# Patient Record
Sex: MALE | Race: WHITE | NOT HISPANIC OR LATINO | Employment: OTHER | URBAN - METROPOLITAN AREA
[De-identification: names, ages, dates, MRNs, and addresses within clinical notes are randomized per-mention and may not be internally consistent; named-entity substitution may affect disease eponyms.]

---

## 2019-03-29 ENCOUNTER — OFFICE VISIT (OUTPATIENT)
Dept: FAMILY MEDICINE CLINIC | Facility: CLINIC | Age: 84
End: 2019-03-29

## 2019-03-29 VITALS
DIASTOLIC BLOOD PRESSURE: 60 MMHG | SYSTOLIC BLOOD PRESSURE: 100 MMHG | HEART RATE: 84 BPM | BODY MASS INDEX: 31.97 KG/M2 | OXYGEN SATURATION: 98 % | WEIGHT: 236 LBS | HEIGHT: 72 IN

## 2019-03-29 DIAGNOSIS — L03.113 CELLULITIS OF RIGHT UPPER EXTREMITY: Primary | ICD-10-CM

## 2019-03-29 PROCEDURE — 99203 OFFICE O/P NEW LOW 30 MIN: CPT | Performed by: FAMILY MEDICINE

## 2019-03-29 RX ORDER — CEPHALEXIN 500 MG/1
500 CAPSULE ORAL 2 TIMES DAILY
Qty: 20 CAPSULE | Refills: 0 | Status: SHIPPED | OUTPATIENT
Start: 2019-03-29 | End: 2019-04-08

## 2019-03-29 RX ORDER — OMEPRAZOLE 40 MG/1
40 CAPSULE, DELAYED RELEASE ORAL DAILY
Refills: 2 | COMMUNITY
Start: 2019-02-21 | End: 2023-04-03 | Stop reason: SDUPTHER

## 2019-03-29 RX ORDER — AMLODIPINE BESYLATE 10 MG/1
5 TABLET ORAL 2 TIMES DAILY
Refills: 10 | COMMUNITY
Start: 2019-01-20 | End: 2023-04-03 | Stop reason: SDUPTHER

## 2019-03-29 RX ORDER — LOSARTAN POTASSIUM 50 MG/1
50 TABLET ORAL 2 TIMES DAILY
Refills: 2 | COMMUNITY
Start: 2019-01-20 | End: 2023-04-03 | Stop reason: SDUPTHER

## 2019-03-29 RX ORDER — CLONIDINE HYDROCHLORIDE 0.2 MG/1
0.2 TABLET ORAL 2 TIMES DAILY
Refills: 2 | COMMUNITY
Start: 2019-02-21

## 2019-03-29 RX ORDER — HYDROCHLOROTHIAZIDE 12.5 MG/1
1 TABLET ORAL DAILY
Refills: 3 | COMMUNITY
Start: 2019-03-21 | End: 2021-12-10 | Stop reason: HOSPADM

## 2019-03-29 NOTE — PROGRESS NOTES
Subjective   Iain Dee is a 83 y.o. male.     Chief Complaint   Patient presents with   • Establish Care     swelling and redness of right hand        Upper Extremity Issue   This is a new problem. The current episode started in the past 7 days. The problem occurs constantly. The problem has been unchanged. Associated symptoms include joint swelling and a rash. Pertinent negatives include no abdominal pain, chills, fatigue, fever, myalgias or weakness. Nothing aggravates the symptoms. He has tried nothing for the symptoms.        Previous primary care: None in years  Sees Dr. Bernstein, cardiology    Acute complaints:  Iain Dee is a 83 y.o. male who presents today to establish [facility]. He reports right hand swelling.    The following portions of the patient's history were reviewed and updated as appropriate: allergies, current medications, past family history, past medical history, past social history, past surgical history and problem list.    Active Ambulatory Problems     Diagnosis Date Noted   • No Active Ambulatory Problems     Resolved Ambulatory Problems     Diagnosis Date Noted   • No Resolved Ambulatory Problems     Past Medical History:   Diagnosis Date   • Arthritis    • Cancer (CMS/[facility])    • Diabetes mellitus (CMS/[facility])    • GERD (gastroesophageal reflux disease)    • Gout    • Hypertension      Past Surgical History:   Procedure Laterality Date   • BLADDER SURGERY      Biopsy   • HERNIA REPAIR     • PROSTATE BIOPSY       Family History   Problem Relation Age of Onset   • Heart attack Father      Social History     Socioeconomic History   • Marital status: Single     Spouse name: Not on file   • Number of children: Not on file   • Years of education: Not on file   • Highest education level: Not on file   Tobacco Use   • Smoking status: Never Smoker   • Smokeless tobacco: Never Used   Substance and Sexual Activity   • Alcohol use: Yes   • Drug use: No         Review of Systems   Constitutional:  "Negative for chills, fatigue and fever.   Gastrointestinal: Negative for abdominal pain.   Musculoskeletal: Positive for joint swelling. Negative for myalgias.   Skin: Positive for rash.   Neurological: Negative for weakness.       Objective   Blood pressure 100/60, pulse 84, height 182.9 cm (72\"), weight 107 kg (236 lb), SpO2 98 %.  Nursing note reviewed  Physical Exam  Const: NAD, A&Ox4, Pleasant, Cooperative  Eyes: EOMI, no conjunctivitis  ENT: No nasal discharge present, neck supple  Cardiac: Regular rate and rhythm, no cyanosis  Resp: Respiratory rate within normal limits, no increased work of breathing, no audible wheezing or retractions noted  GI: No distention or ascites  Neurologic: CN II-XII grossly intact  Psych: Appropriate mood and behavior.  Skin: There is significant dorsal swelling of the right hand and fingers extending to the proximal wrist crease level. There is overlying erythema.  Procedures  Assessment/Plan   Iain was seen today for establish care.    Diagnoses and all orders for this visit:    Cellulitis of right hand  -     cephalexin (KEFLEX) 500 MG capsule; Take 1 capsule by mouth 2 (Two) Times a Day for 10 days.  -     CBC & Differential; Future      We will plan to obtain previous records both for chronic preventative care as well as those related to the current episode of care.  Any records that the patient brought with him today were reviewed personally by me during the visit today and will be scanned into the chart for posterity.    There are no Patient Instructions on file for this visit.    Return in about 1 week (around 4/5/2019) for follow-up acute issue.    Ambulatory progress note signed and attested to by Luis Aguilar D.O. on 3/29/2019 at 10:36.           "

## 2019-04-01 ENCOUNTER — OFFICE VISIT (OUTPATIENT)
Dept: FAMILY MEDICINE CLINIC | Facility: CLINIC | Age: 84
End: 2019-04-01

## 2019-04-01 VITALS
DIASTOLIC BLOOD PRESSURE: 74 MMHG | WEIGHT: 236 LBS | BODY MASS INDEX: 31.97 KG/M2 | SYSTOLIC BLOOD PRESSURE: 120 MMHG | HEART RATE: 74 BPM | HEIGHT: 72 IN | OXYGEN SATURATION: 93 %

## 2019-04-01 DIAGNOSIS — L03.113 CELLULITIS OF RIGHT UPPER EXTREMITY: Primary | ICD-10-CM

## 2019-04-01 DIAGNOSIS — L03.113 CELLULITIS OF RIGHT UPPER EXTREMITY: ICD-10-CM

## 2019-04-01 PROCEDURE — 99214 OFFICE O/P EST MOD 30 MIN: CPT | Performed by: FAMILY MEDICINE

## 2019-04-01 RX ORDER — INDOMETHACIN 50 MG/1
CAPSULE ORAL
Qty: 180 CAPSULE | Refills: 0 | OUTPATIENT
Start: 2019-04-01

## 2019-04-01 RX ORDER — INDOMETHACIN 50 MG/1
50 CAPSULE ORAL 2 TIMES DAILY WITH MEALS
Qty: 20 CAPSULE | Refills: 0 | Status: SHIPPED | OUTPATIENT
Start: 2019-04-01 | End: 2019-04-09 | Stop reason: SDUPTHER

## 2019-04-09 DIAGNOSIS — L03.113 CELLULITIS OF RIGHT UPPER EXTREMITY: ICD-10-CM

## 2019-04-09 RX ORDER — INDOMETHACIN 50 MG/1
CAPSULE ORAL
Qty: 20 CAPSULE | Refills: 0 | Status: SHIPPED | OUTPATIENT
Start: 2019-04-09 | End: 2019-04-18 | Stop reason: SDUPTHER

## 2019-04-18 DIAGNOSIS — L03.113 CELLULITIS OF RIGHT UPPER EXTREMITY: ICD-10-CM

## 2019-04-18 RX ORDER — INDOMETHACIN 50 MG/1
CAPSULE ORAL
Qty: 20 CAPSULE | Refills: 0 | Status: SHIPPED | OUTPATIENT
Start: 2019-04-18 | End: 2019-05-16

## 2019-04-22 NOTE — PROGRESS NOTES
Subjective   Iain Dee is a 83 y.o. male.     Chief Complaint   Patient presents with   • Cellulitis     right hand, no improvement        Upper Extremity Issue   Chronicity: Ongoing. The current episode started in the past 7 days. The problem occurs constantly. The problem has been unchanged. Associated symptoms include joint swelling and a rash. Pertinent negatives include no abdominal pain, chills, fatigue, fever, myalgias or weakness. Nothing aggravates the symptoms. He has tried nothing for the symptoms.   Cellulitis   Chronicity: Ongoing. Associated symptoms include joint swelling and a rash. Pertinent negatives include no abdominal pain, chills, fatigue, fever, myalgias or weakness.        Acute complaints:  Iain Dee is a 83 y.o. male who presents today to follow-up on cellulitis.  He was started on antibiotics last week, talk to his friends again over the weekend and still thinks he has pseudogout.    The following portions of the patient's history were reviewed and updated as appropriate: allergies, current medications, past family history, past medical history, past social history, past surgical history and problem list.    Active Ambulatory Problems     Diagnosis Date Noted   • No Active Ambulatory Problems     Resolved Ambulatory Problems     Diagnosis Date Noted   • No Resolved Ambulatory Problems     Past Medical History:   Diagnosis Date   • Arthritis    • Cancer (CMS/[facility])    • Diabetes mellitus (CMS/[facility])    • GERD (gastroesophageal reflux disease)    • Gout    • Hypertension      Past Surgical History:   Procedure Laterality Date   • BLADDER SURGERY      Biopsy   • HERNIA REPAIR     • PROSTATE BIOPSY       Family History   Problem Relation Age of Onset   • Heart attack Father      Social History     Socioeconomic History   • Marital status: Single     Spouse name: Not on file   • Number of children: Not on file   • Years of education: Not on file   • Highest education level: Not on file  "  Tobacco Use   • Smoking status: Never Smoker   • Smokeless tobacco: Never Used   Substance and Sexual Activity   • Alcohol use: Yes   • Drug use: No         Review of Systems   Constitutional: Negative for chills, fatigue and fever.   Gastrointestinal: Negative for abdominal pain.   Musculoskeletal: Positive for joint swelling. Negative for myalgias.   Skin: Positive for rash.   Neurological: Negative for weakness.       Objective   Blood pressure 120/74, pulse 74, height 182.9 cm (72.01\"), weight 107 kg (236 lb), SpO2 93 %.  Nursing note reviewed  Physical Exam  Const: NAD, A&Ox4, Pleasant, Cooperative  Eyes: EOMI, no conjunctivitis  ENT: No nasal discharge present, neck supple  Cardiac: Regular rate and rhythm, no cyanosis  Resp: Respiratory rate within normal limits, no increased work of breathing, no audible wheezing or retractions noted  GI: No distention or ascites  Neurologic: CN II-XII grossly intact  Psych: Appropriate mood and behavior.  Skin: There is significant dorsal swelling of the right hand and fingers extending to the proximal wrist crease level. There is overlying erythema, which does appear to be slightly improved from last week  Procedures  Assessment/Plan   Iain was seen today for cellulitis.    Diagnoses and all orders for this visit:    Cellulitis of right hand  -     Discontinue: indomethacin (INDOCIN) 50 MG capsule; Take 1 capsule by mouth 2 (Two) Times a Day With Meals.      There is no swelling of a particular joint that would be consistent with pseudogout, and certainly nothing that would be able to be drained today without the assistance of ultrasound, even if it were within the joint.  I like him to continue the antibiotics, he may use indomethacin as needed    There are no Patient Instructions on file for this visit.    No Follow-up on file.    Ambulatory progress note signed and attested to by Luis Aguilar D.O.           "

## 2021-12-08 ENCOUNTER — APPOINTMENT (OUTPATIENT)
Dept: CT IMAGING | Facility: HOSPITAL | Age: 86
End: 2021-12-08

## 2021-12-08 ENCOUNTER — APPOINTMENT (OUTPATIENT)
Dept: GENERAL RADIOLOGY | Facility: HOSPITAL | Age: 86
End: 2021-12-08

## 2021-12-08 ENCOUNTER — APPOINTMENT (OUTPATIENT)
Dept: MRI IMAGING | Facility: HOSPITAL | Age: 86
End: 2021-12-08

## 2021-12-08 ENCOUNTER — HOSPITAL ENCOUNTER (INPATIENT)
Facility: HOSPITAL | Age: 86
LOS: 2 days | Discharge: HOME | End: 2021-12-10
Attending: STUDENT IN AN ORGANIZED HEALTH CARE EDUCATION/TRAINING PROGRAM | Admitting: INTERNAL MEDICINE

## 2021-12-08 ENCOUNTER — APPOINTMENT (OUTPATIENT)
Dept: CARDIOLOGY | Facility: HOSPITAL | Age: 86
End: 2021-12-08

## 2021-12-08 DIAGNOSIS — E83.42 HYPOMAGNESEMIA: ICD-10-CM

## 2021-12-08 DIAGNOSIS — E83.51 HYPOCALCEMIA: ICD-10-CM

## 2021-12-08 DIAGNOSIS — R41.82 ALTERED MENTAL STATUS, UNSPECIFIED ALTERED MENTAL STATUS TYPE: ICD-10-CM

## 2021-12-08 DIAGNOSIS — E87.6 HYPOKALEMIA: Primary | ICD-10-CM

## 2021-12-08 PROBLEM — I48.91 ATRIAL FIBRILLATION: Status: ACTIVE | Noted: 2021-12-08

## 2021-12-08 PROBLEM — Z79.01 CHRONIC ANTICOAGULATION: Status: ACTIVE | Noted: 2021-12-08

## 2021-12-08 PROBLEM — K21.9 GERD (GASTROESOPHAGEAL REFLUX DISEASE): Status: ACTIVE | Noted: 2021-12-08

## 2021-12-08 PROBLEM — G93.40 ENCEPHALOPATHY: Status: ACTIVE | Noted: 2021-12-08

## 2021-12-08 PROBLEM — E88.09 HYPOALBUMINEMIA: Status: ACTIVE | Noted: 2021-12-08

## 2021-12-08 PROBLEM — I10 ESSENTIAL HYPERTENSION: Status: ACTIVE | Noted: 2021-12-08

## 2021-12-08 PROBLEM — R73.9 HYPERGLYCEMIA: Status: ACTIVE | Noted: 2021-12-08

## 2021-12-08 LAB
ALBUMIN SERPL-MCNC: 2.6 G/DL (ref 3.5–5.2)
ALBUMIN/GLOB SERPL: 1.4 G/DL
ALP SERPL-CCNC: 46 U/L (ref 39–117)
ALT SERPL W P-5'-P-CCNC: 9 U/L (ref 1–41)
AMMONIA BLD-SCNC: 16 UMOL/L (ref 16–60)
AMPHET+METHAMPHET UR QL: NEGATIVE
AMPHETAMINES UR QL: NEGATIVE
ANION GAP SERPL CALCULATED.3IONS-SCNC: 12 MMOL/L (ref 5–15)
ANION GAP SERPL CALCULATED.3IONS-SCNC: 14 MMOL/L (ref 5–15)
APAP SERPL-MCNC: <5 MCG/ML (ref 0–30)
AST SERPL-CCNC: 16 U/L (ref 1–40)
B PARAPERT DNA SPEC QL NAA+PROBE: NOT DETECTED
B PERT DNA SPEC QL NAA+PROBE: NOT DETECTED
BACTERIA UR QL AUTO: NORMAL /HPF
BARBITURATES UR QL SCN: NEGATIVE
BASE EXCESS BLDA CALC-SCNC: 4 MMOL/L (ref -5–5)
BASOPHILS # BLD AUTO: 0.1 10*3/MM3 (ref 0–0.2)
BASOPHILS NFR BLD AUTO: 1 % (ref 0–1.5)
BENZODIAZ UR QL SCN: NEGATIVE
BH CV ECHO MEAS - AI DEC SLOPE: 242.5 CM/SEC^2
BH CV ECHO MEAS - AI MAX PG: 90 MMHG
BH CV ECHO MEAS - AI MAX VEL: 474.4 CM/SEC
BH CV ECHO MEAS - AI P1/2T: 572.8 MSEC
BH CV ECHO MEAS - AO MAX PG (FULL): 0.86 MMHG
BH CV ECHO MEAS - AO MAX PG: 5.6 MMHG
BH CV ECHO MEAS - AO MEAN PG (FULL): 1.3 MMHG
BH CV ECHO MEAS - AO MEAN PG: 3.1 MMHG
BH CV ECHO MEAS - AO ROOT AREA (BSA CORRECTED): 1.5
BH CV ECHO MEAS - AO ROOT AREA: 9.3 CM^2
BH CV ECHO MEAS - AO ROOT DIAM: 3.4 CM
BH CV ECHO MEAS - AO V2 MAX: 118.1 CM/SEC
BH CV ECHO MEAS - AO V2 MEAN: 82 CM/SEC
BH CV ECHO MEAS - AO V2 VTI: 24.5 CM
BH CV ECHO MEAS - ASC AORTA: 3.1 CM
BH CV ECHO MEAS - AVA(I,A): 2.5 CM^2
BH CV ECHO MEAS - AVA(I,D): 2.5 CM^2
BH CV ECHO MEAS - AVA(V,A): 2.8 CM^2
BH CV ECHO MEAS - AVA(V,D): 2.8 CM^2
BH CV ECHO MEAS - BSA(HAYCOCK): 2.3 M^2
BH CV ECHO MEAS - BSA: 2.2 M^2
BH CV ECHO MEAS - BZI_BMI: 30.7 KILOGRAMS/M^2
BH CV ECHO MEAS - BZI_METRIC_HEIGHT: 182.9 CM
BH CV ECHO MEAS - BZI_METRIC_WEIGHT: 102.5 KG
BH CV ECHO MEAS - EDV(CUBED): 222 ML
BH CV ECHO MEAS - EDV(MOD-SP2): 90 ML
BH CV ECHO MEAS - EDV(MOD-SP4): 109 ML
BH CV ECHO MEAS - EDV(TEICH): 183.8 ML
BH CV ECHO MEAS - EF(CUBED): 76.2 %
BH CV ECHO MEAS - EF(MOD-BP): 63 %
BH CV ECHO MEAS - EF(MOD-SP2): 61.1 %
BH CV ECHO MEAS - EF(MOD-SP4): 63.3 %
BH CV ECHO MEAS - EF(TEICH): 67.3 %
BH CV ECHO MEAS - ESV(CUBED): 52.9 ML
BH CV ECHO MEAS - ESV(MOD-SP2): 35 ML
BH CV ECHO MEAS - ESV(MOD-SP4): 40 ML
BH CV ECHO MEAS - ESV(TEICH): 60.2 ML
BH CV ECHO MEAS - FS: 38 %
BH CV ECHO MEAS - IVS/LVPW: 0.99
BH CV ECHO MEAS - IVSD: 1.2 CM
BH CV ECHO MEAS - LA DIMENSION: 4.2 CM
BH CV ECHO MEAS - LA/AO: 1.2
BH CV ECHO MEAS - LAD MAJOR: 7.1 CM
BH CV ECHO MEAS - LAT PEAK E' VEL: 12.6 CM/SEC
BH CV ECHO MEAS - LATERAL E/E' RATIO: 7.5
BH CV ECHO MEAS - LV DIASTOLIC VOL/BSA (35-75): 48.6 ML/M^2
BH CV ECHO MEAS - LV IVRT: 0.08 SEC
BH CV ECHO MEAS - LV MASS(C)D: 330.9 GRAMS
BH CV ECHO MEAS - LV MASS(C)DI: 147.4 GRAMS/M^2
BH CV ECHO MEAS - LV MAX PG: 4.7 MMHG
BH CV ECHO MEAS - LV MEAN PG: 1.8 MMHG
BH CV ECHO MEAS - LV SYSTOLIC VOL/BSA (12-30): 17.8 ML/M^2
BH CV ECHO MEAS - LV V1 MAX: 108.6 CM/SEC
BH CV ECHO MEAS - LV V1 MEAN: 58.5 CM/SEC
BH CV ECHO MEAS - LV V1 VTI: 20.2 CM
BH CV ECHO MEAS - LVIDD: 6.1 CM
BH CV ECHO MEAS - LVIDS: 3.8 CM
BH CV ECHO MEAS - LVLD AP2: 7.9 CM
BH CV ECHO MEAS - LVLD AP4: 7.8 CM
BH CV ECHO MEAS - LVLS AP2: 7.6 CM
BH CV ECHO MEAS - LVLS AP4: 7.4 CM
BH CV ECHO MEAS - LVOT AREA (M): 3.1 CM^2
BH CV ECHO MEAS - LVOT AREA: 3.1 CM^2
BH CV ECHO MEAS - LVOT DIAM: 2 CM
BH CV ECHO MEAS - LVPWD: 1.2 CM
BH CV ECHO MEAS - MED PEAK E' VEL: 11.2 CM/SEC
BH CV ECHO MEAS - MEDIAL E/E' RATIO: 8.4
BH CV ECHO MEAS - MV A MAX VEL: 45 CM/SEC
BH CV ECHO MEAS - MV DEC SLOPE: 440.3 CM/SEC^2
BH CV ECHO MEAS - MV DEC TIME: 0.17 SEC
BH CV ECHO MEAS - MV E MAX VEL: 96 CM/SEC
BH CV ECHO MEAS - MV E/A: 2.1
BH CV ECHO MEAS - MV MAX PG: 8.6 MMHG
BH CV ECHO MEAS - MV MEAN PG: 2.8 MMHG
BH CV ECHO MEAS - MV P1/2T MAX VEL: 148.6 CM/SEC
BH CV ECHO MEAS - MV P1/2T: 98.8 MSEC
BH CV ECHO MEAS - MV V2 MAX: 146.7 CM/SEC
BH CV ECHO MEAS - MV V2 MEAN: 70.8 CM/SEC
BH CV ECHO MEAS - MV V2 VTI: 55.8 CM
BH CV ECHO MEAS - MVA P1/2T LCG: 1.5 CM^2
BH CV ECHO MEAS - MVA(P1/2T): 2.2 CM^2
BH CV ECHO MEAS - MVA(VTI): 1.1 CM^2
BH CV ECHO MEAS - PA ACC SLOPE: 433.9 CM/SEC^2
BH CV ECHO MEAS - PA ACC TIME: 0.15 SEC
BH CV ECHO MEAS - PA PR(ACCEL): 12.5 MMHG
BH CV ECHO MEAS - PI END-D VEL: 97.7 CM/SEC
BH CV ECHO MEAS - RAP SYSTOLE: 8 MMHG
BH CV ECHO MEAS - RVDD: 3 CM
BH CV ECHO MEAS - RVSP: 44 MMHG
BH CV ECHO MEAS - SI(AO): 102 ML/M^2
BH CV ECHO MEAS - SI(CUBED): 75.3 ML/M^2
BH CV ECHO MEAS - SI(LVOT): 27.6 ML/M^2
BH CV ECHO MEAS - SI(MOD-SP2): 24.5 ML/M^2
BH CV ECHO MEAS - SI(MOD-SP4): 30.7 ML/M^2
BH CV ECHO MEAS - SI(TEICH): 55.1 ML/M^2
BH CV ECHO MEAS - SV(AO): 228.9 ML
BH CV ECHO MEAS - SV(CUBED): 169 ML
BH CV ECHO MEAS - SV(LVOT): 62 ML
BH CV ECHO MEAS - SV(MOD-SP2): 55 ML
BH CV ECHO MEAS - SV(MOD-SP4): 69 ML
BH CV ECHO MEAS - SV(TEICH): 123.6 ML
BH CV ECHO MEAS - TAPSE (>1.6): 1.5 CM
BH CV ECHO MEAS - TR MAX PG: 36 MMHG
BH CV ECHO MEAS - TR MAX VEL: 299.1 CM/SEC
BH CV ECHO MEASUREMENTS AVERAGE E/E' RATIO: 8.07
BH CV VAS BP RIGHT ARM: NORMAL MMHG
BH CV XLRA - RV BASE: 4.3 CM
BH CV XLRA - RV LENGTH: 8.5 CM
BH CV XLRA - RV MID: 3.5 CM
BH CV XLRA - TDI S': 19.4 CM/SEC
BILIRUB SERPL-MCNC: 0.5 MG/DL (ref 0–1.2)
BILIRUB UR QL STRIP: NEGATIVE
BUN SERPL-MCNC: 17 MG/DL (ref 8–23)
BUN SERPL-MCNC: 20 MG/DL (ref 8–23)
BUN/CREAT SERPL: 18.2 (ref 7–25)
BUN/CREAT SERPL: 20.5 (ref 7–25)
BUPRENORPHINE SERPL-MCNC: NEGATIVE NG/ML
C PNEUM DNA NPH QL NAA+NON-PROBE: NOT DETECTED
CA-I BLDA-SCNC: 0.72 MMOL/L (ref 1.2–1.32)
CA-I SERPL ISE-MCNC: 0.83 MMOL/L (ref 1.12–1.32)
CA-I SERPL ISE-MCNC: 0.9 MMOL/L (ref 1.12–1.32)
CALCIUM SPEC-SCNC: 4.3 MG/DL (ref 8.6–10.5)
CALCIUM SPEC-SCNC: 6.5 MG/DL (ref 8.6–10.5)
CANNABINOIDS SERPL QL: NEGATIVE
CHLORIDE SERPL-SCNC: 108 MMOL/L (ref 98–107)
CHLORIDE SERPL-SCNC: 94 MMOL/L (ref 98–107)
CHOLEST SERPL-MCNC: 129 MG/DL (ref 0–200)
CLARITY UR: CLEAR
CO2 BLDA-SCNC: 30 MMOL/L (ref 24–29)
CO2 SERPL-SCNC: 22 MMOL/L (ref 22–29)
CO2 SERPL-SCNC: 29 MMOL/L (ref 22–29)
COCAINE UR QL: NEGATIVE
COLOR UR: YELLOW
CREAT BLDA-MCNC: 1.2 MG/DL (ref 0.6–1.3)
CREAT SERPL-MCNC: 0.83 MG/DL (ref 0.76–1.27)
CREAT SERPL-MCNC: 1.1 MG/DL (ref 0.76–1.27)
CRP SERPL-MCNC: 4.51 MG/DL (ref 0–0.5)
D-LACTATE SERPL-SCNC: 1.3 MMOL/L (ref 0.5–2)
D-LACTATE SERPL-SCNC: 3 MMOL/L (ref 0.5–2)
DEPRECATED RDW RBC AUTO: 45.1 FL (ref 37–54)
EOSINOPHIL # BLD AUTO: 0.25 10*3/MM3 (ref 0–0.4)
EOSINOPHIL NFR BLD AUTO: 2.5 % (ref 0.3–6.2)
ERYTHROCYTE [DISTWIDTH] IN BLOOD BY AUTOMATED COUNT: 12.4 % (ref 12.3–15.4)
ETHANOL BLD-MCNC: <10 MG/DL (ref 0–10)
FLUAV SUBTYP SPEC NAA+PROBE: NOT DETECTED
FLUBV RNA ISLT QL NAA+PROBE: NOT DETECTED
GFR SERPL CREATININE-BSD FRML MDRD: 64 ML/MIN/1.73
GFR SERPL CREATININE-BSD FRML MDRD: 88 ML/MIN/1.73
GLOBULIN UR ELPH-MCNC: 1.9 GM/DL
GLUCOSE BLDC GLUCOMTR-MCNC: 126 MG/DL (ref 70–130)
GLUCOSE BLDC GLUCOMTR-MCNC: 165 MG/DL (ref 70–130)
GLUCOSE BLDC GLUCOMTR-MCNC: 199 MG/DL (ref 70–130)
GLUCOSE BLDC GLUCOMTR-MCNC: 202 MG/DL (ref 70–130)
GLUCOSE BLDC GLUCOMTR-MCNC: 205 MG/DL (ref 70–130)
GLUCOSE BLDC GLUCOMTR-MCNC: 206 MG/DL (ref 70–130)
GLUCOSE SERPL-MCNC: 160 MG/DL (ref 65–99)
GLUCOSE SERPL-MCNC: 183 MG/DL (ref 65–99)
GLUCOSE UR STRIP-MCNC: NEGATIVE MG/DL
HADV DNA SPEC NAA+PROBE: NOT DETECTED
HBA1C MFR BLD: 8 % (ref 4.8–5.6)
HCO3 BLDA-SCNC: 28.8 MMOL/L (ref 22–26)
HCOV 229E RNA SPEC QL NAA+PROBE: NOT DETECTED
HCOV HKU1 RNA SPEC QL NAA+PROBE: NOT DETECTED
HCOV NL63 RNA SPEC QL NAA+PROBE: NOT DETECTED
HCOV OC43 RNA SPEC QL NAA+PROBE: NOT DETECTED
HCT VFR BLD AUTO: 38 % (ref 37.5–51)
HCT VFR BLDA CALC: 36 % (ref 38–51)
HDLC SERPL-MCNC: 28 MG/DL (ref 40–60)
HGB BLD-MCNC: 12.8 G/DL (ref 13–17.7)
HGB BLDA-MCNC: 12.2 G/DL (ref 12–17)
HGB UR QL STRIP.AUTO: ABNORMAL
HMPV RNA NPH QL NAA+NON-PROBE: NOT DETECTED
HOLD SPECIMEN: NORMAL
HPIV1 RNA ISLT QL NAA+PROBE: NOT DETECTED
HPIV2 RNA SPEC QL NAA+PROBE: NOT DETECTED
HPIV3 RNA NPH QL NAA+PROBE: NOT DETECTED
HPIV4 P GENE NPH QL NAA+PROBE: NOT DETECTED
HYALINE CASTS UR QL AUTO: NORMAL /LPF
IMM GRANULOCYTES # BLD AUTO: 0.04 10*3/MM3 (ref 0–0.05)
IMM GRANULOCYTES NFR BLD AUTO: 0.4 % (ref 0–0.5)
INR PPP: 1.3 (ref 0.8–1.2)
KETONES UR QL STRIP: NEGATIVE
LDH SERPL-CCNC: 296 U/L (ref 135–225)
LDLC SERPL CALC-MCNC: 84 MG/DL (ref 0–100)
LDLC/HDLC SERPL: 2.96 {RATIO}
LEFT ATRIUM VOLUME INDEX: 42 ML/M^2
LEFT ATRIUM VOLUME: 93 ML
LEUKOCYTE ESTERASE UR QL STRIP.AUTO: NEGATIVE
LYMPHOCYTES # BLD AUTO: 1.45 10*3/MM3 (ref 0.7–3.1)
LYMPHOCYTES NFR BLD AUTO: 14.7 % (ref 19.6–45.3)
M PNEUMO IGG SER IA-ACNC: NOT DETECTED
MAGNESIUM SERPL-MCNC: 0.3 MG/DL (ref 1.6–2.4)
MAGNESIUM SERPL-MCNC: 0.5 MG/DL (ref 1.6–2.4)
MAGNESIUM SERPL-MCNC: 1.2 MG/DL (ref 1.6–2.4)
MAGNESIUM SERPL-MCNC: 1.2 MG/DL (ref 1.6–2.4)
MAGNESIUM SERPL-MCNC: 1.9 MG/DL (ref 1.6–2.4)
MCH RBC QN AUTO: 32.9 PG (ref 26.6–33)
MCHC RBC AUTO-ENTMCNC: 33.7 G/DL (ref 31.5–35.7)
MCV RBC AUTO: 97.7 FL (ref 79–97)
METHADONE UR QL SCN: NEGATIVE
MONOCYTES # BLD AUTO: 0.64 10*3/MM3 (ref 0.1–0.9)
MONOCYTES NFR BLD AUTO: 6.5 % (ref 5–12)
NEUTROPHILS NFR BLD AUTO: 7.37 10*3/MM3 (ref 1.7–7)
NEUTROPHILS NFR BLD AUTO: 74.9 % (ref 42.7–76)
NITRITE UR QL STRIP: NEGATIVE
NRBC BLD AUTO-RTO: 0 /100 WBC (ref 0–0.2)
NT-PROBNP SERPL-MCNC: 778.3 PG/ML (ref 0–1800)
OPIATES UR QL: NEGATIVE
OXYCODONE UR QL SCN: NEGATIVE
PCO2 BLDA: 48 MM HG (ref 35–45)
PCP UR QL SCN: NEGATIVE
PH BLDA: 7.39 PH UNITS (ref 7.35–7.6)
PH UR STRIP.AUTO: 6.5 [PH] (ref 5–8)
PHOSPHATE SERPL-MCNC: 2.3 MG/DL (ref 2.5–4.5)
PHOSPHATE SERPL-MCNC: 2.5 MG/DL (ref 2.5–4.5)
PHOSPHATE SERPL-MCNC: 3.3 MG/DL (ref 2.5–4.5)
PLATELET # BLD AUTO: 393 10*3/MM3 (ref 140–450)
PMV BLD AUTO: 10.6 FL (ref 6–12)
PO2 BLDA: 38 MMHG (ref 80–105)
POTASSIUM BLDA-SCNC: 2.4 MMOL/L (ref 3.5–4.9)
POTASSIUM SERPL-SCNC: 1.9 MMOL/L (ref 3.5–5.2)
POTASSIUM SERPL-SCNC: 2.7 MMOL/L (ref 3.5–5.2)
POTASSIUM SERPL-SCNC: 2.9 MMOL/L (ref 3.5–5.2)
POTASSIUM SERPL-SCNC: 3.5 MMOL/L (ref 3.5–5.2)
POTASSIUM SERPL-SCNC: 3.5 MMOL/L (ref 3.5–5.2)
PROCALCITONIN SERPL-MCNC: 0.1 NG/ML (ref 0–0.25)
PROPOXYPH UR QL: NEGATIVE
PROT SERPL-MCNC: 4.5 G/DL (ref 6–8.5)
PROT UR QL STRIP: ABNORMAL
PROTHROMBIN TIME: 14.9 SECONDS (ref 12.8–15.2)
RBC # BLD AUTO: 3.89 10*6/MM3 (ref 4.14–5.8)
RBC # UR STRIP: NORMAL /HPF
REF LAB TEST METHOD: NORMAL
RHINOVIRUS RNA SPEC NAA+PROBE: NOT DETECTED
RSV RNA NPH QL NAA+NON-PROBE: NOT DETECTED
SALICYLATES SERPL-MCNC: <0.3 MG/DL
SAO2 % BLDA: 71 % (ref 95–98)
SARS-COV-2 RNA NPH QL NAA+NON-PROBE: NOT DETECTED
SODIUM BLD-SCNC: 142 MMOL/L (ref 138–146)
SODIUM SERPL-SCNC: 137 MMOL/L (ref 136–145)
SODIUM SERPL-SCNC: 142 MMOL/L (ref 136–145)
SP GR UR STRIP: 1.03 (ref 1–1.03)
SQUAMOUS #/AREA URNS HPF: NORMAL /HPF
T4 FREE SERPL-MCNC: 1.22 NG/DL (ref 0.93–1.7)
TRICYCLICS UR QL SCN: NEGATIVE
TRIGL SERPL-MCNC: 90 MG/DL (ref 0–150)
TROPONIN T SERPL-MCNC: 0.02 NG/ML (ref 0–0.03)
URATE SERPL-MCNC: 5.5 MG/DL (ref 3.4–7)
UROBILINOGEN UR QL STRIP: ABNORMAL
VLDLC SERPL-MCNC: 17 MG/DL (ref 5–40)
WBC # UR STRIP: NORMAL /HPF
WBC NRBC COR # BLD: 9.85 10*3/MM3 (ref 3.4–10.8)
WHOLE BLOOD HOLD SPECIMEN: NORMAL
WHOLE BLOOD HOLD SPECIMEN: NORMAL

## 2021-12-08 PROCEDURE — 83605 ASSAY OF LACTIC ACID: CPT | Performed by: STUDENT IN AN ORGANIZED HEALTH CARE EDUCATION/TRAINING PROGRAM

## 2021-12-08 PROCEDURE — 82330 ASSAY OF CALCIUM: CPT | Performed by: STUDENT IN AN ORGANIZED HEALTH CARE EDUCATION/TRAINING PROGRAM

## 2021-12-08 PROCEDURE — 63710000001 INSULIN LISPRO (HUMAN) PER 5 UNITS: Performed by: NURSE PRACTITIONER

## 2021-12-08 PROCEDURE — 82947 ASSAY GLUCOSE BLOOD QUANT: CPT

## 2021-12-08 PROCEDURE — 84295 ASSAY OF SERUM SODIUM: CPT

## 2021-12-08 PROCEDURE — 83735 ASSAY OF MAGNESIUM: CPT | Performed by: NURSE PRACTITIONER

## 2021-12-08 PROCEDURE — 99291 CRITICAL CARE FIRST HOUR: CPT | Performed by: INTERNAL MEDICINE

## 2021-12-08 PROCEDURE — 84100 ASSAY OF PHOSPHORUS: CPT | Performed by: INTERNAL MEDICINE

## 2021-12-08 PROCEDURE — 83735 ASSAY OF MAGNESIUM: CPT | Performed by: INTERNAL MEDICINE

## 2021-12-08 PROCEDURE — 84132 ASSAY OF SERUM POTASSIUM: CPT | Performed by: INTERNAL MEDICINE

## 2021-12-08 PROCEDURE — 92523 SPEECH SOUND LANG COMPREHEN: CPT | Performed by: SPEECH-LANGUAGE PATHOLOGIST

## 2021-12-08 PROCEDURE — 85014 HEMATOCRIT: CPT

## 2021-12-08 PROCEDURE — 0 POTASSIUM CHLORIDE 10 MEQ/100ML SOLUTION: Performed by: STUDENT IN AN ORGANIZED HEALTH CARE EDUCATION/TRAINING PROGRAM

## 2021-12-08 PROCEDURE — 86140 C-REACTIVE PROTEIN: CPT | Performed by: STUDENT IN AN ORGANIZED HEALTH CARE EDUCATION/TRAINING PROGRAM

## 2021-12-08 PROCEDURE — 84100 ASSAY OF PHOSPHORUS: CPT | Performed by: NURSE PRACTITIONER

## 2021-12-08 PROCEDURE — P9612 CATHETERIZE FOR URINE SPEC: HCPCS

## 2021-12-08 PROCEDURE — 0 MAGNESIUM SULFATE 4 GM/100ML SOLUTION: Performed by: STUDENT IN AN ORGANIZED HEALTH CARE EDUCATION/TRAINING PROGRAM

## 2021-12-08 PROCEDURE — 84145 PROCALCITONIN (PCT): CPT | Performed by: STUDENT IN AN ORGANIZED HEALTH CARE EDUCATION/TRAINING PROGRAM

## 2021-12-08 PROCEDURE — 94799 UNLISTED PULMONARY SVC/PX: CPT

## 2021-12-08 PROCEDURE — 70551 MRI BRAIN STEM W/O DYE: CPT

## 2021-12-08 PROCEDURE — 80306 DRUG TEST PRSMV INSTRMNT: CPT | Performed by: STUDENT IN AN ORGANIZED HEALTH CARE EDUCATION/TRAINING PROGRAM

## 2021-12-08 PROCEDURE — 82565 ASSAY OF CREATININE: CPT

## 2021-12-08 PROCEDURE — 85025 COMPLETE CBC W/AUTO DIFF WBC: CPT | Performed by: STUDENT IN AN ORGANIZED HEALTH CARE EDUCATION/TRAINING PROGRAM

## 2021-12-08 PROCEDURE — 87040 BLOOD CULTURE FOR BACTERIA: CPT | Performed by: STUDENT IN AN ORGANIZED HEALTH CARE EDUCATION/TRAINING PROGRAM

## 2021-12-08 PROCEDURE — 84132 ASSAY OF SERUM POTASSIUM: CPT

## 2021-12-08 PROCEDURE — 81001 URINALYSIS AUTO W/SCOPE: CPT | Performed by: STUDENT IN AN ORGANIZED HEALTH CARE EDUCATION/TRAINING PROGRAM

## 2021-12-08 PROCEDURE — 0042T HC CT CEREBRAL PERFUSION W/WO CONTRAST: CPT

## 2021-12-08 PROCEDURE — 97166 OT EVAL MOD COMPLEX 45 MIN: CPT

## 2021-12-08 PROCEDURE — 84100 ASSAY OF PHOSPHORUS: CPT | Performed by: STUDENT IN AN ORGANIZED HEALTH CARE EDUCATION/TRAINING PROGRAM

## 2021-12-08 PROCEDURE — 94660 CPAP INITIATION&MGMT: CPT

## 2021-12-08 PROCEDURE — 80143 DRUG ASSAY ACETAMINOPHEN: CPT | Performed by: STUDENT IN AN ORGANIZED HEALTH CARE EDUCATION/TRAINING PROGRAM

## 2021-12-08 PROCEDURE — 99285 EMERGENCY DEPT VISIT HI MDM: CPT

## 2021-12-08 PROCEDURE — 92610 EVALUATE SWALLOWING FUNCTION: CPT | Performed by: SPEECH-LANGUAGE PATHOLOGIST

## 2021-12-08 PROCEDURE — 25010000002 MAGNESIUM SULFATE 2 GM/50ML SOLUTION: Performed by: NURSE PRACTITIONER

## 2021-12-08 PROCEDURE — 99221 1ST HOSP IP/OBS SF/LOW 40: CPT | Performed by: PSYCHIATRY & NEUROLOGY

## 2021-12-08 PROCEDURE — 82140 ASSAY OF AMMONIA: CPT | Performed by: NURSE PRACTITIONER

## 2021-12-08 PROCEDURE — 82330 ASSAY OF CALCIUM: CPT

## 2021-12-08 PROCEDURE — 70496 CT ANGIOGRAPHY HEAD: CPT

## 2021-12-08 PROCEDURE — 83615 LACTATE (LD) (LDH) ENZYME: CPT | Performed by: STUDENT IN AN ORGANIZED HEALTH CARE EDUCATION/TRAINING PROGRAM

## 2021-12-08 PROCEDURE — 25010000002 CALCIUM GLUCONATE PER 10 ML

## 2021-12-08 PROCEDURE — 70498 CT ANGIOGRAPHY NECK: CPT

## 2021-12-08 PROCEDURE — 0202U NFCT DS 22 TRGT SARS-COV-2: CPT | Performed by: NURSE PRACTITIONER

## 2021-12-08 PROCEDURE — 93306 TTE W/DOPPLER COMPLETE: CPT

## 2021-12-08 PROCEDURE — 80053 COMPREHEN METABOLIC PANEL: CPT | Performed by: STUDENT IN AN ORGANIZED HEALTH CARE EDUCATION/TRAINING PROGRAM

## 2021-12-08 PROCEDURE — 70450 CT HEAD/BRAIN W/O DYE: CPT

## 2021-12-08 PROCEDURE — 82330 ASSAY OF CALCIUM: CPT | Performed by: INTERNAL MEDICINE

## 2021-12-08 PROCEDURE — 80061 LIPID PANEL: CPT | Performed by: NURSE PRACTITIONER

## 2021-12-08 PROCEDURE — 85610 PROTHROMBIN TIME: CPT

## 2021-12-08 PROCEDURE — 83036 HEMOGLOBIN GLYCOSYLATED A1C: CPT | Performed by: NURSE PRACTITIONER

## 2021-12-08 PROCEDURE — 0 IOPAMIDOL PER 1 ML: Performed by: STUDENT IN AN ORGANIZED HEALTH CARE EDUCATION/TRAINING PROGRAM

## 2021-12-08 PROCEDURE — 82803 BLOOD GASES ANY COMBINATION: CPT

## 2021-12-08 PROCEDURE — 82077 ASSAY SPEC XCP UR&BREATH IA: CPT | Performed by: STUDENT IN AN ORGANIZED HEALTH CARE EDUCATION/TRAINING PROGRAM

## 2021-12-08 PROCEDURE — 84132 ASSAY OF SERUM POTASSIUM: CPT | Performed by: NURSE PRACTITIONER

## 2021-12-08 PROCEDURE — 71045 X-RAY EXAM CHEST 1 VIEW: CPT

## 2021-12-08 PROCEDURE — 83735 ASSAY OF MAGNESIUM: CPT | Performed by: STUDENT IN AN ORGANIZED HEALTH CARE EDUCATION/TRAINING PROGRAM

## 2021-12-08 PROCEDURE — 4A03X5D MEASUREMENT OF ARTERIAL FLOW, INTRACRANIAL, EXTERNAL APPROACH: ICD-10-PCS | Performed by: STUDENT IN AN ORGANIZED HEALTH CARE EDUCATION/TRAINING PROGRAM

## 2021-12-08 PROCEDURE — 97530 THERAPEUTIC ACTIVITIES: CPT

## 2021-12-08 PROCEDURE — 84484 ASSAY OF TROPONIN QUANT: CPT | Performed by: STUDENT IN AN ORGANIZED HEALTH CARE EDUCATION/TRAINING PROGRAM

## 2021-12-08 PROCEDURE — 97162 PT EVAL MOD COMPLEX 30 MIN: CPT

## 2021-12-08 PROCEDURE — 80179 DRUG ASSAY SALICYLATE: CPT | Performed by: STUDENT IN AN ORGANIZED HEALTH CARE EDUCATION/TRAINING PROGRAM

## 2021-12-08 PROCEDURE — 93005 ELECTROCARDIOGRAM TRACING: CPT | Performed by: STUDENT IN AN ORGANIZED HEALTH CARE EDUCATION/TRAINING PROGRAM

## 2021-12-08 PROCEDURE — 93005 ELECTROCARDIOGRAM TRACING: CPT

## 2021-12-08 PROCEDURE — 84439 ASSAY OF FREE THYROXINE: CPT | Performed by: STUDENT IN AN ORGANIZED HEALTH CARE EDUCATION/TRAINING PROGRAM

## 2021-12-08 PROCEDURE — 84550 ASSAY OF BLOOD/URIC ACID: CPT | Performed by: STUDENT IN AN ORGANIZED HEALTH CARE EDUCATION/TRAINING PROGRAM

## 2021-12-08 PROCEDURE — 83880 ASSAY OF NATRIURETIC PEPTIDE: CPT | Performed by: STUDENT IN AN ORGANIZED HEALTH CARE EDUCATION/TRAINING PROGRAM

## 2021-12-08 RX ORDER — SODIUM CHLORIDE 0.9 % (FLUSH) 0.9 %
10 SYRINGE (ML) INJECTION EVERY 12 HOURS SCHEDULED
Status: DISCONTINUED | OUTPATIENT
Start: 2021-12-08 | End: 2021-12-10 | Stop reason: HOSPADM

## 2021-12-08 RX ORDER — POTASSIUM CHLORIDE 7.45 MG/ML
10 INJECTION INTRAVENOUS ONCE
Status: COMPLETED | OUTPATIENT
Start: 2021-12-08 | End: 2021-12-08

## 2021-12-08 RX ORDER — PANTOPRAZOLE SODIUM 40 MG/1
40 TABLET, DELAYED RELEASE ORAL EVERY MORNING
Refills: 2 | Status: DISCONTINUED | OUTPATIENT
Start: 2021-12-08 | End: 2021-12-10 | Stop reason: HOSPADM

## 2021-12-08 RX ORDER — ATORVASTATIN CALCIUM 40 MG/1
80 TABLET, FILM COATED ORAL NIGHTLY
Status: DISCONTINUED | OUTPATIENT
Start: 2021-12-08 | End: 2021-12-10 | Stop reason: HOSPADM

## 2021-12-08 RX ORDER — SODIUM CHLORIDE 0.9 % (FLUSH) 0.9 %
10 SYRINGE (ML) INJECTION AS NEEDED
Status: DISCONTINUED | OUTPATIENT
Start: 2021-12-08 | End: 2021-12-08

## 2021-12-08 RX ORDER — ASPIRIN 81 MG/1
81 TABLET, CHEWABLE ORAL DAILY
Status: DISCONTINUED | OUTPATIENT
Start: 2021-12-08 | End: 2021-12-10 | Stop reason: HOSPADM

## 2021-12-08 RX ORDER — POTASSIUM CHLORIDE 7.45 MG/ML
10 INJECTION INTRAVENOUS
Status: DISCONTINUED | OUTPATIENT
Start: 2021-12-08 | End: 2021-12-10 | Stop reason: HOSPADM

## 2021-12-08 RX ORDER — POTASSIUM CHLORIDE 1.5 G/1.77G
40 POWDER, FOR SOLUTION ORAL AS NEEDED
Status: DISCONTINUED | OUTPATIENT
Start: 2021-12-08 | End: 2021-12-10 | Stop reason: HOSPADM

## 2021-12-08 RX ORDER — AMLODIPINE BESYLATE 5 MG/1
5 TABLET ORAL
Status: DISCONTINUED | OUTPATIENT
Start: 2021-12-08 | End: 2021-12-10 | Stop reason: HOSPADM

## 2021-12-08 RX ORDER — ASPIRIN 300 MG/1
300 SUPPOSITORY RECTAL DAILY
Status: DISCONTINUED | OUTPATIENT
Start: 2021-12-08 | End: 2021-12-10 | Stop reason: HOSPADM

## 2021-12-08 RX ORDER — POTASSIUM CHLORIDE 750 MG/1
40 CAPSULE, EXTENDED RELEASE ORAL AS NEEDED
Status: DISCONTINUED | OUTPATIENT
Start: 2021-12-08 | End: 2021-12-10 | Stop reason: HOSPADM

## 2021-12-08 RX ORDER — MAGNESIUM SULFATE HEPTAHYDRATE 40 MG/ML
2 INJECTION, SOLUTION INTRAVENOUS AS NEEDED
Status: DISCONTINUED | OUTPATIENT
Start: 2021-12-08 | End: 2021-12-10 | Stop reason: HOSPADM

## 2021-12-08 RX ORDER — MAGNESIUM SULFATE HEPTAHYDRATE 40 MG/ML
4 INJECTION, SOLUTION INTRAVENOUS AS NEEDED
Status: DISCONTINUED | OUTPATIENT
Start: 2021-12-08 | End: 2021-12-10 | Stop reason: HOSPADM

## 2021-12-08 RX ORDER — CALCIUM GLUCONATE 94 MG/ML
2 INJECTION, SOLUTION INTRAVENOUS ONCE
Status: DISCONTINUED | OUTPATIENT
Start: 2021-12-08 | End: 2021-12-08

## 2021-12-08 RX ORDER — SODIUM CHLORIDE 0.9 % (FLUSH) 0.9 %
10 SYRINGE (ML) INJECTION AS NEEDED
Status: DISCONTINUED | OUTPATIENT
Start: 2021-12-08 | End: 2021-12-10 | Stop reason: HOSPADM

## 2021-12-08 RX ORDER — MAGNESIUM SULFATE HEPTAHYDRATE 40 MG/ML
4 INJECTION, SOLUTION INTRAVENOUS ONCE
Status: COMPLETED | OUTPATIENT
Start: 2021-12-08 | End: 2021-12-08

## 2021-12-08 RX ORDER — PANTOPRAZOLE SODIUM 40 MG/10ML
40 INJECTION, POWDER, LYOPHILIZED, FOR SOLUTION INTRAVENOUS
Status: DISCONTINUED | OUTPATIENT
Start: 2021-12-08 | End: 2021-12-08

## 2021-12-08 RX ADMIN — POTASSIUM CHLORIDE 40 MEQ: 750 CAPSULE, EXTENDED RELEASE ORAL at 12:43

## 2021-12-08 RX ADMIN — INSULIN LISPRO 3 UNITS: 100 INJECTION, SOLUTION INTRAVENOUS; SUBCUTANEOUS at 12:43

## 2021-12-08 RX ADMIN — METOPROLOL TARTRATE 12.5 MG: 25 TABLET, FILM COATED ORAL at 08:39

## 2021-12-08 RX ADMIN — IOPAMIDOL 100 ML: 755 INJECTION, SOLUTION INTRAVENOUS at 02:22

## 2021-12-08 RX ADMIN — APIXABAN 2.5 MG: 2.5 TABLET, FILM COATED ORAL at 08:39

## 2021-12-08 RX ADMIN — POTASSIUM CHLORIDE 40 MEQ: 750 CAPSULE, EXTENDED RELEASE ORAL at 16:15

## 2021-12-08 RX ADMIN — ATORVASTATIN CALCIUM 80 MG: 40 TABLET, FILM COATED ORAL at 21:44

## 2021-12-08 RX ADMIN — PANTOPRAZOLE SODIUM 40 MG: 40 INJECTION, POWDER, FOR SOLUTION INTRAVENOUS at 06:08

## 2021-12-08 RX ADMIN — MAGNESIUM SULFATE HEPTAHYDRATE 4 G: 40 INJECTION, SOLUTION INTRAVENOUS at 03:32

## 2021-12-08 RX ADMIN — CALCIUM GLUCONATE 2 G: 98 INJECTION, SOLUTION INTRAVENOUS at 04:31

## 2021-12-08 RX ADMIN — PANTOPRAZOLE SODIUM 40 MG: 40 TABLET, DELAYED RELEASE ORAL at 12:43

## 2021-12-08 RX ADMIN — MAGNESIUM SULFATE HEPTAHYDRATE 2 G: 2 INJECTION, SOLUTION INTRAVENOUS at 08:37

## 2021-12-08 RX ADMIN — POTASSIUM CHLORIDE 40 MEQ: 750 CAPSULE, EXTENDED RELEASE ORAL at 08:38

## 2021-12-08 RX ADMIN — INSULIN LISPRO 3 UNITS: 100 INJECTION, SOLUTION INTRAVENOUS; SUBCUTANEOUS at 21:44

## 2021-12-08 RX ADMIN — METOPROLOL TARTRATE 12.5 MG: 25 TABLET, FILM COATED ORAL at 21:44

## 2021-12-08 RX ADMIN — POTASSIUM CHLORIDE 10 MEQ: 7.46 INJECTION, SOLUTION INTRAVENOUS at 03:30

## 2021-12-08 RX ADMIN — ASPIRIN 81 MG CHEWABLE TABLET 81 MG: 81 TABLET CHEWABLE at 08:39

## 2021-12-08 RX ADMIN — MAGNESIUM SULFATE HEPTAHYDRATE 2 G: 2 INJECTION, SOLUTION INTRAVENOUS at 10:33

## 2021-12-08 RX ADMIN — INSULIN LISPRO 2 UNITS: 100 INJECTION, SOLUTION INTRAVENOUS; SUBCUTANEOUS at 17:49

## 2021-12-08 RX ADMIN — SODIUM CHLORIDE, PRESERVATIVE FREE 10 ML: 5 INJECTION INTRAVENOUS at 21:45

## 2021-12-08 RX ADMIN — APIXABAN 2.5 MG: 2.5 TABLET, FILM COATED ORAL at 21:45

## 2021-12-08 RX ADMIN — POTASSIUM CHLORIDE 10 MEQ: 7.46 INJECTION, SOLUTION INTRAVENOUS at 04:52

## 2021-12-08 RX ADMIN — SODIUM CHLORIDE 500 ML: 9 INJECTION, SOLUTION INTRAVENOUS at 01:57

## 2021-12-08 RX ADMIN — MAGNESIUM SULFATE HEPTAHYDRATE 2 G: 2 INJECTION, SOLUTION INTRAVENOUS at 12:43

## 2021-12-08 RX ADMIN — AMLODIPINE BESYLATE 5 MG: 5 TABLET ORAL at 08:39

## 2021-12-08 RX ADMIN — SODIUM CHLORIDE, POTASSIUM CHLORIDE, SODIUM LACTATE AND CALCIUM CHLORIDE 500 ML: 600; 310; 30; 20 INJECTION, SOLUTION INTRAVENOUS at 03:39

## 2021-12-08 RX ADMIN — SODIUM CHLORIDE, PRESERVATIVE FREE 10 ML: 5 INJECTION INTRAVENOUS at 08:39

## 2021-12-08 NOTE — ED PROVIDER NOTES
EMERGENCY DEPARTMENT ENCOUNTER    Pt Name: Iain Dee  MRN: [id number]  Pt :   1935  Room Number:  N216/1  Date of encounter:  2021  PCP: Luis Aguilar DO  ED Provider: Coy Rain MD    Historian: EMS, wife      HPI:  Chief Complaint: Altered mental status        Context: Iain Dee is a 86-year-old man who was brought in the emergency department by EMS for altered mental status.  Reportedly by his wife who is at the bedside he was in his normal state of health when going to bed at 10 PM last night.  She says his mental baseline is alert and oriented with no confusion or dementia.  Then immediately prior to arrival she was awoken by him she says flailing around in bed and confused.  EMS report that when they arrived he was combative to the point that they nearly needed sedation but he calmed down after a while.  He is remained confused.  Upon arrival here he has not alert to the situation, place, or time and says he has no idea what happened and has no complaints.  They deny recent illness or fevers.  No other history known at this time.  PAST MEDICAL HISTORY  Past Medical History:   Diagnosis Date   • Arthritis    • Cancer (HCC)    • Diabetes mellitus (HCC)    • GERD (gastroesophageal reflux disease)    • Gout    • H/O Bladder carcinoma (HCC)    • Hypertension          PAST SURGICAL HISTORY  Past Surgical History:   Procedure Laterality Date   • BLADDER SURGERY      Biopsy   • HERNIA REPAIR     • PROSTATE BIOPSY           FAMILY HISTORY  Family History   Problem Relation Age of Onset   • Heart attack Father          SOCIAL HISTORY  Social History     Socioeconomic History   • Marital status: Single   Tobacco Use   • Smoking status: Never Smoker   • Smokeless tobacco: Never Used   Substance and Sexual Activity   • Alcohol use: Yes   • Drug use: No         ALLERGIES  Colchicine        REVIEW OF SYSTEMS  Review of Systems     Unobtainable due to altered mental status but  negative per the wife.       PHYSICAL EXAM    I have reviewed the triage vital signs and nursing notes.    ED Triage Vitals   Temp Heart Rate Resp BP SpO2   12/08/21 0159 12/08/21 0152 12/08/21 0152 12/08/21 0156 12/08/21 0152   98.5 °F (36.9 °C) 111 16 91/71 (!) 85 %      Temp src Heart Rate Source Patient Position BP Location FiO2 (%)   12/08/21 0159 12/08/21 0152 12/08/21 0156 12/08/21 0156 --   Oral Monitor Lying Right arm        Physical Exam  GENERAL:   Appears pale and acutely ill  HENT: Nares patent.  Dry mucous membranes  EYES: No scleral icterus.  Pupils equal and reactive extraocular movements intact  CV: Tachycardia, regular heart rate, no murmurs rubs or gallops  RESPIRATORY: Normal effort.  No audible wheezes, rales or rhonchi.  ABDOMEN: Soft, nontender  MUSCULOSKELETAL: No deformities.   NEURO: Confused only alert to self, moves all extremities, follows commands.  SKIN: Warm, pale, dry, no rash visualized.        LAB RESULTS  Recent Results (from the past 24 hour(s))   Magnesium    Collection Time: 12/08/21  1:59 AM    Specimen: Blood   Result Value Ref Range    Magnesium 0.5 (C) 1.6 - 2.4 mg/dL   Green Top (Gel)    Collection Time: 12/08/21  1:59 AM   Result Value Ref Range    Extra Tube Hold for add-ons.    Lavender Top    Collection Time: 12/08/21  1:59 AM   Result Value Ref Range    Extra Tube hold for add-on    Gold Top - SST    Collection Time: 12/08/21  1:59 AM   Result Value Ref Range    Extra Tube Hold for add-ons.    Gray Top    Collection Time: 12/08/21  1:59 AM   Result Value Ref Range    Extra Tube Hold for add-ons.    Light Blue Top    Collection Time: 12/08/21  1:59 AM   Result Value Ref Range    Extra Tube hold for add-on    CBC Auto Differential    Collection Time: 12/08/21  1:59 AM    Specimen: Blood   Result Value Ref Range    WBC 9.85 3.40 - 10.80 10*3/mm3    RBC 3.89 (L) 4.14 - 5.80 10*6/mm3    Hemoglobin 12.8 (L) 13.0 - 17.7 g/dL    Hematocrit 38.0 37.5 - 51.0 %    MCV 97.7 (H)  79.0 - 97.0 fL    MCH 32.9 26.6 - 33.0 pg    MCHC 33.7 31.5 - 35.7 g/dL    RDW 12.4 12.3 - 15.4 %    RDW-SD 45.1 37.0 - 54.0 fl    MPV 10.6 6.0 - 12.0 fL    Platelets 393 140 - 450 10*3/mm3    Neutrophil % 74.9 42.7 - 76.0 %    Lymphocyte % 14.7 (L) 19.6 - 45.3 %    Monocyte % 6.5 5.0 - 12.0 %    Eosinophil % 2.5 0.3 - 6.2 %    Basophil % 1.0 0.0 - 1.5 %    Immature Grans % 0.4 0.0 - 0.5 %    Neutrophils, Absolute 7.37 (H) 1.70 - 7.00 10*3/mm3    Lymphocytes, Absolute 1.45 0.70 - 3.10 10*3/mm3    Monocytes, Absolute 0.64 0.10 - 0.90 10*3/mm3    Eosinophils, Absolute 0.25 0.00 - 0.40 10*3/mm3    Basophils, Absolute 0.10 0.00 - 0.20 10*3/mm3    Immature Grans, Absolute 0.04 0.00 - 0.05 10*3/mm3    nRBC 0.0 0.0 - 0.2 /100 WBC   Lactic Acid, Plasma    Collection Time: 12/08/21  1:59 AM    Specimen: Blood   Result Value Ref Range    Lactate 3.0 (C) 0.5 - 2.0 mmol/L   POC Creatinine    Collection Time: 12/08/21  2:06 AM    Specimen: Blood   Result Value Ref Range    Creatinine 1.20 0.60 - 1.30 mg/dL   POC Surgery Labs    Collection Time: 12/08/21  2:10 AM    Specimen: Blood   Result Value Ref Range    Ionized Calcium 0.72 (L) 1.20 - 1.32 mmol/L    POC Potassium 2.4 (L) 3.5 - 4.9 mmol/L    Sodium 142 138 - 146 mmol/L    Total CO2 30 (H) 24 - 29 mmol/L    Hemoglobin 12.2 12.0 - 17.0 g/dL    Hematocrit 36 (L) 38 - 51 %    pCO2, Arterial 48.0 (H) 35 - 45 mm Hg    pO2, Arterial 38 (L) 80 - 105 mmHg    Base Excess 4.0000 -5 - 5 mmol/L    O2 Saturation, Arterial 71 (L) 95 - 98 %    pH, Arterial 7.39 7.35 - 7.6 pH units    HCO3, Arterial 28.8 (H) 22 - 26 mmol/L    Glucose 202 (H) 70 - 130 mg/dL   POC Protime / INR    Collection Time: 12/08/21  2:15 AM    Specimen: Blood   Result Value Ref Range    Protime 14.9 12.8 - 15.2 seconds    INR 1.3 (H) 0.8 - 1.2   Urinalysis With Microscopic If Indicated (No Culture) - Urine, Catheter    Collection Time: 12/08/21  2:30 AM    Specimen: Urine, Catheter   Result Value Ref Range    Color, UA  Yellow Yellow, Straw    Appearance, UA Clear Clear    pH, UA 6.5 5.0 - 8.0    Specific Gravity, UA 1.026 1.001 - 1.030    Glucose, UA Negative Negative    Ketones, UA Negative Negative    Bilirubin, UA Negative Negative    Blood, UA Small (1+) (A) Negative    Protein,  mg/dL (2+) (A) Negative    Leuk Esterase, UA Negative Negative    Nitrite, UA Negative Negative    Urobilinogen, UA 1.0 E.U./dL 0.2 - 1.0 E.U./dL   Urine Drug Screen - Urine, Catheter    Collection Time: 12/08/21  2:30 AM    Specimen: Urine, Catheter   Result Value Ref Range    THC, Screen, Urine Negative Negative    Phencyclidine (PCP), Urine Negative Negative    Cocaine Screen, Urine Negative Negative    Methamphetamine, Ur Negative Negative    Opiate Screen Negative Negative    Amphetamine Screen, Urine Negative Negative    Benzodiazepine Screen, Urine Negative Negative    Tricyclic Antidepressants Screen Negative Negative    Methadone Screen, Urine Negative Negative    Barbiturates Screen, Urine Negative Negative    Oxycodone Screen, Urine Negative Negative    Propoxyphene Screen Negative Negative    Buprenorphine, Screen, Urine Negative Negative   Urinalysis, Microscopic Only - Urine, Catheter    Collection Time: 12/08/21  2:30 AM    Specimen: Urine, Catheter   Result Value Ref Range    RBC, UA 0-2 None Seen, 0-2 /HPF    WBC, UA 0-2 None Seen, 0-2 /HPF    Bacteria, UA None Seen None Seen, Trace /HPF    Squamous Epithelial Cells, UA 0-2 None Seen, 0-2 /HPF    Hyaline Casts, UA 0-6 0 - 6 /LPF    Methodology Automated Microscopy    Comprehensive Metabolic Panel    Collection Time: 12/08/21  3:10 AM    Specimen: Blood   Result Value Ref Range    Glucose 160 (H) 65 - 99 mg/dL    BUN 17 8 - 23 mg/dL    Creatinine 0.83 0.76 - 1.27 mg/dL    Sodium 142 136 - 145 mmol/L    Potassium 1.9 (C) 3.5 - 5.2 mmol/L    Chloride 108 (H) 98 - 107 mmol/L    CO2 22.0 22.0 - 29.0 mmol/L    Calcium 4.3 (C) 8.6 - 10.5 mg/dL    Total Protein 4.5 (L) 6.0 - 8.5 g/dL     Albumin 2.60 (L) 3.50 - 5.20 g/dL    ALT (SGPT) 9 1 - 41 U/L    AST (SGOT) 16 1 - 40 U/L    Alkaline Phosphatase 46 39 - 117 U/L    Total Bilirubin 0.5 0.0 - 1.2 mg/dL    eGFR Non African Amer 88 >60 mL/min/1.73    Globulin 1.9 gm/dL    A/G Ratio 1.4 g/dL    BUN/Creatinine Ratio 20.5 7.0 - 25.0    Anion Gap 12.0 5.0 - 15.0 mmol/L   Troponin    Collection Time: 12/08/21  3:10 AM    Specimen: Blood   Result Value Ref Range    Troponin T 0.019 0.000 - 0.030 ng/mL   BNP    Collection Time: 12/08/21  3:10 AM    Specimen: Blood   Result Value Ref Range    proBNP 778.3 0.0-1,800.0 pg/mL   Ethanol    Collection Time: 12/08/21  3:10 AM    Specimen: Blood   Result Value Ref Range    Ethanol <10 0 - 10 mg/dL   Salicylate Level    Collection Time: 12/08/21  3:10 AM    Specimen: Blood   Result Value Ref Range    Salicylate <0.3 <=30.0 mg/dL   Acetaminophen Level    Collection Time: 12/08/21  3:10 AM    Specimen: Blood   Result Value Ref Range    Acetaminophen <5.0 0.0 - 30.0 mcg/mL   C-reactive Protein    Collection Time: 12/08/21  3:10 AM    Specimen: Blood   Result Value Ref Range    C-Reactive Protein 4.51 (H) 0.00 - 0.50 mg/dL   Phosphorus    Collection Time: 12/08/21  3:10 AM    Specimen: Blood   Result Value Ref Range    Phosphorus 2.5 2.5 - 4.5 mg/dL   T4, Free    Collection Time: 12/08/21  3:10 AM    Specimen: Blood   Result Value Ref Range    Free T4 1.22 0.93 - 1.70 ng/dL   Magnesium    Collection Time: 12/08/21  3:10 AM    Specimen: Blood   Result Value Ref Range    Magnesium 0.3 (C) 1.6 - 2.4 mg/dL   Procalcitonin    Collection Time: 12/08/21  3:10 AM    Specimen: Blood   Result Value Ref Range    Procalcitonin 0.10 0.00 - 0.25 ng/mL   Calcium, Ionized    Collection Time: 12/08/21  4:21 AM    Specimen: Blood   Result Value Ref Range    Ionized Calcium 0.83 (L) 1.12 - 1.32 mmol/L   Lactate Dehydrogenase    Collection Time: 12/08/21  4:21 AM    Specimen: Blood   Result Value Ref Range     (H) 135 - 225 U/L    Uric Acid    Collection Time: 12/08/21  4:21 AM    Specimen: Blood   Result Value Ref Range    Uric Acid 5.5 3.4 - 7.0 mg/dL   Lipid Panel    Collection Time: 12/08/21  5:42 AM    Specimen: Arm, Right; Blood   Result Value Ref Range    Total Cholesterol 129 0 - 200 mg/dL    Triglycerides 90 0 - 150 mg/dL    HDL Cholesterol 28 (L) 40 - 60 mg/dL    LDL Cholesterol  84 0 - 100 mg/dL    VLDL Cholesterol 17 5 - 40 mg/dL    LDL/HDL Ratio 2.96    Basic Metabolic Panel    Collection Time: 12/08/21  5:42 AM    Specimen: Arm, Right; Blood   Result Value Ref Range    Glucose 183 (H) 65 - 99 mg/dL    BUN 20 8 - 23 mg/dL    Creatinine 1.10 0.76 - 1.27 mg/dL    Sodium 137 136 - 145 mmol/L    Potassium 2.7 (L) 3.5 - 5.2 mmol/L    Chloride 94 (L) 98 - 107 mmol/L    CO2 29.0 22.0 - 29.0 mmol/L    Calcium 6.5 (L) 8.6 - 10.5 mg/dL    eGFR Non African Amer 64 >60 mL/min/1.73    BUN/Creatinine Ratio 18.2 7.0 - 25.0    Anion Gap 14.0 5.0 - 15.0 mmol/L   Magnesium    Collection Time: 12/08/21  5:42 AM    Specimen: Arm, Right; Blood   Result Value Ref Range    Magnesium 1.2 (L) 1.6 - 2.4 mg/dL   Ammonia    Collection Time: 12/08/21  5:43 AM    Specimen: Arm, Right; Blood   Result Value Ref Range    Ammonia 16 16 - 60 umol/L   POC Glucose Once    Collection Time: 12/08/21  6:06 AM    Specimen: Blood   Result Value Ref Range    Glucose 165 (H) 70 - 130 mg/dL   Respiratory Panel PCR w/COVID-19(SARS-CoV-2) RIKI/CYNTHIA/EARL/PAD/COR/MAD/JORDAN In-House, [facility], 3-4 HR TAT - Swab, Nasopharynx    Collection Time: 12/08/21  6:28 AM    Specimen: Nasopharynx; Swab   Result Value Ref Range    ADENOVIRUS, PCR Not Detected Not Detected    Coronavirus 229E Not Detected Not Detected    Coronavirus HKU1 Not Detected Not Detected    Coronavirus NL63 Not Detected Not Detected    Coronavirus OC43 Not Detected Not Detected    COVID19 Not Detected Not Detected - Ref. Range    Human Metapneumovirus Not Detected Not Detected    Human Rhinovirus/Enterovirus  Not Detected Not Detected    Influenza A PCR Not Detected Not Detected    Influenza B PCR Not Detected Not Detected    Parainfluenza Virus 1 Not Detected Not Detected    Parainfluenza Virus 2 Not Detected Not Detected    Parainfluenza Virus 3 Not Detected Not Detected    Parainfluenza Virus 4 Not Detected Not Detected    RSV, PCR Not Detected Not Detected    Bordetella pertussis pcr Not Detected Not Detected    Bordetella parapertussis PCR Not Detected Not Detected    Chlamydophila pneumoniae PCR Not Detected Not Detected    Mycoplasma pneumo by PCR Not Detected Not Detected   STAT Lactic Acid, Reflex    Collection Time: 12/08/21  6:50 AM    Specimen: Blood   Result Value Ref Range    Lactate 1.3 0.5 - 2.0 mmol/L   Magnesium    Collection Time: 12/08/21  6:50 AM    Specimen: Blood   Result Value Ref Range    Magnesium 1.2 (L) 1.6 - 2.4 mg/dL   Phosphorus    Collection Time: 12/08/21  6:50 AM    Specimen: Blood   Result Value Ref Range    Phosphorus 3.3 2.5 - 4.5 mg/dL   Potassium    Collection Time: 12/08/21  6:50 AM    Specimen: Blood   Result Value Ref Range    Potassium 2.9 (L) 3.5 - 5.2 mmol/L   Adult Transthoracic Echo Complete W/ Cont if Necessary Per Protocol (With Agitated Saline)    Collection Time: 12/08/21  9:12 AM   Result Value Ref Range    Target HR (85%) 115 bpm    Max. Pred. HR (100%) 135 bpm       If labs were ordered, I independently reviewed the results.        RADIOLOGY  CT Angiogram Neck    Result Date: 12/8/2021  CTA  HEAD W AI ANALYSIS FOR LVO, CTA Neck INDICATION: Shortness of air TECHNIQUE: CT angiogram of the head and neck with IV contrast. 3-D reconstructions were obtained and reviewed.  Evaluation for a significant carotid arterial stenosis is based on the NASCET criteria. Radiation dose reduction techniques included automated exposure control or exposure modulation based on body size. Count of known CT and cardiac nuc med studies performed in previous 12 months: 2.. AI analysis for LVO  was performed COMPARISON: Cerebral perfusion study and CT brain from earlier today FINDINGS: CTA neck:  The visualized aortic arch is normal. The great vessels are all patent. The vertebral arteries both arise from the subclavian arteries. They are equal in size and they unite to form the basilar artery. There are extensive small calcified plaques throughout the carotid bifurcations bilaterally. Stenosis. The less than 50% bilaterally. The rest of the internal carotid arteries are patent. The right internal carotid artery is very tortuous. CTA head:  Starting posterior cerebral arteries are normal in appearance. The middle and anterior cerebral arteries are patent without stenosis.     Extensive small calcified plaque formations throughout the distal common carotid arteries and carotid bifurcations. I Cannot identify any stenoses of greater than 50%. No vascular occlusions are identified. CT of the brain is within normal limits Signer Name: Estuardo Sanches MD  Signed: 12/8/2021 2:38 AM  Workstation Name: [facility]  Radiology [facility]    XR Chest 1 View    Result Date: 12/8/2021  CR Chest 1 Vw INDICATION: New onset confusion COMPARISON:  None available. FINDINGS: Portable AP view(s) of the chest.  There is left base atelectasis. Right lung is clear. Heart size normal. Bones are normal     Poor inspiration with left base atelectasis Signer Name: Estuardo Sanches MD  Signed: 12/8/2021 2:48 AM  Workstation Name: [facility]  Radiology [facility]    CT Head Without Contrast Stroke Protocol    Result Date: 12/8/2021  CT Head WO Code Stroke HISTORY: New onset confusion TECHNIQUE: Axial unenhanced head CT. Radiation dose reduction techniques included automated exposure control or exposure modulation based on body size. Count of known CT and cardiac nuc med studies performed in previous 12 months: 0. Time of scan: 2:03 AM, the study was available online at 2:11 and results were given to CT technologist  at 2:12 AM COMPARISON: None. FINDINGS: No intracranial hemorrhage, mass, or infarct. No hydrocephalus or extra-axial fluid collection. There are senescent changes, including volume loss and nonspecific white matter change, but no acute abnormality is seen. The skull base, calvarium, and extracranial soft tissues are normal.     Senescent changes without acute abnormality. Signer Name: Estuardo Sanches MD  Signed: 12/8/2021 2:12 AM  Workstation Name: [facility]  [facility]    CT Angiogram Head w AI Analysis of LVO    Result Date: 12/8/2021  CTA  HEAD W AI ANALYSIS FOR LVO, CTA Neck INDICATION: Shortness of air TECHNIQUE: CT angiogram of the head and neck with IV contrast. 3-D reconstructions were obtained and reviewed.  Evaluation for a significant carotid arterial stenosis is based on the NASCET criteria. Radiation dose reduction techniques included automated exposure control or exposure modulation based on body size. Count of known CT and cardiac nuc med studies performed in previous 12 months: 2.. AI analysis for LVO was performed COMPARISON: Cerebral perfusion study and CT brain from earlier today FINDINGS: CTA neck:  The visualized aortic arch is normal. The great vessels are all patent. The vertebral arteries both arise from the subclavian arteries. They are equal in size and they unite to form the basilar artery. There are extensive small calcified plaques throughout the carotid bifurcations bilaterally. Stenosis. The less than 50% bilaterally. The rest of the internal carotid arteries are patent. The right internal carotid artery is very tortuous. CTA head:  Starting posterior cerebral arteries are normal in appearance. The middle and anterior cerebral arteries are patent without stenosis.     Extensive small calcified plaque formations throughout the distal common carotid arteries and carotid bifurcations. I Cannot identify any stenoses of greater than 50%. No vascular occlusions are  identified. CT of the brain is within normal limits Signer Name: Estuardo Sanches MD  Signed: 12/8/2021 2:38 AM  Workstation Name: [facility]  [facility]    CT CEREBRAL PERFUSION WITH & WITHOUT CONTRAST    Result Date: 12/8/2021  CT CEREBRAL PERFUSION W WO CONTRAST HISTORY: New onset confusion TECHNIQUE: Axial CT images of the brain without and with intravenous contrast using cerebral perfusion protocol. Post-processing parametric maps were created using RAPID software and reviewed. Radiation dose reduction techniques included automated exposure control or exposure modulation based on body size. CT and nuclear cardiology exams in the last 12 months: 0. COMPARISON: CT brain without contrast from today FINDINGS: Arterial input function is optimal. Perfusion maps are symmetric without evidence of cerebral ischemia or core infarction.     Normal brain perfusion CT. Signer Name: Estuardo Sanches MD  Signed: 12/8/2021 2:29 AM  Workstation Name: [facility]  Radiology [facility]      I ordered and reviewed the above noted radiographic studies.      I viewed images of noncontrast head CT while in the CT scanner with the patient as a stroke alert which I do not appreciate any acute bleeds, masses or other abnormalities.  CTAs of the head and neck reviewed with the neurology team and he has diffuse calcifications but no significant stenosis that we can appreciate.  Chest x-ray appears clear.  See radiologist's dictation for official interpretation.        PROCEDURES    Procedures    ECG 12 Lead         ECG 12 Lead             MEDICATIONS GIVEN IN ER    Medications   sodium chloride 0.9 % flush 10 mL (has no administration in time range)   sodium chloride 0.9 % flush 10 mL (10 mL Intravenous Given 12/8/21 0839)   sodium chloride 0.9 % flush 10 mL (has no administration in time range)   atorvastatin (LIPITOR) tablet 80 mg (has no administration in time range)   aspirin chewable tablet 81 mg (81 mg  Oral Given 12/8/21 0839)     Or   aspirin suppository 300 mg ( Rectal Not Given:  See Alt 12/8/21 0839)   potassium chloride (MICRO-K) CR capsule 40 mEq (40 mEq Oral Given 12/8/21 0838)     Or   potassium chloride (KLOR-CON) packet 40 mEq ( Oral Not Given:  See Alt 12/8/21 0838)     Or   potassium chloride 10 mEq in 100 mL IVPB ( Intravenous Not Given:  See Alt 12/8/21 0838)   Magnesium Sulfate 2 gram Bolus, followed by 8 gram infusion (total Mg dose 10 grams)- Mg less than or equal to 1mg/dL ( Intravenous Not Given:  See Alt 12/8/21 0837)     Or   Magnesium Sulfate 2 gram / 50mL Infusion (GIVE X 3 BAGS TO EQUAL 6GM TOTAL DOSE) - Mg 1.1 - 1.5 mg/dl (2 g Intravenous New Bag 12/8/21 0837)     Or   Magnesium Sulfate 4 gram infusion- Mg 1.6-1.9 mg/dL ( Intravenous Not Given:  See Alt 12/8/21 0837)   amLODIPine (NORVASC) tablet 5 mg (5 mg Oral Given 12/8/21 0839)   apixaban (ELIQUIS) tablet 2.5 mg (2.5 mg Oral Given 12/8/21 0839)   metoprolol tartrate (LOPRESSOR) tablet 12.5 mg (12.5 mg Oral Given 12/8/21 0839)   pantoprazole (PROTONIX) EC tablet 40 mg (has no administration in time range)   sodium chloride 0.9 % bolus 500 mL (0 mL Intravenous Stopped 12/8/21 0320)   iopamidol (ISOVUE-370) 76 % injection 150 mL (100 mL Intravenous Given 12/8/21 0222)   magnesium sulfate 4g/100mL (PREMIX) infusion (0 g Intravenous Stopped 12/8/21 0608)   potassium chloride 10 mEq in 100 mL IVPB (0 mEq Intravenous Stopped 12/8/21 0452)   lactated ringers bolus 500 mL (0 mL Intravenous Stopped 12/8/21 0426)   potassium chloride 10 mEq in 100 mL IVPB (0 mEq Intravenous Stopped 12/8/21 0558)   calcium gluconate 2 g in sodium chloride 0.9 % 100 mL IVPB (0 g Intravenous Stopped 12/8/21 0452)         PROGRESS, DATA ANALYSIS, CONSULTS, AND MEDICAL DECISION MAKING    All labs have been independently reviewed by me.  All radiology studies have been reviewed by me and the radiologist dictating the report.   EKG's have been independently viewed and  interpreted by me.      Differential diagnoses: Hemorrhagic stroke, ischemic stroke, seizure, infection, anemia, electrolyte abnormality, intoxication, thyroid abnormality, Covid      ED Course as of 12/08/21 0917   Wed Dec 08, 2021   0351 In summary this is an 86-year-old man who was brought in the emergency department by EMS for altered mental status.  Reportedly by his wife who is at the bedside he was in his normal state of health when going to bed at 10 PM last night.  She says his mental baseline is alert and oriented with no confusion or dementia.  Then immediately prior to arrival she was awoken by him she says flailing around in bed and confused.  EMS report that when they arrived he was combative to the point that they nearly needed sedation but he calmed down after a while.  He is remained confused.  Upon arrival here he has not alert to the situation, place, or time and says he has no idea what happened and has no complaints.  They deny recent illness or fevers.  No other history known at this time. [CC]   0352 Patient arrived altered GCS 14 but no focal neurologic deficits I can appreciate.  However with the reported combativeness my main concern is intracranial hemorrhage and he was made a stroke alert taken directly to the CT scanner.  Neurology team on hand evaluated and he is confused NIH of 1.  No focal deficits appreciated.  Significant calcifications on CTAs but no acute bleed on the Noncon head CT and no significant stenosis.  Neurology team ordering stat MRI. [CC]   0353 He initially required oxygen upon arrival here in initial chest x-ray reveals atelectasis but could be faint pneumonia. [CC]   0353 CBC reveals anemia at 12.8.  I was contacted with critical results of magnesium being 0.5 and potassium was also critically low at 2.4 on the point-of-care surgery labs.  Starting him on 4 g of magnesium and will concomitantly infuse IV potassium.  He has A. fib and is treated with Xarelto.  No  infection on cath urine.  He does have elevated CRP but at this point I do not have an infectious source.  I am giving him electrolyte and crystalloid replacement but holding antibiotics for the time being as he is afebrile.  Will reevaluate pending the rest of her work-up. [CC]   0431 Repeat magnesium is now 0.3 and we have discussed with pharmacy and her doubling the rate at which we infuse the 4 g.  Calcium is 4.3 and also infusing calcium gluconate, potassium is 1.9 and we are also infusing potassium at double a regular rate.  I have added on LDH and uric acid.  He has normal vital signs going to discuss with the medicine team but I believe his profound electrolyte abnormalities may require ICU care. [CC]      ED Course User Index  [CC] Coy Rain MD     Medicine team appropriately not comfortable managing this patient on the floor as his profound electrolyte abnormalities put him at risk for fatal arrhythmia.  He is currently being aggressively resuscitated with IV potassium, magnesium, calcium.  Dr. Alanis in the ICU was contacted who is agreeable to ICU admission.    75 minutes of critical care provided. This time excludes other billable procedures. Time does include preparation of documents, medical consultations, review of old records, and direct bedside care. Patient was at high risk for life-threatening deterioration due to altered mental status on blood thinners initially stroke alert for concern of hemorrhagic or ischemic stroke, and profound hypomagnesemia, hypokalemia, hypocalcemia requiring resuscitation with multiple electrolytes at twice the recommended rate and ICU admission due to risk of acute fatal arrhythmia.       AS OF 09:17 EST VITALS:    BP - 130/81  HR - 89  TEMP - 98.2 °F (36.8 °C) (Axillary)  O2 SATS - 95%                  DIAGNOSIS  Final diagnoses:   Hypokalemia   Hypomagnesemia   Altered mental status, unspecified altered mental status type   Hypocalcemia          DISPOSITION  Admit to ICU             Coy Rain MD  12/08/21 8995

## 2021-12-08 NOTE — THERAPY EVALUATION
Patient Name: Iain Dee  : 1935    MRN: [id number]                              Today's Date: 2021       Admit Date: 2021    Visit Dx:     ICD-10-CM ICD-9-CM   1. Hypokalemia  E87.6 276.8   2. Hypomagnesemia  E83.42 275.2   3. Altered mental status, unspecified altered mental status type  R41.82 780.97   4. Hypocalcemia  E83.51 275.41     Patient Active Problem List   Diagnosis   • AMS (altered mental status)   • Hypokalemia   • Hypomagnesemia   • Hypocalcemia   • Hypoalbuminemia   • Essential hypertension   • Atrial fibrillation (HCC)   • GERD (gastroesophageal reflux disease)   • Hyperglycemia   • Gout   • Diabetes mellitus (HCC)   • H/O Bladder carcinoma (HCC)   • JOLENE on CPAP   • Chronic anticoagulation (Eliquis)     Past Medical History:   Diagnosis Date   • Arthritis    • Cancer (HCC)    • Diabetes mellitus (HCC)    • GERD (gastroesophageal reflux disease)    • Gout    • H/O Bladder carcinoma (HCC)    • Hypertension      Past Surgical History:   Procedure Laterality Date   • BLADDER SURGERY      Biopsy   • HERNIA REPAIR     • PROSTATE BIOPSY        General Information     Row Name 21 1318          OT Time and Intention    Document Type evaluation  -AN     Mode of Treatment occupational therapy  -AN     Row Name 21 1318          General Information    Patient Profile Reviewed yes  -AN     Prior Level of Function independent:; all household mobility; community mobility; gait; ADL's  ambulates with rollator  -AN     Existing Precautions/Restrictions fall; oxygen therapy device and L/min  -AN     Barriers to Rehab medically complex; cognitive status  -AN     Row Name 21 1318          Living Environment    Lives With spouse  -AN     Row Name 21 1318          Home Main Entrance    Number of Stairs, Main Entrance none  -AN     Row Name 21 1318          Stairs Within Home, Primary    Stairs, Within Home, Primary 1 level house  -AN     Stairs Comment, Within Home,  Primary tub shower with grab bars and shower chair  -AN     Row Name 12/08/21 1318          Cognition    Orientation Status (Cognition) oriented x 3; verbal cues/prompts needed for orientation  -AN     Row Name 12/08/21 1318          Safety Issues, Functional Mobility    Safety Issues Affecting Function (Mobility) insight into deficits/self-awareness; judgment; safety precaution awareness; safety precautions follow-through/compliance; sequencing abilities  -AN     Impairments Affecting Function (Mobility) balance; cognition; endurance/activity tolerance  -AN           User Key  (r) = Recorded By, (t) = Taken By, (c) = Cosigned By    Initials Name Provider Type    AN Lisset Victor OT Occupational Therapist                 Mobility/ADL's     Row Name 12/08/21 1320          Bed Mobility    Comment (Bed Mobility) up in chair upon arrival  -AN     Row Name 12/08/21 1320          Transfers    Transfers sit-stand transfer  -AN     Sit-Stand Cobb (Transfers) contact guard; verbal cues  -AN     Row Name 12/08/21 1320          Sit-Stand Transfer    Assistive Device (Sit-Stand Transfers) walker, front-wheeled  -AN     Row Name 12/08/21 1320          Functional Mobility    Functional Mobility- Ind. Level contact guard assist; verbal cues required  -AN     Functional Mobility- Device rolling walker  -AN     Functional Mobility-Distance (Feet) --  household distance  -AN     Functional Mobility- Safety Issues supplemental O2  -AN     Functional Mobility- Comment no LOB noted throughout, demo'd good safety awareness  -AN     Row Name 12/08/21 1320          Activities of Daily Living    BADL Assessment/Intervention lower body dressing  -AN     Row Name 12/08/21 1320          Lower Body Dressing Assessment/Training    Cobb Level (Lower Body Dressing) don; socks; minimum assist (75% patient effort)  -AN     Position (Lower Body Dressing) supported sitting  -AN           User Key  (r) = Recorded By, (t) = Taken  By, (c) = Cosigned By    Initials Name Provider Type    AN Lisset Victor OT Occupational Therapist               Obj/Interventions     Row Name 12/08/21 1324          Sensory Assessment (Somatosensory)    Sensory Assessment (Somatosensory) UE sensation intact  -AN     Row Name 12/08/21 1324          Vision Assessment/Intervention    Visual Impairment/Limitations WFL  -AN     Row Name 12/08/21 1324          Range of Motion Comprehensive    General Range of Motion upper extremity range of motion deficits identified  -AN     Comment, General Range of Motion BUE shoulder flexion limited to ~100 flexion, 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; elbow, wrist, and hand WFL  -AN     Row Name 12/08/21 1324          Strength Comprehensive (MMT)    General Manual Muscle Testing (MMT) Assessment upper extremity strength deficits identified  -AN     Comment, General Manual Muscle Testing (MMT) Assessment b/l elbow, wrist, and hand 4/5 grossly; BUE shoulder 2+/5  -AN     Row Name 12/08/21 1324          Motor Skills    Motor Skills functional endurance; coordination  -AN     Coordination fine motor deficit; right; upper extremity  baseline R hand coordination deficits(birth injury)  -AN     Row Name 12/08/21 1324          Balance    Balance Assessment sitting static balance; sitting dynamic balance; sit to stand dynamic balance; standing static balance; standing dynamic balance  -AN     Static Sitting Balance WFL; sitting in chair  -AN     Dynamic Sitting Balance WFL; sitting in chair  -AN     Sit to Stand Dynamic Balance WFL; standing  -AN     Static Standing Balance supported; standing; WFL  -AN     Dynamic Standing Balance mild impairment; supported; standing  -AN     Balance Interventions supported; sit to stand; standing; static; dynamic; minimal challenge; occupation based/functional task; narrowed base of support  -AN           User Key  (r) = Recorded By, (t) = Taken By, (c) = Cosigned By    Initials Name Provider  Type    AN Lisset Victor, OBI Occupational Therapist               Goals/Plan     Row Name 12/08/21 1331          Bed Mobility Goal 1 (OT)    Activity/Assistive Device (Bed Mobility Goal 1, OT) sit to supine/supine to sit  -AN     Grant Level/Cues Needed (Bed Mobility Goal 1, OT) modified independence  -AN     Time Frame (Bed Mobility Goal 1, OT) long term goal (LTG)  -AN     Row Name 12/08/21 1331          Transfer Goal 1 (OT)    Activity/Assistive Device (Transfer Goal 1, OT) sit-to-stand/stand-to-sit; commode; walker, rolling  -AN     Grant Level/Cues Needed (Transfer Goal 1, OT) modified independence  -AN     Time Frame (Transfer Goal 1, OT) long term goal (LTG); 10 days  -AN     Row Name 12/08/21 1331          Dressing Goal 1 (OT)    Activity/Device (Dressing Goal 1, OT) lower body dressing  -AN     Grant/Cues Needed (Dressing Goal 1, OT) modified independence  -AN     Time Frame (Dressing Goal 1, OT) long term goal (LTG); 10 days  -AN     Row Name 12/08/21 1331          Toileting Goal 1 (OT)    Activity/Device (Toileting Goal 1, OT) adjust/manage clothing; perform perineal hygiene; commode  -AN     Grant Level/Cues Needed (Toileting Goal 1, OT) modified independence  -AN     Time Frame (Toileting Goal 1, OT) long term goal (LTG); 10 days  -AN     Row Name 12/08/21 1331          Strength Goal 1 (OT)    Strength Goal 1 (OT) Pt will increase BUE strength to 4+/5 for ADLs  -AN     Time Frame (Strength Goal 1, OT) long term goal (LTG); 10 days  -AN     Row Name 12/08/21 1331          Therapy Assessment/Plan (OT)    Planned Therapy Interventions (OT) activity tolerance training; adaptive equipment training; BADL retraining; cognitive/visual perception retraining; IADL retraining; functional balance retraining; occupation/activity based interventions; patient/caregiver education/training; transfer/mobility retraining; strengthening exercise  -AN           User Key  (r) = Recorded By,  (t) = Taken By, (c) = Cosigned By    Initials Name Provider Type    Lisset Baker, OT Occupational Therapist               Clinical Impression     [facility] Name 12/08/21 1327          Pain Assessment    Additional Documentation Pain Scale: Numbers Pre/Post-Treatment (Group)  -AN     Row Name 12/08/21 1327          Pain Scale: Numbers Pre/Post-Treatment    Pretreatment Pain Rating 0/10 - no pain  -AN     Posttreatment Pain Rating 0/10 - no pain  -AN     Pre/Posttreatment Pain Comment asymptomatic throughout  -AN     Pain Intervention(s) Repositioned; Ambulation/increased activity  -AN     [facility] Name 12/08/21 1327          Plan of Care Review    Plan of Care Reviewed With patient  -AN     Progress no change  OT evaluation  -AN     Outcome Summary OT evaluation completed. Pt presents with generalized weakness and impaired cognition limiting independence with functional mobility and ADLs. Pt performed STS from chair with CGA using the RW and ambulated ~household distance using the RW with CGA. No LOB noted. OT recs home with assist at [place].  -AN     Row Name 12/08/21 1327          Therapy Assessment/Plan (OT)    Patient/Family Therapy Goal Statement (OT) Return to PLOF  -AN     Rehab Potential (OT) good, to achieve stated therapy goals  -AN     Criteria for Skilled Therapeutic Interventions Met (OT) yes; skilled treatment is necessary  -AN     Therapy Frequency (OT) 3 times/wk  -AN     Row Name 12/08/21 1327          Therapy Plan Review/Discharge Plan (OT)    Anticipated Discharge Disposition (OT) home with assist  -AN     Row Name 12/08/21 1327          Vital Signs    Pre Systolic BP Rehab --  VSS  -AN     O2 Delivery Pre Treatment nasal cannula  -AN     O2 Delivery Intra Treatment nasal cannula  -AN     O2 Delivery Post Treatment nasal cannula  -AN     Pre Patient Position Sitting  -AN     Intra Patient Position Standing  -AN     Post Patient Position Sitting  -AN     Row Name 12/08/21 1327          Positioning and  Restraints    Pre-Treatment Position sitting in chair/recliner  -AN     Post Treatment Position chair  -AN     In Chair notified nsg; reclined; call light within reach; encouraged to call for assist; exit alarm on; RUE elevated; LUE elevated; with family/caregiver; waffle cushion; legs elevated  -AN           User Key  (r) = Recorded By, (t) = Taken By, (c) = Cosigned By    Initials Name Provider Type    Lisset Baker, OBI Occupational Therapist               Outcome Measures     Row Name 12/08/21 1332          How much help from another is currently needed...    Putting on and taking off regular lower body clothing? 3  -AN     Bathing (including washing, rinsing, and drying) 3  -AN     Toileting (which includes using toilet bed pan or urinal) 3  -AN     Putting on and taking off regular upper body clothing 3  -AN     Taking care of personal grooming (such as brushing teeth) 3  -AN     Eating meals 4  -AN     AM-PAC 6 Clicks Score (OT) 19  -AN     Row Name 12/08/21 1449          How much help from another person do you currently need...    Turning from your back to your side while in flat bed without using bedrails? 3  -AY     Moving from lying on back to sitting on the side of a flat bed without bedrails? 3  -AY     Moving to and from a bed to a chair (including a wheelchair)? 3  -AY     Standing up from a chair using your arms (e.g., wheelchair, bedside chair)? 3  -AY     Climbing 3-5 steps with a railing? 3  -AY     To walk in hospital room? 3  -AY     AM-PAC 6 Clicks Score (PT) 18  -AY     Row Name 12/08/21 1449 12/08/21 1332       Functional Assessment    Outcome Measure Options AM-PAC 6 Clicks Basic Mobility (PT)  -AY AM-PAC 6 Clicks Daily Activity (OT)  -AN          User Key  (r) = Recorded By, (t) = Taken By, (c) = Cosigned By    Initials Name Provider Type    Soraya Amaral, PT Physical Therapist    Lisset Baker, OBI Occupational Therapist                Occupational Therapy Education                  Title: PT OT SLP Therapies (In Progress)     Topic: Occupational Therapy (Done)     Point: ADL training (Done)     Description:   Instruct learner(s) on proper safety adaptation and remediation techniques during self care or transfers.   Instruct in proper use of assistive devices.              Learning Progress Summary           Patient Acceptance, E, VU,NR by AN at 12/8/2021 1333                   Point: Home exercise program (Done)     Description:   Instruct learner(s) on appropriate technique for monitoring, assisting and/or progressing therapeutic exercises/activities.              Learning Progress Summary           Patient Acceptance, E, VU,NR by AN at 12/8/2021 1333                   Point: Precautions (Done)     Description:   Instruct learner(s) on prescribed precautions during self-care and functional transfers.              Learning Progress Summary           Patient Acceptance, E, VU,NR by AN at 12/8/2021 1333                   Point: Body mechanics (Done)     Description:   Instruct learner(s) on proper positioning and spine alignment during self-care, functional mobility activities and/or exercises.              Learning Progress Summary           Patient Acceptance, E, VU,NR by AN at 12/8/2021 1333                               User Key     Initials Effective Dates Name Provider Type Discipline     09/21/21 -  Lisset Victor, OT Occupational Therapist OT              OT Recommendation and Plan  Planned Therapy Interventions (OT): activity tolerance training, adaptive equipment training, BADL retraining, cognitive/visual perception retraining, IADL retraining, functional balance retraining, occupation/activity based interventions, patient/caregiver education/training, transfer/mobility retraining, strengthening exercise  Therapy Frequency (OT): 3 times/wk  Plan of Care Review  Plan of Care Reviewed With: patient  Progress: no change (OT evaluation)  Outcome Summary: OT evaluation  completed. Pt presents with generalized weakness and impaired cognition limiting independence with functional mobility and ADLs. Pt performed STS from chair with CGA using the RW and ambulated ~household distance using the RW with CGA. No LOB noted. OT recs home with assist at [place].     Time Calculation:    Time Calculation- OT     Row Name 12/08/21 1557             Time Calculation- OT    OT Start Time 1111  -AN      OT Received On 12/08/21  -AN      OT Goal Re-Cert Due Date 12/18/21  -AN              Untimed Charges    OT Eval/Re-eval Minutes 46  -AN              Total Minutes    Untimed Charges Total Minutes 46  -AN       Total Minutes 46  -AN            User Key  (r) = Recorded By, (t) = Taken By, (c) = Cosigned By    Initials Name Provider Type    AN Lisset Victor OT Occupational Therapist              Therapy Charges for Today     Code Description Service Date Service Provider Modifiers Qty    [id number] HC OT EVAL MOD COMPLEXITY 4 12/8/2021 Lisset Victor OT GO 1               Lisset Victor OT  12/8/2021

## 2021-12-08 NOTE — THERAPY EVALUATION
Patient Name: Iain Dee  : 1935    MRN: [id number]                              Today's Date: 2021       Admit Date: 2021    Visit Dx:     ICD-10-CM ICD-9-CM   1. Hypokalemia  E87.6 276.8   2. Hypomagnesemia  E83.42 275.2   3. Altered mental status, unspecified altered mental status type  R41.82 780.97   4. Hypocalcemia  E83.51 275.41     Patient Active Problem List   Diagnosis   • AMS (altered mental status)   • Hypokalemia   • Hypomagnesemia   • Hypocalcemia   • Hypoalbuminemia   • Essential hypertension   • Atrial fibrillation (HCC)   • GERD (gastroesophageal reflux disease)   • Hyperglycemia   • Gout   • Diabetes mellitus (HCC)   • H/O Bladder carcinoma (HCC)   • JOLENE on CPAP   • Chronic anticoagulation (Eliquis)     Past Medical History:   Diagnosis Date   • Arthritis    • Cancer (HCC)    • Diabetes mellitus (HCC)    • GERD (gastroesophageal reflux disease)    • Gout    • H/O Bladder carcinoma (HCC)    • Hypertension      Past Surgical History:   Procedure Laterality Date   • BLADDER SURGERY      Biopsy   • HERNIA REPAIR     • PROSTATE BIOPSY        General Information     Row Name 21 1436          Physical Therapy Time and Intention    Document Type evaluation  -AY     Mode of Treatment physical therapy  -AY     Row Name 21 1436          General Information    Patient Profile Reviewed yes  -AY     Prior Level of Function independent:; all household mobility; community mobility; gait; transfer; bed mobility; using stairs  amb with rollator  -AY     Existing Precautions/Restrictions fall; oxygen therapy device and L/min  -AY     Barriers to Rehab medically complex  -AY     Row Name 21 1436          Living Environment    Lives With spouse  -AY     Row Name 21 1436          Home Main Entrance    Number of Stairs, Main Entrance one  -AY     Row Name 21 1436          Stairs Within Home, Primary    Number of Stairs, Within Home, Primary none  -AY     Row Name  12/08/21 1436          Cognition    Orientation Status (Cognition) oriented x 3  -AY     Row Name 12/08/21 1436          Safety Issues, Functional Mobility    Safety Issues Affecting Function (Mobility) awareness of need for assistance; insight into deficits/self-awareness; safety precaution awareness; safety precautions follow-through/compliance; sequencing abilities  -AY     Impairments Affecting Function (Mobility) balance; endurance/activity tolerance; strength; coordination; shortness of breath  -AY           User Key  (r) = Recorded By, (t) = Taken By, (c) = Cosigned By    Initials Name Provider Type    AY Soraya Roblero, PT Physical Therapist               Mobility     Row Name 12/08/21 1437          Bed Mobility    Bed Mobility supine-sit  -AY     Supine-Sit [place] (Bed Mobility) minimum assist (75% patient effort); 1 person assist; verbal cues  -AY     Assistive Device (Bed Mobility) bed rails; head of bed elevated  -AY     Comment (Bed Mobility) Vc for sequencing and hand placement.  -AY     Row Name 12/08/21 1437          Transfers    Comment (Transfers) VC for hand placement, sequencing, and walker positioning.  -AY     Row Name 12/08/21 1437          Sit-Stand Transfer    Sit-Stand [place] (Transfers) minimum assist (75% patient effort); 1 person assist; verbal cues  -AY     Assistive Device (Sit-Stand Transfers) walker, front-wheeled  -AY     Row Name 12/08/21 1437          Gait/Stairs (Locomotion)    [place] Level (Gait) minimum assist (75% patient effort); 1 person assist; 1 person to manage equipment  -AY     Assistive Device (Gait) walker, front-wheeled  -AY     Distance in Feet (Gait) 10  -AY     Deviations/Abnormal Patterns (Gait) han decreased; festinating/shuffling; gait speed decreased; stride length decreased; bilateral deviations; base of support, wide  -AY     Bilateral Gait Deviations forward flexed posture; heel strike decreased  -AY     Comment (Gait/Stairs) pt  demonstrated step through gait pattern with decreased han and flexed posture. Verbal and tactile cueing for posture, PLB, and increased stride length. O2 remained >90% on 4L NC throughout. Mild instability noted throughout, but reportedly baseline per family and pt. Gait distance limited by fatigue.  -AY           User Key  (r) = Recorded By, (t) = Taken By, (c) = Cosigned By    Initials Name Provider Type    Soraya Amaral PT Physical Therapist               Obj/Interventions     Row Name 12/08/21 1442          Range of Motion Comprehensive    General Range of Motion bilateral lower extremity ROM WFL  -AY     Row Name 12/08/21 1442          Strength Comprehensive (MMT)    General Manual Muscle Testing (MMT) Assessment lower extremity strength deficits identified  -AY     Comment, General Manual Muscle Testing (MMT) Assessment BLE grossly 3+/5. B foot drop (0/5 DF bilaterally)  -AY     Row Name 12/08/21 1442          Balance    Balance Assessment sitting static balance; sitting dynamic balance; standing static balance; standing dynamic balance  -AY     Static Sitting Balance WFL; sitting, edge of bed  -AY     Dynamic Sitting Balance WFL; sitting, edge of bed  -AY     Static Standing Balance mild impairment; standing; supported  -AY     Dynamic Standing Balance mild impairment; supported; standing  -AY     Row Name 12/08/21 1442          Sensory Assessment (Somatosensory)    Sensory Assessment (Somatosensory) LE sensation intact  -AY           User Key  (r) = Recorded By, (t) = Taken By, (c) = Cosigned By    Initials Name Provider Type    Soraya Amaral PT Physical Therapist               Goals/Plan     Row Name 12/08/21 1446          Bed Mobility Goal 1 (PT)    Activity/Assistive Device (Bed Mobility Goal 1, PT) sit to supine/supine to sit  -AY     [place] Level/Cues Needed (Bed Mobility Goal 1, PT) independent  -AY     Time Frame (Bed Mobility Goal 1, PT) long term goal (LTG); 2 weeks  -AY      Progress/Outcomes (Bed Mobility Goal 1, PT) goal ongoing  -AY     Row Name 12/08/21 1446          Transfer Goal 1 (PT)    Activity/Assistive Device (Transfer Goal 1, PT) sit-to-stand/stand-to-sit; walker, rolling  -AY     [place] Level/Cues Needed (Transfer Goal 1, PT) modified independence  -AY     Time Frame (Transfer Goal 1, PT) long term goal (LTG); 2 weeks  -AY     Progress/Outcome (Transfer Goal 1, PT) goal ongoing  -AY     Row Name 12/08/21 1446          Gait Training Goal 1 (PT)    Activity/Assistive Device (Gait Training Goal 1, PT) gait (walking locomotion); assistive device use; walker, rolling  -AY     [place] Level (Gait Training Goal 1, PT) supervision required  -AY     Distance (Gait Training Goal 1, PT) 150  -AY     Time Frame (Gait Training Goal 1, PT) long term goal (LTG); 2 weeks  -AY     Progress/Outcome (Gait Training Goal 1, PT) goal ongoing  -AY     Row Name 12/08/21 1446          Stairs Goal 1 (PT)    Activity/Assistive Device (Stairs Goal 1, PT) ascending stairs; descending stairs; walker, rolling  -AY     [place] Level/Cues Needed (Stairs Goal 1, PT) supervision required  -AY     Number of Stairs (Stairs Goal 1, PT) 1  -AY     Time Frame (Stairs Goal 1, PT) long term goal (LTG); 2 weeks  -AY     Progress/Outcome (Stairs Goal 1, PT) goal ongoing  -AY           User Key  (r) = Recorded By, (t) = Taken By, (c) = Cosigned By    Initials Name Provider Type    AY Soraya Roblero, PT Physical Therapist               Clinical Impression     Row Name 12/08/21 1443          Pain    Additional Documentation Pain Scale: Numbers Pre/Post-Treatment (Group)  -AY     Row Name 12/08/21 1443          Pain Scale: Numbers Pre/Post-Treatment    Pretreatment Pain Rating 0/10 - no pain  -AY     Posttreatment Pain Rating 0/10 - no pain  -AY     Row Name 12/08/21 1443          Plan of Care Review    Plan of Care Reviewed With patient; spouse  -AY     Outcome Summary PT initial eval completed. Pt  limited by decreased functional endurance, generalized weakness, balance deficit, and B foot drop (reportedly baseline). Pt amb 10ft with RWx and min Ax1+1. Rec continued skilled IP PT to increase indep with mobility. d/c rec for home with assist and OP PT.  -AY     Row Name 12/08/21 1443          Therapy Assessment/Plan (PT)    Rehab Potential (PT) good, to achieve stated therapy goals  -AY     Criteria for Skilled Interventions Met (PT) yes; meets criteria; skilled treatment is necessary  -AY     Row Name 12/08/21 1443          Vital Signs    Pre Systolic BP Rehab --  VSS  -AY     O2 Delivery Pre Treatment nasal cannula  -AY     O2 Delivery Intra Treatment nasal cannula  -AY     O2 Delivery Post Treatment nasal cannula  -AY     Pre Patient Position Supine  -AY     Intra Patient Position Standing  -AY     Post Patient Position Sitting  -AY     Row Name 12/08/21 1443          Positioning and Restraints    Pre-Treatment Position in bed  -AY     Post Treatment Position chair  -AY     In Chair notified nsg; reclined; sitting; call light within reach; encouraged to call for assist; exit alarm on; waffle cushion; legs elevated; with family/caregiver; RUE elevated; LUE elevated  -AY           User Key  (r) = Recorded By, (t) = Taken By, (c) = Cosigned By    Initials Name Provider Type    AY Soraya Roblero, PT Physical Therapist               Outcome Measures     Row Name 12/08/21 1448          How much help from another person do you currently need...    Turning from your back to your side while in flat bed without using bedrails? 3  -AY     Moving from lying on back to sitting on the side of a flat bed without bedrails? 3  -AY     Moving to and from a bed to a chair (including a wheelchair)? 3  -AY     Standing up from a chair using your arms (e.g., wheelchair, bedside chair)? 3  -AY     Climbing 3-5 steps with a railing? 3  -AY     To walk in hospital room? 3  -AY     AM-PAC 6 Clicks Score (PT) 18  -AY     Row Name  12/08/21 1449 12/08/21 1332       Functional Assessment    Outcome Measure Options AM-PAC 6 Clicks Basic Mobility (PT)  -AY AM-PAC 6 Clicks Daily Activity (OT)  -AN          User Key  (r) = Recorded By, (t) = Taken By, (c) = Cosigned By    Initials Name Provider Type    AY Soraya Roblero, PT Physical Therapist    Lisset Baker, OT Occupational Therapist                             Physical Therapy Education                 Title: PT OT SLP Therapies (In Progress)     Topic: Physical Therapy (In Progress)     Point: Mobility training (Done)     Learning Progress Summary           Patient Acceptance, E,TB, VU,NR by AY at 12/8/2021 1450                   Point: Home exercise program (Not Started)     Learner Progress:  Not documented in this visit.          Point: Body mechanics (Done)     Learning Progress Summary           Patient Acceptance, E,TB, VU,NR by AY at 12/8/2021 1450                   Point: Precautions (Done)     Learning Progress Summary           Patient Acceptance, E,TB, VU,NR by AY at 12/8/2021 1450                               User Key     Initials Effective Dates Name Provider Type Discipline    AY 11/10/20 -  Soraya Roblero, PT Physical Therapist PT              PT Recommendation and Plan  Planned Therapy Interventions (PT): balance training, bed mobility training, gait training, home exercise program, patient/family education, strengthening, stair training, transfer training, postural re-education  Plan of Care Reviewed With: patient, spouse  Outcome Summary: PT initial eval completed. Pt limited by decreased functional endurance, generalized weakness, balance deficit, and B foot drop (reportedly baseline). Pt amb 10ft with RWx and min Ax1+1. Rec continued skilled IP PT to increase indep with mobility. d/c rec for home with assist and OP PT.     Time Calculation:    PT Charges     Row Name 12/08/21 1450             Time Calculation    Start Time 0926  -AY      PT Received On 12/08/21  -AY       PT Goal Re-Cert Due Date 12/18/21  -AY              Time Calculation- PT    Total Timed Code Minutes- PT 10 minute(s)  -AY              Timed Charges    00919 - PT Therapeutic Activity Minutes 10  -AY              Untimed Charges    PT Eval/Re-eval Minutes 46  -AY              Total Minutes    Timed Charges Total Minutes 10  -AY      Untimed Charges Total Minutes 46  -AY       Total Minutes 56  -AY            User Key  (r) = Recorded By, (t) = Taken By, (c) = Cosigned By    Initials Name Provider Type    AY Soraya Roblero, PT Physical Therapist              Therapy Charges for Today     Code Description Service Date Service Provider Modifiers Qty    76697657921 HC PT THERAPEUTIC ACT EA 15 MIN 12/8/2021 Soraya Roblero, PT GP 1    98997149022 HC PT EVAL MOD COMPLEXITY 4 12/8/2021 Soraya Roblero, PT GP 1          PT G-Codes  Outcome Measure Options: AM-PAC 6 Clicks Basic Mobility (PT)  AM-PAC 6 Clicks Score (PT): 18  AM-PAC 6 Clicks Score (OT): 19    Soraya Roblero PT  12/8/2021

## 2021-12-08 NOTE — PLAN OF CARE
Goal Outcome Evaluation:  Plan of Care Reviewed With: patient, spouse           Outcome Summary: PT initial eval completed. Pt limited by decreased functional endurance, generalized weakness, balance deficit, and B foot drop (reportedly baseline). Pt amb 10ft with RWx and min Ax1+1. Rec continued skilled IP PT to increase indep with mobility. d/c rec for home with assist and OP PT.

## 2021-12-08 NOTE — CASE MANAGEMENT/SOCIAL WORK
Continued Stay Note  [facility]     Patient Name: Iain Dee  MRN: [id number]  Today's Date: 12/8/2021    Admit Date: 12/8/2021     Discharge Plan     Row Name 12/08/21 1323       Plan    Plan home    Patient/Family in Agreement with Plan yes    Plan Comments Patient does use CPAP at home    Final Discharge Disposition Code 01 - home or self-care    Row Name 12/08/21 1244       Plan    Plan HOme    Patient/Family in Agreement with Plan yes    Plan Comments I met with Mr. Dee in room to discuss discharge planning.  He lives with his spouse in [facility].  He is indpendent with ADL's.  He uses a rolling walker for mobility assistance.  He has Rx coverage.  He is not current with home health or PT.  He does not have a living will or POA on file.  Plan is home at discharge with spouse to transport.    Final Discharge Disposition Code 01 - home or self-care               Discharge Codes    No documentation.               Expected Discharge Date and Time     Expected Discharge Date Expected Discharge Time    Dec 13, 2021             Aurora Cordova RN

## 2021-12-08 NOTE — PLAN OF CARE
Goal Outcome Evaluation:  Plan of Care Reviewed With: patient        Progress: no change (OT evaluation)  Outcome Summary: OT evaluation completed. Pt presents with generalized weakness and impaired cognition limiting independence with functional mobility and ADLs. Pt performed STS from chair with CGA using the RW and ambulated ~household distance using the RW with CGA. No LOB noted. OT recs home with assist at MT.

## 2021-12-08 NOTE — CASE MANAGEMENT/SOCIAL WORK
Continued Stay Note  [facility]     Patient Name: Iain Dee  MRN: [id number]  Today's Date: 12/8/2021    Admit Date: 12/8/2021     Discharge Plan     Row Name 12/08/21 1244       Plan    Plan HOme    Patient/Family in Agreement with Plan yes    Plan Comments I met with Mr. Dee in room to discuss discharge planning.  He lives with his spouse in [facility].  He is indpendent with ADL's.  He uses a rolling walker for mobility assistance.  He has Rx coverage.  He is not current with home health or PT.  He does not have a living will or POA on file. He is agreeable to Home health PT/OT if recommended.  No preference to HH agencies.  Plan is home at discharge with spouse to transport.    Final Discharge Disposition Code 01 - home or self-care               Discharge Codes    No documentation.               Expected Discharge Date and Time     Expected Discharge Date Expected Discharge Time    Dec 13, 2021             Aurora Cordova RN

## 2021-12-08 NOTE — PLAN OF CARE
Goal Outcome Evaluation:  Plan of Care Reviewed With: patient, spouse        Progress:  (initial eval)   SLP evaluation completed. Will sign-off for SLC and dysphagia. Continue regular diet w/ thin liquids. Please see note for further details and recommendations.

## 2021-12-08 NOTE — THERAPY EVALUATION
Acute Care - Speech Language Pathology Initial Evaluation  [facility]   Clinical Swallow Evaluation  Cognitive-Communication Evaluation       Patient Name: Iain Dee  : 1935  MRN: [id number]  Today's Date: 2021               Admit Date: 2021     Visit Dx:    ICD-10-CM ICD-9-CM   1. Hypokalemia  E87.6 276.8   2. Hypomagnesemia  E83.42 275.2   3. Altered mental status, unspecified altered mental status type  R41.82 780.97   4. Hypocalcemia  E83.51 275.41     Patient Active Problem List   Diagnosis   • AMS (altered mental status)   • Hypokalemia   • Hypomagnesemia   • Hypocalcemia   • Hypoalbuminemia   • Essential hypertension   • Atrial fibrillation (HCC)   • GERD (gastroesophageal reflux disease)   • Hyperglycemia   • Gout   • Diabetes mellitus (HCC)   • H/O Bladder carcinoma (HCC)   • JOLENE on CPAP   • Chronic anticoagulation (Eliquis)     Past Medical History:   Diagnosis Date   • Arthritis    • Cancer (HCC)    • Diabetes mellitus (HCC)    • GERD (gastroesophageal reflux disease)    • Gout    • H/O Bladder carcinoma (HCC)    • Hypertension      Past Surgical History:   Procedure Laterality Date   • BLADDER SURGERY      Biopsy   • HERNIA REPAIR     • PROSTATE BIOPSY         SLP Recommendation and Plan  SLP Diagnosis: Per this evaluation Mr. Dee presents w/ wfl speech/language/cognitive skills w/ no deficits observed at this time. No further skilled SLP services warranted at this time (21 1000)        Swallow Criteria for Skilled Therapeutic Interventions Met: no problems identified which require skilled intervention (21 1000)  SLC Criteria for Skilled Therapy Interventions Met: no problems identified which require skilled intervention, baseline status (21 1000)  Anticipated Discharge Disposition (SLP): unknown, anticipate therapy at next level of care (21 1000)     Therapy Frequency (Swallow): evaluation only (21 1000)                Plan of Care Reviewed With:  patient, spouse (12/08/21 1437)  Progress:  (initial eval) (12/08/21 1437)      SLP EVALUATION (last 72 hours)     SLP SLC Evaluation     Row Name 12/08/21 1000       Communication Assessment/Intervention    Document Type evaluation  -CJ    Subjective Information no complaints  -CJ    Patient Observations alert; cooperative  -CJ    Patient/Family/Caregiver Comments/Observations spouse present  -CJ    Patient Effort good  -CJ    Symptoms Noted During/After Treatment none  -CJ            General Information    Patient Profile Reviewed yes  -CJ    Pertinent History Of Current Problem Pt adm w/ ams on stroke pathway; has sig h/o hypokalemia, hypomagnesemia, hypocalcemia, ess HTN, GERD, afib,gout, JOLENE, diabetes.  -CJ    Precautions/Limitations, Vision WFL; for purposes of eval  -CJ    Precautions/Limitations, Hearing WFL; for purposes of eval  -CJ    Prior Level of Function-Communication WFL  -    Plans/Goals Discussed with patient; spouse/S.O.; agreed upon  -    Barriers to Rehab none identified  -    Patient's Goals for Discharge patient did not state  -    Family Goals for Discharge family did not state  -            Pain    Additional Documentation Pain Scale: FACES Pre/Post-Treatment (Group)  -            Pain Scale: FACES Pre/Post-Treatment    Pain: FACES Scale, Pretreatment 0-->no hurt  -    Posttreatment Pain Rating 0-->no hurt  -            Comprehension Assessment/Intervention    Comprehension Assessment/Intervention Auditory Comprehension; Reading Comprehension  -            Auditory Comprehension Assessment/Intervention    Auditory Comprehension (Communication) WFL  -            Reading Comprehension Assessment/Intervention    Reading Comprehension (Communication) WFL  -            Expression Assessment/Intervention    Expression Assessment/Intervention verbal expression; graphic expression  -            Verbal Expression Assessment/Intervention    Verbal Expression WFL  -             Graphic Expression Assessment/Intervention    Graphic Expression [facility]  -            Oral Motor Structure and Function    Oral Motor Structure and Function [facility]  -    Dentition Assessment natural, present and adequate  -    Mucosal Quality moist, healthy  -            Oral Musculature and Cranial Nerve Assessment    Oral Motor General Assessment [facility]  -CJ            Motor Speech Assessment/Intervention    Motor Speech Function [facility]  -CJ            Cognitive Assessment Intervention- SLP    Cognitive Function (Cognition) WFL  -CJ            SLP Evaluation Clinical Impressions    SLP Diagnosis Per this evaluation Mr. Dee presents w/ wfl speech/language/cognitive skills w/ no deficits observed at this time. No further skilled SLP services warranted at this time  -CJ    Rehab Potential/Prognosis good  -    SLC Criteria for Skilled Therapy Interventions Met no problems identified which require skilled intervention; baseline status  -CJ    Functional Impact no impact on function  -CJ            Recommendations    Therapy Frequency (SLP SLC) evaluation only  -CJ    Anticipated Discharge Disposition (SLP) unknown; anticipate therapy at next level of care  -CJ          User Key  (r) = Recorded By, (t) = Taken By, (c) = Cosigned By    Initials Name Effective Dates    CJ Celeste Aguilar, MS CCC-SLP 06/16/21 -                    EDUCATION  The patient has been educated in the following areas:     Cognitive Impairment Communication Impairment.                      Time Calculation:      Time Calculation- SLP     Row Name 12/08/21 1439             Time Calculation- SLP    SLP Start Time 1000  -CJ      SLP Received On 12/08/21  -CJ              Untimed Charges    56226-BI Eval Speech and Production w/ Language Minutes 38  -CJ      81078-RM Eval Oral Pharyng Swallow Minutes 40  -CJ              Total Minutes    Untimed Charges Total Minutes 78  -CJ       Total Minutes 78  -CJ            User Key  (r) = Recorded By, (t) =  Taken By, (c) = Cosigned By    Initials Name Provider Type    CJ Jeff Celeste SWARTZ, MS CCC-SLP Speech and Language Pathologist                Therapy Charges for Today     Code Description Service Date Service Provider Modifiers Qty    96227864350 HC ST EVAL ORAL PHARYNG SWALLOW 3 2021 AguilarCeleste, MS CCC-SLP GN 1    81775238862 HC ST EVAL SPEECH AND PROD W LANG  3 2021 Jeff Celeste SWARTZ, MS CCC-SLP GN 1                     Celeste Aguilar MS CCC-SLP  2021 and   Acute Care - Speech Language Pathology   Swallow Initial Evaluation [facility]   Clinical Swallow Evaluation       Patient Name: Iain Dee  : 1935  MRN: [id number]  Today's Date: 2021               Admit Date: 2021    Visit Dx:     ICD-10-CM ICD-9-CM   1. Hypokalemia  E87.6 276.8   2. Hypomagnesemia  E83.42 275.2   3. Altered mental status, unspecified altered mental status type  R41.82 780.97   4. Hypocalcemia  E83.51 275.41     Patient Active Problem List   Diagnosis   • AMS (altered mental status)   • Hypokalemia   • Hypomagnesemia   • Hypocalcemia   • Hypoalbuminemia   • Essential hypertension   • Atrial fibrillation (HCC)   • GERD (gastroesophageal reflux disease)   • Hyperglycemia   • Gout   • Diabetes mellitus (HCC)   • H/O Bladder carcinoma (HCC)   • JOLENE on CPAP   • Chronic anticoagulation (Eliquis)     Past Medical History:   Diagnosis Date   • Arthritis    • Cancer (HCC)    • Diabetes mellitus (HCC)    • GERD (gastroesophageal reflux disease)    • Gout    • H/O Bladder carcinoma (HCC)    • Hypertension      Past Surgical History:   Procedure Laterality Date   • BLADDER SURGERY      Biopsy   • HERNIA REPAIR     • PROSTATE BIOPSY         SLP Recommendation and Plan  SLP Swallowing Diagnosis: functional oral phase, functional pharyngeal phase (21 1000)  SLP Diet Recommendation: regular textures, thin liquids (21 1000)  Recommended Precautions and Strategies: upright posture during/after eating,  small bites of food and sips of liquid, general aspiration precautions, reflux precautions (12/08/21 1000)  SLP Rec. for Method of Medication Administration: meds whole, with thin liquids, with pudding or applesauce, as tolerated (12/08/21 1000)     Monitor for Signs of Aspiration: yes, notify SLP if any concerns (12/08/21 1000)  Recommended Diagnostics: No further SLP services recommended (12/08/21 1000)  Swallow Criteria for Skilled Therapeutic Interventions Met: no problems identified which require skilled intervention (12/08/21 1000)  Anticipated Discharge Disposition (SLP): unknown, anticipate therapy at next level of care (12/08/21 1000)     Therapy Frequency (Swallow): evaluation only (12/08/21 1000)                            Plan of Care Reviewed With: patient, spouse  Progress:  (initial eval)      SWALLOW EVALUATION (last 72 hours)     SLP Adult Swallow Evaluation     Row Name 12/08/21 1000       General Information    Current Method of Nutrition NPO  -CJ    Prior Level of Function-Communication WFL  -CJ    Prior Level of Function-Swallowing no diet consistency restrictions; safe, efficient swallowing in all situations  -    Patient's Goals for Discharge return to PO diet  -    Family Goals for Discharge family did not state  -            Oral Motor Structure and Function    Secretion Management WNL/WFL  -CJ    Volitional Swallow WFL  -CJ    Volitional Cough WFL  -CJ            General Eating/Swallowing Observations    Respiratory Support Currently in Use nasal cannula  -    Eating/Swallowing Skills fed by SLP; self-fed; appropriate self-feeding skills observed  -    Positioning During Eating upright 90 degree; upright in chair  -    Utensils Used spoon; cup; straw  -CJ    Consistencies Trialed regular textures; ice chips; thin liquids; pureed  -CJ            Clinical Swallow Eval    Pharyngeal Phase no overt signs/symptoms of pharyngeal impairment  -    Clinical Swallow Evaluation Summary  CSE completed. He is positioned upright and centered in bedside chair to accept po presentations. Oral phase is seemingly wfl. No overt s/s of aspiration. No clinical s/s concerning for silent aspiration as pt is w/o changes in vocal quality, respirations or secretions post po presentations. He will most benefit from regular diet w/ thin liquids. Meds whole in puree/thins as tolerated. No further SLP f/u warranted at this time  -            Clinical Impression    SLP Swallowing Diagnosis functional oral phase; functional pharyngeal phase  -    Functional Impact no impact on function  -    Swallow Criteria for Skilled Therapeutic Interventions Met no problems identified which require skilled intervention  -            Recommendations    Therapy Frequency (Swallow) evaluation only  -    SLP Diet Recommendation regular textures; thin liquids  -    Recommended Diagnostics No further SLP services recommended  -    Recommended Precautions and Strategies upright posture during/after eating; small bites of food and sips of liquid; general aspiration precautions; reflux precautions  -    Oral Care Recommendations Oral Care BID/PRN  -    SLP Rec. for Method of Medication Administration meds whole; with thin liquids; with pudding or applesauce; as tolerated  -    Monitor for Signs of Aspiration yes; notify SLP if any concerns  -          User Key  (r) = Recorded By, (t) = Taken By, (c) = Cosigned By    Initials Name Effective Dates     Celeste Aguilar, MS CCC-SLP 06/16/21 -                 EDUCATION  The patient has been educated in the following areas:   Dysphagia (Swallowing Impairment) Oral Care/Hydration.              Time Calculation:    Time Calculation- SLP     Row Name 12/08/21 1438             Time Calculation- SLP    SLP Start Time 1000  -      SLP Received On 12/08/21  -              Untimed Charges    44454-JC Eval Speech and Production w/ Language Minutes 38  -      39998-EW Eval  Oral Pharyng Swallow Minutes 40  -CJ              Total Minutes    Untimed Charges Total Minutes 78  -CJ       Total Minutes 78  -CJ            User Key  (r) = Recorded By, (t) = Taken By, (c) = Cosigned By    Initials Name Provider Type    Celeste Pacheco MS CCC-SLP Speech and Language Pathologist                Therapy Charges for Today     Code Description Service Date Service Provider Modifiers Qty    89135571620  ST EVAL ORAL PHARYNG SWALLOW 3 12/8/2021 Celeste Aguilar MS CCC-SLP GN 1    [id number]  ST EVAL SPEECH AND PROD W LANG  3 12/8/2021 Celeste Aguilar, MS HENNING-SLP GN 1               MS MARIAMA Feliciano  12/8/2021

## 2021-12-08 NOTE — H&P
CRITICAL CARE ADMISSION NOTE    Chief Complaint     AMS (altered mental status)    History of Present Illness     Iain Dee is a 86 y.o. male who has a PMHX of AF (on BB/Eliquis), bladder CA (s/p surgery), HTN, JOLENE ( on CPAP), gout, and GERD.  The patient was asleep when he woke up agitated and confused, pulling off his NIPPV.  Was brought to our ED for AMS which has reportedly been improving.  CVA Navigator saw him and he is not a candidate for tPA given his Eliquis use and CTA & P were negative for LVO or flow-limiting stenosis.  MRI is planned.    Laboratory work showed wildly deranged electrolytes (K 1.9, Mg 0.3, Ca 4.3).      The patient states has been on the same medications and there have been no recent changes or additions to his medical regimen  His spouse indicates that she arranges his medications and puts him in a Mediset on a weekly basis  They do not think that there have been any changes or mixups in his medications    No recent nausea, vomiting, or diarrhea  No recent dietary changes  No history of alcohol excess or other drug use    Problem List, Surgical History, Family, Social History, and ROS     AMS (altered mental status)    Hypokalemia    Hypomagnesemia    Hypocalcemia    Hypoalbuminemia    Essential hypertension    Atrial fibrillation (HCC)    GERD (gastroesophageal reflux disease)    Hyperglycemia    Gout    Diabetes mellitus (HCC)    H/O Bladder carcinoma (HCC)    JOLENE on CPAP    Chronic anticoagulation (Eliquis)    Past Surgical History:   Procedure Laterality Date   • BLADDER SURGERY      Biopsy   • HERNIA REPAIR     • PROSTATE BIOPSY         Allergies   Allergen Reactions   • Colchicine Other (See Comments)     Unable to walk      No current facility-administered medications on file prior to encounter.     Current Outpatient Medications on File Prior to Encounter   Medication Sig   • amLODIPine (NORVASC) 10 MG tablet Take 0.5 tablets by mouth 2 (Two) Times a Day.   • apixaban  "(ELIQUIS) 2.5 MG tablet tablet Take 1 tablet by mouth 2 (Two) Times a Day.   • CloNIDine (CATAPRES) 0.2 MG tablet Take 1 tablet by mouth 2 (Two) Times a Day.   • hydrochlorothiazide (HYDRODIURIL) 12.5 MG tablet Take 1 tablet by mouth Daily.   • indomethacin (INDOCIN) 50 MG capsule TAKE 1 CAPSULE BY MOUTH TWICE DAILY WITH MEALS   • losartan (COZAAR) 50 MG tablet Take 50 mg by mouth 2 (Two) Times a Day.   • metoprolol tartrate (LOPRESSOR) 25 MG tablet Take 0.5 tablets by mouth 2 (Two) Times a Day.   • omeprazole (priLOSEC) 40 MG capsule Take 1 capsule by mouth Daily.     MEDICATION LIST AND ALLERGIES REVIEWED.    Family History   Problem Relation Age of Onset   • Heart attack Father      Social History     Tobacco Use   • Smoking status: Never Smoker   • Smokeless tobacco: Never Used   Substance Use Topics   • Alcohol use: Yes   • Drug use: No     Social History     Social History Narrative   • Not on file     FAMILY AND SOCIAL HISTORY REVIEWED.    Review of Systems  ALL OTHER SYSTEMS REVIEWED AND ARE NEGATIVE.    Physical Exam and Clinical Information   /92   Pulse 84   Temp 98.5 °F (36.9 °C) (Oral)   Resp 16   Ht 182.9 cm (72\")   Wt 104 kg (230 lb)   SpO2 93%   BMI 31.19 kg/m²   Physical Exam   GENERAL: Awake, no distress   HEENT: No adenopathy or thyromegaly.  No evidence of external trauma   LUNGS: Decreased breath sounds without wheezes   HEART: Irregular rhythm without murmurs   GI: Obese, protuberant, soft, nontender   EXTREMITIES: Trace pitting lower extremity edema without clubbing or cyanosis   NEURO/PSYCH: Awake and alert.  Currently appropriate and responds to questions appropriately without focal motor deficit, improved in comparison to initial presentation    Results from last 7 days   Lab Units 12/08/21  0210 12/08/21  0159   WBC 10*3/mm3  --  9.85   HEMOGLOBIN g/dL  --  12.8*   HEMOGLOBIN, POC g/dL 12.2  --    PLATELETS 10*3/mm3  --  393     Results from last 7 days   Lab Units " 12/08/21  0310 12/08/21  0206 12/08/21  0159   SODIUM mmol/L 142  --   --    POTASSIUM mmol/L 1.9*  --   --    CO2 mmol/L 22.0  --   --    BUN mg/dL 17  --   --    CREATININE mg/dL 0.83 1.20  --    MAGNESIUM mg/dL 0.3*  --  0.5*   PHOSPHORUS mg/dL 2.5  --   --    GLUCOSE mg/dL 160*  --   --      Estimated Creatinine Clearance: 81.2 mL/min (by C-G formula based on SCr of 0.83 mg/dL).      Results from last 7 days   Lab Units 12/08/21  0210   PH, ARTERIAL pH units 7.39     Lab Results   Component Value Date    LACTATE 3.0 (C) 12/08/2021        IMAGES: Chest x-ray reveals cardiomegaly and left basilar atelectasis or infiltrate or effusion    I reviewed the patient's results and images.     Assesment     Active Hospital Problems    Diagnosis    • **AMS (altered mental status)    • Hypokalemia    • Hypomagnesemia    • Hypocalcemia    • Hypoalbuminemia    • Essential hypertension    • Atrial fibrillation (HCC)    • GERD (gastroesophageal reflux disease)    • Hyperglycemia    • Chronic anticoagulation (Eliquis)    • Gout    • Diabetes mellitus (HCC)    • H/O Bladder carcinoma (HCC)    • JOLENE on CPAP      Plan/Recommendations     Admit to the intensive care unit  Telemetry monitoring  Replace electrolytes  Hold some home medications in particular hydrochlorothiazide and Indocin  Eventually MRI of the brain, and EEG if neurologic status does not continue to improve  Eventually repeat chest x-ray to reassess left base  Ulcer prophylaxis  Continue Sneha Castellanos MD  Pulmonary and Critical Care Medicine     Critical Care time spent in direct patient care: 38 minutes (excluding procedure time, if applicable) including high complexity decision making to assess, manipulate, and support vital organ system failure in this individual who has impairment of one or more vital organ systems such that there is a high probability of imminent or life threatening deterioration in the patient’s condition    CC: Luis Aguilar  Tim, DO    Please note that portions of this note were completed with a voice recognition program. Efforts were made to edit the dictations, but occasionally words are mistranscribed.

## 2021-12-08 NOTE — CONSULTS
Stroke Consult Note    Patient Name: Iain Dee   MRN: [id number]  Age: 86 y.o.  Sex: male  : 1935    Primary Care Physician: Luis Aguilar DO  Referring Physician:  No ref. provider found    TIME STROKE TEAM CALLED: 0200 EST     TIME PATIENT SEEN: 0210 EST    Handedness: right   Race:       Chief Complaint/Reason for Consultation: AMS    Subjective .  HPI:   Iain Dee is a 86-year-old  male with known medical diagnoses of diabetes mellitus,Afib (on Eliquis) hypertension, gout, GERD, malignant bladder cancer and arthritis.  He presents to [facility] ED via EMS with concerns of altered mental status.  Per report wife was woken up by patient thrashing around in bed.  He was quite agitated and confused.  At first unable to answer any questions.  Wife removed his CPAP and he was able to start speaking but was not making any sense.  Per EMS patient was very hostile and aggressive.  On arrival to our facility patient was much more calm and was able to answer some orientation questions.  He is moving all extremities equal.  He is no focal neurologic deficits.  He was taken urgently for advanced imaging.  CT head without contrast negative for acute intracranial abnormality.  CTA of the head neck needed for large vessel occlusion or flow limiting stenosis. CT perfusion negative for area of reversible ischemia.  Nurses are currently working to catheterize patient for UA to rule out UTI.  I will order an MRI without contrast to evaluate for vascular cause.  May consider EEG if this is negative.  Patient does not appear to be postictal at this time      Last Known Normal Date/Time: 2200 EST 21     Review of Systems   Unable to perform ROS: Mental status change      Past Medical History:   Diagnosis Date   • Arthritis    • Cancer (HCC)    • Diabetes mellitus (HCC)    • GERD (gastroesophageal reflux disease)    • Gout    • Hypertension      Past Surgical History:   Procedure Laterality Date    • BLADDER SURGERY      Biopsy   • HERNIA REPAIR     • PROSTATE BIOPSY       Family History   Problem Relation Age of Onset   • Heart attack Father      Social History     Socioeconomic History   • Marital status: Single   Tobacco Use   • Smoking status: Never Smoker   • Smokeless tobacco: Never Used   Substance and Sexual Activity   • Alcohol use: Yes   • Drug use: No     Allergies   Allergen Reactions   • Colchicine Other (See Comments)     Unable to walk      Prior to Admission medications    Medication Sig Start Date End Date Taking? Authorizing Provider   amLODIPine (NORVASC) 10 MG tablet Take 0.5 tablets by mouth 2 (Two) Times a Day. 1/20/19  Yes Jovita Handy MD   apixaban (ELIQUIS) 2.5 MG tablet tablet Take 1 tablet by mouth 2 (Two) Times a Day.   Yes Jovita Handy MD   CloNIDine (CATAPRES) 0.2 MG tablet Take 1 tablet by mouth 2 (Two) Times a Day. 2/21/19  Yes Jovita Handy MD   hydrochlorothiazide (HYDRODIURIL) 12.5 MG tablet Take 1 tablet by mouth Daily. 3/21/19  Yes Jovita Handy MD   indomethacin (INDOCIN) 50 MG capsule TAKE 1 CAPSULE BY MOUTH TWICE DAILY WITH MEALS 4/18/19  Yes Luis Aguilar DO   losartan (COZAAR) 50 MG tablet Take 50 mg by mouth 2 (Two) Times a Day. 1/20/19  Yes Jovita Handy MD   metoprolol tartrate (LOPRESSOR) 25 MG tablet Take 0.5 tablets by mouth 2 (Two) Times a Day.   Yes Jovita Handy MD   omeprazole (priLOSEC) 40 MG capsule Take 1 capsule by mouth Daily. 2/21/19  Yes Jovita Handy MD             Objective     Temp:  [98.5 °F (36.9 °C)] 98.5 °F (36.9 °C)  Heart Rate:  [111] 111  Resp:  [16] 16  BP: (91)/(71) 91/71  Neurological Exam  Mental Status  Alert. Oriented only to person. Speech is normal. Language is fluent with no aphasia. Fund of knowledge is abnormal.    Cranial Nerves  CN II: Visual fields full to confrontation.  CN III, IV, VI: Extraocular movements intact bilaterally. Normal lids and orbits  bilaterally. Pupils equal round and reactive to light bilaterally.  CN V: Facial sensation is normal.  CN VII: Full and symmetric facial movement.  CN XI: Shoulder shrug strength is normal.  CN XII: Tongue midline without atrophy or fasciculations.    Motor   Strength is 5/5 throughout all four extremities.    Sensory  Light touch is normal in upper and lower extremities.     Coordination  Right: Finger-to-nose normal.  Left: Finger-to-nose normal.    Gait  Not assessed.      Physical Exam  Vitals and nursing note reviewed.   Constitutional:       Appearance: Normal appearance.   HENT:      Head: Normocephalic and atraumatic.      Nose: Nose normal.      Mouth/Throat:      Mouth: Mucous membranes are moist.   Eyes:      General: Lids are normal.      Extraocular Movements: Extraocular movements intact.      Pupils: Pupils are equal, round, and reactive to light.   Cardiovascular:      Rate and Rhythm: Normal rate and regular rhythm.   Pulmonary:      Effort: Pulmonary effort is normal. No respiratory distress.   Skin:     General: Skin is warm and dry.      Findings: Bruising present.   Neurological:      Mental Status: He is alert. He is disoriented.      Deep Tendon Reflexes: Strength normal.   Psychiatric:         Speech: Speech normal.         Acute Stroke Data    Alteplase (tPA) Inclusion / Exclusion Criteria    Time: 02:32 EST  Person Administering Scale: COLIN Alves    Inclusion Criteria  [x]   18 years of age or greater   []   Onset of symptoms < 4.5 hours before beginning treatment (stroke onset = time patient was last seen well or without symptoms).   []   Diagnosis of acute ischemic stroke causing measurable disabling deficit (Complete Hemianopia, Any Aphasia, Visual or Sensory Extinction, Any weakness limiting sustained effort against gravity)   []   Any remaining deficit considered potentially disabling in view of patient and practitioner   Exclusion criteria (Do not proceed with Alteplase  if any are checked under exclusion criteria)  []   Onset unknown or GREATER than 4.5 hours   []   ICH on CT/MRI   []   CT demonstrates hypodensity representing acute or subacute infarct   []   Significant head trauma or prior stroke in the previous 3 months   []   Symptoms suggestive of subarachnoid hemorrhage   []   History of un-ruptured intracranial aneurysm GREATER than 10 mm   []   Recent intracranial or intraspinal surgery within the last 3 months   []   Arterial puncture at a non-compressible site in the previous 7 days   []   Active internal bleeding   []   Acute bleeding tendency   []   Platelet count LESS than 100,000 for known hematological diseases such as leukemia, thrombocytopenia or chronic cirrhosis   []   Current use of anticoagulant with INR GREATER than 1.7 or PT GREATER than 15 seconds, aPTT GREATER than 40 seconds   []   Heparin received within 48 hours, resulting in abnormally elevated aPTT GREATER than upper limit of normal   []   Current use of direct thrombin inhibitors or direct factor Xa inhibitors in the past 48 hours   []   Elevated blood pressure refractory to treatment (systolic GREATER than 185 mm/Hg or diastolic  GREATER than 110 mm/Hg   []   Suspected infective endocarditis and aortic arch dissection   []   Current use of therapeutic treatment dose of low-molecular-weight heparin (LMWH) within the previous 24 hours   []   Structural GI malignancy or bleed   Relative exclusion for all patients  []   Only minor non-disabling symptoms   []   Pregnancy   []   Seizure at onset with postictal residual neurological impairments   []   Major surgery or previous trauma within past 14 days   []   History of previous spontaneous ICH, intracranial neoplasm, or AV malformation   []   Postpartum (within previous 14 days)   []   Recent GI or urinary tract hemorrhage (within previous 21 days)   []   Recent acute MI (within previous 3 months)   []   History of un-ruptured intracranial aneurysm LESS  than 10 mm   []   History of ruptured intracranial aneurysm   []   Blood glucose LESS than 50 mg/dL (2.7 mmol/L)   []   Dural puncture within the last 7 days   []   Known GREATER than 10 cerebral microbleeds   Additional exclusions for patients with symptoms onset between 3 and 4.5 hours.  []   Age > 80.   [x]   On any anticoagulants regardless of INR  >>> Warfarin (Coumadin), Heparin, Enoxaparin (Lovenox), fondaparinux (Arixtra), bivalirudin (Angiomax), Argatroban, dabigatran (Pradaxa), rivaroxaban (Xarelto), or apixaban (Eliquis)   []   Severe stroke (NIHSS > 25).   []   History of BOTH diabetes and previous ischemic stroke.   []   The risks and benefits have been discussed with the patient or family related to the administration of IV Alteplase for stroke symptoms.   []   I have discussed and reviewed the patient's case and imaging with the attending prior to IV Alteplase.   Not given Time Alteplase administered       Hospital Meds:  Scheduled-    Infusions-     PRNs- •  sodium chloride    Functional Status Prior to Current Stroke/Narrows Score: 0    NIH Stroke Scale  Time: 02:32 EST  Person Administering Scale: COLIN Alves    Interval: baseline  1a. Level of Consciousness: 0-->Alert, keenly responsive  1b. LOC Questions: 1-->Answers one question correctly  1c. LOC Commands: 0-->Performs both tasks correctly  2. Best Gaze: 0-->Normal  3. Visual: 0-->No visual loss  4. Facial Palsy: 0-->Normal symmetrical movements  5a. Motor Arm, Left: 0-->No drift, limb holds 90 (or 45) degrees for full 10 secs  5b. Motor Arm, Right: 0-->No drift, limb holds 90 (or 45) degrees for full 10 secs  6a. Motor Leg, Left: 0-->No drift, leg holds 30 degree position for full 5 secs  6b. Motor Leg, Right: 0-->No drift, leg holds 30 degree position for full 5 secs  7. Limb Ataxia: 0-->Absent  8. Sensory: 0-->Normal, no sensory loss  9. Best Language: 0-->No aphasia, normal  10. Dysarthria: 0-->Normal  11. Extinction and  Inattention (formerly Neglect): 0-->No abnormality    Total (NIH Stroke Scale): 1    Results Reviewed:  I have personally reviewed current lab, radiology, and data and agree with results.      CT Angiogram Neck    Result Date: 12/8/2021  Extensive small calcified plaque formations throughout the distal common carotid arteries and carotid bifurcations. I Cannot identify any stenoses of greater than 50%. No vascular occlusions are identified. CT of the brain is within normal limits Signer Name: Estuardo Sanches MD  Signed: 12/8/2021 2:38 AM  Workstation Name: [facility]    CT Head Without Contrast Stroke Protocol    Result Date: 12/8/2021  Senescent changes without acute abnormality. Signer Name: Estuardo Sanches MD  Signed: 12/8/2021 2:12 AM  Workstation Name: [facility]    CT Angiogram Head w AI Analysis of LVO    Result Date: 12/8/2021  Extensive small calcified plaque formations throughout the distal common carotid arteries and carotid bifurcations. I Cannot identify any stenoses of greater than 50%. No vascular occlusions are identified. CT of the brain is within normal limits Signer Name: Estuardo Sanches MD  Signed: 12/8/2021 2:38 AM  Workstation Name: [facility]    CT CEREBRAL PERFUSION WITH & WITHOUT CONTRAST    Result Date: 12/8/2021  Normal brain perfusion CT. Signer Name: Estuardo Sanches MD  Signed: 12/8/2021 2:29 AM  Workstation Name: [facility]          Assessment/Plan:  This is a 86-year-old  male with known medical diagnoses of diabetes mellitus,Afib (on Eliquis) hypertension, gout, GERD and arthritis.  He presents to [facility] ED via EMS with concerns of altered mental status.   1. AMS, improving  -Imaging reviewed above.  Negative for acute intracranial abnormality, large vessel occlusion or flow-limiting stenosis  -MRI brain without contrast ordered  -TTE, FLP,  HgbA1C  -UA and culture if indicated  -Consider EEG  -add baby ASA and high intensity statin for secondary stroke prevention   -PT/OT/SLP consulted      2. Afib  -continue Eliquis     3. HTN  -currently hypotensive   -hold home medications for now     [place] plan of care with patient, wife, ED provider.  Stroke neurology will follow up on imaging and make further recommendations.  Thank you for this consult      Shaunna Young, COLIN  December 8, 2021  02:32 EST

## 2021-12-08 NOTE — PLAN OF CARE
Goal Outcome Evaluation:           Progress: improving  Outcome Summary: VSS. Pt weaned from 6L NC to RA. NIHSS score was zero this shift. MRI without contrast ordered, EEG ordered. PT/OT worked with pt. Pass SLP swallow. Pt on regular/cardiac/ada diet. Per neurologist continue NIHSS until after MRI resulted and negative. Insulin ordered for elevated BG. Up in chair majority of shift.Mg and K replaced. Continue to monitor.

## 2021-12-09 ENCOUNTER — APPOINTMENT (OUTPATIENT)
Dept: NEUROLOGY | Facility: HOSPITAL | Age: 86
End: 2021-12-09

## 2021-12-09 PROBLEM — F10.10 ALCOHOL ABUSE: Status: ACTIVE | Noted: 2021-12-09

## 2021-12-09 LAB
ALBUMIN SERPL-MCNC: 3.4 G/DL (ref 3.5–5.2)
ALBUMIN/GLOB SERPL: 1 G/DL
ALP SERPL-CCNC: 70 U/L (ref 39–117)
ALT SERPL W P-5'-P-CCNC: 13 U/L (ref 1–41)
ANION GAP SERPL CALCULATED.3IONS-SCNC: 15 MMOL/L (ref 5–15)
AST SERPL-CCNC: 33 U/L (ref 1–40)
BILIRUB SERPL-MCNC: 1.1 MG/DL (ref 0–1.2)
BUN SERPL-MCNC: 13 MG/DL (ref 8–23)
BUN/CREAT SERPL: 15.1 (ref 7–25)
CALCIUM SPEC-SCNC: 7 MG/DL (ref 8.6–10.5)
CHLORIDE SERPL-SCNC: 97 MMOL/L (ref 98–107)
CO2 SERPL-SCNC: 22 MMOL/L (ref 22–29)
CREAT SERPL-MCNC: 0.86 MG/DL (ref 0.76–1.27)
DEPRECATED RDW RBC AUTO: 44.1 FL (ref 37–54)
ERYTHROCYTE [DISTWIDTH] IN BLOOD BY AUTOMATED COUNT: 12.4 % (ref 12.3–15.4)
GFR SERPL CREATININE-BSD FRML MDRD: 85 ML/MIN/1.73
GLOBULIN UR ELPH-MCNC: 3.4 GM/DL
GLUCOSE BLDC GLUCOMTR-MCNC: 113 MG/DL (ref 70–130)
GLUCOSE BLDC GLUCOMTR-MCNC: 168 MG/DL (ref 70–130)
GLUCOSE BLDC GLUCOMTR-MCNC: 201 MG/DL (ref 70–130)
GLUCOSE BLDC GLUCOMTR-MCNC: 212 MG/DL (ref 70–130)
GLUCOSE SERPL-MCNC: 205 MG/DL (ref 65–99)
HCT VFR BLD AUTO: 38.6 % (ref 37.5–51)
HGB BLD-MCNC: 13.4 G/DL (ref 13–17.7)
MAGNESIUM SERPL-MCNC: 1.7 MG/DL (ref 1.6–2.4)
MCH RBC QN AUTO: 33.5 PG (ref 26.6–33)
MCHC RBC AUTO-ENTMCNC: 34.7 G/DL (ref 31.5–35.7)
MCV RBC AUTO: 96.5 FL (ref 79–97)
PHOSPHATE SERPL-MCNC: 2.5 MG/DL (ref 2.5–4.5)
PLATELET # BLD AUTO: 311 10*3/MM3 (ref 140–450)
PMV BLD AUTO: 10.8 FL (ref 6–12)
POTASSIUM SERPL-SCNC: 3.8 MMOL/L (ref 3.5–5.2)
PROT SERPL-MCNC: 6.8 G/DL (ref 6–8.5)
QT INTERVAL: 348 MS
QT INTERVAL: 358 MS
QTC INTERVAL: 418 MS
QTC INTERVAL: 437 MS
RBC # BLD AUTO: 4 10*6/MM3 (ref 4.14–5.8)
SODIUM SERPL-SCNC: 134 MMOL/L (ref 136–145)
WBC NRBC COR # BLD: 9.52 10*3/MM3 (ref 3.4–10.8)

## 2021-12-09 PROCEDURE — 99232 SBSQ HOSP IP/OBS MODERATE 35: CPT | Performed by: PSYCHIATRY & NEUROLOGY

## 2021-12-09 PROCEDURE — 94799 UNLISTED PULMONARY SVC/PX: CPT

## 2021-12-09 PROCEDURE — 82962 GLUCOSE BLOOD TEST: CPT

## 2021-12-09 PROCEDURE — 80053 COMPREHEN METABOLIC PANEL: CPT | Performed by: NURSE PRACTITIONER

## 2021-12-09 PROCEDURE — 84100 ASSAY OF PHOSPHORUS: CPT | Performed by: NURSE PRACTITIONER

## 2021-12-09 PROCEDURE — 95816 EEG AWAKE AND DROWSY: CPT

## 2021-12-09 PROCEDURE — 63710000001 INSULIN LISPRO (HUMAN) PER 5 UNITS: Performed by: NURSE PRACTITIONER

## 2021-12-09 PROCEDURE — 99233 SBSQ HOSP IP/OBS HIGH 50: CPT | Performed by: INTERNAL MEDICINE

## 2021-12-09 PROCEDURE — 83735 ASSAY OF MAGNESIUM: CPT | Performed by: NURSE PRACTITIONER

## 2021-12-09 PROCEDURE — 0 MAGNESIUM SULFATE 4 GM/100ML SOLUTION: Performed by: NURSE PRACTITIONER

## 2021-12-09 PROCEDURE — 25010000002 THIAMINE PER 100 MG: Performed by: INTERNAL MEDICINE

## 2021-12-09 PROCEDURE — 94660 CPAP INITIATION&MGMT: CPT

## 2021-12-09 PROCEDURE — 85027 COMPLETE CBC AUTOMATED: CPT | Performed by: NURSE PRACTITIONER

## 2021-12-09 RX ORDER — METOPROLOL TARTRATE 5 MG/5ML
5 INJECTION INTRAVENOUS ONCE
Status: COMPLETED | OUTPATIENT
Start: 2021-12-09 | End: 2021-12-09

## 2021-12-09 RX ORDER — LORAZEPAM 1 MG/1
1 TABLET ORAL
Status: DISCONTINUED | OUTPATIENT
Start: 2021-12-09 | End: 2021-12-10 | Stop reason: HOSPADM

## 2021-12-09 RX ORDER — LORAZEPAM 2 MG/ML
1 INJECTION INTRAMUSCULAR
Status: DISCONTINUED | OUTPATIENT
Start: 2021-12-09 | End: 2021-12-10 | Stop reason: HOSPADM

## 2021-12-09 RX ORDER — CLONIDINE HYDROCHLORIDE 0.2 MG/1
0.2 TABLET ORAL EVERY 12 HOURS SCHEDULED
Status: DISCONTINUED | OUTPATIENT
Start: 2021-12-09 | End: 2021-12-10 | Stop reason: HOSPADM

## 2021-12-09 RX ORDER — LOSARTAN POTASSIUM 25 MG/1
50 TABLET ORAL
Status: DISCONTINUED | OUTPATIENT
Start: 2021-12-09 | End: 2021-12-10 | Stop reason: HOSPADM

## 2021-12-09 RX ORDER — LORAZEPAM 2 MG/ML
0.5 INJECTION INTRAMUSCULAR
Status: DISCONTINUED | OUTPATIENT
Start: 2021-12-09 | End: 2021-12-10 | Stop reason: HOSPADM

## 2021-12-09 RX ORDER — LORAZEPAM 0.5 MG/1
0.5 TABLET ORAL
Status: DISCONTINUED | OUTPATIENT
Start: 2021-12-09 | End: 2021-12-10 | Stop reason: HOSPADM

## 2021-12-09 RX ORDER — FOLIC ACID 1 MG/1
1 TABLET ORAL DAILY
Status: DISCONTINUED | OUTPATIENT
Start: 2021-12-09 | End: 2021-12-10 | Stop reason: HOSPADM

## 2021-12-09 RX ADMIN — MAGNESIUM SULFATE HEPTAHYDRATE 4 G: 40 INJECTION, SOLUTION INTRAVENOUS at 10:12

## 2021-12-09 RX ADMIN — INSULIN LISPRO 3 UNITS: 100 INJECTION, SOLUTION INTRAVENOUS; SUBCUTANEOUS at 16:37

## 2021-12-09 RX ADMIN — PANTOPRAZOLE SODIUM 40 MG: 40 TABLET, DELAYED RELEASE ORAL at 05:59

## 2021-12-09 RX ADMIN — SODIUM CHLORIDE, PRESERVATIVE FREE 10 ML: 5 INJECTION INTRAVENOUS at 07:47

## 2021-12-09 RX ADMIN — METOPROLOL TARTRATE 12.5 MG: 25 TABLET, FILM COATED ORAL at 21:41

## 2021-12-09 RX ADMIN — INSULIN LISPRO 2 UNITS: 100 INJECTION, SOLUTION INTRAVENOUS; SUBCUTANEOUS at 07:48

## 2021-12-09 RX ADMIN — SODIUM CHLORIDE, PRESERVATIVE FREE 10 ML: 5 INJECTION INTRAVENOUS at 21:41

## 2021-12-09 RX ADMIN — CLONIDINE HYDROCHLORIDE 0.2 MG: 0.2 TABLET ORAL at 21:40

## 2021-12-09 RX ADMIN — APIXABAN 2.5 MG: 2.5 TABLET, FILM COATED ORAL at 21:41

## 2021-12-09 RX ADMIN — ATORVASTATIN CALCIUM 80 MG: 40 TABLET, FILM COATED ORAL at 21:41

## 2021-12-09 RX ADMIN — APIXABAN 2.5 MG: 2.5 TABLET, FILM COATED ORAL at 07:47

## 2021-12-09 RX ADMIN — FOLIC ACID 1 MG: 1 TABLET ORAL at 10:12

## 2021-12-09 RX ADMIN — METOPROLOL TARTRATE 12.5 MG: 25 TABLET, FILM COATED ORAL at 07:46

## 2021-12-09 RX ADMIN — THIAMINE HYDROCHLORIDE 500 MG: 100 INJECTION, SOLUTION INTRAMUSCULAR; INTRAVENOUS at 10:12

## 2021-12-09 RX ADMIN — ASPIRIN 81 MG CHEWABLE TABLET 81 MG: 81 TABLET CHEWABLE at 07:47

## 2021-12-09 RX ADMIN — INSULIN LISPRO 3 UNITS: 100 INJECTION, SOLUTION INTRAVENOUS; SUBCUTANEOUS at 12:15

## 2021-12-09 RX ADMIN — METOPROLOL TARTRATE 5 MG: 1 INJECTION, SOLUTION INTRAVENOUS at 01:38

## 2021-12-09 RX ADMIN — THIAMINE HYDROCHLORIDE 500 MG: 100 INJECTION, SOLUTION INTRAMUSCULAR; INTRAVENOUS at 18:00

## 2021-12-09 RX ADMIN — CLONIDINE HYDROCHLORIDE 0.2 MG: 0.2 TABLET ORAL at 10:12

## 2021-12-09 RX ADMIN — AMLODIPINE BESYLATE 5 MG: 5 TABLET ORAL at 07:46

## 2021-12-09 RX ADMIN — LOSARTAN POTASSIUM 50 MG: 50 TABLET, FILM COATED ORAL at 10:12

## 2021-12-09 NOTE — PROGRESS NOTES
"Neurology Note    Patient:  Iain Dee    YOB: 1935    REFERRING PHYSICIAN:  Dr. Moses    CHIEF COMPLAINT:    AMS    HISTORY OF PRESENT ILLNESS:   The patient was a bit agitated and red in the face this am, calmed out w/o meds. Reports drinking one \"strong\"martini daily. Cleared for po diet, currently back to baseline.    Past Medical History:  Past Medical History:   Diagnosis Date   • Arthritis    • Cancer (HCC)    • Diabetes mellitus (HCC)    • GERD (gastroesophageal reflux disease)    • Gout    • H/O Bladder carcinoma (HCC)    • Hypertension        Past Surgical History:  Past Surgical History:   Procedure Laterality Date   • BLADDER SURGERY      Biopsy   • HERNIA REPAIR     • PROSTATE BIOPSY         Social History:   Social History     Socioeconomic History   • Marital status: Single   Tobacco Use   • Smoking status: Never Smoker   • Smokeless tobacco: Never Used   Substance and Sexual Activity   • Alcohol use: Yes   • Drug use: No        Family History:   Family History   Problem Relation Age of Onset   • Heart attack Father        Medications Prior to Admission:    Prior to Admission medications    Medication Sig Start Date End Date Taking? Authorizing Provider   amLODIPine (NORVASC) 10 MG tablet Take 0.5 tablets by mouth 2 (Two) Times a Day. 1/20/19  Yes Jovita Handy MD   apixaban (ELIQUIS) 2.5 MG tablet tablet Take 1 tablet by mouth 2 (Two) Times a Day.   Yes Jovita Handy MD   CloNIDine (CATAPRES) 0.2 MG tablet Take 1 tablet by mouth 2 (Two) Times a Day. 2/21/19  Yes Jovita Handy MD   hydrochlorothiazide (HYDRODIURIL) 12.5 MG tablet Take 1 tablet by mouth Daily. 3/21/19  Yes Jovita Handy MD   indomethacin (INDOCIN) 50 MG capsule TAKE 1 CAPSULE BY MOUTH TWICE DAILY WITH MEALS 4/18/19  Yes Luis Aguilar DO   losartan (COZAAR) 50 MG tablet Take 50 mg by mouth 2 (Two) Times a Day. 1/20/19  Yes Jovita Handy MD   metoprolol tartrate " (LOPRESSOR) 25 MG tablet Take 0.5 tablets by mouth 2 (Two) Times a Day.   Yes Provider, MD Jovita   omeprazole (priLOSEC) 40 MG capsule Take 1 capsule by mouth Daily. 2/21/19  Yes Provider, MD Jovita       Allergies:  Colchicine      Review of system  Review of Systems   Neurological: Negative for seizures, syncope and weakness.   Psychiatric/Behavioral: Negative for confusion.   All other systems reviewed and are negative.      Vitals:    12/09/21 1200   BP: 107/95   Pulse: 79   Resp: 16   Temp: 97.5 °F (36.4 °C)   SpO2: 91%       Physical exam  Physical Exam  Constitutional:       Appearance: He is well-developed.   Cardiovascular:      Rate and Rhythm: Normal rate and regular rhythm.   Pulmonary:      Effort: Pulmonary effort is normal.   Neurological:      Mental Status: He is alert and oriented to person, place, and time.      Deep Tendon Reflexes: Reflexes are normal and symmetric.      Comments: Speech clear, no facial droop, no limb drift, no tremor.   Psychiatric:         Behavior: Behavior normal.         Thought Content: Thought content normal.           Lab Results   Component Value Date    WBC 9.52 12/09/2021    HGB 13.4 12/09/2021    HCT 38.6 12/09/2021    MCV 96.5 12/09/2021     12/09/2021     Lab Results   Component Value Date    GLUCOSE 205 (H) 12/09/2021    BUN 13 12/09/2021    CREATININE 0.86 12/09/2021    EGFRIFNONA 85 12/09/2021    BCR 15.1 12/09/2021    CO2 22.0 12/09/2021    CALCIUM 7.0 (L) 12/09/2021    ALBUMIN 3.40 (L) 12/09/2021    AST 33 12/09/2021    ALT 13 12/09/2021   Contains abnormal data Comprehensive Metabolic Panel  Order: [id number]   Status: Final result     Visible to patient: No (scheduled for 12/9/2021 11:55 PM)     Next appt: None    Specimen Information: Blood         0 Result Notes    Component   Ref Range & Units 08:42   (12/9/21) 07:24   (12/9/21) 1 d ago   (12/8/21) 1 d ago   (12/8/21) 1 d ago   (12/8/21) 1 d ago   (12/8/21) 1 d ago   (12/8/21)   Glucose    65 - 99 mg/dL 205 High   168 High  R, CM  126 R, CM  206 High  R, CM    199 High  R, CM    BUN   8 - 23 mg/dL 13          Creatinine   0.76 - 1.27 mg/dL 0.86          Sodium   136 - 145 mmol/L 134 Low           Potassium   3.5 - 5.2 mmol/L 3.8     3.5  3.5     Comment: Slight hemolysis detected by analyzer. Results may be affected.   Chloride   98 - 107 mmol/L 97 Low           CO2   22.0 - 29.0 mmol/L 22.0          Calcium   8.6 - 10.5 mg/dL 7.0 Low           Total Protein   6.0 - 8.5 g/dL 6.8          Albumin   3.50 - 5.20 g/dL 3.40 Low           ALT (SGPT)   1 - 41 U/L 13          AST (SGOT)   1 - 40 U/L 33          Alkaline Phosphatase   39 - 117 U/L 70          Total Bilirubin   0.0 - 1.2 mg/dL 1.1          eGFR Non African Amer   >60 mL/min/1.73 85          Globulin   gm/dL 3.4          Comment: Calculated Result   A/G Ratio   g/dL 1.0          BUN/Creatinine Ratio   7.0 - 25.0 15.1          Anion Gap   5.0 - 15.0 mmol/L 15.0                 Ammonia   16 - 60 umol/L 16      Ethanol   0 - 10 mg/dL <10      Magnesium   1.6 - 2.4 mg/dL 1.9  1.2 Low   1.2 Low   0.3 Low Critical   0.5 Low Critical             Radiological Studies:  Adult Transthoracic Echo Complete W/ Cont if Necessary Per Protocol (With Agitated Saline)    Result Date: 12/8/2021  · Saline test results are negative. · The left ventricular cavity is dilated. · Left ventricular wall thickness is consistent with mild concentric hypertrophy. · The right ventricular cavity is dilated. · Mild mitral valve stenosis is present. · LVSF is normal      EEG    Result Date: 12/9/2021  Reason for referral: 86 y.o.male with sideration seizure, altered mental status Technical Summary:  A 19 channel digital EEG was performed using the international 10-20 placement system, including eye leads and EKG leads. Duration: 21 minutes Video: Off Findings: Diffuse low amplitude 5-7 Hz theta which is present symmetrically over both hemispheres.  Underlying low amplitude EMG  and eye blink artifact are present.  Stage II sleep is not seen.  Photic stimulation yields a symmetric driving response at several flash frequencies.  Hyperventilation is not performed.  No focal features or epileptiform activity are seen. Technical quality: Excellent SUMMARY: Mild generalized slow No focal features or epileptiform activity are seen     The study shows evidence for diffuse cerebral dysfunction of mild degree but is nonspecific as to cause No evidence for epilepsy is seen This report is transcribed using the Dragon dictation system.      CT Angiogram Neck    Result Date: 12/8/2021  CTA  HEAD W AI ANALYSIS FOR LVO, CTA Neck INDICATION: Shortness of air TECHNIQUE: CT angiogram of the head and neck with IV contrast. 3-D reconstructions were obtained and reviewed.  Evaluation for a significant carotid arterial stenosis is based on the NASCET criteria. Radiation dose reduction techniques included automated exposure control or exposure modulation based on body size. Count of known CT and cardiac nuc med studies performed in previous 12 months: 2.. AI analysis for LVO was performed COMPARISON: Cerebral perfusion study and CT brain from earlier today FINDINGS: CTA neck:  The visualized aortic arch is normal. The great vessels are all patent. The vertebral arteries both arise from the subclavian arteries. They are equal in size and they unite to form the basilar artery. There are extensive small calcified plaques throughout the carotid bifurcations bilaterally. Stenosis. The less than 50% bilaterally. The rest of the internal carotid arteries are patent. The right internal carotid artery is very tortuous. CTA head:  Starting posterior cerebral arteries are normal in appearance. The middle and anterior cerebral arteries are patent without stenosis.     Extensive small calcified plaque formations throughout the distal common carotid arteries and carotid bifurcations. I Cannot identify any stenoses of greater  than 50%. No vascular occlusions are identified. CT of the brain is within normal limits Signer Name: Estuardo Sanches MD  Signed: 12/8/2021 2:38 AM  Workstation Name: [facility]    MRI Brain Without Contrast    Result Date: 12/9/2021  EXAMINATION: MRI BRAIN WO CONTRAST-  INDICATION: Stroke, followup; E87.6-Hypokalemia; E83.42-Hypomagnesemia; R41.82-Altered mental status, unspecified; E83.51-Hypocalcemia; generalized weakness.  TECHNIQUE: Routine multiplanar imaging was obtained of the brain without the administration of gadolinium contrast.  COMPARISON: None.  FINDINGS: No evidence of restricted diffusion to suggest evidence of an acute ischemic insult. Atrophy identified within the brain. Increased signal seen scattered throughout the periventricular and subcortical white matter suggesting chronic small vessel ischemic change. Dilated perivascular space is seen in the basal ganglia bilaterally. There is no hemorrhage or hydrocephalus. There is no mass, mass effect, or midline shift. No abnormal extraaxial fluid collection is identified. Pituitary and sella are unremarkable. Craniovertebral junction is preserved. Globes and orbits are intact. The visualized paranasal sinuses are grossly clear. Mastoid air cells are patent. No cerebellopontine mass is identified.      Atrophy and chronic changes seen within the brain. No acute intracranial abnormality.  D:  12/09/2021 E:  12/09/2021        XR Chest 1 View    Result Date: 12/8/2021  CR Chest 1 Vw INDICATION: New onset confusion COMPARISON:  None available. FINDINGS: Portable AP view(s) of the chest.  There is left base atelectasis. Right lung is clear. Heart size normal. Bones are normal     Poor inspiration with left base atelectasis Signer Name: Estuardo Sanches MD  Signed: 12/8/2021 2:48 AM  Workstation Name: [facility]    CT Head Without Contrast Stroke Protocol    Result Date: 12/8/2021  CT Head WO  Code Stroke HISTORY: New onset confusion TECHNIQUE: Axial unenhanced head CT. Radiation dose reduction techniques included automated exposure control or exposure modulation based on body size. Count of known CT and cardiac nuc med studies performed in previous 12 months: 0. Time of scan: 2:03 AM, the study was available online at 2:11 and results were given to CT technologist at 2:12 AM COMPARISON: None. FINDINGS: No intracranial hemorrhage, mass, or infarct. No hydrocephalus or extra-axial fluid collection. There are senescent changes, including volume loss and nonspecific white matter change, but no acute abnormality is seen. The skull base, calvarium, and extracranial soft tissues are normal.     Senescent changes without acute abnormality. Signer Name: Estuardo Sanches MD  Signed: 12/8/2021 2:12 AM  Workstation Name: [facility]  [facility]    CT Angiogram Head w AI Analysis of LVO    Result Date: 12/8/2021  CTA  HEAD W AI ANALYSIS FOR LVO, CTA Neck INDICATION: Shortness of air TECHNIQUE: CT angiogram of the head and neck with IV contrast. 3-D reconstructions were obtained and reviewed.  Evaluation for a significant carotid arterial stenosis is based on the NASCET criteria. Radiation dose reduction techniques included automated exposure control or exposure modulation based on body size. Count of known CT and cardiac nuc med studies performed in previous 12 months: 2.. AI analysis for LVO was performed COMPARISON: Cerebral perfusion study and CT brain from earlier today FINDINGS: CTA neck:  The visualized aortic arch is normal. The great vessels are all patent. The vertebral arteries both arise from the subclavian arteries. They are equal in size and they unite to form the basilar artery. There are extensive small calcified plaques throughout the carotid bifurcations bilaterally. Stenosis. The less than 50% bilaterally. The rest of the internal carotid arteries are patent. The right internal  carotid artery is very tortuous. CTA head:  Starting posterior cerebral arteries are normal in appearance. The middle and anterior cerebral arteries are patent without stenosis.     Extensive small calcified plaque formations throughout the distal common carotid arteries and carotid bifurcations. I Cannot identify any stenoses of greater than 50%. No vascular occlusions are identified. CT of the brain is within normal limits Signer Name: Estuardo Sanches MD  Signed: 12/8/2021 2:38 AM  Workstation Name: [facility]  Radiology [facility]    CT CEREBRAL PERFUSION WITH & WITHOUT CONTRAST    Result Date: 12/8/2021  CT CEREBRAL PERFUSION W WO CONTRAST HISTORY: New onset confusion TECHNIQUE: Axial CT images of the brain without and with intravenous contrast using cerebral perfusion protocol. Post-processing parametric maps were created using RAPID software and reviewed. Radiation dose reduction techniques included automated exposure control or exposure modulation based on body size. CT and nuclear cardiology exams in the last 12 months: 0. COMPARISON: CT brain without contrast from today FINDINGS: Arterial input function is optimal. Perfusion maps are symmetric without evidence of cerebral ischemia or core infarction.     Normal brain perfusion CT. Signer Name: Estuardo Sanches MD  Signed: 12/8/2021 2:29 AM  Workstation Name: [facility]  Radiology [facility]      During this visit the following were done:  Labs Reviewed [x]    Labs Ordered []    Radiology Reports Reviewed [x]    Radiology Ordered []    EKG, echo, and/or stress test reviewed [x]    EEG results reviewed  []    EEG reviewed and interpreted per myself   []    Discussed case with neurointerventionalist or neuroradiologist []    Referring Provider Records Reviewed []    ER Records Reviewed []    Hospital Records Reviewed []    History Obtained From Family []    Radiological images view and Interpreted per myself [x]    Case Discussed with  referring provider []     Decision to obtain and request outside records  []        Assessment and Plan     AMS, resolved, suspected TME vs postictal state. Daily drinker. Severe electrolyte abnormalities on admission including magnesium of 0.3 corrected. MRI brain negative for acute abnormalities, personally reviewed, EEG negative.   - Moderate or stop alcohol consumption.   - No driving for 3 months.   - Outpatient neurology F/U in 2-3 months.   - F/U with PCP re: electrolyte abnormalities first available.    Call for questions, will see prn. Thanks,          Electronically signed by Antwan Shelton MD on 12/9/2021 at 14:08 EST

## 2021-12-09 NOTE — PROGRESS NOTES
Intensive Care Follow-up     Hospital:  LOS: 1 day   Mr. Iain Dee, 86 y.o. male is followed for:   AMS (altered mental status)            History of present illness:   Iain Dee is a 86 y.o. male who has a PMHX of AF (on BB/Eliquis), bladder CA (s/p surgery), HTN, JOLENE ( on CPAP), gout, and GERD.  The patient was asleep when he woke up agitated and confused, pulling off his NIPPV.  Was brought to our ED for AMS which has reportedly been improving.  CVA Navigator saw him and he is not a candidate for tPA given his Eliquis use and CTA & P were negative for LVO or flow-limiting stenosis.  MRI is planned.     Laboratory work showed wildly deranged electrolytes (K 1.9, Mg 0.3, Ca 4.3).       The patient states has been on the same medications and there have been no recent changes or additions to his medical regimen  His spouse indicates that she arranges his medications and puts him in a Mediset on a weekly basis  They do not think that there have been any changes or mixups in his medications     No recent nausea, vomiting, or diarrhea  No recent dietary changes  No history of alcohol excess or other drug use      Subjective   Interval History:  Patient inform us this morning that he actually drinks 1 to 2 glasses of bourbon almost every day.  Electrolytes are gradually improving.  Hydrochlorothiazide is continued to be held.  Renal function is overall stable.  Denies any chest pain, nausea, fever, or chills.  He did undergo an MRI last night which was unremarkable.  He was also seen by neurology who thought likely this could have been a partial seizure.  Due to the fact that he had multiple electrolyte normalities.  I do suspect that his alcohol use contributed to the patient's severe electrolyte abnormalities             The patient's past medical, surgical and social history were reviewed and updated in Epic as appropriate.       Objective     Infusions:     Medications:  amLODIPine, 5 mg, Oral,  Q24H  apixaban, 2.5 mg, Oral, BID  aspirin, 81 mg, Oral, Daily   Or  aspirin, 300 mg, Rectal, Daily  atorvastatin, 80 mg, Oral, Nightly  folic acid, 1 mg, Oral, Daily  insulin lispro, 0-7 Units, Subcutaneous, 4x Daily With Meals & Nightly  metoprolol tartrate, 12.5 mg, Oral, BID  pantoprazole, 40 mg, Oral, QAM  sodium chloride, 10 mL, Intravenous, Q12H  thiamine, 500 mg, Intravenous, Q8H      I reviewed the patient's medications.    Vital Sign Min/Max for last 24 hours  Temp  Min: 98 °F (36.7 °C)  Max: 98.5 °F (36.9 °C)   BP  Min: 120/87  Max: 179/97   Pulse  Min: 67  Max: 100   Resp  Min: 16  Max: 20   SpO2  Min: 88 %  Max: 99 %   Flow (L/min)  Min: 0.5  Max: 4       Input/Output for last 24 hour shift  12/08 0701 - 12/09 0700  In: 510 [P.O.:360; I.V.:150]  Out: 2000 [Urine:2000]      GENERAL : NAD, conversant  RESPIRATORY/THORAX : normal respiratory effort and no intercostal retractions, CTAB  CARDIOVASCULAR : Normal S1/S2, RRR.  No lower ext edema.  GASTROINTESTINAL : Soft, NT/ND. BS x 4 normoactive. No hepatosplenomegaly.  MUSCULOSKELETAL : No cyanosis, clubbing, or ischemia  NEUROLOGICAL: alert and oriented to person, place and time  PSYCHOLOGICAL : Appropriate affect    Results from last 7 days   Lab Units 12/08/21  0210 12/08/21  0159   WBC 10*3/mm3  --  9.85   HEMOGLOBIN g/dL  --  12.8*   HEMOGLOBIN, POC g/dL 12.2  --    PLATELETS 10*3/mm3  --  393     Results from last 7 days   Lab Units 12/08/21  1850 12/08/21  0650 12/08/21  0542 12/08/21  0310 12/08/21  0310 12/08/21  0206 12/08/21  0159   SODIUM mmol/L  --   --  137  --  142  --   --    POTASSIUM mmol/L 3.5  3.5 2.9* 2.7*   < > 1.9*  --   --    CO2 mmol/L  --   --  29.0  --  22.0  --   --    BUN mg/dL  --   --  20  --  17  --   --    CREATININE mg/dL  --   --  1.10  --  0.83 1.20  --    MAGNESIUM mg/dL 1.9 1.2* 1.2*   < > 0.3*  --    < >   PHOSPHORUS mg/dL 2.3* 3.3  --   --  2.5  --   --    GLUCOSE mg/dL  --   --  183*  --  160*  --   --     < > =  values in this interval not displayed.     Estimated Creatinine Clearance: 61 mL/min (by C-G formula based on SCr of 1.1 mg/dL).    Results from last 7 days   Lab Units 12/08/21  0210   PH, ARTERIAL pH units 7.39       I reviewed the patient's new clinical results.  I reviewed the patient's new imaging results/reports including actual images and agree with reports.              Imaging Results (Last 24 Hours)     Procedure Component Value Units Date/Time    MRI Brain Without Contrast [[id number]] Collected: 12/09/21 0703     Updated: 12/09/21 0730    Narrative:      EXAMINATION: MRI BRAIN WO CONTRAST-     INDICATION: Stroke, followup; E87.6-Hypokalemia; E83.42-Hypomagnesemia;  R41.82-Altered mental status, unspecified; E83.51-Hypocalcemia;  generalized weakness.     TECHNIQUE: Routine multiplanar imaging was obtained of the brain without  the administration of gadolinium contrast.     COMPARISON: None.     FINDINGS: No evidence of restricted diffusion to suggest evidence of an  acute ischemic insult. Atrophy identified within the brain. Increased  signal seen scattered throughout the periventricular and subcortical  white matter suggesting chronic small vessel ischemic change. Dilated  perivascular space is seen in the basal ganglia bilaterally. There is no  hemorrhage or hydrocephalus. There is no mass, mass effect, or midline  shift. No abnormal extraaxial fluid collection is identified. Pituitary  and sella are unremarkable. Craniovertebral junction is preserved.  Globes and orbits are intact. The visualized paranasal sinuses are  grossly clear. Mastoid air cells are patent. No cerebellopontine mass is  identified.       Impression:      Atrophy and chronic changes seen within the brain. No acute  intracranial abnormality.     D:  12/09/2021  E:  12/09/2021                 Assessment/Plan   Impression        AMS (altered mental status)    Hypokalemia    Hypomagnesemia    Hypocalcemia    Hypoalbuminemia     Essential hypertension    Atrial fibrillation (HCC)    GERD (gastroesophageal reflux disease)    Hyperglycemia    Gout    Diabetes mellitus (HCC)    H/O Bladder carcinoma (HCC)    JOLENE on CPAP    Chronic anticoagulation (Eliquis)    Alcohol abuse (1 to 2 glasses of bourbon a day)       Plan        86-year-old male with a past medical history significant for atrial fibrillation (on beta-blocker/Eliquis), bladder cancer status post surgery, hypertension after sleep apnea on CPAP, gout, and GERD.  Who presented to [facility] ICU on 12/8/2021 with acute encephalopathy and severe metabolic abnormalities.  Work-up was performed with help of neurology, MRI was unremarkable.  EEG is still pending.  Concern is for partial seizures, likely due to the patient's severe metabolic abnormalities.  On day 2 of the hospitalization the patient inform us that he actually drinks 1 to 2 glasses of bourbon every day, and was showing signs of alcohol withdrawal.  After which she was started on CIWA protocol with thiamine and folic acid.  Electrolytes have stabilized out and mental status has significantly improved.    -Continue to monitor electrolytes and replace aggressively back to within normal limits  -Follow-up EEG  -Any antiepileptics per neurology  -Initiate CIWA protocol with Ativan  -Start thiamine 500 mg every 8 hours x3 days, and folic acid  -Restart antihypertensive medications with losartan and clonidine, continue metoprolol and amlodipine.  We will continue to hold hydrochlorothiazide specially the patient severe metabolic abnormalities as this medication very likely could have been contributing in addition to his underlying alcohol use.  -Insulin for blood sugar control  -Continue Eliquis for underlying atrial fibrillation  -Protonix for GERD  -CPAP for obstructive sleep apnea  -Mobilize patient  -Aggressive pulmonary toilet  -Patient okay to be transferred to the floor    Plan of care and goals reviewed with  multidisciplinary/antibiotic stewardship team during rounds.   I discussed the patient's findings and my recommendations with patient and nursing staff     High level of risk due to:  abrupt change in neurological status.      Scout Moses DO  Pulmonary, Critical care and Sleep Medicine

## 2021-12-09 NOTE — CASE MANAGEMENT/SOCIAL WORK
Continued Stay Note   Tri     Patient Name: Iain Dee  MRN: [id number]  Today's Date: 12/9/2021    Admit Date: 12/8/2021     Discharge Plan     Row Name 12/09/21 1202       Plan    Plan home    Patient/Family in Agreement with Plan yes    Plan Comments Therapy has evaluated patient.  OT has recommended home with assistance and PT has ordered home with outpatient PT.  Will check will patient on preferance.  Spoke with Mr. Dee and he will call and set up appointment for outpatient PT.  Order Provided.      Final Discharge Disposition Code 01 - home or self-care               Discharge Codes    No documentation.               Expected Discharge Date and Time     Expected Discharge Date Expected Discharge Time    Dec 13, 2021             Aurora Cordova RN

## 2021-12-09 NOTE — PLAN OF CARE
"Goal Outcome Evaluation:  Plan of Care Reviewed With: patient           Outcome Summary: Wore bipap all night @ 40% fio2.  NIHSS score was zero this shift.  MRI done.  EEG currently going on.  Pt had several episodes of confusion starting with restlessness and picking at lines and tubes to asking \"what is going on, why am I here?\"  Reoriented with each episode x4 and patient able to be reoriented, but would awaken confused.  Pt at 0600 is oriented x4 with EEG currently going on at this time.  "

## 2021-12-10 ENCOUNTER — READMISSION MANAGEMENT (OUTPATIENT)
Dept: CALL CENTER | Facility: HOSPITAL | Age: 86
End: 2021-12-10

## 2021-12-10 VITALS
HEART RATE: 90 BPM | OXYGEN SATURATION: 92 % | HEIGHT: 72 IN | TEMPERATURE: 98.1 F | BODY MASS INDEX: 30.76 KG/M2 | DIASTOLIC BLOOD PRESSURE: 73 MMHG | RESPIRATION RATE: 18 BRPM | SYSTOLIC BLOOD PRESSURE: 140 MMHG | WEIGHT: 227.07 LBS

## 2021-12-10 LAB
ALBUMIN SERPL-MCNC: 3.7 G/DL (ref 3.5–5.2)
ALBUMIN/GLOB SERPL: 1.2 G/DL
ALP SERPL-CCNC: 76 U/L (ref 39–117)
ALT SERPL W P-5'-P-CCNC: 13 U/L (ref 1–41)
ANION GAP SERPL CALCULATED.3IONS-SCNC: 10 MMOL/L (ref 5–15)
AST SERPL-CCNC: 32 U/L (ref 1–40)
BILIRUB SERPL-MCNC: 0.9 MG/DL (ref 0–1.2)
BUN SERPL-MCNC: 18 MG/DL (ref 8–23)
BUN/CREAT SERPL: 15.3 (ref 7–25)
CA-I SERPL ISE-MCNC: 1.02 MMOL/L (ref 1.12–1.32)
CALCIUM SPEC-SCNC: 7.5 MG/DL (ref 8.6–10.5)
CHLORIDE SERPL-SCNC: 99 MMOL/L (ref 98–107)
CO2 SERPL-SCNC: 30 MMOL/L (ref 22–29)
CREAT SERPL-MCNC: 1.18 MG/DL (ref 0.76–1.27)
DEPRECATED RDW RBC AUTO: 45.8 FL (ref 37–54)
ERYTHROCYTE [DISTWIDTH] IN BLOOD BY AUTOMATED COUNT: 12.6 % (ref 12.3–15.4)
GFR SERPL CREATININE-BSD FRML MDRD: 59 ML/MIN/1.73
GLOBULIN UR ELPH-MCNC: 3.1 GM/DL
GLUCOSE BLDC GLUCOMTR-MCNC: 168 MG/DL (ref 70–130)
GLUCOSE SERPL-MCNC: 174 MG/DL (ref 65–99)
HCT VFR BLD AUTO: 38.9 % (ref 37.5–51)
HGB BLD-MCNC: 12.9 G/DL (ref 13–17.7)
MAGNESIUM SERPL-MCNC: 2.2 MG/DL (ref 1.6–2.4)
MCH RBC QN AUTO: 33.1 PG (ref 26.6–33)
MCHC RBC AUTO-ENTMCNC: 33.2 G/DL (ref 31.5–35.7)
MCV RBC AUTO: 99.7 FL (ref 79–97)
PHOSPHATE SERPL-MCNC: 2.7 MG/DL (ref 2.5–4.5)
PLATELET # BLD AUTO: 394 10*3/MM3 (ref 140–450)
PMV BLD AUTO: 10.1 FL (ref 6–12)
POTASSIUM SERPL-SCNC: 4 MMOL/L (ref 3.5–5.2)
PROT SERPL-MCNC: 6.8 G/DL (ref 6–8.5)
RBC # BLD AUTO: 3.9 10*6/MM3 (ref 4.14–5.8)
SODIUM SERPL-SCNC: 139 MMOL/L (ref 136–145)
WBC NRBC COR # BLD: 8.07 10*3/MM3 (ref 3.4–10.8)

## 2021-12-10 PROCEDURE — 85027 COMPLETE CBC AUTOMATED: CPT | Performed by: INTERNAL MEDICINE

## 2021-12-10 PROCEDURE — 80053 COMPREHEN METABOLIC PANEL: CPT | Performed by: INTERNAL MEDICINE

## 2021-12-10 PROCEDURE — 84100 ASSAY OF PHOSPHORUS: CPT | Performed by: INTERNAL MEDICINE

## 2021-12-10 PROCEDURE — 99239 HOSP IP/OBS DSCHRG MGMT >30: CPT | Performed by: INTERNAL MEDICINE

## 2021-12-10 PROCEDURE — 82962 GLUCOSE BLOOD TEST: CPT

## 2021-12-10 PROCEDURE — 63710000001 INSULIN LISPRO (HUMAN) PER 5 UNITS: Performed by: NURSE PRACTITIONER

## 2021-12-10 PROCEDURE — 94660 CPAP INITIATION&MGMT: CPT

## 2021-12-10 PROCEDURE — 97530 THERAPEUTIC ACTIVITIES: CPT

## 2021-12-10 PROCEDURE — 83735 ASSAY OF MAGNESIUM: CPT | Performed by: INTERNAL MEDICINE

## 2021-12-10 PROCEDURE — 82330 ASSAY OF CALCIUM: CPT | Performed by: INTERNAL MEDICINE

## 2021-12-10 PROCEDURE — 94799 UNLISTED PULMONARY SVC/PX: CPT

## 2021-12-10 PROCEDURE — 25010000002 THIAMINE PER 100 MG: Performed by: INTERNAL MEDICINE

## 2021-12-10 RX ADMIN — INSULIN LISPRO 2 UNITS: 100 INJECTION, SOLUTION INTRAVENOUS; SUBCUTANEOUS at 08:32

## 2021-12-10 RX ADMIN — ASPIRIN 81 MG CHEWABLE TABLET 81 MG: 81 TABLET CHEWABLE at 08:35

## 2021-12-10 RX ADMIN — CLONIDINE HYDROCHLORIDE 0.2 MG: 0.2 TABLET ORAL at 08:35

## 2021-12-10 RX ADMIN — FOLIC ACID 1 MG: 1 TABLET ORAL at 08:35

## 2021-12-10 RX ADMIN — METOPROLOL TARTRATE 12.5 MG: 25 TABLET, FILM COATED ORAL at 08:35

## 2021-12-10 RX ADMIN — SODIUM CHLORIDE, PRESERVATIVE FREE 10 ML: 5 INJECTION INTRAVENOUS at 08:32

## 2021-12-10 RX ADMIN — THIAMINE HYDROCHLORIDE 500 MG: 100 INJECTION, SOLUTION INTRAMUSCULAR; INTRAVENOUS at 08:32

## 2021-12-10 RX ADMIN — THIAMINE HYDROCHLORIDE 500 MG: 100 INJECTION, SOLUTION INTRAMUSCULAR; INTRAVENOUS at 01:12

## 2021-12-10 RX ADMIN — LOSARTAN POTASSIUM 50 MG: 50 TABLET, FILM COATED ORAL at 08:35

## 2021-12-10 RX ADMIN — AMLODIPINE BESYLATE 5 MG: 5 TABLET ORAL at 08:35

## 2021-12-10 RX ADMIN — APIXABAN 2.5 MG: 2.5 TABLET, FILM COATED ORAL at 08:35

## 2021-12-10 RX ADMIN — PANTOPRAZOLE SODIUM 40 MG: 40 TABLET, DELAYED RELEASE ORAL at 06:20

## 2021-12-10 NOTE — CASE MANAGEMENT/SOCIAL WORK
Continued Stay Note   Hand     Patient Name: Iain Dee  MRN: [id number]  Today's Date: 12/10/2021    Admit Date: 12/8/2021     Discharge Plan     Row Name 12/10/21 0922       Plan    Plan Home    Patient/Family in Agreement with Plan yes    Plan Comments Called Mr Dee on his room phone and called his spouse and no answer. Review of CM note from 12/9: an outpatient PT order was given to Mr Dee and he chose to make his own appt. No other needs were voiced/identified. Plan is DC home today and his spouse will transport.    Final Discharge Disposition Code 01 - home or self-care               Discharge Codes    No documentation.               Expected Discharge Date and Time     Expected Discharge Date Expected Discharge Time    Dec 10, 2021             Ryann Wahl RN

## 2021-12-10 NOTE — PLAN OF CARE
Goal Outcome Evaluation:              Outcome Summary: Pt min A to don socks with increased time and effort, pt declines need for AE as he states he does not usually wear socks.  Pt completed 10 reps BUE strengthening TE without complaint..  Pt min A STS, ambulated 100 ft with RW given CGA.  Pt progressing, but limited by weakenss and decreased activity tolerance.

## 2021-12-10 NOTE — CONSULTS
"Diabetes Education    Patient Name:  Iain Dee  YOB: 1935  MRN: [id number]  Admit Date:  12/8/2021        Attempted to see pt for diabetes ed consult, he has been discharged. Pt was not scheduled for outpatient diabetes/stroke follow up as neuro note states no stroke. Attempted call to pt, no answer. Mailed missed pt packet which includes outpatient diabetes ed info and  Jerald \"what is diabetes\" booklet.       Electronically signed by:  Soraya Hamm RN, [facility]  12/10/21 12:53 EST  "

## 2021-12-10 NOTE — DISCHARGE SUMMARY
[facility] Medicine Services  DISCHARGE SUMMARY    Patient Name: Iain Dee  : 1935  MRN: [id number]    Date of Admission: 2021  1:49 AM  Date of Discharge:  12/10/2021  Primary Care Physician: Luis Aguilar DO    Consults     Date and Time Order Name Status Description    2021  5:23 AM Inpatient Neurology Consult Stroke Completed           Hospital Course     Presenting Problem:   Hypokalemia [E87.6]    Active Hospital Problems    Diagnosis  POA   • **AMS (altered mental status) [R41.82]  Yes   • Alcohol abuse (1 to 2 glasses of bourbon a day) [F10.10]  Yes   • Hypokalemia [E87.6]  Yes   • Hypomagnesemia [E83.42]  Yes   • Hypocalcemia [E83.51]  Yes   • Hypoalbuminemia [E88.09]  Yes   • Essential hypertension [I10]  Yes   • Atrial fibrillation (HCC) [I48.91]  Yes   • GERD (gastroesophageal reflux disease) [K21.9]  Yes   • Hyperglycemia [R73.9]  Yes   • Chronic anticoagulation (Eliquis) [Z79.01]  Not Applicable   • Gout [M10.9]  Yes   • Diabetes mellitus (HCC) [E11.9]  Yes   • H/O Bladder carcinoma (HCC) [C67.9]  Yes   • JOLENE on CPAP [G47.33, Z99.89]  Not Applicable      Resolved Hospital Problems   No resolved problems to display.        *Encephalopathy (metabolic encephalopathy versus seizure), resolved  *daily ETOH use (counseled cessation)  *severe electrolyte abnormalities (hypomagnesemia and hypokalemia and hypocalcemia)  *Hx Afib (continue eliquis an b-blocker)  *DM (continue prior rx)      Hospital Course:  Iain Dee is a 86 y.o. male w/ hx afib, htn, dm2 who presented with confusion and neurologic spells concerning for possible partial seizures. Neuro-stroke team initially followed but stroke workup was unremarkable. Neurology felt possibly had partial seizure due to severe hypokalemia and hypomagnesemia. hctz has been held and electrolytes replaced. eeg negative. Now normal mentation. Patient did share that he drinks 1-2 drinks bourbon daily. He  has no signs of withdrawal currently and is at baseline mentation. Counseled on etoh cessation as likely related to his hospitalization. Stop hctz. No driving x 3 months (per neuro) and patient & wife understand this is law until cleared by neurology. Follow up pcp next week for electrolyte checks and dm2 check. F/u neurology clinic 4-6 weeks      Discharge Follow Up Recommendations for outpatient labs/diagnostics:  pcp 1 week, follow up Diabetes & electrolyte checks  Neurology clinic 4-6 weeks     Day of Discharge     HPI:   Feels fine. No spells overnight. Normal mentation. Wants to go home    Review of Systems  No n/v/d.    Vital Signs:   Temp:  [97.5 °F (36.4 °C)-98.8 °F (37.1 °C)] 98 °F (36.7 °C)  Heart Rate:  [73-99] 90  Resp:  [16-18] 18  BP: (107-169)/(73-99) 140/73     Physical Exam:  Constitutional:Alert, oriented x 3, nontoxic appearing  Psych:Normal/appropriate affect  HEENT:NCAT, oropharynx clear  Neck: neck supple, full range of motion  Neuro: Face symmetric, speech clear, equal , moves all extremities  Cardiac: RRR; No pretibial pitting edema  Resp: CTAB, normal effort  GI: abd soft, nontender, obese  Skin: No extremity rash  Musculoskeletal/extremities: no cyanosis of extremities; no significant ankle edema        Pertinent  and/or Most Recent Results     LAB RESULTS:      Lab 12/10/21  0812 12/09/21  0842 12/08/21  0650 12/08/21  0421 12/08/21  0310 12/08/21  0215 12/08/21  0210 12/08/21  0159   WBC 8.07 9.52  --   --   --   --   --  9.85   HEMOGLOBIN 12.9* 13.4  --   --   --   --   --  12.8*   HEMOGLOBIN, POC  --   --   --   --   --   --  12.2  --    HEMATOCRIT 38.9 38.6  --   --   --   --   --  38.0   HEMATOCRIT POC  --   --   --   --   --   --  36*  --    PLATELETS 394 311  --   --   --   --   --  393   NEUTROS ABS  --   --   --   --   --   --   --  7.37*   IMMATURE GRANS (ABS)  --   --   --   --   --   --   --  0.04   LYMPHS ABS  --   --   --   --   --   --   --  1.45   MONOS ABS  --   --    --   --   --   --   --  0.64   EOS ABS  --   --   --   --   --   --   --  0.25   MCV 99.7* 96.5  --   --   --   --   --  97.7*   CRP  --   --   --   --  4.51*  --   --   --    PROCALCITONIN  --   --   --   --  0.10  --   --   --    LACTATE  --   --  1.3  --   --   --   --  3.0*   LDH  --   --   --  296*  --   --   --   --    PROTIME  --   --   --   --   --  14.9  --   --          Lab 12/10/21  0812 12/10/21  0349 12/09/21  0842 12/08/21  1850 12/08/21  0650 12/08/21  0542 12/08/21  0421 12/08/21  0310 12/08/21  0310 12/08/21  0206 12/08/21  0159 12/08/21  0159   SODIUM 139  --  134*  --   --  137  --   --  142  --   --   --    POTASSIUM 4.0  --  3.8 3.5  3.5 2.9* 2.7*  --    < > 1.9*  --   --   --    CHLORIDE 99  --  97*  --   --  94*  --   --  108*  --   --   --    CO2 30.0*  --  22.0  --   --  29.0  --   --  22.0  --   --   --    ANION GAP 10.0  --  15.0  --   --  14.0  --   --  12.0  --   --   --    BUN 18  --  13  --   --  20  --   --  17  --   --   --    CREATININE 1.18  --  0.86  --   --  1.10  --   --  0.83 1.20  --   --    GLUCOSE 174*  --  205*  --   --  183*  --   --  160*  --   --   --    CALCIUM 7.5*  --  7.0*  --   --  6.5*  --   --  4.3*  --   --   --    IONIZED CALCIUM 1.02*  --   --  0.90*  --   --  0.83*  --   --   --   --   --    MAGNESIUM  --  2.2 1.7 1.9 1.2* 1.2*  --    < > 0.3*  --    < > 0.5*   PHOSPHORUS 2.7  --  2.5 2.3* 3.3  --   --   --  2.5  --   --   --    HEMOGLOBIN A1C  --   --   --   --   --   --   --   --   --   --   --  8.00*    < > = values in this interval not displayed.         Lab 12/10/21  0812 12/09/21  0842 12/08/21  0310   TOTAL PROTEIN 6.8 6.8 4.5*   ALBUMIN 3.70 3.40* 2.60*   GLOBULIN 3.1 3.4 1.9   ALT (SGPT) 13 13 9   AST (SGOT) 32 33 16   BILIRUBIN 0.9 1.1 0.5   ALK PHOS 76 70 46         Lab 12/08/21  0310 12/08/21  0215   PROBNP 778.3  --    TROPONIN T 0.019  --    PROTIME  --  14.9   INR  --  1.3*         Lab 12/08/21  0542   CHOLESTEROL 129   LDL CHOL 84   HDL CHOL  28*   TRIGLYCERIDES 90             Lab 12/08/21  0210   PH, ARTERIAL 7.39     Brief Urine Lab Results  (Last result in the past 365 days)      Color   Clarity   Blood   Leuk Est   Nitrite   Protein   CREAT   Urine HCG        12/08/21 0230 Yellow   Clear   Small (1+)   Negative   Negative   100 mg/dL (2+)               Microbiology Results (last 10 days)     Procedure Component Value - Date/Time    COVID PRE-OP / PRE-PROCEDURE SCREENING ORDER (NO ISOLATION) - Swab, Nasopharynx [[id number]]  (Normal) Collected: 12/08/21 0628    Lab Status: Final result Specimen: Swab from Nasopharynx Updated: 12/08/21 0841    Narrative:      The following orders were created for panel order COVID PRE-OP / PRE-PROCEDURE SCREENING ORDER (NO ISOLATION) - Swab, Nasopharynx.  Procedure                               Abnormality         Status                     ---------                               -----------         ------                     Respiratory Panel PCR w/...[[id number]]  Normal              Final result                 Please view results for these tests on the individual orders.    Respiratory Panel PCR w/COVID-19(SARS-CoV-2) RIKI/[facility]/EARL/PAD/COR/MAD/JORDAN In-House, NP Swab in UTM/VTM, 3-4 HR TAT - Swab, Nasopharynx [[id number]]  (Normal) Collected: 12/08/21 0628    Lab Status: Final result Specimen: Swab from Nasopharynx Updated: 12/08/21 0841     ADENOVIRUS, PCR Not Detected     Coronavirus 229E Not Detected     Coronavirus HKU1 Not Detected     Coronavirus NL63 Not Detected     Coronavirus OC43 Not Detected     COVID19 Not Detected     Human Metapneumovirus Not Detected     Human Rhinovirus/Enterovirus Not Detected     Influenza A PCR Not Detected     Influenza B PCR Not Detected     Parainfluenza Virus 1 Not Detected     Parainfluenza Virus 2 Not Detected     Parainfluenza Virus 3 Not Detected     Parainfluenza Virus 4 Not Detected     RSV, PCR Not Detected     Bordetella pertussis pcr Not Detected     Bordetella  parapertussis PCR Not Detected     Chlamydophila pneumoniae PCR Not Detected     Mycoplasma pneumo by PCR Not Detected    Narrative:      In the setting of a positive respiratory panel with a viral infection PLUS a negative procalcitonin without other underlying concern for bacterial infection, consider observing off antibiotics or discontinuation of antibiotics and continue supportive care. If the respiratory panel is positive for atypical bacterial infection (Bordetella pertussis, Chlamydophila pneumoniae, or Mycoplasma pneumoniae), consider antibiotic de-escalation to target atypical bacterial infection.    Blood Culture - Blood, Arm, Left [[id number]]  (Normal) Collected: 12/08/21 0247    Lab Status: Preliminary result Specimen: Blood from Arm, Left Updated: 12/09/21 0930     Blood Culture No growth at 24 hours    Blood Culture - Blood, Arm, Right [[id number]]  (Normal) Collected: 12/08/21 0247    Lab Status: Preliminary result Specimen: Blood from Arm, Right Updated: 12/09/21 0930     Blood Culture No growth at 24 hours          Adult Transthoracic Echo Complete W/ Cont if Necessary Per Protocol (With Agitated Saline)    Result Date: 12/8/2021  · Saline test results are negative. · The left ventricular cavity is dilated. · Left ventricular wall thickness is consistent with mild concentric hypertrophy. · The right ventricular cavity is dilated. · Mild mitral valve stenosis is present. · LVSF is normal      EEG    Result Date: 12/9/2021  Reason for referral: 86 y.o.male with sideration seizure, altered mental status Technical Summary:  A 19 channel digital EEG was performed using the international 10-20 placement system, including eye leads and EKG leads. Duration: 21 minutes Video: Off Findings: Diffuse low amplitude 5-7 Hz theta which is present symmetrically over both hemispheres.  Underlying low amplitude EMG and eye blink artifact are present.  Stage II sleep is not seen.  Photic stimulation yields a symmetric  driving response at several flash frequencies.  Hyperventilation is not performed.  No focal features or epileptiform activity are seen. Technical quality: Excellent SUMMARY: Mild generalized slow No focal features or epileptiform activity are seen     The study shows evidence for diffuse cerebral dysfunction of mild degree but is nonspecific as to cause No evidence for epilepsy is seen This report is transcribed using the Dragon dictation system.      CT Angiogram Neck    Result Date: 12/8/2021  CTA  HEAD W AI ANALYSIS FOR LVO, CTA Neck INDICATION: Shortness of air TECHNIQUE: CT angiogram of the head and neck with IV contrast. 3-D reconstructions were obtained and reviewed.  Evaluation for a significant carotid arterial stenosis is based on the NASCET criteria. Radiation dose reduction techniques included automated exposure control or exposure modulation based on body size. Count of known CT and cardiac nuc med studies performed in previous 12 months: 2.. AI analysis for LVO was performed COMPARISON: Cerebral perfusion study and CT brain from earlier today FINDINGS: CTA neck:  The visualized aortic arch is normal. The great vessels are all patent. The vertebral arteries both arise from the subclavian arteries. They are equal in size and they unite to form the basilar artery. There are extensive small calcified plaques throughout the carotid bifurcations bilaterally. Stenosis. The less than 50% bilaterally. The rest of the internal carotid arteries are patent. The right internal carotid artery is very tortuous. CTA head:  Starting posterior cerebral arteries are normal in appearance. The middle and anterior cerebral arteries are patent without stenosis.     Extensive small calcified plaque formations throughout the distal common carotid arteries and carotid bifurcations. I Cannot identify any stenoses of greater than 50%. No vascular occlusions are identified. CT of the brain is within normal limits Signer Name:  Estuardo Sanches MD  Signed: 12/8/2021 2:38 AM  Workstation Name: [facility]  Radiology [facility]    MRI Brain Without Contrast    Result Date: 12/9/2021  EXAMINATION: MRI BRAIN WO CONTRAST-  INDICATION: Stroke, followup; E87.6-Hypokalemia; E83.42-Hypomagnesemia; R41.82-Altered mental status, unspecified; E83.51-Hypocalcemia; generalized weakness.  TECHNIQUE: Routine multiplanar imaging was obtained of the brain without the administration of gadolinium contrast.  COMPARISON: None.  FINDINGS: No evidence of restricted diffusion to suggest evidence of an acute ischemic insult. Atrophy identified within the brain. Increased signal seen scattered throughout the periventricular and subcortical white matter suggesting chronic small vessel ischemic change. Dilated perivascular space is seen in the basal ganglia bilaterally. There is no hemorrhage or hydrocephalus. There is no mass, mass effect, or midline shift. No abnormal extraaxial fluid collection is identified. Pituitary and sella are unremarkable. Craniovertebral junction is preserved. Globes and orbits are intact. The visualized paranasal sinuses are grossly clear. Mastoid air cells are patent. No cerebellopontine mass is identified.      Atrophy and chronic changes seen within the brain. No acute intracranial abnormality.  D:  12/09/2021 E:  12/09/2021        XR Chest 1 View    Result Date: 12/8/2021  CR Chest 1 Vw INDICATION: New onset confusion COMPARISON:  None available. FINDINGS: Portable AP view(s) of the chest.  There is left base atelectasis. Right lung is clear. Heart size normal. Bones are normal     Poor inspiration with left base atelectasis Signer Name: Estuardo Sanches MD  Signed: 12/8/2021 2:48 AM  Workstation Name: [facility]    CT Head Without Contrast Stroke Protocol    Result Date: 12/8/2021  CT Head WO Code Stroke HISTORY: New onset confusion TECHNIQUE: Axial unenhanced head CT. Radiation dose reduction  techniques included automated exposure control or exposure modulation based on body size. Count of known CT and cardiac nuc med studies performed in previous 12 months: 0. Time of scan: 2:03 AM, the study was available online at 2:11 and results were given to CT technologist at 2:12 AM COMPARISON: None. FINDINGS: No intracranial hemorrhage, mass, or infarct. No hydrocephalus or extra-axial fluid collection. There are senescent changes, including volume loss and nonspecific white matter change, but no acute abnormality is seen. The skull base, calvarium, and extracranial soft tissues are normal.     Senescent changes without acute abnormality. Signer Name: Estuardo Sanches MD  Signed: 12/8/2021 2:12 AM  Workstation Name: [facility]  [facility]    CT Angiogram Head w AI Analysis of LVO    Result Date: 12/8/2021  CTA  HEAD W AI ANALYSIS FOR LVO, CTA Neck INDICATION: Shortness of air TECHNIQUE: CT angiogram of the head and neck with IV contrast. 3-D reconstructions were obtained and reviewed.  Evaluation for a significant carotid arterial stenosis is based on the NASCET criteria. Radiation dose reduction techniques included automated exposure control or exposure modulation based on body size. Count of known CT and cardiac nuc med studies performed in previous 12 months: 2.. AI analysis for LVO was performed COMPARISON: Cerebral perfusion study and CT brain from earlier today FINDINGS: CTA neck:  The visualized aortic arch is normal. The great vessels are all patent. The vertebral arteries both arise from the subclavian arteries. They are equal in size and they unite to form the basilar artery. There are extensive small calcified plaques throughout the carotid bifurcations bilaterally. Stenosis. The less than 50% bilaterally. The rest of the internal carotid arteries are patent. The right internal carotid artery is very tortuous. CTA head:  Starting posterior cerebral arteries are normal in  appearance. The middle and anterior cerebral arteries are patent without stenosis.     Extensive small calcified plaque formations throughout the distal common carotid arteries and carotid bifurcations. I Cannot identify any stenoses of greater than 50%. No vascular occlusions are identified. CT of the brain is within normal limits Signer Name: Estuardo Sanches MD  Signed: 12/8/2021 2:38 AM  Workstation Name: [facility]  Radiology [facility]    CT CEREBRAL PERFUSION WITH & WITHOUT CONTRAST    Result Date: 12/8/2021  CT CEREBRAL PERFUSION W WO CONTRAST HISTORY: New onset confusion TECHNIQUE: Axial CT images of the brain without and with intravenous contrast using cerebral perfusion protocol. Post-processing parametric maps were created using RAPID software and reviewed. Radiation dose reduction techniques included automated exposure control or exposure modulation based on body size. CT and nuclear cardiology exams in the last 12 months: 0. COMPARISON: CT brain without contrast from today FINDINGS: Arterial input function is optimal. Perfusion maps are symmetric without evidence of cerebral ischemia or core infarction.     Normal brain perfusion CT. Signer Name: Estuardo Sanches MD  Signed: 12/8/2021 2:29 AM  Workstation Name: [facility]  Radiology [facility]              Results for orders placed during the hospital encounter of 12/08/21    Adult Transthoracic Echo Complete W/ Cont if Necessary Per Protocol (With Agitated Saline)    Interpretation Summary  · Saline test results are negative.  · The left ventricular cavity is dilated.  · Left ventricular wall thickness is consistent with mild concentric hypertrophy.  · The right ventricular cavity is dilated.  · Mild mitral valve stenosis is present.  · LVSF is normal      Plan for Follow-up of Pending Labs/Results: pcp  Pending Labs     Order Current Status    Blood Culture - Blood, Arm, Left Preliminary result    Blood Culture - Blood, Arm, Right  Preliminary result        Discharge Details        Discharge Medications      Continue These Medications      Instructions Start Date   amLODIPine 10 MG tablet  Commonly known as: NORVASC   0.5 tablets, Oral, 2 Times Daily      cloNIDine 0.2 MG tablet  Commonly known as: CATAPRES   1 tablet, Oral, 2 Times Daily      Eliquis 2.5 MG tablet tablet  Generic drug: apixaban   1 tablet, Oral, 2 Times Daily      indomethacin 50 MG capsule  Commonly known as: INDOCIN   TAKE 1 CAPSULE BY MOUTH TWICE DAILY WITH MEALS      losartan 50 MG tablet  Commonly known as: COZAAR   50 mg, Oral, 2 Times Daily      metoprolol tartrate 25 MG tablet  Commonly known as: LOPRESSOR   0.5 tablets, Oral, 2 Times Daily      omeprazole 40 MG capsule  Commonly known as: priLOSEC   1 capsule, Oral, Daily         Stop These Medications    hydroCHLOROthiazide 12.5 MG tablet  Commonly known as: HYDRODIURIL            Allergies   Allergen Reactions   • Colchicine Other (See Comments)     Unable to walk          Discharge Disposition:  Home or Self Care    Diet:  Hospital:  Diet Order   Procedures   • Diet Regular; Thin; Cardiac, Consistent Carbohydrate       Activity:           CODE STATUS:    Code Status and Medical Interventions:   Ordered at: 12/08/21 0934     Level Of Support Discussed With:    Patient     Code Status (Patient has no pulse and is not breathing):    CPR (Attempt to Resuscitate)     Medical Interventions (Patient has pulse or is breathing):    Full Support       Future Appointments   Date Time Provider Department Center   2/14/2022 10:00 AM Cody Sharma MD [facility]       Additional Instructions for the Follow-ups that You Need to Schedule     Ambulatory Referral to Physical Therapy Evaluate and treat   As directed      Specialty needed: Evaluate and treat         Discharge Follow-up with PCP   As directed       Currently Documented PCP:    Luis Aguilar DO    PCP Phone Number:    [phone]     Follow Up Details: 1  week (for electrolyte checks)         Discharge Follow-up with Specialty: [facility] neurology clinic 4-6 weeks (post-hosptialization follow up for possible seizure)   As directed      Specialty: [facility] neurology clinic 4-6 weeks (post-hosptialization follow up for possible seizure)                     Coy Herrera MD  12/10/21      Time Spent on Discharge:  I spent  35  minutes on this discharge activity which included: face-to-face encounter with the patient, reviewing the data in the system, coordination of the care with the nursing staff as well as consultants, documentation, and entering orders.

## 2021-12-10 NOTE — THERAPY TREATMENT NOTE
Patient Name: Iain Dee  : 1935    MRN: [id number]                              Today's Date: 12/10/2021       Admit Date: 2021    Visit Dx:     ICD-10-CM ICD-9-CM   1. Hypokalemia  E87.6 276.8   2. Hypomagnesemia  E83.42 275.2   3. Altered mental status, unspecified altered mental status type  R41.82 780.97   4. Hypocalcemia  E83.51 275.41     Patient Active Problem List   Diagnosis   • AMS (altered mental status)   • Hypokalemia   • Hypomagnesemia   • Hypocalcemia   • Hypoalbuminemia   • Essential hypertension   • Atrial fibrillation (HCC)   • GERD (gastroesophageal reflux disease)   • Hyperglycemia   • Gout   • Diabetes mellitus (HCC)   • H/O Bladder carcinoma (HCC)   • JOLENE on CPAP   • Chronic anticoagulation (Eliquis)   • Alcohol abuse (1 to 2 glasses of bourbon a day)     Past Medical History:   Diagnosis Date   • Arthritis    • Cancer (HCC)    • Diabetes mellitus (HCC)    • GERD (gastroesophageal reflux disease)    • Gout    • H/O Bladder carcinoma (HCC)    • Hypertension      Past Surgical History:   Procedure Laterality Date   • BLADDER SURGERY      Biopsy   • HERNIA REPAIR     • PROSTATE BIOPSY        General Information     Row Name 12/10/21 08          OT Time and Intention    Document Type therapy note (daily note)  -AC     Mode of Treatment occupational therapy  -AC     Row Name 12/10/21 08          General Information    Patient Profile Reviewed yes  -AC     Existing Precautions/Restrictions fall  B foot drop  -AC     Row Name 12/10/21 0845          Cognition    Orientation Status (Cognition) oriented x 3  -AC     Row Name 12/10/21 0839          Safety Issues, Functional Mobility    Impairments Affecting Function (Mobility) balance; endurance/activity tolerance; strength; coordination  -AC           User Key  (r) = Recorded By, (t) = Taken By, (c) = Cosigned By    Initials Name Provider Type    AC Tracy Donohue OT Occupational Therapist                 Mobility/ADL's     Row  Name 12/10/21 0845          Bed Mobility    Comment (Bed Mobility) UIC  -AC     Row Name 12/10/21 0845          Transfers    Transfers sit-stand transfer  -     Sit-Stand Jay (Transfers) minimum assist (75% patient effort); 1 person assist; verbal cues  -     Row Name 12/10/21 0845          Sit-Stand Transfer    Assistive Device (Sit-Stand Transfers) walker, front-wheeled  -     Row Name 12/10/21 0845          Functional Mobility    Functional Mobility- Ind. Level contact guard assist; verbal cues required  -     Functional Mobility- Device rolling walker  -     Functional Mobility-Distance (Feet) 100  -AC     Row Name 12/10/21 0845          Activities of Daily Living    BADL Assessment/Intervention lower body dressing  -     Row Name 12/10/21 08          Lower Body Dressing Assessment/Training    Jay Level (Lower Body Dressing) don; socks; minimum assist (75% patient effort)  -     Position (Lower Body Dressing) supported sitting  -     Comment (Lower Body Dressing) increased time and effort, required 2 rest breaks - pt declines need for AE as he reports he does not usually wear socks  -           User Key  (r) = Recorded By, (t) = Taken By, (c) = Cosigned By    Initials Name Provider Type    Tracy Carter, OT Occupational Therapist               Obj/Interventions     Row Name 12/10/21 0845          Elbow/Forearm (Therapeutic Exercise)    Elbow/Forearm (Therapeutic Exercise) strengthening exercise  -     Elbow/Forearm Strengthening (Therapeutic Exercise) bilateral; flexion; extension; 10 repetitions  manual resistance  -     Row Name 12/10/21 0845          Therapeutic Exercise    Therapeutic Exercise elbow/forearm  -           User Key  (r) = Recorded By, (t) = Taken By, (c) = Cosigned By    Initials Name Provider Type    Tracy Carter, OT Occupational Therapist               Goals/Plan    No documentation.                Clinical Impression     Row Name 12/10/21  0845          Pain Scale: Numbers Pre/Post-Treatment    Pretreatment Pain Rating 0/10 - no pain  -AC     Posttreatment Pain Rating 0/10 - no pain  -AC     Row Name 12/10/21 0845          Plan of Care Review    Outcome Summary Pt min A to don socks with increased time and effort, pt declines need for AE as he states he does not usually wear socks.  Pt completed 10 reps BUE strengthening TE without complaint..  Pt min A STS, ambulated 100 ft with RW given CGA.  Pt progressing, but limited by weakenss and decreased activity tolerance.  -AC     Row Name 12/10/21 0845          Vital Signs    Pre Systolic BP Rehab 140  -AC     Pre Treatment Diastolic BP 73  -AC     Pretreatment Heart Rate (beats/min) 100  -AC     Posttreatment Heart Rate (beats/min) 98  -AC     Pre SpO2 (%) 90  -AC     O2 Delivery Pre Treatment room air  -AC     O2 Delivery Intra Treatment room air  -AC     Post SpO2 (%) 91  -AC     O2 Delivery Post Treatment room air  -AC     Pre Patient Position Sitting  -AC     Intra Patient Position Standing  -AC     Post Patient Position Sitting  -AC     Row Name 12/10/21 0845          Positioning and Restraints    Pre-Treatment Position sitting in chair/recliner  -AC     Post Treatment Position chair  -AC     In Chair sitting; call light within reach; encouraged to call for assist; exit alarm on; with family/caregiver  -AC           User Key  (r) = Recorded By, (t) = Taken By, (c) = Cosigned By    Initials Name Provider Type    AC Tracy Donohue, OT Occupational Therapist               Outcome Measures     Row Name 12/10/21 0845          How much help from another is currently needed...    Putting on and taking off regular lower body clothing? 3  -AC     Bathing (including washing, rinsing, and drying) 3  -AC     Toileting (which includes using toilet bed pan or urinal) 3  -AC     Putting on and taking off regular upper body clothing 3  -AC     Taking care of personal grooming (such as brushing teeth) 3  -AC      Eating meals 4  -     AM-PAC 6 Clicks Score (OT) 19  -AC     Row Name 12/10/21 0845          Functional Assessment    Outcome Measure Options AM-PAC 6 Clicks Daily Activity (OT)  -           User Key  (r) = Recorded By, (t) = Taken By, (c) = Cosigned By    Initials Name Provider Type    AC Tracy Donohue, OT Occupational Therapist                Occupational Therapy Education                 Title: PT OT SLP Therapies (In Progress)     Topic: Occupational Therapy (Done)     Point: ADL training (Done)     Description:   Instruct learner(s) on proper safety adaptation and remediation techniques during self care or transfers.   Instruct in proper use of assistive devices.              Learning Progress Summary           Patient Acceptance, E, VU by  at 12/10/2021 0845    Acceptance, E, VU,NR by AN at 12/8/2021 1333                   Point: Home exercise program (Done)     Description:   Instruct learner(s) on appropriate technique for monitoring, assisting and/or progressing therapeutic exercises/activities.              Learning Progress Summary           Patient Acceptance, E, VU,NR by AN at 12/8/2021 1333                   Point: Precautions (Done)     Description:   Instruct learner(s) on prescribed precautions during self-care and functional transfers.              Learning Progress Summary           Patient Acceptance, E, VU,NR by AN at 12/8/2021 1333                   Point: Body mechanics (Done)     Description:   Instruct learner(s) on proper positioning and spine alignment during self-care, functional mobility activities and/or exercises.              Learning Progress Summary           Patient Acceptance, E, VU,NR by AN at 12/8/2021 1333                               User Key     Initials Effective Dates Name Provider Type Discipline     06/16/21 -  Tracy Donohue, OT Occupational Therapist OT    MARLENE 09/21/21 -  Lisset Victor OT Occupational Therapist OT              OT Recommendation and Plan      Plan of Care Review  Outcome Summary: Pt min A to don socks with increased time and effort, pt declines need for AE as he states he does not usually wear socks.  Pt completed 10 reps BUE strengthening TE without complaint..  Pt min A STS, ambulated 100 ft with RW given CGA.  Pt progressing, but limited by weakenss and decreased activity tolerance.     Time Calculation:    Time Calculation- OT     Row Name 12/10/21 0845             Time Calculation- OT    OT Start Time 0845  -AC      OT Received On 12/10/21  -AC      OT Goal Re-Cert Due Date 12/18/21  -AC              Timed Charges    08007 - OT Therapeutic Exercise Minutes 5  -AC      58588 - OT Therapeutic Activity Minutes 8  -AC      47182 - OT Self Care/Mgmt Minutes 5  -AC              Total Minutes    Timed Charges Total Minutes 18  -AC       Total Minutes 18  -AC            User Key  (r) = Recorded By, (t) = Taken By, (c) = Cosigned By    Initials Name Provider Type    AC Tracy Donohue, OT Occupational Therapist              Therapy Charges for Today     Code Description Service Date Service Provider Modifiers Qty    [id number]  OT THERAPEUTIC ACT EA 15 MIN 12/10/2021 Tracy Donohue OT GO 1               Tracy Donohue OT  12/10/2021

## 2021-12-10 NOTE — PLAN OF CARE
Inpatient RN   Plan of Care Update  ________________________________________________    Patient Name:  Iain Dee  YOB: 1935  MRN: [id number]  Admit Date:  12/8/2021      Assessment Date:  12/10/2021    Vital Signs stable, On Telemetry, Pt is Atrial Fibrillation w/ a controlled rate, Pt currently on  CPAP for sleep  Pt has been sleeping well with a Pain status of no pain and finding no nausea and no vomiting.    Pt orientation time, person, and place with LOC of alert. Confusion at times.     UOP adequate.      Pt has no requests at this time.         Electronically signed by:  SHILPA FARRELL RN  12/10/21 06:10 EST      Problem: Malnutrition  Goal: Improved Nutritional Intake  Outcome: Ongoing, Progressing     Problem: Electrolyte Imbalance  Goal: Electrolyte Balance  Outcome: Ongoing, Progressing     Problem: Skin Injury Risk Increased  Goal: Skin Health and Integrity  Outcome: Ongoing, Progressing  Intervention: Optimize Skin Protection  Recent Flowsheet Documentation  Taken 12/10/2021 0400 by Shilpa Farrell RN  Pressure Reduction Techniques:   frequent weight shift encouraged   pressure points protected   weight shift assistance provided  Head of Bed ([facility]): [facility] elevated  Pressure Reduction Devices:   positioning supports utilized   pressure-redistributing mattress utilized  Skin Protection:   adhesive use limited   incontinence pads utilized   transparent dressing maintained   tubing/devices free from skin contact  Taken 12/10/2021 0200 by Shilpa Farrell RN  Pressure Reduction Techniques:   frequent weight shift encouraged   weight shift assistance provided  Head of Bed ([facility]): [facility] elevated  Pressure Reduction Devices:   pressure-redistributing mattress utilized   positioning supports utilized  Taken 12/10/2021 0000 by Shilpa Farrell RN  Pressure Reduction Techniques:   frequent weight shift encouraged   pressure points protected   weight shift assistance provided  Head of Bed ([facility]): [facility]  elevated  Pressure Reduction Devices:   positioning supports utilized   pressure-redistributing mattress utilized  Skin Protection:   adhesive use limited   incontinence pads utilized   transparent dressing maintained   tubing/devices free from skin contact  Taken 12/9/2021 2200 by Anamika Hartmann RN  Pressure Reduction Techniques:   frequent weight shift encouraged   pressure points protected   weight shift assistance provided  Head of Bed (hospitals): hospitals elevated  Pressure Reduction Devices:   positioning supports utilized   pressure-redistributing mattress utilized  Skin Protection:   adhesive use limited   incontinence pads utilized   transparent dressing maintained   tubing/devices free from skin contact  Taken 12/9/2021 2000 by Anamika Hartmann RN  Pressure Reduction Techniques:   frequent weight shift encouraged   pressure points protected   weight shift assistance provided  Head of Bed (hospitals): hospitals elevated  Pressure Reduction Devices:   positioning supports utilized   pressure-redistributing mattress utilized  Skin Protection:   adhesive use limited   incontinence pads utilized   transparent dressing maintained   tubing/devices free from skin contact     Problem: Fall Injury Risk  Goal: Absence of Fall and Fall-Related Injury  Outcome: Ongoing, Progressing  Intervention: Identify and Manage Contributors to Fall Injury Risk  Recent Flowsheet Documentation  Taken 12/10/2021 0400 by Anamika Hartmann RN  Medication Review/Management: medications reviewed  Taken 12/10/2021 0200 by Anamika Hartmann RN  Medication Review/Management: medications reviewed  Taken 12/10/2021 0000 by Anamika Hartmann RN  Medication Review/Management: medications reviewed  Taken 12/9/2021 2200 by Anamika Hartmann RN  Medication Review/Management: medications reviewed  Taken 12/9/2021 2000 by Anamika Hartmann RN  Medication Review/Management: medications reviewed  Intervention: Promote Injury-Free Environment  Recent Flowsheet Documentation  Taken 12/10/2021 0400  by Anamika Hartmann, RN  Safety Promotion/Fall Prevention:   activity supervised   assistive device/personal items within reach   clutter free environment maintained   fall prevention program maintained   nonskid shoes/slippers when out of bed   room organization consistent   safety round/check completed  Taken 12/10/2021 0200 by Anamika Hartmann, DENIS  Safety Promotion/Fall Prevention:   assistive device/personal items within reach   activity supervised   clutter free environment maintained   fall prevention program maintained   nonskid shoes/slippers when out of bed   room organization consistent   safety round/check completed  Taken 12/10/2021 0000 by Anamika Hartmann RN  Safety Promotion/Fall Prevention:   activity supervised   assistive device/personal items within reach   clutter free environment maintained   fall prevention program maintained   nonskid shoes/slippers when out of bed   room organization consistent   safety round/check completed  Taken 12/9/2021 2200 by Anamika Hartmann, DENIS  Safety Promotion/Fall Prevention:   activity supervised   assistive device/personal items within reach   clutter free environment maintained   fall prevention program maintained   nonskid shoes/slippers when out of bed   room organization consistent   safety round/check completed  Taken 12/9/2021 2000 by Anamika Hartmann, DENIS  Safety Promotion/Fall Prevention:   assistive device/personal items within reach   activity supervised   clutter free environment maintained   fall prevention program maintained   room organization consistent   safety round/check completed     Problem: Adult Inpatient Plan of Care  Goal: Plan of Care Review  Outcome: Ongoing, Progressing  Goal: Patient-Specific Goal (Individualized)  Outcome: Ongoing, Progressing  Goal: Absence of Hospital-Acquired Illness or Injury  Outcome: Ongoing, Progressing  Intervention: Identify and Manage Fall Risk  Recent Flowsheet Documentation  Taken 12/10/2021 0400 by Anamika Hartmann, RN  Safety  Promotion/Fall Prevention:   activity supervised   assistive device/personal items within reach   clutter free environment maintained   fall prevention program maintained   nonskid shoes/slippers when out of bed   room organization consistent   safety round/check completed  Taken 12/10/2021 0200 by Anamika Hartmann, DENIS  Safety Promotion/Fall Prevention:   assistive device/personal items within reach   activity supervised   clutter free environment maintained   fall prevention program maintained   nonskid shoes/slippers when out of bed   room organization consistent   safety round/check completed  Taken 12/10/2021 0000 by Anamika Hartmann RN  Safety Promotion/Fall Prevention:   activity supervised   assistive device/personal items within reach   clutter free environment maintained   fall prevention program maintained   nonskid shoes/slippers when out of bed   room organization consistent   safety round/check completed  Taken 12/9/2021 2200 by Anamika Hartmann RN  Safety Promotion/Fall Prevention:   activity supervised   assistive device/personal items within reach   clutter free environment maintained   fall prevention program maintained   nonskid shoes/slippers when out of bed   room organization consistent   safety round/check completed  Taken 12/9/2021 2000 by Anamika Hartmann RN  Safety Promotion/Fall Prevention:   assistive device/personal items within reach   activity supervised   clutter free environment maintained   fall prevention program maintained   room organization consistent   safety round/check completed  Intervention: Prevent Skin Injury  Recent Flowsheet Documentation  Taken 12/10/2021 0400 by Anamika Hartmann, RN  Body Position: position changed independently  Skin Protection:   adhesive use limited   incontinence pads utilized   transparent dressing maintained   tubing/devices free from skin contact  Taken 12/10/2021 0200 by Anamika Hartmann, RN  Body Position: position changed independently  Taken 12/10/2021 0000 by  Anamika Hartmann RN  Body Position: position changed independently  Skin Protection:   adhesive use limited   incontinence pads utilized   transparent dressing maintained   tubing/devices free from skin contact  Taken 12/9/2021 2200 by Anamika Hartmann RN  Body Position: position changed independently  Skin Protection:   adhesive use limited   incontinence pads utilized   transparent dressing maintained   tubing/devices free from skin contact  Taken 12/9/2021 2000 by Anamika Hartmann RN  Body Position: dangle, side of bed  Skin Protection:   adhesive use limited   incontinence pads utilized   transparent dressing maintained   tubing/devices free from skin contact  Intervention: Prevent Infection  Recent Flowsheet Documentation  Taken 12/10/2021 0400 by Anamika Hartmann RN  Infection Prevention:   hand hygiene promoted   personal protective equipment utilized   rest/sleep promoted   single patient room provided   visitors restricted/screened  Taken 12/10/2021 0200 by Anamika Hartmann RN  Infection Prevention:   hand hygiene promoted   personal protective equipment utilized   rest/sleep promoted   single patient room provided   visitors restricted/screened  Taken 12/10/2021 0000 by Anamika Hartmann RN  Infection Prevention:   hand hygiene promoted   personal protective equipment utilized   rest/sleep promoted   single patient room provided   visitors restricted/screened  Taken 12/9/2021 2200 by Anamika Hartmann RN  Infection Prevention:   hand hygiene promoted   personal protective equipment utilized   rest/sleep promoted   single patient room provided   visitors restricted/screened  Taken 12/9/2021 2000 by Anamika Hartmann RN  Infection Prevention:   hand hygiene promoted   personal protective equipment utilized   rest/sleep promoted   single patient room provided   visitors restricted/screened  Goal: Optimal Comfort and Wellbeing  Outcome: Ongoing, Progressing  Intervention: Provide Person-Centered Care  Recent Flowsheet  Documentation  Taken 12/9/2021 2000 by Anamika Hartmann RN  Trust Relationship/Rapport:   care explained   choices provided   emotional support provided   empathic listening provided   questions answered   questions encouraged   reassurance provided   thoughts/feelings acknowledged  Goal: Readiness for Transition of Care  Outcome: Ongoing, Progressing     Problem: Diabetes Comorbidity  Goal: Blood Glucose Level Within Desired Range  Outcome: Ongoing, Progressing     Problem: Hypertension Comorbidity  Goal: Blood Pressure in Desired Range  Outcome: Ongoing, Progressing  Intervention: Maintain Hypertension-Management Strategies  Recent Flowsheet Documentation  Taken 12/10/2021 0400 by Anamika Hartmann RN  Medication Review/Management: medications reviewed  Taken 12/10/2021 0200 by Anamika Hartmann RN  Medication Review/Management: medications reviewed  Taken 12/10/2021 0000 by Anamika Hartmann RN  Medication Review/Management: medications reviewed  Taken 12/9/2021 2200 by Anamika Hartmann RN  Medication Review/Management: medications reviewed  Taken 12/9/2021 2000 by Anamika Hartmann RN  Medication Review/Management: medications reviewed     Problem: Obstructive Sleep Apnea Risk or Actual (Comorbidity Management)  Goal: Unobstructed Breathing During Sleep  Outcome: Ongoing, Progressing   Goal Outcome Evaluation:

## 2021-12-11 NOTE — OUTREACH NOTE
Prep Survey      Responses   [facility] patient discharged from? [facility]   Is LACE score < 7 ? No   Emergency Room discharge w/ pulse ox? No   Eligibility [facility]   Date of Admission 12/08/21   Date of Discharge 12/10/21   Discharge Disposition Home or Self Care   Discharge diagnosis AMS, metabolic encephalopathy, severe electrolyte abnormalities, ETOH abuse   Does the patient have one of the following disease processes/diagnoses(primary or secondary)? Other   Does the patient have Home health ordered? No   Is there a DME ordered? No   Prep survey completed? Yes          Lizabeth Schwartz RN

## 2021-12-12 ENCOUNTER — TRANSITIONAL CARE MANAGEMENT TELEPHONE ENCOUNTER (OUTPATIENT)
Dept: CALL CENTER | Facility: HOSPITAL | Age: 86
End: 2021-12-12

## 2021-12-12 NOTE — OUTREACH NOTE
Call Center TCM Note      Responses   [facility] patient discharged from? [facility]   Does the patient have one of the following disease processes/diagnoses(primary or secondary)? Other   TCM attempt successful? Yes   Call start time 1631   Call end time 1633   Discharge diagnosis AMS, metabolic encephalopathy, severe electrolyte abnormalities, ETOH abuse   Meds reviewed with patient/caregiver? Yes   Is the patient having any side effects they believe may be caused by any medication additions or changes? No   Does the patient have all medications ordered at discharge? N/A   Prescription comments Pt aware to stop HCTZ   Is the patient taking all medications as directed (includes completed medication regime)? Yes   Does the patient have a primary care provider?  Yes   Does the patient have an appointment with their PCP within 7 days of discharge? Yes   Comments regarding PCP [facility] FU appt 12/16/21 2 9:45 am.    Has the patient kept scheduled appointments due by today? N/A   Psychosocial issues? No   Did the patient receive a copy of their discharge instructions? Yes   Nursing interventions Reviewed instructions with patient   What is the patient's perception of their health status since discharge? Improving   Is the patient/caregiver able to teach back signs and symptoms related to disease process for when to call PCP? Yes   Is the patient/caregiver able to teach back signs and symptoms related to disease process for when to call 911? Yes   Is the patient/caregiver able to teach back the hierarchy of who to call/visit for symptoms/problems? PCP, Specialist, Home health nurse, Urgent Care, ED, 911 Yes   TCM call completed? Yes   Wrap up additional comments Pt reports he feels fine. No questions,           Yessenia Allen RN    12/12/2021, 16:34 EST       Pt aware. States he's in [place] area and wondering where [address] can be done there? Please advise.

## 2021-12-13 LAB
BACTERIA SPEC AEROBE CULT: NORMAL
BACTERIA SPEC AEROBE CULT: NORMAL

## 2021-12-16 ENCOUNTER — LAB (OUTPATIENT)
Dept: LAB | Facility: HOSPITAL | Age: 86
End: 2021-12-16

## 2021-12-16 ENCOUNTER — OFFICE VISIT (OUTPATIENT)
Dept: FAMILY MEDICINE CLINIC | Facility: CLINIC | Age: 86
End: 2021-12-16

## 2021-12-16 VITALS
OXYGEN SATURATION: 94 % | BODY MASS INDEX: 31.83 KG/M2 | WEIGHT: 235 LBS | SYSTOLIC BLOOD PRESSURE: 140 MMHG | RESPIRATION RATE: 16 BRPM | HEIGHT: 72 IN | HEART RATE: 85 BPM | DIASTOLIC BLOOD PRESSURE: 80 MMHG

## 2021-12-16 DIAGNOSIS — E83.51 HYPOCALCEMIA: ICD-10-CM

## 2021-12-16 DIAGNOSIS — E83.42 HYPOMAGNESEMIA: ICD-10-CM

## 2021-12-16 DIAGNOSIS — F10.10 ALCOHOL ABUSE: ICD-10-CM

## 2021-12-16 DIAGNOSIS — E11.65 TYPE 2 DIABETES MELLITUS WITH HYPERGLYCEMIA, WITHOUT LONG-TERM CURRENT USE OF INSULIN (HCC): ICD-10-CM

## 2021-12-16 DIAGNOSIS — E87.6 HYPOKALEMIA: ICD-10-CM

## 2021-12-16 DIAGNOSIS — R41.82 ALTERED MENTAL STATUS, UNSPECIFIED ALTERED MENTAL STATUS TYPE: Primary | ICD-10-CM

## 2021-12-16 LAB
ALBUMIN SERPL-MCNC: 4.1 G/DL (ref 3.5–5.2)
ALBUMIN/GLOB SERPL: 1.4 G/DL
ALP SERPL-CCNC: 76 U/L (ref 39–117)
ALT SERPL W P-5'-P-CCNC: 20 U/L (ref 1–41)
ANION GAP SERPL CALCULATED.3IONS-SCNC: 10.1 MMOL/L (ref 5–15)
AST SERPL-CCNC: 31 U/L (ref 1–40)
BILIRUB SERPL-MCNC: 0.4 MG/DL (ref 0–1.2)
BUN SERPL-MCNC: 19 MG/DL (ref 8–23)
BUN/CREAT SERPL: 16 (ref 7–25)
CA-I BLD-MCNC: 4.8 MG/DL
CA-I SERPL ISE-MCNC: 1.2 MMOL/L (ref 1.1–1.35)
CALCIUM SPEC-SCNC: 9.1 MG/DL (ref 8.6–10.5)
CHLORIDE SERPL-SCNC: 100 MMOL/L (ref 98–107)
CO2 SERPL-SCNC: 27.9 MMOL/L (ref 22–29)
CREAT SERPL-MCNC: 1.19 MG/DL (ref 0.76–1.27)
GFR SERPL CREATININE-BSD FRML MDRD: 58 ML/MIN/1.73
GLOBULIN UR ELPH-MCNC: 2.9 GM/DL
GLUCOSE SERPL-MCNC: 249 MG/DL (ref 65–99)
MAGNESIUM SERPL-MCNC: 1 MG/DL (ref 1.6–2.4)
PHOSPHATE SERPL-MCNC: 3.1 MG/DL (ref 2.5–4.5)
POTASSIUM SERPL-SCNC: 4.6 MMOL/L (ref 3.5–5.2)
PROT SERPL-MCNC: 7 G/DL (ref 6–8.5)
SODIUM SERPL-SCNC: 138 MMOL/L (ref 136–145)

## 2021-12-16 PROCEDURE — 83735 ASSAY OF MAGNESIUM: CPT

## 2021-12-16 PROCEDURE — 1111F DSCHRG MED/CURRENT MED MERGE: CPT | Performed by: FAMILY MEDICINE

## 2021-12-16 PROCEDURE — 80053 COMPREHEN METABOLIC PANEL: CPT

## 2021-12-16 PROCEDURE — 99495 TRANSJ CARE MGMT MOD F2F 14D: CPT | Performed by: FAMILY MEDICINE

## 2021-12-16 PROCEDURE — 36415 COLL VENOUS BLD VENIPUNCTURE: CPT

## 2021-12-16 PROCEDURE — 82330 ASSAY OF CALCIUM: CPT

## 2021-12-16 PROCEDURE — 84100 ASSAY OF PHOSPHORUS: CPT

## 2021-12-16 NOTE — PROGRESS NOTES
Transitional Care Follow Up Visit  Subjective     Iain Dee is a 86 y.o. male who presents for a transitional care management visit.    Within 48 business hours after discharge our office contacted him via telephone to coordinate his care and needs.      I reviewed and discussed the details of that call along with the discharge summary, hospital problems, inpatient lab results, inpatient diagnostic studies, and consultation reports with Iain.    Patient Care Team:  Luis Aguilar DO as PCP - General (Family Medicine)  Iain Bernstein MD as Consulting Physician (Cardiology)     Current outpatient and discharge medications have been reconciled for the patient.  Reviewed by: Luis Aguilar DO      Date of TCM Phone Call 12/10/2021   [facility]   Date of Admission 12/8/2021   Date of Discharge 12/10/2021   Discharge Disposition Home or Self Care     Risk for Readmission (LACE) Score: 8 (12/10/2021  6:01 AM)      History of Present Illness   Course During Hospital Stay:      *Encephalopathy (metabolic encephalopathy versus seizure), resolved  *daily ETOH use (counseled cessation)  *severe electrolyte abnormalities (hypomagnesemia and hypokalemia and hypocalcemia)  *Hx Afib (continue eliquis an b-blocker)  *DM (continue prior rx)        Hospital Course:  Iain eDe is a 86 y.o. male w/ hx afib, htn, dm2 who presented with confusion and neurologic spells concerning for possible partial seizures. Neuro-stroke team initially followed but stroke workup was unremarkable. Neurology felt possibly had partial seizure due to severe hypokalemia and hypomagnesemia. hctz has been held and electrolytes replaced. eeg negative. Now normal mentation. Patient did share that he drinks 1-2 drinks bourbon daily. He has no signs of withdrawal currently and is at baseline mentation. Counseled on etoh cessation as likely related to his hospitalization. Stop hctz. No driving x 3 months (per neuro) and patient &  wife understand this is law until cleared by neurology. Follow up pcp next week for electrolyte checks and dm2 check. F/u neurology clinic 4-6 weeks      Discharge Follow Up Recommendations for outpatient labs/diagnostics:  pcp 1 week, follow up Diabetes & electrolyte checks  Neurology clinic 4-6 weeks     Status Since Discharge:    Iain presents today for a transitional care management visit. He is accompanied by an adult female who contributes to the history of their care.    The patient reports that he is feeling normal.  He reports that he has been seeing his primary care physician, Dr. Iain Espinosa, who is his cardiologist.    He reports that he is still seeing Dr. Espinosa. He reports that he has been cutting down on the bourbon. He reports that he has had1 drink per day, for approximately 10 years. He did not change his alcohol intake at all before he went to the hospital. The adult female reports that he drinks more liquor than other drinks.    He denies the swelling in his feet getting worse since he has gone off of the fluid pill. He reports that his skin broke on his great toe prior to being in the hospital. He has worn compression stockings in the past.     He reports that he eats a lot of sugar. The adult female states that he has reduced his sugar intake since leaving the hospital.     He reports that in the hospital, he was taking indomethacin for cellulitis in his right hand, but he did not know he had any issues. He reports that he no longer has that.       The following portions of the patient's history were reviewed and updated as appropriate: allergies, current medications, past family history, past medical history, past social history, past surgical history and problem list.    Review of Systems  Review of Systems -  General ROS: negative for - chills, fever or night sweats  Cardiovascular ROS: no chest pain or dyspnea on exertion  Gastrointestinal ROS: no abdominal pain, change in bowel  "habits, or black or bloody stools  Genito-Urinary ROS: no dysuria, trouble voiding, or hematuria    Objective   Blood pressure 140/80, pulse 85, resp. rate 16, height 182.9 cm (72.01\"), weight 107 kg (235 lb), SpO2 94 %.  Nursing note reviewed  Physical Exam  Const: NAD, A&Ox4, Pleasant, Cooperative  Eyes: EOMI, no conjunctivitis  ENT: No nasal discharge present, neck supple  Cardiac: Regular rate and rhythm, no cyanosis  Resp: Respiratory rate within normal limits, no increased work of breathing, no audible wheezing or retractions noted  GI: No distention or ascites  MSK: Motor and sensation grossly intact in bilateral upper extremities  Neurologic: CN II-XII grossly intact  Psych: Appropriate mood and behavior.  Skin: Warm, dry  Assessment/Plan   Problem List Items Addressed This Visit        Endocrine and Metabolic    Diabetes mellitus (HCC)       Genitourinary and Reproductive     Hypokalemia    Relevant Orders    Comprehensive Metabolic Panel (Completed)    Magnesium (Completed)    Phosphorus (Completed)    Calcium, Ionized (Completed)    Hypomagnesemia    Relevant Orders    Comprehensive Metabolic Panel (Completed)    Magnesium (Completed)    Phosphorus (Completed)    Calcium, Ionized (Completed)    Hypocalcemia    Relevant Orders    Comprehensive Metabolic Panel (Completed)    Magnesium (Completed)    Phosphorus (Completed)    Calcium, Ionized (Completed)       Mental Health    Alcohol abuse (1 to 2 glasses of bourbon a day)       Neuro    AMS (altered mental status) - Primary         1. Severe electrolyte imbalance is causing altered mental status.  - Presumably multifactorial secondary to his alcohol use as well as the HCTZ he was on prior to hospitalization. I would like him to cut his alcohol use at least in half and stay off the HCTZ. We will recheck electrolytes today.    2. Alcohol abuse.  - Reportedly 1 to 2 drinks daily; however, his servings tend to be more along the lines of 3 ounces per serving, " so his level of alcohol intake is much higher than 1 to 2 drinks per day. I advised him to cut this in half to reduce overall risk.    3. Type 2 diabetes mellitus.  - His A1c is 8 percent. At his age, thankfully, this is not unreasonable; however, I would like him to try and lower this on his own by decreasing his alcohol use and added sugar intake.    See patient diagnoses and orders along with patient instructions for assessment, plan, and changes to care for patient.    Patient Instructions   1.  Cut alcohol in half    2.  Reduce added sugars in your diet    3.  Wearing compression stockings for the swelling in your feet    4.  Stay off HCTZ    5.  Soak toe in warm soapy water daily         Transcribed from ambient dictation for Luis Aguilar DO by Jeanie Blas.  12/16/21   18:37 EST    Patient verbalized consent to the visit recording.  I have personally performed the services described in this document as transcribed by the above individual, and it is both accurate and complete.  Luis Aguilar DO  12/22/2021  09:52 EST

## 2021-12-16 NOTE — PATIENT INSTRUCTIONS
1.  Cut alcohol in half    2.  Reduce added sugars in your diet    3.  Wearing compression stockings for the swelling in your feet    4.  Stay off HCTZ    5.  Soak toe in warm soapy water daily

## 2022-01-27 ENCOUNTER — TELEPHONE (OUTPATIENT)
Dept: FAMILY MEDICINE CLINIC | Facility: CLINIC | Age: 87
End: 2022-01-27

## 2022-01-27 DIAGNOSIS — E83.42 HYPOMAGNESEMIA: Primary | ICD-10-CM

## 2022-01-28 RX ORDER — PNV NO.95/FERROUS FUM/FOLIC AC 28MG-0.8MG
250 TABLET ORAL DAILY
Qty: 90 TABLET | Refills: 2 | Status: SHIPPED | OUTPATIENT
Start: 2022-01-28

## 2022-02-01 NOTE — TELEPHONE ENCOUNTER
Caller: Iain Dee    Relationship: Self    Best call back number: [phone]    PATIENT CALLED BACK AND I RELAYED THE MESSAGE BELOW. PATIENT VOICED UNDERSTANDING    Attempted to call pt, received no answer. Left  with office number given.     Okay for hub to relay message and document     His levels had improved after being off the HCTZ but he should be on a magnesium supplement anyways. This has been sent. His glucose was elevated but we were expecting that.

## 2023-02-27 ENCOUNTER — APPOINTMENT (OUTPATIENT)
Dept: GENERAL RADIOLOGY | Facility: HOSPITAL | Age: 88
DRG: 896 | End: 2023-02-27
Payer: MEDICARE

## 2023-02-27 ENCOUNTER — APPOINTMENT (OUTPATIENT)
Dept: CT IMAGING | Facility: HOSPITAL | Age: 88
DRG: 896 | End: 2023-02-27
Payer: MEDICARE

## 2023-02-27 ENCOUNTER — HOSPITAL ENCOUNTER (INPATIENT)
Facility: HOSPITAL | Age: 88
LOS: 7 days | Discharge: NURSING, REHAB OR LONG-TERM CARE | DRG: 896 | End: 2023-03-06
Attending: EMERGENCY MEDICINE | Admitting: INTERNAL MEDICINE
Payer: MEDICARE

## 2023-02-27 DIAGNOSIS — E83.51 HYPOCALCEMIA: ICD-10-CM

## 2023-02-27 DIAGNOSIS — D72.829 LEUKOCYTOSIS, UNSPECIFIED TYPE: ICD-10-CM

## 2023-02-27 DIAGNOSIS — E83.42 HYPOMAGNESEMIA: ICD-10-CM

## 2023-02-27 DIAGNOSIS — E87.6 HYPOKALEMIA: ICD-10-CM

## 2023-02-27 DIAGNOSIS — R56.9 SEIZURE: Primary | ICD-10-CM

## 2023-02-27 DIAGNOSIS — R73.9 HYPERGLYCEMIA: ICD-10-CM

## 2023-02-27 DIAGNOSIS — R53.1 GENERALIZED WEAKNESS: ICD-10-CM

## 2023-02-27 LAB
ALBUMIN SERPL-MCNC: 3.5 G/DL (ref 3.5–5.2)
ALBUMIN/GLOB SERPL: 1.1 G/DL
ALP SERPL-CCNC: 79 U/L (ref 39–117)
ALT SERPL W P-5'-P-CCNC: 20 U/L (ref 1–41)
AMMONIA BLD-SCNC: 33 UMOL/L (ref 16–60)
AMPHET+METHAMPHET UR QL: NEGATIVE
AMPHETAMINES UR QL: NEGATIVE
ANION GAP SERPL CALCULATED.3IONS-SCNC: 17 MMOL/L (ref 5–15)
APAP SERPL-MCNC: <5 MCG/ML (ref 0–30)
AST SERPL-CCNC: 32 U/L (ref 1–40)
BACTERIA UR QL AUTO: ABNORMAL /HPF
BARBITURATES UR QL SCN: NEGATIVE
BASOPHILS # BLD AUTO: 0.09 10*3/MM3 (ref 0–0.2)
BASOPHILS NFR BLD AUTO: 0.6 % (ref 0–1.5)
BENZODIAZ UR QL SCN: NEGATIVE
BILIRUB SERPL-MCNC: 1 MG/DL (ref 0–1.2)
BILIRUB UR QL STRIP: ABNORMAL
BUN SERPL-MCNC: 20 MG/DL (ref 8–23)
BUN/CREAT SERPL: 16.9 (ref 7–25)
BUPRENORPHINE SERPL-MCNC: NEGATIVE NG/ML
CA-I SERPL ISE-MCNC: 0.78 MMOL/L (ref 1.12–1.32)
CALCIUM SPEC-SCNC: 5.8 MG/DL (ref 8.6–10.5)
CANNABINOIDS SERPL QL: NEGATIVE
CHLORIDE SERPL-SCNC: 94 MMOL/L (ref 98–107)
CLARITY UR: CLEAR
CO2 SERPL-SCNC: 26 MMOL/L (ref 22–29)
COCAINE UR QL: NEGATIVE
COLOR UR: ABNORMAL
CREAT SERPL-MCNC: 1.18 MG/DL (ref 0.76–1.27)
D-LACTATE SERPL-SCNC: 1.2 MMOL/L (ref 0.5–2)
D-LACTATE SERPL-SCNC: 4.8 MMOL/L (ref 0.5–2)
DEPRECATED RDW RBC AUTO: 46 FL (ref 37–54)
EGFRCR SERPLBLD CKD-EPI 2021: 60.1 ML/MIN/1.73
EOSINOPHIL # BLD AUTO: 0.03 10*3/MM3 (ref 0–0.4)
EOSINOPHIL NFR BLD AUTO: 0.2 % (ref 0.3–6.2)
ERYTHROCYTE [DISTWIDTH] IN BLOOD BY AUTOMATED COUNT: 12.1 % (ref 12.3–15.4)
ETHANOL BLD-MCNC: <10 MG/DL (ref 0–10)
FLUAV RNA RESP QL NAA+PROBE: NOT DETECTED
FLUBV RNA RESP QL NAA+PROBE: NOT DETECTED
GEN 5 2HR TROPONIN T REFLEX: 97 NG/L
GLOBULIN UR ELPH-MCNC: 3.3 GM/DL
GLUCOSE BLDC GLUCOMTR-MCNC: 220 MG/DL (ref 70–130)
GLUCOSE SERPL-MCNC: 215 MG/DL (ref 65–99)
GLUCOSE UR STRIP-MCNC: ABNORMAL MG/DL
HCT VFR BLD AUTO: 40.3 % (ref 37.5–51)
HGB BLD-MCNC: 13.2 G/DL (ref 13–17.7)
HGB UR QL STRIP.AUTO: ABNORMAL
HYALINE CASTS UR QL AUTO: ABNORMAL /LPF
IMM GRANULOCYTES # BLD AUTO: 0.09 10*3/MM3 (ref 0–0.05)
IMM GRANULOCYTES NFR BLD AUTO: 0.6 % (ref 0–0.5)
INR PPP: 1.65 (ref 0.84–1.13)
KETONES UR QL STRIP: ABNORMAL
LEUKOCYTE ESTERASE UR QL STRIP.AUTO: NEGATIVE
LYMPHOCYTES # BLD AUTO: 1.04 10*3/MM3 (ref 0.7–3.1)
LYMPHOCYTES NFR BLD AUTO: 7.3 % (ref 19.6–45.3)
MAGNESIUM SERPL-MCNC: 0.4 MG/DL (ref 1.6–2.4)
MCH RBC QN AUTO: 33.1 PG (ref 26.6–33)
MCHC RBC AUTO-ENTMCNC: 32.8 G/DL (ref 31.5–35.7)
MCV RBC AUTO: 101 FL (ref 79–97)
METHADONE UR QL SCN: NEGATIVE
MONOCYTES # BLD AUTO: 0.83 10*3/MM3 (ref 0.1–0.9)
MONOCYTES NFR BLD AUTO: 5.8 % (ref 5–12)
NEUTROPHILS NFR BLD AUTO: 12.23 10*3/MM3 (ref 1.7–7)
NEUTROPHILS NFR BLD AUTO: 85.5 % (ref 42.7–76)
NITRITE UR QL STRIP: NEGATIVE
NRBC BLD AUTO-RTO: 0 /100 WBC (ref 0–0.2)
NT-PROBNP SERPL-MCNC: 4790 PG/ML (ref 0–1800)
OPIATES UR QL: NEGATIVE
OXYCODONE UR QL SCN: NEGATIVE
PCP UR QL SCN: NEGATIVE
PH UR STRIP.AUTO: 5.5 [PH] (ref 5–8)
PHOSPHATE SERPL-MCNC: 4.7 MG/DL (ref 2.5–4.5)
PLATELET # BLD AUTO: 421 10*3/MM3 (ref 140–450)
PMV BLD AUTO: 10.8 FL (ref 6–12)
POTASSIUM SERPL-SCNC: 3.2 MMOL/L (ref 3.5–5.2)
PROCALCITONIN SERPL-MCNC: 0.18 NG/ML (ref 0–0.25)
PROPOXYPH UR QL: NEGATIVE
PROT SERPL-MCNC: 6.8 G/DL (ref 6–8.5)
PROT UR QL STRIP: ABNORMAL
PROTHROMBIN TIME: 19.5 SECONDS (ref 11.4–14.4)
PTH-INTACT SERPL-MCNC: 55.3 PG/ML (ref 15–65)
QT INTERVAL: 400 MS
QTC INTERVAL: 494 MS
RBC # BLD AUTO: 3.99 10*6/MM3 (ref 4.14–5.8)
RBC # UR STRIP: ABNORMAL /HPF
REF LAB TEST METHOD: ABNORMAL
SALICYLATES SERPL-MCNC: <0.3 MG/DL
SARS-COV-2 RNA RESP QL NAA+PROBE: NOT DETECTED
SODIUM SERPL-SCNC: 137 MMOL/L (ref 136–145)
SP GR UR STRIP: 1.03 (ref 1–1.03)
SQUAMOUS #/AREA URNS HPF: ABNORMAL /HPF
T4 FREE SERPL-MCNC: 1.14 NG/DL (ref 0.93–1.7)
TRICYCLICS UR QL SCN: NEGATIVE
TROPONIN T DELTA: -12 NG/L
TROPONIN T SERPL HS-MCNC: 109 NG/L
TSH SERPL DL<=0.05 MIU/L-ACNC: 3.35 UIU/ML (ref 0.27–4.2)
UROBILINOGEN UR QL STRIP: ABNORMAL
WBC # UR STRIP: ABNORMAL /HPF
WBC NRBC COR # BLD: 14.31 10*3/MM3 (ref 3.4–10.8)

## 2023-02-27 PROCEDURE — 99285 EMERGENCY DEPT VISIT HI MDM: CPT

## 2023-02-27 PROCEDURE — 80306 DRUG TEST PRSMV INSTRMNT: CPT | Performed by: EMERGENCY MEDICINE

## 2023-02-27 PROCEDURE — 94660 CPAP INITIATION&MGMT: CPT

## 2023-02-27 PROCEDURE — 80053 COMPREHEN METABOLIC PANEL: CPT | Performed by: EMERGENCY MEDICINE

## 2023-02-27 PROCEDURE — 25010000002 CALCIUM GLUCONATE-NACL 1-0.675 GM/50ML-% SOLUTION: Performed by: EMERGENCY MEDICINE

## 2023-02-27 PROCEDURE — 82962 GLUCOSE BLOOD TEST: CPT

## 2023-02-27 PROCEDURE — 93005 ELECTROCARDIOGRAM TRACING: CPT | Performed by: EMERGENCY MEDICINE

## 2023-02-27 PROCEDURE — 25010000002 LORAZEPAM PER 2 MG: Performed by: INTERNAL MEDICINE

## 2023-02-27 PROCEDURE — 0 LEVETIRACETAM IN NACL 0.54% 1500 MG/100ML SOLUTION: Performed by: EMERGENCY MEDICINE

## 2023-02-27 PROCEDURE — 84484 ASSAY OF TROPONIN QUANT: CPT | Performed by: EMERGENCY MEDICINE

## 2023-02-27 PROCEDURE — 84439 ASSAY OF FREE THYROXINE: CPT | Performed by: EMERGENCY MEDICINE

## 2023-02-27 PROCEDURE — 87636 SARSCOV2 & INF A&B AMP PRB: CPT | Performed by: EMERGENCY MEDICINE

## 2023-02-27 PROCEDURE — P9612 CATHETERIZE FOR URINE SPEC: HCPCS

## 2023-02-27 PROCEDURE — 84145 PROCALCITONIN (PCT): CPT | Performed by: EMERGENCY MEDICINE

## 2023-02-27 PROCEDURE — 85025 COMPLETE CBC W/AUTO DIFF WBC: CPT | Performed by: EMERGENCY MEDICINE

## 2023-02-27 PROCEDURE — 83735 ASSAY OF MAGNESIUM: CPT | Performed by: EMERGENCY MEDICINE

## 2023-02-27 PROCEDURE — 84100 ASSAY OF PHOSPHORUS: CPT | Performed by: PSYCHIATRY & NEUROLOGY

## 2023-02-27 PROCEDURE — 85610 PROTHROMBIN TIME: CPT | Performed by: EMERGENCY MEDICINE

## 2023-02-27 PROCEDURE — 80179 DRUG ASSAY SALICYLATE: CPT | Performed by: EMERGENCY MEDICINE

## 2023-02-27 PROCEDURE — 80143 DRUG ASSAY ACETAMINOPHEN: CPT | Performed by: EMERGENCY MEDICINE

## 2023-02-27 PROCEDURE — 0 POTASSIUM CHLORIDE 10 MEQ/100ML SOLUTION: Performed by: EMERGENCY MEDICINE

## 2023-02-27 PROCEDURE — 25010000002 THIAMINE PER 100 MG: Performed by: INTERNAL MEDICINE

## 2023-02-27 PROCEDURE — 99291 CRITICAL CARE FIRST HOUR: CPT | Performed by: INTERNAL MEDICINE

## 2023-02-27 PROCEDURE — 82140 ASSAY OF AMMONIA: CPT | Performed by: EMERGENCY MEDICINE

## 2023-02-27 PROCEDURE — 82330 ASSAY OF CALCIUM: CPT | Performed by: EMERGENCY MEDICINE

## 2023-02-27 PROCEDURE — 81001 URINALYSIS AUTO W/SCOPE: CPT | Performed by: EMERGENCY MEDICINE

## 2023-02-27 PROCEDURE — 84443 ASSAY THYROID STIM HORMONE: CPT | Performed by: EMERGENCY MEDICINE

## 2023-02-27 PROCEDURE — 83970 ASSAY OF PARATHORMONE: CPT | Performed by: PSYCHIATRY & NEUROLOGY

## 2023-02-27 PROCEDURE — 25010000002 MAGNESIUM SULFATE IN D5W 1G/100ML (PREMIX) 1-5 GM/100ML-% SOLUTION: Performed by: EMERGENCY MEDICINE

## 2023-02-27 PROCEDURE — 94799 UNLISTED PULMONARY SVC/PX: CPT

## 2023-02-27 PROCEDURE — 87040 BLOOD CULTURE FOR BACTERIA: CPT | Performed by: EMERGENCY MEDICINE

## 2023-02-27 PROCEDURE — 99223 1ST HOSP IP/OBS HIGH 75: CPT | Performed by: PSYCHIATRY & NEUROLOGY

## 2023-02-27 PROCEDURE — 83880 ASSAY OF NATRIURETIC PEPTIDE: CPT | Performed by: EMERGENCY MEDICINE

## 2023-02-27 PROCEDURE — 25010000002 CALCIUM GLUCONATE PER 10 ML: Performed by: INTERNAL MEDICINE

## 2023-02-27 PROCEDURE — 71045 X-RAY EXAM CHEST 1 VIEW: CPT

## 2023-02-27 PROCEDURE — 70450 CT HEAD/BRAIN W/O DYE: CPT

## 2023-02-27 PROCEDURE — HZ2ZZZZ DETOXIFICATION SERVICES FOR SUBSTANCE ABUSE TREATMENT: ICD-10-PCS | Performed by: INTERNAL MEDICINE

## 2023-02-27 PROCEDURE — 82077 ASSAY SPEC XCP UR&BREATH IA: CPT | Performed by: EMERGENCY MEDICINE

## 2023-02-27 PROCEDURE — 0 POTASSIUM CHLORIDE 10 MEQ/100ML SOLUTION: Performed by: INTERNAL MEDICINE

## 2023-02-27 PROCEDURE — 83605 ASSAY OF LACTIC ACID: CPT | Performed by: EMERGENCY MEDICINE

## 2023-02-27 PROCEDURE — 25010000002 MAGNESIUM SULFATE 2 GM/50ML SOLUTION: Performed by: INTERNAL MEDICINE

## 2023-02-27 RX ORDER — SODIUM CHLORIDE 9 MG/ML
40 INJECTION, SOLUTION INTRAVENOUS AS NEEDED
Status: DISCONTINUED | OUTPATIENT
Start: 2023-02-27 | End: 2023-03-06 | Stop reason: HOSPADM

## 2023-02-27 RX ORDER — CLONIDINE HYDROCHLORIDE 0.2 MG/1
0.2 TABLET ORAL 2 TIMES DAILY
Status: DISCONTINUED | OUTPATIENT
Start: 2023-02-28 | End: 2023-03-06 | Stop reason: HOSPADM

## 2023-02-27 RX ORDER — MAGNESIUM SULFATE 1 G/100ML
1 INJECTION INTRAVENOUS ONCE
Status: COMPLETED | OUTPATIENT
Start: 2023-02-27 | End: 2023-02-27

## 2023-02-27 RX ORDER — ACETAMINOPHEN 650 MG/1
650 SUPPOSITORY RECTAL EVERY 4 HOURS PRN
Status: DISCONTINUED | OUTPATIENT
Start: 2023-02-27 | End: 2023-03-06 | Stop reason: HOSPADM

## 2023-02-27 RX ORDER — MAGNESIUM SULFATE HEPTAHYDRATE 40 MG/ML
6 INJECTION, SOLUTION INTRAVENOUS AS NEEDED
Status: DISCONTINUED | OUTPATIENT
Start: 2023-02-27 | End: 2023-03-06 | Stop reason: HOSPADM

## 2023-02-27 RX ORDER — MAGNESIUM SULFATE HEPTAHYDRATE 40 MG/ML
2 INJECTION, SOLUTION INTRAVENOUS AS NEEDED
Status: DISPENSED | OUTPATIENT
Start: 2023-02-27 | End: 2023-02-28

## 2023-02-27 RX ORDER — CALCIUM GLUCONATE 20 MG/ML
1 INJECTION, SOLUTION INTRAVENOUS ONCE
Status: COMPLETED | OUTPATIENT
Start: 2023-02-27 | End: 2023-02-27

## 2023-02-27 RX ORDER — ACETAMINOPHEN 325 MG/1
650 TABLET ORAL EVERY 4 HOURS PRN
Status: DISCONTINUED | OUTPATIENT
Start: 2023-02-27 | End: 2023-03-06 | Stop reason: HOSPADM

## 2023-02-27 RX ORDER — BISACODYL 10 MG
10 SUPPOSITORY, RECTAL RECTAL DAILY PRN
Status: DISCONTINUED | OUTPATIENT
Start: 2023-02-27 | End: 2023-03-06 | Stop reason: HOSPADM

## 2023-02-27 RX ORDER — AMOXICILLIN 250 MG
2 CAPSULE ORAL 2 TIMES DAILY
Status: DISCONTINUED | OUTPATIENT
Start: 2023-02-27 | End: 2023-03-06 | Stop reason: HOSPADM

## 2023-02-27 RX ORDER — POTASSIUM CHLORIDE 7.45 MG/ML
10 INJECTION INTRAVENOUS
Status: COMPLETED | OUTPATIENT
Start: 2023-02-27 | End: 2023-02-27

## 2023-02-27 RX ORDER — SODIUM CHLORIDE 0.9 % (FLUSH) 0.9 %
10 SYRINGE (ML) INJECTION AS NEEDED
Status: DISCONTINUED | OUTPATIENT
Start: 2023-02-27 | End: 2023-03-06 | Stop reason: HOSPADM

## 2023-02-27 RX ORDER — LORAZEPAM 2 MG/ML
0.5 INJECTION INTRAMUSCULAR EVERY 8 HOURS
Status: DISCONTINUED | OUTPATIENT
Start: 2023-02-28 | End: 2023-02-28

## 2023-02-27 RX ORDER — POLYETHYLENE GLYCOL 3350 17 G/17G
17 POWDER, FOR SOLUTION ORAL DAILY PRN
Status: DISCONTINUED | OUTPATIENT
Start: 2023-02-27 | End: 2023-03-06 | Stop reason: HOSPADM

## 2023-02-27 RX ORDER — PANTOPRAZOLE SODIUM 40 MG/1
40 TABLET, DELAYED RELEASE ORAL
Status: DISCONTINUED | OUTPATIENT
Start: 2023-02-28 | End: 2023-03-06 | Stop reason: HOSPADM

## 2023-02-27 RX ORDER — DIPHENOXYLATE HYDROCHLORIDE AND ATROPINE SULFATE 2.5; .025 MG/1; MG/1
1 TABLET ORAL DAILY
Status: COMPLETED | OUTPATIENT
Start: 2023-02-28 | End: 2023-03-02

## 2023-02-27 RX ORDER — THIAMINE HYDROCHLORIDE 100 MG/ML
100 INJECTION, SOLUTION INTRAMUSCULAR; INTRAVENOUS ONCE
Status: COMPLETED | OUTPATIENT
Start: 2023-02-27 | End: 2023-02-27

## 2023-02-27 RX ORDER — SODIUM CHLORIDE 0.9 % (FLUSH) 0.9 %
10 SYRINGE (ML) INJECTION EVERY 12 HOURS SCHEDULED
Status: DISCONTINUED | OUTPATIENT
Start: 2023-02-27 | End: 2023-03-06 | Stop reason: HOSPADM

## 2023-02-27 RX ORDER — AMLODIPINE BESYLATE 5 MG/1
5 TABLET ORAL 2 TIMES DAILY
Status: DISCONTINUED | OUTPATIENT
Start: 2023-02-28 | End: 2023-03-06 | Stop reason: HOSPADM

## 2023-02-27 RX ORDER — POTASSIUM CHLORIDE 7.45 MG/ML
10 INJECTION INTRAVENOUS ONCE
Status: COMPLETED | OUTPATIENT
Start: 2023-02-27 | End: 2023-02-27

## 2023-02-27 RX ORDER — SODIUM CHLORIDE, SODIUM LACTATE, POTASSIUM CHLORIDE, CALCIUM CHLORIDE 600; 310; 30; 20 MG/100ML; MG/100ML; MG/100ML; MG/100ML
75 INJECTION, SOLUTION INTRAVENOUS CONTINUOUS
Status: DISCONTINUED | OUTPATIENT
Start: 2023-02-27 | End: 2023-02-28

## 2023-02-27 RX ORDER — FOLIC ACID 1 MG/1
1 TABLET ORAL DAILY
Status: COMPLETED | OUTPATIENT
Start: 2023-02-28 | End: 2023-03-02

## 2023-02-27 RX ORDER — LEVETIRACETAM 15 MG/ML
1500 INJECTION INTRAVASCULAR ONCE
Status: COMPLETED | OUTPATIENT
Start: 2023-02-27 | End: 2023-02-27

## 2023-02-27 RX ORDER — LOSARTAN POTASSIUM 50 MG/1
50 TABLET ORAL 2 TIMES DAILY
Status: DISCONTINUED | OUTPATIENT
Start: 2023-02-28 | End: 2023-03-06 | Stop reason: HOSPADM

## 2023-02-27 RX ORDER — LORAZEPAM 2 MG/ML
0.5 INJECTION INTRAMUSCULAR EVERY 6 HOURS
Status: DISPENSED | OUTPATIENT
Start: 2023-02-27 | End: 2023-02-28

## 2023-02-27 RX ORDER — BISACODYL 5 MG/1
5 TABLET, DELAYED RELEASE ORAL DAILY PRN
Status: DISCONTINUED | OUTPATIENT
Start: 2023-02-27 | End: 2023-03-06 | Stop reason: HOSPADM

## 2023-02-27 RX ADMIN — POTASSIUM CHLORIDE 10 MEQ: 7.46 INJECTION, SOLUTION INTRAVENOUS at 18:45

## 2023-02-27 RX ADMIN — LORAZEPAM 0.5 MG: 2 INJECTION INTRAMUSCULAR; INTRAVENOUS at 21:05

## 2023-02-27 RX ADMIN — Medication 10 ML: at 15:06

## 2023-02-27 RX ADMIN — MAGNESIUM SULFATE HEPTAHYDRATE 2 G: 2 INJECTION, SOLUTION INTRAVENOUS at 21:25

## 2023-02-27 RX ADMIN — POTASSIUM CHLORIDE 10 MEQ: 7.46 INJECTION, SOLUTION INTRAVENOUS at 19:59

## 2023-02-27 RX ADMIN — POTASSIUM CHLORIDE 10 MEQ: 7.46 INJECTION, SOLUTION INTRAVENOUS at 21:04

## 2023-02-27 RX ADMIN — CALCIUM GLUCONATE 1 G: 20 INJECTION, SOLUTION INTRAVENOUS at 14:43

## 2023-02-27 RX ADMIN — MAGNESIUM SULFATE HEPTAHYDRATE 6 G: 40 INJECTION, SOLUTION INTRAVENOUS at 18:45

## 2023-02-27 RX ADMIN — SODIUM CHLORIDE, POTASSIUM CHLORIDE, SODIUM LACTATE AND CALCIUM CHLORIDE 75 ML/HR: 600; 310; 30; 20 INJECTION, SOLUTION INTRAVENOUS at 15:06

## 2023-02-27 RX ADMIN — MAGNESIUM SULFATE HEPTAHYDRATE 2 G: 40 INJECTION, SOLUTION INTRAVENOUS at 17:26

## 2023-02-27 RX ADMIN — THIAMINE HYDROCHLORIDE 100 MG: 100 INJECTION, SOLUTION INTRAMUSCULAR; INTRAVENOUS at 16:36

## 2023-02-27 RX ADMIN — Medication 400 MG: at 21:05

## 2023-02-27 RX ADMIN — MAGNESIUM SULFATE HEPTAHYDRATE 1 G: 1 INJECTION, SOLUTION INTRAVENOUS at 14:22

## 2023-02-27 RX ADMIN — POTASSIUM CHLORIDE 10 MEQ: 7.46 INJECTION, SOLUTION INTRAVENOUS at 14:40

## 2023-02-27 RX ADMIN — MAGNESIUM SULFATE HEPTAHYDRATE 2 G: 2 INJECTION, SOLUTION INTRAVENOUS at 23:04

## 2023-02-27 RX ADMIN — SENNOSIDES AND DOCUSATE SODIUM 2 TABLET: 8.6; 5 TABLET ORAL at 21:05

## 2023-02-27 RX ADMIN — CALCIUM GLUCONATE 6 G: 98 INJECTION, SOLUTION INTRAVENOUS at 19:57

## 2023-02-27 RX ADMIN — LEVETIRACETAM INJECTION 1500 MG: 15 INJECTION INTRAVENOUS at 14:19

## 2023-02-27 RX ADMIN — Medication 10 ML: at 21:04

## 2023-02-28 ENCOUNTER — APPOINTMENT (OUTPATIENT)
Dept: GENERAL RADIOLOGY | Facility: HOSPITAL | Age: 88
DRG: 896 | End: 2023-02-28
Payer: MEDICARE

## 2023-02-28 LAB
ALBUMIN SERPL-MCNC: 3.6 G/DL (ref 3.5–5.2)
ALBUMIN/GLOB SERPL: 1 G/DL
ALP SERPL-CCNC: 86 U/L (ref 39–117)
ALT SERPL W P-5'-P-CCNC: 21 U/L (ref 1–41)
ANION GAP SERPL CALCULATED.3IONS-SCNC: 14 MMOL/L (ref 5–15)
AST SERPL-CCNC: 30 U/L (ref 1–40)
BILIRUB SERPL-MCNC: 1.2 MG/DL (ref 0–1.2)
BUN SERPL-MCNC: 16 MG/DL (ref 8–23)
BUN/CREAT SERPL: 15.2 (ref 7–25)
CA-I SERPL ISE-MCNC: 0.97 MMOL/L (ref 1.12–1.32)
CALCIUM SPEC-SCNC: 7.2 MG/DL (ref 8.6–10.5)
CHLORIDE SERPL-SCNC: 95 MMOL/L (ref 98–107)
CO2 SERPL-SCNC: 28 MMOL/L (ref 22–29)
CREAT SERPL-MCNC: 1.05 MG/DL (ref 0.76–1.27)
DEPRECATED RDW RBC AUTO: 46 FL (ref 37–54)
EGFRCR SERPLBLD CKD-EPI 2021: 69.1 ML/MIN/1.73
ERYTHROCYTE [DISTWIDTH] IN BLOOD BY AUTOMATED COUNT: 12.1 % (ref 12.3–15.4)
GLOBULIN UR ELPH-MCNC: 3.5 GM/DL
GLUCOSE BLDC GLUCOMTR-MCNC: 162 MG/DL (ref 70–130)
GLUCOSE BLDC GLUCOMTR-MCNC: 183 MG/DL (ref 70–130)
GLUCOSE BLDC GLUCOMTR-MCNC: 194 MG/DL (ref 70–130)
GLUCOSE BLDC GLUCOMTR-MCNC: 207 MG/DL (ref 70–130)
GLUCOSE SERPL-MCNC: 147 MG/DL (ref 65–99)
HCT VFR BLD AUTO: 40.1 % (ref 37.5–51)
HGB BLD-MCNC: 12.9 G/DL (ref 13–17.7)
MAGNESIUM SERPL-MCNC: 1.8 MG/DL (ref 1.6–2.4)
MCH RBC QN AUTO: 32.6 PG (ref 26.6–33)
MCHC RBC AUTO-ENTMCNC: 32.2 G/DL (ref 31.5–35.7)
MCV RBC AUTO: 101.3 FL (ref 79–97)
PHOSPHATE SERPL-MCNC: 3.8 MG/DL (ref 2.5–4.5)
PLATELET # BLD AUTO: 383 10*3/MM3 (ref 140–450)
PMV BLD AUTO: 10.9 FL (ref 6–12)
POTASSIUM SERPL-SCNC: 3.3 MMOL/L (ref 3.5–5.2)
POTASSIUM SERPL-SCNC: 3.9 MMOL/L (ref 3.5–5.2)
PROT SERPL-MCNC: 7.1 G/DL (ref 6–8.5)
RBC # BLD AUTO: 3.96 10*6/MM3 (ref 4.14–5.8)
SODIUM SERPL-SCNC: 137 MMOL/L (ref 136–145)
WBC NRBC COR # BLD: 13.66 10*3/MM3 (ref 3.4–10.8)

## 2023-02-28 PROCEDURE — 99233 SBSQ HOSP IP/OBS HIGH 50: CPT | Performed by: INTERNAL MEDICINE

## 2023-02-28 PROCEDURE — 80053 COMPREHEN METABOLIC PANEL: CPT | Performed by: INTERNAL MEDICINE

## 2023-02-28 PROCEDURE — 84132 ASSAY OF SERUM POTASSIUM: CPT | Performed by: INTERNAL MEDICINE

## 2023-02-28 PROCEDURE — 25010000002 LORAZEPAM PER 2 MG: Performed by: INTERNAL MEDICINE

## 2023-02-28 PROCEDURE — 94799 UNLISTED PULMONARY SVC/PX: CPT

## 2023-02-28 PROCEDURE — 84100 ASSAY OF PHOSPHORUS: CPT | Performed by: INTERNAL MEDICINE

## 2023-02-28 PROCEDURE — 85027 COMPLETE CBC AUTOMATED: CPT | Performed by: INTERNAL MEDICINE

## 2023-02-28 PROCEDURE — 82962 GLUCOSE BLOOD TEST: CPT

## 2023-02-28 PROCEDURE — 0 POTASSIUM CHLORIDE 10 MEQ/100ML SOLUTION: Performed by: INTERNAL MEDICINE

## 2023-02-28 PROCEDURE — 94640 AIRWAY INHALATION TREATMENT: CPT

## 2023-02-28 PROCEDURE — 25010000002 HYDRALAZINE PER 20 MG

## 2023-02-28 PROCEDURE — 25010000002 MAGNESIUM SULFATE 2 GM/50ML SOLUTION: Performed by: INTERNAL MEDICINE

## 2023-02-28 PROCEDURE — 0 MAGNESIUM SULFATE 4 GM/100ML SOLUTION: Performed by: INTERNAL MEDICINE

## 2023-02-28 PROCEDURE — 83735 ASSAY OF MAGNESIUM: CPT

## 2023-02-28 PROCEDURE — 71045 X-RAY EXAM CHEST 1 VIEW: CPT

## 2023-02-28 PROCEDURE — 82330 ASSAY OF CALCIUM: CPT

## 2023-02-28 PROCEDURE — 99232 SBSQ HOSP IP/OBS MODERATE 35: CPT | Performed by: PSYCHIATRY & NEUROLOGY

## 2023-02-28 RX ORDER — MAGNESIUM SULFATE HEPTAHYDRATE 40 MG/ML
4 INJECTION, SOLUTION INTRAVENOUS ONCE
Status: COMPLETED | OUTPATIENT
Start: 2023-02-28 | End: 2023-02-28

## 2023-02-28 RX ORDER — BUMETANIDE 0.25 MG/ML
2 INJECTION INTRAMUSCULAR; INTRAVENOUS ONCE
Status: COMPLETED | OUTPATIENT
Start: 2023-02-28 | End: 2023-02-28

## 2023-02-28 RX ORDER — LORAZEPAM 2 MG/ML
0.25 INJECTION INTRAMUSCULAR EVERY 8 HOURS
Status: COMPLETED | OUTPATIENT
Start: 2023-02-28 | End: 2023-03-01

## 2023-02-28 RX ORDER — POTASSIUM CHLORIDE 7.45 MG/ML
10 INJECTION INTRAVENOUS
Status: COMPLETED | OUTPATIENT
Start: 2023-02-28 | End: 2023-02-28

## 2023-02-28 RX ORDER — LORAZEPAM 2 MG/ML
0.25 INJECTION INTRAMUSCULAR EVERY 8 HOURS
Status: DISCONTINUED | OUTPATIENT
Start: 2023-02-28 | End: 2023-02-28

## 2023-02-28 RX ORDER — POTASSIUM CHLORIDE 750 MG/1
40 CAPSULE, EXTENDED RELEASE ORAL EVERY 4 HOURS
Status: DISCONTINUED | OUTPATIENT
Start: 2023-02-28 | End: 2023-02-28

## 2023-02-28 RX ORDER — IPRATROPIUM BROMIDE AND ALBUTEROL SULFATE 2.5; .5 MG/3ML; MG/3ML
3 SOLUTION RESPIRATORY (INHALATION) EVERY 6 HOURS PRN
Status: DISCONTINUED | OUTPATIENT
Start: 2023-02-28 | End: 2023-03-06 | Stop reason: HOSPADM

## 2023-02-28 RX ORDER — HYDRALAZINE HYDROCHLORIDE 20 MG/ML
10 INJECTION INTRAMUSCULAR; INTRAVENOUS ONCE
Status: COMPLETED | OUTPATIENT
Start: 2023-02-28 | End: 2023-02-28

## 2023-02-28 RX ADMIN — LORAZEPAM 0.25 MG: 2 INJECTION INTRAMUSCULAR; INTRAVENOUS at 20:28

## 2023-02-28 RX ADMIN — AMLODIPINE BESYLATE 5 MG: 5 TABLET ORAL at 20:27

## 2023-02-28 RX ADMIN — SENNOSIDES AND DOCUSATE SODIUM 2 TABLET: 8.6; 5 TABLET ORAL at 08:15

## 2023-02-28 RX ADMIN — POTASSIUM CHLORIDE 10 MEQ: 7.46 INJECTION, SOLUTION INTRAVENOUS at 12:56

## 2023-02-28 RX ADMIN — SENNOSIDES AND DOCUSATE SODIUM 2 TABLET: 8.6; 5 TABLET ORAL at 20:27

## 2023-02-28 RX ADMIN — LOSARTAN POTASSIUM 50 MG: 50 TABLET, FILM COATED ORAL at 08:16

## 2023-02-28 RX ADMIN — AMLODIPINE BESYLATE 5 MG: 5 TABLET ORAL at 08:15

## 2023-02-28 RX ADMIN — APIXABAN 2.5 MG: 2.5 TABLET, FILM COATED ORAL at 20:27

## 2023-02-28 RX ADMIN — THIAMINE HCL TAB 100 MG 100 MG: 100 TAB at 08:15

## 2023-02-28 RX ADMIN — MAGNESIUM SULFATE HEPTAHYDRATE 4 G: 40 INJECTION, SOLUTION INTRAVENOUS at 12:56

## 2023-02-28 RX ADMIN — CLONIDINE HYDROCHLORIDE 0.2 MG: 0.2 TABLET ORAL at 08:16

## 2023-02-28 RX ADMIN — PANTOPRAZOLE SODIUM 40 MG: 40 TABLET, DELAYED RELEASE ORAL at 05:53

## 2023-02-28 RX ADMIN — POTASSIUM CHLORIDE 40 MEQ: 10 CAPSULE, COATED, EXTENDED RELEASE ORAL at 06:21

## 2023-02-28 RX ADMIN — Medication 10 ML: at 20:28

## 2023-02-28 RX ADMIN — IPRATROPIUM BROMIDE AND ALBUTEROL SULFATE 3 ML: 2.5; .5 SOLUTION RESPIRATORY (INHALATION) at 21:29

## 2023-02-28 RX ADMIN — HYDRALAZINE HYDROCHLORIDE 10 MG: 20 INJECTION INTRAMUSCULAR; INTRAVENOUS at 06:21

## 2023-02-28 RX ADMIN — FOLIC ACID 1 MG: 1 TABLET ORAL at 08:16

## 2023-02-28 RX ADMIN — Medication 400 MG: at 08:15

## 2023-02-28 RX ADMIN — APIXABAN 2.5 MG: 2.5 TABLET, FILM COATED ORAL at 08:15

## 2023-02-28 RX ADMIN — CLONIDINE HYDROCHLORIDE 0.2 MG: 0.2 TABLET ORAL at 20:29

## 2023-02-28 RX ADMIN — Medication 12.5 MG: at 20:27

## 2023-02-28 RX ADMIN — POTASSIUM CHLORIDE 10 MEQ: 7.46 INJECTION, SOLUTION INTRAVENOUS at 11:54

## 2023-02-28 RX ADMIN — Medication 400 MG: at 20:27

## 2023-02-28 RX ADMIN — BUMETANIDE 2 MG: 0.25 INJECTION INTRAMUSCULAR; INTRAVENOUS at 10:25

## 2023-02-28 RX ADMIN — Medication 12.5 MG: at 08:15

## 2023-02-28 RX ADMIN — ACETAMINOPHEN 325MG 650 MG: 325 TABLET ORAL at 20:27

## 2023-02-28 RX ADMIN — Medication 10 ML: at 08:14

## 2023-02-28 RX ADMIN — MULTIVITAMIN TABLET 1 TABLET: TABLET at 08:16

## 2023-02-28 RX ADMIN — LORAZEPAM 0.5 MG: 2 INJECTION INTRAMUSCULAR; INTRAVENOUS at 03:58

## 2023-02-28 RX ADMIN — MAGNESIUM SULFATE HEPTAHYDRATE 2 G: 40 INJECTION, SOLUTION INTRAVENOUS at 01:23

## 2023-02-28 RX ADMIN — LORAZEPAM 0.5 MG: 2 INJECTION INTRAMUSCULAR; INTRAVENOUS at 08:14

## 2023-02-28 RX ADMIN — SODIUM CHLORIDE, POTASSIUM CHLORIDE, SODIUM LACTATE AND CALCIUM CHLORIDE 75 ML/HR: 600; 310; 30; 20 INJECTION, SOLUTION INTRAVENOUS at 05:53

## 2023-03-01 ENCOUNTER — APPOINTMENT (OUTPATIENT)
Dept: CARDIOLOGY | Facility: HOSPITAL | Age: 88
DRG: 896 | End: 2023-03-01
Payer: MEDICARE

## 2023-03-01 LAB
ANION GAP SERPL CALCULATED.3IONS-SCNC: 9 MMOL/L (ref 5–15)
ASCENDING AORTA: 3.5 CM
AV VENA CONTRACTA: 0.49 CM
BASOPHILS # BLD AUTO: 0.06 10*3/MM3 (ref 0–0.2)
BASOPHILS NFR BLD AUTO: 0.5 % (ref 0–1.5)
BH CV ECHO MEAS - AI P1/2T: 560 MSEC
BH CV ECHO MEAS - AO MAX PG: 7.1 MMHG
BH CV ECHO MEAS - AO MEAN PG: 3.9 MMHG
BH CV ECHO MEAS - AO ROOT DIAM: 4.2 CM
BH CV ECHO MEAS - AO V2 MAX: 132.5 CM/SEC
BH CV ECHO MEAS - AO V2 VTI: 26.5 CM
BH CV ECHO MEAS - AVA(I,D): 2.04 CM2
BH CV ECHO MEAS - EDV(CUBED): 158.5 ML
BH CV ECHO MEAS - EDV(MOD-SP2): 126 ML
BH CV ECHO MEAS - EDV(MOD-SP4): 83 ML
BH CV ECHO MEAS - EF(MOD-BP): 52.4 %
BH CV ECHO MEAS - EF(MOD-SP2): 55.5 %
BH CV ECHO MEAS - EF(MOD-SP4): 51.8 %
BH CV ECHO MEAS - ESV(CUBED): 39.7 ML
BH CV ECHO MEAS - ESV(MOD-SP2): 56.1 ML
BH CV ECHO MEAS - ESV(MOD-SP4): 40 ML
BH CV ECHO MEAS - FS: 37 %
BH CV ECHO MEAS - IVS/LVPW: 1.25 CM
BH CV ECHO MEAS - IVSD: 1.45 CM
BH CV ECHO MEAS - LA DIMENSION: 4.2 CM
BH CV ECHO MEAS - LAT PEAK E' VEL: 11.9 CM/SEC
BH CV ECHO MEAS - LV DIASTOLIC VOL/BSA (35-75): 37 CM2
BH CV ECHO MEAS - LV MASS(C)D: 299.2 GRAMS
BH CV ECHO MEAS - LV MAX PG: 2.9 MMHG
BH CV ECHO MEAS - LV MEAN PG: 1.7 MMHG
BH CV ECHO MEAS - LV SYSTOLIC VOL/BSA (12-30): 17.8 CM2
BH CV ECHO MEAS - LV V1 MAX: 84.4 CM/SEC
BH CV ECHO MEAS - LV V1 VTI: 17.5 CM
BH CV ECHO MEAS - LVIDD: 5.4 CM
BH CV ECHO MEAS - LVIDS: 3.4 CM
BH CV ECHO MEAS - LVOT AREA: 3.1 CM2
BH CV ECHO MEAS - LVOT DIAM: 1.98 CM
BH CV ECHO MEAS - LVPWD: 1.16 CM
BH CV ECHO MEAS - MED PEAK E' VEL: 7.9 CM/SEC
BH CV ECHO MEAS - MV DEC SLOPE: 277.5 CM/SEC2
BH CV ECHO MEAS - MV DEC TIME: 0.29 MSEC
BH CV ECHO MEAS - MV E MAX VEL: 82.8 CM/SEC
BH CV ECHO MEAS - MV MAX PG: 3.3 MMHG
BH CV ECHO MEAS - MV MEAN PG: 1.11 MMHG
BH CV ECHO MEAS - MV P1/2T: 97.8 MSEC
BH CV ECHO MEAS - MV V2 VTI: 25 CM
BH CV ECHO MEAS - MVA(P1/2T): 2.25 CM2
BH CV ECHO MEAS - MVA(VTI): 2.16 CM2
BH CV ECHO MEAS - PA ACC TIME: 0.13 SEC
BH CV ECHO MEAS - PA PR(ACCEL): 22 MMHG
BH CV ECHO MEAS - PA V2 MAX: 69.8 CM/SEC
BH CV ECHO MEAS - RAP SYSTOLE: 8 MMHG
BH CV ECHO MEAS - RVSP: 31.1 MMHG
BH CV ECHO MEAS - SI(MOD-SP2): 31.1 ML/M2
BH CV ECHO MEAS - SI(MOD-SP4): 19.2 ML/M2
BH CV ECHO MEAS - SV(LVOT): 54 ML
BH CV ECHO MEAS - SV(MOD-SP2): 69.9 ML
BH CV ECHO MEAS - SV(MOD-SP4): 43 ML
BH CV ECHO MEAS - TAPSE (>1.6): 1.56 CM
BH CV ECHO MEAS - TR MAX PG: 23.1 MMHG
BH CV ECHO MEAS - TR MAX VEL: 240.5 CM/SEC
BH CV ECHO MEASUREMENTS AVERAGE E/E' RATIO: 8.36
BH CV VAS BP LEFT ARM: NORMAL MMHG
BH CV XLRA - RV BASE: 4.7 CM
BH CV XLRA - RV LENGTH: 8 CM
BH CV XLRA - RV MID: 4.3 CM
BH CV XLRA - TDI S': 8.4 CM/SEC
BUN SERPL-MCNC: 17 MG/DL (ref 8–23)
BUN/CREAT SERPL: 14.3 (ref 7–25)
CA-I SERPL ISE-MCNC: 1.03 MMOL/L (ref 1.12–1.32)
CALCIUM SPEC-SCNC: 7.1 MG/DL (ref 8.6–10.5)
CHLORIDE SERPL-SCNC: 99 MMOL/L (ref 98–107)
CO2 SERPL-SCNC: 30 MMOL/L (ref 22–29)
CREAT SERPL-MCNC: 1.19 MG/DL (ref 0.76–1.27)
DEPRECATED RDW RBC AUTO: 47.2 FL (ref 37–54)
EGFRCR SERPLBLD CKD-EPI 2021: 59.5 ML/MIN/1.73
EOSINOPHIL # BLD AUTO: 0.08 10*3/MM3 (ref 0–0.4)
EOSINOPHIL NFR BLD AUTO: 0.6 % (ref 0.3–6.2)
ERYTHROCYTE [DISTWIDTH] IN BLOOD BY AUTOMATED COUNT: 12.4 % (ref 12.3–15.4)
GLUCOSE BLDC GLUCOMTR-MCNC: 193 MG/DL (ref 70–130)
GLUCOSE BLDC GLUCOMTR-MCNC: 201 MG/DL (ref 70–130)
GLUCOSE BLDC GLUCOMTR-MCNC: 248 MG/DL (ref 70–130)
GLUCOSE SERPL-MCNC: 172 MG/DL (ref 65–99)
HCT VFR BLD AUTO: 37.4 % (ref 37.5–51)
HGB BLD-MCNC: 12.1 G/DL (ref 13–17.7)
IMM GRANULOCYTES # BLD AUTO: 0.07 10*3/MM3 (ref 0–0.05)
IMM GRANULOCYTES NFR BLD AUTO: 0.5 % (ref 0–0.5)
IVRT: 60 MSEC
LEFT ATRIUM VOLUME INDEX: 53.8 ML/M2
LV EF 2D ECHO EST: 55 %
LYMPHOCYTES # BLD AUTO: 0.48 10*3/MM3 (ref 0.7–3.1)
LYMPHOCYTES NFR BLD AUTO: 3.8 % (ref 19.6–45.3)
MAGNESIUM SERPL-MCNC: 2.3 MG/DL (ref 1.6–2.4)
MAXIMAL PREDICTED HEART RATE: 134 BPM
MCH RBC QN AUTO: 33.2 PG (ref 26.6–33)
MCHC RBC AUTO-ENTMCNC: 32.4 G/DL (ref 31.5–35.7)
MCV RBC AUTO: 102.5 FL (ref 79–97)
MONOCYTES # BLD AUTO: 0.75 10*3/MM3 (ref 0.1–0.9)
MONOCYTES NFR BLD AUTO: 5.9 % (ref 5–12)
NEUTROPHILS NFR BLD AUTO: 11.33 10*3/MM3 (ref 1.7–7)
NEUTROPHILS NFR BLD AUTO: 88.7 % (ref 42.7–76)
NRBC BLD AUTO-RTO: 0 /100 WBC (ref 0–0.2)
PHOSPHATE SERPL-MCNC: 3.8 MG/DL (ref 2.5–4.5)
PLATELET # BLD AUTO: 353 10*3/MM3 (ref 140–450)
PMV BLD AUTO: 10.7 FL (ref 6–12)
POTASSIUM SERPL-SCNC: 3.9 MMOL/L (ref 3.5–5.2)
RBC # BLD AUTO: 3.65 10*6/MM3 (ref 4.14–5.8)
SODIUM SERPL-SCNC: 138 MMOL/L (ref 136–145)
STRESS TARGET HR: 114 BPM
WBC NRBC COR # BLD: 12.77 10*3/MM3 (ref 3.4–10.8)

## 2023-03-01 PROCEDURE — 82330 ASSAY OF CALCIUM: CPT | Performed by: INTERNAL MEDICINE

## 2023-03-01 PROCEDURE — 80048 BASIC METABOLIC PNL TOTAL CA: CPT | Performed by: INTERNAL MEDICINE

## 2023-03-01 PROCEDURE — 83735 ASSAY OF MAGNESIUM: CPT | Performed by: INTERNAL MEDICINE

## 2023-03-01 PROCEDURE — 93306 TTE W/DOPPLER COMPLETE: CPT | Performed by: INTERNAL MEDICINE

## 2023-03-01 PROCEDURE — 99231 SBSQ HOSP IP/OBS SF/LOW 25: CPT | Performed by: PSYCHIATRY & NEUROLOGY

## 2023-03-01 PROCEDURE — 25010000002 LORAZEPAM PER 2 MG: Performed by: INTERNAL MEDICINE

## 2023-03-01 PROCEDURE — 84100 ASSAY OF PHOSPHORUS: CPT | Performed by: INTERNAL MEDICINE

## 2023-03-01 PROCEDURE — 97162 PT EVAL MOD COMPLEX 30 MIN: CPT

## 2023-03-01 PROCEDURE — 94660 CPAP INITIATION&MGMT: CPT

## 2023-03-01 PROCEDURE — 85025 COMPLETE CBC W/AUTO DIFF WBC: CPT | Performed by: INTERNAL MEDICINE

## 2023-03-01 PROCEDURE — 94799 UNLISTED PULMONARY SVC/PX: CPT

## 2023-03-01 PROCEDURE — 99232 SBSQ HOSP IP/OBS MODERATE 35: CPT | Performed by: INTERNAL MEDICINE

## 2023-03-01 PROCEDURE — 82962 GLUCOSE BLOOD TEST: CPT

## 2023-03-01 PROCEDURE — 93306 TTE W/DOPPLER COMPLETE: CPT

## 2023-03-01 RX ADMIN — PANTOPRAZOLE SODIUM 40 MG: 40 TABLET, DELAYED RELEASE ORAL at 05:15

## 2023-03-01 RX ADMIN — THIAMINE HCL TAB 100 MG 100 MG: 100 TAB at 08:22

## 2023-03-01 RX ADMIN — SENNOSIDES AND DOCUSATE SODIUM 2 TABLET: 8.6; 5 TABLET ORAL at 20:05

## 2023-03-01 RX ADMIN — APIXABAN 2.5 MG: 2.5 TABLET, FILM COATED ORAL at 20:05

## 2023-03-01 RX ADMIN — Medication 12.5 MG: at 20:05

## 2023-03-01 RX ADMIN — CLONIDINE HYDROCHLORIDE 0.2 MG: 0.2 TABLET ORAL at 08:21

## 2023-03-01 RX ADMIN — AMLODIPINE BESYLATE 5 MG: 5 TABLET ORAL at 08:22

## 2023-03-01 RX ADMIN — Medication 10 ML: at 20:05

## 2023-03-01 RX ADMIN — LORAZEPAM 0.25 MG: 2 INJECTION INTRAMUSCULAR; INTRAVENOUS at 05:12

## 2023-03-01 RX ADMIN — SENNOSIDES AND DOCUSATE SODIUM 2 TABLET: 8.6; 5 TABLET ORAL at 08:22

## 2023-03-01 RX ADMIN — CLONIDINE HYDROCHLORIDE 0.2 MG: 0.2 TABLET ORAL at 20:05

## 2023-03-01 RX ADMIN — ACETAMINOPHEN 325MG 650 MG: 325 TABLET ORAL at 17:28

## 2023-03-01 RX ADMIN — Medication 400 MG: at 20:05

## 2023-03-01 RX ADMIN — LOSARTAN POTASSIUM 50 MG: 50 TABLET, FILM COATED ORAL at 08:21

## 2023-03-01 RX ADMIN — Medication 10 ML: at 08:21

## 2023-03-01 RX ADMIN — APIXABAN 2.5 MG: 2.5 TABLET, FILM COATED ORAL at 08:22

## 2023-03-01 RX ADMIN — FOLIC ACID 1 MG: 1 TABLET ORAL at 08:22

## 2023-03-01 RX ADMIN — MULTIVITAMIN TABLET 1 TABLET: TABLET at 08:21

## 2023-03-01 RX ADMIN — LOSARTAN POTASSIUM 50 MG: 50 TABLET, FILM COATED ORAL at 20:05

## 2023-03-01 RX ADMIN — Medication 400 MG: at 08:22

## 2023-03-01 RX ADMIN — Medication 12.5 MG: at 08:22

## 2023-03-01 RX ADMIN — AMLODIPINE BESYLATE 5 MG: 5 TABLET ORAL at 20:05

## 2023-03-01 NOTE — THERAPY EVALUATION
Patient Name: Iain Dee  : 1935    MRN: [id number]                              Today's Date: 3/1/2023       Admit Date: 2023    Visit Dx:     ICD-10-CM ICD-9-CM   1. Seizure (HCC)  R56.9 780.39   2. Hypocalcemia  E83.51 275.41   3. Hypomagnesemia  E83.42 275.2   4. Hypokalemia  E87.6 276.8   5. Generalized weakness  R53.1 780.79   6. Leukocytosis, unspecified type  D72.829 288.60   7. Hyperglycemia  R73.9 790.29     Patient Active Problem List   Diagnosis   • AMS (altered mental status)   • Hypokalemia   • Hypomagnesemia   • Hypocalcemia   • Hypoalbuminemia   • Essential hypertension   • Atrial fibrillation (HCC)   • GERD (gastroesophageal reflux disease)   • Hyperglycemia   • Gout   • Diabetes mellitus (HCC)   • H/O Bladder carcinoma (HCC)   • JOLENE on CPAP   • Chronic anticoagulation (Eliquis)   • Alcohol abuse (1 to 2 glasses of bourbon a day)   • Seizures (HCC)     Past Medical History:   Diagnosis Date   • Arthritis    • Cancer (HCC)    • Diabetes mellitus (HCC)    • GERD (gastroesophageal reflux disease)    • Gout    • H/O Bladder carcinoma (HCC)    • Hypertension      Past Surgical History:   Procedure Laterality Date   • BLADDER SURGERY      Biopsy   • HERNIA REPAIR     • PROSTATE BIOPSY        General Information     Row Name 23 6927          Physical Therapy Time and Intention    Document Type evaluation  -CM     Mode of Treatment physical therapy;individual therapy  -CM     Row Name 23 1191          General Information    Patient Profile Reviewed yes  -CM     Prior Level of Function independent:;all household mobility;min assist:;ADL's  patient reports ind mobility with rollator, his wife assists him some with ADLs  -CM     Existing Precautions/Restrictions fall;seizures;oxygen therapy device and L/min  -CM     Barriers to Rehab medically complex;previous functional deficit  -CM     Row Name 23 5780          Living Environment    People in Home spouse  -CM     Row Name  03/01/23 1550          Home Main Entrance    Number of Stairs, Main Entrance none;other (see comments)  ramp  -CM     Row Name 03/01/23 1550          Stairs Within Home, Primary    Number of Stairs, Within Home, Primary none  -CM     Row Name 03/01/23 1550          Cognition    Orientation Status (Cognition) oriented x 3;verbal cues/prompts needed for orientation  initially states it is april, corrects to march with cues  -CM     Row Name 03/01/23 1550          Safety Issues, Functional Mobility    Safety Issues Affecting Function (Mobility) awareness of need for assistance;insight into deficits/self-awareness;safety precaution awareness;safety precautions follow-through/compliance  -CM     Impairments Affecting Function (Mobility) balance;coordination;endurance/activity tolerance;postural/trunk control;strength  -CM           User Key  (r) = Recorded By, (t) = Taken By, (c) = Cosigned By    Initials Name Provider Type    Radha Mackey, PT Physical Therapist               Mobility     Row Name 03/01/23 1552          Bed Mobility    Bed Mobility supine-sit;sit-supine;scooting/bridging  -CM     Scooting/Bridging [place] (Bed Mobility) moderate assist (50% patient effort);1 person assist;verbal cues  -CM     Supine-Sit [place] (Bed Mobility) minimum assist (75% patient effort);1 person assist;verbal cues  -CM     Sit-Supine [place] (Bed Mobility) maximum assist (25% patient effort);1 person assist;verbal cues  -CM     Assistive Device (Bed Mobility) bed rails;head of bed elevated  -CM     Comment, (Bed Mobility) VCs provided for sequencing, patient requiring Rosanne to scoot hips to EOB for sup to sit, assist at trunk and BLEs for sit to sup  -CM     Row Name 03/01/23 1552          Sit-Stand Transfer    Sit-Stand [place] (Transfers) maximum assist (25% patient effort);1 person assist;verbal cues  -CM     Assistive Device (Sit-Stand Transfers) walker, front-wheeled  -CM     Comment, (Sit-Stand  Transfer) VCs for safe hand placement, attempt x2 with patient unable to clear hips, patient successful on 3rd attempt with elevated bed height and cues for foot placement and anterior weightshifting prior to transfer. Patient unsteady with wide KENDRA in standing requiring cues for upright posture, he does achieve 100% upright posture with cues  -CM     Row Name 03/01/23 1552          Gait/Stairs (Locomotion)    Comment, (Gait/Stairs) deferred due to poor standing balance upon standing and feelings of BLE weakness  -CM           User Key  (r) = Recorded By, (t) = Taken By, (c) = Cosigned By    Initials Name Provider Type    Radha Mackey, PT Physical Therapist               Obj/Interventions     Row Name 03/01/23 1555          Range of Motion Comprehensive    General Range of Motion bilateral lower extremity ROM WFL  -CM     Row Name 03/01/23 1554          Strength Comprehensive (MMT)    Comment, General Manual Muscle Testing (MMT) Assessment RLE grossly 2-/5, LLE grossly 3/5  -CM     Row Name 03/01/23 1550          Balance    Balance Assessment sitting static balance;standing static balance;standing dynamic balance  -CM     Static Sitting Balance contact guard  -CM     Position, Sitting Balance unsupported;sitting edge of bed  -CM     Static Standing Balance maximum assist;1-person assist  -CM     Dynamic Standing Balance maximum assist;1-person assist  -CM     Position/Device Used, Standing Balance supported;walker, front-wheeled  -CM     Comment, Balance patient with wide KENDRA and flexed posture upon standing, cues for upright posture result in LOB posteriorly, maxA to recover and achieve 100% upright posture  -CM     Row Name 03/01/23 4142          Sensory Assessment (Somatosensory)    Sensory Assessment (Somatosensory) LE sensation intact  -CM           User Key  (r) = Recorded By, (t) = Taken By, (c) = Cosigned By    Initials Name Provider Type    Radha Mackey, PT Physical Therapist                Goals/Plan     Row Name 03/01/23 1600          Bed Mobility Goal 1 (PT)    Activity/Assistive Device (Bed Mobility Goal 1, PT) sit to supine/supine to sit  -CM     [place] Level/Cues Needed (Bed Mobility Goal 1, PT) supervision required  -CM     Time Frame (Bed Mobility Goal 1, PT) long term goal (LTG);10 days  -CM     Progress/Outcomes (Bed Mobility Goal 1, PT) new goal  -CM     Row Name 03/01/23 1600          Transfer Goal 1 (PT)    Activity/Assistive Device (Transfer Goal 1, PT) sit-to-stand/stand-to-sit;bed-to-chair/chair-to-bed  -CM     [place] Level/Cues Needed (Transfer Goal 1, PT) minimum assist (75% or more patient effort)  -CM     Time Frame (Transfer Goal 1, PT) long term goal (LTG);10 days  -CM     Progress/Outcome (Transfer Goal 1, PT) new goal  -CM     Row Name 03/01/23 1600          Gait Training Goal 1 (PT)    Activity/Assistive Device (Gait Training Goal 1, PT) gait (walking locomotion);assistive device use  -CM     [place] Level (Gait Training Goal 1, PT) minimum assist (75% or more patient effort)  -CM     Distance (Gait Training Goal 1, PT) 100'  -CM     Time Frame (Gait Training Goal 1, PT) long term goal (LTG);10 days  -CM     Progress/Outcome (Gait Training Goal 1, PT) new goal  -CM     Row Name 03/01/23 1600          Therapy Assessment/Plan (PT)    Planned Therapy Interventions (PT) balance training;bed mobility training;gait training;home exercise program;postural re-education;patient/family education;motor coordination training;neuromuscular re-education;ROM (range of motion);stair training;strengthening;transfer training  -CM           User Key  (r) = Recorded By, (t) = Taken By, (c) = Cosigned By    Initials Name Provider Type    Radha Mackey, PT Physical Therapist               Clinical Impression     Row Name 03/01/23 1556          Pain    Pretreatment Pain Rating 0/10 - no pain  -CM     Posttreatment Pain Rating 0/10 - no pain  -CM     Row Name 03/01/23  1556          Plan of Care Review    Plan of Care Reviewed With patient  -CM     Outcome Evaluation Patient presents with generalized weakness primarily in BLEs as well as endurance and balance deficits. He was able to perform STS with maxAx1 and RW today demonstrating poor balance upon standing. Current deficits limit the patient's functional mobility below his baseline indicating IPPT to address deficits. Will recommend that patient D/C to SNF.  -     Row Name 03/01/23 1556          Therapy Assessment/Plan (PT)    Rehab Potential (PT) good, to achieve stated therapy goals  -CM     Criteria for Skilled Interventions Met (PT) yes;meets criteria;skilled treatment is necessary  -CM     Therapy Frequency (PT) daily  -CM     Row Name 03/01/23 1556          Vital Signs    Pre Systolic BP Rehab 137  -CM     Pre Treatment Diastolic BP 99  -CM     Post Systolic BP Rehab 148  -CM     Post Treatment Diastolic BP 83  -CM     Pretreatment Heart Rate (beats/min) 94  -CM     Posttreatment Heart Rate (beats/min) 98  -CM     Pre SpO2 (%) 94  -CM     O2 Delivery Pre Treatment nasal cannula  -CM     Post SpO2 (%) 97  -CM     O2 Delivery Post Treatment nasal cannula  -CM     Pre Patient Position Supine  -CM     Intra Patient Position Standing  -CM     Post Patient Position Supine  -CM     Row Name 03/01/23 1556          Positioning and Restraints    Pre-Treatment Position in bed  -CM     Post Treatment Position bed  -CM     In Bed fowlers;call light within reach;encouraged to call for assist;exit alarm on;RUE elevated;LUE elevated;heels elevated;notified nsg  -CM           User Key  (r) = Recorded By, (t) = Taken By, (c) = Cosigned By    Initials Name Provider Type    Radha Mackey, PT Physical Therapist               Outcome Measures     Row Name 03/01/23 1601          How much help from another person do you currently need...    Turning from your back to your side while in flat bed without using bedrails? 3  -CM      Moving from lying on back to sitting on the side of a flat bed without bedrails? 3  -CM     Moving to and from a bed to a chair (including a wheelchair)? 2  -CM     Standing up from a chair using your arms (e.g., wheelchair, bedside chair)? 2  -CM     Climbing 3-5 steps with a railing? 1  -CM     To walk in hospital room? 2  -CM     AM-PAC 6 Clicks Score (PT) 13  -CM     Highest level of mobility 4 --> Transferred to chair/commode  -CM     Row Name 03/01/23 1601          Functional Assessment    Outcome Measure Options AM-PAC 6 Clicks Basic Mobility (PT)  -CM           User Key  (r) = Recorded By, (t) = Taken By, (c) = Cosigned By    Initials Name Provider Type    Radha Mackey PT Physical Therapist                             Physical Therapy Education     Title: PT OT SLP Therapies (In Progress)     Topic: Physical Therapy (In Progress)     Point: Mobility training (Done)     Learning Progress Summary           Patient Acceptance, E, VU,NR by CM at 3/1/2023 1601                   Point: Home exercise program (Not Started)     Learner Progress:  Not documented in this visit.          Point: Body mechanics (Done)     Learning Progress Summary           Patient Acceptance, E, VU,NR by CM at 3/1/2023 1601                   Point: Precautions (Done)     Learning Progress Summary           Patient Acceptance, E, VU,NR by CM at 3/1/2023 1601                               User Key     Initials Effective Dates Name Provider Type Discipline    CINDY 09/22/22 -  Radha Jenkins PT Physical Therapist PT              PT Recommendation and Plan  Planned Therapy Interventions (PT): balance training, bed mobility training, gait training, home exercise program, postural re-education, patient/family education, motor coordination training, neuromuscular re-education, ROM (range of motion), stair training, strengthening, transfer training  Plan of Care Reviewed With: patient  Outcome Evaluation: Patient presents with  generalized weakness primarily in BLEs as well as endurance and balance deficits. He was able to perform STS with maxAx1 and RW today demonstrating poor balance upon standing. Current deficits limit the patient's functional mobility below his baseline indicating IPPT to address deficits. Will recommend that patient D/C to SNF.     Time Calculation:    PT Charges     Row Name 03/01/23 1602             Time Calculation    Start Time 1521  -CM      PT Received On 03/01/23  -CM      PT Goal Re-Cert Due Date 03/11/23  -CM         Untimed Charges    PT Eval/Re-eval Minutes 51  -CM         Total Minutes    Untimed Charges Total Minutes 51  -CM       Total Minutes 51  -CM            User Key  (r) = Recorded By, (t) = Taken By, (c) = Cosigned By    Initials Name Provider Type    Radha Mackey, PT Physical Therapist              Therapy Charges for Today     Code Description Service Date Service Provider Modifiers Qty    81394054521 HC PT EVAL MOD COMPLEXITY 4 3/1/2023 Radha Jenkins, CLINT GP 1          PT G-Codes  Outcome Measure Options: AM-PAC 6 Clicks Basic Mobility (PT)  AM-PAC 6 Clicks Score (PT): 13  PT Discharge Summary  Anticipated Discharge Disposition (PT): skilled nursing facility    Radha Jenkins PT  3/1/2023

## 2023-03-01 NOTE — CONSULTS
Clinical Nutrition     Nutrition Support Assessment  Reason for Visit: MDR, Identified at risk by screening criteria, MST score 2+, Physician consult      Patient Name: Iain Dee  YOB: 1935  MRN: [id number]  Date of Encounter: 03/01/23 13:16 EST  Admission date: 2/27/2023    Comments: Pt w/ reduced oral intake over the past 6 months to1 year r/t constipation , recent 2 week reduction in intake was intentional for weight loss.  He has had a 14 lb weight loss over 15 months , this number may be skewed by dehydration on admission. Weight loss does not meet criterion for malnutrition.    Nutrition Assessment   Admission Diagnosis:  Seizures (HCC) [R56.9]      Problem List:    Seizures (HCC)    Hypokalemia    Hypomagnesemia    Hypocalcemia    Essential hypertension    Atrial fibrillation (HCC)    GERD (gastroesophageal reflux disease)    Diabetes mellitus (HCC)    JOLENE on CPAP    Chronic anticoagulation (Eliquis)    Alcohol abuse (1 to 2 glasses of bourbon a day)        PMH:   He  has a past medical history of Arthritis, Cancer (HCC), Diabetes mellitus (HCC), GERD (gastroesophageal reflux disease), Gout, H/O Bladder carcinoma (HCC), and Hypertension.    PSH:  He  has a past surgical history that includes Hernia repair; Prostate biopsy; and Bladder surgery.      Applicable Nutrition Concerns:   Skin:  Oral:  GI:    Applicable Interval History:       Reported/Observed/Food/Nutrition Related History:   RN reports pt 's mentation is much improved, he seems sensitive to ativan. He ate all of his breakfast today. He has 2+ generalized edema. Electrolytes have been corrected and are WNL today.    Pt w/ reduced oral intake over the past 6 months to1 year r/t constipation per his wife, she states he is a big eater w/  his size. He tells me in the past  2- 3 weeks he decided to eat less to lose weight further decreased to confusion. They are unable to describe how much less is other than  "smaller portions. He reports appetite somewhat decreased, he has no problems w/ N/V/D. He does drink 1 or 2 gins daily. He has had a 14 lb weight loss over 15 months , this number may be skewed by dehydration on admission and may be less.  He declines ONS offer at this time. Dr. Hawkins advised of constipation issue.  The pt and I discussed the need for fiber and fluid intake to alleviate constipation.    Labs    Labs Reviewed: Yes     Results from last 7 days   Lab Units 03/01/23  0401 02/28/23  1748 02/28/23  1130 02/28/23  0456 02/27/23  1319   GLUCOSE mg/dL 172*  --   --  147* 215*   BUN mg/dL 17  --   --  16 20   CREATININE mg/dL 1.19  --   --  1.05 1.18   SODIUM mmol/L 138  --   --  137 137   CHLORIDE mmol/L 99  --   --  95* 94*   POTASSIUM mmol/L 3.9 3.9  --  3.3* 3.2*   PHOSPHORUS mg/dL 3.8  --   --  3.8 4.7*   MAGNESIUM mg/dL 2.3  --  1.8  --  0.4*   ALT (SGPT) U/L  --   --   --  21 20       Results from last 7 days   Lab Units 03/01/23  0401 02/28/23  1130 02/28/23  0456 02/27/23  1505 02/27/23  1319   ALBUMIN g/dL  --   --  3.6  --  3.5   IONIZED CALCIUM mmol/L 1.03* 0.97*  --  0.78*  --      Lab Results   Lab Value Date/Time    HGBA1C 8.00 (H) 12/08/2021 0159         Results from last 7 days   Lab Units 02/27/23  1319   PROBNP pg/mL 4,790.0*       Medications    Medications Reviewed: Yes  Pertinent:thiamine, folic acid, theragran, Mag-OX,  pericolace  Infusion:      Intake/Ouptut 24 hrs (0701 - 0700)   I&O's Reviewed: Yes   Intake & Output (last day)       02/28 0701 03/01 0700 03/01 0701 03/02 0700    P.O. 1050 600    I.V. (mL/kg) 280 (2.8)     IV Piggyback 200     Total Intake(mL/kg) 1530 (15.3) 600 (6)    Urine (mL/kg/hr) 1300 (0.5) 150 (0.2)    Stool 0 0    Total Output 1300 150    Net +230 +450          Stool Unmeasured Occurrence 0 x 0 x      No stool occurrence documented since adm    Anthropometrics     Admission Height 185.4 cm (73\") Documented at 02/27/2023 1424   Admission Weight 99.8 kg " (220 lb) Documented at 02/27/2023 1424     Last Filed Weight: Weight: 100 kg (221 lb) (03/01/23 1137)  Method:    BMI: BMI (Calculated): 29.2     BMI classification: Overweight: 25.0-29.9kg/m2   Weight Weight (kg) Weight (lbs) Weight Method VISIT REPORT   3/1/2023 100.245 kg 221 lb - -   2/28/2023 100.3 kg 221 lb 1.9 oz - -   2/27/2023 99.791 kg 220 lb - -   12/16/2021 106.595 kg 235 lb - Report   12/8/2021 103 kg 227 lb 1.2 oz - -   12/8/2021 102.8 kg 226 lb 10.1 oz Bed scale -     UBW: 14 lbs   Weight change:  6% loss over 12-15 months reflective of 6%  Loss of UBW over time  does not meet criterion for malnutrition    Nutrition Focused Physical Exam     Date: 3/1    Unable to perform exam due to: Edema/anasarca, Patient body position, Defer pending indication      Needs Assessment   Date: 3/1     Height used: 185.4 cm  Weights used: 100 kg      Estimated Calorie needs: ~ 2000  Kcal/day  Method:  Kcals/KG 12-25 kcal/kg for critical illness= 1581-2033 kcal/d  Method:  MSJ 1734 x 1.2 = 2168    Estimated Protein needs: ~13  g PRO/day  1.2-1.5 gm/kg = 120-150 gm/d    Estimated Fluid needs: ~ 2000 ml/day   Per RDA method    Current Nutrition Prescription     PO: Diet: Regular/House Diet, Cardiac Diets, Diabetic Diets; Healthy Heart (2-3 Na+); Consistent Carbohydrate; Texture: Regular Texture (IDDSI 7); Fluid Consistency: Thin (IDDSI 0)  Oral Nutrition Supplement:   Intake: 1 Day: 2 meals 100%      Nutrition Diagnosis   Date:3/1 Updated:   Problem Inadequate oral intake   Etiology Constipation, int. weight loss, alcohol  intake   Signs/Symptoms Seizures, confusion , lo K+, Mg++ and iCa++   Status: new    Date: 3/1 Updated:  Problem Altered nutrition related laboratory values   Etiology Reduced oral intake, alcohol intake   Signs/Symptoms Low K+, Mg++, iCa+   Status: new    Goal:   General: Nutrition to support treatment, Improve nutrition related labs  PO: Maintain intake, Continue positive trend, Meet estimated  needs  EN/PN: N/A    Nutrition Intervention      Follow treatment progress, Care plan reviewed, Advise alternate selection, Interview for preferences, Menu adjusted, Encourage intake, Supplement offered/refused, Education provided Pt eats most foods    Monitoring/Evaluation:   Per protocol, I&O, PO intake, Pertinent labs, Weight, Symptoms      Kait Aguilar MS,RD,LD  Time Spent: 45 mins

## 2023-03-01 NOTE — CASE MANAGEMENT/SOCIAL WORK
Continued Stay Note   Tri     Patient Name: Iain Dee  MRN: [id number]  Today's Date: 3/1/2023    Admit Date: 2/27/2023    Plan: Home   Discharge Plan     Row Name 03/01/23 1038       Plan    Plan Home    Patient/Family in Agreement with Plan other (see comments)    Plan Comments Pt discussed in Medical Rounds today. Pt is tolerating diet, alert and oriented x 3, scheduled for an ECHO today, and Dr. Hawkins is placing orders to transfer to the floor. Pt's RN states that pt is requiring 2L NC Oxygen and has wheezing and crackles in his lungs. Pt and pt's wife plan for pt to D/C home.  will continue to follow.    Final Discharge Disposition Code 01 - home or self-care               Discharge Codes    No documentation.               Expected Discharge Date and Time     Expected Discharge Date Expected Discharge Time    Mar 6, 2023             Tracy Otoole RN

## 2023-03-01 NOTE — PROGRESS NOTES
Intensive Care Follow-up     Hospital:  LOS: 2 days   Mr. Iain Dee, 87 y.o. male is followed for:   Seizures (HCC)        Subjective     86-year-old male with past medical history significant for atrial fibrillation, on Eliquis, bladder cancer status post surgery, hypertension, obstructive sleep apnea, gout, and GERD.  Who presents to the [facility] ICU on 2/27/2023 after having a seizure at home.  On arrival to the ER he was found to have severe metabolic abnormalities with a severely low calcium and magnesium.  He had a similar event 1 year ago in December 2021 at that time he had also informed us that he drinks 1 to 2 glasses of bourbon every day and had signs of alcohol withdrawal while hospitalized.  He was given a Keppra load in the ER and admitted to the ICU for closer monitoring.     Patient is unable to answer any questions during my exam.  But is awake and moving all extremities.  Per the patient's wife he has not been eating much lately he continues to have at least 1-2 drinks per day especially in social situations.  Is a has been feeling full recently which is why he has not been eating.  Also has had significant constipation issues.  She states that other than that he has been in his normal state of health.  The reason that she was mainly having him come in today was because he could not walk on his own.  Interval History:  The chart has been reviewed.  The patient does remain somewhat confused although is conversant and has fluent speech.  Hemodynamics have remained stable.  He has intermittently been getting Ativan for increased CIWA score.  His wife reports to me that he has quite a bit of difficulty with constipation at home and frequently avoids eating due to this.  He is getting stool softeners here.    The patient's past medical, surgical and social history were reviewed and updated in Epic as appropriate.        Objective     Infusions:     Medications:  amLODIPine, 5 mg,  "Oral, BID  apixaban, 2.5 mg, Oral, BID  atropine sulfate, , ,   cloNIDine, 0.2 mg, Oral, BID  thiamine, 100 mg, Oral, Daily   And  multivitamin, 1 tablet, Oral, Daily   And  folic acid, 1 mg, Oral, Daily  losartan, 50 mg, Oral, BID  magnesium oxide, 400 mg, Oral, BID  metoprolol tartrate, 12.5 mg, Oral, BID  pantoprazole, 40 mg, Oral, Q AM  senna-docusate sodium, 2 tablet, Oral, BID  sodium chloride, 10 mL, Intravenous, Q12H        Vital Sign Min/Max for last 24 hours  Temp  Min: 98.2 °F (36.8 °C)  Max: 99.6 °F (37.6 °C)   BP  Min: 102/62  Max: 142/78   Pulse  Min: 56  Max: 105   Resp  Min: 18  Max: 24   SpO2  Min: 91 %  Max: 100 %   Flow (L/min)  Min: 2  Max: 6       Input/Output for last 24 hour shift  02/28 0701 - 03/01 0700  In: 1530 [P.O.:1050; I.V.:280]  Out: 1300 [Urine:1300]      Objective:  General Appearance:  Uncomfortable, ill-appearing, in no acute distress and not in pain.    Vital signs: (most recent): Blood pressure 133/68, pulse 80, temperature 99.6 °F (37.6 °C), temperature source Axillary, resp. rate 20, height 185.4 cm (73\"), weight 100 kg (221 lb), SpO2 96 %.    HEENT: Normal HEENT exam.    Lungs:  Normal effort.  He is not in respiratory distress.  There are decreased breath sounds.  No rhonchi.    Heart: Normal rate.  Regular rhythm.  S1 normal and S2 normal.  No murmur.   Chest: Symmetric chest wall expansion.   Abdomen: Abdomen is soft and non-distended.  Bowel sounds are normal.   There is no abdominal tenderness.     Extremities: Normal range of motion.  There is dependent edema.    Pulses: Distal pulses are intact.    Neurological: (Mild confusion.  Able to answer questions.  Appears to be oriented x3.  No tremors.).    Pupils:  Pupils are equal, round, and reactive to light.    Skin:  No rash.             Results from last 7 days   Lab Units 03/01/23  0401 02/28/23  0456 02/27/23  1319   WBC 10*3/mm3 12.77* 13.66* 14.31*   HEMOGLOBIN g/dL 12.1* 12.9* 13.2   PLATELETS 10*3/mm3 552 699 421 "     Results from last 7 days   Lab Units 03/01/23  0401 02/28/23  1748 02/28/23  1130 02/28/23  0456 02/27/23  1319   SODIUM mmol/L 138  --   --  137 137   POTASSIUM mmol/L 3.9 3.9  --  3.3* 3.2*   CO2 mmol/L 30.0*  --   --  28.0 26.0   BUN mg/dL 17  --   --  16 20   CREATININE mg/dL 1.19  --   --  1.05 1.18   MAGNESIUM mg/dL 2.3  --  1.8  --  0.4*   PHOSPHORUS mg/dL 3.8  --   --  3.8 4.7*   GLUCOSE mg/dL 172*  --   --  147* 215*     Estimated Creatinine Clearance: 55.4 mL/min (by C-G formula based on SCr of 1.19 mg/dL).            I reviewed the patient's results and images.     Assessment & Plan   Impression        Seizures (HCC)    Hypokalemia    Hypomagnesemia    Hypocalcemia    Essential hypertension    Atrial fibrillation (HCC)    GERD (gastroesophageal reflux disease)    Diabetes mellitus (HCC)    JOLENE on CPAP    Chronic anticoagulation (Eliquis)    Alcohol abuse (1 to 2 glasses of bourbon a day)       Plan        Recheck labs tomorrow.  We will replace electrolytes as needed.  Continue with CIWA checks.  Glucose control as before.  Continue with bowel regimen.  Initiate physical therapy.  Continue anticoagulation.  Plan to transition to telemetry today.    Plan of care and goals reviewed with mulitdisciplinary/antibiotic stewardship team during rounds.   I discussed the patient's findings and my recommendations with patient, family, nursing staff and consulting provider       Jamaal Hawkins MD, [facility]  Pulmonology and Critical Care Medicine

## 2023-03-01 NOTE — PROGRESS NOTES
"Neurology       Patient Care Team:  Luis Aguilar DO as PCP - General (Family Medicine)  Iain Bernstein MD as Consulting Physician (Cardiology)    Chief complaint: Weakness    History: Patient weakness is improved with magnesium correcting.      Past Medical History:   Diagnosis Date   • Arthritis    • Cancer (HCC)    • Diabetes mellitus (HCC)    • GERD (gastroesophageal reflux disease)    • Gout    • H/O Bladder carcinoma (HCC)    • Hypertension        Vital Signs   Vitals:    03/01/23 0900 03/01/23 1000 03/01/23 1100 03/01/23 1137   BP: 102/62 107/76 120/78    BP Location:       Patient Position:       Pulse: 68 58 56    Resp:       Temp:       TempSrc:       SpO2: 92% 94% 95%    Weight:    100 kg (221 lb)   Height:    185.4 cm (73\")       Physical Exam:   General: Awake and alert              Neuro: Oriented to person place and time.    Denies complaints.  Para    Results Review:  Potassium is 3.9  Results from last 7 days   Lab Units 03/01/23  0401   WBC 10*3/mm3 12.77*   HEMOGLOBIN g/dL 12.1*   HEMATOCRIT % 37.4*   PLATELETS 10*3/mm3 353     Results from last 7 days   Lab Units 03/01/23  0401 02/28/23  1748 02/28/23  0456 02/27/23  1319   SODIUM mmol/L 138  --  137 137   POTASSIUM mmol/L 3.9 3.9 3.3* 3.2*   CHLORIDE mmol/L 99  --  95* 94*   CO2 mmol/L 30.0*  --  28.0 26.0   BUN mg/dL 17  --  16 20   CREATININE mg/dL 1.19  --  1.05 1.18   CALCIUM mg/dL 7.1*  --  7.2* 5.8*   BILIRUBIN mg/dL  --   --  1.2 1.0   ALK PHOS U/L  --   --  86 79   ALT (SGPT) U/L  --   --  21 20   AST (SGOT) U/L  --   --  30 32   GLUCOSE mg/dL 172*  --  147* 215*       Imaging Results (Last 24 Hours)     ** No results found for the last 24 hours. **          Assessment:  Multiple electrolyte abnormalities secondary to malnutrition and likely alcohol    Plan:  Up in chair twice daily.    PT as planned.  Comment:  Working on food preferences increasing his oral intake on a chronic basis.         I discussed the patients " findings and my recommendations with patient, family and primary care team    Vamshi Gudiry MD  03/01/23  11:50 EST

## 2023-03-01 NOTE — PLAN OF CARE
Problem: Adult Inpatient Plan of Care  Goal: Plan of Care Review  Outcome: Ongoing, Not Progressing  Flowsheets (Taken 3/1/2023 0953)  Progress: no change  Plan of Care Reviewed With:   patient   spouse  Goal: Patient-Specific Goal (Individualized)  Outcome: Ongoing, Not Progressing  Goal: Absence of Hospital-Acquired Illness or Injury  Outcome: Ongoing, Not Progressing  Intervention: Identify and Manage Fall Risk  Recent Flowsheet Documentation  Taken 3/1/2023 0800 by Celina Tavares RN  Safety Promotion/Fall Prevention:   clutter free environment maintained   fall prevention program maintained   gait belt   lighting adjusted   room organization consistent   safety round/check completed  Intervention: Prevent Skin Injury  Recent Flowsheet Documentation  Taken 3/1/2023 0800 by Celina Tavares RN  Body Position:   turned   30 degrees   legs elevated   weight shifting  Skin Protection:   adhesive use limited   incontinence pads utilized   tubing/devices free from skin contact  Intervention: Prevent and Manage VTE (Venous Thromboembolism) Risk  Recent Flowsheet Documentation  Taken 3/1/2023 0800 by Celina Tavares RN  Activity Management: bedrest  VTE Prevention/Management: (receiving elequis)   bilateral   sequential compression devices off   patient refused intervention  Range of Motion: active ROM (range of motion) encouraged  Intervention: Prevent Infection  Recent Flowsheet Documentation  Taken 3/1/2023 0800 by Celina Tavares RN  Infection Prevention:   environmental surveillance performed   hand hygiene promoted   rest/sleep promoted   single patient room provided  Goal: Optimal Comfort and Wellbeing  Outcome: Ongoing, Not Progressing  Intervention: Provide Person-Centered Care  Recent Flowsheet Documentation  Taken 3/1/2023 0800 by Celina Tavares RN  Trust Relationship/Rapport:   care explained   choices provided  Goal: Readiness for Transition of Care  Outcome: Ongoing, Not Progressing      Problem: Fall Injury Risk  Goal: Absence of Fall and Fall-Related Injury  Outcome: Ongoing, Not Progressing  Intervention: Identify and Manage Contributors  Recent Flowsheet Documentation  Taken 3/1/2023 0800 by Celina Tavares RN  Medication Review/Management: medications reviewed  Self-Care Promotion: independence encouraged  Intervention: Promote Injury-Free Environment  Recent Flowsheet Documentation  Taken 3/1/2023 0800 by Celina Tavares RN  Safety Promotion/Fall Prevention:   clutter free environment maintained   fall prevention program maintained   gait belt   lighting adjusted   room organization consistent   safety round/check completed     Problem: Skin Injury Risk Increased  Goal: Skin Health and Integrity  Outcome: Ongoing, Not Progressing  Intervention: Optimize Skin Protection  Recent Flowsheet Documentation  Taken 3/1/2023 0800 by Celina Tavares RN  Pressure Reduction Techniques:   frequent weight shift encouraged   heels elevated off bed   weight shift assistance provided  Head of Bed (HOB) Positioning: HOB at 30-45 degrees  Pressure Reduction Devices:   heel offloading device utilized   specialty bed utilized  Skin Protection:   adhesive use limited   incontinence pads utilized   tubing/devices free from skin contact   Goal Outcome Evaluation:  Plan of Care Reviewed With: patient, spouse        Progress: no change

## 2023-03-01 NOTE — PLAN OF CARE
Goal Outcome Evaluation:  Plan of Care Reviewed With: patient           Outcome Evaluation: Patient presents with generalized weakness primarily in BLEs as well as endurance and balance deficits. He was able to perform STS with maxAx1 and RW today demonstrating poor balance upon standing. Current deficits limit the patient's functional mobility below his baseline indicating IPPT to address deficits. Will recommend that patient D/C to SNF.

## 2023-03-02 LAB
ANION GAP SERPL CALCULATED.3IONS-SCNC: 12 MMOL/L (ref 5–15)
BASOPHILS # BLD AUTO: 0.04 10*3/MM3 (ref 0–0.2)
BASOPHILS NFR BLD AUTO: 0.3 % (ref 0–1.5)
BUN SERPL-MCNC: 22 MG/DL (ref 8–23)
BUN/CREAT SERPL: 16.8 (ref 7–25)
CA-I SERPL ISE-MCNC: 1.03 MMOL/L (ref 1.12–1.32)
CALCIUM SPEC-SCNC: 7.1 MG/DL (ref 8.6–10.5)
CHLORIDE SERPL-SCNC: 96 MMOL/L (ref 98–107)
CO2 SERPL-SCNC: 22 MMOL/L (ref 22–29)
CREAT SERPL-MCNC: 1.31 MG/DL (ref 0.76–1.27)
DEPRECATED RDW RBC AUTO: 45.9 FL (ref 37–54)
EGFRCR SERPLBLD CKD-EPI 2021: 53 ML/MIN/1.73
EOSINOPHIL # BLD AUTO: 0.06 10*3/MM3 (ref 0–0.4)
EOSINOPHIL NFR BLD AUTO: 0.5 % (ref 0.3–6.2)
ERYTHROCYTE [DISTWIDTH] IN BLOOD BY AUTOMATED COUNT: 12.3 % (ref 12.3–15.4)
GLUCOSE BLDC GLUCOMTR-MCNC: 182 MG/DL (ref 70–130)
GLUCOSE BLDC GLUCOMTR-MCNC: 191 MG/DL (ref 70–130)
GLUCOSE BLDC GLUCOMTR-MCNC: 241 MG/DL (ref 70–130)
GLUCOSE SERPL-MCNC: 162 MG/DL (ref 65–99)
HCT VFR BLD AUTO: 35.8 % (ref 37.5–51)
HGB BLD-MCNC: 11.4 G/DL (ref 13–17.7)
IMM GRANULOCYTES # BLD AUTO: 0.05 10*3/MM3 (ref 0–0.05)
IMM GRANULOCYTES NFR BLD AUTO: 0.4 % (ref 0–0.5)
LYMPHOCYTES # BLD AUTO: 0.42 10*3/MM3 (ref 0.7–3.1)
LYMPHOCYTES NFR BLD AUTO: 3.2 % (ref 19.6–45.3)
MAGNESIUM SERPL-MCNC: 1.8 MG/DL (ref 1.6–2.4)
MCH RBC QN AUTO: 32.8 PG (ref 26.6–33)
MCHC RBC AUTO-ENTMCNC: 31.8 G/DL (ref 31.5–35.7)
MCV RBC AUTO: 102.9 FL (ref 79–97)
MONOCYTES # BLD AUTO: 0.83 10*3/MM3 (ref 0.1–0.9)
MONOCYTES NFR BLD AUTO: 6.4 % (ref 5–12)
NEUTROPHILS NFR BLD AUTO: 11.6 10*3/MM3 (ref 1.7–7)
NEUTROPHILS NFR BLD AUTO: 89.2 % (ref 42.7–76)
NRBC BLD AUTO-RTO: 0 /100 WBC (ref 0–0.2)
PLATELET # BLD AUTO: 324 10*3/MM3 (ref 140–450)
PMV BLD AUTO: 11 FL (ref 6–12)
POTASSIUM SERPL-SCNC: 4.7 MMOL/L (ref 3.5–5.2)
RBC # BLD AUTO: 3.48 10*6/MM3 (ref 4.14–5.8)
SODIUM SERPL-SCNC: 130 MMOL/L (ref 136–145)
WBC NRBC COR # BLD: 13 10*3/MM3 (ref 3.4–10.8)

## 2023-03-02 PROCEDURE — 85025 COMPLETE CBC W/AUTO DIFF WBC: CPT | Performed by: INTERNAL MEDICINE

## 2023-03-02 PROCEDURE — 99232 SBSQ HOSP IP/OBS MODERATE 35: CPT | Performed by: PSYCHIATRY & NEUROLOGY

## 2023-03-02 PROCEDURE — 25010000002 LORAZEPAM PER 2 MG

## 2023-03-02 PROCEDURE — 25010000002 LORAZEPAM PER 2 MG: Performed by: INTERNAL MEDICINE

## 2023-03-02 PROCEDURE — 94660 CPAP INITIATION&MGMT: CPT

## 2023-03-02 PROCEDURE — 82330 ASSAY OF CALCIUM: CPT | Performed by: INTERNAL MEDICINE

## 2023-03-02 PROCEDURE — 94799 UNLISTED PULMONARY SVC/PX: CPT

## 2023-03-02 PROCEDURE — 80048 BASIC METABOLIC PNL TOTAL CA: CPT | Performed by: INTERNAL MEDICINE

## 2023-03-02 PROCEDURE — 99232 SBSQ HOSP IP/OBS MODERATE 35: CPT | Performed by: INTERNAL MEDICINE

## 2023-03-02 PROCEDURE — 82962 GLUCOSE BLOOD TEST: CPT

## 2023-03-02 PROCEDURE — 97530 THERAPEUTIC ACTIVITIES: CPT

## 2023-03-02 PROCEDURE — 0 MAGNESIUM SULFATE 4 GM/100ML SOLUTION

## 2023-03-02 PROCEDURE — 97165 OT EVAL LOW COMPLEX 30 MIN: CPT

## 2023-03-02 PROCEDURE — 83735 ASSAY OF MAGNESIUM: CPT | Performed by: INTERNAL MEDICINE

## 2023-03-02 RX ORDER — LORAZEPAM 2 MG/ML
0.25 INJECTION INTRAMUSCULAR ONCE
Status: COMPLETED | OUTPATIENT
Start: 2023-03-02 | End: 2023-03-02

## 2023-03-02 RX ORDER — MAGNESIUM SULFATE HEPTAHYDRATE 40 MG/ML
4 INJECTION, SOLUTION INTRAVENOUS ONCE
Status: COMPLETED | OUTPATIENT
Start: 2023-03-02 | End: 2023-03-02

## 2023-03-02 RX ORDER — LORAZEPAM 2 MG/ML
0.25 INJECTION INTRAMUSCULAR EVERY 4 HOURS PRN
Status: DISCONTINUED | OUTPATIENT
Start: 2023-03-02 | End: 2023-03-06 | Stop reason: HOSPADM

## 2023-03-02 RX ORDER — DIPHENOXYLATE HYDROCHLORIDE AND ATROPINE SULFATE 2.5; .025 MG/1; MG/1
1 TABLET ORAL DAILY
Status: DISCONTINUED | OUTPATIENT
Start: 2023-03-03 | End: 2023-03-06 | Stop reason: HOSPADM

## 2023-03-02 RX ORDER — CHOLECALCIFEROL (VITAMIN D3) 125 MCG
5 CAPSULE ORAL NIGHTLY PRN
Status: DISCONTINUED | OUTPATIENT
Start: 2023-03-02 | End: 2023-03-06 | Stop reason: HOSPADM

## 2023-03-02 RX ADMIN — SENNOSIDES AND DOCUSATE SODIUM 2 TABLET: 8.6; 5 TABLET ORAL at 08:31

## 2023-03-02 RX ADMIN — CLONIDINE HYDROCHLORIDE 0.2 MG: 0.2 TABLET ORAL at 08:31

## 2023-03-02 RX ADMIN — LORAZEPAM 0.25 MG: 2 INJECTION INTRAMUSCULAR; INTRAVENOUS at 06:19

## 2023-03-02 RX ADMIN — Medication 10 ML: at 08:31

## 2023-03-02 RX ADMIN — PANTOPRAZOLE SODIUM 40 MG: 40 TABLET, DELAYED RELEASE ORAL at 06:20

## 2023-03-02 RX ADMIN — FOLIC ACID 1 MG: 1 TABLET ORAL at 08:32

## 2023-03-02 RX ADMIN — LOSARTAN POTASSIUM 50 MG: 50 TABLET, FILM COATED ORAL at 20:30

## 2023-03-02 RX ADMIN — Medication 12.5 MG: at 20:30

## 2023-03-02 RX ADMIN — THIAMINE HCL TAB 100 MG 100 MG: 100 TAB at 10:28

## 2023-03-02 RX ADMIN — LOSARTAN POTASSIUM 50 MG: 50 TABLET, FILM COATED ORAL at 08:32

## 2023-03-02 RX ADMIN — LORAZEPAM 0.25 MG: 2 INJECTION INTRAMUSCULAR; INTRAVENOUS at 01:40

## 2023-03-02 RX ADMIN — AMLODIPINE BESYLATE 5 MG: 5 TABLET ORAL at 20:30

## 2023-03-02 RX ADMIN — Medication 400 MG: at 20:30

## 2023-03-02 RX ADMIN — MAGNESIUM SULFATE HEPTAHYDRATE 4 G: 40 INJECTION, SOLUTION INTRAVENOUS at 06:35

## 2023-03-02 RX ADMIN — APIXABAN 2.5 MG: 2.5 TABLET, FILM COATED ORAL at 08:31

## 2023-03-02 RX ADMIN — CLONIDINE HYDROCHLORIDE 0.2 MG: 0.2 TABLET ORAL at 20:30

## 2023-03-02 RX ADMIN — APIXABAN 2.5 MG: 2.5 TABLET, FILM COATED ORAL at 20:30

## 2023-03-02 RX ADMIN — Medication 400 MG: at 08:31

## 2023-03-02 RX ADMIN — Medication 10 ML: at 20:32

## 2023-03-02 RX ADMIN — AMLODIPINE BESYLATE 5 MG: 5 TABLET ORAL at 08:32

## 2023-03-02 RX ADMIN — ACETAMINOPHEN 325MG 650 MG: 325 TABLET ORAL at 08:31

## 2023-03-02 RX ADMIN — SENNOSIDES AND DOCUSATE SODIUM 2 TABLET: 8.6; 5 TABLET ORAL at 20:30

## 2023-03-02 RX ADMIN — MULTIVITAMIN TABLET 1 TABLET: TABLET at 08:33

## 2023-03-02 RX ADMIN — Medication 12.5 MG: at 08:31

## 2023-03-02 NOTE — PLAN OF CARE
Goal Outcome Evaluation:  Plan of Care Reviewed With: patient, spouse        Progress: improving  Outcome Evaluation: Patient continues to be limited by BLE strength deficits, however showing improvement with ability to transfer bed to chair with maxAx2 today. He gave good effort with BLE therex. He remains below his baseline mobility. Will continue with current PT POC with D/C rec to SNF.

## 2023-03-02 NOTE — PROGRESS NOTES
Intensive Care Follow-up     Hospital:  LOS: 3 days   Mr. Iain Dee, 87 y.o. male is followed for:   Seizures (HCC)        Subjective     86-year-old male with past medical history significant for atrial fibrillation, on Eliquis, bladder cancer status post surgery, hypertension, obstructive sleep apnea, gout, and GERD.  Who presents to the [facility] ICU on 2/27/2023 after having a seizure at home.  On arrival to the ER he was found to have severe metabolic abnormalities with a severely low calcium and magnesium.  He had a similar event 1 year ago in December 2021 at that time he had also informed us that he drinks 1 to 2 glasses of bourbon every day and had signs of alcohol withdrawal while hospitalized.  He was given a Keppra load in the ER and admitted to the ICU for closer monitoring.     Patient is unable to answer any questions during my exam.  But is awake and moving all extremities.  Per the patient's wife he has not been eating much lately he continues to have at least 1-2 drinks per day especially in social situations.  Is a has been feeling full recently which is why he has not been eating.  Also has had significant constipation issues.  She states that other than that he has been in his normal state of health.  The reason that she was mainly having him come in today was because he could not walk on his own.  Interval History:  The chart has been reviewed.  The patient does remain slightly confused although is more conversant and more awake today.  He did have a fever overnight but currently denies shortness of breath or cough.  He has not had any chills.  He did undergo echocardiography which shows minimal valvular abnormalities but nothing significant.  Ejection fraction is normal.  Patient still has not had a bowel movement.    The patient's past medical, surgical and social history were reviewed and updated in Epic as appropriate.        Objective     Infusions:    "  Medications:  amLODIPine, 5 mg, Oral, BID  apixaban, 2.5 mg, Oral, BID  cloNIDine, 0.2 mg, Oral, BID  losartan, 50 mg, Oral, BID  magnesium oxide, 400 mg, Oral, BID  metoprolol tartrate, 12.5 mg, Oral, BID  pantoprazole, 40 mg, Oral, Q AM  senna-docusate sodium, 2 tablet, Oral, BID  sodium chloride, 10 mL, Intravenous, Q12H        Vital Sign Min/Max for last 24 hours  Temp  Min: 98 °F (36.7 °C)  Max: 101.4 °F (38.6 °C)   BP  Min: 113/68  Max: 146/82   Pulse  Min: 60  Max: 105   Resp  Min: 18  Max: 22   SpO2  Min: 90 %  Max: 99 %   Flow (L/min)  Min: 4  Max: 4       Input/Output for last 24 hour shift  03/01 0701 - 03/02 0700  In: 840 [P.O.:840]  Out: 750 [Urine:750]      Objective:  General Appearance:  Uncomfortable, ill-appearing, in no acute distress and not in pain.    Vital signs: (most recent): Blood pressure 131/81, pulse 72, temperature 98 °F (36.7 °C), temperature source Axillary, resp. rate 18, height 185.4 cm (73\"), weight 100 kg (221 lb), SpO2 94 %.    HEENT: Normal HEENT exam.    Lungs:  Normal effort.  He is not in respiratory distress.  There are decreased breath sounds.  No rhonchi.    Heart: Normal rate.  Regular rhythm.  S1 normal and S2 normal.  No murmur.   Chest: Symmetric chest wall expansion.   Abdomen: Abdomen is soft and non-distended.  Bowel sounds are normal.   There is no abdominal tenderness.     Extremities: Normal range of motion.  There is dependent edema.    Pulses: Distal pulses are intact.    Neurological: (Mild confusion.  Able to answer questions.  Appears to be oriented x3.  No tremors.).    Pupils:  Pupils are equal, round, and reactive to light.    Skin:  No rash.             Results from last 7 days   Lab Units 03/02/23  0444 03/01/23  0401 02/28/23  0456   WBC 10*3/mm3 13.00* 12.77* 13.66*   HEMOGLOBIN g/dL 11.4* 12.1* 12.9*   PLATELETS 10*3/mm3 324 353 383     Results from last 7 days   Lab Units 03/02/23  0444 03/01/23  0401 02/28/23  1748 02/28/23  1130 02/28/23  0456 " 02/27/23  1319   SODIUM mmol/L 130* 138  --   --  137 137   POTASSIUM mmol/L 4.7 3.9 3.9  --  3.3* 3.2*   CO2 mmol/L 22.0 30.0*  --   --  28.0 26.0   BUN mg/dL 22 17  --   --  16 20   CREATININE mg/dL 1.31* 1.19  --   --  1.05 1.18   MAGNESIUM mg/dL 1.8 2.3  --  1.8  --  0.4*   PHOSPHORUS mg/dL  --  3.8  --   --  3.8 4.7*   GLUCOSE mg/dL 162* 172*  --   --  147* 215*     Estimated Creatinine Clearance: 50.3 mL/min (A) (by C-G formula based on SCr of 1.31 mg/dL (H)).            I reviewed the patient's results and images.     Assessment & Plan   Impression        Seizures (HCC)    Hypokalemia    Hypomagnesemia    Hypocalcemia    Essential hypertension    Atrial fibrillation (HCC)    GERD (gastroesophageal reflux disease)    Diabetes mellitus (HCC)    JOLENE on CPAP    Chronic anticoagulation (Eliquis)    Alcohol abuse (1 to 2 glasses of bourbon a day)       Plan        Continue with CIWA checks.  We will try to minimize benzodiazepines if possible.  I suspect that his seizures were likely secondary to his severe metabolic derangement which is likely contributed to by poor oral intake.  I believe that his constipation is likely contributing to his lack of interest in eating.  Continue with bowel regimen.  Replace electrolytes as able.  Continue to work with physical and Occupational Therapy.  He likely is going to require some supplemental vitamins.  I will add a vitamin to his daily regimen.  Plan transition to telemetry.    Plan of care and goals reviewed with mulitdisciplinary/antibiotic stewardship team during rounds.   I discussed the patient's findings and my recommendations with patient, family and nursing staff       Jamaal Hawkins MD, [facility]  Pulmonology and Critical Care Medicine

## 2023-03-02 NOTE — PROGRESS NOTES
Neurology       Patient Care Team:  Luis Aguilar DO as PCP - General (Family Medicine)  Iain Bernstein MD as Consulting Physician (Cardiology)    Chief complaint: Seizure    History: Patient been off antiseizure medication since his first at hospital.    He is gradually improving and actually starting to eat some.    His wife indicates that he tends to wax and wane in his sometimes confused and other times quite close to baseline.      Past Medical History:   Diagnosis Date   • Arthritis    • Cancer (HCC)    • Diabetes mellitus (HCC)    • GERD (gastroesophageal reflux disease)    • Gout    • H/O Bladder carcinoma (HCC)    • Hypertension        Vital Signs   Vitals:    03/02/23 1000 03/02/23 1100 03/02/23 1200 03/02/23 1300   BP: 118/76 133/70 113/68 131/81   BP Location:   Right arm    Patient Position:   Lying    Pulse: 80 76 60 72   Resp:   18    Temp:   98 °F (36.7 °C)    TempSrc:   Axillary    SpO2: 93% 90% 96% 94%   Weight:       Height:           Physical Exam:   General: Awake and feeding himself              Neuro: Oriented to person and place.    Speech is articulate.    He is calm and cooperative.        Results Review:  Potassium is 4.7.    Calcium was 7.1  Results from last 7 days   Lab Units 03/02/23  0444   WBC 10*3/mm3 13.00*   HEMOGLOBIN g/dL 11.4*   HEMATOCRIT % 35.8*   PLATELETS 10*3/mm3 324     Results from last 7 days   Lab Units 03/02/23  0444 03/01/23  0401 02/28/23  1748 02/28/23  0456 02/27/23  1319   SODIUM mmol/L 130* 138  --  137 137   POTASSIUM mmol/L 4.7 3.9 3.9 3.3* 3.2*   CHLORIDE mmol/L 96* 99  --  95* 94*   CO2 mmol/L 22.0 30.0*  --  28.0 26.0   BUN mg/dL 22 17  --  16 20   CREATININE mg/dL 1.31* 1.19  --  1.05 1.18   CALCIUM mg/dL 7.1* 7.1*  --  7.2* 5.8*   BILIRUBIN mg/dL  --   --   --  1.2 1.0   ALK PHOS U/L  --   --   --  86 79   ALT (SGPT) U/L  --   --   --  21 20   AST (SGOT) U/L  --   --   --  30 32   GLUCOSE mg/dL 162* 172*  --  147* 215*       Imaging Results  (Last 24 Hours)     ** No results found for the last 24 hours. **          Assessment:  Metabolic encephalopathy secondary to starvation leading to marked electrolyte imbalance.    Seizure secondary to the above.    Mild cognitive impairment versus early dementia, transiently worse in the hospital.    Plan:  Patient scheduled for her skilled nursing which seems reasonable given that he is requiring maximal assistance to ambulate.        Comment:  His prognosis is guarded.    Following the significant metabolic stress, individuals with cognitive impairment sometimes worsen and do not fully recover.    I will be out of town for several weeks.  My colleagues are available if help is needed         I discussed the patients findings and my recommendations with patient and family    Vamshi Guidry MD  03/02/23  13:43 EST

## 2023-03-02 NOTE — THERAPY EVALUATION
Patient Name: Iain Dee  : 1935    MRN: [id number]                              Today's Date: 3/2/2023       Admit Date: 2023    Visit Dx:     ICD-10-CM ICD-9-CM   1. Seizure (HCC)  R56.9 780.39   2. Hypocalcemia  E83.51 275.41   3. Hypomagnesemia  E83.42 275.2   4. Hypokalemia  E87.6 276.8   5. Generalized weakness  R53.1 780.79   6. Leukocytosis, unspecified type  D72.829 288.60   7. Hyperglycemia  R73.9 790.29     Patient Active Problem List   Diagnosis   • AMS (altered mental status)   • Hypokalemia   • Hypomagnesemia   • Hypocalcemia   • Hypoalbuminemia   • Essential hypertension   • Atrial fibrillation (HCC)   • GERD (gastroesophageal reflux disease)   • Hyperglycemia   • Gout   • Diabetes mellitus (HCC)   • H/O Bladder carcinoma (HCC)   • JOLENE on CPAP   • Chronic anticoagulation (Eliquis)   • Alcohol abuse (1 to 2 glasses of bourbon a day)   • Seizures (HCC)     Past Medical History:   Diagnosis Date   • Arthritis    • Cancer (HCC)    • Diabetes mellitus (HCC)    • GERD (gastroesophageal reflux disease)    • Gout    • H/O Bladder carcinoma (HCC)    • Hypertension      Past Surgical History:   Procedure Laterality Date   • BLADDER SURGERY      Biopsy   • HERNIA REPAIR     • PROSTATE BIOPSY        General Information     Row Name 23 1509          OT Time and Intention    Document Type evaluation  -CS     Mode of Treatment occupational therapy  -CS     Row Name 23 1509          General Information    Patient Profile Reviewed yes  -CS     Prior Level of Function independent:;all household mobility;transfer;mod assist:;ADL's  -CS     Existing Precautions/Restrictions fall;oxygen therapy device and L/min;seizures  -CS     Barriers to Rehab medically complex;previous functional deficit  -CS     Row Name 23 1509          Living Environment    People in Home spouse  -CS     Row Name 23 1509          Home Main Entrance    Number of Stairs, Main Entrance none  -CS     Row Name  03/02/23 1509          Cognition    Orientation Status (Cognition) oriented x 3  -     Row Name 03/02/23 1509          Safety Issues, Functional Mobility    Impairments Affecting Function (Mobility) balance;endurance/activity tolerance;postural/trunk control;strength;range of motion (ROM)  -CS           User Key  (r) = Recorded By, (t) = Taken By, (c) = Cosigned By    Initials Name Provider Type    CS Tereza Schwartz OT Occupational Therapist                 Mobility/ADL's     Row Name 03/02/23 1509          Bed Mobility    Bed Mobility supine-sit  -     Supine-Sit [place] (Bed Mobility) maximum assist (25% patient effort);2 person assist;verbal cues  -     Assistive Device (Bed Mobility) bed rails;head of bed elevated  -     Row Name 03/02/23 1509          Transfers    Transfers sit-stand transfer;stand-sit transfer  -     Row Name 03/02/23 1509          Sit-Stand Transfer    Sit-Stand Riley (Transfers) maximum assist (25% patient effort);2 person assist;verbal cues  -     Assistive Device (Sit-Stand Transfers) walker, front-wheeled  -     Row Name 03/02/23 1509          Stand-Sit Transfer    Stand-Sit Riley (Transfers) maximum assist (25% patient effort);2 person assist;verbal cues  -CS     Assistive Device (Stand-Sit Transfers) walker, front-wheeled  -     Row Name 03/02/23 1509          Activities of Daily Living    BADL Assessment/Intervention lower body dressing  -     Row Name 03/02/23 1509          Lower Body Dressing Assessment/Training    [place]iley Level (Lower Body Dressing) don;socks;dependent (less than 25% patient effort)  -     Position (Lower Body Dressing) supine  -           User Key  (r) = Recorded By, (t) = Taken By, (c) = Cosigned By    Initials Name Provider Type    Tereza Rosado OT Occupational Therapist               Obj/Interventions     Row Name 03/02/23 1510          Sensory Assessment (Somatosensory)    Sensory Assessment (Somatosensory) UE  sensation intact  -     Row Name 03/02/23 1510          Vision Assessment/Intervention    Visual Impairment/Limitations corrective lenses full-time  -     Row Name 03/02/23 1510          Range of Motion Comprehensive    Comment, General Range of Motion BUE shldr F ~90 deg, remainder grossly WFL  -     Row Name 03/02/23 1510          Strength Comprehensive (MMT)    Comment, General Manual Muscle Testing (MMT) Assessment BUE grossly 3+/5  -[facility] Name 03/02/23 1510          Shoulder (Therapeutic Exercise)    Shoulder (Therapeutic Exercise) AROM (active range of motion)  -     Shoulder AROM (Therapeutic Exercise) bilateral;5 repetitions  mini chair push ups  -[facility] Name 03/02/23 1510          Elbow/Forearm (Therapeutic Exercise)    Elbow/Forearm (Therapeutic Exercise) AROM (active range of motion)  -     Elbow/Forearm AROM (Therapeutic Exercise) bilateral;flexion;extension;5 repetitions  -[facility] Name 03/02/23 1510          Motor Skills    Therapeutic Exercise shoulder;elbow/forearm  -[facility] Name 03/02/23 1510          Balance    Balance Assessment sitting static balance;sitting dynamic balance;standing static balance  -     Static Sitting Balance contact guard  -     Dynamic Sitting Balance minimal assist  -CS     Position, Sitting Balance sitting edge of bed  -     Static Standing Balance maximum assist;2-person assist  -     Position/Device Used, Standing Balance supported;walker, rolling  -     Balance Interventions sitting;standing;static;dynamic;dynamic reaching;occupation based/functional task;weight shifting activity  -           User Key  (r) = Recorded By, (t) = Taken By, (c) = Cosigned By    Initials Name Provider Type    CS Tereza Schwartz OT Occupational Therapist               Goals/Plan     [facility] Name 03/02/23 1512          Transfer Goal 1 (OT)    Activity/Assistive Device (Transfer Goal 1, OT) sit-to-stand/stand-to-sit;toilet  -     [place] Level/Cues Needed  (Transfer Goal 1, OT) moderate assist (50-74% patient effort)  -CS     Time Frame (Transfer Goal 1, OT) long term goal (LTG);10 days  -CS     Progress/Outcome (Transfer Goal 1, OT) goal ongoing  -CS     Row Name 03/02/23 1512          Dressing Goal 1 (OT)    Activity/Device (Dressing Goal 1, OT) lower body dressing  -CS     [place]/Cues Needed (Dressing Goal 1, OT) moderate assist (50-74% patient effort)  -CS     Time Frame (Dressing Goal 1, OT) long term goal (LTG);10 days  -CS     Strategies/Barriers (Dressing Goal 1, OT) don/doff socks w/ AAD  -CS     Progress/Outcome (Dressing Goal 1, OT) goal ongoing  -CS     Row Name 03/02/23 1512          Toileting Goal 1 (OT)    Activity/Device (Toileting Goal 1, OT) adjust/manage clothing;perform perineal hygiene  -CS     [place] Level/Cues Needed (Toileting Goal 1, OT) moderate assist (50-74% patient effort)  -CS     Time Frame (Toileting Goal 1, OT) long term goal (LTG);10 days  -CS     Progress/Outcome (Toileting Goal 1, OT) goal ongoing  -CS     Row Name 03/02/23 1512          Therapy Assessment/Plan (OT)    Planned Therapy Interventions (OT) activity tolerance training;BADL retraining;adaptive equipment training;occupation/activity based interventions;ROM/therapeutic exercise;strengthening exercise;transfer/mobility retraining;functional balance retraining  -CS           User Key  (r) = Recorded By, (t) = Taken By, (c) = Cosigned By    Initials Name Provider Type    CS Tereza Schwartz OT Occupational Therapist               Clinical Impression     Row Name 03/02/23 1511          Pain Assessment    Pretreatment Pain Rating 0/10 - no pain  -CS     Posttreatment Pain Rating 0/10 - no pain  -CS     Row Name 03/02/23 1511          Plan of Care Review    Plan of Care Reviewed With patient;spouse  -CS     Progress improving  -CS     Outcome Evaluation Pt presents w/ significant generalized weakness, limiting UE ROM, and balance deficits limiting functional  independence from baseline. Pt would benefit from cont skilled IPOT POC to facilitate return to PLOF. Recommend pt DC to SNF.  -CS     Row Name 03/02/23 1511          Therapy Assessment/Plan (OT)    Patient/Family Therapy Goal Statement (OT) Return to PLOF  -CS     Rehab Potential (OT) good, to achieve stated therapy goals  -CS     Criteria for Skilled Therapeutic Interventions Met (OT) yes;skilled treatment is necessary  -CS     Therapy Frequency (OT) daily  -CS     Row Name 03/02/23 1511          Therapy Plan Review/Discharge Plan (OT)    Anticipated Discharge Disposition (OT) skilled nursing facility  -CS     Row Name 03/02/23 1511          Vital Signs    Pre Systolic BP Rehab --  VSS  -CS     O2 Delivery Pre Treatment nasal cannula  -CS     O2 Delivery Intra Treatment nasal cannula  -CS     O2 Delivery Post Treatment nasal cannula  -CS     Pre Patient Position Supine  -CS     Intra Patient Position Standing  -CS     Post Patient Position Sitting  -CS     Row Name 03/02/23 1511          Positioning and Restraints    Pre-Treatment Position in bed  -CS     Post Treatment Position bed  -CS     In Bed notified nsg;sitting EOB;with PT;call light within reach;encouraged to call for assist;exit alarm on;with family/caregiver  -CS           User Key  (r) = Recorded By, (t) = Taken By, (c) = Cosigned By    Initials Name Provider Type    CS Tereza Schwartz, OT Occupational Therapist               Outcome Measures     Row Name 03/02/23 1513          How much help from another is currently needed...    Putting on and taking off regular lower body clothing? 1  -CS     Bathing (including washing, rinsing, and drying) 2  -CS     Toileting (which includes using toilet bed pan or urinal) 1  -CS     Putting on and taking off regular upper body clothing 2  -CS     Taking care of personal grooming (such as brushing teeth) 2  -CS     Eating meals 3  -CS     AM-PAC 6 Clicks Score (OT) 11  -CS     Row Name 03/02/23 8141          How  much help from another person do you currently need...    Turning from your back to your side while in flat bed without using bedrails? 3  -CM     Moving from lying on back to sitting on the side of a flat bed without bedrails? 3  -CM     Moving to and from a bed to a chair (including a wheelchair)? 2  -CM     Standing up from a chair using your arms (e.g., wheelchair, bedside chair)? 2  -CM     Climbing 3-5 steps with a railing? 1  -CM     To walk in hospital room? 2  -CM     AM-PAC 6 Clicks Score (PT) 13  -CM     Highest level of mobility 4 --> Transferred to chair/commode  -CM     Row Name 03/02/23 1513          Functional Assessment    Outcome Measure Options AM-PAC 6 Clicks Daily Activity (OT)  -CS           User Key  (r) = Recorded By, (t) = Taken By, (c) = Cosigned By    Initials Name Provider Type    CS Tereza Schwartz, OT Occupational Therapist    CM Radha Jenkins, PT Physical Therapist                Occupational Therapy Education     Title: PT OT SLP Therapies (In Progress)     Topic: Occupational Therapy (In Progress)     Point: ADL training (In Progress)     Description:   Instruct learner(s) on proper safety adaptation and remediation techniques during self care or transfers.   Instruct in proper use of assistive devices.              Learning Progress Summary           Patient Acceptance, E, NR by CS at 3/2/2023 1513                   Point: Home exercise program (Not Started)     Description:   Instruct learner(s) on appropriate technique for monitoring, assisting and/or progressing therapeutic exercises/activities.              Learner Progress:  Not documented in this visit.          Point: Precautions (In Progress)     Description:   Instruct learner(s) on prescribed precautions during self-care and functional transfers.              Learning Progress Summary           Patient Acceptance, E, NR by CS at 3/2/2023 1513                   Point: Body mechanics (In Progress)     Description:    Instruct learner(s) on proper positioning and spine alignment during self-care, functional mobility activities and/or exercises.              Learning Progress Summary           Patient Acceptance, E, NR by  at 3/2/2023 1513                               User Key     Initials Effective Dates Name Provider Type Discipline     09/02/21 -  Tereza Schwartz OT Occupational Therapist OT              OT Recommendation and Plan  Planned Therapy Interventions (OT): activity tolerance training, BADL retraining, adaptive equipment training, occupation/activity based interventions, ROM/therapeutic exercise, strengthening exercise, transfer/mobility retraining, functional balance retraining  Therapy Frequency (OT): daily  Plan of Care Review  Plan of Care Reviewed With: patient, spouse  Progress: improving  Outcome Evaluation: Pt presents w/ significant generalized weakness, limiting UE ROM, and balance deficits limiting functional independence from baseline. Pt would benefit from cont skilled IPOT POC to facilitate return to PLOF. Recommend pt DC to SNF.     Time Calculation:    Time Calculation- OT     Row Name 03/02/23 1514             Time Calculation- OT    OT Start Time 1330  -CS      OT Received On 03/02/23  -CS      OT Goal Re-Cert Due Date 03/12/23  -CS         Untimed Charges    OT Eval/Re-eval Minutes 46  -CS         Total Minutes    Untimed Charges Total Minutes 46  -CS       Total Minutes 46  -CS            User Key  (r) = Recorded By, (t) = Taken By, (c) = Cosigned By    Initials Name Provider Type     Tereza Schwartz OT Occupational Therapist              Therapy Charges for Today     Code Description Service Date Service Provider Modifiers Qty    [id number] HC OT EVAL LOW COMPLEXITY 4 3/2/2023 Tereza Schwartz OT GO 1               Tereza Schwartz OT  3/2/2023

## 2023-03-02 NOTE — PLAN OF CARE
Problem: Adult Inpatient Plan of Care  Goal: Plan of Care Review  Outcome: Ongoing, Not Progressing  Flowsheets (Taken 3/2/2023 0910)  Progress: no change  Plan of Care Reviewed With:   patient   spouse  Goal: Patient-Specific Goal (Individualized)  Outcome: Ongoing, Not Progressing  Goal: Absence of Hospital-Acquired Illness or Injury  Outcome: Ongoing, Not Progressing  Intervention: Identify and Manage Fall Risk  Recent Flowsheet Documentation  Taken 3/2/2023 0800 by Celina Tavares RN  Safety Promotion/Fall Prevention:   clutter free environment maintained   fall prevention program maintained   gait belt   lighting adjusted   muscle strengthening facilitated   nonskid shoes/slippers when out of bed   room organization consistent   safety round/check completed  Intervention: Prevent Skin Injury  Recent Flowsheet Documentation  Taken 3/2/2023 0800 by Celina Tavares, RN  Body Position:   turned   30 degrees   legs elevated   weight shifting  Skin Protection:   adhesive use limited   incontinence pads utilized   tubing/devices free from skin contact  Intervention: Prevent and Manage VTE (Venous Thromboembolism) Risk  Recent Flowsheet Documentation  Taken 3/2/2023 0800 by Celina Tavares, RN  Activity Management: bedrest  VTE Prevention/Management: (receiving eliquis)   bilateral   sequential compression devices off   patient refused intervention   other (see comments)  Range of Motion: active ROM (range of motion) encouraged  Intervention: Prevent Infection  Recent Flowsheet Documentation  Taken 3/2/2023 0800 by Celina Tavares, RN  Infection Prevention:   environmental surveillance performed   hand hygiene promoted   rest/sleep promoted   single patient room provided  Goal: Optimal Comfort and Wellbeing  Outcome: Ongoing, Not Progressing  Intervention: Provide Person-Centered Care  Recent Flowsheet Documentation  Taken 3/2/2023 0800 by Celina Tavares RN  Trust Relationship/Rapport:   care explained    choices provided  Goal: Readiness for Transition of Care  Outcome: Ongoing, Not Progressing     Problem: Fall Injury Risk  Goal: Absence of Fall and Fall-Related Injury  Outcome: Ongoing, Not Progressing  Intervention: Identify and Manage Contributors  Recent Flowsheet Documentation  Taken 3/2/2023 0800 by Celina Tavares RN  Medication Review/Management: medications reviewed  Self-Care Promotion: independence encouraged  Intervention: Promote Injury-Free Environment  Recent Flowsheet Documentation  Taken 3/2/2023 0800 by Celina Tavares RN  Safety Promotion/Fall Prevention:   clutter free environment maintained   fall prevention program maintained   gait belt   lighting adjusted   muscle strengthening facilitated   nonskid shoes/slippers when out of bed   room organization consistent   safety round/check completed     Problem: Skin Injury Risk Increased  Goal: Skin Health and Integrity  Outcome: Ongoing, Not Progressing  Intervention: Optimize Skin Protection  Recent Flowsheet Documentation  Taken 3/2/2023 0800 by Celina Tavares RN  Pressure Reduction Techniques:   frequent weight shift encouraged   heels elevated off bed   weight shift assistance provided  Head of Bed (HOB) Positioning: HOB at 30-45 degrees  Pressure Reduction Devices:   heel offloading device utilized   specialty bed utilized  Skin Protection:   adhesive use limited   incontinence pads utilized   tubing/devices free from skin contact   Goal Outcome Evaluation:  Plan of Care Reviewed With: patient, spouse        Progress: no change      no

## 2023-03-02 NOTE — THERAPY TREATMENT NOTE
Patient Name: Iain Dee  : 1935    MRN: [id number]                              Today's Date: 3/2/2023       Admit Date: 2023    Visit Dx:     ICD-10-CM ICD-9-CM   1. Seizure (HCC)  R56.9 780.39   2. Hypocalcemia  E83.51 275.41   3. Hypomagnesemia  E83.42 275.2   4. Hypokalemia  E87.6 276.8   5. Generalized weakness  R53.1 780.79   6. Leukocytosis, unspecified type  D72.829 288.60   7. Hyperglycemia  R73.9 790.29     Patient Active Problem List   Diagnosis   • AMS (altered mental status)   • Hypokalemia   • Hypomagnesemia   • Hypocalcemia   • Hypoalbuminemia   • Essential hypertension   • Atrial fibrillation (HCC)   • GERD (gastroesophageal reflux disease)   • Hyperglycemia   • Gout   • Diabetes mellitus (HCC)   • H/O Bladder carcinoma (HCC)   • JOLENE on CPAP   • Chronic anticoagulation (Eliquis)   • Alcohol abuse (1 to 2 glasses of bourbon a day)   • Seizures (HCC)     Past Medical History:   Diagnosis Date   • Arthritis    • Cancer (HCC)    • Diabetes mellitus (HCC)    • GERD (gastroesophageal reflux disease)    • Gout    • H/O Bladder carcinoma (HCC)    • Hypertension      Past Surgical History:   Procedure Laterality Date   • BLADDER SURGERY      Biopsy   • HERNIA REPAIR     • PROSTATE BIOPSY        General Information     Row Name 23 1449          Physical Therapy Time and Intention    Document Type therapy note (daily note)  -CM     Mode of Treatment physical therapy  -CM     Row Name 23 1449          General Information    Patient Profile Reviewed yes  -CM     Existing Precautions/Restrictions fall;seizures;oxygen therapy device and L/min  -CM     Barriers to Rehab medically complex;previous functional deficit  -CM     Row Name 23 1449          Cognition    Orientation Status (Cognition) oriented x 3  -CM     Row Name 23 1449          Safety Issues, Functional Mobility    Safety Issues Affecting Function (Mobility) awareness of need for assistance;insight into  deficits/self-awareness;safety precaution awareness;safety precautions follow-through/compliance;sequencing abilities  -CM     Impairments Affecting Function (Mobility) balance;coordination;endurance/activity tolerance;postural/trunk control;strength  -CM           User Key  (r) = Recorded By, (t) = Taken By, (c) = Cosigned By    Initials Name Provider Type    Radha Mackey PT Physical Therapist               Mobility     Row Name 03/02/23 1450          Bed Mobility    Comment, (Bed Mobility) EOB with OT upon therapist entry  -CM     Row Name 03/02/23 1450          Bed-Chair Transfer    Bed-Chair [place] (Transfers) maximum assist (25% patient effort);2 person assist;verbal cues  -CM     Assistive Device (Bed-Chair Transfers) other (see comments)  BUE support  -CM     Comment, (Bed-Chair Transfer) bed to chair, patient able to take shuffling steps toward chair, bilat knee block provided with LLE buckling noted with weight acceptance, patient demonstrates flexed posture throughout  -CM     Row Name 03/02/23 1450          Sit-Stand Transfer    Sit-Stand [place] (Transfers) maximum assist (25% patient effort);verbal cues;2 person assist  -CM     Assistive Device (Sit-Stand Transfers) walker, front-wheeled  -CM     Comment, (Sit-Stand Transfer) VCs for safe hand placement on walker, patient requires assist to clear hips from elevated bed height, verbal and tactile cues for upright posture upon standing with patient favoring flexed posture  -CM     Row Name 03/02/23 1450          Gait/Stairs (Locomotion)    Comment, (Gait/Stairs) deferred due to poor standing balance and fatigue with pivot transfer to chair  -CM           User Key  (r) = Recorded By, (t) = Taken By, (c) = Cosigned By    Initials Name Provider Type    Radha Mackey PT Physical Therapist               Obj/Interventions     Row Name 03/02/23 1453          Motor Skills    Therapeutic Exercise hip;knee;other (see comments)   encouraged these exercises multiple times daily, also educated on incentive spirometer use  -CM     Row Name 03/02/23 1453          Hip (Therapeutic Exercise)    Hip (Therapeutic Exercise) isometric exercises  -CM     Hip Isometrics (Therapeutic Exercise) bilateral;gluteal sets;5 repetitions;3 second hold  -CM     Row Name 03/02/23 1453          Knee (Therapeutic Exercise)    Knee (Therapeutic Exercise) isometric exercises  -CM     Knee Isometrics (Therapeutic Exercise) bilateral;quad sets;5 repetitions;3 second hold  -CM     Row Name 03/02/23 1453          Balance    Balance Assessment sitting static balance;standing static balance;standing dynamic balance  -CM     Static Sitting Balance contact guard  -CM     Position, Sitting Balance unsupported;sitting edge of bed  -CM     Static Standing Balance maximum assist;2-person assist;verbal cues  -CM     Dynamic Standing Balance maximum assist;2-person assist;verbal cues  -CM     Position/Device Used, Standing Balance supported;walker, front-wheeled;other (see comments)  BUE support for pivot, FWW for stand  -CM     Comment, Balance weightshifting L/R x5 completed in standing with L knee buckling noted, patient still with posterior lean in standing today  -CM           User Key  (r) = Recorded By, (t) = Taken By, (c) = Cosigned By    Initials Name Provider Type    CM Radha Jenkins, PT Physical Therapist               Goals/Plan    No documentation.                Clinical Impression     Row Name 03/02/23 1455          Pain    Pretreatment Pain Rating 0/10 - no pain  -CM     Posttreatment Pain Rating 0/10 - no pain  -CM     Row Name 03/02/23 1455          Plan of Care Review    Plan of Care Reviewed With patient;spouse  -CM     Progress improving  -CM     Outcome Evaluation Patient continues to be limited by BLE strength deficits, however showing improvement with ability to transfer bed to chair with maxAx2 today. He gave good effort with BLE therex. He remains  below his baseline mobility. Will continue with current PT POC with D/C rec to SNF.  -CM     Row Name 03/02/23 1455          Vital Signs    Pre Systolic BP Rehab --  MEKHI, RN cleared PT for treatment  -CM     Row Name 03/02/23 1452          Positioning and Restraints    Pre-Treatment Position in bed  -CM     Post Treatment Position chair  -CM     In Chair reclined;call light within reach;encouraged to call for assist;exit alarm on;with family/caregiver;waffle cushion;on mechanical lift sling  -CM           User Key  (r) = Recorded By, (t) = Taken By, (c) = Cosigned By    Initials Name Provider Type    Radha Mackey PT Physical Therapist               Outcome Measures     Row Name 03/02/23 8856          How much help from another person do you currently need...    Turning from your back to your side while in flat bed without using bedrails? 3  -CM     Moving from lying on back to sitting on the side of a flat bed without bedrails? 3  -CM     Moving to and from a bed to a chair (including a wheelchair)? 2  -CM     Standing up from a chair using your arms (e.g., wheelchair, bedside chair)? 2  -CM     Climbing 3-5 steps with a railing? 1  -CM     To walk in hospital room? 2  -CM     AM-PAC 6 Clicks Score (PT) 13  -CM     Highest level of mobility 4 --> Transferred to chair/commode  -CM           User Key  (r) = Recorded By, (t) = Taken By, (c) = Cosigned By    Initials Name Provider Type    Radha Mackey PT Physical Therapist                             Physical Therapy Education     Title: PT OT SLP Therapies (Done)     Topic: Physical Therapy (Done)     Point: Mobility training (Done)     Learning Progress Summary           Patient Acceptance, E, VU,NR by CM at 3/2/2023 1458    Acceptance, E, VU,NR by CM at 3/1/2023 1601   Significant Other Acceptance, E, VU,NR by CM at 3/2/2023 1458                   Point: Home exercise program (Done)     Learning Progress Summary           Patient Acceptance,  E, VU,NR by CM at 3/2/2023 1458   Significant Other Acceptance, E, VU,NR by CM at 3/2/2023 1458                   Point: Body mechanics (Done)     Learning Progress Summary           Patient Acceptance, E, VU,NR by CM at 3/2/2023 1458    Acceptance, E, VU,NR by CM at 3/1/2023 1601   Significant Other Acceptance, E, VU,NR by CM at 3/2/2023 1458                   Point: Precautions (Done)     Learning Progress Summary           Patient Acceptance, E, VU,NR by CM at 3/2/2023 1458    Acceptance, E, VU,NR by CM at 3/1/2023 1601   Significant Other Acceptance, E, VU,NR by CM at 3/2/2023 1458                               User Key     Initials Effective Dates Name Provider Type Discipline    CM 09/22/22 -  Radha Jenkins, PT Physical Therapist PT              PT Recommendation and Plan  Planned Therapy Interventions (PT): balance training, bed mobility training, gait training, home exercise program, postural re-education, patient/family education, motor coordination training, neuromuscular re-education, ROM (range of motion), stair training, strengthening, transfer training  Plan of Care Reviewed With: patient, spouse  Progress: improving  Outcome Evaluation: Patient continues to be limited by BLE strength deficits, however showing improvement with ability to transfer bed to chair with maxAx2 today. He gave good effort with BLE therex. He remains below his baseline mobility. Will continue with current PT POC with D/C rec to SNF.     Time Calculation:    PT Charges     Row Name 03/02/23 1458             Time Calculation    Start Time 1358  -CM      PT Received On 03/02/23  -CM      PT Goal Re-Cert Due Date 03/11/23  -CM         Timed Charges    81626 - PT Therapeutic Activity Minutes 28  -CM         Total Minutes    Timed Charges Total Minutes 28  -CM       Total Minutes 28  -CM            User Key  (r) = Recorded By, (t) = Taken By, (c) = Cosigned By    Initials Name Provider Type    Radha Mackey, PT  Physical Therapist              Therapy Charges for Today     Code Description Service Date Service Provider Modifiers Qty    09269236088 HC PT EVAL MOD COMPLEXITY 4 3/1/2023 Radha Jenkins, PT GP 1    08848486561 HC PT THERAPEUTIC ACT EA 15 MIN 3/2/2023 Radha Jenkins, PT GP 2          PT G-Codes  Outcome Measure Options: AM-PAC 6 Clicks Basic Mobility (PT)  AM-PAC 6 Clicks Score (PT): 13  PT Discharge Summary  Anticipated Discharge Disposition (PT): skilled nursing facility    Radha Jenkins, PT  3/2/2023

## 2023-03-02 NOTE — PLAN OF CARE
Goal Outcome Evaluation:  Plan of Care Reviewed With: patient, spouse        Progress: improving  Outcome Evaluation: Pt presents w/ significant generalized weakness, limiting UE ROM, and balance deficits limiting functional independence from baseline. Pt would benefit from cont skilled IPOT POC to facilitate return to PLOF. Recommend pt DC to SNF.

## 2023-03-03 LAB
ANION GAP SERPL CALCULATED.3IONS-SCNC: 9 MMOL/L (ref 5–15)
BASOPHILS # BLD AUTO: 0.03 10*3/MM3 (ref 0–0.2)
BASOPHILS NFR BLD AUTO: 0.3 % (ref 0–1.5)
BUN SERPL-MCNC: 27 MG/DL (ref 8–23)
BUN/CREAT SERPL: 24.1 (ref 7–25)
CA-I SERPL ISE-MCNC: 1.15 MMOL/L (ref 1.12–1.32)
CALCIUM SPEC-SCNC: 7.7 MG/DL (ref 8.6–10.5)
CHLORIDE SERPL-SCNC: 96 MMOL/L (ref 98–107)
CO2 SERPL-SCNC: 29 MMOL/L (ref 22–29)
CREAT SERPL-MCNC: 1.12 MG/DL (ref 0.76–1.27)
DEPRECATED RDW RBC AUTO: 44.9 FL (ref 37–54)
EGFRCR SERPLBLD CKD-EPI 2021: 64 ML/MIN/1.73
EOSINOPHIL # BLD AUTO: 0.16 10*3/MM3 (ref 0–0.4)
EOSINOPHIL NFR BLD AUTO: 1.8 % (ref 0.3–6.2)
ERYTHROCYTE [DISTWIDTH] IN BLOOD BY AUTOMATED COUNT: 12 % (ref 12.3–15.4)
GLUCOSE BLDC GLUCOMTR-MCNC: 190 MG/DL (ref 70–130)
GLUCOSE BLDC GLUCOMTR-MCNC: 229 MG/DL (ref 70–130)
GLUCOSE SERPL-MCNC: 167 MG/DL (ref 65–99)
HCT VFR BLD AUTO: 34.3 % (ref 37.5–51)
HGB BLD-MCNC: 10.8 G/DL (ref 13–17.7)
IMM GRANULOCYTES # BLD AUTO: 0.04 10*3/MM3 (ref 0–0.05)
IMM GRANULOCYTES NFR BLD AUTO: 0.4 % (ref 0–0.5)
LYMPHOCYTES # BLD AUTO: 0.36 10*3/MM3 (ref 0.7–3.1)
LYMPHOCYTES NFR BLD AUTO: 4 % (ref 19.6–45.3)
MAGNESIUM SERPL-MCNC: 2.2 MG/DL (ref 1.6–2.4)
MCH RBC QN AUTO: 32 PG (ref 26.6–33)
MCHC RBC AUTO-ENTMCNC: 31.5 G/DL (ref 31.5–35.7)
MCV RBC AUTO: 101.8 FL (ref 79–97)
MONOCYTES # BLD AUTO: 0.55 10*3/MM3 (ref 0.1–0.9)
MONOCYTES NFR BLD AUTO: 6.1 % (ref 5–12)
NEUTROPHILS NFR BLD AUTO: 7.85 10*3/MM3 (ref 1.7–7)
NEUTROPHILS NFR BLD AUTO: 87.4 % (ref 42.7–76)
NRBC BLD AUTO-RTO: 0 /100 WBC (ref 0–0.2)
PHOSPHATE SERPL-MCNC: 3.3 MG/DL (ref 2.5–4.5)
PLATELET # BLD AUTO: 295 10*3/MM3 (ref 140–450)
PMV BLD AUTO: 11.1 FL (ref 6–12)
POTASSIUM SERPL-SCNC: 4.7 MMOL/L (ref 3.5–5.2)
RBC # BLD AUTO: 3.37 10*6/MM3 (ref 4.14–5.8)
SODIUM SERPL-SCNC: 134 MMOL/L (ref 136–145)
WBC NRBC COR # BLD: 8.99 10*3/MM3 (ref 3.4–10.8)

## 2023-03-03 PROCEDURE — 85025 COMPLETE CBC W/AUTO DIFF WBC: CPT | Performed by: INTERNAL MEDICINE

## 2023-03-03 PROCEDURE — 94799 UNLISTED PULMONARY SVC/PX: CPT

## 2023-03-03 PROCEDURE — 94660 CPAP INITIATION&MGMT: CPT

## 2023-03-03 PROCEDURE — 82330 ASSAY OF CALCIUM: CPT | Performed by: INTERNAL MEDICINE

## 2023-03-03 PROCEDURE — 84100 ASSAY OF PHOSPHORUS: CPT | Performed by: INTERNAL MEDICINE

## 2023-03-03 PROCEDURE — 82962 GLUCOSE BLOOD TEST: CPT

## 2023-03-03 PROCEDURE — 99232 SBSQ HOSP IP/OBS MODERATE 35: CPT | Performed by: INTERNAL MEDICINE

## 2023-03-03 PROCEDURE — 80048 BASIC METABOLIC PNL TOTAL CA: CPT | Performed by: INTERNAL MEDICINE

## 2023-03-03 PROCEDURE — 83735 ASSAY OF MAGNESIUM: CPT

## 2023-03-03 PROCEDURE — 63710000001 INSULIN LISPRO (HUMAN) PER 5 UNITS: Performed by: INTERNAL MEDICINE

## 2023-03-03 PROCEDURE — 94664 DEMO&/EVAL PT USE INHALER: CPT

## 2023-03-03 RX ORDER — DEXTROSE MONOHYDRATE 25 G/50ML
25 INJECTION, SOLUTION INTRAVENOUS
Status: DISCONTINUED | OUTPATIENT
Start: 2023-03-03 | End: 2023-03-06 | Stop reason: HOSPADM

## 2023-03-03 RX ORDER — INSULIN LISPRO 100 [IU]/ML
0-7 INJECTION, SOLUTION INTRAVENOUS; SUBCUTANEOUS
Status: DISCONTINUED | OUTPATIENT
Start: 2023-03-03 | End: 2023-03-06 | Stop reason: HOSPADM

## 2023-03-03 RX ORDER — NICOTINE POLACRILEX 4 MG
15 LOZENGE BUCCAL
Status: DISCONTINUED | OUTPATIENT
Start: 2023-03-03 | End: 2023-03-06 | Stop reason: HOSPADM

## 2023-03-03 RX ADMIN — Medication 400 MG: at 09:50

## 2023-03-03 RX ADMIN — MULTIVITAMIN TABLET 1 TABLET: TABLET at 09:50

## 2023-03-03 RX ADMIN — AMLODIPINE BESYLATE 5 MG: 5 TABLET ORAL at 09:50

## 2023-03-03 RX ADMIN — LOSARTAN POTASSIUM 50 MG: 50 TABLET, FILM COATED ORAL at 09:50

## 2023-03-03 RX ADMIN — PANTOPRAZOLE SODIUM 40 MG: 40 TABLET, DELAYED RELEASE ORAL at 09:50

## 2023-03-03 RX ADMIN — Medication 12.5 MG: at 09:50

## 2023-03-03 RX ADMIN — SENNOSIDES AND DOCUSATE SODIUM 2 TABLET: 8.6; 5 TABLET ORAL at 09:50

## 2023-03-03 RX ADMIN — APIXABAN 2.5 MG: 2.5 TABLET, FILM COATED ORAL at 09:50

## 2023-03-03 RX ADMIN — APIXABAN 2.5 MG: 2.5 TABLET, FILM COATED ORAL at 20:32

## 2023-03-03 RX ADMIN — INSULIN LISPRO 3 UNITS: 100 INJECTION, SOLUTION INTRAVENOUS; SUBCUTANEOUS at 17:34

## 2023-03-03 RX ADMIN — LOSARTAN POTASSIUM 50 MG: 50 TABLET, FILM COATED ORAL at 20:32

## 2023-03-03 RX ADMIN — Medication 12.5 MG: at 20:32

## 2023-03-03 RX ADMIN — CLONIDINE HYDROCHLORIDE 0.2 MG: 0.2 TABLET ORAL at 09:50

## 2023-03-03 RX ADMIN — POLYETHYLENE GLYCOL 3350 17 G: 17 POWDER, FOR SOLUTION ORAL at 10:17

## 2023-03-03 RX ADMIN — SENNOSIDES AND DOCUSATE SODIUM 2 TABLET: 8.6; 5 TABLET ORAL at 20:33

## 2023-03-03 RX ADMIN — CLONIDINE HYDROCHLORIDE 0.2 MG: 0.2 TABLET ORAL at 20:32

## 2023-03-03 RX ADMIN — BISACODYL 5 MG: 5 TABLET, COATED ORAL at 17:34

## 2023-03-03 RX ADMIN — INSULIN LISPRO 2 UNITS: 100 INJECTION, SOLUTION INTRAVENOUS; SUBCUTANEOUS at 13:33

## 2023-03-03 RX ADMIN — IPRATROPIUM BROMIDE AND ALBUTEROL SULFATE 3 ML: 2.5; .5 SOLUTION RESPIRATORY (INHALATION) at 19:06

## 2023-03-03 RX ADMIN — Medication 10 ML: at 09:52

## 2023-03-03 RX ADMIN — Medication 400 MG: at 20:33

## 2023-03-03 RX ADMIN — Medication 10 ML: at 20:33

## 2023-03-03 RX ADMIN — AMLODIPINE BESYLATE 5 MG: 5 TABLET ORAL at 20:32

## 2023-03-03 NOTE — CASE MANAGEMENT/SOCIAL WORK
Continued Stay Note  [facility]     Patient Name: Iain Dee  MRN: [id number]  Today's Date: 3/3/2023    Admit Date: 2/27/2023    Plan: SNF   Discharge Plan     Row Name 03/03/23 0916       Plan    Plan SNF    Patient/Family in Agreement with Plan yes    Plan Comments Spoke with  and Mrs. Dee at bedside to discuss therapy recommendations for SNF.  Patient is agreeable to SNF and Mrs. Dee would like referrals to [facility] and [facility], and [facility] as third choice.  CM called referrals to Aleksandra at [facility]. No insurance precert will be needed.  Patient will need Covid test within 24 hrs of discharge.  CM will continue to follow and assist with discharge plan.    Final Discharge Disposition Code 03 - skilled nursing facility (SNF)               Discharge Codes    No documentation.               Expected Discharge Date and Time     Expected Discharge Date Expected Discharge Time    Mar 6, 2023             Giovanna Wright RN

## 2023-03-03 NOTE — PROGRESS NOTES
[facility] Medicine Services  PROGRESS NOTE    Patient Name: Iain Dee  : 1935  MRN: [id number]    Date of Admission: 2023  Primary Care Physician: Luis Aguilar DO    Subjective   Subjective     CC: f/u sz    HPI: Up in bed with wife in room. Mentally doing much better, nearly back to normal. Is very weak and unable to walk.    ROS:  Gen- No fevers, chills  CV- No chest pain, palpitations  Resp- No cough, dyspnea  GI- No N/V/D, abd pain     Objective   Objective     Vital Signs:   Temp:  [97.8 °F (36.6 °C)-99.2 °F (37.3 °C)] 97.8 °F (36.6 °C)  Heart Rate:  [] 101  Resp:  [16-18] 17  BP: (111-140)/(61-94) 136/75  Flow (L/min):  [4] 4     Physical Exam:  Constitutional: No acute distress, awake, alert, elderly male  HENT: NCAT, mucous membranes moist  Respiratory: Clear to auscultation bilaterally, respiratory effort normal   Cardiovascular: RRR, no murmurs, rubs, or gallops  Gastrointestinal: Positive bowel sounds, soft, nontender, nondistended  Musculoskeletal: No bilateral ankle edema  Psychiatric: Appropriate affect, cooperative  Neurologic: Oriented x 3, strength symmetric in all extremities, Cranial Nerves grossly intact to confrontation, speech clear  Skin: No rashes    Results Reviewed:  LAB RESULTS:      Lab 23  0335 23  0444 23  0401 23  0456 23  1712 23  1319   WBC 8.99 13.00* 12.77* 13.66*  --  14.31*   HEMOGLOBIN 10.8* 11.4* 12.1* 12.9*  --  13.2   HEMATOCRIT 34.3* 35.8* 37.4* 40.1  --  40.3   PLATELETS 295 324 353 383  --  421   NEUTROS ABS 7.85* 11.60* 11.33*  --   --  12.23*   IMMATURE GRANS (ABS) 0.04 0.05 0.07*  --   --  0.09*   LYMPHS ABS 0.36* 0.42* 0.48*  --   --  1.04   MONOS ABS 0.55 0.83 0.75  --   --  0.83   EOS ABS 0.16 0.06 0.08  --   --  0.03   .8* 102.9* 102.5* 101.3*  --  101.0*   PROCALCITONIN  --   --   --   --   --  0.18   LACTATE  --   --   --   --  1.2 4.8*   PROTIME  --   --   --    --   --  19.5*         Lab 03/03/23  0335 03/02/23  0444 03/01/23  0401 02/28/23  1748 02/28/23  1130 02/28/23  0456 02/27/23  1505 02/27/23  1319   SODIUM 134* 130* 138  --   --  137  --  137   POTASSIUM 4.7 4.7 3.9 3.9  --  3.3*  --  3.2*   CHLORIDE 96* 96* 99  --   --  95*  --  94*   CO2 29.0 22.0 30.0*  --   --  28.0  --  26.0   ANION GAP 9.0 12.0 9.0  --   --  14.0  --  17.0*   BUN 27* 22 17  --   --  16  --  20   CREATININE 1.12 1.31* 1.19  --   --  1.05  --  1.18   EGFR 64.0 53.0* 59.5*  --   --  69.1  --  60.1   GLUCOSE 167* 162* 172*  --   --  147*  --  215*   CALCIUM 7.7* 7.1* 7.1*  --   --  7.2*  --  5.8*   IONIZED CALCIUM 1.15 1.03* 1.03*  --  0.97*  --  0.78*  --    MAGNESIUM 2.2 1.8 2.3  --  1.8  --   --  0.4*   PHOSPHORUS 3.3  --  3.8  --   --  3.8  --  4.7*   TSH  --   --   --   --   --   --   --  3.350         Lab 02/28/23  0456 02/27/23  1319   TOTAL PROTEIN 7.1 6.8   ALBUMIN 3.6 3.5   GLOBULIN 3.5 3.3   ALT (SGPT) 21 20   AST (SGOT) 30 32   BILIRUBIN 1.2 1.0   ALK PHOS 86 79         Lab 02/27/23  1712 02/27/23  1319   PROBNP  --  4,790.0*   HSTROP T 97* 109*   PROTIME  --  19.5*   INR  --  1.65*                 Brief Urine Lab Results  (Last result in the past 365 days)      Color   Clarity   Blood   Leuk Est   Nitrite   Protein   CREAT   Urine HCG        02/27/23 1358 Dark Yellow   Clear   Moderate (2+)   Negative   Negative   >=300 mg/dL (3+)                 Microbiology Results Abnormal     Procedure Component Value - Date/Time    Blood Culture - Blood, Arm, Right [[id number]]  (Normal) Collected: 02/27/23 1323    Lab Status: Preliminary result Specimen: Blood from Arm, Right Updated: 03/02/23 1345     Blood Culture No growth at 3 days    Blood Culture - Blood, Hand, Right [[id number]]  (Normal) Collected: 02/27/23 1326    Lab Status: Preliminary result Specimen: Blood from Hand, Right Updated: 03/02/23 1345     Blood Culture No growth at 3 days    COVID-19 and FLU A/B PCR - Swab, Nasopharynx  [[id number]]  (Normal) Collected: 02/27/23 1344    Lab Status: Final result Specimen: Swab from Nasopharynx Updated: 02/27/23 1439     COVID19 Not Detected     Influenza A PCR Not Detected     Influenza B PCR Not Detected    Narrative:      Fact sheet for providers: https://www.fda.gov/media/199456/download    Fact sheet for patients: https://www.fda.gov/media/494227/download    Test performed by PCR.          Adult Transthoracic Echo Complete W/ Cont if Necessary Per Protocol    Result Date: 3/1/2023  •  Left ventricular systolic function is normal. Estimated left ventricular EF = 55% •  Left ventricular diastolic function was indeterminate. •  Mild aortic insufficiency is present. •  Mild mitral regurgitation is present. •  Estimated right ventricular systolic pressure from tricuspid regurgitation is normal (<35 mmHg). •  Mild dilation of the ascending aorta is present. •  Rate controlled atrial fibrillation noted throughout exam       Results for orders placed during the hospital encounter of 02/27/23    Adult Transthoracic Echo Complete W/ Cont if Necessary Per Protocol    Interpretation Summary  •  Left ventricular systolic function is normal. Estimated left ventricular EF = 55%  •  Left ventricular diastolic function was indeterminate.  •  Mild aortic insufficiency is present.  •  Mild mitral regurgitation is present.  •  Estimated right ventricular systolic pressure from tricuspid regurgitation is normal (<35 mmHg).  •  Mild dilation of the ascending aorta is present.  •  Rate controlled atrial fibrillation noted throughout exam      Current medications:  Scheduled Meds:amLODIPine, 5 mg, Oral, BID  apixaban, 2.5 mg, Oral, BID  cloNIDine, 0.2 mg, Oral, BID  losartan, 50 mg, Oral, BID  magnesium oxide, 400 mg, Oral, BID  metoprolol tartrate, 12.5 mg, Oral, BID  multivitamin, 1 tablet, Oral, Daily  pantoprazole, 40 mg, Oral, Q AM  senna-docusate sodium, 2 tablet, Oral, BID  sodium chloride, 10 mL, Intravenous,  Q12H      Continuous Infusions:   PRN Meds:.•  acetaminophen **OR** acetaminophen  •  senna-docusate sodium **AND** polyethylene glycol **AND** bisacodyl **AND** bisacodyl  •  Calcium Replacement - Initiate Nurse / BPA Driven Protocol  •  ipratropium-albuterol  •  LORazepam  •  Magnesium Standard Dose Replacement - Initiate Nurse / BPA Driven Protocol  •  magnesium sulfate  •  melatonin  •  Phosphorus Replacement - Initiate Nurse / BPA Driven Protocol  •  Potassium Replacement - Initiate Nurse / BPA Driven Protocol  •  sodium chloride  •  sodium chloride    Assessment & Plan   Assessment & Plan     Active Hospital Problems    Diagnosis  POA   • **Seizures (HCC) [R56.9]  Yes   • Alcohol abuse (1 to 2 glasses of bourbon a day) [F10.10]  Yes   • Hypomagnesemia [E83.42]  Yes   • Hypokalemia [E87.6]  Yes   • Hypocalcemia [E83.51]  Yes   • Atrial fibrillation (HCC) [I48.91]  Yes   • Chronic anticoagulation (Eliquis) [Z79.01]  Not Applicable   • Essential hypertension [I10]  Yes   • GERD (gastroesophageal reflux disease) [K21.9]  Yes   • JOLENE on CPAP [G47.33, Z99.89]  Not Applicable   • Diabetes mellitus (HCC) [E11.9]  Yes      Resolved Hospital Problems   No resolved problems to display.        Brief Hospital Course to date:  Iain Dee is a 87-year-old male with past medical history significant for atrial fibrillation, on Eliquis, bladder cancer status post surgery, hypertension, obstructive sleep apnea, gout, and GERD.  Who presents to the [facility] ICU on 2/27/2023 after having a seizure at home.  On arrival to the ER he was found to have severe metabolic abnormalities with a severely low calcium and magnesium.  He had a similar event 1 year ago in December 2021 at that time he had also informed us that he drinks 1 to 2 glasses of bourbon every day and had signs of alcohol withdrawal while hospitalized.  He was given a Keppra load in the ER and admitted to the ICU for closer monitoring. Per the  patient's wife he has not been eating much lately he continues to have at least 1-2 drinks per day especially in social situations.  Is a has been feeling full recently which is why he has not been eating.  Also has had significant constipation issues.  She states that other than that he has been in his normal state of health.  The reason that she was mainly having him come in today was because he could not walk on his own. Transferred out of ICU 3/3.    This patient's problems and plans were partially entered by my partner and updated as appropriate by me 03/03/23.    Seizures   Multiple electrolyte disturbances  --Neurology has seen, felt sz due to metabolic derangements. Continue to watch off keppra.   --Electrolyte disturbances are due to poor intake, have been replaced. CTM.  --PT/OT recs SNF, referrals pending.    A fib  HTN  --Rate controlled. Continue metoprolol, eliquis.  --Continue amlodpine.    DM2  --SSI    ETOH use    Expected Discharge Location and Transportation:   Expected Discharge   Expected Discharge Date and Time     Expected Discharge Date Expected Discharge Time    Mar 6, 2023            DVT prophylaxis:  Medical DVT prophylaxis orders are present.     AM-PAC 6 Clicks Score (PT): 13 (03/02/23 2272)    CODE STATUS:   Code Status and Medical Interventions:   Ordered at: 02/27/23 1430     Code Status (Patient has no pulse and is not breathing):    CPR (Attempt to Resuscitate)     Medical Interventions (Patient has pulse or is breathing):    Full Support       Wendy Oconnor II, DO  03/03/23

## 2023-03-04 LAB
ANION GAP SERPL CALCULATED.3IONS-SCNC: 9 MMOL/L (ref 5–15)
BACTERIA SPEC AEROBE CULT: NORMAL
BACTERIA SPEC AEROBE CULT: NORMAL
BUN SERPL-MCNC: 26 MG/DL (ref 8–23)
BUN/CREAT SERPL: 22.8 (ref 7–25)
CALCIUM SPEC-SCNC: 8.1 MG/DL (ref 8.6–10.5)
CHLORIDE SERPL-SCNC: 96 MMOL/L (ref 98–107)
CO2 SERPL-SCNC: 29 MMOL/L (ref 22–29)
CREAT SERPL-MCNC: 1.14 MG/DL (ref 0.76–1.27)
EGFRCR SERPLBLD CKD-EPI 2021: 62.6 ML/MIN/1.73
GLUCOSE BLDC GLUCOMTR-MCNC: 175 MG/DL (ref 70–130)
GLUCOSE BLDC GLUCOMTR-MCNC: 177 MG/DL (ref 70–130)
GLUCOSE BLDC GLUCOMTR-MCNC: 197 MG/DL (ref 70–130)
GLUCOSE BLDC GLUCOMTR-MCNC: 366 MG/DL (ref 70–130)
GLUCOSE SERPL-MCNC: 173 MG/DL (ref 65–99)
POTASSIUM SERPL-SCNC: 5.1 MMOL/L (ref 3.5–5.2)
SODIUM SERPL-SCNC: 134 MMOL/L (ref 136–145)

## 2023-03-04 PROCEDURE — 63710000001 INSULIN LISPRO (HUMAN) PER 5 UNITS: Performed by: INTERNAL MEDICINE

## 2023-03-04 PROCEDURE — 63710000001 INSULIN DETEMIR PER 5 UNITS: Performed by: INTERNAL MEDICINE

## 2023-03-04 PROCEDURE — 94660 CPAP INITIATION&MGMT: CPT

## 2023-03-04 PROCEDURE — 99232 SBSQ HOSP IP/OBS MODERATE 35: CPT | Performed by: INTERNAL MEDICINE

## 2023-03-04 PROCEDURE — 94799 UNLISTED PULMONARY SVC/PX: CPT

## 2023-03-04 PROCEDURE — 80048 BASIC METABOLIC PNL TOTAL CA: CPT | Performed by: INTERNAL MEDICINE

## 2023-03-04 PROCEDURE — 82962 GLUCOSE BLOOD TEST: CPT

## 2023-03-04 RX ADMIN — Medication 400 MG: at 08:30

## 2023-03-04 RX ADMIN — MULTIVITAMIN TABLET 1 TABLET: TABLET at 08:29

## 2023-03-04 RX ADMIN — AMLODIPINE BESYLATE 5 MG: 5 TABLET ORAL at 19:47

## 2023-03-04 RX ADMIN — LOSARTAN POTASSIUM 50 MG: 50 TABLET, FILM COATED ORAL at 19:47

## 2023-03-04 RX ADMIN — SENNOSIDES AND DOCUSATE SODIUM 2 TABLET: 8.6; 5 TABLET ORAL at 19:48

## 2023-03-04 RX ADMIN — PANTOPRAZOLE SODIUM 40 MG: 40 TABLET, DELAYED RELEASE ORAL at 08:28

## 2023-03-04 RX ADMIN — Medication 10 ML: at 19:48

## 2023-03-04 RX ADMIN — METOPROLOL TARTRATE 25 MG: 25 TABLET, FILM COATED ORAL at 19:48

## 2023-03-04 RX ADMIN — INSULIN LISPRO 2 UNITS: 100 INJECTION, SOLUTION INTRAVENOUS; SUBCUTANEOUS at 14:29

## 2023-03-04 RX ADMIN — INSULIN LISPRO 6 UNITS: 100 INJECTION, SOLUTION INTRAVENOUS; SUBCUTANEOUS at 18:01

## 2023-03-04 RX ADMIN — CLONIDINE HYDROCHLORIDE 0.2 MG: 0.2 TABLET ORAL at 19:47

## 2023-03-04 RX ADMIN — INSULIN DETEMIR 10 UNITS: 100 INJECTION, SOLUTION SUBCUTANEOUS at 19:46

## 2023-03-04 RX ADMIN — Medication 10 MG: at 08:22

## 2023-03-04 RX ADMIN — AMLODIPINE BESYLATE 5 MG: 5 TABLET ORAL at 08:28

## 2023-03-04 RX ADMIN — APIXABAN 2.5 MG: 2.5 TABLET, FILM COATED ORAL at 08:28

## 2023-03-04 RX ADMIN — CLONIDINE HYDROCHLORIDE 0.2 MG: 0.2 TABLET ORAL at 08:28

## 2023-03-04 RX ADMIN — Medication 400 MG: at 19:48

## 2023-03-04 RX ADMIN — Medication 10 ML: at 08:30

## 2023-03-04 RX ADMIN — LOSARTAN POTASSIUM 50 MG: 50 TABLET, FILM COATED ORAL at 08:28

## 2023-03-04 RX ADMIN — INSULIN LISPRO 2 UNITS: 100 INJECTION, SOLUTION INTRAVENOUS; SUBCUTANEOUS at 08:29

## 2023-03-04 RX ADMIN — APIXABAN 2.5 MG: 2.5 TABLET, FILM COATED ORAL at 19:48

## 2023-03-04 RX ADMIN — METOPROLOL TARTRATE 25 MG: 25 TABLET, FILM COATED ORAL at 08:28

## 2023-03-04 NOTE — PLAN OF CARE
Goal Outcome Evaluation:  Plan of Care Reviewed With: patient, spouse  Progress: improving  Outcome Evaluation: VSS. Alert and oriented x3 except situation and needing reorienting at times. Patient had a nose bleed this morning; humidification added to oxygen and pressure applied. Nosebleed resolved. Up to chair with lift for majority of shift. No seizure activity noted. Patient has a good appetite. Small bowel movement noted after bisacodyl suppository.

## 2023-03-04 NOTE — PROGRESS NOTES
[facility] Medicine Services  PROGRESS NOTE    Patient Name: Iain Dee  : 1935  MRN: [id number]    Date of Admission: 2023  Primary Care Physician: Luis Aguilar DO    Subjective   Subjective     CC: f/u SZ    HPI: Up in bed with wife in room. Had a good night. Has no complaints this am aside from not liking to be in hospital.    ROS:  Gen- No fevers, chills  CV- No chest pain, palpitations  Resp- No cough, dyspnea  GI- No N/V/D, abd pain     Objective   Objective     Vital Signs:   Temp:  [97.3 °F (36.3 °C)-98.6 °F (37 °C)] 97.9 °F (36.6 °C)  Heart Rate:  [] 77  Resp:  [16-24] 24  BP: (122-159)/() 159/121  Flow (L/min):  [4] 4     Physical Exam:  Constitutional: No acute distress, awake, alert, elderly male  HENT: NCAT, mucous membranes moist  Respiratory: Clear to auscultation bilaterally, respiratory effort normal   Cardiovascular: RRR, no murmurs, rubs, or gallops  Gastrointestinal: Positive bowel sounds, soft, nontender, nondistended  Musculoskeletal: No bilateral ankle edema  Psychiatric: Appropriate affect, cooperative  Neurologic: Oriented x 3, globally weak, strength symmetric in all extremities, Cranial Nerves grossly intact to confrontation, speech clear  Skin: No rashes    Results Reviewed:  LAB RESULTS:      Lab 23  0335 23  0444 23  0401 23  0456 23  1712 23  1319   WBC 8.99 13.00* 12.77* 13.66*  --  14.31*   HEMOGLOBIN 10.8* 11.4* 12.1* 12.9*  --  13.2   HEMATOCRIT 34.3* 35.8* 37.4* 40.1  --  40.3   PLATELETS 295 324 353 383  --  421   NEUTROS ABS 7.85* 11.60* 11.33*  --   --  12.23*   IMMATURE GRANS (ABS) 0.04 0.05 0.07*  --   --  0.09*   LYMPHS ABS 0.36* 0.42* 0.48*  --   --  1.04   MONOS ABS 0.55 0.83 0.75  --   --  0.83   EOS ABS 0.16 0.06 0.08  --   --  0.03   .8* 102.9* 102.5* 101.3*  --  101.0*   PROCALCITONIN  --   --   --   --   --  0.18   LACTATE  --   --   --   --  1.2 4.8*   PROTIME   --   --   --   --   --  19.5*         Lab 03/04/23  0454 03/03/23  0335 03/02/23  0444 03/01/23  0401 02/28/23  1748 02/28/23  1130 02/28/23  0456 02/27/23  1505 02/27/23  1319   SODIUM 134* 134* 130* 138  --   --  137  --  137   POTASSIUM 5.1 4.7 4.7 3.9 3.9  --  3.3*  --  3.2*   CHLORIDE 96* 96* 96* 99  --   --  95*  --  94*   CO2 29.0 29.0 22.0 30.0*  --   --  28.0  --  26.0   ANION GAP 9.0 9.0 12.0 9.0  --   --  14.0  --  17.0*   BUN 26* 27* 22 17  --   --  16  --  20   CREATININE 1.14 1.12 1.31* 1.19  --   --  1.05  --  1.18   EGFR 62.6 64.0 53.0* 59.5*  --   --  69.1  --  60.1   GLUCOSE 173* 167* 162* 172*  --   --  147*  --  215*   CALCIUM 8.1* 7.7* 7.1* 7.1*  --   --  7.2*  --  5.8*   IONIZED CALCIUM  --  1.15 1.03* 1.03*  --  0.97*  --  0.78*  --    MAGNESIUM  --  2.2 1.8 2.3  --  1.8  --   --  0.4*   PHOSPHORUS  --  3.3  --  3.8  --   --  3.8  --  4.7*   TSH  --   --   --   --   --   --   --   --  3.350         Lab 02/28/23  0456 02/27/23  1319   TOTAL PROTEIN 7.1 6.8   ALBUMIN 3.6 3.5   GLOBULIN 3.5 3.3   ALT (SGPT) 21 20   AST (SGOT) 30 32   BILIRUBIN 1.2 1.0   ALK PHOS 86 79         Lab 02/27/23  1712 02/27/23  1319   PROBNP  --  4,790.0*   HSTROP T 97* 109*   PROTIME  --  19.5*   INR  --  1.65*                 Brief Urine Lab Results  (Last result in the past 365 days)      Color   Clarity   Blood   Leuk Est   Nitrite   Protein   CREAT   Urine HCG        02/27/23 1358 Dark Yellow   Clear   Moderate (2+)   Negative   Negative   >=300 mg/dL (3+)                 Microbiology Results Abnormal     Procedure Component Value - Date/Time    Blood Culture - Blood, Hand, Right [[id number]]  (Normal) Collected: 02/27/23 1326    Lab Status: Preliminary result Specimen: Blood from Hand, Right Updated: 03/03/23 1345     Blood Culture No growth at 4 days    Blood Culture - Blood, Arm, Right [[id number]]  (Normal) Collected: 02/27/23 1323    Lab Status: Preliminary result Specimen: Blood from Arm, Right Updated:  03/03/23 1345     Blood Culture No growth at 4 days    COVID-19 and FLU A/B PCR - Swab, Nasopharynx [[id number]]  (Normal) Collected: 02/27/23 1344    Lab Status: Final result Specimen: Swab from Nasopharynx Updated: 02/27/23 1439     COVID19 Not Detected     Influenza A PCR Not Detected     Influenza B PCR Not Detected    Narrative:      Fact sheet for providers: https://www.fda.gov/media/039316/download    Fact sheet for patients: https://www.fda.gov/media/874408/download    Test performed by PCR.          No radiology results from the last 24 hrs    Results for orders placed during the hospital encounter of 02/27/23    Adult Transthoracic Echo Complete W/ Cont if Necessary Per Protocol    Interpretation Summary  •  Left ventricular systolic function is normal. Estimated left ventricular EF = 55%  •  Left ventricular diastolic function was indeterminate.  •  Mild aortic insufficiency is present.  •  Mild mitral regurgitation is present.  •  Estimated right ventricular systolic pressure from tricuspid regurgitation is normal (<35 mmHg).  •  Mild dilation of the ascending aorta is present.  •  Rate controlled atrial fibrillation noted throughout exam      Current medications:  Scheduled Meds:amLODIPine, 5 mg, Oral, BID  apixaban, 2.5 mg, Oral, BID  cloNIDine, 0.2 mg, Oral, BID  insulin lispro, 0-7 Units, Subcutaneous, TID AC  losartan, 50 mg, Oral, BID  magnesium oxide, 400 mg, Oral, BID  metoprolol tartrate, 25 mg, Oral, BID  multivitamin, 1 tablet, Oral, Daily  pantoprazole, 40 mg, Oral, Q AM  senna-docusate sodium, 2 tablet, Oral, BID  sodium chloride, 10 mL, Intravenous, Q12H      Continuous Infusions:   PRN Meds:.•  acetaminophen **OR** acetaminophen  •  senna-docusate sodium **AND** polyethylene glycol **AND** bisacodyl **AND** bisacodyl  •  Calcium Replacement - Initiate Nurse / BPA Driven Protocol  •  dextrose  •  dextrose  •  glucagon (human recombinant)  •  ipratropium-albuterol  •  LORazepam  •  Magnesium  Standard Dose Replacement - Initiate Nurse / BPA Driven Protocol  •  magnesium sulfate  •  melatonin  •  Phosphorus Replacement - Initiate Nurse / BPA Driven Protocol  •  Potassium Replacement - Initiate Nurse / BPA Driven Protocol  •  sodium chloride  •  sodium chloride    Assessment & Plan   Assessment & Plan     Active Hospital Problems    Diagnosis  POA   • **Seizures (HCC) [R56.9]  Yes   • Alcohol abuse (1 to 2 glasses of bourbon a day) [F10.10]  Yes   • Hypomagnesemia [E83.42]  Yes   • Hypokalemia [E87.6]  Yes   • Hypocalcemia [E83.51]  Yes   • Atrial fibrillation (HCC) [I48.91]  Yes   • Chronic anticoagulation (Eliquis) [Z79.01]  Not Applicable   • Essential hypertension [I10]  Yes   • GERD (gastroesophageal reflux disease) [K21.9]  Yes   • JOLENE on CPAP [G47.33, Z99.89]  Not Applicable   • Diabetes mellitus (HCC) [E11.9]  Yes      Resolved Hospital Problems   No resolved problems to display.        Brief Hospital Course to date:  Iain Dee is a 87-year-old male with past medical history significant for atrial fibrillation, on Eliquis, bladder cancer status post surgery, hypertension, obstructive sleep apnea, gout, and GERD.  Who presents to the [facility] ICU on 2/27/2023 after having a seizure at home.  On arrival to the ER he was found to have severe metabolic abnormalities with a severely low calcium and magnesium.  He had a similar event 1 year ago in December 2021 at that time he had also informed us that he drinks 1 to 2 glasses of bourbon every day and had signs of alcohol withdrawal while hospitalized.  He was given a Keppra load in the ER and admitted to the ICU for closer monitoring. Per the patient's wife he has not been eating much lately he continues to have at least 1-2 drinks per day especially in social situations.  Is a has been feeling full recently which is why he has not been eating.  Also has had significant constipation issues.  She states that other than that he has  been in his normal state of health.  The reason that she was mainly having him come in today was because he could not walk on his own. Transferred out of ICU 3/3.     This patient's problems and plans were partially entered by my partner and updated as appropriate by me 03/04/23.     Seizures   Multiple electrolyte disturbances  --Neurology has seen, felt sz due to metabolic derangements. Continue to watch off keppra.   --Electrolyte disturbances are due to poor intake, have been replaced. Stable this am, CTM.  --PT/OT recs SNF, referrals pending.     A fib  HTN  --Rate controlled. Cont eliquis for AC.  --BP uncontrolled. Increased metoprolol. Continue amlodpine.     DM2 w/ hyperglycemia.  --Add low dose levemir. Cont SSI     ETOH use     Expected Discharge Location and Transportation:   Expected Discharge   Expected Discharge Date and Time     Expected Discharge Date Expected Discharge Time    Mar 6, 2023            DVT prophylaxis:  Medical DVT prophylaxis orders are present.     AM-PAC 6 Clicks Score (PT): 13 (03/03/23 0858)    CODE STATUS:   Code Status and Medical Interventions:   Ordered at: 02/27/23 1433     Code Status (Patient has no pulse and is not breathing):    CPR (Attempt to Resuscitate)     Medical Interventions (Patient has pulse or is breathing):    Full Support       Wendy Oconnor II, DO  03/04/23

## 2023-03-05 LAB
GLUCOSE BLDC GLUCOMTR-MCNC: 110 MG/DL (ref 70–130)
GLUCOSE BLDC GLUCOMTR-MCNC: 163 MG/DL (ref 70–130)
GLUCOSE BLDC GLUCOMTR-MCNC: 200 MG/DL (ref 70–130)

## 2023-03-05 PROCEDURE — 63710000001 INSULIN DETEMIR PER 5 UNITS: Performed by: INTERNAL MEDICINE

## 2023-03-05 PROCEDURE — 94799 UNLISTED PULMONARY SVC/PX: CPT

## 2023-03-05 PROCEDURE — 63710000001 INSULIN LISPRO (HUMAN) PER 5 UNITS: Performed by: INTERNAL MEDICINE

## 2023-03-05 PROCEDURE — 82962 GLUCOSE BLOOD TEST: CPT

## 2023-03-05 PROCEDURE — 99232 SBSQ HOSP IP/OBS MODERATE 35: CPT | Performed by: INTERNAL MEDICINE

## 2023-03-05 PROCEDURE — 94660 CPAP INITIATION&MGMT: CPT

## 2023-03-05 RX ORDER — INSULIN LISPRO 100 [IU]/ML
4 INJECTION, SOLUTION INTRAVENOUS; SUBCUTANEOUS
Status: DISCONTINUED | OUTPATIENT
Start: 2023-03-05 | End: 2023-03-06 | Stop reason: HOSPADM

## 2023-03-05 RX ADMIN — SENNOSIDES AND DOCUSATE SODIUM 2 TABLET: 8.6; 5 TABLET ORAL at 08:27

## 2023-03-05 RX ADMIN — INSULIN LISPRO 2 UNITS: 100 INJECTION, SOLUTION INTRAVENOUS; SUBCUTANEOUS at 12:28

## 2023-03-05 RX ADMIN — LOSARTAN POTASSIUM 50 MG: 50 TABLET, FILM COATED ORAL at 08:27

## 2023-03-05 RX ADMIN — MULTIVITAMIN TABLET 1 TABLET: TABLET at 08:27

## 2023-03-05 RX ADMIN — DICLOFENAC 2 G: 10 GEL TOPICAL at 20:08

## 2023-03-05 RX ADMIN — Medication 400 MG: at 20:08

## 2023-03-05 RX ADMIN — INSULIN LISPRO 4 UNITS: 100 INJECTION, SOLUTION INTRAVENOUS; SUBCUTANEOUS at 12:28

## 2023-03-05 RX ADMIN — PANTOPRAZOLE SODIUM 40 MG: 40 TABLET, DELAYED RELEASE ORAL at 05:45

## 2023-03-05 RX ADMIN — Medication 5 MG: at 20:08

## 2023-03-05 RX ADMIN — INSULIN LISPRO 3 UNITS: 100 INJECTION, SOLUTION INTRAVENOUS; SUBCUTANEOUS at 17:27

## 2023-03-05 RX ADMIN — METOPROLOL TARTRATE 25 MG: 25 TABLET, FILM COATED ORAL at 20:08

## 2023-03-05 RX ADMIN — AMLODIPINE BESYLATE 5 MG: 5 TABLET ORAL at 20:08

## 2023-03-05 RX ADMIN — CLONIDINE HYDROCHLORIDE 0.2 MG: 0.2 TABLET ORAL at 08:27

## 2023-03-05 RX ADMIN — METOPROLOL TARTRATE 25 MG: 25 TABLET, FILM COATED ORAL at 08:27

## 2023-03-05 RX ADMIN — Medication 10 ML: at 20:09

## 2023-03-05 RX ADMIN — Medication 10 ML: at 08:27

## 2023-03-05 RX ADMIN — APIXABAN 2.5 MG: 2.5 TABLET, FILM COATED ORAL at 08:27

## 2023-03-05 RX ADMIN — LOSARTAN POTASSIUM 50 MG: 50 TABLET, FILM COATED ORAL at 20:08

## 2023-03-05 RX ADMIN — Medication 400 MG: at 08:27

## 2023-03-05 RX ADMIN — AMLODIPINE BESYLATE 5 MG: 5 TABLET ORAL at 08:27

## 2023-03-05 RX ADMIN — INSULIN DETEMIR 15 UNITS: 100 INJECTION, SOLUTION SUBCUTANEOUS at 20:08

## 2023-03-05 RX ADMIN — CLONIDINE HYDROCHLORIDE 0.2 MG: 0.2 TABLET ORAL at 20:08

## 2023-03-05 RX ADMIN — INSULIN LISPRO 4 UNITS: 100 INJECTION, SOLUTION INTRAVENOUS; SUBCUTANEOUS at 17:26

## 2023-03-05 RX ADMIN — SENNOSIDES AND DOCUSATE SODIUM 2 TABLET: 8.6; 5 TABLET ORAL at 20:08

## 2023-03-05 RX ADMIN — APIXABAN 2.5 MG: 2.5 TABLET, FILM COATED ORAL at 20:08

## 2023-03-05 NOTE — PROGRESS NOTES
[facility] Medicine Services  PROGRESS NOTE    Patient Name: Iain Dee  : 1935  MRN: [id number]    Date of Admission: 2023  Primary Care Physician: Luis Aguilar DO    Subjective   Subjective     CC: f/u sz    HPI: Up in chair getting ready for breakfast. Had 2 bms yesterday. Eating more.     ROS:  Gen- No fevers, chills  CV- No chest pain, palpitations  Resp- No cough, dyspnea  GI- No N/V/D, abd pain     Objective   Objective     Vital Signs:   Temp:  [97.3 °F (36.3 °C)-98.9 °F (37.2 °C)] 97.8 °F (36.6 °C)  Heart Rate:  [58-89] 75  Resp:  [18-20] 18  BP: (128-174)/() 156/90     Physical Exam:  Constitutional: No acute distress, awake, alert  HENT: NCAT, mucous membranes moist  Respiratory: Clear to auscultation bilaterally, respiratory effort normal   Cardiovascular: RRR, no murmurs, rubs, or gallops  Gastrointestinal: Positive bowel sounds, soft, nontender, nondistended  Musculoskeletal: No bilateral ankle edema  Psychiatric: Appropriate affect, cooperative  Neurologic: Oriented x 3, strength symmetric in all extremities, Cranial Nerves grossly intact to confrontation, speech clear  Skin: No rashes    Results Reviewed:  LAB RESULTS:      Lab 23  0335 23  0444 23  0401 23  0456 23  1712 23  1319   WBC 8.99 13.00* 12.77* 13.66*  --  14.31*   HEMOGLOBIN 10.8* 11.4* 12.1* 12.9*  --  13.2   HEMATOCRIT 34.3* 35.8* 37.4* 40.1  --  40.3   PLATELETS 295 324 353 383  --  421   NEUTROS ABS 7.85* 11.60* 11.33*  --   --  12.23*   IMMATURE GRANS (ABS) 0.04 0.05 0.07*  --   --  0.09*   LYMPHS ABS 0.36* 0.42* 0.48*  --   --  1.04   MONOS ABS 0.55 0.83 0.75  --   --  0.83   EOS ABS 0.16 0.06 0.08  --   --  0.03   .8* 102.9* 102.5* 101.3*  --  101.0*   PROCALCITONIN  --   --   --   --   --  0.18   LACTATE  --   --   --   --  1.2 4.8*   PROTIME  --   --   --   --   --  19.5*         Lab 23  0454 23  0335 23  0444  03/01/23  0401 02/28/23  1748 02/28/23  1130 02/28/23  0456 02/27/23  1505 02/27/23  1319   SODIUM 134* 134* 130* 138  --   --  137  --  137   POTASSIUM 5.1 4.7 4.7 3.9 3.9  --  3.3*  --  3.2*   CHLORIDE 96* 96* 96* 99  --   --  95*  --  94*   CO2 29.0 29.0 22.0 30.0*  --   --  28.0  --  26.0   ANION GAP 9.0 9.0 12.0 9.0  --   --  14.0  --  17.0*   BUN 26* 27* 22 17  --   --  16  --  20   CREATININE 1.14 1.12 1.31* 1.19  --   --  1.05  --  1.18   EGFR 62.6 64.0 53.0* 59.5*  --   --  69.1  --  60.1   GLUCOSE 173* 167* 162* 172*  --   --  147*  --  215*   CALCIUM 8.1* 7.7* 7.1* 7.1*  --   --  7.2*  --  5.8*   IONIZED CALCIUM  --  1.15 1.03* 1.03*  --  0.97*  --  0.78*  --    MAGNESIUM  --  2.2 1.8 2.3  --  1.8  --   --  0.4*   PHOSPHORUS  --  3.3  --  3.8  --   --  3.8  --  4.7*   TSH  --   --   --   --   --   --   --   --  3.350         Lab 02/28/23  0456 02/27/23  1319   TOTAL PROTEIN 7.1 6.8   ALBUMIN 3.6 3.5   GLOBULIN 3.5 3.3   ALT (SGPT) 21 20   AST (SGOT) 30 32   BILIRUBIN 1.2 1.0   ALK PHOS 86 79         Lab 02/27/23  1712 02/27/23  1319   PROBNP  --  4,790.0*   HSTROP T 97* 109*   PROTIME  --  19.5*   INR  --  1.65*                 Brief Urine Lab Results  (Last result in the past 365 days)      Color   Clarity   Blood   Leuk Est   Nitrite   Protein   CREAT   Urine HCG        02/27/23 1358 Dark Yellow   Clear   Moderate (2+)   Negative   Negative   >=300 mg/dL (3+)                 Microbiology Results Abnormal     Procedure Component Value - Date/Time    Blood Culture - Blood, Hand, Right [[id number]]  (Normal) Collected: 02/27/23 1326    Lab Status: Final result Specimen: Blood from Hand, Right Updated: 03/04/23 1345     Blood Culture No growth at 5 days    Blood Culture - Blood, Arm, Right [[id number]]  (Normal) Collected: 02/27/23 1323    Lab Status: Final result Specimen: Blood from Arm, Right Updated: 03/04/23 1345     Blood Culture No growth at 5 days    COVID-19 and FLU A/B PCR - Swab, Nasopharynx  [[id number]]  (Normal) Collected: 02/27/23 1344    Lab Status: Final result Specimen: Swab from Nasopharynx Updated: 02/27/23 1439     COVID19 Not Detected     Influenza A PCR Not Detected     Influenza B PCR Not Detected    Narrative:      Fact sheet for providers: https://www.fda.gov/media/561030/download    Fact sheet for patients: https://www.fda.gov/media/159400/download    Test performed by PCR.          No radiology results from the last 24 hrs    Results for orders placed during the hospital encounter of 02/27/23    Adult Transthoracic Echo Complete W/ Cont if Necessary Per Protocol    Interpretation Summary  •  Left ventricular systolic function is normal. Estimated left ventricular EF = 55%  •  Left ventricular diastolic function was indeterminate.  •  Mild aortic insufficiency is present.  •  Mild mitral regurgitation is present.  •  Estimated right ventricular systolic pressure from tricuspid regurgitation is normal (<35 mmHg).  •  Mild dilation of the ascending aorta is present.  •  Rate controlled atrial fibrillation noted throughout exam      Current medications:  Scheduled Meds:amLODIPine, 5 mg, Oral, BID  apixaban, 2.5 mg, Oral, BID  cloNIDine, 0.2 mg, Oral, BID  insulin detemir, 10 Units, Subcutaneous, Nightly  insulin lispro, 0-7 Units, Subcutaneous, TID AC  losartan, 50 mg, Oral, BID  magnesium oxide, 400 mg, Oral, BID  metoprolol tartrate, 25 mg, Oral, BID  multivitamin, 1 tablet, Oral, Daily  pantoprazole, 40 mg, Oral, Q AM  senna-docusate sodium, 2 tablet, Oral, BID  sodium chloride, 10 mL, Intravenous, Q12H      Continuous Infusions:   PRN Meds:.•  acetaminophen **OR** acetaminophen  •  senna-docusate sodium **AND** polyethylene glycol **AND** bisacodyl **AND** bisacodyl  •  Calcium Replacement - Initiate Nurse / BPA Driven Protocol  •  dextrose  •  dextrose  •  glucagon (human recombinant)  •  ipratropium-albuterol  •  LORazepam  •  Magnesium Standard Dose Replacement - Initiate Nurse / BPA  Driven Protocol  •  magnesium sulfate  •  melatonin  •  Phosphorus Replacement - Initiate Nurse / BPA Driven Protocol  •  Potassium Replacement - Initiate Nurse / BPA Driven Protocol  •  sodium chloride  •  sodium chloride    Assessment & Plan   Assessment & Plan     Active Hospital Problems    Diagnosis  POA   • **Seizures (HCC) [R56.9]  Yes   • Alcohol abuse (1 to 2 glasses of bourbon a day) [F10.10]  Yes   • Hypomagnesemia [E83.42]  Yes   • Hypokalemia [E87.6]  Yes   • Hypocalcemia [E83.51]  Yes   • Atrial fibrillation (HCC) [I48.91]  Yes   • Chronic anticoagulation (Eliquis) [Z79.01]  Not Applicable   • Essential hypertension [I10]  Yes   • GERD (gastroesophageal reflux disease) [K21.9]  Yes   • JOLENE on CPAP [G47.33, Z99.89]  Not Applicable   • Diabetes mellitus (HCC) [E11.9]  Yes      Resolved Hospital Problems   No resolved problems to display.        Brief Hospital Course to date:  Iain Dee is a 87-year-old male with past medical history significant for atrial fibrillation, on Eliquis, bladder cancer status post surgery, hypertension, obstructive sleep apnea, gout, and GERD.  Who presents to the [facility] ICU on 2/27/2023 after having a seizure at home.  On arrival to the ER he was found to have severe metabolic abnormalities with a severely low calcium and magnesium.  He had a similar event 1 year ago in December 2021 at that time he had also informed us that he drinks 1 to 2 glasses of bourbon every day and had signs of alcohol withdrawal while hospitalized.  He was given a Keppra load in the ER and admitted to the ICU for closer monitoring. Per the patient's wife he has not been eating much lately he continues to have at least 1-2 drinks per day especially in social situations.  Is a has been feeling full recently which is why he has not been eating.  Also has had significant constipation issues.  She states that other than that he has been in his normal state of health.  The reason  that she was mainly having him come in today was because he could not walk on his own. Transferred out of ICU 3/3.     This patient's problems and plans were partially entered by my partner and updated as appropriate by me 03/05/23.     Seizures   Multiple electrolyte disturbances  --Neurology has seen, felt sz due to metabolic derangements. Continue to watch off keppra.   --Electrolyte disturbances are due to poor intake, have been replaced. Now eating more. Stable this am, CTM.  --PT/OT recs SNF, referrals pending.     A fib  HTN  --Rate controlled. Cont eliquis for AC.  --BP uncontrolled. Increased metoprolol. Continue amlodpine.     DM2 w/ hyperglycemia.  --Uncontrolled, increase levemir. Add mealtime. Cont SSI     ETOH use    Constipation, resolved.    Expected Discharge Location and Transportation:   Expected Discharge   Expected Discharge Date and Time     Expected Discharge Date Expected Discharge Time    Mar 6, 2023            DVT prophylaxis:  Medical DVT prophylaxis orders are present.     AM-PAC 6 Clicks Score (PT): 13 (03/04/23 0800)    CODE STATUS:   Code Status and Medical Interventions:   Ordered at: 02/27/23 1433     Code Status (Patient has no pulse and is not breathing):    CPR (Attempt to Resuscitate)     Medical Interventions (Patient has pulse or is breathing):    Full Support       Wendy Oconnor II, DO  03/05/23

## 2023-03-06 ENCOUNTER — READMISSION MANAGEMENT (OUTPATIENT)
Dept: CALL CENTER | Facility: HOSPITAL | Age: 88
End: 2023-03-06
Payer: MEDICARE

## 2023-03-06 VITALS
TEMPERATURE: 98 F | DIASTOLIC BLOOD PRESSURE: 81 MMHG | OXYGEN SATURATION: 93 % | HEIGHT: 73 IN | BODY MASS INDEX: 30.19 KG/M2 | SYSTOLIC BLOOD PRESSURE: 147 MMHG | WEIGHT: 227.8 LBS | RESPIRATION RATE: 18 BRPM | HEART RATE: 66 BPM

## 2023-03-06 PROBLEM — E83.51 HYPOCALCEMIA: Status: RESOLVED | Noted: 2021-12-08 | Resolved: 2023-03-06

## 2023-03-06 PROBLEM — R56.9 SEIZURES: Status: RESOLVED | Noted: 2023-02-27 | Resolved: 2023-03-06

## 2023-03-06 PROBLEM — E83.42 HYPOMAGNESEMIA: Status: RESOLVED | Noted: 2021-12-08 | Resolved: 2023-03-06

## 2023-03-06 PROBLEM — E87.6 HYPOKALEMIA: Status: RESOLVED | Noted: 2021-12-08 | Resolved: 2023-03-06

## 2023-03-06 LAB
FLUAV RNA RESP QL NAA+PROBE: NOT DETECTED
FLUBV RNA RESP QL NAA+PROBE: NOT DETECTED
GLUCOSE BLDC GLUCOMTR-MCNC: 148 MG/DL (ref 70–130)
GLUCOSE BLDC GLUCOMTR-MCNC: 97 MG/DL (ref 70–130)
SARS-COV-2 RNA RESP QL NAA+PROBE: NOT DETECTED

## 2023-03-06 PROCEDURE — 94799 UNLISTED PULMONARY SVC/PX: CPT

## 2023-03-06 PROCEDURE — 94660 CPAP INITIATION&MGMT: CPT

## 2023-03-06 PROCEDURE — 87636 SARSCOV2 & INF A&B AMP PRB: CPT | Performed by: INTERNAL MEDICINE

## 2023-03-06 PROCEDURE — 63710000001 INSULIN LISPRO (HUMAN) PER 5 UNITS: Performed by: INTERNAL MEDICINE

## 2023-03-06 PROCEDURE — 82962 GLUCOSE BLOOD TEST: CPT

## 2023-03-06 PROCEDURE — 99239 HOSP IP/OBS DSCHRG MGMT >30: CPT | Performed by: INTERNAL MEDICINE

## 2023-03-06 RX ORDER — DIPHENOXYLATE HYDROCHLORIDE AND ATROPINE SULFATE 2.5; .025 MG/1; MG/1
1 TABLET ORAL DAILY
Qty: 30 TABLET
Start: 2023-03-07

## 2023-03-06 RX ADMIN — PANTOPRAZOLE SODIUM 40 MG: 40 TABLET, DELAYED RELEASE ORAL at 06:36

## 2023-03-06 RX ADMIN — Medication 10 ML: at 08:50

## 2023-03-06 RX ADMIN — MULTIVITAMIN TABLET 1 TABLET: TABLET at 08:49

## 2023-03-06 RX ADMIN — METOPROLOL TARTRATE 25 MG: 25 TABLET, FILM COATED ORAL at 08:50

## 2023-03-06 RX ADMIN — INSULIN LISPRO 4 UNITS: 100 INJECTION, SOLUTION INTRAVENOUS; SUBCUTANEOUS at 12:42

## 2023-03-06 RX ADMIN — AMLODIPINE BESYLATE 5 MG: 5 TABLET ORAL at 08:50

## 2023-03-06 RX ADMIN — DICLOFENAC 2 G: 10 GEL TOPICAL at 08:50

## 2023-03-06 RX ADMIN — APIXABAN 2.5 MG: 2.5 TABLET, FILM COATED ORAL at 08:49

## 2023-03-06 RX ADMIN — SENNOSIDES AND DOCUSATE SODIUM 2 TABLET: 8.6; 5 TABLET ORAL at 08:49

## 2023-03-06 RX ADMIN — INSULIN LISPRO 4 UNITS: 100 INJECTION, SOLUTION INTRAVENOUS; SUBCUTANEOUS at 08:50

## 2023-03-06 RX ADMIN — DICLOFENAC 2 G: 10 GEL TOPICAL at 12:42

## 2023-03-06 RX ADMIN — Medication 400 MG: at 08:50

## 2023-03-06 RX ADMIN — LOSARTAN POTASSIUM 50 MG: 50 TABLET, FILM COATED ORAL at 08:49

## 2023-03-06 RX ADMIN — CLONIDINE HYDROCHLORIDE 0.2 MG: 0.2 TABLET ORAL at 08:50

## 2023-03-06 NOTE — PLAN OF CARE
Goal Outcome Evaluation:      Discharge report [facility] No questions or concerns from patient or family. Awaiting transportation for discharge

## 2023-03-06 NOTE — OUTREACH NOTE
Prep Survey    Flowsheet Row Responses   [facility] patient discharged from? [facility]   Is LACE score < 7 ? No   Eligibility Not Eligible   What are the reasons patient is not eligible? [facility]   Does the patient have one of the following disease processes/diagnoses(primary or secondary)? Other   Prep survey completed? Yes          Lizabeth COHEN - Registered Nurse

## 2023-03-06 NOTE — PROGRESS NOTES
[facility] Medicine Services  PROGRESS NOTE    Patient Name: Iain Dee  : 1935  MRN: [id number]    Date of Admission: 2023  Primary Care Physician: Luis Aguilar DO    Subjective   Subjective     CC: f/u sz    HPI: Up in chair with wife on speaker phone. Says he is doing fine. They are wondering when he can leave the hospital.    ROS:  Gen- No fevers, chills  CV- No chest pain, palpitations  Resp- No cough, dyspnea  GI- No N/V/D, abd pain     Objective   Objective     Vital Signs:   Temp:  [97.5 °F (36.4 °C)-98.3 °F (36.8 °C)] 98.2 °F (36.8 °C)  Heart Rate:  [52-86] 78  Resp:  [18] 18  BP: (145-163)/() 145/85  Flow (L/min):  [2] 2     Physical Exam:  Constitutional: No acute distress, awake, alert, up in chair, elderly male  HENT: NCAT, mucous membranes moist, 2L o2  Respiratory: Clear to auscultation bilaterally, respiratory effort normal   Cardiovascular: RRR, no murmurs, rubs, or gallops  Gastrointestinal: Positive bowel sounds, soft, nontender, nondistended  Musculoskeletal: No bilateral ankle edema  Psychiatric: Appropriate affect, cooperative  Neurologic: Oriented x 3, strength symmetric in all extremities, Cranial Nerves grossly intact to confrontation, speech clear  Skin: No rashes    Results Reviewed:  LAB RESULTS:      Lab 23  0335 23  0444 23  0401 23  0456 23  1712 23  1319   WBC 8.99 13.00* 12.77* 13.66*  --  14.31*   HEMOGLOBIN 10.8* 11.4* 12.1* 12.9*  --  13.2   HEMATOCRIT 34.3* 35.8* 37.4* 40.1  --  40.3   PLATELETS 295 324 353 383  --  421   NEUTROS ABS 7.85* 11.60* 11.33*  --   --  12.23*   IMMATURE GRANS (ABS) 0.04 0.05 0.07*  --   --  0.09*   LYMPHS ABS 0.36* 0.42* 0.48*  --   --  1.04   MONOS ABS 0.55 0.83 0.75  --   --  0.83   EOS ABS 0.16 0.06 0.08  --   --  0.03   .8* 102.9* 102.5* 101.3*  --  101.0*   PROCALCITONIN  --   --   --   --   --  0.18   LACTATE  --   --   --   --  1.2 4.8*    PROTIME  --   --   --   --   --  19.5*         Lab 03/04/23  0454 03/03/23  0335 03/02/23  0444 03/01/23  0401 02/28/23  1748 02/28/23  1130 02/28/23  0456 02/27/23  1505 02/27/23  1319   SODIUM 134* 134* 130* 138  --   --  137  --  137   POTASSIUM 5.1 4.7 4.7 3.9 3.9  --  3.3*  --  3.2*   CHLORIDE 96* 96* 96* 99  --   --  95*  --  94*   CO2 29.0 29.0 22.0 30.0*  --   --  28.0  --  26.0   ANION GAP 9.0 9.0 12.0 9.0  --   --  14.0  --  17.0*   BUN 26* 27* 22 17  --   --  16  --  20   CREATININE 1.14 1.12 1.31* 1.19  --   --  1.05  --  1.18   EGFR 62.6 64.0 53.0* 59.5*  --   --  69.1  --  60.1   GLUCOSE 173* 167* 162* 172*  --   --  147*  --  215*   CALCIUM 8.1* 7.7* 7.1* 7.1*  --   --  7.2*  --  5.8*   IONIZED CALCIUM  --  1.15 1.03* 1.03*  --  0.97*  --  0.78*  --    MAGNESIUM  --  2.2 1.8 2.3  --  1.8  --   --  0.4*   PHOSPHORUS  --  3.3  --  3.8  --   --  3.8  --  4.7*   TSH  --   --   --   --   --   --   --   --  3.350         Lab 02/28/23  0456 02/27/23  1319   TOTAL PROTEIN 7.1 6.8   ALBUMIN 3.6 3.5   GLOBULIN 3.5 3.3   ALT (SGPT) 21 20   AST (SGOT) 30 32   BILIRUBIN 1.2 1.0   ALK PHOS 86 79         Lab 02/27/23  1712 02/27/23  1319   PROBNP  --  4,790.0*   HSTROP T 97* 109*   PROTIME  --  19.5*   INR  --  1.65*                 Brief Urine Lab Results  (Last result in the past 365 days)      Color   Clarity   Blood   Leuk Est   Nitrite   Protein   CREAT   Urine HCG        02/27/23 1358 Dark Yellow   Clear   Moderate (2+)   Negative   Negative   >=300 mg/dL (3+)                 Microbiology Results Abnormal     Procedure Component Value - Date/Time    Blood Culture - Blood, Hand, Right [[id number]]  (Normal) Collected: 02/27/23 1326    Lab Status: Final result Specimen: Blood from Hand, Right Updated: 03/04/23 1345     Blood Culture No growth at 5 days    Blood Culture - Blood, Arm, Right [[id number]]  (Normal) Collected: 02/27/23 1323    Lab Status: Final result Specimen: Blood from Arm, Right Updated: 03/04/23  1345     Blood Culture No growth at 5 days    COVID-19 and FLU A/B PCR - Swab, Nasopharynx [[id number]]  (Normal) Collected: 02/27/23 1344    Lab Status: Final result Specimen: Swab from Nasopharynx Updated: 02/27/23 1439     COVID19 Not Detected     Influenza A PCR Not Detected     Influenza B PCR Not Detected    Narrative:      Fact sheet for providers: https://www.fda.gov/media/460773/download    Fact sheet for patients: https://www.fda.gov/media/747346/download    Test performed by PCR.          No radiology results from the last 24 hrs    Results for orders placed during the hospital encounter of 02/27/23    Adult Transthoracic Echo Complete W/ Cont if Necessary Per Protocol    Interpretation Summary  •  Left ventricular systolic function is normal. Estimated left ventricular EF = 55%  •  Left ventricular diastolic function was indeterminate.  •  Mild aortic insufficiency is present.  •  Mild mitral regurgitation is present.  •  Estimated right ventricular systolic pressure from tricuspid regurgitation is normal (<35 mmHg).  •  Mild dilation of the ascending aorta is present.  •  Rate controlled atrial fibrillation noted throughout exam      Current medications:  Scheduled Meds:amLODIPine, 5 mg, Oral, BID  apixaban, 2.5 mg, Oral, BID  cloNIDine, 0.2 mg, Oral, BID  Diclofenac Sodium, 2 g, Topical, 4x Daily  insulin detemir, 15 Units, Subcutaneous, Nightly  insulin lispro, 0-7 Units, Subcutaneous, TID AC  Insulin Lispro, 4 Units, Subcutaneous, TID With Meals  losartan, 50 mg, Oral, BID  magnesium oxide, 400 mg, Oral, BID  metoprolol tartrate, 25 mg, Oral, BID  multivitamin, 1 tablet, Oral, Daily  pantoprazole, 40 mg, Oral, Q AM  senna-docusate sodium, 2 tablet, Oral, BID  sodium chloride, 10 mL, Intravenous, Q12H      Continuous Infusions:   PRN Meds:.•  acetaminophen **OR** acetaminophen  •  senna-docusate sodium **AND** polyethylene glycol **AND** bisacodyl **AND** bisacodyl  •  Calcium Replacement - Initiate  Nurse / BPA Driven Protocol  •  dextrose  •  dextrose  •  glucagon (human recombinant)  •  ipratropium-albuterol  •  LORazepam  •  Magnesium Standard Dose Replacement - Initiate Nurse / BPA Driven Protocol  •  magnesium sulfate  •  melatonin  •  Phosphorus Replacement - Initiate Nurse / BPA Driven Protocol  •  Potassium Replacement - Initiate Nurse / BPA Driven Protocol  •  sodium chloride  •  sodium chloride    Assessment & Plan   Assessment & Plan     Active Hospital Problems    Diagnosis  POA   • **Seizures (HCC) [R56.9]  Yes   • Alcohol abuse (1 to 2 glasses of bourbon a day) [F10.10]  Yes   • Hypomagnesemia [E83.42]  Yes   • Hypokalemia [E87.6]  Yes   • Hypocalcemia [E83.51]  Yes   • Atrial fibrillation (HCC) [I48.91]  Yes   • Chronic anticoagulation (Eliquis) [Z79.01]  Not Applicable   • Essential hypertension [I10]  Yes   • GERD (gastroesophageal reflux disease) [K21.9]  Yes   • JOLENE on CPAP [G47.33, Z99.89]  Not Applicable   • Diabetes mellitus (HCC) [E11.9]  Yes      Resolved Hospital Problems   No resolved problems to display.        Brief Hospital Course to date:  Iain Dee is a 87-year-old male with past medical history significant for atrial fibrillation, on Eliquis, bladder cancer status post surgery, hypertension, obstructive sleep apnea, gout, and GERD.  Who presents to the [facility] ICU on 2/27/2023 after having a seizure at home.  On arrival to the ER he was found to have severe metabolic abnormalities with a severely low calcium and magnesium.  He had a similar event 1 year ago in December 2021 at that time he had also informed us that he drinks 1 to 2 glasses of bourbon every day and had signs of alcohol withdrawal while hospitalized.  He was given a Keppra load in the ER and admitted to the ICU for closer monitoring. Per the patient's wife he has not been eating much lately he continues to have at least 1-2 drinks per day especially in social situations.  Is a has been feeling  full recently which is why he has not been eating.  Also has had significant constipation issues.  She states that other than that he has been in his normal state of health.  The reason that she was mainly having him come in today was because he could not walk on his own. Transferred out of ICU 3/3.     This patient's problems and plans were partially entered by my partner and updated as appropriate by me 03/06/23.     Seizures   Multiple electrolyte disturbances  --Neurology has seen, felt sz due to metabolic derangements. Continue to watch off keppra. Doing well.  --Electrolyte disturbances are due to poor intake, have been replaced. Now eating more. Have been stable. CTM.  --PT/OT recs SNF, referrals pending.     A fib  HTN  --Rate controlled. Cont eliquis for AC.  --BP better. Continue metoprolol. Continue amlodpine.     DM2 w/ hyperglycemia.  --Better. Continue levemir, mealtime. Cont SSI     ETOH use     Constipation, resolved.    Expected Discharge Location and Transportation:   Expected Discharge   Expected Discharge Date and Time     Expected Discharge Date Expected Discharge Time    Mar 6, 2023            DVT prophylaxis:  Medical DVT prophylaxis orders are present.     AM-PAC 6 Clicks Score (PT): 13 (03/05/23 0800)    CODE STATUS:   Code Status and Medical Interventions:   Ordered at: 02/27/23 1433     Code Status (Patient has no pulse and is not breathing):    CPR (Attempt to Resuscitate)     Medical Interventions (Patient has pulse or is breathing):    Full Support       Wendy Oconnor II, DO  03/06/23

## 2023-03-06 NOTE — DISCHARGE SUMMARY
[facility] Medicine Services  DISCHARGE SUMMARY    Patient Name: Iain Dee  : 1935  MRN: [id number]    Date of Admission: 2023  1:13 PM  Date of Discharge:  3/6/2023  Primary Care Physician: Luis Aguilar DO    Consults     No orders found from 2023 to 2023.          Hospital Course     Presenting Problem:   Seizures (HCC) [R56.9]    Active Hospital Problems    Diagnosis  POA   • Alcohol abuse (1 to 2 glasses of bourbon a day) [F10.10]  Yes   • Atrial fibrillation (HCC) [I48.91]  Yes   • Chronic anticoagulation (Eliquis) [Z79.01]  Not Applicable   • Essential hypertension [I10]  Yes   • GERD (gastroesophageal reflux disease) [K21.9]  Yes   • JOLENE on CPAP [G47.33, Z99.89]  Not Applicable   • Diabetes mellitus (HCC) [E11.9]  Yes      Resolved Hospital Problems    Diagnosis Date Resolved POA   • **Seizures (HCC) [R56.9] 2023 Yes   • Hypomagnesemia [E83.42] 2023 Yes   • Hypokalemia [E87.6] 2023 Yes   • Hypocalcemia [E83.51] 2023 Yes          Hospital Course:  Iain Dee is a 87-year-old male with past medical history significant for atrial fibrillation, on Eliquis, bladder cancer status post surgery, hypertension, obstructive sleep apnea, gout, and GERD.  Who presents to the [facility] ICU on 2023 after having a seizure at home.  On arrival to the ER he was found to have severe metabolic abnormalities with a severely low calcium and magnesium.  He had a similar event 1 year ago in 2021 at that time he had also informed us that he drinks 1 to 2 glasses of bourbon every day and had signs of alcohol withdrawal while hospitalized.  He was given a Keppra load in the ER and admitted to the ICU for closer monitoring. Per the patient's wife he has not been eating much lately he continues to have at least 1-2 drinks per day especially in social situations.  Is a has been feeling full recently which is why he has  not been eating.  Also has had significant constipation issues.  She states that other than that he has been in his normal state of health.  The reason that she was mainly having him come in today was because he could not walk on his own. Transferred out of ICU 3/3.    Seizures   Multiple electrolyte disturbances  --Neurology followed throughout stay. They felt his seizures were due to metabolic derangements. He was watched off Keppra and had no further seizure activity.   --Electrolyte disturbances were likely due to poor intake. They have been replaced and remained stable with oral intake.    Discharge Follow Up Recommendations for outpatient labs/diagnostics:   PCP upon release from [facility]    Day of Discharge     See daily note.    Pertinent  and/or Most Recent Results     LAB RESULTS:      Lab 03/03/23 0335 03/02/23 0444 03/01/23  0401 02/28/23  0456 02/27/23  1712 02/27/23  1319   WBC 8.99 13.00* 12.77* 13.66*  --  14.31*   HEMOGLOBIN 10.8* 11.4* 12.1* 12.9*  --  13.2   HEMATOCRIT 34.3* 35.8* 37.4* 40.1  --  40.3   PLATELETS 295 324 353 383  --  421   NEUTROS ABS 7.85* 11.60* 11.33*  --   --  12.23*   IMMATURE GRANS (ABS) 0.04 0.05 0.07*  --   --  0.09*   LYMPHS ABS 0.36* 0.42* 0.48*  --   --  1.04   MONOS ABS 0.55 0.83 0.75  --   --  0.83   EOS ABS 0.16 0.06 0.08  --   --  0.03   .8* 102.9* 102.5* 101.3*  --  101.0*   PROCALCITONIN  --   --   --   --   --  0.18   LACTATE  --   --   --   --  1.2 4.8*   PROTIME  --   --   --   --   --  19.5*         Lab 03/04/23 0454 03/03/23 0335 03/02/23 0444 03/01/23  0401 02/28/23  1748 02/28/23  1130 02/28/23  0456 02/27/23  1505 02/27/23  1319   SODIUM 134* 134* 130* 138  --   --  137  --  137   POTASSIUM 5.1 4.7 4.7 3.9 3.9  --  3.3*  --  3.2*   CHLORIDE 96* 96* 96* 99  --   --  95*  --  94*   CO2 29.0 29.0 22.0 30.0*  --   --  28.0  --  26.0   ANION GAP 9.0 9.0 12.0 9.0  --   --  14.0  --  17.0*   BUN 26* 27* 22 17  --   --  16  --  20   CREATININE 1.14  1.12 1.31* 1.19  --   --  1.05  --  1.18   EGFR 62.6 64.0 53.0* 59.5*  --   --  69.1  --  60.1   GLUCOSE 173* 167* 162* 172*  --   --  147*  --  215*   CALCIUM 8.1* 7.7* 7.1* 7.1*  --   --  7.2*  --  5.8*   IONIZED CALCIUM  --  1.15 1.03* 1.03*  --  0.97*  --  0.78*  --    MAGNESIUM  --  2.2 1.8 2.3  --  1.8  --   --  0.4*   PHOSPHORUS  --  3.3  --  3.8  --   --  3.8  --  4.7*   TSH  --   --   --   --   --   --   --   --  3.350         Lab 02/28/23  0456 02/27/23  1319   TOTAL PROTEIN 7.1 6.8   ALBUMIN 3.6 3.5   GLOBULIN 3.5 3.3   ALT (SGPT) 21 20   AST (SGOT) 30 32   BILIRUBIN 1.2 1.0   ALK PHOS 86 79         Lab 02/27/23  1712 02/27/23  1319   PROBNP  --  4,790.0*   HSTROP T 97* 109*   PROTIME  --  19.5*   INR  --  1.65*                 Brief Urine Lab Results  (Last result in the past 365 days)      Color   Clarity   Blood   Leuk Est   Nitrite   Protein   CREAT   Urine HCG        02/27/23 1358 Dark Yellow   Clear   Moderate (2+)   Negative   Negative   >=300 mg/dL (3+)               Microbiology Results (last 10 days)     Procedure Component Value - Date/Time    COVID PRE-OP / PRE-PROCEDURE SCREENING ORDER (NO ISOLATION) - Swab, Nasopharynx [[id number]]  (Normal) Collected: 03/06/23 1100    Lab Status: Final result Specimen: Swab from Nasopharynx Updated: 03/06/23 1147    Narrative:      The following orders were created for panel order COVID PRE-OP / PRE-PROCEDURE SCREENING ORDER (NO ISOLATION) - Swab, Nasopharynx.  Procedure                               Abnormality         Status                     ---------                               -----------         ------                     COVID-19 and FLU A/B PCR...[[id number]]  Normal              Final result                 Please view results for these tests on the individual orders.    COVID-19 and FLU A/B PCR - Swab, Nasopharynx [[id number]]  (Normal) Collected: 03/06/23 1100    Lab Status: Final result Specimen: Swab from Nasopharynx Updated: 03/06/23 1148      COVID19 Not Detected     Influenza A PCR Not Detected     Influenza B PCR Not Detected    Narrative:      Fact sheet for providers: https://www.fda.gov/media/740484/download    Fact sheet for patients: https://www.fda.gov/media/461447/download    Test performed by PCR.    COVID-19 and FLU A/B PCR - Swab, Nasopharynx [[id number]]  (Normal) Collected: 02/27/23 1344    Lab Status: Final result Specimen: Swab from Nasopharynx Updated: 02/27/23 1439     COVID19 Not Detected     Influenza A PCR Not Detected     Influenza B PCR Not Detected    Narrative:      Fact sheet for providers: https://www.fda.gov/media/168913/download    Fact sheet for patients: https://www.fda.gov/media/454703/download    Test performed by PCR.    Blood Culture - Blood, Hand, Right [[id number]]  (Normal) Collected: 02/27/23 1326    Lab Status: Final result Specimen: Blood from Hand, Right Updated: 03/04/23 1345     Blood Culture No growth at 5 days    Blood Culture - Blood, Arm, Right [[id number]]  (Normal) Collected: 02/27/23 1323    Lab Status: Final result Specimen: Blood from Arm, Right Updated: 03/04/23 1345     Blood Culture No growth at 5 days          Adult Transthoracic Echo Complete W/ Cont if Necessary Per Protocol    Result Date: 3/1/2023  •  Left ventricular systolic function is normal. Estimated left ventricular EF = 55% •  Left ventricular diastolic function was indeterminate. •  Mild aortic insufficiency is present. •  Mild mitral regurgitation is present. •  Estimated right ventricular systolic pressure from tricuspid regurgitation is normal (<35 mmHg). •  Mild dilation of the ascending aorta is present. •  Rate controlled atrial fibrillation noted throughout exam     CT Head Without Contrast    Result Date: 2/27/2023  CT HEAD WO CONTRAST Date of Exam: 2/27/2023 1:24 PM EST Indication: ams. Comparison: MR brain 12/8/2021 Technique: Axial CT images were obtained of the head without contrast administration.  Reconstructed coronal and  sagittal images were also obtained. Automated exposure control and iterative construction methods were used. Findings: Mild motion degradation. Parenchyma:No acute intraparenchymal hemorrhage. No loss of gray-white differentiation to suggest large territory infarct. Mild parenchymal volume loss. Scattered periventricular and subcortical white matter hypodensities, nonspecific, but most often consistent with small vessel ischemic changes. No midline shift or herniation. Small focus of encephalomalacia in the left parietal lobe. Ventricles and extra axial spaces:Prominent ventricles and sulci secondary to volume loss. No extra axial fluid collection seen. Other:Orbits are grossly intact. Scattered paranasal sinus mucosal thickening with near complete opacification of the right-sided paranasal sinuses.. Mastoid air cells are clear. Calvarium is intact. Intracranial atherosclerotic calcification is present.      Impression: Mild motion degradation. No evidence of acute intracranial hemorrhage or large territorial infarct. Chronic changes as above. Electronically Signed: Jalen Truong  2/27/2023 1:54 PM EST  Workstation ID: [id number]    XR Chest 1 View    Result Date: 2/28/2023  XR CHEST 1 VW Date of Exam: 2/28/2023 9:34 AM EST Indication: resp failure. Comparison: 2/27/2023 Findings: Interval development of pulmonary vascular congestion with interstitial and mild alveolar pulmonary edema. No evidence of pneumothorax. Cardiomegaly. Mediastinal contours within normal limits.     Impression: 1. Interval development of changes of CHF. Electronically Signed: Ismael Bay  2/28/2023 10:28 AM EST  Workstation ID: [id number]    XR Chest 1 View    Result Date: 2/27/2023  XR CHEST 1 VW Date of Exam: 2/27/2023 1:50 PM EST Indication: ams. Comparison: 12/8/2021 Findings: There appears to be chronic left basilar pleural scarring similar to the prior study. Small area of discoid atelectasis or scarring in the right base is similar to  the prior study. No clearly new pulmonary parenchymal disease is identified. There is no evidence of pneumothorax. Heart appears to be mildly enlarged. Vasculature appears normal. Prominent right superior mediastinal shadow with no mass effect on the trachea, appears stable.     Impression: 1. Stable mild left basilar pleural scarring or chronic effusion since 12/8/2021. 2. Mild new or persistent right basilar discoid atelectasis or scarring. No clearly new chest disease. Electronically Signed: Srinivas Hawkinsd  2/27/2023 2:55 PM EST  Workstation ID: [id number]              Results for orders placed during the hospital encounter of 02/27/23    Adult Transthoracic Echo Complete W/ Cont if Necessary Per Protocol    Interpretation Summary  •  Left ventricular systolic function is normal. Estimated left ventricular EF = 55%  •  Left ventricular diastolic function was indeterminate.  •  Mild aortic insufficiency is present.  •  Mild mitral regurgitation is present.  •  Estimated right ventricular systolic pressure from tricuspid regurgitation is normal (<35 mmHg).  •  Mild dilation of the ascending aorta is present.  •  Rate controlled atrial fibrillation noted throughout exam      Plan for Follow-up of Pending Labs/Results:     Discharge Details        Discharge Medications      New Medications      Instructions Start Date   insulin detemir 100 UNIT/ML injection  Commonly known as: LEVEMIR   15 Units, Subcutaneous, Nightly      multivitamin tablet tablet   1 tablet, Oral, Daily   Start Date: March 7, 2023        Changes to Medications      Instructions Start Date   metoprolol tartrate 25 MG tablet  Commonly known as: LOPRESSOR  What changed: how much to take   25 mg, Oral, 2 Times Daily         Continue These Medications      Instructions Start Date   amLODIPine 10 MG tablet  Commonly known as: NORVASC   5 mg, Oral, 2 Times Daily      apixaban 2.5 MG tablet tablet  Commonly known as: ELIQUIS   2.5 mg, Oral, 2 Times Daily       cloNIDine 0.2 MG tablet  Commonly known as: CATAPRES   0.2 mg, Oral, 2 Times Daily      losartan 50 MG tablet  Commonly known as: COZAAR   50 mg, Oral, 2 Times Daily      Magnesium Oxide -Mg Supplement 250 MG tablet   250 mg, Oral, Daily      omeprazole 40 MG capsule  Commonly known as: priLOSEC   40 mg, Oral, Daily             Allergies   Allergen Reactions   • Colchicine Other (See Comments)     Unable to walk          Discharge Disposition:  Home or Self Care    Diet:  Hospital:  Diet Order   Procedures   • Diet: Regular/House Diet, Cardiac Diets, Diabetic Diets; Healthy Heart (2-3 Na+); Consistent Carbohydrate; Texture: Regular Texture (IDDSI 7); Fluid Consistency: Thin (IDDSI 0)       Activity:      Restrictions or Other Recommendations:         CODE STATUS:    Code Status and Medical Interventions:   Ordered at: 02/27/23 1433     Code Status (Patient has no pulse and is not breathing):    CPR (Attempt to Resuscitate)     Medical Interventions (Patient has pulse or is breathing):    Full Support       Future Appointments   Date Time Provider Department Center   3/6/2023  4:30 PM EMS 1 [facility] S CYNTHIA Oconnor II, DO  03/06/23      Time Spent on Discharge:  I spent  32  minutes on this discharge activity which included: face-to-face encounter with the patient, reviewing the data in the system, coordination of the care with the nursing staff as well as consultants, documentation, and entering orders.

## 2023-03-06 NOTE — CASE MANAGEMENT/SOCIAL WORK
Case Management Discharge Note      Final Note: Spoke with Aleksandra at [facility], they can offer the patient a bed at [facility] today, patient and spouse in agreement.  Covid test completed today. [facility] stretcher will transport at 1630, PCS in [place].  Nurse to call report to [phone].  DC summary will be retrieved from [facility] by facility.         Selected Continued Care - Admitted Since 2/27/2023     Destination Coordination complete.    Service Provider Selected Services Address Phone Fax Patient Preferred    [facility] [place] [address] [phone] --          Durable Medical Equipment    No services have been selected for the patient.              Dialysis/Infusion    No services have been selected for the patient.              Home Medical Care    No services have been selected for the patient.              Therapy    No services have been selected for the patient.              Community Resources    No services have been selected for the patient.              Community & DME    No services have been selected for the patient.                  Transportation Services  Ambulance: [facility]    Final Discharge Disposition Code: 03 - skilled nursing facility (SNF)

## 2023-03-24 ENCOUNTER — HOME HEALTH ADMISSION (OUTPATIENT)
Dept: HOME HEALTH SERVICES | Facility: HOME HEALTHCARE | Age: 88
End: 2023-03-24
Payer: MEDICARE

## 2023-03-24 ENCOUNTER — TRANSCRIBE ORDERS (OUTPATIENT)
Dept: HOME HEALTH SERVICES | Facility: HOME HEALTHCARE | Age: 88
End: 2023-03-24
Payer: MEDICARE

## 2023-03-24 DIAGNOSIS — I11.0 HYPERTENSIVE HEART DISEASE WITH CHRONIC COMBINED SYSTOLIC AND DIASTOLIC CONGESTIVE HEART FAILURE: Primary | ICD-10-CM

## 2023-03-24 DIAGNOSIS — I50.42 HYPERTENSIVE HEART DISEASE WITH CHRONIC COMBINED SYSTOLIC AND DIASTOLIC CONGESTIVE HEART FAILURE: Primary | ICD-10-CM

## 2023-03-26 ENCOUNTER — HOME CARE VISIT (OUTPATIENT)
Dept: HOME HEALTH SERVICES | Facility: HOME HEALTHCARE | Age: 88
End: 2023-03-26
Payer: MEDICARE

## 2023-03-26 VITALS
OXYGEN SATURATION: 95 % | DIASTOLIC BLOOD PRESSURE: 68 MMHG | HEART RATE: 88 BPM | SYSTOLIC BLOOD PRESSURE: 140 MMHG | BODY MASS INDEX: 31.28 KG/M2 | RESPIRATION RATE: 18 BRPM | TEMPERATURE: 97.8 F | WEIGHT: 236 LBS | HEIGHT: 73 IN

## 2023-03-26 PROCEDURE — G0299 HHS/HOSPICE OF RN EA 15 MIN: HCPCS

## 2023-03-26 NOTE — HOME HEALTH
SOC Note: 86yo male with s/p hospitalization related to seizure caused by low electrolytes and dehydration.  Treated in-patient and went to [facility] for rehab. Lives inhome with spouse who assists with all needs.  treated while in patient for HTN    Home Health ordered for: disciplines: SN/PT/OT    Reason for Hosp/Primary Dx/Co-morbidities:HTN  HX: DM, arthritis, sezieures, low electrolytes, heart failure, right hand deformity, alcoholism, dehydration    Focus of Care: diesease teaching, medication teaching related to HTN    Current Functional status/mobility/DME: ambulates with rollator    HB status/Living Arrangements: lives with spouse    Skin Integrity/wound status: c/d/i    Code Status: Full    Fall Risk: Full    POC confirmed with patient and spouse on 03/26/23    Plan for next visit: disease teaching -SN Pt/OT eval

## 2023-03-27 ENCOUNTER — READMISSION MANAGEMENT (OUTPATIENT)
Dept: CALL CENTER | Facility: HOSPITAL | Age: 88
End: 2023-03-27
Payer: MEDICARE

## 2023-03-27 ENCOUNTER — HOME CARE VISIT (OUTPATIENT)
Dept: HOME HEALTH SERVICES | Facility: HOME HEALTHCARE | Age: 88
End: 2023-03-27
Payer: MEDICARE

## 2023-03-27 VITALS
RESPIRATION RATE: 16 BRPM | SYSTOLIC BLOOD PRESSURE: 132 MMHG | DIASTOLIC BLOOD PRESSURE: 82 MMHG | OXYGEN SATURATION: 92 % | HEART RATE: 80 BPM | TEMPERATURE: 97.2 F

## 2023-03-27 PROCEDURE — G0151 HHCP-SERV OF PT,EA 15 MIN: HCPCS

## 2023-03-27 NOTE — OUTREACH NOTE
Prep Survey    Flowsheet Row Responses   [facility] facility patient discharged from? Non-BH   Is LACE score < 7 ? Non-BH Discharge   Eligibility [facility] -   Date of Discharge 03/24/23   Discharge Disposition Home or Self Care   Discharge diagnosis Unspecified convulsions   Does the patient have one of the following disease processes/diagnoses(primary or secondary)? Other   Prep survey completed? Yes          Fiona GUTIÉRREZ - Registered Nurse

## 2023-03-28 ENCOUNTER — TRANSITIONAL CARE MANAGEMENT TELEPHONE ENCOUNTER (OUTPATIENT)
Dept: CALL CENTER | Facility: HOSPITAL | Age: 88
End: 2023-03-28
Payer: MEDICARE

## 2023-03-28 NOTE — HOME HEALTH
SOC Note: 86yo male with s/p hospitalization related to seizure caused by low electrolytes and dehydration. Treated in-patient and went to [facility] for rehab. Lives inhome with spouse who assists with all needs. Hypertensive heart failure treated while in patient.   Home Health ordered for: disciplines: SN/PT/OT   Reason for Hosp/Primary Dx/Co-morbidities:Hypertensive heart disease with failure and seizure HX: DM, arthritis, seizures, low electrolytes, heart failure, right hand deformity, alcoholism, dehydration   Focus of Care: HHPT 2wk3, 1wk5 for gait training, balance training, therapeutic exercise, pt education and HEP instruction related to seizures  Current Functional status/mobility/DME: ambulates with rollator, in home, has shower chair, elevated TS  HB status/Living Arrangements: lives with spouse in a two story home, ramp at entry; pt is homebound as he requires the use of an AD and the assistance of another person to safely leave home  Skin Integrity/wound status: no deficits noted  Code Status: Full code  Fall Risk: high per [place]  Plan for next visit: issue and review written HEP

## 2023-03-28 NOTE — OUTREACH NOTE
Call Center TCM Note    Flowsheet Row Responses   [facility] patient discharged from? Non-BH   Does the patient have one of the following disease processes/diagnoses(primary or secondary)? Other   TCM attempt successful? Yes  [ VR for PCP]   Call start time 1204   Call end time 1207   Discharge diagnosis Unspecified convulsions   Comments 4/3/2023  9:45 AM  HOSPITAL FOLLOW UP    Does the patient have an appointment with their PCP within 7 days of discharge? Yes   What is the Home health agency?  Home health arranged thru Rehab.   Psychosocial issues? No   Comments Pt is doing well at home.    What is the patient's perception of their health status since discharge? Improving   Is the patient/caregiver able to teach back signs and symptoms related to disease process for when to call PCP? Yes   Is the patient/caregiver able to teach back the hierarchy of who to call/visit for symptoms/problems? PCP, Specialist, Home health nurse, Urgent Care, ED, 911 Yes   TCM call completed? Yes   Call end time 1207   Is the patient interested in additional calls from an ambulatory ?  NOTE:  applies to high risk patients requiring additional follow-up. No          Lynn Sargent RN    3/28/2023, 12:07 EDT

## 2023-03-29 ENCOUNTER — HOME CARE VISIT (OUTPATIENT)
Dept: HOME HEALTH SERVICES | Facility: HOME HEALTHCARE | Age: 88
End: 2023-03-29
Payer: MEDICARE

## 2023-03-29 PROCEDURE — G0152 HHCP-SERV OF OT,EA 15 MIN: HCPCS

## 2023-03-30 ENCOUNTER — HOME CARE VISIT (OUTPATIENT)
Dept: HOME HEALTH SERVICES | Facility: HOME HEALTHCARE | Age: 88
End: 2023-03-30
Payer: MEDICARE

## 2023-03-30 VITALS
RESPIRATION RATE: 18 BRPM | SYSTOLIC BLOOD PRESSURE: 140 MMHG | OXYGEN SATURATION: 93 % | TEMPERATURE: 96.1 F | HEART RATE: 72 BPM | DIASTOLIC BLOOD PRESSURE: 78 MMHG

## 2023-03-30 VITALS — SYSTOLIC BLOOD PRESSURE: 144 MMHG | DIASTOLIC BLOOD PRESSURE: 78 MMHG | HEART RATE: 75 BPM | OXYGEN SATURATION: 95 %

## 2023-03-30 PROCEDURE — G0157 HHC PT ASSISTANT EA 15: HCPCS

## 2023-03-30 PROCEDURE — G0495 RN CARE TRAIN/EDU IN HH: HCPCS

## 2023-03-30 NOTE — HOME HEALTH
Patient is an 87 year old male with past medical history significant for atrial fibrillation, on Eliquis, bladder cancer status post surgery, hypertension, obstructive sleep apnea, gout, and GERD. He presented to the [facility] ICU on 2/27/2023 after having a seizure at home. On arrival to the ER he was found to have severe metabolic abnormalities with a severely low calcium and magnesium, and was dehydrated. Patient then went to rehab at [facility] following hospital discharge. He has since returned home and was seen today for an OT evaluation. He presents with decreased strength/ROM, balance, and endurance affecting his independence with ADL skills, and functional mobility/transfers in the home. PLOF- mod I with all BADL's, functional mobility/transfers, drove a vehicle. OT to see an additional 1w3 in order to improve patient's independence and safety with ADL tasks and progress towards return to PLOF.

## 2023-03-30 NOTE — HOME HEALTH
Routine Visit Note: 3/30/23    Skill/education provided: Instructed pt on s/s of chf to report, instructed on low sodium diet, leg elevation while in sitting position, obtaining larger shoes to help with edema, instructed in importance of daily weights, caregiver states has scale but pt not steady enough to weigh.     Patient/caregiver response: pt and cg verbalized understanding, pt states he does not like foods with alot of salt.     Plan for next visit: cardiac assessment and instruction

## 2023-03-31 VITALS
OXYGEN SATURATION: 95 % | DIASTOLIC BLOOD PRESSURE: 83 MMHG | RESPIRATION RATE: 17 BRPM | HEART RATE: 64 BPM | SYSTOLIC BLOOD PRESSURE: 142 MMHG | TEMPERATURE: 97.5 F

## 2023-03-31 NOTE — HOME HEALTH
Routine Visit Note:Greeted at door by patient caregiver, patient first seen sitting in living room with no visible signs of distress. Patient reports feeling “pretty good“ and states that he has been completing exercises on his own.      Skill/education provided: Instructed therapeutic exercises, gait training, and balance training today. Educated patient on pain management strategies and provided with / reviewed HEP.     Patient/caregiver response: Patient tolerated all treatment well, but required occasional seated rest breaks to recover due to SOB. No visable signs of distress, excessive fatigue, or pain.     Plan for next visit: Progress with standing exercises and gait training next visit. Patient would benefit from contniued skilled PT to address deficits in strength, balance, endurance, and to improve overall functional mobility.    Other pertinent info: follow up regarding HEP

## 2023-04-03 ENCOUNTER — LAB (OUTPATIENT)
Dept: LAB | Facility: HOSPITAL | Age: 88
End: 2023-04-03
Payer: MEDICARE

## 2023-04-03 ENCOUNTER — OFFICE VISIT (OUTPATIENT)
Dept: FAMILY MEDICINE CLINIC | Facility: CLINIC | Age: 88
End: 2023-04-03
Payer: MEDICARE

## 2023-04-03 ENCOUNTER — HOME CARE VISIT (OUTPATIENT)
Dept: HOME HEALTH SERVICES | Facility: HOME HEALTHCARE | Age: 88
End: 2023-04-03
Payer: MEDICARE

## 2023-04-03 VITALS
HEART RATE: 56 BPM | SYSTOLIC BLOOD PRESSURE: 142 MMHG | OXYGEN SATURATION: 95 % | TEMPERATURE: 98.2 F | DIASTOLIC BLOOD PRESSURE: 78 MMHG

## 2023-04-03 DIAGNOSIS — E11.65 TYPE 2 DIABETES MELLITUS WITH HYPERGLYCEMIA, WITHOUT LONG-TERM CURRENT USE OF INSULIN: ICD-10-CM

## 2023-04-03 DIAGNOSIS — I10 ESSENTIAL HYPERTENSION: ICD-10-CM

## 2023-04-03 DIAGNOSIS — I48.91 ATRIAL FIBRILLATION, UNSPECIFIED TYPE: ICD-10-CM

## 2023-04-03 DIAGNOSIS — R41.82 ALTERED MENTAL STATUS, UNSPECIFIED ALTERED MENTAL STATUS TYPE: ICD-10-CM

## 2023-04-03 DIAGNOSIS — Z09 HOSPITAL DISCHARGE FOLLOW-UP: Primary | ICD-10-CM

## 2023-04-03 LAB
ANION GAP SERPL CALCULATED.3IONS-SCNC: 10.5 MMOL/L (ref 5–15)
BUN SERPL-MCNC: 15 MG/DL (ref 8–23)
BUN/CREAT SERPL: 12 (ref 7–25)
CALCIUM SPEC-SCNC: 9.5 MG/DL (ref 8.6–10.5)
CHLORIDE SERPL-SCNC: 96 MMOL/L (ref 98–107)
CO2 SERPL-SCNC: 27.5 MMOL/L (ref 22–29)
CREAT SERPL-MCNC: 1.25 MG/DL (ref 0.76–1.27)
EGFRCR SERPLBLD CKD-EPI 2021: 56.1 ML/MIN/1.73
GLUCOSE SERPL-MCNC: 180 MG/DL (ref 65–99)
HBA1C MFR BLD: 7.3 % (ref 4.8–5.6)
POTASSIUM SERPL-SCNC: 3.8 MMOL/L (ref 3.5–5.2)
SODIUM SERPL-SCNC: 134 MMOL/L (ref 136–145)

## 2023-04-03 PROCEDURE — 80048 BASIC METABOLIC PNL TOTAL CA: CPT

## 2023-04-03 PROCEDURE — G0157 HHC PT ASSISTANT EA 15: HCPCS

## 2023-04-03 PROCEDURE — 83036 HEMOGLOBIN GLYCOSYLATED A1C: CPT

## 2023-04-03 RX ORDER — AMLODIPINE BESYLATE 10 MG/1
5 TABLET ORAL DAILY
Qty: 90 TABLET | Refills: 3 | Status: SHIPPED | OUTPATIENT
Start: 2023-04-03

## 2023-04-03 RX ORDER — OMEPRAZOLE 40 MG/1
40 CAPSULE, DELAYED RELEASE ORAL DAILY
Qty: 90 CAPSULE | Refills: 2 | Status: SHIPPED | OUTPATIENT
Start: 2023-04-03

## 2023-04-03 RX ORDER — LOSARTAN POTASSIUM 50 MG/1
50 TABLET ORAL 2 TIMES DAILY
Qty: 180 TABLET | Refills: 2 | Status: SHIPPED | OUTPATIENT
Start: 2023-04-03

## 2023-04-03 NOTE — ASSESSMENT & PLAN NOTE
He was increased up to amlodipine 5 mg twice daily in the hospital, blood pressure has been better since he has been home.  Recommend returning to his previous amlodipine 5 mg daily, losartan 50 mg twice daily, Lopressor 25 mg twice daily.

## 2023-04-03 NOTE — ASSESSMENT & PLAN NOTE
Continue metoprolol tartrate 25 mg twice daily for rate control, Eliquis 2.5 mg twice daily for anticoagulation

## 2023-04-03 NOTE — PROGRESS NOTES
Hospital Follow-Up/Transition of Care Visit     Patient Name: Iain Dee  : 1935   MRN: [id number]   Care Team: Patient Care Team:  Luis Aguilar DO as PCP - General (Family Medicine)  Iain Bernstein MD as Consulting Physician (Cardiology)    Chief Complaint:    Chief Complaint   Patient presents with   • Seizures     Hospital follow-up, pt had a seizure       History of Present Illness: Iain Dee is a 87 y.o. male who presents today for TCM visit.    Within 48 business hours after discharge our office contacted him via telephone to coordinate his care and needs.      I reviewed and discussed the details of that call along with the discharge summary, hospital problems, inpatient lab results, inpatient diagnostic studies, and consultation reports with Iain.      Current outpatient and discharge medications have been reconciled for the patient.  Reviewed by: Luis Aguilar DO      Date of TCM Phone Call 12/10/2021 3/27/2023   [facility] -   Date of Admission 2021 -   Date of Discharge 12/10/2021 3/24/2023   Discharge Disposition Home or Self Care Home or Self Care     Risk for Readmission (LACE) No data recorded    History of Present Illness   Course During Hospital Stay:    Iain Dee is a 87-year-old male with past medical history significant for atrial fibrillation, on Eliquis, bladder cancer status post surgery, hypertension, obstructive sleep apnea, gout, and GERD.  Who presents to the [facility] ICU on 2023 after having a seizure at home.  On arrival to the ER he was found to have severe metabolic abnormalities with a severely low calcium and magnesium.  He had a similar event 1 year ago in 2021 at that time he had also informed us that he drinks 1 to 2 glasses of bourbon every day and had signs of alcohol withdrawal while hospitalized.  He was given a Keppra load in the ER and admitted to the ICU for  closer monitoring. Per the patient's wife he has not been eating much lately he continues to have at least 1-2 drinks per day especially in social situations.  Is a has been feeling full recently which is why he has not been eating.  Also has had significant constipation issues.  She states that other than that he has been in his normal state of health.  The reason that she was mainly having him come in today was because he could not walk on his own. Transferred out of ICU 3/3.     Seizures   Multiple electrolyte disturbances  --Neurology followed throughout stay. They felt his seizures were due to metabolic derangements. He was watched off Keppra and had no further seizure activity.   --Electrolyte disturbances were likely due to poor intake. They have been replaced and remained stable with oral intake.     Discharge Follow Up Recommendations for outpatient labs/diagnostics:   PCP upon release from [facility]    Status Since Discharge:  [facility] was fair, food was poor and bed was uncomfortable. Feels weak, unable to be at baseline from prior to hospitalization. PT is coming with home health.    Was on Levemir inpatient and at the [facility], not at home. Occasionally checking at home, not often.     The following portions of the patient's history were reviewed and updated as appropriate: allergies, current medications, past family history, past medical history, past social history, past surgical history and problem list.    This patient is accompanied by their wife who contributes to the history of their care.    The following portions of the patient's history were reviewed and updated as appropriate: allergies, current medications, past family history, past medical history, past social history, past surgical history and problem list.    Subjective      Review of Systems:   Review of Systems - See HPI    Past Medical History:   Past Medical History:   Diagnosis Date   • Arthritis    • Cancer    • Diabetes mellitus   "  • GERD (gastroesophageal reflux disease)    • Gout    • H/O Bladder carcinoma (HCC)    • Hypertension        Past Surgical History:   Past Surgical History:   Procedure Laterality Date   • BLADDER SURGERY      Biopsy   • HERNIA REPAIR     • PROSTATE BIOPSY         Family History:   Family History   Problem Relation Age of Onset   • Heart attack Father        Social History:   Social History     Socioeconomic History   • Marital status: Single   Tobacco Use   • Smoking status: Never     Passive exposure: Never   • Smokeless tobacco: Never   Vaping Use   • Vaping Use: Never used   Substance and Sexual Activity   • Alcohol use: Yes     Alcohol/week: 28.0 standard drinks     Types: 28 Shots of liquor per week     Comment: Estimation based on spouse report of patient drinking \"1 large martini\" each night (regular martini = 3 shots, large martini = est. 4 shots)   • Drug use: No   • Sexual activity: Defer       Tobacco History:   Social History     Tobacco Use   Smoking Status Never   • Passive exposure: Never   Smokeless Tobacco Never       Medications:     Current Outpatient Medications:   •  amLODIPine (NORVASC) 10 MG tablet, Take 0.5 tablets by mouth Daily., Disp: 90 tablet, Rfl: 3  •  apixaban (ELIQUIS) 2.5 MG tablet tablet, Take 1 tablet by mouth 2 (Two) Times a Day., Disp: 60 tablet, Rfl: 11  •  CloNIDine (CATAPRES) 0.2 MG tablet, Take 1 tablet by mouth 2 (Two) Times a Day., Disp: , Rfl: 2  •  losartan (COZAAR) 50 MG tablet, Take 1 tablet by mouth 2 (Two) Times a Day., Disp: 180 tablet, Rfl: 2  •  Magnesium Oxide -Mg Supplement 250 MG tablet, Take 1 tablet by mouth Daily., Disp: 90 tablet, Rfl: 2  •  metoprolol tartrate (LOPRESSOR) 25 MG tablet, Take 1 tablet by mouth 2 (Two) Times a Day., Disp: 180 tablet, Rfl: 3  •  multivitamin (THERAGRAN) tablet tablet, Take 1 tablet by mouth Daily., Disp: 30 tablet, Rfl:   •  omeprazole (priLOSEC) 40 MG capsule, Take 1 capsule by mouth Daily., Disp: 90 capsule, Rfl: 2  •  " metFORMIN (Glucophage) 500 MG tablet, Take 1 tablet by mouth Daily With Breakfast., Disp: 90 tablet, Rfl: 0    Allergies:   Allergies   Allergen Reactions   • Colchicine Other (See Comments)     Unable to walk        Objective   Objective     Physical Exam:  Vital Signs:   Vitals:    04/03/23 0947   BP: 142/78   BP Location: Left arm   Patient Position: Sitting   Cuff Size: Adult   Pulse: 56   Temp: 98.2 °F (36.8 °C)   TempSrc: Infrared   SpO2: 95%   Weight: Comment: unable to stand     There is no height or weight on file to calculate BMI.     Physical Exam  Nursing note reviewed  Const: NAD, A&Ox4, Pleasant, Cooperative  MSK: In wheelchair  Procedures/Radiology     Procedures  No radiology results for the last 7 days     Assessment & Plan   Assessment / Plan      Assessment/Plan:   Problems Addressed This Visit  Diagnoses and all orders for this visit:    1. Hospital discharge follow-up (Primary)    2. Altered mental status, unspecified altered mental status type    3. Type 2 diabetes mellitus with hyperglycemia, without long-term current use of insulin  -     Basic Metabolic Panel; Future  -     Hemoglobin A1c; Future  -     metFORMIN (Glucophage) 500 MG tablet; Take 1 tablet by mouth Daily With Breakfast.  Dispense: 90 tablet; Refill: 0    4. Essential hypertension  Assessment & Plan:  He was increased up to amlodipine 5 mg twice daily in the hospital, blood pressure has been better since he has been home.  Recommend returning to his previous amlodipine 5 mg daily, losartan 50 mg twice daily, Lopressor 25 mg twice daily.    Orders:  -     amLODIPine (NORVASC) 10 MG tablet; Take 0.5 tablets by mouth Daily.  Dispense: 90 tablet; Refill: 3  -     losartan (COZAAR) 50 MG tablet; Take 1 tablet by mouth 2 (Two) Times a Day.  Dispense: 180 tablet; Refill: 2  -     metoprolol tartrate (LOPRESSOR) 25 MG tablet; Take 1 tablet by mouth 2 (Two) Times a Day.  Dispense: 180 tablet; Refill: 3    5. Atrial fibrillation,  unspecified type  Assessment & Plan:  Continue metoprolol tartrate 25 mg twice daily for rate control, Eliquis 2.5 mg twice daily for anticoagulation    Orders:  -     metoprolol tartrate (LOPRESSOR) 25 MG tablet; Take 1 tablet by mouth 2 (Two) Times a Day.  Dispense: 180 tablet; Refill: 3  -     apixaban (ELIQUIS) 2.5 MG tablet tablet; Take 1 tablet by mouth 2 (Two) Times a Day.  Dispense: 60 tablet; Refill: 11    Other orders  -     omeprazole (priLOSEC) 40 MG capsule; Take 1 capsule by mouth Daily.  Dispense: 90 capsule; Refill: 2    Problem List Items Addressed This Visit        Cardiac and Vasculature    Essential hypertension    Current Assessment & Plan     He was increased up to amlodipine 5 mg twice daily in the hospital, blood pressure has been better since he has been home.  Recommend returning to his previous amlodipine 5 mg daily, losartan 50 mg twice daily, Lopressor 25 mg twice daily.         Relevant Medications    amLODIPine (NORVASC) 10 MG tablet    losartan (COZAAR) 50 MG tablet    metoprolol tartrate (LOPRESSOR) 25 MG tablet    Atrial fibrillation    Overview     Dr. Espinosa         Current Assessment & Plan     Continue metoprolol tartrate 25 mg twice daily for rate control, Eliquis 2.5 mg twice daily for anticoagulation         Relevant Medications    amLODIPine (NORVASC) 10 MG tablet    metoprolol tartrate (LOPRESSOR) 25 MG tablet    apixaban (ELIQUIS) 2.5 MG tablet tablet       Endocrine and Metabolic    Diabetes mellitus    Overview     He was on Levemir in the hospital and at the [facility], has not been taking since he has been home.  Would recommend oral antihyperglycemic agent such as metformin, check metabolic panel and A1c today.  His hyperglycemia surely impacts his fluid/volume status as well.         Relevant Medications    metFORMIN (Glucophage) 500 MG tablet    Other Relevant Orders    Basic Metabolic Panel    Hemoglobin A1c       Neuro    AMS (altered mental status)   Other Visit  Diagnoses     Hospital discharge follow-up    -  Primary        #1 altered mental status secondary to electrolyte disturbance  [place] of low oral intake, high alcohol intake, and severe hyperglycemia leading to dehydration.  -Recommended improved glucose control, routine water intake and supplemental electrolytes  -Also recommended reduced alcohol intake, he is down to about 1 drink per day which is better than his baseline but still not ideal for his age and health status    See patient diagnoses and orders along with patient instructions for assessment, plan, and changes to care for patient.    Patient Instructions   1. Drink 1 Gatorade per day (32 oz) or equivalent Pedialyte  2. Labs today to check on electrolytes  3. Start metformin for blood sugar  4. Amlodipine 5mg once per day  5. Continue losartan 50mg twice per day      Follow Up:   Return in about 3 months (around 7/3/2023) for with A1c;.    MDM     [facility] [address]  [facility]  [address]  [facility] [address]  Fax [phone]  Phone [phone]

## 2023-04-03 NOTE — PATIENT INSTRUCTIONS
Drink 1 Gatorade per day (32 oz) or equivalent Pedialyte  Labs today to check on electrolytes  Start metformin for blood sugar  Amlodipine 5mg once per day  Continue losartan 50mg twice per day

## 2023-04-04 ENCOUNTER — HOME CARE VISIT (OUTPATIENT)
Dept: HOME HEALTH SERVICES | Facility: HOME HEALTHCARE | Age: 88
End: 2023-04-04
Payer: MEDICARE

## 2023-04-04 VITALS
DIASTOLIC BLOOD PRESSURE: 77 MMHG | SYSTOLIC BLOOD PRESSURE: 146 MMHG | RESPIRATION RATE: 16 BRPM | TEMPERATURE: 97.8 F | OXYGEN SATURATION: 93 % | HEART RATE: 54 BPM

## 2023-04-04 VITALS
OXYGEN SATURATION: 91 % | TEMPERATURE: 98.5 F | SYSTOLIC BLOOD PRESSURE: 140 MMHG | DIASTOLIC BLOOD PRESSURE: 68 MMHG | HEART RATE: 77 BPM

## 2023-04-04 PROCEDURE — G0495 RN CARE TRAIN/EDU IN HH: HCPCS

## 2023-04-04 PROCEDURE — G0152 HHCP-SERV OF OT,EA 15 MIN: HCPCS

## 2023-04-04 NOTE — HOME HEALTH
"Routine Visit Note: Greeted at door by patient's caregiver. Patient  first seen with sitting in recliner no visible signs of distress. Patient reports feeling “pretty good today “ and states that his MD appointment earlier today \"went well\" . Patient reports  compliance with HEP.       Skill/education provided: Instructed therapeutic exercises, gait training, and balance training today. Educated patient on pain management strategies, edema managment, and HEP.       Patient/caregiver response: Patient tolerated all treatment well today, with no visible signs of distress. Patient exhibits slight improvement overall since previous visit, and verbalizes understanding of all provided education.       Plan for next visit: Patient would benefit from continued skilled physical therapy to address deficits in strength, balance, endurance, and to improve overall independence with functional daily tasks/ambulation . Progress with exercises and gait training as tolerated next visit. Work on walking up<>down wheelchair ramp next visit."

## 2023-04-05 VITALS
OXYGEN SATURATION: 92 % | RESPIRATION RATE: 18 BRPM | HEART RATE: 50 BPM | TEMPERATURE: 96 F | SYSTOLIC BLOOD PRESSURE: 124 MMHG | DIASTOLIC BLOOD PRESSURE: 82 MMHG

## 2023-04-05 NOTE — HOME HEALTH
Routine Visit Note:    Skill/education provided: ADL retraining, balance activities, and establish and complete UB HEP    Patient/caregiver response: Patient tolerated session well, reports improved strength over the past week since being home from hospitalization/rehab    Plan for next visit: ADL's, bathroom transfers, HEP    Other pertinent info:

## 2023-04-05 NOTE — HOME HEALTH
Routine Visit Note: 4/4/23    Skill/education provided: Instructed pt and caregiver on medication regimen, efffects,s/e, s/s to report. Pt stated had md appt and added metformin to medications but have not come in yet to pharmacy. Instructed pt on s/s of chf to report, pt had better fitting shoes on at this visit, lower ext edema improved    Patient/caregiver response: Verbalized understanding of medication regimen and s/s to report    Plan for next visit: Cardiac assessment, instruction of safety    Other pertinent info: Had follow up with pcp 4/3/23

## 2023-04-06 ENCOUNTER — HOME CARE VISIT (OUTPATIENT)
Dept: HOME HEALTH SERVICES | Facility: HOME HEALTHCARE | Age: 88
End: 2023-04-06
Payer: MEDICARE

## 2023-04-06 PROCEDURE — G0157 HHC PT ASSISTANT EA 15: HCPCS

## 2023-04-06 NOTE — Clinical Note
" Patient presented with elevated BP at beginning of treatment session today at 194/92 Patient proceeded to take prescribed BP medication / BP returned to normal limits for him after taking medication (156/84). Patient reports \"feeling fine\" and states that he feels \"normal\". Patient and caregiver strongly encouraged to monitor BP and call EMS if BP increased/feeling unwell. "

## 2023-04-07 VITALS
HEART RATE: 59 BPM | TEMPERATURE: 98.5 F | DIASTOLIC BLOOD PRESSURE: 84 MMHG | SYSTOLIC BLOOD PRESSURE: 156 MMHG | RESPIRATION RATE: 16 BRPM | OXYGEN SATURATION: 96 %

## 2023-04-07 NOTE — HOME HEALTH
"Routine Visit Note:Greeted at door by caregiver, patient first seen sittin gin recliner with no visable signs of distress. Reports \"doing good\", and states that he has been having some soreness since starting to complete HEP exercises more reguarly.     Skill/education provided: Educated patient on HEP, pain managment strategies, and fall prevention strategies.     Patient/caregiver response: Patient presented with elevated BP at beginning of treatment session today at 194/92 Patient proceeded to take prescribed BP medication / BP returned to normal limits after taking medication. Patient reports \"feeling fine\" and states that he feels \"normal\". Due to initial presentation of elevated BP, progressions in exercise and gait training held today. /86 prior to leaving. Patient/caaregiver strongly encouraged to monitor symptoms and call EMS if BP were to elevate again / if feeling unwell.     Plan for next visit: Progress with gait training and therpaeutic exercises as tolerated next visit. Progress with balance exercises."

## 2023-04-10 ENCOUNTER — HOME CARE VISIT (OUTPATIENT)
Dept: HOME HEALTH SERVICES | Facility: HOME HEALTHCARE | Age: 88
End: 2023-04-10
Payer: MEDICARE

## 2023-04-10 VITALS
DIASTOLIC BLOOD PRESSURE: 76 MMHG | OXYGEN SATURATION: 96 % | TEMPERATURE: 96.2 F | HEART RATE: 48 BPM | SYSTOLIC BLOOD PRESSURE: 124 MMHG | RESPIRATION RATE: 18 BRPM

## 2023-04-10 VITALS
HEART RATE: 61 BPM | TEMPERATURE: 98.7 F | DIASTOLIC BLOOD PRESSURE: 85 MMHG | SYSTOLIC BLOOD PRESSURE: 163 MMHG | OXYGEN SATURATION: 96 % | RESPIRATION RATE: 17 BRPM

## 2023-04-10 PROCEDURE — G0495 RN CARE TRAIN/EDU IN HH: HCPCS

## 2023-04-10 PROCEDURE — G0157 HHC PT ASSISTANT EA 15: HCPCS

## 2023-04-10 NOTE — HOME HEALTH
Routine Visit Note: 4/10/23    Skill/education provided: Pt states doing well, Cardiac assessment completed, pt with lower ext edema, small area at right leg moist and with weeping edema, discussed compression stockings, patient states has used in past but will not use at present due to difficulty applying, instructed on reason for leg drainage, discussed options of tubi , pt would be agreeable to apply those, instructed will discuss with md and order a pair for patient to try. Instructed pt on metformin, regimen to follow, effects and side effects.     Patient/caregiver response: Pt verbalized understanding and is agreeable. Pt states compliant with medication    Plan for next visit: cardiac assessment    Other pertinent info: no follow ups with md scheduled

## 2023-04-10 NOTE — Clinical Note
Pt with small amt of right lower ext weeping edema, agreeable to try tubi  compression. May I order for patient.

## 2023-04-11 ENCOUNTER — HOME CARE VISIT (OUTPATIENT)
Dept: HOME HEALTH SERVICES | Facility: HOME HEALTHCARE | Age: 88
End: 2023-04-11
Payer: MEDICARE

## 2023-04-11 VITALS — SYSTOLIC BLOOD PRESSURE: 132 MMHG | DIASTOLIC BLOOD PRESSURE: 80 MMHG | HEART RATE: 50 BPM | OXYGEN SATURATION: 96 %

## 2023-04-11 PROCEDURE — G0152 HHCP-SERV OF OT,EA 15 MIN: HCPCS

## 2023-04-11 NOTE — HOME HEALTH
Routine Visit Note: Let into home by patient, who was first seen sitting in recliner with no visible signs of distress. Patient reports feeling “pretty good“ today and states that he has been completing HEP and ambulating at least once every hour.     Skill/education provided: Instructed therapeutic exercises, gait training, and balance training today. Educated patient on pain management strategies and HEP.     Patient/caregiver response: Patient tolerated all treatment well today, with no visible signs of distress. Patient  displays improvment overall and is on track to meet goals.  Patient required occasional seated rest break throughout treatment to recover due to SOB.     Plan for next visit: Patient would benefit from continued skilled physical therapy to address deficits in endurance, balance, strength, and to improve overall functional mobility / independence .

## 2023-04-12 NOTE — HOME HEALTH
Routine Visit Note:    Skill/education provided: ADL retraining, bathroom transfers/functional mobility, review and completion of HEP    Patient/caregiver response: Patient tolerated session well, is demonstrating improved strength with transfers/mobility in the home    Plan for next visit: OT d/c, ADL's, HEP    Other pertinent info:

## 2023-04-13 ENCOUNTER — HOME CARE VISIT (OUTPATIENT)
Dept: HOME HEALTH SERVICES | Facility: HOME HEALTHCARE | Age: 88
End: 2023-04-13
Payer: MEDICARE

## 2023-04-13 PROCEDURE — G0157 HHC PT ASSISTANT EA 15: HCPCS

## 2023-04-16 VITALS
SYSTOLIC BLOOD PRESSURE: 159 MMHG | OXYGEN SATURATION: 92 % | TEMPERATURE: 97.8 F | HEART RATE: 49 BPM | RESPIRATION RATE: 16 BRPM | DIASTOLIC BLOOD PRESSURE: 90 MMHG

## 2023-04-16 NOTE — HOME HEALTH
Routine Visit Note: Greeted at door by patient's wife. Patient first seen sitting in recliner with no visible signs of distress. Patient reports feeling “pretty good“ today and states that he has been walking more frequently and completing HEP exercises reguarly.     Skill/education provided: Instructed therapeutic exercises, gait training, and balance training today. Educated patient on pain management strategies and HEP.  Stair training instructed today x2 steps.     Patient/caregiver response: Patient tolerated all gait training and exercise well today,  but was apprehensive with stair training due to fear of falling. No excessive fatigue, pain, or signs of distress. Patient requires occasional seated rest break following standing activities due to SOB.     Plan for next visit: Patient would benefit from continued skilled physical therapy to address deficits in endurance, strength, balance, and to improve overall tolerance to ADL's and ambulation. Progress with gait training and continue to work on stair training if tolerated.

## 2023-04-17 ENCOUNTER — HOME CARE VISIT (OUTPATIENT)
Dept: HOME HEALTH SERVICES | Facility: HOME HEALTHCARE | Age: 88
End: 2023-04-17
Payer: MEDICARE

## 2023-04-17 PROCEDURE — G0157 HHC PT ASSISTANT EA 15: HCPCS

## 2023-04-17 NOTE — Clinical Note
Krystal Gamboa!     Mr. Dee is due for re-assessment next visit. He has improvedad slightly since SOC, but is very self-limiting. He continues to get short of breath easily following gait training and standing exercise. It appears he may be nearing max potential with PT due to his self-imposed limitations, but definately has room for improvement overall. Let me know if you have any questions!     - David

## 2023-04-17 NOTE — HOME HEALTH
Routine Visit Note: Greeted at door by caregiver. Patient first seen sitting in recliner with no visable signs of distress. Patient reports having diarrhea and states that he would like to ,limit exercises today. Patient states that he has been completing exercises on his own.     Skill/education provided: Educated patient on HEP, fall prevention, and pain management strategies.     Patient/caregiver response: Patient verbalizes understanding of all provided education and states that he is willing to progress next visit if he feels better.     Plan for next visit: Patient is due for re-assessment with supervising PT. Patient continues to have deficits in balance, strength, and endurance / progress with gait training, strengthening, and balance exercises as tolerated next visit.

## 2023-04-18 ENCOUNTER — HOME CARE VISIT (OUTPATIENT)
Dept: HOME HEALTH SERVICES | Facility: HOME HEALTHCARE | Age: 88
End: 2023-04-18
Payer: MEDICARE

## 2023-04-18 VITALS
TEMPERATURE: 98.7 F | RESPIRATION RATE: 16 BRPM | HEART RATE: 54 BPM | OXYGEN SATURATION: 96 % | SYSTOLIC BLOOD PRESSURE: 138 MMHG | DIASTOLIC BLOOD PRESSURE: 80 MMHG

## 2023-04-18 PROCEDURE — G0152 HHCP-SERV OF OT,EA 15 MIN: HCPCS

## 2023-04-18 PROCEDURE — G0495 RN CARE TRAIN/EDU IN HH: HCPCS

## 2023-04-18 NOTE — Clinical Note
HHOT discharge completed as of 4/18/23 with all goals met. Patient back at his baseline level of function with BADL's, and mobility in the home.

## 2023-04-18 NOTE — HOME HEALTH
Routine Visit Note:    Skill/education provided: patient reports doing well and requesting discharge from nursing.  Assessment completed with needed education provided.  Nurse in agreement for discharge at this time    Patient/caregiver response: verbalized understanding of instructions    Plan for next visit: nursing discharge today    Other pertinent info:

## 2023-04-19 VITALS — OXYGEN SATURATION: 96 % | DIASTOLIC BLOOD PRESSURE: 80 MMHG | SYSTOLIC BLOOD PRESSURE: 138 MMHG | HEART RATE: 54 BPM

## 2023-04-19 NOTE — CASE COMMUNICATION
Erin Aguilar message sent before I realized.  Patient request his own free style jovany glucometer be called into [facility] on [address]  He has been using wifes.  Will need strips and lancets to start too  Also discharged today from nursing at patient request.  He is doing well and agree with this request.  Thank you!!

## 2023-04-19 NOTE — HOME HEALTH
OT discipline discharge completed. Patient has returned to his baseline with BADL's, and functional mobility/transfers in the home. He requires mod I-SBA. He is independent with completing UB HEP daily, and was educated on how to upgrade after d/c. He verbalized understanding. Pt remains in the home with his spouse assistance, under MD care, in good condition. PT/SN remain in the home.

## 2023-04-24 ENCOUNTER — TELEPHONE (OUTPATIENT)
Dept: FAMILY MEDICINE CLINIC | Facility: CLINIC | Age: 88
End: 2023-04-24
Payer: MEDICARE

## 2023-04-24 DIAGNOSIS — E11.65 TYPE 2 DIABETES MELLITUS WITH HYPERGLYCEMIA, WITHOUT LONG-TERM CURRENT USE OF INSULIN: Primary | ICD-10-CM

## 2023-04-24 RX ORDER — LANCETS
1 EACH MISCELLANEOUS DAILY
Qty: 50 EACH | Refills: 11 | Status: SHIPPED | OUTPATIENT
Start: 2023-04-24

## 2023-04-24 RX ORDER — BLOOD-GLUCOSE METER
1 KIT MISCELLANEOUS DAILY
Qty: 1 EACH | Refills: 0 | Status: SHIPPED | OUTPATIENT
Start: 2023-04-24

## 2023-04-24 NOTE — TELEPHONE ENCOUNTER
Rx Refill Note  Requested Prescriptions     Signed Prescriptions Disp Refills   • glucose monitor monitoring kit 1 each 0     Si each Daily. Use daily as directed.     Authorizing Provider: TAMICA NAVARRETE     Ordering User: JAYDA MARCUS   • glucose blood test strip 50 each 12     Si each by Other route Daily. Use daily as directed     Authorizing Provider: TAMICA NAVARRETE     Ordering User: JAYDA MARCUS   • Lancets Thin misc 50 each 11     Si each Daily. Use daily as directed     Authorizing Provider: TAMICA NAVARRETE     Ordering User: JAYDA MARCUS      Last office visit with prescribing clinician: 4/3/2023   Last telemedicine visit with prescribing clinician: 7/10/2023   Next office visit with prescribing clinician: 7/10/2023                         Would you like a call back once the refill request has been completed: [] Yes [] No    If the office needs to give you a call back, can they leave a voicemail: [] Yes [] No    Jayda Marcus MA  23, 10:53 EDT

## 2023-04-24 NOTE — TELEPHONE ENCOUNTER
Caller: Maya Germain    Relationship: Emergency Contact    Best call back number: [phone]     Equipment requested: GLUCOSE MONITER IN THE BRAND OF ( ACCU CHECK  DEVICE OR THE FREE STYLE  MACHINE)    Reason for the request: TO CHECK BLOOD SUGAR     Prescribing Provider: ANETA NAVARRETE, DO    Additional information or concerns: PLEASE CALL ONCE THE REQUEST HAS BEEN APPROVED OR DENIED AND ALSO IF APPROVED  CALL TO INFORM THE PATIENT SUPPLY HAS BEEN SENT TO THE     [id number] - [place], [place] - [address] AT [facility] OF [address] [facility] - [phone] [facility] [phone] FX

## 2023-04-26 ENCOUNTER — HOME CARE VISIT (OUTPATIENT)
Dept: HOME HEALTH SERVICES | Facility: HOME HEALTHCARE | Age: 88
End: 2023-04-26
Payer: MEDICARE

## 2023-04-26 VITALS
OXYGEN SATURATION: 97 % | TEMPERATURE: 97.2 F | RESPIRATION RATE: 16 BRPM | DIASTOLIC BLOOD PRESSURE: 82 MMHG | HEART RATE: 60 BPM | SYSTOLIC BLOOD PRESSURE: 132 MMHG

## 2023-04-26 PROCEDURE — G0151 HHCP-SERV OF PT,EA 15 MIN: HCPCS

## 2023-05-01 ENCOUNTER — HOME CARE VISIT (OUTPATIENT)
Dept: HOME HEALTH SERVICES | Facility: HOME HEALTHCARE | Age: 88
End: 2023-05-01
Payer: MEDICARE

## 2023-05-01 VITALS
OXYGEN SATURATION: 95 % | SYSTOLIC BLOOD PRESSURE: 145 MMHG | DIASTOLIC BLOOD PRESSURE: 80 MMHG | TEMPERATURE: 97.6 F | RESPIRATION RATE: 16 BRPM | HEART RATE: 75 BPM

## 2023-05-01 PROCEDURE — G0157 HHC PT ASSISTANT EA 15: HCPCS

## 2023-05-02 NOTE — HOME HEALTH
"Routine Visit Note: Greeted at door by patient's wife. Patient first seen sitting in recliner with no visable signs of distress. Patient states that he is \"getting close to where I was before\". Patient states that he has been compliant with HEP and that he has been ambulating frequently throughout the day.  Patient reports impovement overall in endurance/tolerance to ADL's.     Skill/education provided: Instructed gait training, therpaeutic exercise, and balance training today. Educated patient on HEP and pain managment strategies.     Patient/caregiver response: Patient tolerated all treatment well today, with no visable signs of distress. Attempted to complete stair traininingx1 step/Patient continues to self-limit and decline. Patient displays moderate improvment in overall functional mobility overall.     Plan for next visit: Patient would benefit from continued skilled PT to address deficits in BLE strength, balance, and overall functional mobility. Work on car transfers/entering and leaving home, and stair navigation if possible."

## 2023-05-08 ENCOUNTER — HOME CARE VISIT (OUTPATIENT)
Dept: HOME HEALTH SERVICES | Facility: HOME HEALTHCARE | Age: 88
End: 2023-05-08
Payer: MEDICARE

## 2023-05-08 PROCEDURE — G0157 HHC PT ASSISTANT EA 15: HCPCS

## 2023-05-08 NOTE — Clinical Note
Krystal Gamboa,     Mr. Dee is due for discharge next visit. We were unable to progress much over the last few visits. Patient continued to refuse due to knee pain or self-limitation after multiple attempts over several visits. He appears to be at his max potential with PT, and states that he feels very close to his prior level of function.

## 2023-05-09 VITALS
DIASTOLIC BLOOD PRESSURE: 84 MMHG | OXYGEN SATURATION: 95 % | RESPIRATION RATE: 16 BRPM | SYSTOLIC BLOOD PRESSURE: 155 MMHG | TEMPERATURE: 98.5 F | HEART RATE: 60 BPM

## 2023-05-09 NOTE — HOME HEALTH
"Routine Visit Note: Greeted at door by patient's wife. Patient first seen siiting on recliner with no visable signs of distress. Patient states that he is having R knee pain recently due to \"arthritis\" and reports that it has impaired his ability to complete HEP and ambulate.     Skill/education provided: Instructed therapeutic exercise and gait training today. Educated patient on HEP and pain management strategies.    Patient/caregiver response: Patient declined to progress with functional transfer training and balance exercise today due to R knee pain. Patient tolerated completed exercises well, but declined to work on stair training/car transfers due to knee pain.     Plan for next visit: Patient is scheduled for discharge with supervising PT."

## 2023-05-19 ENCOUNTER — HOME CARE VISIT (OUTPATIENT)
Dept: HOME HEALTH SERVICES | Facility: HOME HEALTHCARE | Age: 88
End: 2023-05-19
Payer: MEDICARE

## 2023-07-24 DIAGNOSIS — E83.42 HYPOMAGNESEMIA: ICD-10-CM

## 2023-07-24 RX ORDER — POTASSIUM CHLORIDE 750 MG/1
TABLET, FILM COATED, EXTENDED RELEASE ORAL
Qty: 7 TABLET | Refills: 0 | OUTPATIENT
Start: 2023-07-24

## 2023-07-31 ENCOUNTER — LAB (OUTPATIENT)
Dept: LAB | Facility: HOSPITAL | Age: 88
End: 2023-07-31
Payer: MEDICARE

## 2023-07-31 DIAGNOSIS — E83.42 HYPOMAGNESEMIA: Primary | ICD-10-CM

## 2023-07-31 DIAGNOSIS — E83.42 HYPOMAGNESEMIA: ICD-10-CM

## 2023-07-31 LAB
ANION GAP SERPL CALCULATED.3IONS-SCNC: 12.7 MMOL/L (ref 5–15)
BUN SERPL-MCNC: 24 MG/DL (ref 8–23)
BUN/CREAT SERPL: 22.2 (ref 7–25)
CALCIUM SPEC-SCNC: 9 MG/DL (ref 8.6–10.5)
CHLORIDE SERPL-SCNC: 99 MMOL/L (ref 98–107)
CO2 SERPL-SCNC: 26.3 MMOL/L (ref 22–29)
CREAT SERPL-MCNC: 1.08 MG/DL (ref 0.76–1.27)
EGFRCR SERPLBLD CKD-EPI 2021: 66.8 ML/MIN/1.73
GLUCOSE SERPL-MCNC: 173 MG/DL (ref 65–99)
MAGNESIUM SERPL-MCNC: 0.8 MG/DL (ref 1.6–2.4)
POTASSIUM SERPL-SCNC: 4.4 MMOL/L (ref 3.5–5.2)
SODIUM SERPL-SCNC: 138 MMOL/L (ref 136–145)

## 2023-07-31 PROCEDURE — 80048 BASIC METABOLIC PNL TOTAL CA: CPT

## 2023-07-31 PROCEDURE — 83735 ASSAY OF MAGNESIUM: CPT

## 2023-08-01 ENCOUNTER — TELEPHONE (OUTPATIENT)
Dept: FAMILY MEDICINE CLINIC | Facility: CLINIC | Age: 88
End: 2023-08-01
Payer: MEDICARE

## 2023-08-03 ENCOUNTER — PRIOR AUTHORIZATION (OUTPATIENT)
Dept: FAMILY MEDICINE CLINIC | Facility: CLINIC | Age: 88
End: 2023-08-03
Payer: MEDICARE

## 2023-08-03 ENCOUNTER — LAB (OUTPATIENT)
Dept: LAB | Facility: HOSPITAL | Age: 88
End: 2023-08-03
Payer: MEDICARE

## 2023-08-03 DIAGNOSIS — E83.42 HYPOMAGNESEMIA: ICD-10-CM

## 2023-08-03 LAB
ALBUMIN SERPL-MCNC: 3.9 G/DL (ref 3.5–5.2)
ANION GAP SERPL CALCULATED.3IONS-SCNC: 15.2 MMOL/L (ref 5–15)
BUN SERPL-MCNC: 29 MG/DL (ref 8–23)
BUN/CREAT SERPL: 24 (ref 7–25)
CALCIUM SPEC-SCNC: 9.3 MG/DL (ref 8.6–10.5)
CHLORIDE SERPL-SCNC: 97 MMOL/L (ref 98–107)
CO2 SERPL-SCNC: 24.8 MMOL/L (ref 22–29)
CREAT SERPL-MCNC: 1.21 MG/DL (ref 0.76–1.27)
CREAT UR-MCNC: 27 MG/DL
EGFRCR SERPLBLD CKD-EPI 2021: 58.3 ML/MIN/1.73
GLUCOSE SERPL-MCNC: 161 MG/DL (ref 65–99)
MAGNESIUM SERPL-MCNC: 0.8 MG/DL (ref 1.6–2.4)
PHOSPHATE SERPL-MCNC: 3.5 MG/DL (ref 2.5–4.5)
POTASSIUM SERPL-SCNC: 4.1 MMOL/L (ref 3.5–5.2)
SODIUM SERPL-SCNC: 137 MMOL/L (ref 136–145)

## 2023-08-03 PROCEDURE — 83735 ASSAY OF MAGNESIUM: CPT

## 2023-08-03 PROCEDURE — 80069 RENAL FUNCTION PANEL: CPT

## 2023-08-03 PROCEDURE — 82570 ASSAY OF URINE CREATININE: CPT

## 2023-08-04 ENCOUNTER — LAB (OUTPATIENT)
Dept: LAB | Facility: HOSPITAL | Age: 88
End: 2023-08-04
Payer: MEDICARE

## 2023-08-04 ENCOUNTER — APPOINTMENT (OUTPATIENT)
Dept: LAB | Facility: HOSPITAL | Age: 88
End: 2023-08-04
Payer: MEDICARE

## 2023-08-04 PROCEDURE — 81050 URINALYSIS VOLUME MEASURE: CPT

## 2023-08-04 PROCEDURE — 83735 ASSAY OF MAGNESIUM: CPT

## 2023-08-05 LAB
MAGNESIUM 24H UR-MRATE: <18.2 MG/24 HR (ref 12–293)
MAGNESIUM UR-MCNC: <1.4 MG/DL
MAGNESIUM UR-MCNC: <1.4 MG/DL

## 2023-08-10 DIAGNOSIS — E83.42 HYPOMAGNESEMIA: Primary | ICD-10-CM

## 2023-08-10 RX ORDER — SODIUM CHLORIDE 9 MG/ML
250 INJECTION, SOLUTION INTRAVENOUS ONCE
OUTPATIENT
Start: 2023-08-10

## 2023-08-21 ENCOUNTER — LAB (OUTPATIENT)
Dept: LAB | Facility: HOSPITAL | Age: 88
End: 2023-08-21
Payer: MEDICARE

## 2023-08-21 DIAGNOSIS — E83.42 HYPOMAGNESEMIA: ICD-10-CM

## 2023-08-21 LAB — MAGNESIUM SERPL-MCNC: 2.2 MG/DL (ref 1.6–2.4)

## 2023-08-21 PROCEDURE — 83735 ASSAY OF MAGNESIUM: CPT

## 2023-08-25 ENCOUNTER — TELEPHONE (OUTPATIENT)
Dept: FAMILY MEDICINE CLINIC | Facility: CLINIC | Age: 88
End: 2023-08-25
Payer: MEDICARE

## 2023-08-25 NOTE — TELEPHONE ENCOUNTER
Caller: Maya Germain    Relationship: Emergency Contact    Best call back number: [phone]    Who are you requesting to speak with (clinical staff, provider,  specific staff member): CLINICAL    What was the call regarding: PATIENTS WIFE CALLED TO CHECK ON MAGNESIUM LAB

## 2023-08-25 NOTE — TELEPHONE ENCOUNTER
His magnesium level is back to normal.  This is fantastic news.  Continue what he is doing, stay off the reflux pill.

## 2023-09-27 ENCOUNTER — TELEPHONE (OUTPATIENT)
Dept: FAMILY MEDICINE CLINIC | Facility: CLINIC | Age: 88
End: 2023-09-27
Payer: MEDICARE

## 2023-09-27 NOTE — TELEPHONE ENCOUNTER
Caller: Ariella Germainta    Relationship: Emergency Contact    Best call back number: [phone]    What medication are you requesting: A DIFFERENT MEDICATION FOR ACID REFLUX    What are your current symptoms: ACID RELUX        Have you had these symptoms before:    [x] Yes  [] No    Have you been treated for these symptoms before:   [x] Yes  [] No    If a prescription is needed, what is your preferred pharmacy and phone number: [facility] #[id number] [facility] [address] [facility] [address] [facility] [phone] [facility] [phone] FX     Additional notes:THE CALLER STATES THAT THE PATIENT WAS ON OMEPRAZOLE AND HE WAS TAKEN OFF THAT MEDICATION BECAUSE IT INTERFERES WITH HIS ELECTROLYTES THE CALLER WOULD LIKE TO KNOW IF HE CAN BE PRESCRIBED SOMETHING ELSE THAT WONT INTERFERE WITH THE PATIENTS ELECTROLYTES

## 2023-09-29 DIAGNOSIS — E11.65 TYPE 2 DIABETES MELLITUS WITH HYPERGLYCEMIA, WITHOUT LONG-TERM CURRENT USE OF INSULIN: ICD-10-CM

## 2023-09-29 NOTE — TELEPHONE ENCOUNTER
Rx Refill Note  Requested Prescriptions     Pending Prescriptions Disp Refills    metFORMIN (GLUCOPHAGE) 500 MG tablet [Pharmacy Med Name: metFORMIN  MG TABLET] 90 tablet      Sig: TAKE ONE TABLET BY MOUTH DAILY WITH BREAKFAST      Last office visit with prescribing clinician: 7/10/2023   Last telemedicine visit with prescribing clinician: Visit date not found   Next office visit with prescribing clinician: 11/2/2023                         Would you like a call back once the refill request has been completed: [] Yes [] No    If the office needs to give you a call back, can they leave a voicemail: [] Yes [] No    April GENET Payne  09/29/23, 08:36 EDT

## 2023-10-03 RX ORDER — FAMOTIDINE 20 MG/1
20 TABLET, FILM COATED ORAL 2 TIMES DAILY PRN
Qty: 60 TABLET | Refills: 2 | Status: SHIPPED | OUTPATIENT
Start: 2023-10-03

## 2023-10-03 NOTE — TELEPHONE ENCOUNTER
Caller: Iain Dee    Relationship: Self    Best call back number:  [phone]     Who are you requesting to speak with (clinical staff, provider,  specific staff member): CLINICAL     What was the call regarding: REGARDING A MEDICATION FOR ACID REFLUX   THE PATIENT WENT TO THE PHARMACY BUT THEY SAID NOTHING WAS SENT IN FOR THIS     Is it okay if the provider responds through MyChart:

## 2023-10-30 ENCOUNTER — TELEPHONE (OUTPATIENT)
Dept: FAMILY MEDICINE CLINIC | Facility: CLINIC | Age: 88
End: 2023-10-30
Payer: MEDICARE

## 2023-10-30 NOTE — TELEPHONE ENCOUNTER
Called and spoke with patient. He was due for his Medicare wellness this month. I got him scheduled for next Tuesday 11/7/203 and explained at that time Dr. Aguilar can do all of is annual labs.

## 2023-10-30 NOTE — TELEPHONE ENCOUNTER
Caller: Iain Dee    Relationship: Self    Best call back number: [phone]     What orders are you requesting (i.e. lab or imaging): LABS TO CHECK ELECTROLYTES     In what timeframe would the patient need to come in: SOON     Where will you receive your lab/imaging services: AT THE PRACTICE     Additional notes: PLEASE CALL THE PATIENT AND ADVISE.

## 2024-01-02 RX ORDER — FAMOTIDINE 20 MG/1
TABLET, FILM COATED ORAL
Qty: 60 TABLET | Refills: 2 | Status: SHIPPED | OUTPATIENT
Start: 2024-01-02

## 2024-01-03 DIAGNOSIS — I10 ESSENTIAL HYPERTENSION: ICD-10-CM

## 2024-01-03 RX ORDER — LOSARTAN POTASSIUM 50 MG/1
50 TABLET ORAL 2 TIMES DAILY
Qty: 180 TABLET | Refills: 2 | Status: SHIPPED | OUTPATIENT
Start: 2024-01-03

## 2024-01-19 DIAGNOSIS — I48.91 ATRIAL FIBRILLATION, UNSPECIFIED TYPE: ICD-10-CM

## 2024-01-19 NOTE — TELEPHONE ENCOUNTER
Caller: MuralimeronIain    Relationship: Self    Best call back number: [phone]     Requested Prescriptions:   Requested Prescriptions     Pending Prescriptions Disp Refills    apixaban (ELIQUIS) 2.5 MG tablet tablet 60 tablet 11     Sig: Take 1 tablet by mouth 2 (Two) Times a Day. Indications: Atrial Fibrillation        Pharmacy where request should be sent: [facility] #[id number] [facility] [address] AT [facility] [address] [facility] [phone] [facility] [phone]      Last office visit with prescribing clinician: 7/10/2023   Last telemedicine visit with prescribing clinician: Visit date not found   Next office visit with prescribing clinician: 2/14/2024     Does the patient have less than a 3 day supply:  [x] Yes  [] No    Would you like a call back once the refill request has been completed: [] Yes [x] No    If the office needs to give you a call back, can they leave a voicemail: [] Yes [x] No    Lexus Rose Rep   01/19/24 16:07 EST

## 2024-02-14 ENCOUNTER — LAB (OUTPATIENT)
Dept: LAB | Facility: HOSPITAL | Age: 89
End: 2024-02-14
Payer: MEDICARE

## 2024-02-14 ENCOUNTER — OFFICE VISIT (OUTPATIENT)
Dept: FAMILY MEDICINE CLINIC | Facility: CLINIC | Age: 89
End: 2024-02-14
Payer: MEDICARE

## 2024-02-14 VITALS
DIASTOLIC BLOOD PRESSURE: 76 MMHG | WEIGHT: 236 LBS | OXYGEN SATURATION: 96 % | SYSTOLIC BLOOD PRESSURE: 122 MMHG | HEART RATE: 66 BPM | BODY MASS INDEX: 31.28 KG/M2 | HEIGHT: 73 IN

## 2024-02-14 DIAGNOSIS — Z00.00 MEDICARE ANNUAL WELLNESS VISIT, SUBSEQUENT: Primary | ICD-10-CM

## 2024-02-14 DIAGNOSIS — L30.9 FACIAL DERMATITIS: ICD-10-CM

## 2024-02-14 DIAGNOSIS — G47.34 NOCTURNAL HYPOXEMIA: ICD-10-CM

## 2024-02-14 DIAGNOSIS — R41.3 MEMORY IMPAIRMENT: ICD-10-CM

## 2024-02-14 DIAGNOSIS — Z79.01 CHRONIC ANTICOAGULATION: ICD-10-CM

## 2024-02-14 DIAGNOSIS — E83.42 HYPOMAGNESEMIA: ICD-10-CM

## 2024-02-14 DIAGNOSIS — E11.65 TYPE 2 DIABETES MELLITUS WITH HYPERGLYCEMIA, WITHOUT LONG-TERM CURRENT USE OF INSULIN: ICD-10-CM

## 2024-02-14 DIAGNOSIS — I10 ESSENTIAL HYPERTENSION: ICD-10-CM

## 2024-02-14 DIAGNOSIS — F10.10 ALCOHOL ABUSE: ICD-10-CM

## 2024-02-14 DIAGNOSIS — G47.33 OSA ON CPAP: ICD-10-CM

## 2024-02-14 DIAGNOSIS — I48.91 ATRIAL FIBRILLATION, UNSPECIFIED TYPE: ICD-10-CM

## 2024-02-14 DIAGNOSIS — Z13.220 SCREENING FOR HYPERLIPIDEMIA: ICD-10-CM

## 2024-02-14 DIAGNOSIS — Z13.0 SCREENING FOR DEFICIENCY ANEMIA: ICD-10-CM

## 2024-02-14 DIAGNOSIS — Z23 IMMUNIZATION DUE: ICD-10-CM

## 2024-02-14 DIAGNOSIS — M10.9 GOUT, UNSPECIFIED CAUSE, UNSPECIFIED CHRONICITY, UNSPECIFIED SITE: ICD-10-CM

## 2024-02-14 LAB
ALBUMIN SERPL-MCNC: 4 G/DL (ref 3.5–5.2)
ALBUMIN UR-MCNC: 438.1 MG/DL
ALBUMIN/GLOB SERPL: 1.4 G/DL
ALP SERPL-CCNC: 71 U/L (ref 39–117)
ALT SERPL W P-5'-P-CCNC: 14 U/L (ref 1–41)
ANION GAP SERPL CALCULATED.3IONS-SCNC: 12.6 MMOL/L (ref 5–15)
AST SERPL-CCNC: 24 U/L (ref 1–40)
BACTERIA UR QL AUTO: NORMAL /HPF
BASOPHILS # BLD AUTO: 0.08 10*3/MM3 (ref 0–0.2)
BASOPHILS NFR BLD AUTO: 1.2 % (ref 0–1.5)
BILIRUB SERPL-MCNC: 1 MG/DL (ref 0–1.2)
BILIRUB UR QL STRIP: NEGATIVE
BUN SERPL-MCNC: 22 MG/DL (ref 8–23)
BUN/CREAT SERPL: 20 (ref 7–25)
CALCIUM SPEC-SCNC: 9.6 MG/DL (ref 8.6–10.5)
CHLORIDE SERPL-SCNC: 99 MMOL/L (ref 98–107)
CHOLEST SERPL-MCNC: 156 MG/DL (ref 0–200)
CLARITY UR: CLEAR
CO2 SERPL-SCNC: 24.4 MMOL/L (ref 22–29)
COLOR UR: YELLOW
CREAT SERPL-MCNC: 1.1 MG/DL (ref 0.76–1.27)
CREAT UR-MCNC: 64.8 MG/DL
DEPRECATED RDW RBC AUTO: 41.5 FL (ref 37–54)
EGFRCR SERPLBLD CKD-EPI 2021: 65 ML/MIN/1.73
EOSINOPHIL # BLD AUTO: 0.1 10*3/MM3 (ref 0–0.4)
EOSINOPHIL NFR BLD AUTO: 1.5 % (ref 0.3–6.2)
ERYTHROCYTE [DISTWIDTH] IN BLOOD BY AUTOMATED COUNT: 12.4 % (ref 12.3–15.4)
EXPIRATION DATE: ABNORMAL
FOLATE SERPL-MCNC: 17.5 NG/ML (ref 4.78–24.2)
GLOBULIN UR ELPH-MCNC: 2.8 GM/DL
GLUCOSE SERPL-MCNC: 161 MG/DL (ref 65–99)
GLUCOSE UR STRIP-MCNC: ABNORMAL MG/DL
HBA1C MFR BLD: 6.3 % (ref 4.5–5.7)
HCT VFR BLD AUTO: 41.8 % (ref 37.5–51)
HDLC SERPL-MCNC: 43 MG/DL (ref 40–60)
HGB BLD-MCNC: 14.1 G/DL (ref 13–17.7)
HGB UR QL STRIP.AUTO: NEGATIVE
HYALINE CASTS UR QL AUTO: NORMAL /LPF
IMM GRANULOCYTES # BLD AUTO: 0.02 10*3/MM3 (ref 0–0.05)
IMM GRANULOCYTES NFR BLD AUTO: 0.3 % (ref 0–0.5)
KETONES UR QL STRIP: NEGATIVE
LDLC SERPL CALC-MCNC: 92 MG/DL (ref 0–100)
LDLC/HDLC SERPL: 2.09 {RATIO}
LEUKOCYTE ESTERASE UR QL STRIP.AUTO: NEGATIVE
LYMPHOCYTES # BLD AUTO: 0.6 10*3/MM3 (ref 0.7–3.1)
LYMPHOCYTES NFR BLD AUTO: 8.9 % (ref 19.6–45.3)
Lab: ABNORMAL
MAGNESIUM SERPL-MCNC: 1.6 MG/DL (ref 1.6–2.4)
MCH RBC QN AUTO: 31.5 PG (ref 26.6–33)
MCHC RBC AUTO-ENTMCNC: 33.7 G/DL (ref 31.5–35.7)
MCV RBC AUTO: 93.3 FL (ref 79–97)
MICROALBUMIN/CREAT UR: 6760.8 MG/G (ref 0–29)
MONOCYTES # BLD AUTO: 0.5 10*3/MM3 (ref 0.1–0.9)
MONOCYTES NFR BLD AUTO: 7.4 % (ref 5–12)
NEUTROPHILS NFR BLD AUTO: 5.47 10*3/MM3 (ref 1.7–7)
NEUTROPHILS NFR BLD AUTO: 80.7 % (ref 42.7–76)
NITRITE UR QL STRIP: NEGATIVE
NRBC BLD AUTO-RTO: 0 /100 WBC (ref 0–0.2)
PH UR STRIP.AUTO: 7 [PH] (ref 5–8)
PLATELET # BLD AUTO: 272 10*3/MM3 (ref 140–450)
PMV BLD AUTO: 10.6 FL (ref 6–12)
POTASSIUM SERPL-SCNC: 4.1 MMOL/L (ref 3.5–5.2)
PROT SERPL-MCNC: 6.8 G/DL (ref 6–8.5)
PROT UR QL STRIP: ABNORMAL
RBC # BLD AUTO: 4.48 10*6/MM3 (ref 4.14–5.8)
RBC # UR STRIP: NORMAL /HPF
REF LAB TEST METHOD: NORMAL
SODIUM SERPL-SCNC: 136 MMOL/L (ref 136–145)
SP GR UR STRIP: 1.02 (ref 1–1.03)
SQUAMOUS #/AREA URNS HPF: NORMAL /HPF
TRIGL SERPL-MCNC: 116 MG/DL (ref 0–150)
URATE SERPL-MCNC: 6.4 MG/DL (ref 3.4–7)
UROBILINOGEN UR QL STRIP: ABNORMAL
VIT B12 BLD-MCNC: 332 PG/ML (ref 211–946)
VLDLC SERPL-MCNC: 21 MG/DL (ref 5–40)
WBC # UR STRIP: NORMAL /HPF
WBC NRBC COR # BLD AUTO: 6.77 10*3/MM3 (ref 3.4–10.8)

## 2024-02-14 PROCEDURE — 82043 UR ALBUMIN QUANTITATIVE: CPT | Performed by: FAMILY MEDICINE

## 2024-02-14 PROCEDURE — 84550 ASSAY OF BLOOD/URIC ACID: CPT | Performed by: FAMILY MEDICINE

## 2024-02-14 PROCEDURE — 82607 VITAMIN B-12: CPT | Performed by: FAMILY MEDICINE

## 2024-02-14 PROCEDURE — 81001 URINALYSIS AUTO W/SCOPE: CPT | Performed by: FAMILY MEDICINE

## 2024-02-14 PROCEDURE — 80053 COMPREHEN METABOLIC PANEL: CPT | Performed by: FAMILY MEDICINE

## 2024-02-14 PROCEDURE — 82746 ASSAY OF FOLIC ACID SERUM: CPT | Performed by: FAMILY MEDICINE

## 2024-02-14 PROCEDURE — 83735 ASSAY OF MAGNESIUM: CPT | Performed by: FAMILY MEDICINE

## 2024-02-14 PROCEDURE — 80061 LIPID PANEL: CPT | Performed by: FAMILY MEDICINE

## 2024-02-14 PROCEDURE — 85025 COMPLETE CBC W/AUTO DIFF WBC: CPT | Performed by: FAMILY MEDICINE

## 2024-02-14 PROCEDURE — 82570 ASSAY OF URINE CREATININE: CPT | Performed by: FAMILY MEDICINE

## 2024-02-14 RX ORDER — PIMECROLIMUS 10 MG/G
1 CREAM TOPICAL DAILY
Qty: 60 G | Refills: 11 | Status: SHIPPED | OUTPATIENT
Start: 2024-02-14

## 2024-02-20 ENCOUNTER — TELEPHONE (OUTPATIENT)
Dept: FAMILY MEDICINE CLINIC | Facility: CLINIC | Age: 89
End: 2024-02-20
Payer: MEDICARE

## 2024-02-20 NOTE — TELEPHONE ENCOUNTER
[employer] ([facility])   Brought in a form that needs to be completed for the pt to receive oxygen. MA has the form. Pt needs to come in and complete a walk test and fill out form to send back.

## 2024-02-29 ENCOUNTER — TELEPHONE (OUTPATIENT)
Dept: FAMILY MEDICINE CLINIC | Facility: CLINIC | Age: 89
End: 2024-02-29
Payer: MEDICARE

## 2024-02-29 NOTE — TELEPHONE ENCOUNTER
Caller: Iain Dee    Relationship: Self    Best call back number: [phone]    Caller requesting test results: SELF    What test was performed: LABS    When was the test performed: 02/14/2024    Additional notes: PATIENT CALLED CHECKING ON LAB RESULTS. PLEASE ADVISE

## 2024-03-01 NOTE — TELEPHONE ENCOUNTER
Please advise on results. I did not see result note, letters, or telephone encounter regarding results being reviewed.

## 2024-03-01 NOTE — TELEPHONE ENCOUNTER
Labs looked great apart from his blood sugar which we knew about. He is spilling some protein in his urine, which is common for diabetic patients and is treated by his losartan. His cholesterol looks good and his anemia has resolved, blood counts look great. No changes, happy with results apart from high blood sugar.

## 2024-03-04 NOTE — TELEPHONE ENCOUNTER
Unable to reach Iain Dee by phone or leave message.    Hub may relay message and document.    Luis Aguilar DO  e  Sae  Clinical Pool3 days ago       Labs looked great apart from his blood sugar which we knew about. He is spilling some protein in his urine, which is common for diabetic patients and is treated by his losartan. His cholesterol looks good and his anemia has resolved, blood counts look great. No changes, happy with results apart from high blood sugar.

## 2024-03-15 ENCOUNTER — PRIOR AUTHORIZATION (OUTPATIENT)
Dept: FAMILY MEDICINE CLINIC | Facility: CLINIC | Age: 89
End: 2024-03-15
Payer: MEDICARE

## 2024-03-15 NOTE — TELEPHONE ENCOUNTER
(Key: [id number]) - [id number]  Pimecrolimus 1% cream  Status: PA Request  Created: March 15th, 2024 [phone]  Sent: March 15th, 2024    Approved today  PA Case: [id number], Status: Approved, Coverage Starts on: 12/16/2023 12:00:00 AM, Coverage Ends on: 3/15/2025 12:00:00 AM.

## 2024-03-17 DIAGNOSIS — E11.65 TYPE 2 DIABETES MELLITUS WITH HYPERGLYCEMIA, WITHOUT LONG-TERM CURRENT USE OF INSULIN: ICD-10-CM

## 2024-03-18 RX ORDER — FAMOTIDINE 20 MG/1
TABLET, FILM COATED ORAL
Qty: 60 TABLET | Refills: 2 | Status: SHIPPED | OUTPATIENT
Start: 2024-03-18

## 2024-03-19 ENCOUNTER — TELEPHONE (OUTPATIENT)
Dept: FAMILY MEDICINE CLINIC | Facility: CLINIC | Age: 89
End: 2024-03-19

## 2024-03-19 ENCOUNTER — TELEPHONE (OUTPATIENT)
Dept: FAMILY MEDICINE CLINIC | Facility: CLINIC | Age: 89
End: 2024-03-19
Payer: MEDICARE

## 2024-03-19 NOTE — TELEPHONE ENCOUNTER
Caller: Maya Germain    Relationship: Emergency Contact    Best call back number:  [phone] (Mobile)     Requested Prescriptions:   Requested Prescriptions      No prescriptions requested or ordered in this encounter    CPAP SUPPLIES     Pharmacy where request should be sent:  [facility] [address]  [facility]     Last office visit with prescribing clinician: 2/14/2024   Last telemedicine visit with prescribing clinician: Visit date not found   Next office visit with prescribing clinician: 8/14/2024     Additional details provided by patient: PATIENT DOES NOT WANT TO USE [facility]   THEY WOULD LIKE TO USE [facility]

## 2024-03-19 NOTE — TELEPHONE ENCOUNTER
Caller: Moiz Germain    Relationship: Emergency Contact    Best call back number:       Requested Prescriptions:   Requested Prescriptions      No prescriptions requested or ordered in this encounter    MEDICATION FOR HIS FACE THAT WAS PRESCRIBED IN FEBRUARY 2024   BECAUSE OF THE CPAP MACHINE   THE PHARMACY SAID THE MEDICATION WOULD COST $400  MOIZ WANTS TO KNOW IF THERE IS ANOTHER MEDICATION THAT WOULD WORK FOR HIM     Pharmacy where request should be sent:  [facility]     Last office visit with prescribing clinician: 2/14/2024   Last telemedicine visit with prescribing clinician: Visit date not found   Next office visit with prescribing clinician: 8/14/2024

## 2024-03-25 NOTE — TELEPHONE ENCOUNTER
Called pt and let him know that prescription should be about $70 with goodrx for the generic. There is not a great alternative for the facial area other than OTC cetaphil. He had no further questions

## 2024-04-01 DIAGNOSIS — I10 ESSENTIAL HYPERTENSION: ICD-10-CM

## 2024-04-01 DIAGNOSIS — E11.65 TYPE 2 DIABETES MELLITUS WITH HYPERGLYCEMIA, WITHOUT LONG-TERM CURRENT USE OF INSULIN: ICD-10-CM

## 2024-04-01 RX ORDER — FAMOTIDINE 20 MG/1
TABLET, FILM COATED ORAL
Qty: 60 TABLET | Refills: 2 | OUTPATIENT
Start: 2024-04-01

## 2024-04-01 RX ORDER — AMLODIPINE BESYLATE 10 MG/1
5 TABLET ORAL DAILY
Qty: 45 TABLET | Refills: 1 | Status: SHIPPED | OUTPATIENT
Start: 2024-04-01

## 2024-04-01 RX ORDER — CLONIDINE HYDROCHLORIDE 0.2 MG/1
0.2 TABLET ORAL 2 TIMES DAILY
Qty: 180 TABLET | Refills: 1 | Status: SHIPPED | OUTPATIENT
Start: 2024-04-01

## 2024-04-01 NOTE — TELEPHONE ENCOUNTER
Caller: Maya Germain    Relationship: Emergency Contact    Best call back number: [phone]     Requested Prescriptions:   Requested Prescriptions     Pending Prescriptions Disp Refills    amLODIPine (NORVASC) 10 MG tablet 90 tablet 3     Sig: Take 0.5 tablets by mouth Daily. Indications: High Blood Pressure Disorder    cloNIDine (CATAPRES) 0.2 MG tablet 180 tablet 2     Sig: Take 1 tablet by mouth 2 (Two) Times a Day. Indications: High Blood Pressure Disorder    metFORMIN (GLUCOPHAGE) 500 MG tablet 180 tablet 0     Sig: Take 1 tablet by mouth 2 (Two) Times a Day With Meals.    famotidine (PEPCID) 20 MG tablet 60 tablet 2        Pharmacy where request should be sent: [id number] - [facility] [address] AT [facility] [address] [facility] [phone] [facility] [phone] FX     Last office visit with prescribing clinician: 2/14/2024   Last telemedicine visit with prescribing clinician: Visit date not found   Next office visit with prescribing clinician: 8/14/2024         Does the patient have less than a 3 day supply:  [] Yes  [x] No    Would you like a call back once the refill request has been completed: [] Yes [x] No    If the office needs to give you a call back, can they leave a voicemail: [] Yes [x] No    Lexus Boykin Rep   04/01/24 13:01 EDT

## 2024-04-05 ENCOUNTER — HOSPITAL ENCOUNTER (EMERGENCY)
Facility: HOSPITAL | Age: 89
Discharge: HOME | End: 2024-04-05
Attending: EMERGENCY MEDICINE
Payer: MEDICARE

## 2024-04-05 VITALS
WEIGHT: 225 LBS | HEIGHT: 72 IN | DIASTOLIC BLOOD PRESSURE: 107 MMHG | RESPIRATION RATE: 16 BRPM | BODY MASS INDEX: 30.48 KG/M2 | HEART RATE: 64 BPM | TEMPERATURE: 97.6 F | OXYGEN SATURATION: 92 % | SYSTOLIC BLOOD PRESSURE: 177 MMHG

## 2024-04-05 DIAGNOSIS — Z79.01 CHRONIC ANTICOAGULATION: ICD-10-CM

## 2024-04-05 DIAGNOSIS — R04.0 EPISTAXIS: Primary | ICD-10-CM

## 2024-04-05 PROCEDURE — 99283 EMERGENCY DEPT VISIT LOW MDM: CPT

## 2024-04-05 RX ORDER — TRANEXAMIC ACID 100 MG/ML
500 INJECTION, SOLUTION INTRAVENOUS ONCE
Status: COMPLETED | OUTPATIENT
Start: 2024-04-05 | End: 2024-04-05

## 2024-04-05 RX ORDER — LIDOCAINE HYDROCHLORIDE 40 MG/ML
1 SOLUTION TOPICAL ONCE
Status: COMPLETED | OUTPATIENT
Start: 2024-04-05 | End: 2024-04-05

## 2024-04-05 RX ADMIN — TRANEXAMIC ACID 500 MG: 100 INJECTION, SOLUTION INTRAVENOUS at 12:37

## 2024-04-05 RX ADMIN — LIDOCAINE HYDROCHLORIDE 1 APPLICATION: 40 SOLUTION TOPICAL at 12:38

## 2024-04-05 RX ADMIN — PHENYLEPHRINE HYDROCHLORIDE 2 SPRAY: 0.25 SPRAY NASAL at 12:39

## 2024-04-05 NOTE — ED PROVIDER NOTES
[place]    EMERGENCY DEPARTMENT ENCOUNTER      Pt Name: Iain Dee  MRN: [id number]  YOB: 1935  Date of evaluation: 4/5/2024  Provider: Omar Erazo DO    CHIEF COMPLAINT       Chief Complaint   Patient presents with    Nose Bleed         HISTORY OF PRESENT ILLNESS  (Location/Symptom, Timing/Onset, Context/Setting, Quality, Duration, Modifying Factors, Severity.)   Iain Dee is a 88 y.o. male who presents to the emergency department for evaluation of epistaxis which occurred just prior to arrival.  The patient notes digital insertion into the nose and then significant amount of bleeding which he believes is from the anterior portion prior to arrival.  He notes he is on Eliquis chronically, is not had an issue with any significant or recent nosebleeds.  Denies any fall, injury or trauma.  No difficulty with breathing.  No shortness of breath, denies any recent illness, fever or chills.  Patient denies any other acute systemic complaints at this time.      Nursing notes were reviewed.      PAST MEDICAL HISTORY     Past Medical History:   Diagnosis Date    Arthritis     Cancer     Diabetes mellitus     GERD (gastroesophageal reflux disease)     Gout     H/O Bladder carcinoma (HCC)     Hypertension          SURGICAL HISTORY       Past Surgical History:   Procedure Laterality Date    BLADDER SURGERY      Biopsy    HERNIA REPAIR      PROSTATE BIOPSY           CURRENT MEDICATIONS       Current Facility-Administered Medications:     phenylephrine (MARYJO-SYNEPHRINE) 0.25 % nasal spray 2 spray, 2 spray, Each Nare, Q4H PRN, Omar Erazo DO, 2 spray at 04/05/24 1239    Current Outpatient Medications:     amLODIPine (NORVASC) 10 MG tablet, Take 0.5 tablets by mouth Daily. Indications: High Blood Pressure Disorder, Disp: 45 tablet, Rfl: 1    cloNIDine (CATAPRES) 0.2 MG tablet, Take 1 tablet by mouth 2 (Two) Times a Day. Indications: High Blood Pressure Disorder, Disp: 180 tablet, Rfl: 1    " apixaban (ELIQUIS) 2.5 MG tablet tablet, Take 1 tablet by mouth 2 (Two) Times a Day. Indications: Atrial Fibrillation, Disp: 60 tablet, Rfl: 11    famotidine (PEPCID) 20 MG tablet, TAKE 1 TABLET BY MOUTH TWICE DAILY AS NEEDED FOR HEARTBURN. REPLACE RANITIDINE, Disp: 60 tablet, Rfl: 2    glucose blood test strip, 1 each by Other route Daily. Use daily as directed, Disp: 50 each, Rfl: 12    glucose monitor monitoring kit, 1 each Daily. Use daily as directed., Disp: 1 each, Rfl: 0    Lancets Thin misc, 1 each Daily. Use daily as directed, Disp: 50 each, Rfl: 11    losartan (COZAAR) 50 MG tablet, TAKE ONE TABLET BY MOUTH TWICE A DAY, Disp: 180 tablet, Rfl: 2    metFORMIN (GLUCOPHAGE) 500 MG tablet, TAKE 1 TABLET BY MOUTH TWICE DAILY WITH MEALS, Disp: 180 tablet, Rfl: 0    metoprolol tartrate (LOPRESSOR) 25 MG tablet, Take 1 tablet by mouth 2 (Two) Times a Day. (Patient taking differently: Take 0.5 tablets by mouth Daily. Indications: High Blood Pressure Disorder), Disp: 180 tablet, Rfl: 3    pimecrolimus (Elidel) 1 % cream, Apply 1 Application topically to the appropriate area as directed Daily. Indications: Facial dermatitis, Disp: 60 g, Rfl: 11    ALLERGIES     Colchicine    FAMILY HISTORY       Family History   Problem Relation Age of Onset    Heart attack Father           SOCIAL HISTORY       Social History     Socioeconomic History    Marital status:    Tobacco Use    Smoking status: Never     Passive exposure: Never    Smokeless tobacco: Never   Vaping Use    Vaping status: Never Used   Substance and Sexual Activity    Alcohol use: Yes     Alcohol/week: 28.0 standard drinks of alcohol     Types: 28 Shots of liquor per week     Comment: Estimation based on spouse report of patient drinking \"1 large martini\" each night (regular martini = 3 shots, large martini = est. 4 shots)    Drug use: No    Sexual activity: Defer         PHYSICAL EXAM    (up to 7 for level 4, 8 or more for level 5)     Vitals:    " "04/05/24 1234 04/05/24 1300   BP: (!) 199/110 (!) 177/107   BP Location: Right arm    Patient Position: Lying    Pulse: 67 64   Resp: 16    Temp: 97.6 °F (36.4 °C)    TempSrc: Axillary    SpO2: 93% 92%   Weight: 102 kg (225 lb)    Height: 182.9 cm (72\")        Physical Exam  General : Patient is awake, alert, oriented, in no acute distress, nontoxic appearing  HEENT: Pupils are equally round, EOMI, there is current epistaxis noted along the left mid anterior nares, no bleeding is noted in the posterior pharynx  Neck: Neck is supple  Cardiac: Heart regular rate, rhythm  Lungs: Lungs are clear to auscultation        DIAGNOSTIC RESULTS     EKG:  All EKGs are interpreted by the Emergency Department Physician who either signs or Co-signs this chart in the absence of a cardiologist.    No orders to display         ED BEDSIDE ULTRASOUND:   Performed by ED Physician - none    LABS:    I have reviewed and interpreted all of the currently available lab results from this visit (if applicable):  Results for orders placed or performed in visit on 02/14/24   Microalbumin / Creatinine Urine Ratio - Urine, Clean Catch    Specimen: Urine, Clean Catch   Result Value Ref Range    Microalbumin/Creatinine Ratio 6,760.8 (H) 0.0 - 29.0 mg/g    Creatinine, Urine 64.8 mg/dL    Microalbumin, Urine 438.1 mg/dL   Magnesium    Specimen: Blood   Result Value Ref Range    Magnesium 1.6 1.6 - 2.4 mg/dL   Uric Acid    Specimen: Blood   Result Value Ref Range    Uric Acid 6.4 3.4 - 7.0 mg/dL   Lipid Panel    Specimen: Blood   Result Value Ref Range    Total Cholesterol 156 0 - 200 mg/dL    Triglycerides 116 0 - 150 mg/dL    HDL Cholesterol 43 40 - 60 mg/dL    LDL Cholesterol  92 0 - 100 mg/dL    VLDL Cholesterol 21 5 - 40 mg/dL    LDL/HDL Ratio 2.09    Comprehensive Metabolic Panel    Specimen: Blood   Result Value Ref Range    Glucose 161 (H) 65 - 99 mg/dL    BUN 22 8 - 23 mg/dL    Creatinine 1.10 0.76 - 1.27 mg/dL    Sodium 136 136 - 145 mmol/L    " Potassium 4.1 3.5 - 5.2 mmol/L    Chloride 99 98 - 107 mmol/L    CO2 24.4 22.0 - 29.0 mmol/L    Calcium 9.6 8.6 - 10.5 mg/dL    Total Protein 6.8 6.0 - 8.5 g/dL    Albumin 4.0 3.5 - 5.2 g/dL    ALT (SGPT) 14 1 - 41 U/L    AST (SGOT) 24 1 - 40 U/L    Alkaline Phosphatase 71 39 - 117 U/L    Total Bilirubin 1.0 0.0 - 1.2 mg/dL    Globulin 2.8 gm/dL    A/G Ratio 1.4 g/dL    BUN/Creatinine Ratio 20.0 7.0 - 25.0    Anion Gap 12.6 5.0 - 15.0 mmol/L    eGFR 65.0 >60.0 mL/min/1.73   Urinalysis With Microscopic If Indicated (No Culture) - Urine, Clean Catch    Specimen: Urine, Clean Catch   Result Value Ref Range    Color, UA Yellow Yellow, Straw    Appearance, UA Clear Clear    pH, UA 7.0 5.0 - 8.0    Specific Gravity, UA 1.020 1.005 - 1.030    Glucose,  mg/dL (Trace) (A) Negative    Ketones, UA Negative Negative    Bilirubin, UA Negative Negative    Blood, UA Negative Negative    Protein, UA >=300 mg/dL (3+) (A) Negative    Leuk Esterase, UA Negative Negative    Nitrite, UA Negative Negative    Urobilinogen, UA 1.0 E.U./dL 0.2 - 1.0 E.U./dL   Vitamin B12    Specimen: Blood   Result Value Ref Range    Vitamin B-12 332 211 - 946 pg/mL   Folate    Specimen: Blood   Result Value Ref Range    Folate 17.50 4.78 - 24.20 ng/mL   CBC Auto Differential    Specimen: Blood   Result Value Ref Range    WBC 6.77 3.40 - 10.80 10*3/mm3    RBC 4.48 4.14 - 5.80 10*6/mm3    Hemoglobin 14.1 13.0 - 17.7 g/dL    Hematocrit 41.8 37.5 - 51.0 %    MCV 93.3 79.0 - 97.0 fL    MCH 31.5 26.6 - 33.0 pg    MCHC 33.7 31.5 - 35.7 g/dL    RDW 12.4 12.3 - 15.4 %    RDW-SD 41.5 37.0 - 54.0 fl    MPV 10.6 6.0 - 12.0 fL    Platelets 272 140 - 450 10*3/mm3    Neutrophil % 80.7 (H) 42.7 - 76.0 %    Lymphocyte % 8.9 (L) 19.6 - 45.3 %    Monocyte % 7.4 5.0 - 12.0 %    Eosinophil % 1.5 0.3 - 6.2 %    Basophil % 1.2 0.0 - 1.5 %    Immature Grans % 0.3 0.0 - 0.5 %    Neutrophils, Absolute 5.47 1.70 - 7.00 10*3/mm3    Lymphocytes, Absolute 0.60 (L) 0.70 - 3.10  10*3/mm3    Monocytes, Absolute 0.50 0.10 - 0.90 10*3/mm3    Eosinophils, Absolute 0.10 0.00 - 0.40 10*3/mm3    Basophils, Absolute 0.08 0.00 - 0.20 10*3/mm3    Immature Grans, Absolute 0.02 0.00 - 0.05 10*3/mm3    nRBC 0.0 0.0 - 0.2 /100 WBC   Urinalysis, Microscopic Only - Urine, Clean Catch    Specimen: Urine, Clean Catch   Result Value Ref Range    RBC, UA 0-2 None Seen, 0-2 /HPF    WBC, UA 0-2 None Seen, 0-2 /HPF    Bacteria, UA None Seen None Seen /HPF    Squamous Epithelial Cells, UA None Seen None Seen, 0-2 /HPF    Hyaline Casts, UA None Seen None Seen /LPF    Methodology Manual Light Microscopy    POC Glycosylated Hemoglobin (Hb A1C)    Specimen: Blood   Result Value Ref Range    Hemoglobin A1C 6.3 (A) 4.5 - 5.7 %    Lot Number [id number]     Expiration Date 10/18/2025         If labs were ordered, I independently reviewed the results and considered them in treating the patient.      EMERGENCY DEPARTMENT COURSE and DIFFERENTIAL DIAGNOSIS/MDM:   Vitals:  AS OF 13:58 EDT    BP - (!) 177/107  HR - 64  TEMP - 97.6 °F (36.4 °C) (Axillary)  O2 SATS - 92%      Orders placed during this visit:  Orders Placed This Encounter   Procedures    Epistaxis Management       All labs have been independently reviewed by me.  All radiology studies have been reviewed by me and the radiologist dictating the report.  All EKG's have been independently viewed and interpreted by me.      Discussion below represents my analysis of pertinent findings related to patient's condition, differential diagnosis, treatment plan and final disposition.    Differential diagnosis:  The differential diagnosis associated with the patient's presentation includes: Epistaxis, chronic anticoagulation    Additional sources  Discussed/ obtained information from independent historians:   [x] Spouse  [] Parent  [] Family member  [] Friend  [x] EMS   [] Other:    External (non-ED) record review:   [] Inpatient record:   [] Office record:   [] Outpatient  record:   [] Prior Outpatient labs:   [] Prior Outpatient radiology:   [] Primary Care record:   [] Outside ED record:   [] Other:     Patient's care impacted by:   [] Diabetes  [] Hypertension  [] CHF  [] Hyperlipidemia  [] Coronary Artery Disease   [] COPD   [] Cancer   [] Tobacco Abuse   [] Substance Abuse    [x] Other: Chronic anticoagulant usage    Care significantly affected by Social Determinants of Health (housing and economic circumstances, unemployment)    [] Yes     [x] No   If yes, Patient's care significantly limited by Social Determinants of Health including:   [] Inadequate housing   [] Low income   [] Alcoholism and drug addiction in family   [] Problems related to primary support group   [] Unemployment   [] Problems related to employment   [] Other Social Determinants of Health:       MEDICATIONS ADMINISTERED IN ED:  Medications   phenylephrine (MARYJO-SYNEPHRINE) 0.25 % nasal spray 2 spray (2 sprays Each Nare Given 4/5/24 1239)   tranexamic acid 500 MG/5ML nasal (ED/Crit Care for epistaxis) - VIAL DISPENSE 500 mg (500 mg Nasal Given 4/5/24 1237)   lidocaine (XYLOCAINE) 4 % external solution 1 Application (1 Application Topical Given 4/5/24 1238)              88-year-old male with a left nares epistaxis prior to arrival from digital insertion.  He is on Eliquis, significant bleeding with EMS prior to arrival.  I did immediately place an anterior packing with Maryjo-Synephrine, lidocaine, transischemic acid.  There is no bleeding the posterior pharynx, no respiratory distress.  The a anterior Rhino Rocket was placed, balloon inflated to 5 cc, bleeding is controlled, no bleeding in the posterior pharynx, patient tolerated the procedure.  We discussed continuing with the balloon Rhino Rocket packing without reassessment in 24 to 48 hours for reevaluation, removal of the packing.  The wife feels comfortable removing it at home which I feel is reasonable.  We discussed returning if they have any worsening  symptoms or further concerns in the meantime.  Patient feels well and comfortable with this plan of care as discussed.    I had a discussion with the patient/family regarding diagnosis, diagnostic results, treatment plan, and medications.  The patient/family indicated understanding of these instructions.  I spent adequate time at the bedside preceding discharge necessary to personally discuss the aftercare instructions, giving patient education, providing explanations of the results of our evaluations/findings, and my decision making to assure that the patient/family understand the plan of care.  Time was allotted to answer questions at that time and throughout the ED course.  Emphasis was placed on timely follow-up after discharge.  I also discussed the potential for the development of an acute emergent condition requiring further evaluation, admission, or even surgical intervention. I discussed that we found nothing during the visit today indicating the need for further workup, admission, or the presence of an unstable medical condition.  I encouraged the patient to return to the emergency department immediately for ANY concerns, worsening, new complaints, or if symptoms persist and unable to seek follow-up in a timely fashion.  The patient/family expressed understanding and agreement with this plan.  The patient will follow-up with their PCP in 1-2 days for reevaluation.     PROCEDURES:  Epistaxis Management    Date/Time: 4/5/2024 12:43 PM    Performed by: Omar Erazo DO  Authorized by: Omar Erazo DO    Consent:     Consent obtained:  Verbal    Consent given by:  Spouse and patient    Risks discussed:  Bleeding, nasal injury and pain    Alternatives discussed:  Delayed treatment  Universal protocol:     Procedure explained and questions answered to patient or proxy's satisfaction: yes      Patient identity confirmed:  Verbally with patient  Anesthesia:     Anesthesia method:  Topical  application    Topical anesthetic:  Lidocaine gel  Procedure details:     Treatment site:  L anterior    Treatment method:  Anterior pack    Treatment complexity:  Limited    Treatment episode: initial    Post-procedure details:     Assessment:  Bleeding decreased    Procedure completion:  Tolerated well, no immediate complications      CRITICAL CARE TIME    Total Critical Care time was 0 minutes, excluding separately reportable procedures.   There was a high probability of clinically significant/life threatening deterioration in the patient's condition which required my urgent intervention.      FINAL IMPRESSION      1. Epistaxis    2. Chronic anticoagulation          DISPOSITION/PLAN     ED Disposition       ED Disposition   Discharge    Condition   Stable    Comment   --               PATIENT REFERRED TO:  Luis Aguilar DO  [address] [facility] [address]  [phone]    In 2 days      [facility] EMERGENCY DEPARTMENT  [address] [facility] [address] [address] [phone]    If symptoms worsen      DISCHARGE MEDICATIONS:     Medication List        CHANGE how you take these medications      metoprolol tartrate 25 MG tablet  Commonly known as: LOPRESSOR  Take 1 tablet by mouth 2 (Two) Times a Day.  What changed:   how much to take  when to take this            CONTINUE taking these medications      amLODIPine 10 MG tablet  Commonly known as: NORVASC  Take 0.5 tablets by mouth Daily. Indications: High Blood Pressure Disorder     apixaban 2.5 MG tablet tablet  Commonly known as: ELIQUIS  Take 1 tablet by mouth 2 (Two) Times a Day. Indications: Atrial Fibrillation     cloNIDine 0.2 MG tablet  Commonly known as: CATAPRES  Take 1 tablet by mouth 2 (Two) Times a Day. Indications: High Blood Pressure Disorder     famotidine 20 MG tablet  Commonly known as: PEPCID  TAKE 1 TABLET BY MOUTH TWICE DAILY AS NEEDED FOR HEARTBURN. REPLACE RANITIDINE     glucose blood test strip  1 each  by Other route Daily. Use daily as directed     glucose monitor monitoring kit  1 each Daily. Use daily as directed.     Lancets Thin misc  1 each Daily. Use daily as directed     losartan 50 MG tablet  Commonly known as: COZAAR  TAKE ONE TABLET BY MOUTH TWICE A DAY     metFORMIN 500 MG tablet  Commonly known as: GLUCOPHAGE  TAKE 1 TABLET BY MOUTH TWICE DAILY WITH MEALS     pimecrolimus 1 % cream  Commonly known as: Elidel  Apply 1 Application topically to the appropriate area as directed Daily. Indications: Facial dermatitis                  Comment: Please note this report has been produced using speech recognition software.      Omar Erazo DO  Attending Emergency Physician         Omar Erazo DO  04/05/24 0139

## 2024-04-05 NOTE — DISCHARGE INSTRUCTIONS
Please return back to the emergency department 24 to 48 hours for reevaluation, removal of your anterior nasal packing.  Return sooner if you have any further concerns in the meantime.

## 2024-04-06 DIAGNOSIS — E11.65 TYPE 2 DIABETES MELLITUS WITH HYPERGLYCEMIA, WITHOUT LONG-TERM CURRENT USE OF INSULIN: ICD-10-CM

## 2024-04-11 ENCOUNTER — TELEPHONE (OUTPATIENT)
Dept: FAMILY MEDICINE CLINIC | Facility: CLINIC | Age: 89
End: 2024-04-11
Payer: MEDICARE

## 2024-04-11 NOTE — TELEPHONE ENCOUNTER
The patient's wife called. She said the patient has had some nose bleeds into the cpap machine. He is on Eliquis which makes the nose bleeds hard to stop. This morning at 5:00am they were woke up by the nose bleed and it took an hour to stop. She wants to know if there is any kind of medication that they can put in his nose when this happens to help it stop faster. She said this has happened twice in the last couple of weeks. She said there was a lot of blood everywhere and it was scary.

## 2024-04-15 NOTE — TELEPHONE ENCOUNTER
They can get nasal packing from [employer] (Nampon, Rhino Rocket, nosebleed plugs, etc) and Afrin nasal spray which can both help to stop nose bleeds. We also might just want to consider changing from Eliquis to aspirin, but I would want to discuss at a visit.

## 2024-05-28 DIAGNOSIS — I10 ESSENTIAL HYPERTENSION: ICD-10-CM

## 2024-05-28 RX ORDER — LOSARTAN POTASSIUM 50 MG/1
50 TABLET ORAL 2 TIMES DAILY
Qty: 180 TABLET | Refills: 2 | Status: SHIPPED | OUTPATIENT
Start: 2024-05-28

## 2024-05-28 RX ORDER — FAMOTIDINE 20 MG/1
20 TABLET, FILM COATED ORAL 2 TIMES DAILY PRN
Qty: 60 TABLET | Refills: 2 | Status: SHIPPED | OUTPATIENT
Start: 2024-05-28

## 2024-05-28 NOTE — TELEPHONE ENCOUNTER
Rx Refill Note  Requested Prescriptions     Pending Prescriptions Disp Refills    losartan (COZAAR) 50 MG tablet 180 tablet 2     Sig: Take 1 tablet by mouth 2 (Two) Times a Day.    famotidine (PEPCID) 20 MG tablet 60 tablet 2      Last office visit with prescribing clinician: 2/14/2024   Last telemedicine visit with prescribing clinician: Visit date not found   Next office visit with prescribing clinician: 8/14/2024       Nancy Rojas MA  05/28/24, 08:32 EDT

## 2024-05-28 NOTE — TELEPHONE ENCOUNTER
Caller: Leila Artie    Relationship: Self    Best call back number:[phone]    Requested Prescriptions:   Requested Prescriptions     Pending Prescriptions Disp Refills    losartan (COZAAR) 50 MG tablet 180 tablet 2     Sig: Take 1 tablet by mouth 2 (Two) Times a Day.    famotidine (PEPCID) 20 MG tablet 60 tablet 2        Pharmacy where request should be sent: [id number] [address] [facility] [address] . [facility] [phone] [facility] [phone]      Last office visit with prescribing clinician: 2/14/2024   Last telemedicine visit with prescribing clinician: Visit date not found   Next office visit with prescribing clinician: 8/14/2024     Additional details provided by patient: PATIENT IS OUT OF MEDICATION    Does the patient have less than a 3 day supply:  [x] Yes  [] No    Would you like a call back once the refill request has been completed: [x] Yes [] No    If the office needs to give you a call back, can they leave a voicemail: [x] Yes [] No    Lex[employer] Christina Rep   05/28/24 08:04 EDT

## 2024-06-19 DIAGNOSIS — E11.65 TYPE 2 DIABETES MELLITUS WITH HYPERGLYCEMIA, WITHOUT LONG-TERM CURRENT USE OF INSULIN: ICD-10-CM

## 2024-07-02 DIAGNOSIS — E11.65 TYPE 2 DIABETES MELLITUS WITH HYPERGLYCEMIA, WITHOUT LONG-TERM CURRENT USE OF INSULIN: ICD-10-CM

## 2024-07-05 DIAGNOSIS — E11.65 TYPE 2 DIABETES MELLITUS WITH HYPERGLYCEMIA, WITHOUT LONG-TERM CURRENT USE OF INSULIN: ICD-10-CM

## 2024-07-10 DIAGNOSIS — E11.65 TYPE 2 DIABETES MELLITUS WITH HYPERGLYCEMIA, WITHOUT LONG-TERM CURRENT USE OF INSULIN: ICD-10-CM

## 2024-07-10 NOTE — TELEPHONE ENCOUNTER
Caller: Iain Dee    Relationship: Self    Best call back number: [phone]     Requested Prescriptions:   Requested Prescriptions     Pending Prescriptions Disp Refills    metFORMIN (GLUCOPHAGE) 500 MG tablet 180 tablet 0     Sig: Take 1 tablet by mouth 2 (Two) Times a Day With Meals.        Pharmacy where request should be sent: [id number] - [facility] [address] [facility] [address] [facility] [phone] [facility] [phone] FX     Last office visit with prescribing clinician: 2/14/2024   Last telemedicine visit with prescribing clinician: Visit date not found   Next office visit with prescribing clinician: 8/14/2024     Additional details provided by patient: OUT OF MEDICATION     Does the patient have less than a 3 day supply:  [x] Yes  [] No    Would you like a call back once the refill request has been completed: [] Yes [x] No    If the office needs to give you a call back, can they leave a voicemail: [] Yes [x] No    Lex[employer] Fair Rep   07/10/24 09:56 EDT

## 2024-07-10 NOTE — TELEPHONE ENCOUNTER
Rx Refill Note  Requested Prescriptions     Pending Prescriptions Disp Refills    metFORMIN (GLUCOPHAGE) 500 MG tablet 180 tablet 0     Sig: Take 1 tablet by mouth 2 (Two) Times a Day With Meals.      Last office visit with prescribing clinician: 2/14/2024   Last telemedicine visit with prescribing clinician: Visit date not found   Next office visit with prescribing clinician: 8/14/2024     Nancy Rojas MA  07/10/24, 10:30 EDT

## 2024-07-11 DIAGNOSIS — E11.65 TYPE 2 DIABETES MELLITUS WITH HYPERGLYCEMIA, WITHOUT LONG-TERM CURRENT USE OF INSULIN: ICD-10-CM

## 2024-07-11 NOTE — TELEPHONE ENCOUNTER
Rx Refill Note  Requested Prescriptions     Pending Prescriptions Disp Refills    metFORMIN (GLUCOPHAGE) 500 MG tablet [Pharmacy Med Name: METFORMIN 500MG TABLETS] 180 tablet 0     Sig: TAKE 1 TABLET BY MOUTH TWICE DAILY WITH MEALS      Last office visit with prescribing clinician: 2/14/2024   Last telemedicine visit with prescribing clinician: Visit date not found   Next office visit with prescribing clinician: 8/14/2024                         Would you like a call back once the refill request has been completed: [] Yes [] No    If the office needs to give you a call back, can they leave a voicemail: [] Yes [] No    Madelyn Mcmahon MA  07/11/24, 12:55 EDT

## 2024-07-11 NOTE — TELEPHONE ENCOUNTER
HUB CALLED PHARMACY AND THEY DID NOT RECEIVE THE REQUEST THAT WAS SENT ON 06/19/2024. PLEASE RESUBMIT THIS REQUEST. PATIENT IS COMPLETELY OUT OF HIS METFORMIN

## 2024-08-05 ENCOUNTER — APPOINTMENT (OUTPATIENT)
Dept: CARDIOLOGY | Facility: HOSPITAL | Age: 89
DRG: 280 | End: 2024-08-05
Payer: MEDICARE

## 2024-08-05 ENCOUNTER — HOSPITAL ENCOUNTER (INPATIENT)
Facility: HOSPITAL | Age: 89
LOS: 4 days | Discharge: NURSING, REHAB OR LONG-TERM CARE | DRG: 280 | End: 2024-08-09
Attending: EMERGENCY MEDICINE | Admitting: INTERNAL MEDICINE
Payer: MEDICARE

## 2024-08-05 ENCOUNTER — APPOINTMENT (OUTPATIENT)
Dept: GENERAL RADIOLOGY | Facility: HOSPITAL | Age: 89
DRG: 280 | End: 2024-08-05
Payer: MEDICARE

## 2024-08-05 DIAGNOSIS — J96.01 ACUTE RESPIRATORY FAILURE WITH HYPOXIA: ICD-10-CM

## 2024-08-05 DIAGNOSIS — I21.19 STEMI INVOLVING OTH CORONARY ARTERY OF INFERIOR WALL: Primary | ICD-10-CM

## 2024-08-05 PROBLEM — I21.3 STEMI (ST ELEVATION MYOCARDIAL INFARCTION): Status: ACTIVE | Noted: 2024-08-05

## 2024-08-05 LAB
ALBUMIN SERPL-MCNC: 3.8 G/DL (ref 3.5–5.2)
ALBUMIN/GLOB SERPL: 1.2 G/DL
ALP SERPL-CCNC: 92 U/L (ref 39–117)
ALT SERPL W P-5'-P-CCNC: 17 U/L (ref 1–41)
ANION GAP SERPL CALCULATED.3IONS-SCNC: 11 MMOL/L (ref 5–15)
APTT PPP: 35.5 SECONDS (ref 60–90)
APTT PPP: 54.1 SECONDS (ref 60–90)
AST SERPL-CCNC: 32 U/L (ref 1–40)
BASOPHILS # BLD AUTO: 0.08 10*3/MM3 (ref 0–0.2)
BASOPHILS NFR BLD AUTO: 0.8 % (ref 0–1.5)
BILIRUB SERPL-MCNC: 0.8 MG/DL (ref 0–1.2)
BUN BLDA-MCNC: 23 MG/DL (ref 8–26)
BUN SERPL-MCNC: 23 MG/DL (ref 8–23)
BUN/CREAT SERPL: 16.4 (ref 7–25)
CA-I BLDA-SCNC: 1.17 MMOL/L (ref 1.2–1.32)
CALCIUM SPEC-SCNC: 9.3 MG/DL (ref 8.6–10.5)
CHLORIDE BLDA-SCNC: 98 MMOL/L (ref 98–109)
CHLORIDE SERPL-SCNC: 99 MMOL/L (ref 98–107)
CO2 BLDA-SCNC: 30 MMOL/L (ref 24–29)
CO2 SERPL-SCNC: 30 MMOL/L (ref 22–29)
CREAT BLDA-MCNC: 1.4 MG/DL (ref 0.6–1.3)
CREAT SERPL-MCNC: 1.4 MG/DL (ref 0.76–1.27)
DEPRECATED RDW RBC AUTO: 47.3 FL (ref 37–54)
EGFRCR SERPLBLD CKD-EPI 2021: 48.6 ML/MIN/1.73
EGFRCR SERPLBLD CKD-EPI 2021: 48.6 ML/MIN/1.73
EOSINOPHIL # BLD AUTO: 0.11 10*3/MM3 (ref 0–0.4)
EOSINOPHIL NFR BLD AUTO: 1.1 % (ref 0.3–6.2)
ERYTHROCYTE [DISTWIDTH] IN BLOOD BY AUTOMATED COUNT: 13.2 % (ref 12.3–15.4)
GEN 5 2HR TROPONIN T REFLEX: 560 NG/L
GLOBULIN UR ELPH-MCNC: 3.1 GM/DL
GLUCOSE BLDC GLUCOMTR-MCNC: 102 MG/DL (ref 70–130)
GLUCOSE BLDC GLUCOMTR-MCNC: 134 MG/DL (ref 70–130)
GLUCOSE BLDC GLUCOMTR-MCNC: 184 MG/DL (ref 70–130)
GLUCOSE BLDC GLUCOMTR-MCNC: 192 MG/DL (ref 70–130)
GLUCOSE SERPL-MCNC: 193 MG/DL (ref 65–99)
HCT VFR BLD AUTO: 42.4 % (ref 37.5–51)
HCT VFR BLDA CALC: 42 % (ref 38–51)
HGB BLD-MCNC: 13.8 G/DL (ref 13–17.7)
HGB BLDA-MCNC: 14.3 G/DL (ref 12–17)
HOLD SPECIMEN: NORMAL
IMM GRANULOCYTES # BLD AUTO: 0.03 10*3/MM3 (ref 0–0.05)
IMM GRANULOCYTES NFR BLD AUTO: 0.3 % (ref 0–0.5)
INR PPP: 1.2 (ref 0.8–1.2)
INR PPP: 1.26 (ref 0.89–1.12)
LYMPHOCYTES # BLD AUTO: 0.65 10*3/MM3 (ref 0.7–3.1)
LYMPHOCYTES NFR BLD AUTO: 6.4 % (ref 19.6–45.3)
MCH RBC QN AUTO: 32.2 PG (ref 26.6–33)
MCHC RBC AUTO-ENTMCNC: 32.5 G/DL (ref 31.5–35.7)
MCV RBC AUTO: 98.8 FL (ref 79–97)
MONOCYTES # BLD AUTO: 0.67 10*3/MM3 (ref 0.1–0.9)
MONOCYTES NFR BLD AUTO: 6.6 % (ref 5–12)
NEUTROPHILS NFR BLD AUTO: 8.61 10*3/MM3 (ref 1.7–7)
NEUTROPHILS NFR BLD AUTO: 84.8 % (ref 42.7–76)
NRBC BLD AUTO-RTO: 0 /100 WBC (ref 0–0.2)
NT-PROBNP SERPL-MCNC: 5672 PG/ML (ref 0–1800)
PLATELET # BLD AUTO: 254 10*3/MM3 (ref 140–450)
PMV BLD AUTO: 10.3 FL (ref 6–12)
POTASSIUM BLDA-SCNC: 3.8 MMOL/L (ref 3.5–4.9)
POTASSIUM SERPL-SCNC: 4 MMOL/L (ref 3.5–5.2)
PROT SERPL-MCNC: 6.9 G/DL (ref 6–8.5)
PROTHROMBIN TIME: 14.1 SECONDS (ref 12.8–15.2)
PROTHROMBIN TIME: 15.9 SECONDS (ref 12.2–14.5)
RBC # BLD AUTO: 4.29 10*6/MM3 (ref 4.14–5.8)
SODIUM BLD-SCNC: 140 MMOL/L (ref 138–146)
SODIUM SERPL-SCNC: 140 MMOL/L (ref 136–145)
TROPONIN T DELTA: 192 NG/L
TROPONIN T SERPL HS-MCNC: 368 NG/L
UFH PPP CHRO-ACNC: >1.1 IU/ML (ref 0.3–0.7)
WBC NRBC COR # BLD AUTO: 10.15 10*3/MM3 (ref 3.4–10.8)
WHOLE BLOOD HOLD COAG: NORMAL
WHOLE BLOOD HOLD SPECIMEN: NORMAL

## 2024-08-05 PROCEDURE — 85730 THROMBOPLASTIN TIME PARTIAL: CPT | Performed by: INTERNAL MEDICINE

## 2024-08-05 PROCEDURE — 25010000002 SULFUR HEXAFLUORIDE MICROSPH 60.7-25 MG RECONSTITUTED SUSPENSION: Performed by: INTERNAL MEDICINE

## 2024-08-05 PROCEDURE — 93306 TTE W/DOPPLER COMPLETE: CPT

## 2024-08-05 PROCEDURE — 80053 COMPREHEN METABOLIC PANEL: CPT | Performed by: EMERGENCY MEDICINE

## 2024-08-05 PROCEDURE — 99233 SBSQ HOSP IP/OBS HIGH 50: CPT | Performed by: INTERNAL MEDICINE

## 2024-08-05 PROCEDURE — 94799 UNLISTED PULMONARY SVC/PX: CPT

## 2024-08-05 PROCEDURE — 25010000002 MIDAZOLAM PER 1 MG: Performed by: INTERNAL MEDICINE

## 2024-08-05 PROCEDURE — 83880 ASSAY OF NATRIURETIC PEPTIDE: CPT | Performed by: EMERGENCY MEDICINE

## 2024-08-05 PROCEDURE — 63710000001 INSULIN LISPRO (HUMAN) PER 5 UNITS: Performed by: INTERNAL MEDICINE

## 2024-08-05 PROCEDURE — 84484 ASSAY OF TROPONIN QUANT: CPT | Performed by: EMERGENCY MEDICINE

## 2024-08-05 PROCEDURE — 93005 ELECTROCARDIOGRAM TRACING: CPT | Performed by: INTERNAL MEDICINE

## 2024-08-05 PROCEDURE — 25010000002 NITROGLYCERIN 200 MCG/ML SOLUTION: Performed by: EMERGENCY MEDICINE

## 2024-08-05 PROCEDURE — 93005 ELECTROCARDIOGRAM TRACING: CPT | Performed by: EMERGENCY MEDICINE

## 2024-08-05 PROCEDURE — 85610 PROTHROMBIN TIME: CPT | Performed by: INTERNAL MEDICINE

## 2024-08-05 PROCEDURE — 25010000002 HEPARIN (PORCINE) PER 1000 UNITS: Performed by: INTERNAL MEDICINE

## 2024-08-05 PROCEDURE — 85520 HEPARIN ASSAY: CPT | Performed by: INTERNAL MEDICINE

## 2024-08-05 PROCEDURE — 25010000002 HEPARIN (PORCINE) 25000-0.45 UT/250ML-% SOLUTION

## 2024-08-05 PROCEDURE — 93306 TTE W/DOPPLER COMPLETE: CPT | Performed by: INTERNAL MEDICINE

## 2024-08-05 PROCEDURE — 94660 CPAP INITIATION&MGMT: CPT

## 2024-08-05 PROCEDURE — 94761 N-INVAS EAR/PLS OXIMETRY MLT: CPT

## 2024-08-05 PROCEDURE — 99291 CRITICAL CARE FIRST HOUR: CPT

## 2024-08-05 PROCEDURE — 85025 COMPLETE CBC W/AUTO DIFF WBC: CPT | Performed by: EMERGENCY MEDICINE

## 2024-08-05 PROCEDURE — 80047 BASIC METABLC PNL IONIZED CA: CPT

## 2024-08-05 PROCEDURE — 25010000002 FUROSEMIDE PER 20 MG: Performed by: EMERGENCY MEDICINE

## 2024-08-05 PROCEDURE — 71045 X-RAY EXAM CHEST 1 VIEW: CPT

## 2024-08-05 PROCEDURE — 85610 PROTHROMBIN TIME: CPT

## 2024-08-05 PROCEDURE — 99223 1ST HOSP IP/OBS HIGH 75: CPT | Performed by: INTERNAL MEDICINE

## 2024-08-05 PROCEDURE — 36415 COLL VENOUS BLD VENIPUNCTURE: CPT

## 2024-08-05 PROCEDURE — 85014 HEMATOCRIT: CPT

## 2024-08-05 PROCEDURE — 93010 ELECTROCARDIOGRAM REPORT: CPT | Performed by: INTERNAL MEDICINE

## 2024-08-05 PROCEDURE — 82948 REAGENT STRIP/BLOOD GLUCOSE: CPT

## 2024-08-05 PROCEDURE — 25010000002 THIAMINE HCL 200 MG/2ML SOLUTION: Performed by: INTERNAL MEDICINE

## 2024-08-05 RX ORDER — MIDAZOLAM HYDROCHLORIDE 1 MG/ML
2 INJECTION INTRAMUSCULAR; INTRAVENOUS
Status: DISCONTINUED | OUTPATIENT
Start: 2024-08-05 | End: 2024-08-09 | Stop reason: HOSPADM

## 2024-08-05 RX ORDER — LORAZEPAM 1 MG/1
2 TABLET ORAL
Status: DISCONTINUED | OUTPATIENT
Start: 2024-08-05 | End: 2024-08-09 | Stop reason: HOSPADM

## 2024-08-05 RX ORDER — NITROGLYCERIN 20 MG/100ML
5-200 INJECTION INTRAVENOUS
Status: DISCONTINUED | OUTPATIENT
Start: 2024-08-05 | End: 2024-08-09

## 2024-08-05 RX ORDER — ASPIRIN 81 MG/1
81 TABLET ORAL DAILY
Status: DISCONTINUED | OUTPATIENT
Start: 2024-08-05 | End: 2024-08-05

## 2024-08-05 RX ORDER — MIDAZOLAM HYDROCHLORIDE 1 MG/ML
8 INJECTION INTRAMUSCULAR; INTRAVENOUS
Status: DISCONTINUED | OUTPATIENT
Start: 2024-08-05 | End: 2024-08-09 | Stop reason: HOSPADM

## 2024-08-05 RX ORDER — FUROSEMIDE 10 MG/ML
80 INJECTION INTRAMUSCULAR; INTRAVENOUS ONCE
Status: DISCONTINUED | OUTPATIENT
Start: 2024-08-05 | End: 2024-08-05

## 2024-08-05 RX ORDER — ASPIRIN 81 MG/1
81 TABLET ORAL DAILY
Status: DISCONTINUED | OUTPATIENT
Start: 2024-08-05 | End: 2024-08-09

## 2024-08-05 RX ORDER — FUROSEMIDE 10 MG/ML
40 INJECTION INTRAMUSCULAR; INTRAVENOUS ONCE
Status: COMPLETED | OUTPATIENT
Start: 2024-08-05 | End: 2024-08-05

## 2024-08-05 RX ORDER — NICOTINE POLACRILEX 4 MG
15 LOZENGE BUCCAL
Status: DISCONTINUED | OUTPATIENT
Start: 2024-08-05 | End: 2024-08-09 | Stop reason: HOSPADM

## 2024-08-05 RX ORDER — INSULIN LISPRO 100 [IU]/ML
2-7 INJECTION, SOLUTION INTRAVENOUS; SUBCUTANEOUS
Status: DISCONTINUED | OUTPATIENT
Start: 2024-08-05 | End: 2024-08-09 | Stop reason: HOSPADM

## 2024-08-05 RX ORDER — LORAZEPAM 1 MG/1
1 TABLET ORAL
Status: DISCONTINUED | OUTPATIENT
Start: 2024-08-05 | End: 2024-08-09 | Stop reason: HOSPADM

## 2024-08-05 RX ORDER — HEPARIN SODIUM 10000 [USP'U]/100ML
13.5 INJECTION, SOLUTION INTRAVENOUS
Status: DISPENSED | OUTPATIENT
Start: 2024-08-05 | End: 2024-08-07

## 2024-08-05 RX ORDER — MIDAZOLAM HYDROCHLORIDE 1 MG/ML
4 INJECTION INTRAMUSCULAR; INTRAVENOUS
Status: DISCONTINUED | OUTPATIENT
Start: 2024-08-05 | End: 2024-08-09 | Stop reason: HOSPADM

## 2024-08-05 RX ORDER — BISACODYL 5 MG/1
5 TABLET, DELAYED RELEASE ORAL DAILY PRN
Status: DISCONTINUED | OUTPATIENT
Start: 2024-08-05 | End: 2024-08-09 | Stop reason: HOSPADM

## 2024-08-05 RX ORDER — CLOPIDOGREL BISULFATE 75 MG/1
75 TABLET ORAL DAILY
Status: DISCONTINUED | OUTPATIENT
Start: 2024-08-05 | End: 2024-08-09 | Stop reason: HOSPADM

## 2024-08-05 RX ORDER — HEPARIN SODIUM 5000 [USP'U]/ML
5000 INJECTION, SOLUTION INTRAVENOUS; SUBCUTANEOUS EVERY 12 HOURS SCHEDULED
Status: DISCONTINUED | OUTPATIENT
Start: 2024-08-05 | End: 2024-08-05

## 2024-08-05 RX ORDER — FOLIC ACID 1 MG/1
1 TABLET ORAL DAILY
Status: DISCONTINUED | OUTPATIENT
Start: 2024-08-05 | End: 2024-08-09 | Stop reason: HOSPADM

## 2024-08-05 RX ORDER — SODIUM CHLORIDE 0.9 % (FLUSH) 0.9 %
10 SYRINGE (ML) INJECTION EVERY 12 HOURS SCHEDULED
Status: DISCONTINUED | OUTPATIENT
Start: 2024-08-05 | End: 2024-08-09 | Stop reason: HOSPADM

## 2024-08-05 RX ORDER — ATORVASTATIN CALCIUM 40 MG/1
40 TABLET, FILM COATED ORAL NIGHTLY
Status: DISCONTINUED | OUTPATIENT
Start: 2024-08-05 | End: 2024-08-09 | Stop reason: HOSPADM

## 2024-08-05 RX ORDER — MIDAZOLAM HYDROCHLORIDE 1 MG/ML
2 INJECTION INTRAMUSCULAR; INTRAVENOUS EVERY 6 HOURS
Status: DISPENSED | OUTPATIENT
Start: 2024-08-05 | End: 2024-08-06

## 2024-08-05 RX ORDER — IBUPROFEN 600 MG/1
1 TABLET ORAL
Status: DISCONTINUED | OUTPATIENT
Start: 2024-08-05 | End: 2024-08-09 | Stop reason: HOSPADM

## 2024-08-05 RX ORDER — AMOXICILLIN 250 MG
2 CAPSULE ORAL 2 TIMES DAILY
Status: DISCONTINUED | OUTPATIENT
Start: 2024-08-05 | End: 2024-08-09 | Stop reason: HOSPADM

## 2024-08-05 RX ORDER — LORAZEPAM 1 MG/1
4 TABLET ORAL
Status: DISCONTINUED | OUTPATIENT
Start: 2024-08-05 | End: 2024-08-09 | Stop reason: HOSPADM

## 2024-08-05 RX ORDER — SODIUM CHLORIDE 0.9 % (FLUSH) 0.9 %
10 SYRINGE (ML) INJECTION AS NEEDED
Status: DISCONTINUED | OUTPATIENT
Start: 2024-08-05 | End: 2024-08-09 | Stop reason: HOSPADM

## 2024-08-05 RX ORDER — MIDAZOLAM HYDROCHLORIDE 1 MG/ML
2 INJECTION INTRAMUSCULAR; INTRAVENOUS EVERY 6 HOURS
Status: DISCONTINUED | OUTPATIENT
Start: 2024-08-06 | End: 2024-08-05

## 2024-08-05 RX ORDER — SODIUM CHLORIDE 9 MG/ML
40 INJECTION, SOLUTION INTRAVENOUS AS NEEDED
Status: DISCONTINUED | OUTPATIENT
Start: 2024-08-05 | End: 2024-08-07

## 2024-08-05 RX ORDER — POLYETHYLENE GLYCOL 3350 17 G/17G
17 POWDER, FOR SOLUTION ORAL DAILY PRN
Status: DISCONTINUED | OUTPATIENT
Start: 2024-08-05 | End: 2024-08-09 | Stop reason: HOSPADM

## 2024-08-05 RX ORDER — THIAMINE HYDROCHLORIDE 100 MG/ML
200 INJECTION, SOLUTION INTRAMUSCULAR; INTRAVENOUS EVERY 8 HOURS SCHEDULED
Status: DISCONTINUED | OUTPATIENT
Start: 2024-08-05 | End: 2024-08-09 | Stop reason: HOSPADM

## 2024-08-05 RX ORDER — HEPARIN SODIUM 10000 [USP'U]/100ML
1000 INJECTION, SOLUTION INTRAVENOUS
Status: DISCONTINUED | OUTPATIENT
Start: 2024-08-05 | End: 2024-08-05

## 2024-08-05 RX ORDER — MIDAZOLAM HYDROCHLORIDE 1 MG/ML
1 INJECTION INTRAMUSCULAR; INTRAVENOUS EVERY 6 HOURS
Status: DISPENSED | OUTPATIENT
Start: 2024-08-06 | End: 2024-08-08

## 2024-08-05 RX ORDER — SODIUM CHLORIDE 0.9 % (FLUSH) 0.9 %
10 SYRINGE (ML) INJECTION AS NEEDED
Status: DISCONTINUED | OUTPATIENT
Start: 2024-08-05 | End: 2024-08-07

## 2024-08-05 RX ORDER — MIDAZOLAM HYDROCHLORIDE 1 MG/ML
4 INJECTION INTRAMUSCULAR; INTRAVENOUS EVERY 6 HOURS
Status: DISCONTINUED | OUTPATIENT
Start: 2024-08-05 | End: 2024-08-05

## 2024-08-05 RX ORDER — NITROGLYCERIN 0.4 MG/1
0.4 TABLET SUBLINGUAL
Status: DISCONTINUED | OUTPATIENT
Start: 2024-08-05 | End: 2024-08-09 | Stop reason: HOSPADM

## 2024-08-05 RX ORDER — DEXTROSE MONOHYDRATE 25 G/50ML
25 INJECTION, SOLUTION INTRAVENOUS
Status: DISCONTINUED | OUTPATIENT
Start: 2024-08-05 | End: 2024-08-09 | Stop reason: HOSPADM

## 2024-08-05 RX ORDER — FUROSEMIDE 10 MG/ML
40 INJECTION INTRAMUSCULAR; INTRAVENOUS ONCE
Status: DISCONTINUED | OUTPATIENT
Start: 2024-08-05 | End: 2024-08-06

## 2024-08-05 RX ORDER — BISACODYL 10 MG
10 SUPPOSITORY, RECTAL RECTAL DAILY PRN
Status: DISCONTINUED | OUTPATIENT
Start: 2024-08-05 | End: 2024-08-09 | Stop reason: HOSPADM

## 2024-08-05 RX ADMIN — MIDAZOLAM HYDROCHLORIDE 4 MG: 1 INJECTION, SOLUTION INTRAMUSCULAR; INTRAVENOUS at 13:25

## 2024-08-05 RX ADMIN — CLOPIDOGREL BISULFATE 75 MG: 75 TABLET ORAL at 09:42

## 2024-08-05 RX ADMIN — THIAMINE HYDROCHLORIDE 200 MG: 100 INJECTION, SOLUTION INTRAMUSCULAR; INTRAVENOUS at 21:00

## 2024-08-05 RX ADMIN — HEPARIN SODIUM 5000 UNITS: 5000 INJECTION INTRAVENOUS; SUBCUTANEOUS at 09:42

## 2024-08-05 RX ADMIN — ATORVASTATIN CALCIUM 40 MG: 40 TABLET, FILM COATED ORAL at 20:55

## 2024-08-05 RX ADMIN — ASPIRIN 81 MG: 81 TABLET, COATED ORAL at 12:11

## 2024-08-05 RX ADMIN — FOLIC ACID 1 MG: 1 TABLET ORAL at 12:11

## 2024-08-05 RX ADMIN — SULFUR HEXAFLUORIDE 3 ML: KIT at 16:47

## 2024-08-05 RX ADMIN — THIAMINE HYDROCHLORIDE 200 MG: 100 INJECTION, SOLUTION INTRAMUSCULAR; INTRAVENOUS at 13:25

## 2024-08-05 RX ADMIN — Medication 10 ML: at 20:56

## 2024-08-05 RX ADMIN — FUROSEMIDE 80 MG: 10 INJECTION, SOLUTION INTRAMUSCULAR; INTRAVENOUS at 09:03

## 2024-08-05 RX ADMIN — NITROGLYCERIN 5 MCG/MIN: 20 INJECTION INTRAVENOUS at 08:58

## 2024-08-05 RX ADMIN — MIDAZOLAM HYDROCHLORIDE 2 MG: 1 INJECTION, SOLUTION INTRAMUSCULAR; INTRAVENOUS at 18:46

## 2024-08-05 RX ADMIN — INSULIN LISPRO 2 UNITS: 100 INJECTION, SOLUTION INTRAVENOUS; SUBCUTANEOUS at 20:55

## 2024-08-05 RX ADMIN — HEPARIN SODIUM 9.5 UNITS/KG/HR: 10000 INJECTION, SOLUTION INTRAVENOUS at 12:07

## 2024-08-05 NOTE — ED PROVIDER NOTES
"Subjective   History of Present Illness  Mr Dee arrives via EMS with chest pain and shortness of breath.  He tells me he felt fine yesterday.  Woke up with pain across his chest and shortness of breath.  EMS applied Nitropaste.  He tells me the pain is now gone but still feels short of breath.  Saturations 82% on room air on arrival.  Has history of permanent atrial fibrillation and takes Eliquis.  Cardiologist is at [facility].  He reports that he took his medications at 8:00 this morning.      Review of Systems    Past Medical History:   Diagnosis Date    Arthritis     Cancer     Diabetes mellitus     GERD (gastroesophageal reflux disease)     Gout     H/O Bladder carcinoma (HCC)     Hypertension        Allergies   Allergen Reactions    Colchicine Other (See Comments)     Unable to walk        Past Surgical History:   Procedure Laterality Date    BLADDER SURGERY      Biopsy    HERNIA REPAIR      PROSTATE BIOPSY         Family History   Problem Relation Age of Onset    Heart attack Father        Social History     Socioeconomic History    Marital status:    Tobacco Use    Smoking status: Never     Passive exposure: Never    Smokeless tobacco: Never   Vaping Use    Vaping status: Never Used   Substance and Sexual Activity    Alcohol use: Yes     Alcohol/week: 1.0 standard drink of alcohol     Types: 1 Shots of liquor per week     Comment: Estimation based on spouse report of patient drinking \"1 large martini\" each night (regular martini = 3 shots, large martini = est. 4 shots)    Drug use: No    Sexual activity: Defer           Objective   Physical Exam  Vitals and nursing note reviewed.   Constitutional:       General: He is not in acute distress.     Appearance: Normal appearance.   HENT:      Head: Normocephalic and atraumatic.      Nose: Nose normal. No congestion or rhinorrhea.   Eyes:      General: No scleral icterus.     Conjunctiva/sclera: Conjunctivae normal.   Neck:      Comments: No JVD "   Cardiovascular:      Rate and Rhythm: Normal rate and regular rhythm.      Heart sounds: No murmur heard.     No friction rub.   Pulmonary:      Effort: Pulmonary effort is normal.      Breath sounds: Normal breath sounds. No wheezing or rales.   Abdominal:      General: Bowel sounds are normal.      Palpations: Abdomen is soft.      Tenderness: There is no abdominal tenderness. There is no guarding or rebound.   Musculoskeletal:         General: No tenderness.      Cervical back: Normal range of motion and neck supple.      Right lower leg: No edema.      Left lower leg: No edema.   Skin:     General: Skin is warm and dry.      Coloration: Skin is not pale.      Findings: No erythema.   Neurological:      General: No focal deficit present.      Mental Status: He is alert and oriented to person, place, and time.      Motor: No weakness.      Coordination: Coordination normal.   Psychiatric:         Mood and Affect: Mood normal.         Behavior: Behavior normal.         Thought Content: Thought content normal.         Critical Care    Performed by: Kwawdo Andrea MD  Authorized by: Isabel Stover MD    Critical care provider statement:     Critical care time (minutes):  35    Critical care was necessary to treat or prevent imminent or life-threatening deterioration of the following conditions:  Cardiac failure and respiratory failure    Critical care was time spent personally by me on the following activities:  Discussions with consultants, evaluation of patient's response to treatment, examination of patient, re-evaluation of patient's condition, pulse oximetry, ordering and review of radiographic studies, ordering and review of laboratory studies and ordering and performing treatments and interventions             ED Course  ED Course as of 08/06/24 0615   Mon Aug 05, 2024   0853 Reviewed EKG at bedside which shows inferior ST elevation with T wave inversion consistent with acute inferior MI.  He has already  had aspirin.  EMS applied Nitropaste and he is now pain-free after that.  Will start nitro drip.  He is anticoagulated on Eliquis.  Reviewed records.  Spoke with Dr. Ambriz.  She is calling Dr. Stover to see him.  We have attempted to titrate his oxygen level downward unsuccessfully.  Currently he is on nonrebreather to maintain saturations in the 90s.  Room air saturation was 82%. [DT]   0859 Dr. Stover has seen him and is currently at bedside.  He advises too unstable at this point to take to Cath Lab.  He cannot lay flat.  Will diurese. [DT]   0902 Reviewed chest x-ray at bedside which shows pulmonary edema.  He tells me he does sleep with BiPAP.  Will initiate BiPAP. [DT]      ED Course User Index  [DT] Kwadwo Andrea MD                                             Medical Decision Making  Please see course notes.  I saw him on arrival.  Interviewed paramedics.  Ordered and interpreted labs as well as EKG.  Diagnosed acute STEMI.  Initiated nitroglycerin infusions.  Ordered IV diuresis.  Ordered and interpreted chest x-ray at bedside.  Ordered BiPAP and monitored that.  Consulted cardiology.    Problems Addressed:  Acute respiratory failure with hypoxia: complicated acute illness or injury that poses a threat to life or bodily functions  STEMI involving oth coronary artery of inferior wall: complicated acute illness or injury that poses a threat to life or bodily functions    Amount and/or Complexity of Data Reviewed  Labs: ordered. Decision-making details documented in ED Course.  Radiology: ordered. Decision-making details documented in ED Course.  ECG/medicine tests: ordered. Decision-making details documented in ED Course.    Risk  Prescription drug management.  Decision regarding hospitalization.    Critical Care  Total time providing critical care: 35 minutes        Final diagnoses:   STEMI involving oth coronary artery of inferior wall   Acute respiratory failure with hypoxia       ED  Disposition  ED Disposition       ED Disposition   Decision to Admit    Condition   --    Comment   Level of Care: Critical Care [6]   Diagnosis: STEMI (ST elevation myocardial infarction) [992145]   Certification: I Certify That Inpatient Hospital Services Are Medically Necessary For Greater Than 2 Midnights                 No follow-up provider specified.       Medication List      No changes were made to your prescriptions during this visit.            Kwadwo Andrea MD  08/06/24 0615

## 2024-08-05 NOTE — H&P
Date of Hospital Visit: 24  Encounter Provider: Isabel Stover MD  Place of Service: [facility]  Patient Name: Iain Dee  :1935  Referral Provider: No ref. provider found  Primary Care Provider: Luis Aguilar DO    Chief complaint: Chest pain inferior STEMI and shortness of breath      History of Present Illness:  Patient is 88 years old gentleman with a history of chronic atrial fibrillation starting having chest pain this morning.  Patient also starting having problem breathing.  Patient was brought into the emergency room is found to have inferior ST segment elevation MI.  Patient is unable to lay flat.  Patient was given nitro and he became chest pain-free.  Patient has been given Lasix.    Patient denies any diaphoresis.  Patient denies any lower extremity edema that is new.  Patient has chronic edema of the legs which appears to be venous stasis.      Problem List:  CARDIAC  Coronary Artery Disease:   Inferior STEMI, 2024     Myocardium:   Echo, 3/1/2023: LVEF 55%     Valvular:   Mild MR, mild TR     Electrical:   Chronic A-fib     Pericardium:   Normal     VASCULAR  Venous:  Chronic venous stasis     CARDIAC RISK FACTORS  Hypertension  Diabetes  Dyslipidemia  Obstructive Sleep Apnea    NON-CARDIAC  GERD  Gout  Arthritis    SURGERIES  Prostate cancer s/p radiation  Bladder cancer s/p surgery  Hernia repair    Past Medical History:   Diagnosis Date    Arthritis     Cancer     Diabetes mellitus     GERD (gastroesophageal reflux disease)     Gout     H/O Bladder carcinoma (HCC)     Hypertension        Past Surgical History:   Procedure Laterality Date    BLADDER SURGERY      Biopsy    HERNIA REPAIR      PROSTATE BIOPSY         (Not in a hospital admission)      Social History     Socioeconomic History    Marital status:    Tobacco Use    Smoking status: Never     Passive exposure: Never    Smokeless tobacco: Never   Vaping Use    Vaping status: Never Used  "  Substance and Sexual Activity    Alcohol use: Yes     Alcohol/week: 28.0 standard drinks of alcohol     Types: 28 Shots of liquor per week     Comment: Estimation based on spouse report of patient drinking \"1 large martini\" each night (regular martini = 3 shots, large martini = est. 4 shots)    Drug use: No    Sexual activity: Defer       Family History   Problem Relation Age of Onset    Heart attack Father        REVIEW OF SYSTEMS:   Review of Systems   Respiratory:  Positive for shortness of breath.    Cardiovascular:  Positive for chest pain.             Objective:     Vitals:    08/05/24 0857 08/05/24 0907 08/05/24 0908 08/05/24 0909   BP:  (!) 154/105  (!) 154/105   Pulse: 73 89 76 74   Resp:       Temp:       TempSrc:       SpO2: 94% 95% 96% 93%   Weight:       Height:           Body mass index is 30.34 kg/m².  Flowsheet Rows      Flowsheet Row First Filed Value   Admission Height 185.4 cm (73\") Documented at 08/05/2024 0852   Admission Weight 104 kg (230 lb) Documented at 08/05/2024 0852            Physical Exam     Constitutional:       General: Not in acute distress.     Appearance: Healthy appearance. Not in distress.     Neck:     JVP:Not elevated     Carotid artery: Normal    Pulmonary:      Effort: Pulmonary effort is normal.      Breath sounds: Normal breath sounds. No wheezing. No rhonchi.  Rales up to mid chest    Cardiovascular:      Normal rate. Irregular rhythm. Normal S1. Normal S2.      Murmurs: There is mild systolic murmur.      No gallop. No click. No rub.     Abdominal:      General: Bowel sounds are normal.      Palpations: Abdomen is soft.      Tenderness: There is no abdominal tenderness.    Extremities:     Pulses:Normal radial and pedal pulses     Edema:no edema    Lab Review:                Results from last 7 days   Lab Units 08/05/24  0909 08/05/24  0856   SODIUM mmol/L  --  140   POTASSIUM mmol/L  --  4.0   CHLORIDE mmol/L  --  99   CO2 mmol/L  --  30.0*   BUN mg/dL  --  23 "   CREATININE mg/dL 1.40* 1.40*   GLUCOSE mg/dL  --  193*   CALCIUM mg/dL  --  9.3         Results from last 7 days   Lab Units 08/05/24  0909 08/05/24  0856   WBC 10*3/mm3  --  10.15   HEMOGLOBIN g/dL  --  13.8   HEMOGLOBIN, POC g/dL 14.3  --    HEMATOCRIT %  --  42.4   HEMATOCRIT POC % 42  --    PLATELETS 10*3/mm3  --  254     Results from last 7 days   Lab Units 08/05/24  0907   INR  1.2             @LABRCNT(probnp)@      EKG: A-fib with inferior STEMI    CXR: Pulmonary edema       Assessment:   Inferior ST segment elevation MI with possible RV involvement  Chest pain-free now  Sleep apnea  Chronic kidney disease  Chronic atrial fibrillation on Eliquis        Plan:   Patient is chest pain-free after nitroglycerin.  Patient cannot lay flat because of what appears to be pulmonary edema.  Patient has been given Lasix and put on BiPAP.  Spoke with the patient family about risk and benefit of heart cath.  They want to proceed with medical management at this time.  We will start patient on aspirin Plavix and heparin.  I have given him 80 of Lasix.  Will admit to ICU.  Will keep monitoring his progress.  If he suddenly become very symptomatic we may proceed with left heart cath at that time.

## 2024-08-05 NOTE — PLAN OF CARE
Goal Outcome Evaluation:  Plan of Care Reviewed With: patient, spouse   Neuro: A/O x 4. CIWA protocol in place. Activity minimized d/t HR, rhythm, and STEMI.  Resp: Initial bipap use and switched to 4L NC. Bipap at night (CPAP at home).  Cardiac: NTG gtt and Heparin gtt infusing. HR 32-90s with afternoon EKG obtained. Frequent PVCs. PT has 0 complaints of chest pain. Echo performed at bedside.   GI: Diet ordered. Active BS.  : External catheter placed. Adequate UOP (approx. 1.2L).  Afebrile.       Progress: improving

## 2024-08-05 NOTE — PROGRESS NOTES
INTENSIVIST   PROGRESS NOTE     Hospital:  LOS: 0 days     Mr. Iain Dee, 88 y.o. male is followed for a Chief Complaint of: Acute Respiratory Failure      Subjective   S     Interval History:  Mr. Dee is an 89yo M with a history of ETOH use, HTN, and T2DM who presented to the [facility] on 8/5/24 with chest pain and shortness of breath. Upon arrival, his oxygen saturation was noted to be 82% on room air.     EKG showed inferior ST elevation with T-wave inversion. He was started on a nitroglycerin drip and Cardiology was consulted. He was too unstable due to hypoxic respiratory failure to go to the cath lab and was admitted to the ICU for medical management in the meantime.        The patient's relevant past medical, surgical and social history were reviewed and updated in Epic as appropriate.      ROS:   Constitutional: Negative for fever.   Respiratory: Positive for dyspnea.   Cardiovascular: Negative for chest pain.   Gastrointestinal: Negative for  nausea, vomiting and diarrhea.     Objective   O     Vitals:  Temp  Min: 97.7 °F (36.5 °C)  Max: 98.2 °F (36.8 °C)  BP  Min: 105/70  Max: 173/109  Pulse  Min: 44  Max: 89  Resp  Min: 14  Max: 25  SpO2  Min: 82 %  Max: 100 % Flow (L/min)  Min: 15  Max: 15    Intake/Ouptut 24 hrs (7:00AM - 6:59 AM)  Intake & Output (last 3 days)         08/02 0701  08/03 0700 08/03 0701  08/04 0700 08/04 0701  08/05 0700 08/05 0701  08/06 0700    Urine (mL/kg/hr)    775    Total Output    775    Net    -775                    Medications (drips):  heparin, Last Rate: 9.5 Units/kg/hr (08/05/24 1302)  nitroglycerin, Last Rate: 5 mcg/min (08/05/24 1416)  Pharmacy to Dose Heparin          Physical Examination  Telemetry:  Normal sinus rhythm.    Constitutional:  No acute distress.  Sitting up in bed on Bipap.    Eyes: No scleral icterus.   PERRL, EOM intact.    Neck:  Supple, FROM   Cardiovascular: Normal rate, regular and rhythm. Normal heart sounds.  No murmurs, gallop or rub.    Respiratory: No respiratory distress. Normal respiratory effort.  Diminished with crackles.    Abdominal:  Soft. No masses. Nontender. No distension. No HSM.   Extremities: No digital cyanosis. No clubbing.  No peripheral edema.   Skin: No rashes, lesions or ulcers   Neurological:   Alert and interactive.              Results from last 7 days   Lab Units 08/05/24  0909 08/05/24  0856   WBC 10*3/mm3  --  10.15   HEMOGLOBIN g/dL  --  13.8   HEMOGLOBIN, POC g/dL 14.3  --    MCV fL  --  98.8*   PLATELETS 10*3/mm3  --  254     Results from last 7 days   Lab Units 08/05/24  0909 08/05/24  0856   SODIUM mmol/L  --  140   POTASSIUM mmol/L  --  4.0   CO2 mmol/L  --  30.0*   CREATININE mg/dL 1.40* 1.40*   GLUCOSE mg/dL  --  193*     Estimated Creatinine Clearance: 47.1 mL/min (A) (by C-G formula based on SCr of 1.4 mg/dL (H)).  Results from last 7 days   Lab Units 08/05/24  0856   ALK PHOS U/L 92   BILIRUBIN mg/dL 0.8   ALT (SGPT) U/L 17   AST (SGOT) U/L 32             Images:  Imaging Results (Last 24 Hours)       Procedure Component Value Units Date/Time    XR Chest 1 View [[id number]] Collected: 08/05/24 0907     Updated: 08/05/24 0913    Narrative:      XR CHEST 1 VW    Date of Exam: 8/5/2024 8:58 AM EDT    Indication: SOA triage protocol    Comparison: 2/28/2023    Findings:  Defibrillator pad is seen.    Borderline cardiomegaly. Perihilar interstitial opacities suggestive of mild edema. No focal opacity, pleural effusion or pneumothorax. No evidence of acute osseous abnormalities. Visualized upper abdomen is unremarkable.      Impression:      Impression:  Borderline cardiomegaly with perihilar and interstitial opacities suggestive of mild edema.      Electronically Signed: Jalen Truong MD    8/5/2024 9:10 AM EDT    Workstation ID: [id number]               Results: Reviewed.  I reviewed the patient's new laboratory and imaging results.  I independently reviewed the patient's new images.    Medications:  Reviewed.    Assessment & Plan   A / P     Mr. Dee is an 89yo M with a history of ETOH use, HTN, and T2DM who presented to the [facility] on 8/5/24 with chest pain and shortness of breath. Upon arrival, his oxygen saturation was noted to be 82% on room air.     EKG showed inferior ST elevation with T-wave inversion. He was started on a nitroglycerin drip and Cardiology was consulted. He was too unstable due to hypoxic respiratory failure to go to the cath lab and was admitted to the ICU for medical management in the meantime.     Nutrition:   Diet: Cardiac, Diabetic; Healthy Heart (2-3 Na+); Consistent Carbohydrate; Fluid Consistency: Thin (IDDSI 0)  Advance Directives:   There are no questions and answers to display.       Active Hospital Problems    Diagnosis     **STEMI (ST elevation myocardial infarction)     Alcohol abuse (1 to 2 glasses of bourbon a day)     Essential hypertension     Diabetes mellitus     JOLENE on CPAP        Assessment / Plan:    Monitor in ICU  CAD management per Cardiology. Currently on Heparin and Nitro drips.   Continue Bipap.   Diuresis with Lasix.   CIWA protocol for alcohol use.   SSI for diabetes management  AM labs and CXR    High risk secondary to acute respiratory failure requiring Bipap.     High level of risk due to:  severe exacerbation of chronic illness and illness with threat to life or bodily function.    Plan of care and goals reviewed during interdisciplinary rounds.  I discussed the patient's findings and my recommendations with patient and nursing staff      Monique Chester DO    Intensive Care Medicine and Pulmonary Medicine

## 2024-08-05 NOTE — PROGRESS NOTES
HEPARIN INFUSION  Iain Dee is a  88 y.o. male receiving heparin infusion.     Therapy for (VTE/Cardiac):   Cardiac/STEMI  Patient Weight: 104 kg  Initial Bolus (Y/N):   N  Any Bolus (Y/N):   Y        Signs or Symptoms of Bleeding: N    Cardiac or Other (Not VTE)   Initial Bolus: 60 units/kg (Max 4,000 units)  Initial rate: 12 units/kg/hr (Max 1,000 units/hr)   aPTT  Rebolus Infusion Hold time Change infusion Dose (Units/kg/hr) Next Anti Xa or aPTT Level Due   <45 50 Units/kg  (4000 Units Max) None Increase by  3 Units/kg/hr 6 hours   45 - 52 25 Units/kg  (2000 Units Max) None Increase by  2 Units/kg/hr 6 hours   53 - 59 0 None Increase by  1 Units/kg/hr 6 hours   60 - 75 0 None No Change 6 hours (after 2 consecutive levels in range check qAM)   76 - 83 0 None Decrease by  1 Units/kg/hr 6 hours   84 - 98 0 30 Minutes Decrease by  2 Units/kg/hr 6 hours   99 - 113 0 60 Minutes Decrease by  3 Units/kg/hr 6 hours   >113 0 Hold  After aPTT less than 75 decrease previous rate by  4 Units/kg/hr  Every 2 hours until aPTT less than 75 then when infusion restarts in 6 hours       Results from last 7 days   Lab Units 08/05/24  0909 08/05/24  0907 08/05/24  0856   INR   --  1.2  --    HEMOGLOBIN g/dL  --   --  13.8   HEMOGLOBIN, POC g/dL 14.3  --   --    HEMATOCRIT %  --   --  42.4   HEMATOCRIT POC % 42  --   --    PLATELETS 10*3/mm3  --   --  254          Date   Time   aPTT Current Rate (Unit/kg/hr) Bolus   (Units) Rate Change   (Unit/kg/hr) New Rate (Unit/kg/hr) Next   aPTT Comments  Pump Check Daily   8/5 1200 -- -- -- +9.5 9.5 1800 D/w DENIS Farrar, [facility]  8/5/2024  11:59 EDT

## 2024-08-06 ENCOUNTER — APPOINTMENT (OUTPATIENT)
Dept: GENERAL RADIOLOGY | Facility: HOSPITAL | Age: 89
DRG: 280 | End: 2024-08-06
Payer: MEDICARE

## 2024-08-06 LAB
ANION GAP SERPL CALCULATED.3IONS-SCNC: 15 MMOL/L (ref 5–15)
APTT PPP: 159.4 SECONDS (ref 60–90)
APTT PPP: 34.8 SECONDS (ref 60–90)
APTT PPP: 37.2 SECONDS (ref 60–90)
APTT PPP: 51.8 SECONDS (ref 60–90)
ASCENDING AORTA: 3.8 CM
BASOPHILS # BLD AUTO: 0.05 10*3/MM3 (ref 0–0.2)
BASOPHILS NFR BLD AUTO: 0.7 % (ref 0–1.5)
BH CV ECHO MEAS - AI P1/2T: 681 MSEC
BH CV ECHO MEAS - AO MEAN PG: 2 MMHG
BH CV ECHO MEAS - AO ROOT DIAM: 3.7 CM
BH CV ECHO MEAS - AO V2 MAX: 101.7 CM/SEC
BH CV ECHO MEAS - AO V2 VTI: 22.8 CM
BH CV ECHO MEAS - AVA(I,D): 1.9 CM2
BH CV ECHO MEAS - EF(MOD-BP): 34.3 %
BH CV ECHO MEAS - IVSD: 1.2 CM
BH CV ECHO MEAS - LA DIMENSION: 4.6 CM
BH CV ECHO MEAS - LAT PEAK E' VEL: 7.9 CM/SEC
BH CV ECHO MEAS - LV MEAN PG: 1 MMHG
BH CV ECHO MEAS - LV V1 MAX: 66.7 CM/SEC
BH CV ECHO MEAS - LV V1 VTI: 14.9 CM
BH CV ECHO MEAS - LVIDD: 4.9 CM
BH CV ECHO MEAS - LVIDS: 3.8 CM
BH CV ECHO MEAS - LVOT DIAM: 2 CM
BH CV ECHO MEAS - LVPWD: 1.2 CM
BH CV ECHO MEAS - MED PEAK E' VEL: 7.8 CM/SEC
BH CV ECHO MEAS - MR MAX PG: 83 MMHG
BH CV ECHO MEAS - MR MAX VEL: 455 CM/SEC
BH CV ECHO MEAS - MR MEAN PG: 53 MMHG
BH CV ECHO MEAS - MR VTI: 167 CM
BH CV ECHO MEAS - MV E MAX VEL: 99.2 CM/SEC
BH CV ECHO MEAS - MV MAX PG: 3.6 MMHG
BH CV ECHO MEAS - MV MEAN PG: 0.8 MMHG
BH CV ECHO MEAS - MV V2 VTI: 24.2 CM
BH CV ECHO MEAS - PA ACC SLOPE: 676 CM/SEC2
BH CV ECHO MEAS - PA ACC TIME: 74 SEC
BH CV ECHO MEAS - RAP SYSTOLE: 15 MMHG
BH CV ECHO MEAS - RVSP: 51 MMHG
BH CV ECHO MEAS - TAPSE (>1.6): 1.34 CM
BH CV ECHO MEAS - TR MAX PG: 37 MMHG
BH CV ECHO MEAS - TR MAX VEL: 306 CM/SEC
BH CV ECHO MEASUREMENTS AVERAGE E/E' RATIO: 12.64
BH CV XLRA - RV BASE: 4.62 CM
BH CV XLRA - RV LENGTH: 7.99 CM
BH CV XLRA - RV MID: 3.99 CM
BH CV XLRA - TDI S': 11.3 CM/SEC
BUN SERPL-MCNC: 25 MG/DL (ref 8–23)
BUN/CREAT SERPL: 18.4 (ref 7–25)
CALCIUM SPEC-SCNC: 8.6 MG/DL (ref 8.6–10.5)
CHLORIDE SERPL-SCNC: 97 MMOL/L (ref 98–107)
CO2 SERPL-SCNC: 25 MMOL/L (ref 22–29)
CREAT SERPL-MCNC: 1.36 MG/DL (ref 0.76–1.27)
DEPRECATED RDW RBC AUTO: 49.9 FL (ref 37–54)
EGFRCR SERPLBLD CKD-EPI 2021: 50.4 ML/MIN/1.73
EOSINOPHIL # BLD AUTO: 0.14 10*3/MM3 (ref 0–0.4)
EOSINOPHIL NFR BLD AUTO: 2 % (ref 0.3–6.2)
ERYTHROCYTE [DISTWIDTH] IN BLOOD BY AUTOMATED COUNT: 13.2 % (ref 12.3–15.4)
GLUCOSE BLDC GLUCOMTR-MCNC: 133 MG/DL (ref 70–130)
GLUCOSE BLDC GLUCOMTR-MCNC: 157 MG/DL (ref 70–130)
GLUCOSE BLDC GLUCOMTR-MCNC: 172 MG/DL (ref 70–130)
GLUCOSE SERPL-MCNC: 119 MG/DL (ref 65–99)
HCT VFR BLD AUTO: 41.1 % (ref 37.5–51)
HGB BLD-MCNC: 13 G/DL (ref 13–17.7)
IMM GRANULOCYTES # BLD AUTO: 0.02 10*3/MM3 (ref 0–0.05)
IMM GRANULOCYTES NFR BLD AUTO: 0.3 % (ref 0–0.5)
LEFT ATRIUM VOLUME INDEX: 47.7 ML/M2
LYMPHOCYTES # BLD AUTO: 0.76 10*3/MM3 (ref 0.7–3.1)
LYMPHOCYTES NFR BLD AUTO: 11 % (ref 19.6–45.3)
MAGNESIUM SERPL-MCNC: 1.7 MG/DL (ref 1.6–2.4)
MCH RBC QN AUTO: 32.3 PG (ref 26.6–33)
MCHC RBC AUTO-ENTMCNC: 31.6 G/DL (ref 31.5–35.7)
MCV RBC AUTO: 102.2 FL (ref 79–97)
MONOCYTES # BLD AUTO: 0.57 10*3/MM3 (ref 0.1–0.9)
MONOCYTES NFR BLD AUTO: 8.3 % (ref 5–12)
NEUTROPHILS NFR BLD AUTO: 5.35 10*3/MM3 (ref 1.7–7)
NEUTROPHILS NFR BLD AUTO: 77.7 % (ref 42.7–76)
NRBC BLD AUTO-RTO: 0 /100 WBC (ref 0–0.2)
PHOSPHATE SERPL-MCNC: 3 MG/DL (ref 2.5–4.5)
PLATELET # BLD AUTO: 215 10*3/MM3 (ref 140–450)
PMV BLD AUTO: 10.5 FL (ref 6–12)
POTASSIUM SERPL-SCNC: 3.6 MMOL/L (ref 3.5–5.2)
POTASSIUM SERPL-SCNC: 4.3 MMOL/L (ref 3.5–5.2)
QT INTERVAL: 332 MS
QT INTERVAL: 392 MS
QT INTERVAL: 416 MS
QTC INTERVAL: 375 MS
QTC INTERVAL: 401 MS
QTC INTERVAL: 435 MS
RBC # BLD AUTO: 4.02 10*6/MM3 (ref 4.14–5.8)
SODIUM SERPL-SCNC: 137 MMOL/L (ref 136–145)
TV PEAK GRADIENT: 26 MMHG
WBC NRBC COR # BLD AUTO: 6.89 10*3/MM3 (ref 3.4–10.8)

## 2024-08-06 PROCEDURE — 99232 SBSQ HOSP IP/OBS MODERATE 35: CPT | Performed by: INTERNAL MEDICINE

## 2024-08-06 PROCEDURE — 94660 CPAP INITIATION&MGMT: CPT

## 2024-08-06 PROCEDURE — 94799 UNLISTED PULMONARY SVC/PX: CPT

## 2024-08-06 PROCEDURE — 85730 THROMBOPLASTIN TIME PARTIAL: CPT | Performed by: INTERNAL MEDICINE

## 2024-08-06 PROCEDURE — 25010000002 HEPARIN (PORCINE) 25000-0.45 UT/250ML-% SOLUTION

## 2024-08-06 PROCEDURE — 82948 REAGENT STRIP/BLOOD GLUCOSE: CPT

## 2024-08-06 PROCEDURE — 25010000002 THIAMINE HCL 200 MG/2ML SOLUTION: Performed by: INTERNAL MEDICINE

## 2024-08-06 PROCEDURE — 84100 ASSAY OF PHOSPHORUS: CPT | Performed by: INTERNAL MEDICINE

## 2024-08-06 PROCEDURE — 85025 COMPLETE CBC W/AUTO DIFF WBC: CPT | Performed by: INTERNAL MEDICINE

## 2024-08-06 PROCEDURE — 25010000002 MIDAZOLAM PER 1 MG: Performed by: INTERNAL MEDICINE

## 2024-08-06 PROCEDURE — 84132 ASSAY OF SERUM POTASSIUM: CPT | Performed by: INTERNAL MEDICINE

## 2024-08-06 PROCEDURE — 63710000001 INSULIN LISPRO (HUMAN) PER 5 UNITS: Performed by: INTERNAL MEDICINE

## 2024-08-06 PROCEDURE — 85730 THROMBOPLASTIN TIME PARTIAL: CPT

## 2024-08-06 PROCEDURE — 71045 X-RAY EXAM CHEST 1 VIEW: CPT

## 2024-08-06 PROCEDURE — 80048 BASIC METABOLIC PNL TOTAL CA: CPT | Performed by: INTERNAL MEDICINE

## 2024-08-06 PROCEDURE — 83735 ASSAY OF MAGNESIUM: CPT | Performed by: INTERNAL MEDICINE

## 2024-08-06 PROCEDURE — 25010000002 NITROGLYCERIN 200 MCG/ML SOLUTION: Performed by: EMERGENCY MEDICINE

## 2024-08-06 RX ORDER — ISOSORBIDE MONONITRATE 60 MG/1
60 TABLET, EXTENDED RELEASE ORAL
Status: DISCONTINUED | OUTPATIENT
Start: 2024-08-06 | End: 2024-08-09 | Stop reason: HOSPADM

## 2024-08-06 RX ORDER — AMLODIPINE BESYLATE 2.5 MG/1
2.5 TABLET ORAL
Status: DISCONTINUED | OUTPATIENT
Start: 2024-08-06 | End: 2024-08-09 | Stop reason: HOSPADM

## 2024-08-06 RX ORDER — BUMETANIDE 1 MG/1
2 TABLET ORAL DAILY
Status: DISCONTINUED | OUTPATIENT
Start: 2024-08-06 | End: 2024-08-09 | Stop reason: HOSPADM

## 2024-08-06 RX ORDER — POTASSIUM CHLORIDE 1.5 G/1.58G
40 POWDER, FOR SOLUTION ORAL EVERY 4 HOURS
Status: COMPLETED | OUTPATIENT
Start: 2024-08-06 | End: 2024-08-06

## 2024-08-06 RX ORDER — HEPARIN SODIUM 1000 [USP'U]/ML
4000 INJECTION, SOLUTION INTRAVENOUS; SUBCUTANEOUS ONCE
Status: DISCONTINUED | OUTPATIENT
Start: 2024-08-06 | End: 2024-08-06

## 2024-08-06 RX ADMIN — ISOSORBIDE MONONITRATE 60 MG: 60 TABLET, EXTENDED RELEASE ORAL at 11:35

## 2024-08-06 RX ADMIN — AMLODIPINE BESYLATE 2.5 MG: 5 TABLET ORAL at 11:35

## 2024-08-06 RX ADMIN — MIDAZOLAM HYDROCHLORIDE 1 MG: 1 INJECTION, SOLUTION INTRAMUSCULAR; INTRAVENOUS at 23:16

## 2024-08-06 RX ADMIN — INSULIN LISPRO 2 UNITS: 100 INJECTION, SOLUTION INTRAVENOUS; SUBCUTANEOUS at 11:47

## 2024-08-06 RX ADMIN — HEPARIN SODIUM 12.5 UNITS/KG/HR: 10000 INJECTION, SOLUTION INTRAVENOUS at 10:04

## 2024-08-06 RX ADMIN — THIAMINE HYDROCHLORIDE 200 MG: 100 INJECTION, SOLUTION INTRAMUSCULAR; INTRAVENOUS at 14:01

## 2024-08-06 RX ADMIN — INSULIN LISPRO 2 UNITS: 100 INJECTION, SOLUTION INTRAVENOUS; SUBCUTANEOUS at 21:11

## 2024-08-06 RX ADMIN — BUMETANIDE 2 MG: 1 TABLET ORAL at 11:35

## 2024-08-06 RX ADMIN — INSULIN LISPRO 2 UNITS: 100 INJECTION, SOLUTION INTRAVENOUS; SUBCUTANEOUS at 17:25

## 2024-08-06 RX ADMIN — THIAMINE HYDROCHLORIDE 200 MG: 100 INJECTION, SOLUTION INTRAMUSCULAR; INTRAVENOUS at 06:12

## 2024-08-06 RX ADMIN — THIAMINE HYDROCHLORIDE 200 MG: 100 INJECTION, SOLUTION INTRAMUSCULAR; INTRAVENOUS at 21:11

## 2024-08-06 RX ADMIN — CLOPIDOGREL BISULFATE 75 MG: 75 TABLET ORAL at 10:04

## 2024-08-06 RX ADMIN — ASPIRIN 81 MG: 81 TABLET, COATED ORAL at 10:04

## 2024-08-06 RX ADMIN — POTASSIUM CHLORIDE 40 MEQ: 1.5 POWDER, FOR SOLUTION ORAL at 06:13

## 2024-08-06 RX ADMIN — FOLIC ACID 1 MG: 1 TABLET ORAL at 10:04

## 2024-08-06 RX ADMIN — POTASSIUM CHLORIDE 40 MEQ: 1.5 POWDER, FOR SOLUTION ORAL at 10:04

## 2024-08-06 RX ADMIN — ATORVASTATIN CALCIUM 40 MG: 40 TABLET, FILM COATED ORAL at 21:11

## 2024-08-06 RX ADMIN — Medication 10 ML: at 21:16

## 2024-08-06 RX ADMIN — NITROGLYCERIN 25 MCG/MIN: 20 INJECTION INTRAVENOUS at 16:25

## 2024-08-06 NOTE — PROGRESS NOTES
INTENSIVIST   PROGRESS NOTE     Hospital:  LOS: 1 day     Mr. Iain Dee, 88 y.o. male is followed for a Chief Complaint of: Acute Respiratory Failure      Subjective   S     Interval History:  Breathing better. Remains on Nitro for blood pressure control.        The patient's relevant past medical, surgical and social history were reviewed and updated in Epic as appropriate.      ROS:   Constitutional: Negative for fever.   Respiratory: Positive for dyspnea.   Cardiovascular: Negative for chest pain.   Gastrointestinal: Negative for  nausea, vomiting and diarrhea.     Objective   O     Vitals:  Temp  Min: 96.8 °F (36 °C)  Max: 98.1 °F (36.7 °C)  BP  Min: 105/70  Max: 172/125  Pulse  Min: 44  Max: 92  Resp  Min: 14  Max: 25  SpO2  Min: 84 %  Max: 100 % Flow (L/min)  Min: 3  Max: 4    Intake/Ouptut 24 hrs (7:00AM - 6:59 AM)  Intake & Output (last 3 days)         08/02 0701 08/03 0700 08/03 0701  08/04 0700 08/04 0701  08/05 0700 08/05 0701  08/06 0700    Urine (mL/kg/hr)    775    Total Output    775    Net    -775                    Medications (drips):  heparin, Last Rate: 12.5 Units/kg/hr (08/06/24 1004)  nitroglycerin, Last Rate: 50 mcg/min (08/06/24 1004)  Pharmacy to Dose Heparin          Physical Examination  Telemetry:  Normal sinus rhythm.    Constitutional:  No acute distress.  Sitting up in bed on 3L nasal cannula.    Eyes: No scleral icterus.   PERRL, EOM intact.    Neck:  Supple, FROM   Cardiovascular: Normal rate, regular and rhythm. Normal heart sounds.  No murmurs, gallop or rub.   Respiratory: No respiratory distress. Normal respiratory effort.  Diminished with crackles.    Abdominal:  Soft. No masses. Nontender. No distension. No HSM.   Extremities: No digital cyanosis. No clubbing.  No peripheral edema.   Skin: No rashes, lesions or ulcers   Neurological:   Alert and interactive.              Results from last 7 days   Lab Units 08/06/24  0314 08/05/24  0909 08/05/24  0856   WBC 10*3/mm3 6.89   --  10.15   HEMOGLOBIN g/dL 13.0  --  13.8   HEMOGLOBIN, POC g/dL  --  14.3  --    MCV fL 102.2*  --  98.8*   PLATELETS 10*3/mm3 215  --  254     Results from last 7 days   Lab Units 08/06/24  0314 08/05/24  0909 08/05/24  0856   SODIUM mmol/L 137  --  140   POTASSIUM mmol/L 3.6  --  4.0   CO2 mmol/L 25.0  --  30.0*   CREATININE mg/dL 1.36* 1.40* 1.40*   GLUCOSE mg/dL 119*  --  193*   MAGNESIUM mg/dL 1.7  --   --    PHOSPHORUS mg/dL 3.0  --   --      Estimated Creatinine Clearance: 48.4 mL/min (A) (by C-G formula based on SCr of 1.36 mg/dL (H)).  Results from last 7 days   Lab Units 08/05/24  0856   ALK PHOS U/L 92   BILIRUBIN mg/dL 0.8   ALT (SGPT) U/L 17   AST (SGOT) U/L 32             Images:  Imaging Results (Last 24 Hours)       Procedure Component Value Units Date/Time    XR Chest 1 View [[id number]] Collected: 08/06/24 0802     Updated: 08/06/24 0807    Narrative:      XR CHEST 1 VW    Date of Exam: 8/6/2024 3:32 AM EDT    Indication: F/u pulmonary edema    Comparison: 8/5/2024.    Findings:  Previously seen pulmonary edema has improved with very mild interstitial edema remaining. There is some mild left base atelectasis. There may be a small left effusion. Left defibrillator pad somewhat obscures detail. There is stable borderline   cardiomegaly.      Impression:      Impression:  Improved pulmonary edema. Possible small left effusion.      Electronically Signed: Danisha Morris MD    8/6/2024 8:03 AM EDT    Workstation ID: [id number]               Results: Reviewed.  I reviewed the patient's new laboratory and imaging results.  I independently reviewed the patient's new images.    Medications: Reviewed.    Assessment & Plan   A / P     Mr. Dee is an 87yo M with a history of ETOH use, HTN, and T2DM who presented to the [facility] on 8/5/24 with chest pain and shortness of breath. Upon arrival, his oxygen saturation was noted to be 82% on room air.     EKG showed inferior ST elevation with T-wave inversion. He was  started on a nitroglycerin drip and Cardiology was consulted. He was too unstable due to hypoxic respiratory failure to go to the cath lab and was admitted to the ICU for medical management in the meantime.     Off Bipap  this AM and on nasal cannula. Remains on Nitro.     Nutrition:   Diet: Cardiac, Diabetic; Healthy Heart (2-3 Na+); Consistent Carbohydrate; Fluid Consistency: Thin (IDDSI 0)  Advance Directives:   Code Status and Medical Interventions: CPR (Attempt to Resuscitate); Full Support   Ordered at: 08/06/24 0933     Level Of Support Discussed With:    Patient     Code Status (Patient has no pulse and is not breathing):    CPR (Attempt to Resuscitate)     Medical Interventions (Patient has pulse or is breathing):    Full Support       Active Hospital Problems    Diagnosis     **STEMI (ST elevation myocardial infarction)     Alcohol abuse (1 to 2 glasses of bourbon a day)     Essential hypertension     Diabetes mellitus     JOLENE on CPAP        Assessment / Plan:    Monitor in ICU  CAD management per Cardiology. Currently on Heparin and Nitro drips.   Bipap as needed. Wean supplemental O2.   CIWA protocol for alcohol use.   SSI for diabetes management  Replace potassium.   AM labs      High level of risk due to:  severe exacerbation of chronic illness and illness with threat to life or bodily function.    Plan of care and goals reviewed during interdisciplinary rounds.  I discussed the patient's findings and my recommendations with patient and nursing staff      Monique Chester,     Intensive Care Medicine and Pulmonary Medicine

## 2024-08-06 NOTE — PROGRESS NOTES
"[facility] at [facility]  IP Progress Note    PROBLEM LIST:  Inferior ST segment elevation MI, chest pain-free  Chronic kidney disease  Chronic A-fib  Medical management      HOSPITAL COURSE:  Patient presented to heart appears to be ST segment elevation MI and resolved with nitrate.  Patient was in flash pulmonary edema.  Patient responded good to the diuresis.  Patient has been chest pain-free.  Patient heart rate has been from 30s to 90s and he has been in atrial fibrillation for a long period of time      CHIEF COMPLAINTS:  Inferior STEMI      Subjective   Patient is sitting on bed comfortably without any discomfort.  No episode of chest pain overnight.  Patient did have rhythm of aberrant conduction.      Objective     Blood pressure 141/97, pulse 92, temperature 97.9 °F (36.6 °C), temperature source Axillary, resp. rate 18, height 185.4 cm (72.99\"), weight 104 kg (229 lb 4.5 oz), SpO2 92%.     Intake/Output Summary (Last 24 hours) at 8/6/2024 1108  Last data filed at 8/6/2024 0800  Gross per 24 hour   Intake 575.99 ml   Output 1825 ml   Net -1249.01 ml       PHYSICAL EXAM:  Constitutional:       General: Not in acute distress.     Appearance: Healthy appearance. Not in distress.     Neck:     JVP:Not elevated     Carotid artery: Normal    Pulmonary:      Effort: Pulmonary effort is normal.      Breath sounds: Normal breath sounds. No wheezing. No rhonchi. No rales.     Cardiovascular:      Normal rate. irregular rhythm. Normal S1. Normal S2.      Murmurs: There is no significant murmur.      No gallop. No click. No rub.     Abdominal:      General: Bowel sounds are normal.      Palpations: Abdomen is soft.      Tenderness: There is no abdominal tenderness.    Extremities:     Pulses:Normal radial and pedal pulses     Edema:no edema    MEDICATIONS:    amLODIPine, 2.5 mg, Oral, Q24H  [START ON 8/7/2024] apixaban, 2.5 mg, Oral, Q12H  aspirin, 81 mg, Oral, Daily  atorvastatin, 40 mg, Oral, " "Nightly  bumetanide, 2 mg, Oral, Daily  clopidogrel, 75 mg, Oral, Daily  folic acid, 1 mg, Oral, Daily  insulin lispro, 2-7 Units, Subcutaneous, 4x Daily AC & at Bedtime  isosorbide mononitrate, 60 mg, Oral, Q24H  midazolam, 2 mg, Intravenous, Q6H   Followed by  midazolam, 1 mg, Intravenous, Q6H  pharmacy consult - MTM, , Does not apply, Daily  senna-docusate sodium, 2 tablet, Oral, BID  sodium chloride, 10 mL, Intravenous, Q12H  thiamine (B-1) IV, 200 mg, Intravenous, Q8H   Followed by  [START ON 8/10/2024] thiamine, 100 mg, Oral, Daily          Results from last 7 days   Lab Units 08/06/24  0314   WBC 10*3/mm3 6.89   HEMOGLOBIN g/dL 13.0   HEMATOCRIT % 41.1   PLATELETS 10*3/mm3 215     Results from last 7 days   Lab Units 08/06/24  0314 08/05/24  0909 08/05/24  0856   SODIUM mmol/L 137  --  140   POTASSIUM mmol/L 3.6  --  4.0   CHLORIDE mmol/L 97*  --  99   CO2 mmol/L 25.0  --  30.0*   BUN mg/dL 25*  --  23   CREATININE mg/dL 1.36*   < > 1.40*   CALCIUM mg/dL 8.6  --  9.3   BILIRUBIN mg/dL  --   --  0.8   ALK PHOS U/L  --   --  92   ALT (SGPT) U/L  --   --  17   AST (SGOT) U/L  --   --  32   GLUCOSE mg/dL 119*  --  193*    < > = values in this interval not displayed.     Results from last 7 days   Lab Units 08/05/24  0907 08/05/24  0856   INR  1.2 1.26*     Lab Results   Component Value Date    TROPONINT 560 (C) 08/05/2024                 No results found for: \"IRON\", \"FERRITIN\", \"LABIRON\", \"TIBC\"   Hemoglobin A1C   Date Value Ref Range Status   02/14/2024 6.3 (A) 4.5 - 5.7 % Final     Magnesium   Date Value Ref Range Status   08/06/2024 1.7 1.6 - 2.4 mg/dL Final        RESULT REVIEW:    I reviewed the patient's new clinical results.    Tele: Atrial Fib with Controlled Rate      ASSESSMENT:     Coronary artery disease  Hypertension  Hyperlipidemia  Atrial fibrillation  Chronic kidney disease    PLAN:     We will keep patient on aspirin Plavix and Eliquis.  Will keep him on heparin for today.  Will switch it to " Eliquis 2.5 mg daily.  We will also keep him on Plavix.  We will stop aspirin in 3 to 4 days.  Patient is not a candidate for ACE or ARB or Arni due to his chronic kidney disease.  We will start him on Norvasc 2.5 mg daily.  Will start him on Imdur and wean him off of nitro drip.  Once he remains stable on chronic kidney disease we might start him on Farxiga.  Should be able to move out of the unit today or tomorrow.  Will keep him on the low-dose diuretics.

## 2024-08-06 NOTE — CASE MANAGEMENT/SOCIAL WORK
Discharge Planning Assessment  [facility]     Patient Name: Iain Dee  MRN: [id number]  Today's Date: 8/6/2024    Admit Date: 8/5/2024    Plan: IDP   Discharge Needs Assessment       Row Name 08/06/24 1116       Living Environment    People in Home spouse    Name(s) of People in Home hung    Current Living Arrangements home    Potentially Unsafe Housing Conditions none    In the past 12 months has the electric, gas, oil, or water company threatened to shut off services in your home? No    Primary Care Provided by self    Provides Primary Care For no one    Family Caregiver if Needed spouse    Family Caregiver Names Hung    Quality of Family Relationships supportive;involved;helpful    Able to Return to Prior Arrangements yes       Resource/Environmental Concerns    Resource/Environmental Concerns none       Transportation Needs    In the past 12 months, has lack of transportation kept you from medical appointments or from getting medications? no       Food Insecurity    Within the past 12 months, you worried that your food would run out before you got the money to buy more. Never true    Within the past 12 months, the food you bought just didn't last and you didn't have money to get more. Never true       Transition Planning    Patient/Family Anticipates Transition to home with family    Patient/Family Anticipated Services at Transition none    Transportation Anticipated family or friend will provide       Discharge Needs Assessment    Readmission Within the Last 30 Days no previous admission in last 30 days    Equipment Currently Used at Home cpap;rollator    Concerns to be Addressed no discharge needs identified;denies needs/concerns at this time    Anticipated Changes Related to Illness none    Equipment Needed After Discharge none                   Discharge Plan       Row Name 08/06/24 1118       Plan    Plan IDP    Patient/Family in Agreement with Plan yes    Plan Comments Spoke with patient at  bedside for IDP. Lives in [place] with spouse. IADL. States he hasnt walked well for a couple of yr and now uses a rollator. Has CPAP. No HH/OPPT. PCP Luis Aguilar. Medicare A&B and Dob[facility] philip Brambila with scripts filled at [facility]. Plan is home with spouse to transport. CM will cont to follow.    Final Discharge Disposition Code 01 - home or self-care                  Continued Care and Services - Admitted Since 8/5/2024    No active coordination exists for this encounter.          Demographic Summary       Row Name 08/06/24 1115       General Information    Admission Type inpatient    Arrived From emergency department    Referral Source admission list    Reason for Consult discharge planning    Preferred Language English                   Functional Status       Row Name 08/06/24 1115       Functional Status    Usual Activity Tolerance moderate    Current Activity Tolerance moderate       Physical Activity    On average, how many days per week do you engage in moderate to strenuous exercise (like a brisk walk)? 0 days    On average, how many minutes do you engage in exercise at this level? 0 min    Number of minutes of exercise per week 0       Functional Status, IADL    Medications independent    Meal Preparation independent    Housekeeping independent    Laundry independent    Shopping assistive person                   Psychosocial    No documentation.                  Abuse/Neglect    No documentation.                  Legal    No documentation.                  Substance Abuse    No documentation.                  Patient Forms    No documentation.                     Angeli Howard RN

## 2024-08-06 NOTE — PROGRESS NOTES
HEPARIN INFUSION  Iain Dee is a  88 y.o. male receiving heparin infusion.     Therapy for (VTE/Cardiac):   Cardiac/STEMI  Patient Weight: 104 kg  Initial Bolus (Y/N):   N  Any Bolus (Y/N):   Y        Signs or Symptoms of Bleeding: N    Cardiac or Other (Not VTE)   Initial Bolus: 60 units/kg (Max 4,000 units)  Initial rate: 12 units/kg/hr (Max 1,000 units/hr)   aPTT  Rebolus Infusion Hold time Change infusion Dose (Units/kg/hr) Next Anti Xa or aPTT Level Due   <45 50 Units/kg  (4000 Units Max) None Increase by  3 Units/kg/hr 6 hours   45 - 52 25 Units/kg  (2000 Units Max) None Increase by  2 Units/kg/hr 6 hours   53 - 59 0 None Increase by  1 Units/kg/hr 6 hours   60 - 75 0 None No Change 6 hours (after 2 consecutive levels in range check qAM)   76 - 83 0 None Decrease by  1 Units/kg/hr 6 hours   84 - 98 0 30 Minutes Decrease by  2 Units/kg/hr 6 hours   99 - 113 0 60 Minutes Decrease by  3 Units/kg/hr 6 hours   >113 0 Hold  After aPTT less than 75 decrease previous rate by  4 Units/kg/hr  Every 2 hours until aPTT less than 75 then when infusion restarts in 6 hours       Results from last 7 days   Lab Units 08/06/24  0314 08/05/24  0909 08/05/24  0907 08/05/24  0856   INR   --   --  1.2 1.26*   HEMOGLOBIN g/dL 13.0  --   --  13.8   HEMOGLOBIN, POC g/dL  --  14.3  --   --    HEMATOCRIT % 41.1  --   --  42.4   HEMATOCRIT POC %  --  42  --   --    PLATELETS 10*3/mm3 215  --   --  254          Date   Time   aPTT Current Rate (Unit/kg/hr) Bolus   (Units) Rate Change   (Unit/kg/hr) New Rate (Unit/kg/hr) Next   aPTT Comments  Pump Check Daily   8/5 1200 -- -- -- +9.5 9.5 1800 D/w RN   8/5 1854 54.1 9.5 -- +1 10.5 0200 Discussed with RN.   8/6 0314 51.8 10.5 -- +2 12.5 1100 D/w RN   8/6 1134 159.4 12.5 -- HOLD HOLD 1500 D/w RN - aPTT Q2H until <75       --           --           --                                                                                                                                                                    Reema Farrar, [facility]              verbalization

## 2024-08-06 NOTE — PROGRESS NOTES
Pt. Referred for Phase II Cardiac Rehab. Staff discussed benefits of exercise, program protocol, and educational material provided. Teach back verified.  Patient refused cardiac rehab at this time. Patient states he will call staff if later interested in program. Patient educated on AHA guidelines for home exercise and given home exercise sheet. Pt given brochure for [facility] if they have any further questions about cardiac rehab.

## 2024-08-07 LAB
ANION GAP SERPL CALCULATED.3IONS-SCNC: 11 MMOL/L (ref 5–15)
APTT PPP: 49.3 SECONDS (ref 60–90)
BUN SERPL-MCNC: 23 MG/DL (ref 8–23)
BUN/CREAT SERPL: 17.3 (ref 7–25)
CALCIUM SPEC-SCNC: 8.4 MG/DL (ref 8.6–10.5)
CHLORIDE SERPL-SCNC: 98 MMOL/L (ref 98–107)
CO2 SERPL-SCNC: 29 MMOL/L (ref 22–29)
CREAT SERPL-MCNC: 1.33 MG/DL (ref 0.76–1.27)
DEPRECATED RDW RBC AUTO: 47.7 FL (ref 37–54)
EGFRCR SERPLBLD CKD-EPI 2021: 51.7 ML/MIN/1.73
ERYTHROCYTE [DISTWIDTH] IN BLOOD BY AUTOMATED COUNT: 13.1 % (ref 12.3–15.4)
GLUCOSE BLDC GLUCOMTR-MCNC: 124 MG/DL (ref 70–130)
GLUCOSE BLDC GLUCOMTR-MCNC: 139 MG/DL (ref 70–130)
GLUCOSE BLDC GLUCOMTR-MCNC: 173 MG/DL (ref 70–130)
GLUCOSE BLDC GLUCOMTR-MCNC: 174 MG/DL (ref 70–130)
GLUCOSE BLDC GLUCOMTR-MCNC: 218 MG/DL (ref 70–130)
GLUCOSE SERPL-MCNC: 145 MG/DL (ref 65–99)
HCT VFR BLD AUTO: 38.8 % (ref 37.5–51)
HGB BLD-MCNC: 12.8 G/DL (ref 13–17.7)
MAGNESIUM SERPL-MCNC: 1.4 MG/DL (ref 1.6–2.4)
MCH RBC QN AUTO: 32.6 PG (ref 26.6–33)
MCHC RBC AUTO-ENTMCNC: 33 G/DL (ref 31.5–35.7)
MCV RBC AUTO: 98.7 FL (ref 79–97)
PHOSPHATE SERPL-MCNC: 2.4 MG/DL (ref 2.5–4.5)
PLATELET # BLD AUTO: 218 10*3/MM3 (ref 140–450)
PMV BLD AUTO: 10.5 FL (ref 6–12)
POTASSIUM SERPL-SCNC: 3.6 MMOL/L (ref 3.5–5.2)
POTASSIUM SERPL-SCNC: 4.4 MMOL/L (ref 3.5–5.2)
RBC # BLD AUTO: 3.93 10*6/MM3 (ref 4.14–5.8)
SODIUM SERPL-SCNC: 138 MMOL/L (ref 136–145)
WBC NRBC COR # BLD AUTO: 8.15 10*3/MM3 (ref 3.4–10.8)

## 2024-08-07 PROCEDURE — 99232 SBSQ HOSP IP/OBS MODERATE 35: CPT | Performed by: INTERNAL MEDICINE

## 2024-08-07 PROCEDURE — 84100 ASSAY OF PHOSPHORUS: CPT | Performed by: INTERNAL MEDICINE

## 2024-08-07 PROCEDURE — 83735 ASSAY OF MAGNESIUM: CPT | Performed by: INTERNAL MEDICINE

## 2024-08-07 PROCEDURE — 80048 BASIC METABOLIC PNL TOTAL CA: CPT | Performed by: INTERNAL MEDICINE

## 2024-08-07 PROCEDURE — 25010000002 THIAMINE HCL 200 MG/2ML SOLUTION: Performed by: INTERNAL MEDICINE

## 2024-08-07 PROCEDURE — 94799 UNLISTED PULMONARY SVC/PX: CPT

## 2024-08-07 PROCEDURE — 85027 COMPLETE CBC AUTOMATED: CPT | Performed by: INTERNAL MEDICINE

## 2024-08-07 PROCEDURE — 97162 PT EVAL MOD COMPLEX 30 MIN: CPT

## 2024-08-07 PROCEDURE — 99232 SBSQ HOSP IP/OBS MODERATE 35: CPT | Performed by: PHYSICIAN ASSISTANT

## 2024-08-07 PROCEDURE — 63710000001 INSULIN LISPRO (HUMAN) PER 5 UNITS: Performed by: INTERNAL MEDICINE

## 2024-08-07 PROCEDURE — 93005 ELECTROCARDIOGRAM TRACING: CPT | Performed by: INTERNAL MEDICINE

## 2024-08-07 PROCEDURE — 93010 ELECTROCARDIOGRAM REPORT: CPT | Performed by: INTERNAL MEDICINE

## 2024-08-07 PROCEDURE — 82948 REAGENT STRIP/BLOOD GLUCOSE: CPT

## 2024-08-07 PROCEDURE — 84132 ASSAY OF SERUM POTASSIUM: CPT | Performed by: INTERNAL MEDICINE

## 2024-08-07 PROCEDURE — 25010000002 MIDAZOLAM PER 1 MG: Performed by: INTERNAL MEDICINE

## 2024-08-07 PROCEDURE — 85730 THROMBOPLASTIN TIME PARTIAL: CPT

## 2024-08-07 PROCEDURE — 25010000002 MAGNESIUM SULFATE 2 GM/50ML SOLUTION: Performed by: INTERNAL MEDICINE

## 2024-08-07 RX ORDER — HYDRALAZINE HYDROCHLORIDE 50 MG/1
25 TABLET, FILM COATED ORAL EVERY 8 HOURS SCHEDULED
Status: DISCONTINUED | OUTPATIENT
Start: 2024-08-07 | End: 2024-08-09 | Stop reason: HOSPADM

## 2024-08-07 RX ORDER — POTASSIUM CHLORIDE 1.5 G/1.58G
40 POWDER, FOR SOLUTION ORAL EVERY 4 HOURS
Status: COMPLETED | OUTPATIENT
Start: 2024-08-07 | End: 2024-08-07

## 2024-08-07 RX ORDER — FAMOTIDINE 20 MG/1
20 TABLET, FILM COATED ORAL
Status: DISCONTINUED | OUTPATIENT
Start: 2024-08-07 | End: 2024-08-09 | Stop reason: HOSPADM

## 2024-08-07 RX ORDER — MAGNESIUM SULFATE HEPTAHYDRATE 40 MG/ML
2 INJECTION, SOLUTION INTRAVENOUS
Status: COMPLETED | OUTPATIENT
Start: 2024-08-07 | End: 2024-08-07

## 2024-08-07 RX ADMIN — ISOSORBIDE MONONITRATE 60 MG: 60 TABLET, EXTENDED RELEASE ORAL at 08:06

## 2024-08-07 RX ADMIN — ASPIRIN 81 MG: 81 TABLET, COATED ORAL at 08:06

## 2024-08-07 RX ADMIN — CLOPIDOGREL BISULFATE 75 MG: 75 TABLET ORAL at 08:06

## 2024-08-07 RX ADMIN — HYDRALAZINE HYDROCHLORIDE 25 MG: 50 TABLET ORAL at 21:00

## 2024-08-07 RX ADMIN — MIDAZOLAM HYDROCHLORIDE 1 MG: 1 INJECTION, SOLUTION INTRAMUSCULAR; INTRAVENOUS at 12:04

## 2024-08-07 RX ADMIN — MIDAZOLAM HYDROCHLORIDE 1 MG: 1 INJECTION, SOLUTION INTRAMUSCULAR; INTRAVENOUS at 17:20

## 2024-08-07 RX ADMIN — HYDRALAZINE HYDROCHLORIDE 25 MG: 50 TABLET ORAL at 10:00

## 2024-08-07 RX ADMIN — FAMOTIDINE 20 MG: 20 TABLET, FILM COATED ORAL at 17:21

## 2024-08-07 RX ADMIN — APIXABAN 2.5 MG: 2.5 TABLET, FILM COATED ORAL at 20:53

## 2024-08-07 RX ADMIN — MAGNESIUM SULFATE HEPTAHYDRATE 2 G: 40 INJECTION, SOLUTION INTRAVENOUS at 06:21

## 2024-08-07 RX ADMIN — THIAMINE HYDROCHLORIDE 200 MG: 100 INJECTION, SOLUTION INTRAMUSCULAR; INTRAVENOUS at 14:32

## 2024-08-07 RX ADMIN — POTASSIUM CHLORIDE 40 MEQ: 1.5 POWDER, FOR SOLUTION ORAL at 10:00

## 2024-08-07 RX ADMIN — THIAMINE HYDROCHLORIDE 200 MG: 100 INJECTION, SOLUTION INTRAMUSCULAR; INTRAVENOUS at 21:00

## 2024-08-07 RX ADMIN — MIDAZOLAM HYDROCHLORIDE 1 MG: 1 INJECTION, SOLUTION INTRAMUSCULAR; INTRAVENOUS at 06:06

## 2024-08-07 RX ADMIN — INSULIN LISPRO 2 UNITS: 100 INJECTION, SOLUTION INTRAVENOUS; SUBCUTANEOUS at 12:04

## 2024-08-07 RX ADMIN — BUMETANIDE 2 MG: 1 TABLET ORAL at 08:06

## 2024-08-07 RX ADMIN — SENNOSIDES AND DOCUSATE SODIUM 2 TABLET: 50; 8.6 TABLET ORAL at 08:06

## 2024-08-07 RX ADMIN — MAGNESIUM SULFATE HEPTAHYDRATE 2 G: 40 INJECTION, SOLUTION INTRAVENOUS at 10:00

## 2024-08-07 RX ADMIN — APIXABAN 2.5 MG: 2.5 TABLET, FILM COATED ORAL at 08:06

## 2024-08-07 RX ADMIN — FOLIC ACID 1 MG: 1 TABLET ORAL at 08:06

## 2024-08-07 RX ADMIN — AMLODIPINE BESYLATE 2.5 MG: 5 TABLET ORAL at 08:06

## 2024-08-07 RX ADMIN — THIAMINE HYDROCHLORIDE 200 MG: 100 INJECTION, SOLUTION INTRAMUSCULAR; INTRAVENOUS at 06:06

## 2024-08-07 RX ADMIN — Medication 10 ML: at 21:01

## 2024-08-07 RX ADMIN — INSULIN LISPRO 3 UNITS: 100 INJECTION, SOLUTION INTRAVENOUS; SUBCUTANEOUS at 17:19

## 2024-08-07 RX ADMIN — MIDAZOLAM HYDROCHLORIDE 1 MG: 1 INJECTION, SOLUTION INTRAMUSCULAR; INTRAVENOUS at 23:14

## 2024-08-07 RX ADMIN — ATORVASTATIN CALCIUM 40 MG: 40 TABLET, FILM COATED ORAL at 20:53

## 2024-08-07 RX ADMIN — MAGNESIUM SULFATE HEPTAHYDRATE 2 G: 40 INJECTION, SOLUTION INTRAVENOUS at 08:08

## 2024-08-07 RX ADMIN — POTASSIUM CHLORIDE 40 MEQ: 1.5 POWDER, FOR SOLUTION ORAL at 06:17

## 2024-08-07 NOTE — PROGRESS NOTES
INTENSIVIST   PROGRESS NOTE     Hospital:  LOS: 2 days     Mr. Iain Dee, 88 y.o. male is followed for a Chief Complaint of: Acute Respiratory Failure      Subjective   S     Interval History:  No acute events overnight.        The patient's relevant past medical, surgical and social history were reviewed and updated in Epic as appropriate.      ROS:   Constitutional: Negative for fever.   Respiratory: Positive for dyspnea.   Cardiovascular: Negative for chest pain.   Gastrointestinal: Negative for  nausea, vomiting and diarrhea.     Objective   O     Vitals:  Temp  Min: 97.2 °F (36.2 °C)  Max: 99.2 °F (37.3 °C)  BP  Min: 133/92  Max: 166/109  Pulse  Min: 77  Max: 120  Resp  Min: 12  Max: 22  SpO2  Min: 90 %  Max: 98 % Flow (L/min)  Min: 2  Max: 3    Intake/Ouptut 24 hrs (7:00AM - 6:59 AM)  Intake & Output (last 3 days)         08/02 0701 08/03 0700 08/03 0701 08/04 0700 08/04 0701 08/05 0700 08/05 0701  08/06 0700    Urine (mL/kg/hr)    775    Total Output    775    Net    -775                    Medications (drips):  nitroglycerin, Last Rate: 50 mcg/min (08/07/24 0430)  Pharmacy to Dose Heparin          Physical Examination  Telemetry:  Normal sinus rhythm.    Constitutional:  No acute distress.  Sitting up in a chair on 3L nasal cannula.    Eyes: No scleral icterus.   PERRL, EOM intact.    Neck:  Supple, FROM   Cardiovascular: Normal rate, regular and rhythm. Normal heart sounds.  No murmurs, gallop or rub.   Respiratory: No respiratory distress. Normal respiratory effort.  Diminished with crackles.    Abdominal:  Soft. No masses. Nontender. No distension. No HSM.   Extremities: No digital cyanosis. No clubbing.  No peripheral edema.   Skin: No rashes, lesions or ulcers   Neurological:   Alert and interactive.              Results from last 7 days   Lab Units 08/07/24  0432 08/06/24  0314 08/05/24  0909 08/05/24  0856   WBC 10*3/mm3 8.15 6.89  --  10.15   HEMOGLOBIN g/dL 12.8* 13.0  --  13.8   HEMOGLOBIN,  POC g/dL  --   --  14.3  --    MCV fL 98.7* 102.2*  --  98.8*   PLATELETS 10*3/mm3 218 215  --  254     Results from last 7 days   Lab Units 08/07/24  0432 08/06/24  1507 08/06/24  0314 08/05/24  0909 08/05/24  0856   SODIUM mmol/L 138  --  137  --  140   POTASSIUM mmol/L 3.6 4.3 3.6  --  4.0   CO2 mmol/L 29.0  --  25.0  --  30.0*   CREATININE mg/dL 1.33*  --  1.36* 1.40* 1.40*   GLUCOSE mg/dL 145*  --  119*  --  193*   MAGNESIUM mg/dL 1.4*  --  1.7  --   --    PHOSPHORUS mg/dL 2.4*  --  3.0  --   --      Estimated Creatinine Clearance: 49.5 mL/min (A) (by C-G formula based on SCr of 1.33 mg/dL (H)).  Results from last 7 days   Lab Units 08/05/24  0856   ALK PHOS U/L 92   BILIRUBIN mg/dL 0.8   ALT (SGPT) U/L 17   AST (SGOT) U/L 32             Images:  Imaging Results (Last 24 Hours)       ** No results found for the last 24 hours. **               Results: Reviewed.  I reviewed the patient's new laboratory and imaging results.  I independently reviewed the patient's new images.    Medications: Reviewed.    Assessment & Plan   A / P     Mr. Dee is an 87yo M with a history of ETOH use, HTN, and T2DM who presented to the [facility] on 8/5/24 with chest pain and shortness of breath. Upon arrival, his oxygen saturation was noted to be 82% on room air.     EKG showed inferior ST elevation with T-wave inversion. He was started on a nitroglycerin drip and Cardiology was consulted. He was too unstable due to hypoxic respiratory failure to go to the cath lab and was admitted to the ICU for medical management in the meantime.     He was transitioned off Bipap to nasal cannula after diuresis. He remains on a nitro drip for blood pressure control.     Nutrition:   Diet: Cardiac, Diabetic; Healthy Heart (2-3 Na+); Consistent Carbohydrate; Fluid Consistency: Thin (IDDSI 0)  Advance Directives:   Code Status and Medical Interventions: CPR (Attempt to Resuscitate); Full Support   Ordered at: 08/06/24 0940     Level Of Support Discussed  With:    Patient     Code Status (Patient has no pulse and is not breathing):    CPR (Attempt to Resuscitate)     Medical Interventions (Patient has pulse or is breathing):    Full Support       Active Hospital Problems    Diagnosis     **STEMI (ST elevation myocardial infarction)     Alcohol abuse (1 to 2 glasses of bourbon a day)     Essential hypertension     Diabetes mellitus     JOLENE on CPAP        Assessment / Plan:    [facility] protocol for alcohol use.   Transition Heparin back to home NOAC  Wean off nitro drip  SSI for diabetes management  Case Management following. May need inpatient rehab at discharge.   AM labs  Okay to transfer to telemetry.       High level of risk due to:  severe exacerbation of chronic illness and illness with threat to life or bodily function.    Plan of care and goals reviewed during interdisciplinary rounds.  I discussed the patient's findings and my recommendations with patient and nursing staff      Monique Chester, DO    Intensive Care Medicine and Pulmonary Medicine

## 2024-08-07 NOTE — PROGRESS NOTES
"  [facility] at [facility]  INPATIENT PROGRESS NOTE    Date of Admission: 8/5/2024  Date of Service: 08/07/24    Identification: Iain Dee is a 88 y.o. male   Primary Care Physician: Luis Aguilar DO    Chief Complaint: Follow-up STEMI  Problem List:   STEMI (ST elevation myocardial infarction)    Essential hypertension    Diabetes mellitus    JOLENE on CPAP    Alcohol abuse (1 to 2 glasses of bourbon a day)          Subjective/Interval History    Patient sitting up in chair.  Tells nursing staff that he usually uses a walker to get around and feels a little bit more weak.  Nurse has put in PT/OT consult.  Patient also remains on nitroglycerin drip at 50 mics.            Objective   Vitals:  BP (!) 148/104 (BP Location: Right arm, Patient Position: Lying)   Pulse 98   Temp 98 °F (36.7 °C) (Oral)   Resp 18   Ht 185.4 cm (72.99\")   Wt 104 kg (229 lb 4.5 oz)   SpO2 94%   BMI 30.26 kg/m²     Intake/Output Summary (Last 24 hours) at 8/7/2024 0839  Last data filed at 8/7/2024 0800  Gross per 24 hour   Intake 713 ml   Output 3125 ml   Net -2412 ml       Current Medications:  amLODIPine, 2.5 mg, Oral, Q24H  apixaban, 2.5 mg, Oral, Q12H  aspirin, 81 mg, Oral, Daily  atorvastatin, 40 mg, Oral, Nightly  bumetanide, 2 mg, Oral, Daily  clopidogrel, 75 mg, Oral, Daily  folic acid, 1 mg, Oral, Daily  hydrALAZINE, 25 mg, Oral, Q8H  insulin lispro, 2-7 Units, Subcutaneous, 4x Daily AC & at Bedtime  isosorbide mononitrate, 60 mg, Oral, Q24H  magnesium sulfate, 2 g, Intravenous, Q2H  midazolam, 1 mg, Intravenous, Q6H  pharmacy consult - MTM, , Does not apply, Daily  potassium chloride, 40 mEq, Oral, Q4H  senna-docusate sodium, 2 tablet, Oral, BID  sodium chloride, 10 mL, Intravenous, Q12H  thiamine (B-1) IV, 200 mg, Intravenous, Q8H   Followed by  [START ON 8/10/2024] thiamine, 100 mg, Oral, Daily      heparin, 13.5 Units/kg/hr, Last Rate: 13.5 Units/kg/hr (08/07/24 0604)  nitroglycerin, 5-200 " mcg/min, Last Rate: 50 mcg/min (08/07/24 0430)  Pharmacy to Dose Heparin,       Vitals reviewed.   Constitutional:       Appearance: Healthy appearance. Not in distress.   Eyes:      Conjunctiva/sclera: Conjunctivae normal.   HENT:    Mouth/Throat:      Pharynx: Oropharynx is clear.   Neck:      Vascular: JVD normal.   Pulmonary:      Effort: Pulmonary effort is normal.      Breath sounds: No wheezing. No rhonchi. No rales.      Comments: Mildly decreased in bases  Cardiovascular:      Normal rate. Regular rhythm.      Murmurs: There is no murmur.      No rub.   Pulses:     Intact distal pulses.   Edema:     Peripheral edema absent.   Abdominal:      General: There is no distension.   Musculoskeletal:         General: No deformity. Skin:     General: Skin is warm and dry.   Neurological:      General: No focal deficit present.      Mental Status: Alert and oriented to person, place and time.          Data Review:  Results from last 7 days   Lab Units 08/07/24  0432 08/06/24  0314 08/05/24  0909 08/05/24  0856   WBC 10*3/mm3 8.15 6.89  --  10.15   HEMOGLOBIN g/dL 12.8* 13.0  --  13.8   HEMOGLOBIN, POC g/dL  --   --  14.3  --    HEMATOCRIT % 38.8 41.1  --  42.4   HEMATOCRIT POC %  --   --  42  --    PLATELETS 10*3/mm3 218 215  --  254     Results from last 7 days   Lab Units 08/07/24  0432 08/06/24  1507 08/06/24  0314 08/05/24  0909 08/05/24  0856   SODIUM mmol/L 138  --  137  --  140   POTASSIUM mmol/L 3.6 4.3 3.6  --  4.0   CHLORIDE mmol/L 98  --  97*  --  99   CO2 mmol/L 29.0  --  25.0  --  30.0*   BUN mg/dL 23  --  25*  --  23   CREATININE mg/dL 1.33*  --  1.36* 1.40* 1.40*   GLUCOSE mg/dL 145*  --  119*  --  193*                      Results from last 7 days   Lab Units 08/07/24  0432 08/06/24  2115 08/06/24  1507 08/05/24  1854 08/05/24  0907 08/05/24  0856   PROTIME seconds  --   --   --   --  14.1 15.9*   INR   --   --   --   --  1.2 1.26*   APTT seconds 49.3* 37.2* 34.8*   < >  --  35.5*    < > = values in  this interval not displayed.     Results from last 7 days   Lab Units 08/05/24  1120 08/05/24  0856   HSTROP T ng/L 560* 368*     Results from last 7 days   Lab Units 08/05/24  0856   PROBNP pg/mL 5,672.0*       XR Chest 1 View    Result Date: 8/6/2024  Impression: Improved pulmonary edema. Possible small left effusion. Electronically Signed: Danisha Morris MD  8/6/2024 8:03 AM EDT  Workstation ID: [id number]    XR Chest 1 View    Result Date: 8/5/2024  Impression: Borderline cardiomegaly with perihilar and interstitial opacities suggestive of mild edema. Electronically Signed: Jalen Truong MD  8/5/2024 9:10 AM EDT  Workstation ID: [id number]     Results for orders placed during the hospital encounter of 08/05/24    Adult Transthoracic Echo Complete W/ Cont if Necessary Per Protocol    Interpretation Summary    Left ventricular systolic function is moderately decreased. Calculated left ventricular EF = 34.3% Left ventricular ejection fraction appears to be 36 - 40%.    Left ventricular wall thickness is consistent with mild concentric hypertrophy.    Left ventricular diastolic function was indeterminate.    Mildly reduced right ventricular systolic function noted.    The right ventricular cavity is dilated.    The left atrial cavity is mildly dilated.    Left atrial volume is moderately increased.    The right atrial cavity is dilated.    Moderate mitral valve regurgitation is present.    Estimated right ventricular systolic pressure from tricuspid regurgitation is moderately elevated (45-55 mmHg). Calculated right ventricular systolic pressure from tricuspid regurgitation is 51 mmHg.      RESULTS REVIEW:    I reviewed the patient's new clinical results.    I personally reviewed the patient's EKG/Telemetry data    Telemetry: Normal sinus rhythm          Assessment:   CAD, inferior STEMI  Acute systolic heart failure with EF 36-40%  Chronic atrial fibrillation  Hypertension  Hyperlipidemia  Debility               Plan:   Transfer to the floor.  PT/OT consult, this needs to be assessed before patient can be discharged.  Add hydralazine 25 mg every 8 hours for blood pressure control.  Continue amlodipine 2.5 mg daily  Continue CIWA protocol  Stop heparin drip at 10 AM this morning and convert back to Eliquis 2.5 mg twice daily  Continue Lipitor 40 mg nightly  Wean patient off of nitroglycerin drip and continue Imdur 60 mg daily.  Agree with replacing potassium and magnesium at this time.  Will continue to monitor patient's blood pressure with coming off of nitroglycerin drip and will determine if patient needs home health versus inpatient rehab for debility.           Electronically signed by Caron Guevara PA-C, 08/07/24, 2:33 PM EDT.

## 2024-08-07 NOTE — THERAPY EVALUATION
Patient Name: Iain Dee  : 1935    MRN: [id number]                              Today's Date: 2024       Admit Date: 2024    Visit Dx:     ICD-10-CM ICD-9-CM   1. STEMI involving oth coronary artery of inferior wall  I21.19 410.40   2. Acute respiratory failure with hypoxia  J96.01 518.81     Patient Active Problem List   Diagnosis    AMS (altered mental status)    Hypomagnesemia    Hypoalbuminemia    Essential hypertension    Atrial fibrillation    GERD (gastroesophageal reflux disease)    Gout    Diabetes mellitus    JOLENE on CPAP    Chronic anticoagulation (Eliquis)    Alcohol abuse (1 to 2 glasses of bourbon a day)    Memory impairment    STEMI (ST elevation myocardial infarction)     Past Medical History:   Diagnosis Date    Arthritis     Cancer     Diabetes mellitus     GERD (gastroesophageal reflux disease)     Gout     H/O Bladder carcinoma (HCC)     Hypertension      Past Surgical History:   Procedure Laterality Date    BLADDER SURGERY      Biopsy    HERNIA REPAIR      PROSTATE BIOPSY        General Information       Row Name 24 1118          Physical Therapy Time and Intention    Document Type evaluation  -KE     Mode of Treatment physical therapy  -       Row Name 24 1118          General Information    Patient Profile Reviewed yes  -KE     Prior Level of Function independent:;all household mobility;ADL's;community mobility  Ind w/ rollator for mobility; spouse assists as needed for ADLs; owns shower chair, denies recent falls  -KE     Existing Precautions/Restrictions cardiac;fall  -KE     Barriers to Rehab medically complex  -JUAN JOSE       Row Name 24 1118          Living Environment    People in Home spouse  -JUAN JOSE       Row Name 24 1118          Home Main Entrance    Number of Stairs, Main Entrance none;other (see comments)  ramp  -JUAN JOSE       Row Name 24 1118          Stairs Within Home, Primary    Number of Stairs, Within Home, Primary none  -       Eliceo  Name 08/07/24 1118          Cognition    Orientation Status (Cognition) oriented x 3  -KE       Row Name 08/07/24 1118          Safety Issues, Functional Mobility    Safety Issues Affecting Function (Mobility) awareness of need for assistance;insight into deficits/self-awareness;safety precaution awareness;safety precautions follow-through/compliance;sequencing abilities  -KE     Impairments Affecting Function (Mobility) balance;endurance/activity tolerance;strength;coordination  -KE               User Key  (r) = Recorded By, (t) = Taken By, (c) = Cosigned By      Initials Name Provider Type    Monique Cervantes, PT Physical Therapist                   Mobility       Row Name 08/07/24 1122          Bed Mobility    Comment, (Bed Mobility) UIC pre/post IE  -KE       Row Name 08/07/24 1122          Sit-Stand Transfer    Sit-Stand [place] (Transfers) moderate assist (50% patient effort);1 person assist  -KE     Comment, (Sit-Stand Transfer) VCs for anterior weight shifting and improving HP  -KE       Row Name 08/07/24 1122          Gait/Stairs (Locomotion)    [place] Level (Gait) minimum assist (75% patient effort);1 person assist;1 person to manage equipment  -KE     Assistive Device (Gait) other (see comments)  B UE support on monitor  -KE     Patient was able to Ambulate yes  -KE     Distance in Feet (Gait) 80  -KE     Deviations/Abnormal Patterns (Gait) bilateral deviations;gait speed decreased;stride length decreased;base of support, wide;han decreased  -KE     Bilateral Gait Deviations forward flexed posture;heel strike decreased  -KE     Left Sided Gait Deviations decreased knee extension  -KE     Right Sided Gait Deviations decreased knee extension;knee buckling, right side  -KE     Comment, (Gait/Stairs) Pt amb w/ partial step through gait pattern with slow gait speed, very fwd flexed posture, and knees maintaining in slight flexion during stance/heel strike w/ R knee buckling. Chair follow for  safety. Further mobility limited by fatigue.  -KE               User Key  (r) = Recorded By, (t) = Taken By, (c) = Cosigned By      Initials Name Provider Type    Monique Cervantes PT Physical Therapist                   Obj/Interventions       Row Name 08/07/24 1126          Range of Motion Comprehensive    General Range of Motion bilateral lower extremity ROM WFL  -KE     Comment, General Range of Motion B knee lacking ~10 deg ext  -KE       Row Name 08/07/24 1126          Strength Comprehensive (MMT)    General Manual Muscle Testing (MMT) Assessment lower extremity strength deficits identified  -KE     Comment, General Manual Muscle Testing (MMT) Assessment B LEs grossly 4-/5  -KE       Row Name 08/07/24 1126          Balance    Balance Assessment sitting static balance;sitting dynamic balance;sit to stand dynamic balance;standing static balance;standing dynamic balance  -KE     Static Sitting Balance standby assist  -KE     Dynamic Sitting Balance contact guard  -KE     Position, Sitting Balance sitting in chair  -KE     Sit to Stand Dynamic Balance moderate assist;1-person assist  -KE     Static Standing Balance minimal assist  -KE     Dynamic Standing Balance minimal assist  -KE     Position/Device Used, Standing Balance supported;other (see comments)  B UE support on tripod monitor  -KE     Balance Interventions sitting;standing;sit to stand;supported;static;dynamic  -KE     Comment, Balance R knee buckling  -KE       Row Name 08/07/24 1126          Sensory Assessment (Somatosensory)    Sensory Assessment (Somatosensory) sensation intact  -KE               User Key  (r) = Recorded By, (t) = Taken By, (c) = Cosigned By      Initials Name Provider Type    Monique Cervantes PT Physical Therapist                   Goals/Plan       Row Name 08/07/24 1132          Bed Mobility Goal 1 (PT)    Activity/Assistive Device (Bed Mobility Goal 1, PT) sit to supine/supine to sit  -KE     [place] Level/Cues Needed  (Bed Mobility Goal 1, PT) minimum assist (75% or more patient effort)  -KE     Time Frame (Bed Mobility Goal 1, PT) short term goal (STG);5 days  -KE     Progress/Outcomes (Bed Mobility Goal 1, PT) new goal  -KE       Row Name 08/07/24 1132          Transfer Goal 1 (PT)    Activity/Assistive Device (Transfer Goal 1, PT) sit-to-stand/stand-to-sit;bed-to-chair/chair-to-bed  -KE     [place] Level/Cues Needed (Transfer Goal 1, PT) contact guard required  -KE     Time Frame (Transfer Goal 1, PT) long term goal (LTG);10 days  -KE     Progress/Outcome (Transfer Goal 1, PT) new goal  -KE       Row Name 08/07/24 1132          Gait Training Goal 1 (PT)    Activity/Assistive Device (Gait Training Goal 1, PT) gait (walking locomotion);assistive device use;improve balance and speed;increase endurance/gait distance;decrease fall risk  -KE     [place] Level (Gait Training Goal 1, PT) standby assist  -KE     Distance (Gait Training Goal 1, PT) 200'  -KE     Time Frame (Gait Training Goal 1, PT) long term goal (LTG);10 days  -KE     Progress/Outcome (Gait Training Goal 1, PT) new goal  -KE       Row Name 08/07/24 1132          Therapy Assessment/Plan (PT)    Planned Therapy Interventions (PT) balance training;bed mobility training;neuromuscular re-education;home exercise program;gait training;patient/family education;postural re-education;ROM (range of motion);stair training;strengthening;stretching;transfer training  -KE               User Key  (r) = Recorded By, (t) = Taken By, (c) = Cosigned By      Initials Name Provider Type    Monique Cervantes, PT Physical Therapist                   Clinical Impression       Row Name 08/07/24 1128          Pain    Pretreatment Pain Rating 0/10 - no pain  -KE     Posttreatment Pain Rating 0/10 - no pain  -KE     Pain Intervention(s) Ambulation/increased activity;Repositioned  -       Row Name 08/07/24 1128          Plan of Care Review    Plan of Care Reviewed With patient  LOURDES      Outcome Evaluation PT eval complete. Pt presents with generalized weakness, gait abnormalities, balance deficits, and decreased functional endurance warranting skilled IPPT to facilitate return to PLOF. Pt amb 80' w/ MinAx1+chair follow. PT rec IRF at d/c.  -JUAN JOSE       Row Name 08/07/24 1128          Therapy Assessment/Plan (PT)    Patient/Family Therapy Goals Statement (PT) return to PLOF  -KE     Rehab Potential (PT) good, to achieve stated therapy goals  -KE     Criteria for Skilled Interventions Met (PT) yes;meets criteria;skilled treatment is necessary  -KE     Therapy Frequency (PT) daily  -KE     Predicted Duration of Therapy Intervention (PT) 10 days  -KE       Row Name 08/07/24 1128          Vital Signs    Pre Systolic BP Rehab 134  -KE     Pre Treatment Diastolic   -KE     Post Systolic BP Rehab 144  -KE     Post Treatment Diastolic BP 97  -KE     Pretreatment Heart Rate (beats/min) 89  -KE     Posttreatment Heart Rate (beats/min) 90  -KE     Pre SpO2 (%) 95  -KE     O2 Delivery Pre Treatment supplemental O2  -KE     O2 Delivery Intra Treatment supplemental O2  -KE     Post SpO2 (%) 95  -KE     O2 Delivery Post Treatment supplemental O2  -KE     Pre Patient Position Sitting  -KE     Intra Patient Position Standing  -KE     Post Patient Position Sitting  -KE       Row Name 08/07/24 1128          Positioning and Restraints    Pre-Treatment Position sitting in chair/recliner  -KE     Post Treatment Position chair  -KE     In Chair notified nsg;reclined;waffle cushion;with nsg;call light within reach;encouraged to call for assist;LUE elevated;RUE elevated  -KE               User Key  (r) = Recorded By, (t) = Taken By, (c) = Cosigned By      Initials Name Provider Type    Monique Cervantes, PT Physical Therapist                   Outcome Measures       Row Name 08/07/24 1134          How much help from another person do you currently need...    Turning from your back to your side while in flat bed  without using bedrails? 3  -KE     Moving from lying on back to sitting on the side of a flat bed without bedrails? 2  -KE     Moving to and from a bed to a chair (including a wheelchair)? 3  -KE     Standing up from a chair using your arms (e.g., wheelchair, bedside chair)? 2  -KE     Climbing 3-5 steps with a railing? 2  -KE     To walk in hospital room? 3  -KE     AM-PAC 6 Clicks Score (PT) 15  -KE     Highest Level of Mobility Goal 4 --> Transfer to chair/commode  -KE       Row Name 08/07/24 1134          Functional Assessment    Outcome Measure Options AM-PAC 6 Clicks Basic Mobility (PT)  -KE               User Key  (r) = Recorded By, (t) = Taken By, (c) = Cosigned By      Initials Name Provider Type    Monique Cervantes PT Physical Therapist                                 Physical Therapy Education       Title: PT OT SLP Therapies (In Progress)       Topic: Physical Therapy (In Progress)       Point: Mobility training (In Progress)       Learning Progress Summary             Patient Acceptance, E, NR by JUAN JOSE at 8/7/2024 0933                         Point: Home exercise program (Not Started)       Learner Progress:  Not documented in this visit.              Point: Body mechanics (In Progress)       Learning Progress Summary             Patient Acceptance, E, NR by JUAN JOSE at 8/7/2024 0933                         Point: Precautions (In Progress)       Learning Progress Summary             Patient Acceptance, E, NR by JUAN JOSE at 8/7/2024 0933                                         User Key       Initials Effective Dates Name Provider Type Discipline    JUAN JOSE 11/16/23 -  Monique Holliday PT Physical Therapist PT                  PT Recommendation and Plan  Planned Therapy Interventions (PT): balance training, bed mobility training, neuromuscular re-education, home exercise program, gait training, patient/family education, postural re-education, ROM (range of motion), stair training, strengthening, stretching, transfer  training  Plan of Care Reviewed With: patient  Outcome Evaluation: PT eval complete. Pt presents with generalized weakness, gait abnormalities, balance deficits, and decreased functional endurance warranting skilled IPPT to facilitate return to PLOF. Pt amb 80' w/ MinAx1+chair follow. PT rec IRF at d/c.     Time Calculation:   PT Evaluation Complexity  History, PT Evaluation Complexity: 3 or more personal factors and/or comorbidities  Examination of Body Systems (PT Eval Complexity): total of 3 or more elements  Clinical Presentation (PT Evaluation Complexity): evolving  Clinical Decision Making (PT Evaluation Complexity): moderate complexity  Overall Complexity (PT Evaluation Complexity): moderate complexity     PT Charges       Row Name 08/07/24 1134             Time Calculation    Start Time 0933  -KE      PT Received On 08/07/24  -KE      PT Goal Re-Cert Due Date 08/17/24  -KE         Untimed Charges    PT Eval/Re-eval Minutes 48  -KE         Total Minutes    Untimed Charges Total Minutes 48  -KE       Total Minutes 48  -KE                User Key  (r) = Recorded By, (t) = Taken By, (c) = Cosigned By      Initials Name Provider Type    Monique Cervantes PT Physical Therapist                  Therapy Charges for Today       Code Description Service Date Service Provider Modifiers Qty    40673961432 HC PT EVAL MOD COMPLEXITY 4 8/7/2024 Monique Holliday, PT GP 1            PT G-Codes  Outcome Measure Options: AM-PAC 6 Clicks Basic Mobility (PT)  AM-PAC 6 Clicks Score (PT): 15  PT Discharge Summary  Anticipated Discharge Disposition (PT): inpatient rehabilitation facility    Monique Holliday PT  8/7/2024

## 2024-08-07 NOTE — CASE MANAGEMENT/SOCIAL WORK
Continued Stay Note   Tri     Patient Name: Iain Dee  MRN: [id number]  Today's Date: 8/7/2024    Admit Date: 8/5/2024    Plan: IPR   Discharge Plan       Row Name 08/07/24 1047       Plan    Plan IPR    Patient/Family in Agreement with Plan yes    Plan Comments Spoke with patient at bedside for therpay recs of IRF. Pt agreeable with long discussion of facilities with patient pt deciding on referral to [facility]. Will await OT notes to send referral. CM will to follow.    Final Discharge Disposition Code 62 - inpatient rehab facility                   Discharge Codes    No documentation.                       Angeli Howard RN

## 2024-08-07 NOTE — PLAN OF CARE
Goal Outcome Evaluation:  Plan of Care Reviewed With: patient           Outcome Evaluation: PT eval complete. Pt presents with generalized weakness, gait abnormalities, balance deficits, and decreased functional endurance warranting skilled IPPT to facilitate return to PLOF. Pt amb 80' w/ MinAx1+chair follow. PT rec IRF at d/c.      Anticipated Discharge Disposition (PT): inpatient rehabilitation facility

## 2024-08-08 LAB
ANION GAP SERPL CALCULATED.3IONS-SCNC: 12 MMOL/L (ref 5–15)
BUN SERPL-MCNC: 21 MG/DL (ref 8–23)
BUN/CREAT SERPL: 14.8 (ref 7–25)
CALCIUM SPEC-SCNC: 8.5 MG/DL (ref 8.6–10.5)
CHLORIDE SERPL-SCNC: 97 MMOL/L (ref 98–107)
CO2 SERPL-SCNC: 28 MMOL/L (ref 22–29)
CREAT SERPL-MCNC: 1.42 MG/DL (ref 0.76–1.27)
EGFRCR SERPLBLD CKD-EPI 2021: 47.8 ML/MIN/1.73
GLUCOSE BLDC GLUCOMTR-MCNC: 142 MG/DL (ref 70–130)
GLUCOSE BLDC GLUCOMTR-MCNC: 171 MG/DL (ref 70–130)
GLUCOSE BLDC GLUCOMTR-MCNC: 172 MG/DL (ref 70–130)
GLUCOSE BLDC GLUCOMTR-MCNC: 211 MG/DL (ref 70–130)
GLUCOSE SERPL-MCNC: 154 MG/DL (ref 65–99)
MAGNESIUM SERPL-MCNC: 2 MG/DL (ref 1.6–2.4)
POTASSIUM SERPL-SCNC: 4.2 MMOL/L (ref 3.5–5.2)
SODIUM SERPL-SCNC: 137 MMOL/L (ref 136–145)

## 2024-08-08 PROCEDURE — 99232 SBSQ HOSP IP/OBS MODERATE 35: CPT | Performed by: INTERNAL MEDICINE

## 2024-08-08 PROCEDURE — 83735 ASSAY OF MAGNESIUM: CPT | Performed by: INTERNAL MEDICINE

## 2024-08-08 PROCEDURE — 94799 UNLISTED PULMONARY SVC/PX: CPT

## 2024-08-08 PROCEDURE — 80048 BASIC METABOLIC PNL TOTAL CA: CPT | Performed by: PHYSICIAN ASSISTANT

## 2024-08-08 PROCEDURE — 94660 CPAP INITIATION&MGMT: CPT

## 2024-08-08 PROCEDURE — 97166 OT EVAL MOD COMPLEX 45 MIN: CPT

## 2024-08-08 PROCEDURE — 63710000001 INSULIN LISPRO (HUMAN) PER 5 UNITS: Performed by: INTERNAL MEDICINE

## 2024-08-08 PROCEDURE — 25010000002 THIAMINE HCL 200 MG/2ML SOLUTION: Performed by: INTERNAL MEDICINE

## 2024-08-08 PROCEDURE — 82948 REAGENT STRIP/BLOOD GLUCOSE: CPT

## 2024-08-08 PROCEDURE — 97530 THERAPEUTIC ACTIVITIES: CPT

## 2024-08-08 PROCEDURE — 97110 THERAPEUTIC EXERCISES: CPT

## 2024-08-08 RX ORDER — CARVEDILOL 6.25 MG/1
6.25 TABLET ORAL EVERY 12 HOURS SCHEDULED
Status: DISCONTINUED | OUTPATIENT
Start: 2024-08-08 | End: 2024-08-09 | Stop reason: HOSPADM

## 2024-08-08 RX ORDER — LABETALOL HYDROCHLORIDE 5 MG/ML
10 INJECTION, SOLUTION INTRAVENOUS ONCE
Status: COMPLETED | OUTPATIENT
Start: 2024-08-08 | End: 2024-08-08

## 2024-08-08 RX ADMIN — SENNOSIDES AND DOCUSATE SODIUM 2 TABLET: 50; 8.6 TABLET ORAL at 20:42

## 2024-08-08 RX ADMIN — ISOSORBIDE MONONITRATE 60 MG: 60 TABLET, EXTENDED RELEASE ORAL at 08:14

## 2024-08-08 RX ADMIN — APIXABAN 2.5 MG: 2.5 TABLET, FILM COATED ORAL at 08:14

## 2024-08-08 RX ADMIN — INSULIN LISPRO 2 UNITS: 100 INJECTION, SOLUTION INTRAVENOUS; SUBCUTANEOUS at 18:00

## 2024-08-08 RX ADMIN — FOLIC ACID 1 MG: 1 TABLET ORAL at 08:14

## 2024-08-08 RX ADMIN — SENNOSIDES AND DOCUSATE SODIUM 2 TABLET: 50; 8.6 TABLET ORAL at 08:14

## 2024-08-08 RX ADMIN — THIAMINE HYDROCHLORIDE 200 MG: 100 INJECTION, SOLUTION INTRAMUSCULAR; INTRAVENOUS at 21:39

## 2024-08-08 RX ADMIN — HYDRALAZINE HYDROCHLORIDE 25 MG: 50 TABLET ORAL at 14:59

## 2024-08-08 RX ADMIN — CARVEDILOL 6.25 MG: 6.25 TABLET, FILM COATED ORAL at 11:23

## 2024-08-08 RX ADMIN — CARVEDILOL 6.25 MG: 6.25 TABLET, FILM COATED ORAL at 20:42

## 2024-08-08 RX ADMIN — INSULIN LISPRO 3 UNITS: 100 INJECTION, SOLUTION INTRAVENOUS; SUBCUTANEOUS at 11:42

## 2024-08-08 RX ADMIN — CLOPIDOGREL BISULFATE 75 MG: 75 TABLET ORAL at 08:14

## 2024-08-08 RX ADMIN — INSULIN LISPRO 2 UNITS: 100 INJECTION, SOLUTION INTRAVENOUS; SUBCUTANEOUS at 20:52

## 2024-08-08 RX ADMIN — AMLODIPINE BESYLATE 2.5 MG: 5 TABLET ORAL at 08:14

## 2024-08-08 RX ADMIN — ASPIRIN 81 MG: 81 TABLET, COATED ORAL at 08:14

## 2024-08-08 RX ADMIN — THIAMINE HYDROCHLORIDE 200 MG: 100 INJECTION, SOLUTION INTRAMUSCULAR; INTRAVENOUS at 05:32

## 2024-08-08 RX ADMIN — HYDRALAZINE HYDROCHLORIDE 25 MG: 50 TABLET ORAL at 05:32

## 2024-08-08 RX ADMIN — FAMOTIDINE 20 MG: 20 TABLET, FILM COATED ORAL at 18:00

## 2024-08-08 RX ADMIN — APIXABAN 2.5 MG: 2.5 TABLET, FILM COATED ORAL at 20:42

## 2024-08-08 RX ADMIN — Medication 10 ML: at 20:44

## 2024-08-08 RX ADMIN — BUMETANIDE 2 MG: 1 TABLET ORAL at 08:14

## 2024-08-08 RX ADMIN — ATORVASTATIN CALCIUM 40 MG: 40 TABLET, FILM COATED ORAL at 20:42

## 2024-08-08 RX ADMIN — FAMOTIDINE 20 MG: 20 TABLET, FILM COATED ORAL at 08:14

## 2024-08-08 RX ADMIN — Medication 10 MG: at 02:18

## 2024-08-08 RX ADMIN — HYDRALAZINE HYDROCHLORIDE 25 MG: 50 TABLET ORAL at 21:39

## 2024-08-08 RX ADMIN — THIAMINE HYDROCHLORIDE 200 MG: 100 INJECTION, SOLUTION INTRAMUSCULAR; INTRAVENOUS at 14:59

## 2024-08-08 NOTE — PLAN OF CARE
Goal Outcome Evaluation:  Plan of Care Reviewed With: patient        Progress: improving  Outcome Evaluation: Pt ambulated 55ft +55ft with Rosanne 1+1 and B UE support on tripod monitor. Chair follow for safety. Pt demonstrated very forward flexed posture with guillermo knee flexion and decreased ankle DF. Pt with frequent R knee buckling. One near LOB requiring physical assistance to correct. One rest break required. Ambulation distance limited by fatigue, weakness, and c/o knee pain. Continue to recommend PT skilled care and D/C to IP rehab facility.

## 2024-08-08 NOTE — THERAPY TREATMENT NOTE
Patient Name: Iain Dee  : 1935    MRN: [id number]                              Today's Date: 2024       Admit Date: 2024    Visit Dx:     ICD-10-CM ICD-9-CM   1. STEMI involving oth coronary artery of inferior wall  I21.19 410.40   2. Acute respiratory failure with hypoxia  J96.01 518.81     Patient Active Problem List   Diagnosis    AMS (altered mental status)    Hypomagnesemia    Hypoalbuminemia    Essential hypertension    Atrial fibrillation    GERD (gastroesophageal reflux disease)    Gout    Diabetes mellitus    JOLENE on CPAP    Chronic anticoagulation (Eliquis)    Alcohol abuse (1 to 2 glasses of bourbon a day)    Memory impairment    STEMI (ST elevation myocardial infarction)     Past Medical History:   Diagnosis Date    Arthritis     Cancer     Diabetes mellitus     GERD (gastroesophageal reflux disease)     Gout     H/O Bladder carcinoma (HCC)     Hypertension      Past Surgical History:   Procedure Laterality Date    BLADDER SURGERY      Biopsy    HERNIA REPAIR      PROSTATE BIOPSY        General Information       Row Name 24 1119          Physical Therapy Time and Intention    Document Type therapy note (daily note)  -KG     Mode of Treatment physical therapy  -KG       Row Name 24 1119          General Information    Existing Precautions/Restrictions cardiac;fall;oxygen therapy device and L/min  -KG     Barriers to Rehab medically complex  -KG       Row Name 24 1119          Cognition    Orientation Status (Cognition) oriented x 3  -KG       Row Name 24 1119          Safety Issues, Functional Mobility    Safety Issues Affecting Function (Mobility) awareness of need for assistance;insight into deficits/self-awareness;safety precaution awareness;safety precautions follow-through/compliance  -KG     Impairments Affecting Function (Mobility) balance;coordination;endurance/activity tolerance;pain;postural/trunk control;shortness of breath;strength  -KG                User Key  (r) = Recorded By, (t) = Taken By, (c) = Cosigned By      Initials Name Provider Type    Gina Llanos PT Physical Therapist                   Mobility       Row Name 08/08/24 1120          Bed Mobility    Comment, (Bed Mobility) UIC  -KG       Row Name 08/08/24 1120          Transfers    Comment, (Transfers) VC's for sequencing and safe hand placement. Pt with increased difficulty with transfer. Required increased time and effort to complete.  -KG       Row Name 08/08/24 1120          Sit-Stand Transfer    Sit-Stand Grant (Transfers) moderate assist (50% patient effort);verbal cues  -KG       Row Name 08/08/24 1120          Gait/Stairs (Locomotion)    Grant Level (Gait) minimum assist (75% patient effort);1 person assist;1 person to manage equipment;verbal cues  chair follow  -KG     Assistive Device (Gait) other (see comments)  B UE support on tripod monitor  -KG     Distance in Feet (Gait) 55  +55  -KG     Deviations/Abnormal Patterns (Gait) bilateral deviations;han decreased;stride length decreased  -KG     Bilateral Gait Deviations forward flexed posture;heel strike decreased  -KG     Right Sided Gait Deviations knee buckling, right side  -KG     Comment, (Gait/Stairs) Pt demonstrated step to gait pattern with slow han and decreased step length. Max cueing for upright posture with trunk and guillermo knee extension. Pt with decreased guillermo ankle DF; periods of increased foot slap. Pt with frequent R knee buckling; one near LOB. Chair follow for safety. Required one rest break. Limited by fatigue and weakness.  -KG               User Key  (r) = Recorded By, (t) = Taken By, (c) = Cosigned By      Initials Name Provider Type    Gina Llanos PT Physical Therapist                   Obj/Interventions       Row Name 08/08/24 1125          Balance    Balance Assessment sitting static balance;standing static balance;standing dynamic balance  -KG     Static Sitting  Balance standby assist  -KG     Position, Sitting Balance unsupported;sitting in chair  -KG     Static Standing Balance minimal assist  -KG     Dynamic Standing Balance minimal assist  -KG     Position/Device Used, Standing Balance supported  -KG               User Key  (r) = Recorded By, (t) = Taken By, (c) = Cosigned By      Initials Name Provider Type    KG Gina Currie N, PT Physical Therapist                   Goals/Plan    No documentation.                  Clinical Impression       Row Name 08/08/24 1126          Pain    Pretreatment Pain Rating 0/10 - no pain  -KG     Posttreatment Pain Rating 8/10  -KG     Pain Location - Side/Orientation Right  -KG     Pain Location - knee  -KG     Pain Intervention(s) Repositioned;Ambulation/increased activity  -KG       Row Name 08/08/24 1126          Plan of Care Review    Plan of Care Reviewed With patient  -KG     Progress improving  -KG     Outcome Evaluation Pt ambulated 55ft +55ft with Rosanne 1+1 and B UE support on tripod monitor. Chair follow for safety. Pt demonstrated very forward flexed posture with guillermo knee flexion and decreased ankle DF. Pt with frequent R knee buckling. One near LOB requiring physical assistance to correct. One rest break required. Ambulation distance limited by fatigue, weakness, and c/o knee pain. Continue to recommend PT skilled care and D/C to IP rehab facility.  -KG       Row Name 08/08/24 1126          Vital Signs    Pre Systolic BP Rehab 154  -KG     Pre Treatment Diastolic   -KG     Post Systolic BP Rehab 179  -KG     Post Treatment Diastolic   -KG     Pretreatment Heart Rate (beats/min) 93  -KG     Posttreatment Heart Rate (beats/min) 97  -KG     Pre SpO2 (%) 94  -KG     O2 Delivery Pre Treatment supplemental O2  -KG     Post SpO2 (%) 95  -KG     O2 Delivery Post Treatment supplemental O2  -KG     Pre Patient Position Sitting  -KG     Intra Patient Position Standing  -KG     Post Patient Position Sitting  -KG        Row Name 08/08/24 1126          Positioning and Restraints    Pre-Treatment Position sitting in chair/recliner  -KG     Post Treatment Position chair  -KG     In Chair notified nsg;reclined;call light within reach;encouraged to call for assist;with family/caregiver;RUE elevated;LUE elevated;legs elevated  -KG               User Key  (r) = Recorded By, (t) = Taken By, (c) = Cosigned By      Initials Name Provider Type    Gina Llanos PT Physical Therapist                   Outcome Measures       Row Name 08/08/24 1129 08/08/24 0800       How much help from another person do you currently need...    Turning from your back to your side while in flat bed without using bedrails? 3  -KG 3  -MH    Moving from lying on back to sitting on the side of a flat bed without bedrails? 2  -KG 2  -MH    Moving to and from a bed to a chair (including a wheelchair)? 3  -KG 2  -MH    Standing up from a chair using your arms (e.g., wheelchair, bedside chair)? 2  -KG 2  -MH    Climbing 3-5 steps with a railing? 2  -KG --    To walk in hospital room? 2  -KG 2  -MH    AM-PAC 6 Clicks Score (PT) 14  -KG --    Highest Level of Mobility Goal 4 --> Transfer to chair/commode  -KG --      Row Name 08/08/24 1129          Functional Assessment    Outcome Measure Options AM-PAC 6 Clicks Basic Mobility (PT)  -KG               User Key  (r) = Recorded By, (t) = Taken By, (c) = Cosigned By      Initials Name Provider Type    Kika Gaines, RN Registered Nurse    Gina Llanos PT Physical Therapist                                 Physical Therapy Education       Title: PT OT SLP Therapies (In Progress)       Topic: Physical Therapy (In Progress)       Point: Mobility training (In Progress)       Learning Progress Summary             Patient Acceptance, E, NR by LENORE at 8/8/2024 0817    Acceptance, E, NR by JUAN JOSE at 8/7/2024 0933                         Point: Home exercise program (In Progress)       Learning Progress Summary              Patient Acceptance, E, NR by KG at 8/8/2024 0817                         Point: Body mechanics (In Progress)       Learning Progress Summary             Patient Acceptance, E, NR by KG at 8/8/2024 0817    Acceptance, E, NR by KE at 8/7/2024 0933                         Point: Precautions (In Progress)       Learning Progress Summary             Patient Acceptance, E, NR by KG at 8/8/2024 0817    Acceptance, E, NR by KE at 8/7/2024 0933                                         User Key       Initials Effective Dates Name Provider Type Discipline    KG 05/22/20 -  Gina Currie, PT Physical Therapist PT     11/16/23 -  Monique Holliday, PT Physical Therapist PT                  PT Recommendation and Plan     Plan of Care Reviewed With: patient  Progress: improving  Outcome Evaluation: Pt ambulated 55ft +55ft with Rosanne 1+1 and B UE support on tripod monitor. Chair follow for safety. Pt demonstrated very forward flexed posture with guillermo knee flexion and decreased ankle DF. Pt with frequent R knee buckling. One near LOB requiring physical assistance to correct. One rest break required. Ambulation distance limited by fatigue, weakness, and c/o knee pain. Continue to recommend PT skilled care and D/C to IP rehab facility.     Time Calculation:         PT Charges       Row Name 08/08/24 0817             Time Calculation    Start Time 0817  -KG      PT Received On 08/08/24  -KG      PT Goal Re-Cert Due Date 08/17/24  -KG         Time Calculation- PT    Total Timed Code Minutes- PT 23 minute(s)  -KG         Timed Charges    44299 - PT Therapeutic Activity Minutes 23  -KG         Total Minutes    Timed Charges Total Minutes 23  -KG       Total Minutes 23  -KG                User Key  (r) = Recorded By, (t) = Taken By, (c) = Cosigned By      Initials Name Provider Type    KG Gina Currie, PT Physical Therapist                  Therapy Charges for Today       Code Description Service Date Service  Provider Modifiers Qty    10334195326 HC PT THERAPEUTIC ACT EA 15 MIN 8/8/2024 Gina Currie, PT GP 2            PT G-Codes  Outcome Measure Options: AM-PAC 6 Clicks Basic Mobility (PT)  AM-PAC 6 Clicks Score (PT): 14       Nilda Currie, PT  8/8/2024

## 2024-08-08 NOTE — THERAPY EVALUATION
Patient Name: Iain Dee  : 1935    MRN: [id number]                              Today's Date: 2024       Admit Date: 2024    Visit Dx:     ICD-10-CM ICD-9-CM   1. STEMI involving oth coronary artery of inferior wall  I21.19 410.40   2. Acute respiratory failure with hypoxia  J96.01 518.81     Patient Active Problem List   Diagnosis    AMS (altered mental status)    Hypomagnesemia    Hypoalbuminemia    Essential hypertension    Atrial fibrillation    GERD (gastroesophageal reflux disease)    Gout    Diabetes mellitus    JOLENE on CPAP    Chronic anticoagulation (Eliquis)    Alcohol abuse (1 to 2 glasses of bourbon a day)    Memory impairment    STEMI (ST elevation myocardial infarction)     Past Medical History:   Diagnosis Date    Arthritis     Cancer     Diabetes mellitus     GERD (gastroesophageal reflux disease)     Gout     H/O Bladder carcinoma (HCC)     Hypertension      Past Surgical History:   Procedure Laterality Date    BLADDER SURGERY      Biopsy    HERNIA REPAIR      PROSTATE BIOPSY        General Information       Row Name 24 1312          OT Time and Intention    Document Type evaluation  -AN     Mode of Treatment occupational therapy  -AN       Row Name 24 1312          General Information    Patient Profile Reviewed yes  -AN     Prior Level of Function independent:;all household mobility;community mobility;gait;ADL's  Prior to admission pt ambulating w/ rollator for mobility, spouse assists as needed for ADLs, owns shower chair, denies recent falls  -AN     Existing Precautions/Restrictions cardiac;fall;oxygen therapy device and L/min  -AN     Barriers to Rehab medically complex  -AN       Row Name 24 1312          Occupational Profile    Environmental Supports and Barriers (Occupational Profile) tub shower with shower chair  -AN       Row Name 24 1312          Living Environment    People in Home spouse  -AN       Row Name 24 1312          Home Main  Entrance    Number of Stairs, Main Entrance none;other (see comments)  ramp  -AN       Row Name 08/08/24 1312          Stairs Within Home, Primary    Number of Stairs, Within Home, Primary none  -AN       Row Name 08/08/24 1312          Cognition    Orientation Status (Cognition) oriented x 4  -AN       Row Name 08/08/24 1312          Safety Issues, Functional Mobility    Safety Issues Affecting Function (Mobility) safety precaution awareness;safety precautions follow-through/compliance;insight into deficits/self-awareness;sequencing abilities;awareness of need for assistance;judgment  -AN     Impairments Affecting Function (Mobility) balance;coordination;endurance/activity tolerance;pain;postural/trunk control;shortness of breath;strength  -AN               User Key  (r) = Recorded By, (t) = Taken By, (c) = Cosigned By      Initials Name Provider Type    Lisset Baekr OT Occupational Therapist                     Mobility/ADL's       Row Name 08/08/24 1315          Bed Mobility    Comment, (Bed Mobility) UIC upon arrival  -AN       Row Name 08/08/24 1315          Transfers    Transfers sit-stand transfer;stand-sit transfer  -AN     Comment, (Transfers) Cues for hand placement and transfer technique using the RW. Pt educated on body mechanics with STS for improved performance.  -AN       Row Name 08/08/24 1315          Sit-Stand Transfer    Sit-Stand [place] (Transfers) verbal cues;maximum assist (25% patient effort);1 person assist  -AN     Comment, (Sit-Stand Transfer) STS performed with Max A progressing to Mod A with cues and education  -AN       Row Name 08/08/24 1315          Stand-Sit Transfer    Stand-Sit [place] (Transfers) verbal cues;moderate assist (50% patient effort);1 person assist  -AN     Assistive Device (Stand-Sit Transfers) walker, front-wheeled  -AN       Row Name 08/08/24 1315          Functional Mobility    Functional Mobility- Ind. Level not tested  -AN       Row Name  08/08/24 1315          Activities of Daily Living    BADL Assessment/Intervention upper body dressing;lower body dressing  -AN       Row Name 08/08/24 1315          Upper Body Dressing Assessment/Training    [place] Level (Upper Body Dressing) don;pajama/robe;minimum assist (75% patient effort)  -AN     Position (Upper Body Dressing) supported sitting  -AN       Row Name 08/08/24 1315          Lower Body Dressing Assessment/Training    [place] Level (Lower Body Dressing) don;socks;dependent (less than 25% patient effort)  -AN     Position (Lower Body Dressing) unsupported sitting  -AN               User Key  (r) = Recorded By, (t) = Taken By, (c) = Cosigned By      Initials Name Provider Type    AN Lisset Victor OT Occupational Therapist                   Obj/Interventions       Row Name 08/08/24 1328          Sensory Assessment (Somatosensory)    Sensory Assessment (Somatosensory) UE sensation intact  -AN       [facility] Name 08/08/24 1328          Vision Assessment/Intervention    Visual Impairment/Limitations WFL  -AN       [facility] Name 08/08/24 1328          Range of Motion Comprehensive    General Range of Motion bilateral upper extremity ROM WFL  -AN       [facility] Name 08/08/24 1328          Strength Comprehensive (MMT)    General Manual Muscle Testing (MMT) Assessment upper extremity strength deficits identified  -AN     Comment, General Manual Muscle Testing (MMT) Assessment R shoulder flex limited to 45* AROM, 100* AAROM, L shoulder WFL  -AN       Row Name 08/08/24 1328          Shoulder (Therapeutic Exercise)    Shoulder (Therapeutic Exercise) AROM (active range of motion);AAROM (active assistive range of motion)  -AN     Shoulder AROM (Therapeutic Exercise) left;flexion;extension;bilateral;scapular retraction;sitting;10 repetitions  -AN     Shoulder AAROM (Therapeutic Exercise) right;extension;flexion;sitting;10 repetitions  -AN       Row Name 08/08/24 1328          Elbow/Forearm (Therapeutic Exercise)     Elbow/Forearm (Therapeutic Exercise) AROM (active range of motion)  -AN     Elbow/Forearm AROM (Therapeutic Exercise) bilateral;flexion;extension;10 repetitions  -AN       Row Name 08/08/24 1328          Motor Skills    Therapeutic Exercise shoulder;elbow/forearm  UE and LE ther-ex performed to reduce arthritis pain  -AN       Row Name 08/08/24 1328          Balance    Balance Assessment sitting static balance;sitting dynamic balance;sit to stand dynamic balance;standing static balance;standing dynamic balance  -AN     Static Sitting Balance standby assist  -AN     Dynamic Sitting Balance standby assist  -AN     Position, Sitting Balance sitting in chair  -AN     Sit to Stand Dynamic Balance verbal cues;maximum assist;1-person assist  -AN     Static Standing Balance verbal cues;minimal assist;1-person assist  -AN     Dynamic Standing Balance verbal cues;minimal assist;1-person assist  -AN     Position/Device Used, Standing Balance supported;walker, rolling  -AN     Balance Interventions standing;supported;static;minimal challenge;sit to stand;highly challenging;occupation based/functional task  -AN       Row Name 08/08/24 1328          Hip (Therapeutic Exercise)    Hip (Therapeutic Exercise) AROM (active range of motion);isometric exercises  -AN     Hip AROM (Therapeutic Exercise) bilateral;flexion;extension;sitting;10 repetitions  -AN     Hip Isometrics (Therapeutic Exercise) bilateral;gluteal sets;10 repetitions  -AN       Row Name 08/08/24 1328          Knee (Therapeutic Exercise)    Knee (Therapeutic Exercise) AROM (active range of motion)  -AN     Knee AROM (Therapeutic Exercise) bilateral;extension;flexion;sitting;10 repetitions  -AN               User Key  (r) = Recorded By, (t) = Taken By, (c) = Cosigned By      Initials Name Provider Type    AN Lisset Victor OT Occupational Therapist                   Goals/Plan       Row Name 08/08/24 1334          Bed Mobility Goal 1 (OT)    Activity/Assistive  Device (Bed Mobility Goal 1, OT) sit to supine/supine to sit  -AN     [place] Level/Cues Needed (Bed Mobility Goal 1, OT) minimum assist (75% or more patient effort)  -AN     Time Frame (Bed Mobility Goal 1, OT) short term goal (STG);4 days  -AN       Row Name 08/08/24 1334          Transfer Goal 1 (OT)    Activity/Assistive Device (Transfer Goal 1, OT) sit-to-stand/stand-to-sit;toilet;walker, rolling  -AN     [place] Level/Cues Needed (Transfer Goal 1, OT) minimum assist (75% or more patient effort)  -AN     Time Frame (Transfer Goal 1, OT) long term goal (LTG);1 week  -AN       Row Name 08/08/24 1334          Dressing Goal 1 (OT)    Activity/Device (Dressing Goal 1, OT) lower body dressing;long-handled shoe horn;reacher;sock-aid  -AN     [place]/Cues Needed (Dressing Goal 1, OT) minimum assist (75% or more patient effort)  -AN     Time Frame (Dressing Goal 1, OT) long term goal (LTG);1 week  -AN       [facility] Name 08/08/24 1334          Therapy Assessment/Plan (OT)    Planned Therapy Interventions (OT) activity tolerance training;adaptive equipment training;BADL retraining;functional balance retraining;occupation/activity based interventions;strengthening exercise;transfer/mobility retraining;patient/caregiver education/training  -AN               User Key  (r) = Recorded By, (t) = Taken By, (c) = Cosigned By      Initials Name Provider Type    Lisset Baker OT Occupational Therapist                   Clinical Impression       Row Name 08/08/24 1331          Pain Assessment    Additional Documentation Pain Scale: FACES Pre/Post-Treatment (Group)  -AN       [facility] Name 08/08/24 1331          Pain Scale: FACES Pre/Post-Treatment    Pain: FACES Scale, Pretreatment 2-->hurts little bit  -AN     Posttreatment Pain Rating 2-->hurts little bit  -AN     Pain Location generalized  -AN     Pre/Posttreatment Pain Comment R shoulder, elbow, b/l knees  -AN       Row Name 08/08/24 1331          Plan of Care  Review    Plan of Care Reviewed With patient;spouse  -AN     Progress no change  -AN     Outcome Evaluation Pt presents below his functional baseline with deficits including generalized weakness, impaired balance, decreased activity tolerance and impaired ADLs warranting skilled OT services. Pt performed STS with Max A progressing to Mod A using RW. Min A for dynamic standing tasks. Rec IPR at dc for best functional outcomes.  -AN       Row Name 08/08/24 1331          Therapy Assessment/Plan (OT)    Patient/Family Therapy Goal Statement (OT) Return to PLOF  -AN     Rehab Potential (OT) good, to achieve stated therapy goals  -AN     Criteria for Skilled Therapeutic Interventions Met (OT) yes;skilled treatment is necessary  -AN     Therapy Frequency (OT) daily  -AN     Predicted Duration of Therapy Intervention (OT) 7 days  -AN       Row Name 08/08/24 1331          Therapy Plan Review/Discharge Plan (OT)    Anticipated Discharge Disposition (OT) inpatient rehabilitation facility  -AN       Row Name 08/08/24 1331          Vital Signs    Pre Systolic BP Rehab 179  -AN     Pre Treatment Diastolic   -AN     Post Systolic BP Rehab 140  -AN     Post Treatment Diastolic BP 87  -AN     Pre SpO2 (%) 95  -AN     O2 Delivery Pre Treatment nasal cannula  -AN     Post SpO2 (%) 95  -AN     O2 Delivery Post Treatment nasal cannula  -AN     Pre Patient Position Sitting  -AN     Intra Patient Position Standing  -AN     Post Patient Position Sitting  -AN       Row Name 08/08/24 1331          Positioning and Restraints    Pre-Treatment Position sitting in chair/recliner  -AN     Post Treatment Position chair  -AN     In Chair notified nsg;reclined;call light within reach;encouraged to call for assist;exit alarm on;legs elevated  -AN               User Key  (r) = Recorded By, (t) = Taken By, (c) = Cosigned By      Initials Name Provider Type    Lisset Baker, OT Occupational Therapist                   Outcome Measures        Row Name 08/08/24 1335          How much help from another is currently needed...    Putting on and taking off regular lower body clothing? 1  -AN     Bathing (including washing, rinsing, and drying) 2  -AN     Toileting (which includes using toilet bed pan or urinal) 2  -AN     Putting on and taking off regular upper body clothing 3  -AN     Taking care of personal grooming (such as brushing teeth) 3  -AN     Eating meals 3  -AN     AM-PAC 6 Clicks Score (OT) 14  -AN       Row Name 08/08/24 1129 08/08/24 0800       How much help from another person do you currently need...    Turning from your back to your side while in flat bed without using bedrails? 3  -KG 3  -MH    Moving from lying on back to sitting on the side of a flat bed without bedrails? 2  -KG 2  -MH    Moving to and from a bed to a chair (including a wheelchair)? 3  -KG 2  -MH    Standing up from a chair using your arms (e.g., wheelchair, bedside chair)? 2  -KG 2  -MH    Climbing 3-5 steps with a railing? 2  -KG --    To walk in hospital room? 2  -KG 2  -MH    AM-PAC 6 Clicks Score (PT) 14  -KG --    Highest Level of Mobility Goal 4 --> Transfer to chair/commode  -KG --      Row Name 08/08/24 1335 08/08/24 1129       Functional Assessment    Outcome Measure Options AM-PAC 6 Clicks Daily Activity (OT)  -AN AM-PAC 6 Clicks Basic Mobility (PT)  -KG              User Key  (r) = Recorded By, (t) = Taken By, (c) = Cosigned By      Initials Name Provider Type    Kika Gaines, RN Registered Nurse    KG Gina Currie, PT Physical Therapist    Lisset Baker OT Occupational Therapist                    Occupational Therapy Education       Title: PT OT SLP Therapies (In Progress)       Topic: Occupational Therapy (Done)       Point: ADL training (Done)       Description:   Instruct learner(s) on proper safety adaptation and remediation techniques during self care or transfers.   Instruct in proper use of assistive devices.                   Learning Progress Summary             Patient Acceptance, E, VU by AN at 8/8/2024 1335   Significant Other Acceptance, E, VU by AN at 8/8/2024 1335                         Point: Home exercise program (Done)       Description:   Instruct learner(s) on appropriate technique for monitoring, assisting and/or progressing therapeutic exercises/activities.                  Learning Progress Summary             Patient Acceptance, E, VU by AN at 8/8/2024 1335   Significant Other Acceptance, E, VU by AN at 8/8/2024 1335                         Point: Precautions (Done)       Description:   Instruct learner(s) on prescribed precautions during self-care and functional transfers.                  Learning Progress Summary             Patient Acceptance, E, VU by AN at 8/8/2024 1335   Significant Other Acceptance, E, VU by AN at 8/8/2024 1335                         Point: Body mechanics (Done)       Description:   Instruct learner(s) on proper positioning and spine alignment during self-care, functional mobility activities and/or exercises.                  Learning Progress Summary             Patient Acceptance, E, VU by AN at 8/8/2024 1335   Significant Other Acceptance, E, VU by AN at 8/8/2024 1335                                         User Key       Initials Effective Dates Name Provider Type Discipline     09/21/21 -  Lisset Victor OT Occupational Therapist OT                  OT Recommendation and Plan  Planned Therapy Interventions (OT): activity tolerance training, adaptive equipment training, BADL retraining, functional balance retraining, occupation/activity based interventions, strengthening exercise, transfer/mobility retraining, patient/caregiver education/training  Therapy Frequency (OT): daily  Plan of Care Review  Plan of Care Reviewed With: patient, spouse  Progress: no change  Outcome Evaluation: Pt presents below his functional baseline with deficits including generalized weakness, impaired  balance, decreased activity tolerance and impaired ADLs warranting skilled OT services. Pt performed STS with Max A progressing to Mod A using RW. Min A for dynamic standing tasks. Rec IPR at dc for best functional outcomes.     Time Calculation:   Evaluation Complexity (OT)  Review Occupational Profile/Medical/Therapy History Complexity: expanded/moderate complexity  Assessment, Occupational Performance/Identification of Deficit Complexity: 3-5 performance deficits  Clinical Decision Making Complexity (OT): detailed assessment/moderate complexity  Overall Complexity of Evaluation (OT): moderate complexity     Time Calculation- OT       Row Name 08/08/24 1335             Time Calculation- OT    OT Start Time 0948  -AN      OT Received On 08/08/24  -AN      OT Goal Re-Cert Due Date 08/18/24  -AN         Timed Charges    69940 - OT Therapeutic Exercise Minutes 8  -AN         Untimed Charges    OT Eval/Re-eval Minutes 35  -AN         Total Minutes    Timed Charges Total Minutes 8  -AN      Untimed Charges Total Minutes 35  -AN       Total Minutes 43  -AN                User Key  (r) = Recorded By, (t) = Taken By, (c) = Cosigned By      Initials Name Provider Type    AN Lisset Victor OT Occupational Therapist                  Therapy Charges for Today       Code Description Service Date Service Provider Modifiers Qty    [id number]  OT THER PROC EA 15 MIN 8/8/2024 Lisset Victor OT GO 1    08550103373 HC OT EVAL MOD COMPLEXITY 3 8/8/2024 Lisset Victor OT GO 1                 Lisset Victor OT  8/8/2024

## 2024-08-08 NOTE — PROGRESS NOTES
INTENSIVIST   PROGRESS NOTE     Hospital:  LOS: 3 days     Mr. Iain Dee, 88 y.o. male is followed for a Chief Complaint of: Acute Respiratory Failure      Subjective   S     Interval History:  No acute events. Still hypertensive.        The patient's relevant past medical, surgical and social history were reviewed and updated in Epic as appropriate.      ROS:   Constitutional: Negative for fever.   Respiratory: Positive for dyspnea.   Cardiovascular: Negative for chest pain.   Gastrointestinal: Negative for  nausea, vomiting and diarrhea.     Objective   O     Vitals:  Temp  Min: 97.8 °F (36.6 °C)  Max: 98.3 °F (36.8 °C)  BP  Min: 131/81  Max: 176/136  Pulse  Min: 62  Max: 101  Resp  Min: 14  Max: 20  SpO2  Min: 88 %  Max: 98 % Flow (L/min)  Min: 3  Max: 3    Intake/Ouptut 24 hrs (7:00AM - 6:59 AM)  Intake & Output (last 3 days)         08/02 0701 08/03 0700 08/03 0701 08/04 0700 08/04 0701  08/05 0700 08/05 0701  08/06 0700    Urine (mL/kg/hr)    775    Total Output    775    Net    -775                    Medications (drips):  nitroglycerin, Last Rate: Stopped (08/07/24 1313)  Pharmacy to Dose Heparin          Physical Examination  Telemetry:  Normal sinus rhythm.    Constitutional:  No acute distress.  Sitting up in a chair on 3L nasal cannula.    Eyes: No scleral icterus.   PERRL, EOM intact.    Neck:  Supple, FROM   Cardiovascular: Normal rate, regular and rhythm. Normal heart sounds.  No murmurs, gallop or rub.   Respiratory: No respiratory distress. Normal respiratory effort.  Diminished   Abdominal:  Soft. No masses. Nontender. No distension. No HSM.   Extremities: No digital cyanosis. No clubbing.  No peripheral edema.   Skin: No rashes, lesions or ulcers   Neurological:   Alert and interactive.              Results from last 7 days   Lab Units 08/07/24  0432 08/06/24  0314 08/05/24  0909 08/05/24  0856   WBC 10*3/mm3 8.15 6.89  --  10.15   HEMOGLOBIN g/dL 12.8* 13.0  --  13.8   HEMOGLOBIN, POC g/dL   --   --  14.3  --    MCV fL 98.7* 102.2*  --  98.8*   PLATELETS 10*3/mm3 218 215  --  254     Results from last 7 days   Lab Units 08/08/24  0623 08/07/24  1438 08/07/24  0432 08/06/24  1507 08/06/24  0314   SODIUM mmol/L 137  --  138  --  137   POTASSIUM mmol/L 4.2 4.4 3.6   < > 3.6   CO2 mmol/L 28.0  --  29.0  --  25.0   CREATININE mg/dL 1.42*  --  1.33*  --  1.36*   GLUCOSE mg/dL 154*  --  145*  --  119*   MAGNESIUM mg/dL 2.0  --  1.4*  --  1.7   PHOSPHORUS mg/dL  --   --  2.4*  --  3.0    < > = values in this interval not displayed.     Estimated Creatinine Clearance: 46.4 mL/min (A) (by C-G formula based on SCr of 1.42 mg/dL (H)).  Results from last 7 days   Lab Units 08/05/24  0856   ALK PHOS U/L 92   BILIRUBIN mg/dL 0.8   ALT (SGPT) U/L 17   AST (SGOT) U/L 32             Images:  Imaging Results (Last 24 Hours)       ** No results found for the last 24 hours. **               Results: Reviewed.  I reviewed the patient's new laboratory and imaging results.  I independently reviewed the patient's new images.    Medications: Reviewed.    Assessment & Plan   A / P     Mr. Dee is an 87yo M with a history of ETOH use, HTN, and T2DM who presented to the [facility] on 8/5/24 with chest pain and shortness of breath. Upon arrival, his oxygen saturation was noted to be 82% on room air.     EKG showed inferior ST elevation with T-wave inversion. He was started on a nitroglycerin drip and Cardiology was consulted. He was too unstable due to hypoxic respiratory failure to go to the cath lab and was admitted to the ICU for medical management in the meantime.     He was transitioned off Bipap to nasal cannula after diuresis. He has been transitioned off nitroglycerin.     Nutrition:   Diet: Cardiac, Diabetic; Healthy Heart (2-3 Na+); Consistent Carbohydrate; Fluid Consistency: Thin (IDDSI 0)  Advance Directives:   Code Status and Medical Interventions: CPR (Attempt to Resuscitate); Full Support   Ordered at: 08/06/24 0986      Level Of Support Discussed With:    Patient     Code Status (Patient has no pulse and is not breathing):    CPR (Attempt to Resuscitate)     Medical Interventions (Patient has pulse or is breathing):    Full Support       Active Hospital Problems    Diagnosis     **STEMI (ST elevation myocardial infarction)     Alcohol abuse (1 to 2 glasses of bourbon a day)     Essential hypertension     Diabetes mellitus     JOLENE on CPAP        Assessment / Plan:    CIWA protocol for alcohol use.   NOAC  Add Cardene.   SSI for diabetes management  Case Management following. May need inpatient rehab at discharge.   AM labs  Awaiting telemetry bed.       High level of risk due to:  severe exacerbation of chronic illness and illness with threat to life or bodily function.    Plan of care and goals reviewed during interdisciplinary rounds.  I discussed the patient's findings and my recommendations with patient and nursing staff      Monique Chester, DO    Intensive Care Medicine and Pulmonary Medicine

## 2024-08-08 NOTE — PROGRESS NOTES
"[facility] at [facility]  IP Progress Note    HOSPITAL COURSE:  Patient was admitted with STEMI.  Patient has been doing fine.  Patient is waiting for inpatient placement.      CHIEF COMPLAINTS:  Chest pain      Subjective   Patient is sitting comfortably with no significant symptoms      Objective     Blood pressure 143/97, pulse 75, temperature 97.9 °F (36.6 °C), temperature source Oral, resp. rate 20, height 185.4 cm (72.99\"), weight 104 kg (229 lb 4.5 oz), SpO2 93%.     Intake/Output Summary (Last 24 hours) at 8/8/2024 1735  Last data filed at 8/8/2024 1642  Gross per 24 hour   Intake 400 ml   Output 2250 ml   Net -1850 ml       PHYSICAL EXAM:  Constitutional:       General: Not in acute distress.     Appearance: Healthy appearance. Not in distress.     Neck:     JVP:Not elevated     Carotid artery: Normal    Pulmonary:      Effort: Pulmonary effort is normal.      Breath sounds: Normal breath sounds. No wheezing. No rhonchi. No rales.     Cardiovascular:      Normal rate. Regular rhythm. Normal S1. Normal S2.      Murmurs: There is mild systolic murmur.      No gallop. No click. No rub.     Abdominal:      General: Bowel sounds are normal.      Palpations: Abdomen is soft.      Tenderness: There is no abdominal tenderness.    Extremities:     Pulses:Normal radial and pedal pulses     Edema:no edema    MEDICATIONS:    amLODIPine, 2.5 mg, Oral, Q24H  apixaban, 2.5 mg, Oral, Q12H  aspirin, 81 mg, Oral, Daily  atorvastatin, 40 mg, Oral, Nightly  bumetanide, 2 mg, Oral, Daily  carvedilol, 6.25 mg, Oral, Q12H  clopidogrel, 75 mg, Oral, Daily  famotidine, 20 mg, Oral, BID AC  folic acid, 1 mg, Oral, Daily  hydrALAZINE, 25 mg, Oral, Q8H  insulin lispro, 2-7 Units, Subcutaneous, 4x Daily AC & at Bedtime  isosorbide mononitrate, 60 mg, Oral, Q24H  pharmacy consult - MTM, , Does not apply, Daily  senna-docusate sodium, 2 tablet, Oral, BID  sodium chloride, 10 mL, Intravenous, Q12H  thiamine (B-1) " "IV, 200 mg, Intravenous, Q8H   Followed by  [START ON 8/10/2024] thiamine, 100 mg, Oral, Daily          Results from last 7 days   Lab Units 08/07/24  0432   WBC 10*3/mm3 8.15   HEMOGLOBIN g/dL 12.8*   HEMATOCRIT % 38.8   PLATELETS 10*3/mm3 218     Results from last 7 days   Lab Units 08/08/24  0623 08/05/24  0909 08/05/24  0856   SODIUM mmol/L 137   < > 140   POTASSIUM mmol/L 4.2   < > 4.0   CHLORIDE mmol/L 97*   < > 99   CO2 mmol/L 28.0   < > 30.0*   BUN mg/dL 21   < > 23   CREATININE mg/dL 1.42*   < > 1.40*   CALCIUM mg/dL 8.5*   < > 9.3   BILIRUBIN mg/dL  --   --  0.8   ALK PHOS U/L  --   --  92   ALT (SGPT) U/L  --   --  17   AST (SGOT) U/L  --   --  32   GLUCOSE mg/dL 154*   < > 193*    < > = values in this interval not displayed.     Results from last 7 days   Lab Units 08/05/24  0907 08/05/24  0856   INR  1.2 1.26*     Lab Results   Component Value Date    TROPONINT 560 (C) 08/05/2024                 No results found for: \"IRON\", \"FERRITIN\", \"LABIRON\", \"TIBC\"   Hemoglobin A1C   Date Value Ref Range Status   02/14/2024 6.3 (A) 4.5 - 5.7 % Final     Magnesium   Date Value Ref Range Status   08/08/2024 2.0 1.6 - 2.4 mg/dL Final        RESULT REVIEW:    I reviewed the patient's new clinical results.    Tele: Sinus Rythym      ASSESSMENT:     ST segment elevation MI on medical management  Hypertension  Hyperlipidemia  Debilitated health    PLAN:     We will keep patient on aspirin Plavix and apixaban.  We will try to take him off of aspirin in the next few days  His blood pressure has been under good control and will continue current medications  Will continue gentle diuresis.  Patient should be able to go to rehab whenever opportunity appears  "

## 2024-08-08 NOTE — PLAN OF CARE
Goal Outcome Evaluation:  Plan of Care Reviewed With: patient, spouse        Progress: improving       Remains on transfer. Alert and oriented. On 2 L nasal oxygen. Remains in A FIB  Ambulated times two today. Very unsteady.

## 2024-08-08 NOTE — PLAN OF CARE
Goal Outcome Evaluation:  Plan of Care Reviewed With: patient, spouse        Progress: no change  Outcome Evaluation: Pt presents below his functional baseline with deficits including generalized weakness, impaired balance, decreased activity tolerance and impaired ADLs warranting skilled OT services. Pt performed STS with Max A progressing to Mod A using RW. Min A for dynamic standing tasks. Rec IPR at dc for best functional outcomes.      Anticipated Discharge Disposition (OT): inpatient rehabilitation facility

## 2024-08-09 VITALS
DIASTOLIC BLOOD PRESSURE: 89 MMHG | HEART RATE: 76 BPM | SYSTOLIC BLOOD PRESSURE: 130 MMHG | RESPIRATION RATE: 20 BRPM | OXYGEN SATURATION: 94 % | BODY MASS INDEX: 30.39 KG/M2 | HEIGHT: 73 IN | WEIGHT: 229.28 LBS | TEMPERATURE: 97.9 F

## 2024-08-09 LAB
ANION GAP SERPL CALCULATED.3IONS-SCNC: 10 MMOL/L (ref 5–15)
BUN SERPL-MCNC: 29 MG/DL (ref 8–23)
BUN/CREAT SERPL: 18.5 (ref 7–25)
CALCIUM SPEC-SCNC: 8.8 MG/DL (ref 8.6–10.5)
CHLORIDE SERPL-SCNC: 97 MMOL/L (ref 98–107)
CO2 SERPL-SCNC: 30 MMOL/L (ref 22–29)
CREAT SERPL-MCNC: 1.57 MG/DL (ref 0.76–1.27)
EGFRCR SERPLBLD CKD-EPI 2021: 42.4 ML/MIN/1.73
GLUCOSE BLDC GLUCOMTR-MCNC: 220 MG/DL (ref 70–130)
GLUCOSE SERPL-MCNC: 132 MG/DL (ref 65–99)
MAGNESIUM SERPL-MCNC: 1.8 MG/DL (ref 1.6–2.4)
PHOSPHATE SERPL-MCNC: 3.8 MG/DL (ref 2.5–4.5)
POTASSIUM SERPL-SCNC: 4.1 MMOL/L (ref 3.5–5.2)
SODIUM SERPL-SCNC: 137 MMOL/L (ref 136–145)

## 2024-08-09 PROCEDURE — 82948 REAGENT STRIP/BLOOD GLUCOSE: CPT

## 2024-08-09 PROCEDURE — 83735 ASSAY OF MAGNESIUM: CPT | Performed by: INTERNAL MEDICINE

## 2024-08-09 PROCEDURE — 84100 ASSAY OF PHOSPHORUS: CPT | Performed by: INTERNAL MEDICINE

## 2024-08-09 PROCEDURE — 80048 BASIC METABOLIC PNL TOTAL CA: CPT | Performed by: INTERNAL MEDICINE

## 2024-08-09 PROCEDURE — 25010000002 THIAMINE PER 100 MG: Performed by: INTERNAL MEDICINE

## 2024-08-09 PROCEDURE — 25010000002 THIAMINE HCL 200 MG/2ML SOLUTION: Performed by: INTERNAL MEDICINE

## 2024-08-09 PROCEDURE — 99239 HOSP IP/OBS DSCHRG MGMT >30: CPT | Performed by: INTERNAL MEDICINE

## 2024-08-09 PROCEDURE — 94799 UNLISTED PULMONARY SVC/PX: CPT

## 2024-08-09 PROCEDURE — 63710000001 INSULIN LISPRO (HUMAN) PER 5 UNITS: Performed by: INTERNAL MEDICINE

## 2024-08-09 PROCEDURE — 99232 SBSQ HOSP IP/OBS MODERATE 35: CPT | Performed by: INTERNAL MEDICINE

## 2024-08-09 RX ORDER — ATORVASTATIN CALCIUM 40 MG/1
40 TABLET, FILM COATED ORAL NIGHTLY
Start: 2024-08-09

## 2024-08-09 RX ORDER — CLOPIDOGREL BISULFATE 75 MG/1
75 TABLET ORAL DAILY
Start: 2024-08-10

## 2024-08-09 RX ORDER — AMLODIPINE BESYLATE 2.5 MG/1
2.5 TABLET ORAL
Start: 2024-08-10

## 2024-08-09 RX ORDER — BUMETANIDE 2 MG/1
2 TABLET ORAL DAILY
Start: 2024-08-10

## 2024-08-09 RX ORDER — HYDRALAZINE HYDROCHLORIDE 25 MG/1
25 TABLET, FILM COATED ORAL EVERY 8 HOURS SCHEDULED
Start: 2024-08-09

## 2024-08-09 RX ORDER — CARVEDILOL 6.25 MG/1
6.25 TABLET ORAL EVERY 12 HOURS SCHEDULED
Start: 2024-08-09

## 2024-08-09 RX ORDER — ISOSORBIDE MONONITRATE 60 MG/1
60 TABLET, EXTENDED RELEASE ORAL
Start: 2024-08-10

## 2024-08-09 RX ADMIN — THIAMINE HYDROCHLORIDE 200 MG: 100 INJECTION, SOLUTION INTRAMUSCULAR; INTRAVENOUS at 13:44

## 2024-08-09 RX ADMIN — THIAMINE HYDROCHLORIDE 200 MG: 100 INJECTION, SOLUTION INTRAMUSCULAR; INTRAVENOUS at 06:22

## 2024-08-09 RX ADMIN — CLOPIDOGREL BISULFATE 75 MG: 75 TABLET ORAL at 08:39

## 2024-08-09 RX ADMIN — CARVEDILOL 6.25 MG: 6.25 TABLET, FILM COATED ORAL at 08:35

## 2024-08-09 RX ADMIN — FAMOTIDINE 20 MG: 20 TABLET, FILM COATED ORAL at 08:34

## 2024-08-09 RX ADMIN — APIXABAN 2.5 MG: 2.5 TABLET, FILM COATED ORAL at 08:39

## 2024-08-09 RX ADMIN — SENNOSIDES AND DOCUSATE SODIUM 2 TABLET: 50; 8.6 TABLET ORAL at 08:35

## 2024-08-09 RX ADMIN — FOLIC ACID 1 MG: 1 TABLET ORAL at 08:34

## 2024-08-09 RX ADMIN — AMLODIPINE BESYLATE 2.5 MG: 5 TABLET ORAL at 08:35

## 2024-08-09 RX ADMIN — HYDRALAZINE HYDROCHLORIDE 25 MG: 50 TABLET ORAL at 13:44

## 2024-08-09 RX ADMIN — ASPIRIN 81 MG: 81 TABLET, COATED ORAL at 08:35

## 2024-08-09 RX ADMIN — HYDRALAZINE HYDROCHLORIDE 25 MG: 50 TABLET ORAL at 06:13

## 2024-08-09 RX ADMIN — BUMETANIDE 2 MG: 1 TABLET ORAL at 08:35

## 2024-08-09 RX ADMIN — INSULIN LISPRO 3 UNITS: 100 INJECTION, SOLUTION INTRAVENOUS; SUBCUTANEOUS at 12:40

## 2024-08-09 RX ADMIN — Medication 10 ML: at 08:35

## 2024-08-09 RX ADMIN — ISOSORBIDE MONONITRATE 60 MG: 60 TABLET, EXTENDED RELEASE ORAL at 08:35

## 2024-08-09 NOTE — CASE MANAGEMENT/SOCIAL WORK
Continued Stay Note  [facility]     Patient Name: Iain Dee  MRN: [id number]  Today's Date: 8/9/2024    Admit Date: 8/5/2024    Plan: IPR   Discharge Plan       Row Name 08/09/24 1014       Plan    Plan IPR    Patient/Family in Agreement with Plan yes    Plan Comments Met with Mr. Dee and his wife at the bedside to discuss discharge plan. His plan is rehab at [facility].  called [facility] liaison, Skylar Chung, and she already had the referral. Jodi is aware of potential weekend discharge and agreed to have her weekend staff follow up with [facility] . Patient will need wheelchair transportation arranged at discharge.   will continue to follow plan of care and assist with discharge planning  needs as indicated.    Final Discharge Disposition Code 62 - inpatient rehab facility                   Discharge Codes    No documentation.                       Patricia Keith RN

## 2024-08-09 NOTE — PROGRESS NOTES
"[facility] at [facility]  IP Progress Note    PROBLEM LIST:  CARDIAC  Coronary Artery Disease:   Inferior STEMI, 8/5/2024: Medical management     Myocardium:   Echo, 3/1/2023: LVEF 55%  Echo, 8/5/2024: LVEF 35 to 40%     Valvular:   Mild MR, mild TR     Electrical:   Chronic A-fib     Pericardium:   Normal     CARDIAC RISK FACTORS:  Hypertension  Diabetes  Dyslipidemia  Obstructive Sleep Apnea    NON-CARDIAC  GERD  Gout  Arthritis    SURGERIES  Prostate cancer s/p radiation  Bladder cancer s/p surgery  Hernia repair        HOSPITAL COURSE:  Patient did wonderful in the hospital.  He did need rehab.      CHIEF COMPLAINTS:  Inferior ST segment elevation MI      Subjective   Patient is doing great ready to go to [facility]      Objective     Blood pressure (!) 148/108, pulse 78, temperature 97.9 °F (36.6 °C), temperature source Oral, resp. rate 20, height 185.4 cm (72.99\"), weight 104 kg (229 lb 4.5 oz), SpO2 95%.     Intake/Output Summary (Last 24 hours) at 8/9/2024 1242  Last data filed at 8/8/2024 2200  Gross per 24 hour   Intake 200 ml   Output 750 ml   Net -550 ml       PHYSICAL EXAM:  Constitutional:       General: Not in acute distress.     Appearance: Healthy appearance. Not in distress.     Neck:     JVP:Not elevated     Carotid artery: Normal    Pulmonary:      Effort: Pulmonary effort is normal.      Breath sounds: Normal breath sounds. No wheezing. No rhonchi. No rales.     Cardiovascular:      Normal rate. Regular rhythm. Normal S1. Normal S2.      Murmurs: There is mild systolic murmur.      No gallop. No click. No rub.     Abdominal:      General: Bowel sounds are normal.      Palpations: Abdomen is soft.      Tenderness: There is no abdominal tenderness.    Extremities:     Pulses:Normal radial and pedal pulses     Edema:no edema    MEDICATIONS:    amLODIPine, 2.5 mg, Oral, Q24H  apixaban, 2.5 mg, Oral, Q12H  aspirin, 81 mg, Oral, Daily  atorvastatin, 40 mg, Oral, " "Nightly  bumetanide, 2 mg, Oral, Daily  carvedilol, 6.25 mg, Oral, Q12H  clopidogrel, 75 mg, Oral, Daily  famotidine, 20 mg, Oral, BID AC  folic acid, 1 mg, Oral, Daily  hydrALAZINE, 25 mg, Oral, Q8H  insulin lispro, 2-7 Units, Subcutaneous, 4x Daily AC & at Bedtime  isosorbide mononitrate, 60 mg, Oral, Q24H  pharmacy consult - [place], , Does not apply, Daily  senna-docusate sodium, 2 tablet, Oral, BID  sodium chloride, 10 mL, Intravenous, Q12H  thiamine (B-1) IV, 200 mg, Intravenous, Q8H   Followed by  [START ON 8/10/2024] thiamine, 100 mg, Oral, Daily          Results from last 7 days   Lab Units 08/07/24  0432   WBC 10*3/mm3 8.15   HEMOGLOBIN g/dL 12.8*   HEMATOCRIT % 38.8   PLATELETS 10*3/mm3 218     Results from last 7 days   Lab Units 08/09/24  0603 08/05/24  0909 08/05/24  0856   SODIUM mmol/L 137   < > 140   POTASSIUM mmol/L 4.1   < > 4.0   CHLORIDE mmol/L 97*   < > 99   CO2 mmol/L 30.0*   < > 30.0*   BUN mg/dL 29*   < > 23   CREATININE mg/dL 1.57*   < > 1.40*   CALCIUM mg/dL 8.8   < > 9.3   BILIRUBIN mg/dL  --   --  0.8   ALK PHOS U/L  --   --  92   ALT (SGPT) U/L  --   --  17   AST (SGOT) U/L  --   --  32   GLUCOSE mg/dL 132*   < > 193*    < > = values in this interval not displayed.     Results from last 7 days   Lab Units 08/05/24  0907 08/05/24  0856   INR  1.2 1.26*     Lab Results   Component Value Date    TROPONINT 560 (C) 08/05/2024                 No results found for: \"IRON\", \"FERRITIN\", \"LABIRON\", \"TIBC\"   Hemoglobin A1C   Date Value Ref Range Status   02/14/2024 6.3 (A) 4.5 - 5.7 % Final     Magnesium   Date Value Ref Range Status   08/09/2024 1.8 1.6 - 2.4 mg/dL Final        RESULT REVIEW:    I reviewed the patient's new clinical results.    Tele: A-fib      ASSESSMENT:     Inferior ST segment elevation MI wants to be treated medically  Cardiomyopathy ejection fraction 35 to 40%  Chronic kidney disease  Chronic A-fib    PLAN:     We will keep on treating him medically.  We will stop aspirin.  We " can increase his Norvasc for his better blood pressure control if needed.  I will see him in the office in 3 weeks.  Will stop his Plavix in 3 months

## 2024-08-09 NOTE — DISCHARGE SUMMARY
[facility] Medicine Services  DISCHARGE SUMMARY    Patient Name: Iain Dee  : 1935  MRN: [id number]    Date of Admission: 2024  8:34 AM  Date of Discharge:  2024  Primary Care Physician: Luis Aguilar DO    Consults       No orders found from 2024 to 2024.            Hospital Course     Presenting Problem: STEMI    Active Hospital Problems    Diagnosis  POA    **STEMI (ST elevation myocardial infarction) [I21.3]  Yes    Alcohol abuse (1 to 2 glasses of bourbon a day) [F10.10]  Yes    Essential hypertension [I10]  Yes    Diabetes mellitus [E11.9]  Yes    JOLENE on CPAP [G47.33]  Yes      Resolved Hospital Problems   No resolved problems to display.          Hospital Course:  Iain Dee is a 88 y.o. male with PMHx significant for ETOH use, A.fib, HTN, JOLENE on CPAP, DMII who presented with chest pain and shortness of breath. EKG w/inferior ST elevation and t-wave inversions. He was also found to be profoundly hypoxic and felt to be too unstable to take to the cath lab. He was admitted to the ICU for medical management. He was aggressively diuresed and was transitioned from BIPAP to nasal cannula. Echocardiogram showed new EF of 35-40% and he has been transitioned to oral diuretics with improvement. Cardiology plans to continue patient on Plavix and Eliquis at this time. They plan to discontinue ASA for now, and will keep him on plavix for 3 months. He will f/u with cardiology in 3 weeks and heart and valve clinic in 3-5 days post discharge. He is currently not having any active withdrawal symptoms and was managed with CIWA protocol while in the ICU.    Discharge Follow Up Recommendations for outpatient labs/diagnostics:  - f/u with Dr. Spencer in 3 weeks  - Heart and Valve in 3-5 days    Day of Discharge     HPI:   Patient sitting up in bed. No issues overnight. Agreeable to rehab transfer. No chest pain. Wife at bedside agreeable w/plan to go to [facility]     Review of Systems  Gen- No fevers, chills  CV- No chest pain, palpitations  Resp- No cough, dyspnea  GI- No N/V/D, abd pain      Vital Signs:   Temp:  [97.9 °F (36.6 °C)-98.7 °F (37.1 °C)] 97.9 °F (36.6 °C)  Heart Rate:  [58-87] 78  Resp:  [20-23] 20  BP: (106-176)/() 148/108  Flow (L/min):  [2-3] 2      Physical Exam:  Constitutional: No acute distress, awake, alert  HENT: NCAT, mucous membranes moist  Respiratory: Clear to auscultation bilaterally, respiratory effort normal   Cardiovascular: RRR, no murmurs, rubs, or gallops  Gastrointestinal: soft, nontender, nondistended  Musculoskeletal: No bilateral ankle edema  Psychiatric: Appropriate affect, cooperative  Neurologic: Oriented x 3, speech clear, no focal deficits  Skin: No rashes      Pertinent  and/or Most Recent Results     LAB RESULTS:      Lab 08/07/24  0432 08/06/24  2115 08/06/24  1507 08/06/24  1134 08/06/24  0314 08/05/24  1854 08/05/24  1350 08/05/24  0909 08/05/24  0907 08/05/24  0856   WBC 8.15  --   --   --  6.89  --   --   --   --  10.15   HEMOGLOBIN 12.8*  --   --   --  13.0  --   --   --   --  13.8   HEMOGLOBIN, POC  --   --   --   --   --   --   --  14.3  --   --    HEMATOCRIT 38.8  --   --   --  41.1  --   --   --   --  42.4   HEMATOCRIT POC  --   --   --   --   --   --   --  42  --   --    PLATELETS 218  --   --   --  215  --   --   --   --  254   NEUTROS ABS  --   --   --   --  5.35  --   --   --   --  8.61*   IMMATURE GRANS (ABS)  --   --   --   --  0.02  --   --   --   --  0.03   LYMPHS ABS  --   --   --   --  0.76  --   --   --   --  0.65*   MONOS ABS  --   --   --   --  0.57  --   --   --   --  0.67   EOS ABS  --   --   --   --  0.14  --   --   --   --  0.11   MCV 98.7*  --   --   --  102.2*  --   --   --   --  98.8*   PROTIME  --   --   --   --   --   --   --   --  14.1 15.9*   APTT 49.3* 37.2* 34.8* 159.4* 51.8*   < >  --   --   --  35.5*   HEPARIN ANTI-XA  --   --   --   --   --   --  >1.10*  --   --   --     < > =  values in this interval not displayed.         Lab 08/09/24  0603 08/08/24  0623 08/07/24  1438 08/07/24  0432 08/06/24  1507 08/06/24  0314 08/05/24  0909 08/05/24  0856   SODIUM 137 137  --  138  --  137  --  140   POTASSIUM 4.1 4.2 4.4 3.6 4.3 3.6  --  4.0   CHLORIDE 97* 97*  --  98  --  97*  --  99   CO2 30.0* 28.0  --  29.0  --  25.0  --  30.0*   ANION GAP 10.0 12.0  --  11.0  --  15.0  --  11.0   BUN 29* 21  --  23  --  25*  --  23   CREATININE 1.57* 1.42*  --  1.33*  --  1.36* 1.40* 1.40*   EGFR 42.4* 47.8*  --  51.7*  --  50.4* 48.6* 48.6*   GLUCOSE 132* 154*  --  145*  --  119*  --  193*   CALCIUM 8.8 8.5*  --  8.4*  --  8.6  --  9.3   MAGNESIUM 1.8 2.0  --  1.4*  --  1.7  --   --    PHOSPHORUS 3.8  --   --  2.4*  --  3.0  --   --          Lab 08/05/24  0856   TOTAL PROTEIN 6.9   ALBUMIN 3.8   GLOBULIN 3.1   ALT (SGPT) 17   AST (SGOT) 32   BILIRUBIN 0.8   ALK PHOS 92         Lab 08/05/24  1120 08/05/24  0907 08/05/24  0856   PROBNP  --   --  5,672.0*   HSTROP T 560*  --  368*   PROTIME  --  14.1 15.9*   INR  --  1.2 1.26*                 Brief Urine Lab Results  (Last result in the past 365 days)        Color   Clarity   Blood   Leuk Est   Nitrite   Protein   CREAT   Urine HCG        02/14/24 0859             64.8         02/14/24 0859 Yellow   Clear   Negative   Negative   Negative   >=300 mg/dL (3+)                 Microbiology Results (last 10 days)       ** No results found for the last 240 hours. **            Adult Transthoracic Echo Complete W/ Cont if Necessary Per Protocol    Result Date: 8/6/2024    Left ventricular systolic function is moderately decreased. Calculated left ventricular EF = 34.3% Left ventricular ejection fraction appears to be 36 - 40%.   Left ventricular wall thickness is consistent with mild concentric hypertrophy.   Left ventricular diastolic function was indeterminate.   Mildly reduced right ventricular systolic function noted.   The right ventricular cavity is dilated.   The  left atrial cavity is mildly dilated.   Left atrial volume is moderately increased.   The right atrial cavity is dilated.   Moderate mitral valve regurgitation is present.   Estimated right ventricular systolic pressure from tricuspid regurgitation is moderately elevated (45-55 mmHg). Calculated right ventricular systolic pressure from tricuspid regurgitation is 51 mmHg.     XR Chest 1 View    Result Date: 8/6/2024  XR CHEST 1 VW Date of Exam: 8/6/2024 3:32 AM EDT Indication: F/u pulmonary edema Comparison: 8/5/2024. Findings: Previously seen pulmonary edema has improved with very mild interstitial edema remaining. There is some mild left base atelectasis. There may be a small left effusion. Left defibrillator pad somewhat obscures detail. There is stable borderline cardiomegaly.     Impression: Improved pulmonary edema. Possible small left effusion. Electronically Signed: Danisha Morris MD  8/6/2024 8:03 AM EDT  Workstation ID: [id number]    XR Chest 1 View    Result Date: 8/5/2024  XR CHEST 1 VW Date of Exam: 8/5/2024 8:58 AM EDT Indication: SOA triage protocol Comparison: 2/28/2023 Findings: Defibrillator pad is seen. Borderline cardiomegaly. Perihilar interstitial opacities suggestive of mild edema. No focal opacity, pleural effusion or pneumothorax. No evidence of acute osseous abnormalities. Visualized upper abdomen is unremarkable.     Impression: Borderline cardiomegaly with perihilar and interstitial opacities suggestive of mild edema. Electronically Signed: Jalen Truong MD  8/5/2024 9:10 AM EDT  Workstation ID: [id number]             Results for orders placed during the hospital encounter of 08/05/24    Adult Transthoracic Echo Complete W/ Cont if Necessary Per Protocol    Interpretation Summary    Left ventricular systolic function is moderately decreased. Calculated left ventricular EF = 34.3% Left ventricular ejection fraction appears to be 36 - 40%.    Left ventricular wall thickness is consistent  with mild concentric hypertrophy.    Left ventricular diastolic function was indeterminate.    Mildly reduced right ventricular systolic function noted.    The right ventricular cavity is dilated.    The left atrial cavity is mildly dilated.    Left atrial volume is moderately increased.    The right atrial cavity is dilated.    Moderate mitral valve regurgitation is present.    Estimated right ventricular systolic pressure from tricuspid regurgitation is moderately elevated (45-55 mmHg). Calculated right ventricular systolic pressure from tricuspid regurgitation is 51 mmHg.      Plan for Follow-up of Pending Labs/Results:     Discharge Details        Discharge Medications        New Medications        Instructions Start Date   atorvastatin 40 MG tablet  Commonly known as: LIPITOR   40 mg, Oral, Nightly      bumetanide 2 MG tablet  Commonly known as: BUMEX   2 mg, Oral, Daily   Start Date: August 10, 2024     carvedilol 6.25 MG tablet  Commonly known as: COREG   6.25 mg, Oral, Every 12 Hours Scheduled      clopidogrel 75 MG tablet  Commonly known as: PLAVIX   75 mg, Oral, Daily   Start Date: August 10, 2024     hydrALAZINE 25 MG tablet  Commonly known as: APRESOLINE   25 mg, Oral, Every 8 Hours Scheduled      isosorbide mononitrate 60 MG 24 hr tablet  Commonly known as: IMDUR   60 mg, Oral, Every 24 Hours Scheduled   Start Date: August 10, 2024            Changes to Medications        Instructions Start Date   amLODIPine 2.5 MG tablet  Commonly known as: NORVASC  What changed:   medication strength  how much to take  when to take this   2.5 mg, Oral, Every 24 Hours Scheduled   Start Date: August 10, 2024            Continue These Medications        Instructions Start Date   apixaban 2.5 MG tablet tablet  Commonly known as: ELIQUIS   2.5 mg, Oral, 2 Times Daily      famotidine 20 MG tablet  Commonly known as: PEPCID   20 mg, Oral, 2 Times Daily PRN      glucose blood test strip   1 each, Other, Daily, Use daily as  directed      glucose monitor monitoring kit   1 each, Does not apply, Daily, Use daily as directed.      Lancets Thin misc   1 each, Does not apply, Daily, Use daily as directed      metFORMIN 500 MG tablet  Commonly known as: GLUCOPHAGE   500 mg, Oral, 2 Times Daily With Meals             Stop These Medications      cloNIDine 0.2 MG tablet  Commonly known as: CATAPRES     metoprolol tartrate 25 MG tablet  Commonly known as: LOPRESSOR            ASK your doctor about these medications        Instructions Start Date   losartan 50 MG tablet  Commonly known as: COZAAR   50 mg, Oral, 2 Times Daily               Allergies   Allergen Reactions    Colchicine Other (See Comments)     Unable to walk          Discharge Disposition:  Rehab Facility or Unit (DC - External)    Diet:  Hospital:  Diet Order   Procedures    Diet: Cardiac, Diabetic; Healthy Heart (2-3 Na+); Consistent Carbohydrate; Fluid Consistency: Thin (IDDSI 0)            Activity:  - as tolerated    Restrictions or Other Recommendations:  - none       CODE STATUS:    Code Status and Medical Interventions: CPR (Attempt to Resuscitate); Full Support   Ordered at: 08/06/24 0933     Level Of Support Discussed With:    Patient     Code Status (Patient has no pulse and is not breathing):    CPR (Attempt to Resuscitate)     Medical Interventions (Patient has pulse or is breathing):    Full Support       No future appointments.              Fransisca Haider DO  08/09/24      Time Spent on Discharge:  I spent  40  minutes on this discharge activity which included: face-to-face encounter with the patient, reviewing the data in the system, coordination of the care with the nursing staff as well as consultants, documentation, and entering orders.

## 2024-08-09 NOTE — CASE MANAGEMENT/SOCIAL WORK
Case Management Discharge Note      Final Note: Met with Mr. Dee to finalize discharge plan. His plan is inpatient rehab at [facility]. He has [employer] wheelchair transportation scheduled for 1530 today. Nursing, please call report to [phone]. No other discharge needs were identified.         Selected Continued Care - Admitted Since 8/5/2024       Destination Coordination complete.      Service Provider Selected Services Address Phone Fax Patient Preferred    [facility] [address] [address] [phone] --              Durable Medical Equipment    No services have been selected for the patient.                Dialysis/Infusion    No services have been selected for the patient.                Home Medical Care    No services have been selected for the patient.                Therapy    No services have been selected for the patient.                Community Resources    No services have been selected for the patient.                Community & DME    No services have been selected for the patient.                    Transportation Services  W/C Van: Other ([employer])    Final Discharge Disposition Code: 62 - inpatient rehab facility

## 2024-08-11 LAB
QT INTERVAL: 328 MS
QTC INTERVAL: 407 MS

## 2024-08-12 ENCOUNTER — TELEPHONE (OUTPATIENT)
Dept: CARDIOLOGY | Facility: CLINIC | Age: 89
End: 2024-08-12
Payer: MEDICARE

## 2024-08-12 NOTE — TELEPHONE ENCOUNTER
Name: Maya Germain    Relationship: Emergency Contact    Best Callback Number: [phone]    Incoming call to the Hub, requesting to  Reschedule their HFU appointment on 8.29.24.     Per Hub workflow, further review is needed before the task can be completed.    Result of Call: Please reach out to the patient to reschedule

## 2024-08-19 RX ORDER — FAMOTIDINE 20 MG/1
20 TABLET, FILM COATED ORAL 2 TIMES DAILY PRN
Qty: 90 TABLET | Refills: 0 | Status: SHIPPED | OUTPATIENT
Start: 2024-08-19

## 2024-08-19 RX ORDER — FAMOTIDINE 20 MG/1
20 TABLET, FILM COATED ORAL 2 TIMES DAILY PRN
Qty: 60 TABLET | Refills: 2 | OUTPATIENT
Start: 2024-08-19

## 2024-09-04 NOTE — PROGRESS NOTES
Follow-up Visit      Date: 2024  Patient Name: Iain Dee  : 1935   MRN: [id number]     Chief Complaint:    Chief Complaint   Patient presents with    STEMI    Hypertension    Atrial Fibrillation       History of Present Illness: Iain Dee is a 88 y.o. male who is here today for follow-up from his inferior ST segment elevation MI.  Patient has been doing fine.  Patient chose to have medical management at this time and has been doing great.  Patient has 2 concerns at this time    He has to take hydralazine 3 times a day  He has to take diuretic and he pees a lot    Patient denies any chest pain any shortness of breath any dizziness any palpitations or any other symptoms.  Patient has a basal cell carcinoma of the hand that was just removed.  Patient did not have any process for nocturnal dyspnea or any other symptoms.      Problem List     CARDIAC  Coronary Artery Disease:   Inferior STEMI, 2024: Medical management     Myocardium:   Echo, 3/1/2023: LVEF 55%  Echo, 2024: LVEF 35 to 40%     Valvular:   Mild MR, mild TR     Electrical:   Chronic A-fib     Pericardium:   Normal     CARDIAC RISK FACTORS  Hypertension  Diabetes  Dyslipidemia  2024   HDL 43 LDL 92   Obstructive Sleep Apnea    NON-CARDIAC  GERD  Gout  Arthritis    SURGERIES  Prostate cancer s/p radiation  Bladder cancer s/p surgery  Hernia repair      Subjective      Review of Systems:   Review of Systems   Respiratory:  Positive for apnea. Negative for cough, choking, chest tightness, shortness of breath, wheezing and stridor.    Cardiovascular:  Negative for chest pain, palpitations and leg swelling.       Medications:     Current Outpatient Medications:     apixaban (ELIQUIS) 2.5 MG tablet tablet, Take 1 tablet by mouth 2 (Two) Times a Day. Indications: Atrial Fibrillation, Disp: 60 tablet, Rfl: 11    atorvastatin (LIPITOR) 40 MG tablet, Take 1 tablet by mouth Every Night., Disp: , Rfl:     bumetanide  "(BUMEX) 2 MG tablet, Take 1 tablet by mouth Daily., Disp: , Rfl:     carvedilol (COREG) 6.25 MG tablet, Take 0.5 tablets by mouth 2 (Two) Times a Day With Meals., Disp: , Rfl:     clopidogrel (PLAVIX) 75 MG tablet, Take 1 tablet by mouth Daily., Disp: 90 tablet, Rfl: 3    famotidine (PEPCID) 20 MG tablet, TAKE 1 TABLET BY MOUTH TWICE DAILY AS NEEDED FOR HEARTBURN, Disp: 90 tablet, Rfl: 0    glucose blood test strip, 1 each by Other route Daily. Use daily as directed, Disp: 50 each, Rfl: 12    glucose monitor monitoring kit, 1 each Daily. Use daily as directed., Disp: 1 each, Rfl: 0    Jardiance 10 MG tablet tablet, Take 1 tablet by mouth Daily., Disp: 90 tablet, Rfl: 11    ketoconazole (NIZORAL) 2 % cream, APPLY TO THE FACE BY TOPICAL ROUTE TWICE DAILY AS NEEDED FOR RASH., Disp: , Rfl:     Lancets Thin misc, 1 each Daily. Use daily as directed, Disp: 50 each, Rfl: 11    metFORMIN (GLUCOPHAGE) 500 MG tablet, TAKE 1 TABLET BY MOUTH TWICE DAILY WITH MEALS, Disp: 180 tablet, Rfl: 0    pantoprazole (PROTONIX) 20 MG EC tablet, , Disp: , Rfl:     nitroglycerin (NITRODUR) 0.2 MG/HR patch, Place 1 patch on the skin as directed by provider See Admin Instructions. Apply patch daily, remove at night for at least 10 hours, Disp: 90 patch, Rfl: 3    Allergies:   Allergies   Allergen Reactions    Colchicine Other (See Comments)     Unable to walk        Objective     Physical Exam:  Vitals:    09/05/24 1434   Pulse: 65   SpO2: 95%   Weight: 92.1 kg (203 lb)   Height: 185.4 cm (72.99\")     Body mass index is 26.79 kg/m².    Constitutional:       General: Not in acute distress.     Appearance: Healthy appearance. Not in distress.     Neck:     JVP:Not elevated     Carotid artery: Normal    Pulmonary:      Effort: Pulmonary effort is normal.      Breath sounds: Normal breath sounds. No wheezing. No rhonchi. No rales.     Cardiovascular:      Normal rate. Irregular rhythm. Normal S1. Normal S2.      Murmurs: There is no significant " murmur.      No gallop. No click. No rub.     Abdominal:      General: Bowel sounds are normal.      Palpations: Abdomen is soft.      Tenderness: There is no abdominal tenderness.    Extremities:     Pulses:Normal radial and pedal pulses     Edema:no edema    Smoking Cessation:   Tobacco Product History : Patient never smoked    Lab Review:   Lab Results   Component Value Date    GLUCOSE 132 (H) 08/09/2024    BUN 29 (H) 08/09/2024    CREATININE 1.57 (H) 08/09/2024    EGFRIFNONA 58 (L) 12/16/2021    BCR 18.5 08/09/2024    K 4.1 08/09/2024    CO2 30.0 (H) 08/09/2024    CALCIUM 8.8 08/09/2024    ALBUMIN 3.8 08/05/2024    AST 32 08/05/2024    ALT 17 08/05/2024     Lab Results   Component Value Date    WBC 8.15 08/07/2024    HGB 12.8 (L) 08/07/2024    HCT 38.8 08/07/2024    MCV 98.7 (H) 08/07/2024     08/07/2024     Lab Results   Component Value Date    TSH 3.350 02/27/2023             Assessment / Plan      Assessment:   Diagnosis Plan   1. Coronary artery disease involving native coronary artery of native heart without angina pectoris        2. Atrial fibrillation, unspecified type  apixaban (ELIQUIS) 2.5 MG tablet tablet      3. Essential hypertension        4. Mixed hyperlipidemia             Plan:  Patient has been stable on his coronary artery disease.  We have him on Eliquis and Plavix for now.  I believe in the next few months we will take him off of Plavix and continue with Eliquis.  His atrial fibrillation has been under control and we will continue with Eliquis 2.5 mg twice daily.  I have advised him to stop his hydralazine as he has to take frequently.  I will start him on Nitropatch 0.2 mg/h apply in the morning and take off at night.  He will continue with his Lipitor 40 mg.  I have advised send discussed with him the benefits of taking Bumex.  He wants to keep on taking it.      Follow Up:       Return in about 6 months (around 3/5/2025).    Isabel Stover MD

## 2024-09-05 ENCOUNTER — OFFICE VISIT (OUTPATIENT)
Dept: CARDIOLOGY | Facility: CLINIC | Age: 89
End: 2024-09-05
Payer: MEDICARE

## 2024-09-05 VITALS — BODY MASS INDEX: 26.9 KG/M2 | WEIGHT: 203 LBS | HEIGHT: 73 IN | OXYGEN SATURATION: 95 % | HEART RATE: 65 BPM

## 2024-09-05 DIAGNOSIS — E78.2 MIXED HYPERLIPIDEMIA: ICD-10-CM

## 2024-09-05 DIAGNOSIS — I25.10 CORONARY ARTERY DISEASE INVOLVING NATIVE CORONARY ARTERY OF NATIVE HEART WITHOUT ANGINA PECTORIS: Primary | ICD-10-CM

## 2024-09-05 DIAGNOSIS — I48.91 ATRIAL FIBRILLATION, UNSPECIFIED TYPE: ICD-10-CM

## 2024-09-05 DIAGNOSIS — I10 ESSENTIAL HYPERTENSION: ICD-10-CM

## 2024-09-05 RX ORDER — PANTOPRAZOLE SODIUM 20 MG/1
TABLET, DELAYED RELEASE ORAL
COMMUNITY
Start: 2024-08-14

## 2024-09-05 RX ORDER — CARVEDILOL 6.25 MG/1
3.12 TABLET ORAL 2 TIMES DAILY WITH MEALS
Start: 2024-09-05

## 2024-09-05 RX ORDER — EMPAGLIFLOZIN 10 MG/1
10 TABLET, FILM COATED ORAL DAILY
Qty: 90 TABLET | Refills: 11 | Status: SHIPPED | OUTPATIENT
Start: 2024-09-05

## 2024-09-05 RX ORDER — CLOPIDOGREL BISULFATE 75 MG/1
75 TABLET ORAL DAILY
Qty: 90 TABLET | Refills: 3 | Status: SHIPPED | OUTPATIENT
Start: 2024-09-05

## 2024-09-05 RX ORDER — BUMETANIDE 2 MG/1
2 TABLET ORAL DAILY
Start: 2024-09-05

## 2024-09-05 RX ORDER — KETOCONAZOLE 20 MG/G
CREAM TOPICAL
COMMUNITY
Start: 2024-06-19

## 2024-09-05 RX ORDER — NITROGLYCERIN 40 MG/1
1 PATCH TRANSDERMAL SEE ADMIN INSTRUCTIONS
Qty: 90 PATCH | Refills: 3 | Status: SHIPPED | OUTPATIENT
Start: 2024-09-05

## 2024-09-05 RX ORDER — EMPAGLIFLOZIN 10 MG/1
10 TABLET, FILM COATED ORAL
COMMUNITY
Start: 2024-08-14 | End: 2024-09-05 | Stop reason: SDUPTHER

## 2024-09-05 RX ORDER — ATORVASTATIN CALCIUM 40 MG/1
40 TABLET, FILM COATED ORAL NIGHTLY
Start: 2024-09-05

## 2024-09-06 DIAGNOSIS — E11.65 TYPE 2 DIABETES MELLITUS WITH HYPERGLYCEMIA, WITHOUT LONG-TERM CURRENT USE OF INSULIN: ICD-10-CM

## 2024-09-16 DIAGNOSIS — I10 ESSENTIAL HYPERTENSION: ICD-10-CM

## 2024-09-16 RX ORDER — BUMETANIDE 2 MG/1
2 TABLET ORAL DAILY
Qty: 90 TABLET | Refills: 0 | Status: SHIPPED | OUTPATIENT
Start: 2024-09-16

## 2024-09-16 RX ORDER — AMLODIPINE BESYLATE 10 MG/1
TABLET ORAL
Qty: 45 TABLET | Refills: 1 | OUTPATIENT
Start: 2024-09-16

## 2024-09-16 RX ORDER — ATORVASTATIN CALCIUM 40 MG/1
40 TABLET, FILM COATED ORAL NIGHTLY
Qty: 90 TABLET | Refills: 3
Start: 2024-09-16 | End: 2024-09-20 | Stop reason: SDUPTHER

## 2024-09-16 RX ORDER — CLONIDINE HYDROCHLORIDE 0.2 MG/1
TABLET ORAL
Qty: 180 TABLET | Refills: 1 | OUTPATIENT
Start: 2024-09-16

## 2024-09-16 RX ORDER — CARVEDILOL 6.25 MG/1
6.25 TABLET ORAL 2 TIMES DAILY WITH MEALS
Qty: 180 TABLET | Refills: 3 | Status: SHIPPED | OUTPATIENT
Start: 2024-09-16 | End: 2024-09-20 | Stop reason: SDUPTHER

## 2024-09-20 RX ORDER — ATORVASTATIN CALCIUM 40 MG/1
40 TABLET, FILM COATED ORAL NIGHTLY
Qty: 90 TABLET | Refills: 3 | Status: SHIPPED | OUTPATIENT
Start: 2024-09-20

## 2024-09-20 RX ORDER — CARVEDILOL 3.12 MG/1
3.12 TABLET ORAL 2 TIMES DAILY WITH MEALS
Qty: 180 TABLET | Refills: 3 | Status: SHIPPED | OUTPATIENT
Start: 2024-09-20

## 2024-09-27 ENCOUNTER — TELEPHONE (OUTPATIENT)
Dept: CARDIOLOGY | Facility: CLINIC | Age: 89
End: 2024-09-27
Payer: MEDICARE

## 2024-09-27 NOTE — TELEPHONE ENCOUNTER
Wife called to ask if Dr. Stover had any idea what would make patient loose consciousness for less than 30 seconds, while sitting at dinner talking with friends Per patient,  he felt he was going to pass out and couldn't stop it.  Patient also states he felt he was highly alert, he knew everything that was going on, EMS came in took VS, everything was perfect, no low BP.  No loss of bowel or bladder control.  His wife is concerned it could be related to him beginning a statin for the 1st time in his life.  Advised would notify provider of occurrence and call back with any recommendations he gives.

## 2024-09-30 DIAGNOSIS — E11.65 TYPE 2 DIABETES MELLITUS WITH HYPERGLYCEMIA, WITHOUT LONG-TERM CURRENT USE OF INSULIN: ICD-10-CM

## 2024-09-30 NOTE — TELEPHONE ENCOUNTER
Caller: Maya Germain    Relationship: Emergency Contact    Best call back number: [phone]     Requested Prescriptions:   Requested Prescriptions     Pending Prescriptions Disp Refills    metFORMIN (GLUCOPHAGE) 500 MG tablet 180 tablet 0     Sig: Take 1 tablet by mouth 2 (Two) Times a Day With Meals.        Pharmacy where request should be sent: [id number] [address] [facility] [address] [facility] [phone] [facility] [phone]      Last office visit with prescribing clinician: 2/14/2024   Last telemedicine visit with prescribing clinician: Visit date not found   Next office visit with prescribing clinician: Visit date not found     Additional details provided by patient:     Does the patient have less than a 3 day supply:  [] Yes  [x] No    Would you like a call back once the refill request has been completed: [] Yes [x] No    If the office needs to give you a call back, can they leave a voicemail: [] Yes [x] No    Lexus Hughes Rep   09/30/24 08:51 EDT

## 2024-10-01 ENCOUNTER — LAB (OUTPATIENT)
Dept: LAB | Facility: HOSPITAL | Age: 89
End: 2024-10-01
Payer: MEDICARE

## 2024-10-01 ENCOUNTER — OFFICE VISIT (OUTPATIENT)
Dept: FAMILY MEDICINE CLINIC | Facility: CLINIC | Age: 89
End: 2024-10-01
Payer: MEDICARE

## 2024-10-01 VITALS
BODY MASS INDEX: 27.57 KG/M2 | WEIGHT: 208 LBS | HEIGHT: 73 IN | OXYGEN SATURATION: 94 % | HEART RATE: 64 BPM | SYSTOLIC BLOOD PRESSURE: 134 MMHG | DIASTOLIC BLOOD PRESSURE: 88 MMHG | RESPIRATION RATE: 16 BRPM | TEMPERATURE: 97.9 F

## 2024-10-01 DIAGNOSIS — I10 ESSENTIAL HYPERTENSION: ICD-10-CM

## 2024-10-01 DIAGNOSIS — E11.65 TYPE 2 DIABETES MELLITUS WITH HYPERGLYCEMIA, WITHOUT LONG-TERM CURRENT USE OF INSULIN: ICD-10-CM

## 2024-10-01 DIAGNOSIS — I10 ESSENTIAL HYPERTENSION: Primary | ICD-10-CM

## 2024-10-01 LAB
ANION GAP SERPL CALCULATED.3IONS-SCNC: 13.3 MMOL/L (ref 5–15)
BUN SERPL-MCNC: 34 MG/DL (ref 8–23)
BUN/CREAT SERPL: 26.2 (ref 7–25)
CALCIUM SPEC-SCNC: 8.9 MG/DL (ref 8.6–10.5)
CHLORIDE SERPL-SCNC: 100 MMOL/L (ref 98–107)
CO2 SERPL-SCNC: 27.7 MMOL/L (ref 22–29)
CREAT SERPL-MCNC: 1.3 MG/DL (ref 0.76–1.27)
EGFRCR SERPLBLD CKD-EPI 2021: 53.2 ML/MIN/1.73
GLUCOSE SERPL-MCNC: 133 MG/DL (ref 65–99)
HBA1C MFR BLD: 6.3 % (ref 4.8–5.6)
MAGNESIUM SERPL-MCNC: 1.4 MG/DL (ref 1.6–2.4)
POTASSIUM SERPL-SCNC: 3.9 MMOL/L (ref 3.5–5.2)
SODIUM SERPL-SCNC: 141 MMOL/L (ref 136–145)

## 2024-10-01 PROCEDURE — 80048 BASIC METABOLIC PNL TOTAL CA: CPT

## 2024-10-01 PROCEDURE — 99214 OFFICE O/P EST MOD 30 MIN: CPT | Performed by: FAMILY MEDICINE

## 2024-10-01 PROCEDURE — 83735 ASSAY OF MAGNESIUM: CPT

## 2024-10-01 PROCEDURE — 83036 HEMOGLOBIN GLYCOSYLATED A1C: CPT

## 2024-10-01 PROCEDURE — 1126F AMNT PAIN NOTED NONE PRSNT: CPT | Performed by: FAMILY MEDICINE

## 2024-10-01 NOTE — PROGRESS NOTES
"Established Patient Office Visit      Patient Name: Iain Dee  : 1935   MRN: [id number]   Care Team: Patient Care Team:  Luis Aguilar DO as PCP - General (Family Medicine)  Iain Bernstein MD as Consulting Physician (Cardiology)  Isabel Stover MD as Cardiologist (Cardiology)    Chief Complaint:    Chief Complaint   Patient presents with    Heart Problem     Follow up - hospital admission for STEMI 2024       History of Present Illness: Iain Dee is a 88 y.o. male who is here today for chief complaint.    HPI    He has to pee a lot with the diuretic.    This patient is accompanied by their wife who contributes to the history of their care.    The following portions of the patient's history were reviewed and updated as appropriate: allergies, current medications, past family history, past medical history, past social history, past surgical history and problem list.    Subjective      Review of Systems:   Review of Systems - See HPI    Past Medical History:   Past Medical History:   Diagnosis Date    Arthritis     Cancer     Diabetes mellitus     GERD (gastroesophageal reflux disease)     Gout     H/O Bladder carcinoma (HCC)     Hypertension        Past Surgical History:   Past Surgical History:   Procedure Laterality Date    BLADDER SURGERY      Biopsy    HERNIA REPAIR      PROSTATE BIOPSY         Family History:   Family History   Problem Relation Age of Onset    No Known Problems Mother     Heart attack Father        Social History:   Social History     Socioeconomic History    Marital status:    Tobacco Use    Smoking status: Never     Passive exposure: Never    Smokeless tobacco: Never   Vaping Use    Vaping status: Never Used   Substance and Sexual Activity    Alcohol use: Yes     Alcohol/week: 1.0 standard drink of alcohol     Types: 1 Shots of liquor per week     Comment: Estimation based on spouse report of patient drinking \"1 large martini\" each night (regular " martini = 3 shots, large martini = est. 4 shots)    Drug use: No    Sexual activity: Defer       Tobacco History:   Social History     Tobacco Use   Smoking Status Never    Passive exposure: Never   Smokeless Tobacco Never       Medications:   Outpatient Medications Prior to Visit   Medication Sig Dispense Refill    apixaban (ELIQUIS) 2.5 MG tablet tablet Take 1 tablet by mouth 2 (Two) Times a Day. Indications: Atrial Fibrillation 60 tablet 11    atorvastatin (LIPITOR) 40 MG tablet Take 1 tablet by mouth Every Night. 90 tablet 3    bumetanide (BUMEX) 2 MG tablet Take 1 tablet by mouth Daily. 90 tablet 0    carvedilol (COREG) 3.125 MG tablet Take 1 tablet by mouth 2 (Two) Times a Day With Meals. 180 tablet 3    clopidogrel (PLAVIX) 75 MG tablet Take 1 tablet by mouth Daily. 90 tablet 3    famotidine (PEPCID) 20 MG tablet TAKE 1 TABLET BY MOUTH TWICE DAILY AS NEEDED FOR HEARTBURN 90 tablet 0    glucose blood test strip 1 each by Other route Daily. Use daily as directed 50 each 12    glucose monitor monitoring kit 1 each Daily. Use daily as directed. 1 each 0    Jardiance 10 MG tablet tablet Take 1 tablet by mouth Daily. 90 tablet 11    ketoconazole (NIZORAL) 2 % cream APPLY TO THE FACE BY TOPICAL ROUTE TWICE DAILY AS NEEDED FOR RASH.      Lancets Thin misc 1 each Daily. Use daily as directed 50 each 11    nitroglycerin (NITRODUR) 0.2 MG/HR patch Place 1 patch on the skin as directed by provider See Admin Instructions. Apply patch daily, remove at night for at least 10 hours 90 patch 3    pantoprazole (PROTONIX) 20 MG EC tablet       metFORMIN (GLUCOPHAGE) 500 MG tablet TAKE 1 TABLET BY MOUTH TWICE DAILY WITH MEALS 180 tablet 0     No facility-administered medications prior to visit.        Allergies:   Allergies   Allergen Reactions    Colchicine Other (See Comments)     Unable to walk        Objective   Objective     Physical Exam:  Vital Signs:   Vitals:    10/01/24 0834   BP: 134/88   BP Location: Right arm  "  Patient Position: Sitting   Cuff Size: Adult   Pulse: 64   Resp: 16   Temp: 97.9 °F (36.6 °C)   TempSrc: Infrared   SpO2: 94%   Weight: 94.3 kg (208 lb)   Height: 185.4 cm (72.99\")   PainSc: 0-No pain     Body mass index is 27.45 kg/m².     Physical Exam  Nursing note reviewed  Const: NAD, A&Ox4, Pleasant, Cooperative  Eyes: EOMI, no conjunctivitis  ENT: No nasal discharge present, neck supple  Cardiac: Regular rate and rhythm, no cyanosis  Resp: Respiratory rate within normal limits, no increased work of breathing, no audible wheezing or retractions noted  GI: No distention or ascites  MSK: Motor and sensation grossly intact in bilateral upper extremities  Neurologic: CN II-XII grossly intact  Psych: Appropriate mood and behavior.  Skin: Warm, dry  Procedures/Radiology     Procedures  No radiology results for the last 7 days     Assessment & Plan   Assessment / Plan      Assessment/Plan:   Problems Addressed This Visit  Diagnoses and all orders for this visit:    1. Essential hypertension (Primary)  -     Basic Metabolic Panel; Future  -     Magnesium; Future    2. Type 2 diabetes mellitus with hyperglycemia, without long-term current use of insulin  -     Hemoglobin A1c; Future      Problem List Items Addressed This Visit          Cardiac and Vasculature    Essential hypertension - Primary    Relevant Orders    Basic Metabolic Panel (Completed)    Magnesium (Completed)       Endocrine and Metabolic    Diabetes mellitus    Overview     He was on Levemir in the hospital and at the [facility], stopped on his own at discharge and was started on metformin at his hospital follow-up in April.          Relevant Orders    Hemoglobin A1c (Completed)     Orders Placed This Encounter   Procedures    Basic Metabolic Panel     Standing Status:   Future     Number of Occurrences:   1     Standing Expiration Date:   10/1/2025     Order Specific Question:   Release to patient     Answer:   Routine Release [[id number]]    Magnesium "     Standing Status:   Future     Number of Occurrences:   1     Standing Expiration Date:   10/1/2025     Order Specific Question:   Release to patient     Answer:   Routine Release [[id number]]    Hemoglobin A1c     Standing Status:   Future     Number of Occurrences:   1     Standing Expiration Date:   10/1/2025     Order Specific Question:   Release to patient     Answer:   Routine Release [[id number]]           Patient Instructions   Weight should stay between 200lbs and 205lbs (if it is over 210 lbs, you are likely retaining fluid)  Labs today    Follow Up:   Return in about 3 months (around 1/1/2025) for with A1c;.    DO RHONDA Flores RD  [facility]  [address] [facility] [facility] [address]  Fax [phone]  Phone [phone]    Disclaimer to patients: The 21st Century Cares Act makes medical notes like these available to patients in the interest of transparency. However, please be advised that this is still a medical document. It is intended as [id number] communication. Many sections may include medical language or jargon, abbreviations, and additional verbiage that are unfamiliar or confusing. In some ways it may come across as blunt, direct, or may be summarized in order to clearly and concisely communicate the most crucial information to medical professionals. It may also include mentions of conditions that are unlikely but considered as part of the differential diagnosis, including serious disorders. These are not always discussed at length at the time of appointment because their likelihood is so low, but may be included in a medical note to make it clear what has been considered and/or ruled out as part of a work-up. Medical documents are intended to carry relevant information, facts as evident, and the personal clinical opinion of the physician. If you have any questions regarding this medical document, please bring them to  the attention of the physician at your next scheduled appointment.

## 2024-10-01 NOTE — PATIENT INSTRUCTIONS
Weight should stay between 200lbs and 205lbs (if it is over 210 lbs, you are likely retaining fluid)  Labs today

## 2024-10-16 DIAGNOSIS — E11.65 TYPE 2 DIABETES MELLITUS WITH HYPERGLYCEMIA, WITHOUT LONG-TERM CURRENT USE OF INSULIN: ICD-10-CM

## 2024-10-29 RX ORDER — FAMOTIDINE 20 MG/1
20 TABLET, FILM COATED ORAL 2 TIMES DAILY PRN
Qty: 90 TABLET | Refills: 0 | Status: SHIPPED | OUTPATIENT
Start: 2024-10-29

## 2024-11-18 ENCOUNTER — TELEPHONE (OUTPATIENT)
Dept: CARDIOLOGY | Facility: CLINIC | Age: 89
End: 2024-11-18
Payer: MEDICARE

## 2024-11-18 NOTE — TELEPHONE ENCOUNTER
Patient called with concerns of shortness of breath for a few seconds. This happens randomly patient, no correlation with activity or sitting etc.    It started happening 3 days ago.     Pt is taking bumex 2 mg every other day.    He took his dose yesterday.    No swelling  No chest pain  No coughing  No vital signs taken at home    Please advise

## 2024-11-18 NOTE — TELEPHONE ENCOUNTER
Caller: Iain Dee    Relationship to patient: Self    Best call back number: [phone]    Chief complaint: SOB     Type of visit: FOLLOW UP     Requested date: ASAP     Additional notes:PATIENT CALLED IN REQUESTING AN APPOINTMENT WITH DR. ARMANDO DUE TO SHORTNESS OF BREATH FOR THE LAST 3 DAYS. PATIENT STATED THAT THE SHORTNESS OF BREATH COMES AND GOES, BUT DOESN'T HAPPEN IF HE HAS HIS CPAP ON.     PATIENT IS NOT CURRENTLY HAVING THESE SYMPTOMS AS THEY COME AND GO.

## 2024-12-18 RX ORDER — FAMOTIDINE 20 MG/1
20 TABLET, FILM COATED ORAL 2 TIMES DAILY PRN
Qty: 180 TABLET | Refills: 0 | Status: SHIPPED | OUTPATIENT
Start: 2024-12-18

## 2024-12-30 DIAGNOSIS — E11.65 TYPE 2 DIABETES MELLITUS WITH HYPERGLYCEMIA, WITHOUT LONG-TERM CURRENT USE OF INSULIN: ICD-10-CM

## 2025-01-01 ENCOUNTER — APPOINTMENT (OUTPATIENT)
Dept: CT IMAGING | Facility: HOSPITAL | Age: 89
DRG: 291 | End: 2025-01-01
Payer: MEDICARE

## 2025-01-01 ENCOUNTER — APPOINTMENT (OUTPATIENT)
Dept: GENERAL RADIOLOGY | Facility: HOSPITAL | Age: 89
DRG: 291 | End: 2025-01-01
Payer: MEDICARE

## 2025-01-01 ENCOUNTER — APPOINTMENT (OUTPATIENT)
Dept: CARDIOLOGY | Facility: HOSPITAL | Age: 89
DRG: 291 | End: 2025-01-01
Payer: MEDICARE

## 2025-01-01 ENCOUNTER — HOSPITAL ENCOUNTER (INPATIENT)
Facility: HOSPITAL | Age: 89
LOS: 2 days | DRG: 291 | End: 2025-07-27
Attending: EMERGENCY MEDICINE | Admitting: INTERNAL MEDICINE
Payer: MEDICARE

## 2025-01-01 ENCOUNTER — TELEMEDICINE (OUTPATIENT)
Dept: FAMILY MEDICINE CLINIC | Facility: CLINIC | Age: 89
End: 2025-01-01
Payer: MEDICARE

## 2025-01-01 ENCOUNTER — TELEPHONE (OUTPATIENT)
Dept: FAMILY MEDICINE CLINIC | Facility: CLINIC | Age: 89
End: 2025-01-01
Payer: MEDICARE

## 2025-01-01 VITALS
SYSTOLIC BLOOD PRESSURE: 130 MMHG | TEMPERATURE: 98.1 F | DIASTOLIC BLOOD PRESSURE: 112 MMHG | RESPIRATION RATE: 12 BRPM | HEART RATE: 63 BPM | HEIGHT: 74 IN | BODY MASS INDEX: 25.66 KG/M2 | WEIGHT: 199.96 LBS | OXYGEN SATURATION: 100 %

## 2025-01-01 DIAGNOSIS — M79.2 NEUROPATHIC PAIN: ICD-10-CM

## 2025-01-01 DIAGNOSIS — E83.42 HYPOMAGNESEMIA: ICD-10-CM

## 2025-01-01 DIAGNOSIS — R06.02 SHORTNESS OF BREATH: ICD-10-CM

## 2025-01-01 DIAGNOSIS — E11.65 TYPE 2 DIABETES MELLITUS WITH HYPERGLYCEMIA, WITHOUT LONG-TERM CURRENT USE OF INSULIN: ICD-10-CM

## 2025-01-01 DIAGNOSIS — J96.01 ACUTE RESPIRATORY FAILURE WITH HYPOXIA: ICD-10-CM

## 2025-01-01 DIAGNOSIS — N18.32 STAGE 3B CHRONIC KIDNEY DISEASE: ICD-10-CM

## 2025-01-01 DIAGNOSIS — R41.82 ALTERED MENTAL STATUS, UNSPECIFIED ALTERED MENTAL STATUS TYPE: ICD-10-CM

## 2025-01-01 DIAGNOSIS — E79.0 HYPERURICEMIA: Primary | ICD-10-CM

## 2025-01-01 DIAGNOSIS — N18.9 ACUTE KIDNEY INJURY SUPERIMPOSED ON CHRONIC KIDNEY DISEASE: ICD-10-CM

## 2025-01-01 DIAGNOSIS — E87.5 HYPERKALEMIA: ICD-10-CM

## 2025-01-01 DIAGNOSIS — N17.9 ACUTE KIDNEY INJURY SUPERIMPOSED ON CHRONIC KIDNEY DISEASE: ICD-10-CM

## 2025-01-01 DIAGNOSIS — K72.00 SHOCK LIVER: ICD-10-CM

## 2025-01-01 DIAGNOSIS — R09.89 CHEST CONGESTION: ICD-10-CM

## 2025-01-01 DIAGNOSIS — R57.9 SHOCK: Primary | ICD-10-CM

## 2025-01-01 DIAGNOSIS — R39.11 URINARY HESITANCY: ICD-10-CM

## 2025-01-01 LAB
ABO GROUP BLD: NORMAL
ABO GROUP BLD: NORMAL
ALBUMIN SERPL-MCNC: 2.9 G/DL (ref 3.5–5.2)
ALBUMIN SERPL-MCNC: 3 G/DL (ref 3.5–5.2)
ALBUMIN SERPL-MCNC: 3.4 G/DL (ref 3.5–5.2)
ALBUMIN/GLOB SERPL: 1.1 G/DL
ALBUMIN/GLOB SERPL: 1.3 G/DL
ALBUMIN/GLOB SERPL: 1.4 G/DL
ALP SERPL-CCNC: 238 U/L (ref 39–117)
ALP SERPL-CCNC: 321 U/L (ref 39–117)
ALP SERPL-CCNC: 333 U/L (ref 39–117)
ALT SERPL W P-5'-P-CCNC: 101 U/L (ref 1–41)
ALT SERPL W P-5'-P-CCNC: 526 U/L (ref 1–41)
ALT SERPL W P-5'-P-CCNC: 601 U/L (ref 1–41)
ANION GAP SERPL CALCULATED.3IONS-SCNC: 12 MMOL/L (ref 5–15)
ANION GAP SERPL CALCULATED.3IONS-SCNC: 13.3 MMOL/L (ref 5–15)
ANION GAP SERPL CALCULATED.3IONS-SCNC: 16.6 MMOL/L (ref 5–15)
ANION GAP SERPL CALCULATED.3IONS-SCNC: 18.6 MMOL/L (ref 5–15)
AORTIC DIMENSIONLESS INDEX: 0.62 (DI)
APTT PPP: 42 SECONDS (ref 22–39)
ARTERIAL PATENCY WRIST A: ABNORMAL
ARTERIAL PATENCY WRIST A: POSITIVE
ASCENDING AORTA: 3.9 CM
AST SERPL-CCNC: 1474 U/L (ref 1–40)
AST SERPL-CCNC: 193 U/L (ref 1–40)
AST SERPL-CCNC: 887 U/L (ref 1–40)
ATMOSPHERIC PRESS: ABNORMAL MM[HG]
AV MEAN PRESS GRAD SYS DOP V1V2: 2 MMHG
AV VMAX SYS DOP: 91.5 CM/SEC
BACTERIA UR QL AUTO: ABNORMAL /HPF
BASE EXCESS BLDA CALC-SCNC: -0.9 MMOL/L (ref 0–2)
BASE EXCESS BLDA CALC-SCNC: -5.2 MMOL/L (ref 0–2)
BASE EXCESS BLDA CALC-SCNC: 5.4 MMOL/L (ref 0–2)
BASE EXCESS BLDA CALC-SCNC: 9.4 MMOL/L (ref 0–2)
BASE EXCESS BLDA CALC-SCNC: 9.6 MMOL/L (ref 0–2)
BASOPHILS # BLD AUTO: 0.03 10*3/MM3 (ref 0–0.2)
BASOPHILS # BLD AUTO: 0.04 10*3/MM3 (ref 0–0.2)
BASOPHILS NFR BLD AUTO: 0.4 % (ref 0–1.5)
BASOPHILS NFR BLD AUTO: 0.6 % (ref 0–1.5)
BDY SITE: ABNORMAL
BH CV ECHO MEAS - AI P1/2T: 574 MSEC
BH CV ECHO MEAS - AO MAX PG: 3.4 MMHG
BH CV ECHO MEAS - AO ROOT DIAM: 4.4 CM
BH CV ECHO MEAS - AO V2 VTI: 15.8 CM
BH CV ECHO MEAS - AVA(I,D): 2.8 CM2
BH CV ECHO MEAS - EDV(CUBED): 178.8 ML
BH CV ECHO MEAS - EDV(MOD-SP2): 269 ML
BH CV ECHO MEAS - EDV(MOD-SP4): 220 ML
BH CV ECHO MEAS - EF(MOD-SP2): 26.4 %
BH CV ECHO MEAS - EF(MOD-SP4): 28.2 %
BH CV ECHO MEAS - ESV(CUBED): 57.6 ML
BH CV ECHO MEAS - ESV(MOD-SP2): 198 ML
BH CV ECHO MEAS - ESV(MOD-SP4): 158 ML
BH CV ECHO MEAS - FS: 31.5 %
BH CV ECHO MEAS - IVS/LVPW: 1 CM
BH CV ECHO MEAS - IVSD: 1.28 CM
BH CV ECHO MEAS - LA DIMENSION: 6 CM
BH CV ECHO MEAS - LAT PEAK E' VEL: 9.8 CM/SEC
BH CV ECHO MEAS - LV DIASTOLIC VOL/BSA (35-75): 101.2 CM2
BH CV ECHO MEAS - LV MASS(C)D: 309.1 GRAMS
BH CV ECHO MEAS - LV MAX PG: 1.43 MMHG
BH CV ECHO MEAS - LV MEAN PG: 1 MMHG
BH CV ECHO MEAS - LV SYSTOLIC VOL/BSA (12-30): 72.7 CM2
BH CV ECHO MEAS - LV V1 MAX: 59.6 CM/SEC
BH CV ECHO MEAS - LV V1 VTI: 9.9 CM
BH CV ECHO MEAS - LVIDD: 5.6 CM
BH CV ECHO MEAS - LVIDS: 3.9 CM
BH CV ECHO MEAS - LVOT AREA: 4.5 CM2
BH CV ECHO MEAS - LVOT DIAM: 2.4 CM
BH CV ECHO MEAS - LVPWD: 1.28 CM
BH CV ECHO MEAS - MED PEAK E' VEL: 4 CM/SEC
BH CV ECHO MEAS - MR MAX PG: 68.7 MMHG
BH CV ECHO MEAS - MR MAX VEL: 414 CM/SEC
BH CV ECHO MEAS - MR MEAN PG: 47.5 MMHG
BH CV ECHO MEAS - MR MEAN VEL: 330 CM/SEC
BH CV ECHO MEAS - MR VTI: 138.5 CM
BH CV ECHO MEAS - MV DEC SLOPE: 206.5 CM/SEC2
BH CV ECHO MEAS - MV DEC TIME: 0.29 SEC
BH CV ECHO MEAS - MV E MAX VEL: 57.1 CM/SEC
BH CV ECHO MEAS - MV MAX PG: 2.02 MMHG
BH CV ECHO MEAS - MV MEAN PG: 1 MMHG
BH CV ECHO MEAS - MV P1/2T: 115 MSEC
BH CV ECHO MEAS - MV V2 VTI: 22.2 CM
BH CV ECHO MEAS - MVA(P1/2T): 1.91 CM2
BH CV ECHO MEAS - MVA(VTI): 2.01 CM2
BH CV ECHO MEAS - PA ACC TIME: 0.07 SEC
BH CV ECHO MEAS - PI END-D VEL: 125 CM/SEC
BH CV ECHO MEAS - RAP SYSTOLE: 15 MMHG
BH CV ECHO MEAS - RVSP: 46 MMHG
BH CV ECHO MEAS - SV(LVOT): 44.6 ML
BH CV ECHO MEAS - SV(MOD-SP2): 71 ML
BH CV ECHO MEAS - SV(MOD-SP4): 62 ML
BH CV ECHO MEAS - SVI(LVOT): 20.5 ML/M2
BH CV ECHO MEAS - SVI(MOD-SP2): 32.7 ML/M2
BH CV ECHO MEAS - SVI(MOD-SP4): 28.5 ML/M2
BH CV ECHO MEAS - TAPSE (>1.6): 0.88 CM
BH CV ECHO MEAS - TR MAX PG: 30.7 MMHG
BH CV ECHO MEAS - TR MAX VEL: 277 CM/SEC
BH CV ECHO MEASUREMENTS AVERAGE E/E' RATIO: 8.28
BH CV XLRA - RV BASE: 5.6 CM
BH CV XLRA - RV LENGTH: 8.7 CM
BH CV XLRA - RV MID: 3.5 CM
BH CV XLRA - TDI S': 5.2 CM/SEC
BILIRUB SERPL-MCNC: 1.1 MG/DL (ref 0–1.2)
BILIRUB SERPL-MCNC: 1.1 MG/DL (ref 0–1.2)
BILIRUB SERPL-MCNC: 1.3 MG/DL (ref 0–1.2)
BILIRUB UR QL STRIP: NEGATIVE
BLD GP AB SCN SERPL QL: NEGATIVE
BODY TEMPERATURE: 37
BUN BLDA-MCNC: 69 MG/DL (ref 8–26)
BUN SERPL-MCNC: 58.7 MG/DL (ref 8–23)
BUN SERPL-MCNC: 59 MG/DL (ref 8–23)
BUN SERPL-MCNC: 61.4 MG/DL (ref 8–23)
BUN SERPL-MCNC: 65.1 MG/DL (ref 8–23)
BUN/CREAT SERPL: 25.7 (ref 7–25)
BUN/CREAT SERPL: 26.4 (ref 7–25)
BUN/CREAT SERPL: 28.8 (ref 7–25)
BUN/CREAT SERPL: 30.1 (ref 7–25)
CA-I BLDA-SCNC: 1.08 MMOL/L (ref 1.2–1.32)
CALCIUM SPEC-SCNC: 8.5 MG/DL (ref 8.6–10.5)
CALCIUM SPEC-SCNC: 8.6 MG/DL (ref 8.6–10.5)
CALCIUM SPEC-SCNC: 8.7 MG/DL (ref 8.6–10.5)
CALCIUM SPEC-SCNC: 9 MG/DL (ref 8.6–10.5)
CHLORIDE BLDA-SCNC: 92 MMOL/L (ref 98–109)
CHLORIDE SERPL-SCNC: 89 MMOL/L (ref 98–107)
CHLORIDE SERPL-SCNC: 92 MMOL/L (ref 98–107)
CHLORIDE SERPL-SCNC: 95 MMOL/L (ref 98–107)
CHLORIDE SERPL-SCNC: 95 MMOL/L (ref 98–107)
CK SERPL-CCNC: 77 U/L (ref 20–200)
CLARITY UR: CLEAR
CO2 BLDA-SCNC: 20.5 MMOL/L (ref 22–33)
CO2 BLDA-SCNC: 24.2 MMOL/L (ref 22–33)
CO2 BLDA-SCNC: 27 MMOL/L (ref 24–29)
CO2 BLDA-SCNC: 27.3 MMOL/L (ref 22–33)
CO2 BLDA-SCNC: 35.8 MMOL/L (ref 22–33)
CO2 BLDA-SCNC: 37.3 MMOL/L (ref 22–33)
CO2 SERPL-SCNC: 24.4 MMOL/L (ref 22–29)
CO2 SERPL-SCNC: 25.4 MMOL/L (ref 22–29)
CO2 SERPL-SCNC: 29.7 MMOL/L (ref 22–29)
CO2 SERPL-SCNC: 32 MMOL/L (ref 22–29)
COHGB MFR BLD: 1.2 % (ref 0–2)
COHGB MFR BLD: 1.2 % (ref 0–2)
COHGB MFR BLD: 1.5 % (ref 0–2)
COHGB MFR BLD: 2.1 % (ref 0–2)
COHGB MFR BLD: 2.2 % (ref 0–2)
COLOR UR: YELLOW
CREAT BLDA-MCNC: 2.5 MG/DL (ref 0.6–1.3)
CREAT SERPL-MCNC: 2.04 MG/DL (ref 0.76–1.27)
CREAT SERPL-MCNC: 2.16 MG/DL (ref 0.76–1.27)
CREAT SERPL-MCNC: 2.3 MG/DL (ref 0.76–1.27)
CREAT SERPL-MCNC: 2.33 MG/DL (ref 0.76–1.27)
D-LACTATE SERPL-SCNC: 1.4 MMOL/L (ref 0.5–2)
D-LACTATE SERPL-SCNC: 2 MMOL/L (ref 0.5–2)
D-LACTATE SERPL-SCNC: 2 MMOL/L (ref 0.5–2)
D-LACTATE SERPL-SCNC: 3.4 MMOL/L (ref 0.5–2)
D-LACTATE SERPL-SCNC: 8 MMOL/L (ref 0.5–2)
DEPRECATED RDW RBC AUTO: 59.9 FL (ref 37–54)
DEPRECATED RDW RBC AUTO: 63.7 FL (ref 37–54)
DEPRECATED RDW RBC AUTO: 65.5 FL (ref 37–54)
EGFRCR SERPLBLD CKD-EPI 2021: 24.1 ML/MIN/1.73
EGFRCR SERPLBLD CKD-EPI 2021: 26.2 ML/MIN/1.73
EGFRCR SERPLBLD CKD-EPI 2021: 26.6 ML/MIN/1.73
EGFRCR SERPLBLD CKD-EPI 2021: 28.7 ML/MIN/1.73
EGFRCR SERPLBLD CKD-EPI 2021: 30.8 ML/MIN/1.73
EOSINOPHIL # BLD AUTO: 0 10*3/MM3 (ref 0–0.4)
EOSINOPHIL # BLD AUTO: 0.04 10*3/MM3 (ref 0–0.4)
EOSINOPHIL NFR BLD AUTO: 0 % (ref 0.3–6.2)
EOSINOPHIL NFR BLD AUTO: 0.6 % (ref 0.3–6.2)
EPAP: 0
ERYTHROCYTE [DISTWIDTH] IN BLOOD BY AUTOMATED COUNT: 17.6 % (ref 12.3–15.4)
ERYTHROCYTE [DISTWIDTH] IN BLOOD BY AUTOMATED COUNT: 17.9 % (ref 12.3–15.4)
ERYTHROCYTE [DISTWIDTH] IN BLOOD BY AUTOMATED COUNT: 18.4 % (ref 12.3–15.4)
GEN 5 1HR TROPONIN T REFLEX: 139 NG/L
GLOBULIN UR ELPH-MCNC: 2.2 GM/DL
GLOBULIN UR ELPH-MCNC: 2.3 GM/DL
GLOBULIN UR ELPH-MCNC: 3 GM/DL
GLUCOSE BLDC GLUCOMTR-MCNC: 118 MG/DL (ref 70–130)
GLUCOSE BLDC GLUCOMTR-MCNC: 168 MG/DL (ref 70–130)
GLUCOSE BLDC GLUCOMTR-MCNC: 205 MG/DL (ref 70–130)
GLUCOSE BLDC GLUCOMTR-MCNC: 74 MG/DL (ref 70–130)
GLUCOSE BLDC GLUCOMTR-MCNC: 75 MG/DL (ref 70–130)
GLUCOSE BLDC GLUCOMTR-MCNC: 76 MG/DL (ref 70–130)
GLUCOSE BLDC GLUCOMTR-MCNC: 76 MG/DL (ref 70–130)
GLUCOSE BLDC GLUCOMTR-MCNC: 81 MG/DL (ref 70–130)
GLUCOSE BLDC GLUCOMTR-MCNC: 95 MG/DL (ref 70–130)
GLUCOSE SERPL-MCNC: 111 MG/DL (ref 65–99)
GLUCOSE SERPL-MCNC: 172 MG/DL (ref 65–99)
GLUCOSE SERPL-MCNC: 187 MG/DL (ref 65–99)
GLUCOSE SERPL-MCNC: 57 MG/DL (ref 65–99)
GLUCOSE UR STRIP-MCNC: ABNORMAL MG/DL
HBA1C MFR BLD: 7.07 % (ref 4.8–5.6)
HCO3 BLDA-SCNC: 19.9 MMOL/L (ref 20–26)
HCO3 BLDA-SCNC: 22.6 MMOL/L (ref 20–26)
HCO3 BLDA-SCNC: 26.5 MMOL/L (ref 20–26)
HCO3 BLDA-SCNC: 34.3 MMOL/L (ref 20–26)
HCO3 BLDA-SCNC: 35.6 MMOL/L (ref 20–26)
HCT VFR BLD AUTO: 28.9 % (ref 37.5–51)
HCT VFR BLD AUTO: 29.3 % (ref 37.5–51)
HCT VFR BLD AUTO: 30 % (ref 37.5–51)
HCT VFR BLD AUTO: 34.3 % (ref 37.5–51)
HCT VFR BLD CALC: 26.1 % (ref 38–51)
HCT VFR BLD CALC: 27.1 % (ref 38–51)
HCT VFR BLD CALC: 28.7 % (ref 38–51)
HCT VFR BLD CALC: 29.8 % (ref 38–51)
HCT VFR BLD CALC: 31.3 % (ref 38–51)
HCT VFR BLDA CALC: 34 % (ref 38–51)
HGB BLD-MCNC: 9 G/DL (ref 13–17.7)
HGB BLD-MCNC: 9.1 G/DL (ref 13–17.7)
HGB BLD-MCNC: 9.1 G/DL (ref 13–17.7)
HGB BLD-MCNC: 9.8 G/DL (ref 13–17.7)
HGB BLDA-MCNC: 10.2 G/DL (ref 13.5–17.5)
HGB BLDA-MCNC: 11.6 G/DL (ref 12–17)
HGB BLDA-MCNC: 8.5 G/DL (ref 13.5–17.5)
HGB BLDA-MCNC: 8.8 G/DL (ref 13.5–17.5)
HGB BLDA-MCNC: 9.4 G/DL (ref 13.5–17.5)
HGB BLDA-MCNC: 9.7 G/DL (ref 13.5–17.5)
HGB UR QL STRIP.AUTO: ABNORMAL
HOLD SPECIMEN: NORMAL
HYALINE CASTS UR QL AUTO: ABNORMAL /LPF
IMM GRANULOCYTES # BLD AUTO: 0.03 10*3/MM3 (ref 0–0.05)
IMM GRANULOCYTES # BLD AUTO: 0.04 10*3/MM3 (ref 0–0.05)
IMM GRANULOCYTES NFR BLD AUTO: 0.4 % (ref 0–0.5)
IMM GRANULOCYTES NFR BLD AUTO: 0.5 % (ref 0–0.5)
INHALED O2 CONCENTRATION: 28 %
INHALED O2 CONCENTRATION: 40 %
INR PPP: 2.14 (ref 0.89–1.12)
IPAP: 0
IVRT: 113 MS
KETONES UR QL STRIP: NEGATIVE
L PNEUMO1 AG UR QL IA: NEGATIVE
LEFT ATRIUM VOLUME INDEX: 85.7 ML/M2
LEUKOCYTE ESTERASE UR QL STRIP.AUTO: ABNORMAL
LV EF BIPLANE MOD: 28.5 %
LYMPHOCYTES # BLD AUTO: 0.55 10*3/MM3 (ref 0.7–3.1)
LYMPHOCYTES # BLD AUTO: 0.65 10*3/MM3 (ref 0.7–3.1)
LYMPHOCYTES NFR BLD AUTO: 7.6 % (ref 19.6–45.3)
LYMPHOCYTES NFR BLD AUTO: 8.4 % (ref 19.6–45.3)
Lab: ABNORMAL
Lab: ABNORMAL
MAGNESIUM SERPL-MCNC: 2.1 MG/DL (ref 1.6–2.4)
MAGNESIUM SERPL-MCNC: 2.1 MG/DL (ref 1.6–2.4)
MAGNESIUM SERPL-MCNC: 2.5 MG/DL (ref 1.6–2.4)
MCH RBC QN AUTO: 28.7 PG (ref 26.6–33)
MCH RBC QN AUTO: 28.8 PG (ref 26.6–33)
MCH RBC QN AUTO: 29.4 PG (ref 26.6–33)
MCHC RBC AUTO-ENTMCNC: 28.6 G/DL (ref 31.5–35.7)
MCHC RBC AUTO-ENTMCNC: 30 G/DL (ref 31.5–35.7)
MCHC RBC AUTO-ENTMCNC: 31.5 G/DL (ref 31.5–35.7)
MCV RBC AUTO: 100.6 FL (ref 79–97)
MCV RBC AUTO: 93.2 FL (ref 79–97)
MCV RBC AUTO: 95.8 FL (ref 79–97)
METHGB BLD QL: 0.1 % (ref 0–1.5)
METHGB BLD QL: 0.2 % (ref 0–1.5)
METHGB BLD QL: 0.2 % (ref 0–1.5)
METHGB BLD QL: 0.3 % (ref 0–1.5)
METHGB BLD QL: 0.4 % (ref 0–1.5)
MODALITY: ABNORMAL
MONOCYTES # BLD AUTO: 0.39 10*3/MM3 (ref 0.1–0.9)
MONOCYTES # BLD AUTO: 0.7 10*3/MM3 (ref 0.1–0.9)
MONOCYTES NFR BLD AUTO: 5.4 % (ref 5–12)
MONOCYTES NFR BLD AUTO: 9 % (ref 5–12)
MRSA DNA SPEC QL NAA+PROBE: NEGATIVE
NEUTROPHILS NFR BLD AUTO: 6.18 10*3/MM3 (ref 1.7–7)
NEUTROPHILS NFR BLD AUTO: 6.32 10*3/MM3 (ref 1.7–7)
NEUTROPHILS NFR BLD AUTO: 81.7 % (ref 42.7–76)
NEUTROPHILS NFR BLD AUTO: 85.4 % (ref 42.7–76)
NITRITE UR QL STRIP: NEGATIVE
NOTIFIED BY: ABNORMAL
NOTIFIED BY: ABNORMAL
NOTIFIED WHO: ABNORMAL
NOTIFIED WHO: ABNORMAL
NRBC BLD AUTO-RTO: 0 /100 WBC (ref 0–0.2)
NRBC BLD AUTO-RTO: 0 /100 WBC (ref 0–0.2)
NT-PROBNP SERPL-MCNC: ABNORMAL PG/ML (ref 0–1800)
OXYHGB MFR BLDV: 93.4 % (ref 94–99)
OXYHGB MFR BLDV: 95.9 % (ref 94–99)
OXYHGB MFR BLDV: 96.6 % (ref 94–99)
OXYHGB MFR BLDV: 96.9 % (ref 94–99)
OXYHGB MFR BLDV: 97.1 % (ref 94–99)
PAW @ PEAK INSP FLOW SETTING VENT: 0 CMH2O
PCO2 BLDA: 20.6 MM HG (ref 35–45)
PCO2 BLDA: 25.7 MM HG (ref 35–45)
PCO2 BLDA: 48.6 MM HG (ref 35–45)
PCO2 BLDA: 54.2 MM HG (ref 35–45)
PCO2 BLDA: 55.9 MM HG (ref 35–45)
PCO2 TEMP ADJ BLD: 20.6 MM HG (ref 35–48)
PCO2 TEMP ADJ BLD: 25.7 MM HG (ref 35–48)
PCO2 TEMP ADJ BLD: 48.6 MM HG (ref 35–48)
PCO2 TEMP ADJ BLD: 54.2 MM HG (ref 35–48)
PCO2 TEMP ADJ BLD: 55.9 MM HG (ref 35–48)
PEEP RESPIRATORY: 5 CM[H2O]
PEEP RESPIRATORY: 5 CM[H2O]
PEEP RESPIRATORY: 8 CM[H2O]
PEEP RESPIRATORY: 8 CM[H2O]
PH BLDA: 7.23 PH UNITS (ref 7.35–7.45)
PH BLDA: 7.41 PH UNITS (ref 7.35–7.45)
PH BLDA: 7.46 PH UNITS (ref 7.35–7.45)
PH BLDA: 7.59 PH UNITS (ref 7.35–7.45)
PH BLDA: 7.62 PH UNITS (ref 7.35–7.45)
PH UR STRIP.AUTO: 7 [PH] (ref 5–8)
PH, TEMP CORRECTED: 7.23 PH UNITS
PH, TEMP CORRECTED: 7.41 PH UNITS
PH, TEMP CORRECTED: 7.46 PH UNITS
PH, TEMP CORRECTED: 7.59 PH UNITS
PH, TEMP CORRECTED: 7.62 PH UNITS
PHOSPHATE SERPL-MCNC: 2.8 MG/DL (ref 2.5–4.5)
PHOSPHATE SERPL-MCNC: 3.3 MG/DL (ref 2.5–4.5)
PLATELET # BLD AUTO: 219 10*3/MM3 (ref 140–450)
PLATELET # BLD AUTO: 230 10*3/MM3 (ref 140–450)
PLATELET # BLD AUTO: 261 10*3/MM3 (ref 140–450)
PMV BLD AUTO: 10.4 FL (ref 6–12)
PMV BLD AUTO: 11.1 FL (ref 6–12)
PMV BLD AUTO: 11.2 FL (ref 6–12)
PO2 BLDA: 102 MM HG (ref 83–108)
PO2 BLDA: 113 MM HG (ref 83–108)
PO2 BLDA: 76.6 MM HG (ref 83–108)
PO2 BLDA: 91.7 MM HG (ref 83–108)
PO2 BLDA: 94.6 MM HG (ref 83–108)
PO2 TEMP ADJ BLD: 102 MM HG (ref 83–108)
PO2 TEMP ADJ BLD: 113 MM HG (ref 83–108)
PO2 TEMP ADJ BLD: 76.6 MM HG (ref 83–108)
PO2 TEMP ADJ BLD: 91.7 MM HG (ref 83–108)
PO2 TEMP ADJ BLD: 94.6 MM HG (ref 83–108)
POTASSIUM BLDA-SCNC: 6.3 MMOL/L (ref 3.5–4.9)
POTASSIUM SERPL-SCNC: 3.8 MMOL/L (ref 3.5–5.2)
POTASSIUM SERPL-SCNC: 4.6 MMOL/L (ref 3.5–5.2)
POTASSIUM SERPL-SCNC: 4.6 MMOL/L (ref 3.5–5.2)
POTASSIUM SERPL-SCNC: 7.1 MMOL/L (ref 3.5–5.2)
PROCALCITONIN SERPL-MCNC: 0.91 NG/ML (ref 0–0.25)
PROT SERPL-MCNC: 5.2 G/DL (ref 6–8.5)
PROT SERPL-MCNC: 5.2 G/DL (ref 6–8.5)
PROT SERPL-MCNC: 6.4 G/DL (ref 6–8.5)
PROT UR QL STRIP: ABNORMAL
PROTHROMBIN TIME: 25.3 SECONDS (ref 12.2–15.3)
RBC # BLD AUTO: 3.1 10*6/MM3 (ref 4.14–5.8)
RBC # BLD AUTO: 3.13 10*6/MM3 (ref 4.14–5.8)
RBC # BLD AUTO: 3.41 10*6/MM3 (ref 4.14–5.8)
RBC # UR STRIP: ABNORMAL /HPF
REF LAB TEST METHOD: ABNORMAL
RH BLD: POSITIVE
RH BLD: POSITIVE
S PNEUM AG SPEC QL LA: NEGATIVE
SODIUM BLD-SCNC: 130 MMOL/L (ref 138–146)
SODIUM SERPL-SCNC: 133 MMOL/L (ref 136–145)
SODIUM SERPL-SCNC: 136 MMOL/L (ref 136–145)
SODIUM SERPL-SCNC: 136 MMOL/L (ref 136–145)
SODIUM SERPL-SCNC: 138 MMOL/L (ref 136–145)
SP GR UR STRIP: 1.01 (ref 1–1.03)
SQUAMOUS #/AREA URNS HPF: ABNORMAL /HPF
T&S EXPIRATION DATE: NORMAL
TOTAL RATE: 0 BREATHS/MINUTE
TOTAL RATE: 13 BREATHS/MINUTE
TOTAL RATE: 14 BREATHS/MINUTE
TOTAL RATE: 14 BREATHS/MINUTE
TROPONIN T % DELTA: -15
TROPONIN T NUMERIC DELTA: -25 NG/L
TROPONIN T SERPL HS-MCNC: 164 NG/L
UROBILINOGEN UR QL STRIP: ABNORMAL
VENTILATOR MODE: ABNORMAL
VT ON VENT VENT: 0.45 ML
VT ON VENT VENT: 0.45 ML
VT ON VENT VENT: 0.57 ML
WBC # UR STRIP: ABNORMAL /HPF
WBC NRBC COR # BLD AUTO: 7.23 10*3/MM3 (ref 3.4–10.8)
WBC NRBC COR # BLD AUTO: 7.74 10*3/MM3 (ref 3.4–10.8)
WBC NRBC COR # BLD AUTO: 7.88 10*3/MM3 (ref 3.4–10.8)
WHOLE BLOOD HOLD COAG: NORMAL
WHOLE BLOOD HOLD SPECIMEN: NORMAL

## 2025-01-01 PROCEDURE — 82948 REAGENT STRIP/BLOOD GLUCOSE: CPT

## 2025-01-01 PROCEDURE — P9047 ALBUMIN (HUMAN), 25%, 50ML: HCPCS

## 2025-01-01 PROCEDURE — 81001 URINALYSIS AUTO W/SCOPE: CPT | Performed by: EMERGENCY MEDICINE

## 2025-01-01 PROCEDURE — 99291 CRITICAL CARE FIRST HOUR: CPT | Performed by: INTERNAL MEDICINE

## 2025-01-01 PROCEDURE — 74018 RADEX ABDOMEN 1 VIEW: CPT

## 2025-01-01 PROCEDURE — 82375 ASSAY CARBOXYHB QUANT: CPT

## 2025-01-01 PROCEDURE — 86900 BLOOD TYPING SEROLOGIC ABO: CPT

## 2025-01-01 PROCEDURE — 80047 BASIC METABLC PNL IONIZED CA: CPT

## 2025-01-01 PROCEDURE — 63710000001 INSULIN REGULAR HUMAN PER 5 UNITS: Performed by: NURSE PRACTITIONER

## 2025-01-01 PROCEDURE — 25010000002 PIPERACILLIN SOD-TAZOBACTAM PER 1 G: Performed by: EMERGENCY MEDICINE

## 2025-01-01 PROCEDURE — 25010000002 PIPERACILLIN SOD-TAZOBACTAM PER 1 G: Performed by: INTERNAL MEDICINE

## 2025-01-01 PROCEDURE — 85025 COMPLETE CBC W/AUTO DIFF WBC: CPT | Performed by: INTERNAL MEDICINE

## 2025-01-01 PROCEDURE — 25010000002 FENTANYL 10 MCG/1 ML NS: Performed by: INTERNAL MEDICINE

## 2025-01-01 PROCEDURE — 94799 UNLISTED PULMONARY SVC/PX: CPT

## 2025-01-01 PROCEDURE — 25010000002 PHENTOLAMINE MESYLATE PER 5 MG: Performed by: NURSE PRACTITIONER

## 2025-01-01 PROCEDURE — 87641 MR-STAPH DNA AMP PROBE: CPT | Performed by: NURSE PRACTITIONER

## 2025-01-01 PROCEDURE — 99232 SBSQ HOSP IP/OBS MODERATE 35: CPT | Performed by: INTERNAL MEDICINE

## 2025-01-01 PROCEDURE — 84145 PROCALCITONIN (PCT): CPT | Performed by: EMERGENCY MEDICINE

## 2025-01-01 PROCEDURE — 5A1945Z RESPIRATORY VENTILATION, 24-96 CONSECUTIVE HOURS: ICD-10-PCS | Performed by: EMERGENCY MEDICINE

## 2025-01-01 PROCEDURE — 99233 SBSQ HOSP IP/OBS HIGH 50: CPT | Performed by: INTERNAL MEDICINE

## 2025-01-01 PROCEDURE — 71045 X-RAY EXAM CHEST 1 VIEW: CPT

## 2025-01-01 PROCEDURE — 25010000002 THIAMINE PER 100 MG: Performed by: NURSE PRACTITIONER

## 2025-01-01 PROCEDURE — 25010000002 PIPERACILLIN SOD-TAZOBACTAM PER 1 G: Performed by: NURSE PRACTITIONER

## 2025-01-01 PROCEDURE — 0BH17EZ INSERTION OF ENDOTRACHEAL AIRWAY INTO TRACHEA, VIA NATURAL OR ARTIFICIAL OPENING: ICD-10-PCS | Performed by: EMERGENCY MEDICINE

## 2025-01-01 PROCEDURE — 83735 ASSAY OF MAGNESIUM: CPT | Performed by: EMERGENCY MEDICINE

## 2025-01-01 PROCEDURE — 85025 COMPLETE CBC W/AUTO DIFF WBC: CPT | Performed by: EMERGENCY MEDICINE

## 2025-01-01 PROCEDURE — 82805 BLOOD GASES W/O2 SATURATION: CPT

## 2025-01-01 PROCEDURE — 25010000002 MORPHINE PER 10 MG

## 2025-01-01 PROCEDURE — 83735 ASSAY OF MAGNESIUM: CPT | Performed by: INTERNAL MEDICINE

## 2025-01-01 PROCEDURE — 83036 HEMOGLOBIN GLYCOSYLATED A1C: CPT | Performed by: NURSE PRACTITIONER

## 2025-01-01 PROCEDURE — 25010000002 DOBUTAMINE PER 250 MG

## 2025-01-01 PROCEDURE — 25010000002 BUMETANIDE PER 0.5 MG: Performed by: EMERGENCY MEDICINE

## 2025-01-01 PROCEDURE — 83605 ASSAY OF LACTIC ACID: CPT | Performed by: EMERGENCY MEDICINE

## 2025-01-01 PROCEDURE — 25010000002 ATROPINE SULFATE 0.1 MG/ML

## 2025-01-01 PROCEDURE — 25010000002 BUMETANIDE PER 0.5 MG: Performed by: INTERNAL MEDICINE

## 2025-01-01 PROCEDURE — 83605 ASSAY OF LACTIC ACID: CPT | Performed by: NURSE PRACTITIONER

## 2025-01-01 PROCEDURE — 87449 NOS EACH ORGANISM AG IA: CPT | Performed by: NURSE PRACTITIONER

## 2025-01-01 PROCEDURE — 86901 BLOOD TYPING SEROLOGIC RH(D): CPT

## 2025-01-01 PROCEDURE — 83050 HGB METHEMOGLOBIN QUAN: CPT

## 2025-01-01 PROCEDURE — 80053 COMPREHEN METABOLIC PANEL: CPT | Performed by: NURSE PRACTITIONER

## 2025-01-01 PROCEDURE — 85027 COMPLETE CBC AUTOMATED: CPT | Performed by: NURSE PRACTITIONER

## 2025-01-01 PROCEDURE — 25010000002 DOBUTAMINE PER 250 MG: Performed by: EMERGENCY MEDICINE

## 2025-01-01 PROCEDURE — 82550 ASSAY OF CK (CPK): CPT | Performed by: NURSE PRACTITIONER

## 2025-01-01 PROCEDURE — 93306 TTE W/DOPPLER COMPLETE: CPT

## 2025-01-01 PROCEDURE — 94003 VENT MGMT INPAT SUBQ DAY: CPT

## 2025-01-01 PROCEDURE — 25010000002 GLYCOPYRROLATE 0.2 MG/ML SOLUTION

## 2025-01-01 PROCEDURE — 87899 AGENT NOS ASSAY W/OPTIC: CPT | Performed by: NURSE PRACTITIONER

## 2025-01-01 PROCEDURE — 25010000002 EPINEPHRINE 1 MG/10ML SOLUTION PREFILLED SYRINGE

## 2025-01-01 PROCEDURE — 94002 VENT MGMT INPAT INIT DAY: CPT

## 2025-01-01 PROCEDURE — 99222 1ST HOSP IP/OBS MODERATE 55: CPT | Performed by: INTERNAL MEDICINE

## 2025-01-01 PROCEDURE — 94664 DEMO&/EVAL PT USE INHALER: CPT

## 2025-01-01 PROCEDURE — 85610 PROTHROMBIN TIME: CPT | Performed by: INTERNAL MEDICINE

## 2025-01-01 PROCEDURE — 99285 EMERGENCY DEPT VISIT HI MDM: CPT

## 2025-01-01 PROCEDURE — 25010000002 EPINEPHRINE 1 MG/ML SOLUTION: Performed by: EMERGENCY MEDICINE

## 2025-01-01 PROCEDURE — 36600 WITHDRAWAL OF ARTERIAL BLOOD: CPT

## 2025-01-01 PROCEDURE — 85014 HEMATOCRIT: CPT

## 2025-01-01 PROCEDURE — 84100 ASSAY OF PHOSPHORUS: CPT | Performed by: NURSE PRACTITIONER

## 2025-01-01 PROCEDURE — 25010000002 ALBUMIN HUMAN 25% PER 50 ML

## 2025-01-01 PROCEDURE — 93005 ELECTROCARDIOGRAM TRACING: CPT | Performed by: EMERGENCY MEDICINE

## 2025-01-01 PROCEDURE — 85018 HEMOGLOBIN: CPT

## 2025-01-01 PROCEDURE — 25010000003 DEXTROSE 5 % SOLUTION

## 2025-01-01 PROCEDURE — 25010000002 SULFUR HEXAFLUORIDE MICROSPH 60.7-25 MG RECONSTITUTED SUSPENSION: Performed by: INTERNAL MEDICINE

## 2025-01-01 PROCEDURE — 93306 TTE W/DOPPLER COMPLETE: CPT | Performed by: INTERNAL MEDICINE

## 2025-01-01 PROCEDURE — 85730 THROMBOPLASTIN TIME PARTIAL: CPT | Performed by: INTERNAL MEDICINE

## 2025-01-01 PROCEDURE — 25810000003 SODIUM CHLORIDE 0.9 % SOLUTION: Performed by: EMERGENCY MEDICINE

## 2025-01-01 PROCEDURE — 80053 COMPREHEN METABOLIC PANEL: CPT | Performed by: EMERGENCY MEDICINE

## 2025-01-01 PROCEDURE — 63710000001 INSULIN REGULAR HUMAN PER 5 UNITS

## 2025-01-01 PROCEDURE — 87205 SMEAR GRAM STAIN: CPT | Performed by: NURSE PRACTITIONER

## 2025-01-01 PROCEDURE — 83735 ASSAY OF MAGNESIUM: CPT | Performed by: NURSE PRACTITIONER

## 2025-01-01 PROCEDURE — 25010000002 CALCIUM CHLORIDE 10 % SOLUTION: Performed by: EMERGENCY MEDICINE

## 2025-01-01 PROCEDURE — 84484 ASSAY OF TROPONIN QUANT: CPT | Performed by: EMERGENCY MEDICINE

## 2025-01-01 PROCEDURE — 83880 ASSAY OF NATRIURETIC PEPTIDE: CPT | Performed by: EMERGENCY MEDICINE

## 2025-01-01 PROCEDURE — 94640 AIRWAY INHALATION TREATMENT: CPT

## 2025-01-01 PROCEDURE — 87040 BLOOD CULTURE FOR BACTERIA: CPT | Performed by: EMERGENCY MEDICINE

## 2025-01-01 PROCEDURE — 86850 RBC ANTIBODY SCREEN: CPT

## 2025-01-01 PROCEDURE — 63710000001 INSULIN GLARGINE PER 5 UNITS: Performed by: NURSE PRACTITIONER

## 2025-01-01 PROCEDURE — P9612 CATHETERIZE FOR URINE SPEC: HCPCS

## 2025-01-01 PROCEDURE — 31500 INSERT EMERGENCY AIRWAY: CPT

## 2025-01-01 PROCEDURE — 71250 CT THORAX DX C-: CPT

## 2025-01-01 PROCEDURE — 80053 COMPREHEN METABOLIC PANEL: CPT | Performed by: INTERNAL MEDICINE

## 2025-01-01 PROCEDURE — 84100 ASSAY OF PHOSPHORUS: CPT | Performed by: INTERNAL MEDICINE

## 2025-01-01 PROCEDURE — 87070 CULTURE OTHR SPECIMN AEROBIC: CPT | Performed by: NURSE PRACTITIONER

## 2025-01-01 PROCEDURE — 36415 COLL VENOUS BLD VENIPUNCTURE: CPT

## 2025-01-01 PROCEDURE — 70450 CT HEAD/BRAIN W/O DYE: CPT

## 2025-01-01 RX ORDER — GABAPENTIN 100 MG/1
100 CAPSULE ORAL 3 TIMES DAILY
Qty: 90 CAPSULE | Refills: 5 | Status: SHIPPED | OUTPATIENT
Start: 2025-01-01

## 2025-01-01 RX ORDER — BISACODYL 5 MG/1
5 TABLET, DELAYED RELEASE ORAL DAILY PRN
Status: DISCONTINUED | OUTPATIENT
Start: 2025-01-01 | End: 2025-01-01

## 2025-01-01 RX ORDER — DOBUTAMINE HYDROCHLORIDE 100 MG/100ML
2-20 INJECTION INTRAVENOUS
Status: DISCONTINUED | OUTPATIENT
Start: 2025-01-01 | End: 2025-01-01

## 2025-01-01 RX ORDER — ALLOPURINOL 100 MG/1
100 TABLET ORAL DAILY
Status: DISCONTINUED | OUTPATIENT
Start: 2025-01-01 | End: 2025-01-01

## 2025-01-01 RX ORDER — BUMETANIDE 0.25 MG/ML
2 INJECTION, SOLUTION INTRAMUSCULAR; INTRAVENOUS ONCE
Status: COMPLETED | OUTPATIENT
Start: 2025-01-01 | End: 2025-01-01

## 2025-01-01 RX ORDER — MILRINONE LACTATE 0.2 MG/ML
INJECTION, SOLUTION INTRAVENOUS
Status: DISCONTINUED
Start: 2025-01-01 | End: 2025-01-01

## 2025-01-01 RX ORDER — ALBUMIN (HUMAN) 12.5 G/50ML
50 SOLUTION INTRAVENOUS ONCE
Status: COMPLETED | OUTPATIENT
Start: 2025-01-01 | End: 2025-01-01

## 2025-01-01 RX ORDER — SODIUM CHLORIDE 0.9 % (FLUSH) 0.9 %
10 SYRINGE (ML) INJECTION EVERY 12 HOURS SCHEDULED
Status: DISCONTINUED | OUTPATIENT
Start: 2025-01-01 | End: 2025-01-01

## 2025-01-01 RX ORDER — DEXTROSE MONOHYDRATE 25 G/50ML
INJECTION, SOLUTION INTRAVENOUS
Status: DISCONTINUED
Start: 2025-01-01 | End: 2025-01-01

## 2025-01-01 RX ORDER — MIDAZOLAM HYDROCHLORIDE 1 MG/ML
2 INJECTION, SOLUTION INTRAMUSCULAR; INTRAVENOUS
Status: DISCONTINUED | OUTPATIENT
Start: 2025-01-01 | End: 2025-01-01 | Stop reason: HOSPADM

## 2025-01-01 RX ORDER — BUMETANIDE 0.25 MG/ML
2 INJECTION, SOLUTION INTRAMUSCULAR; INTRAVENOUS EVERY 6 HOURS
Status: DISCONTINUED | OUTPATIENT
Start: 2025-01-01 | End: 2025-01-01 | Stop reason: HOSPADM

## 2025-01-01 RX ORDER — MORPHINE SULFATE 2 MG/ML
2 INJECTION, SOLUTION INTRAMUSCULAR; INTRAVENOUS ONCE
Status: COMPLETED | OUTPATIENT
Start: 2025-01-01 | End: 2025-01-01

## 2025-01-01 RX ORDER — IBUPROFEN 600 MG/1
1 TABLET ORAL
Status: DISCONTINUED | OUTPATIENT
Start: 2025-01-01 | End: 2025-01-01

## 2025-01-01 RX ORDER — BISACODYL 10 MG
10 SUPPOSITORY, RECTAL RECTAL DAILY PRN
Status: DISCONTINUED | OUTPATIENT
Start: 2025-01-01 | End: 2025-01-01

## 2025-01-01 RX ORDER — DEXTROSE MONOHYDRATE 50 MG/ML
30 INJECTION, SOLUTION INTRAVENOUS CONTINUOUS
Status: DISCONTINUED | OUTPATIENT
Start: 2025-01-01 | End: 2025-01-01

## 2025-01-01 RX ORDER — NOREPINEPHRINE BITARTRATE 0.03 MG/ML
INJECTION, SOLUTION INTRAVENOUS
Status: COMPLETED
Start: 2025-01-01 | End: 2025-01-01

## 2025-01-01 RX ORDER — POLYETHYLENE GLYCOL 3350 17 G/17G
17 POWDER, FOR SOLUTION ORAL DAILY PRN
Status: DISCONTINUED | OUTPATIENT
Start: 2025-01-01 | End: 2025-01-01

## 2025-01-01 RX ORDER — AMOXICILLIN 250 MG
2 CAPSULE ORAL 2 TIMES DAILY
Status: DISCONTINUED | OUTPATIENT
Start: 2025-01-01 | End: 2025-01-01

## 2025-01-01 RX ORDER — SODIUM CHLORIDE 0.9 % (FLUSH) 0.9 %
10 SYRINGE (ML) INJECTION AS NEEDED
Status: DISCONTINUED | OUTPATIENT
Start: 2025-01-01 | End: 2025-01-01

## 2025-01-01 RX ORDER — ALBUTEROL SULFATE 2.5 MG/.5ML
SOLUTION RESPIRATORY (INHALATION)
Status: COMPLETED
Start: 2025-01-01 | End: 2025-01-01

## 2025-01-01 RX ORDER — VANCOMYCIN 1.75 GRAM/500 ML IN 0.9 % SODIUM CHLORIDE INTRAVENOUS
20 ONCE
Status: COMPLETED | OUTPATIENT
Start: 2025-01-01 | End: 2025-01-01

## 2025-01-01 RX ORDER — NITROGLYCERIN 0.4 MG/1
0.4 TABLET SUBLINGUAL
Status: DISCONTINUED | OUTPATIENT
Start: 2025-01-01 | End: 2025-01-01

## 2025-01-01 RX ORDER — FLUTICASONE PROPIONATE 50 MCG
1 SPRAY, SUSPENSION (ML) NASAL DAILY
Qty: 16 G | Refills: 11 | Status: SHIPPED | OUTPATIENT
Start: 2025-01-01

## 2025-01-01 RX ORDER — SODIUM CHLORIDE 9 MG/ML
40 INJECTION, SOLUTION INTRAVENOUS AS NEEDED
Status: DISCONTINUED | OUTPATIENT
Start: 2025-01-01 | End: 2025-01-01

## 2025-01-01 RX ORDER — DEXMEDETOMIDINE HYDROCHLORIDE 4 UG/ML
.2-1.5 INJECTION, SOLUTION INTRAVENOUS
Status: DISCONTINUED | OUTPATIENT
Start: 2025-01-01 | End: 2025-01-01

## 2025-01-01 RX ORDER — DOBUTAMINE HYDROCHLORIDE 100 MG/100ML
INJECTION INTRAVENOUS
Status: COMPLETED
Start: 2025-01-01 | End: 2025-01-01

## 2025-01-01 RX ORDER — ASPIRIN 81 MG/1
81 TABLET ORAL DAILY
Status: DISCONTINUED | OUTPATIENT
Start: 2025-01-01 | End: 2025-01-01

## 2025-01-01 RX ORDER — HEPARIN SODIUM 5000 [USP'U]/ML
5000 INJECTION, SOLUTION INTRAVENOUS; SUBCUTANEOUS EVERY 12 HOURS SCHEDULED
Status: DISCONTINUED | OUTPATIENT
Start: 2025-01-01 | End: 2025-01-01

## 2025-01-01 RX ORDER — ROCURONIUM BROMIDE 10 MG/ML
INJECTION, SOLUTION INTRAVENOUS
Status: COMPLETED | OUTPATIENT
Start: 2025-01-01 | End: 2025-01-01

## 2025-01-01 RX ORDER — NICOTINE POLACRILEX 4 MG
15 LOZENGE BUCCAL
Status: DISCONTINUED | OUTPATIENT
Start: 2025-01-01 | End: 2025-01-01

## 2025-01-01 RX ORDER — NOREPINEPHRINE BITARTRATE 0.03 MG/ML
.02-.3 INJECTION, SOLUTION INTRAVENOUS
Status: DISCONTINUED | OUTPATIENT
Start: 2025-01-01 | End: 2025-01-01

## 2025-01-01 RX ORDER — ASPIRIN 81 MG/1
81 TABLET, CHEWABLE ORAL DAILY
Status: DISCONTINUED | OUTPATIENT
Start: 2025-01-01 | End: 2025-01-01

## 2025-01-01 RX ORDER — ALLOPURINOL 300 MG/1
300 TABLET ORAL DAILY
Qty: 90 TABLET | Refills: 1 | Status: SHIPPED | OUTPATIENT
Start: 2025-01-01

## 2025-01-01 RX ORDER — KETAMINE HCL IN NACL, ISO-OSM 100MG/10ML
SYRINGE (ML) INJECTION
Status: COMPLETED | OUTPATIENT
Start: 2025-01-01 | End: 2025-01-01

## 2025-01-01 RX ORDER — INDOMETHACIN 25 MG/1
CAPSULE ORAL
Status: COMPLETED | OUTPATIENT
Start: 2025-01-01 | End: 2025-01-01

## 2025-01-01 RX ORDER — GLYCOPYRROLATE 0.2 MG/ML
0.4 INJECTION INTRAMUSCULAR; INTRAVENOUS ONCE
Status: COMPLETED | OUTPATIENT
Start: 2025-01-01 | End: 2025-01-01

## 2025-01-01 RX ORDER — DEXTROSE MONOHYDRATE 25 G/50ML
INJECTION, SOLUTION INTRAVENOUS
Status: COMPLETED | OUTPATIENT
Start: 2025-01-01 | End: 2025-01-01

## 2025-01-01 RX ORDER — DEXTROSE MONOHYDRATE 25 G/50ML
25 INJECTION, SOLUTION INTRAVENOUS
Status: DISCONTINUED | OUTPATIENT
Start: 2025-01-01 | End: 2025-01-01

## 2025-01-01 RX ORDER — PANTOPRAZOLE SODIUM 40 MG/10ML
40 INJECTION, POWDER, LYOPHILIZED, FOR SOLUTION INTRAVENOUS
Status: DISCONTINUED | OUTPATIENT
Start: 2025-01-01 | End: 2025-01-01

## 2025-01-01 RX ORDER — CALCIUM CHLORIDE 100 MG/ML
INJECTION INTRAVENOUS; INTRAVENTRICULAR
Status: COMPLETED | OUTPATIENT
Start: 2025-01-01 | End: 2025-01-01

## 2025-01-01 RX ORDER — ONDANSETRON 8 MG/1
8 TABLET, ORALLY DISINTEGRATING ORAL EVERY 8 HOURS PRN
Qty: 9 TABLET | Refills: 2 | Status: SHIPPED | OUTPATIENT
Start: 2025-01-01

## 2025-01-01 RX ADMIN — SODIUM CHLORIDE 500 ML: 9 INJECTION, SOLUTION INTRAVENOUS at 18:08

## 2025-01-01 RX ADMIN — MUPIROCIN 1 APPLICATION: 20 OINTMENT TOPICAL at 10:22

## 2025-01-01 RX ADMIN — MORPHINE SULFATE 2 MG: 2 INJECTION, SOLUTION INTRAMUSCULAR; INTRAVENOUS at 14:54

## 2025-01-01 RX ADMIN — PIPERACILLIN AND TAZOBACTAM 4.5 G: 4; .5 INJECTION, POWDER, FOR SOLUTION INTRAVENOUS at 06:23

## 2025-01-01 RX ADMIN — MUPIROCIN 1 APPLICATION: 20 OINTMENT TOPICAL at 08:35

## 2025-01-01 RX ADMIN — SODIUM BICARBONATE 100 MEQ: 84 INJECTION INTRAVENOUS at 18:41

## 2025-01-01 RX ADMIN — EPINEPHRINE 0.1 MG: 0.1 INJECTION INTRAVENOUS at 18:36

## 2025-01-01 RX ADMIN — ATROPINE SULFATE: 0.1 INJECTION INTRAVENOUS at 18:28

## 2025-01-01 RX ADMIN — ALLOPURINOL 100 MG: 100 TABLET ORAL at 08:35

## 2025-01-01 RX ADMIN — APIXABAN 2.5 MG: 2.5 TABLET, FILM COATED ORAL at 21:56

## 2025-01-01 RX ADMIN — EPINEPHRINE 0.1 MG: 0.1 INJECTION INTRAVENOUS at 18:31

## 2025-01-01 RX ADMIN — PIPERACILLIN AND TAZOBACTAM 4.5 G: 4; .5 INJECTION, POWDER, FOR SOLUTION INTRAVENOUS at 06:22

## 2025-01-01 RX ADMIN — SODIUM CHLORIDE 500 MG: 9 INJECTION, SOLUTION INTRAVENOUS at 06:26

## 2025-01-01 RX ADMIN — NOREPINEPHRINE BITARTRATE 0.02 MCG/KG/MIN: 0.03 INJECTION, SOLUTION INTRAVENOUS at 18:15

## 2025-01-01 RX ADMIN — Medication 10 ML: at 08:35

## 2025-01-01 RX ADMIN — ALBUMIN (HUMAN) 50 G: 0.25 INJECTION, SOLUTION INTRAVENOUS at 18:06

## 2025-01-01 RX ADMIN — DEXTROSE MONOHYDRATE 25 G: 25 INJECTION, SOLUTION INTRAVENOUS at 18:47

## 2025-01-01 RX ADMIN — SODIUM CHLORIDE 500 MG: 9 INJECTION, SOLUTION INTRAVENOUS at 21:20

## 2025-01-01 RX ADMIN — PANTOPRAZOLE SODIUM 40 MG: 40 INJECTION, POWDER, FOR SOLUTION INTRAVENOUS at 10:22

## 2025-01-01 RX ADMIN — DEXMEDETOMIDINE HYDROCHLORIDE 0.4 MCG/KG/HR: 4 INJECTION, SOLUTION INTRAVENOUS at 10:52

## 2025-01-01 RX ADMIN — ASPIRIN 81 MG: 81 TABLET, CHEWABLE ORAL at 10:23

## 2025-01-01 RX ADMIN — BUMETANIDE 2 MG: 0.25 INJECTION INTRAMUSCULAR; INTRAVENOUS at 11:49

## 2025-01-01 RX ADMIN — Medication 1750 MG: at 21:36

## 2025-01-01 RX ADMIN — SODIUM CHLORIDE 500 MG: 9 INJECTION, SOLUTION INTRAVENOUS at 21:56

## 2025-01-01 RX ADMIN — PIPERACILLIN AND TAZOBACTAM 4.5 G: 4; .5 INJECTION, POWDER, FOR SOLUTION INTRAVENOUS at 15:14

## 2025-01-01 RX ADMIN — APIXABAN 2.5 MG: 2.5 TABLET, FILM COATED ORAL at 10:22

## 2025-01-01 RX ADMIN — SENNOSIDES AND DOCUSATE SODIUM 2 TABLET: 50; 8.6 TABLET ORAL at 21:56

## 2025-01-01 RX ADMIN — DEXMEDETOMIDINE HYDROCHLORIDE 0.5 MCG/KG/HR: 4 INJECTION, SOLUTION INTRAVENOUS at 02:45

## 2025-01-01 RX ADMIN — DOBUTAMINE IN DEXTROSE 10 MCG/KG/MIN: 100 INJECTION, SOLUTION INTRAVENOUS at 18:27

## 2025-01-01 RX ADMIN — INSULIN HUMAN 3 UNITS: 100 INJECTION, SOLUTION PARENTERAL at 00:25

## 2025-01-01 RX ADMIN — Medication 100 MG: at 18:30

## 2025-01-01 RX ADMIN — SODIUM ZIRCONIUM CYCLOSILICATE 10 G: 10 POWDER, FOR SUSPENSION ORAL at 21:56

## 2025-01-01 RX ADMIN — ASPIRIN 81 MG: 81 TABLET, COATED ORAL at 08:34

## 2025-01-01 RX ADMIN — DOBUTAMINE HYDROCHLORIDE 2 MCG/KG/MIN: 100 INJECTION INTRAVENOUS at 10:52

## 2025-01-01 RX ADMIN — GLYCOPYRROLATE 0.4 MG: 0.2 SOLUTION INTRAMUSCULAR; INTRAVENOUS at 14:58

## 2025-01-01 RX ADMIN — INSULIN GLARGINE 9 UNITS: 100 INJECTION, SOLUTION SUBCUTANEOUS at 00:25

## 2025-01-01 RX ADMIN — PANTOPRAZOLE SODIUM 40 MG: 40 INJECTION, POWDER, FOR SOLUTION INTRAVENOUS at 21:56

## 2025-01-01 RX ADMIN — Medication 100 MCG/HR: at 15:27

## 2025-01-01 RX ADMIN — NOREPINEPHRINE BITARTRATE 0.02 MCG/KG/MIN: 0.03 INJECTION, SOLUTION INTRAVENOUS at 18:02

## 2025-01-01 RX ADMIN — ALBUTEROL SULFATE 5 MG: 2.5 SOLUTION RESPIRATORY (INHALATION) at 19:09

## 2025-01-01 RX ADMIN — BUMETANIDE 2 MG: 0.25 INJECTION INTRAMUSCULAR; INTRAVENOUS at 12:15

## 2025-01-01 RX ADMIN — APIXABAN 2.5 MG: 2.5 TABLET, FILM COATED ORAL at 08:34

## 2025-01-01 RX ADMIN — PIPERACILLIN AND TAZOBACTAM 4.5 G: 4; .5 INJECTION, POWDER, FOR SOLUTION INTRAVENOUS at 19:37

## 2025-01-01 RX ADMIN — SODIUM CHLORIDE 5 MG: 9 INJECTION, SOLUTION INTRAMUSCULAR; INTRAVENOUS; SUBCUTANEOUS at 05:05

## 2025-01-01 RX ADMIN — ALLOPURINOL 100 MG: 100 TABLET ORAL at 10:23

## 2025-01-01 RX ADMIN — DEXTROSE MONOHYDRATE 30 ML/HR: 50 INJECTION, SOLUTION INTRAVENOUS at 11:55

## 2025-01-01 RX ADMIN — BUMETANIDE 2 MG: 0.25 INJECTION INTRAMUSCULAR; INTRAVENOUS at 23:27

## 2025-01-01 RX ADMIN — SODIUM CHLORIDE 500 MG: 9 INJECTION, SOLUTION INTRAVENOUS at 05:30

## 2025-01-01 RX ADMIN — Medication 50 MCG/HR: at 20:06

## 2025-01-01 RX ADMIN — MUPIROCIN 1 APPLICATION: 20 OINTMENT TOPICAL at 21:20

## 2025-01-01 RX ADMIN — MUPIROCIN 1 APPLICATION: 20 OINTMENT TOPICAL at 21:57

## 2025-01-01 RX ADMIN — BUMETANIDE 2 MG: 0.25 INJECTION INTRAMUSCULAR; INTRAVENOUS at 00:26

## 2025-01-01 RX ADMIN — CALCIUM CHLORIDE 1 G: 100 INJECTION INTRAVENOUS; INTRAVENTRICULAR at 18:38

## 2025-01-01 RX ADMIN — INSULIN HUMAN 10 UNITS: 100 INJECTION, SOLUTION PARENTERAL at 18:48

## 2025-01-01 RX ADMIN — DOBUTAMINE HYDROCHLORIDE 10 MCG/KG/MIN: 100 INJECTION INTRAVENOUS at 18:27

## 2025-01-01 RX ADMIN — SULFUR HEXAFLUORIDE 3 ML: KIT at 08:40

## 2025-01-01 RX ADMIN — Medication 10 ML: at 21:38

## 2025-01-01 RX ADMIN — APIXABAN 2.5 MG: 2.5 TABLET, FILM COATED ORAL at 21:20

## 2025-01-01 RX ADMIN — SODIUM CHLORIDE 500 MG: 9 INJECTION, SOLUTION INTRAVENOUS at 15:14

## 2025-01-01 RX ADMIN — BUMETANIDE 2 MG: 0.25 INJECTION INTRAMUSCULAR; INTRAVENOUS at 05:37

## 2025-01-01 RX ADMIN — ROCURONIUM BROMIDE 100 MG: 10 INJECTION INTRAVENOUS at 18:33

## 2025-01-01 RX ADMIN — NOREPINEPHRINE BITARTRATE 0.02 MCG/KG/MIN: 32 SOLUTION INTRAVENOUS at 18:02

## 2025-01-01 RX ADMIN — BUMETANIDE 2 MG: 0.25 INJECTION INTRAMUSCULAR; INTRAVENOUS at 06:26

## 2025-01-01 RX ADMIN — SENNOSIDES AND DOCUSATE SODIUM 2 TABLET: 50; 8.6 TABLET ORAL at 21:20

## 2025-01-01 RX ADMIN — Medication 225 MCG/HR: at 05:29

## 2025-01-01 RX ADMIN — EPINEPHRINE 0.1 MG: 0.1 INJECTION INTRAVENOUS at 18:35

## 2025-01-01 RX ADMIN — PIPERACILLIN AND TAZOBACTAM 4.5 G: 4; .5 INJECTION, POWDER, FOR SOLUTION INTRAVENOUS at 21:20

## 2025-01-01 RX ADMIN — BUMETANIDE 2 MG: 0.25 INJECTION INTRAMUSCULAR; INTRAVENOUS at 20:08

## 2025-01-01 RX ADMIN — DEXMEDETOMIDINE HYDROCHLORIDE 0.2 MCG/KG/HR: 4 INJECTION, SOLUTION INTRAVENOUS at 21:59

## 2025-01-14 DIAGNOSIS — E11.65 TYPE 2 DIABETES MELLITUS WITH HYPERGLYCEMIA, WITHOUT LONG-TERM CURRENT USE OF INSULIN: ICD-10-CM

## 2025-01-14 NOTE — TELEPHONE ENCOUNTER
Rx Refill Note  Requested Prescriptions     Refused Prescriptions Disp Refills    metFORMIN (GLUCOPHAGE) 500 MG tablet [Pharmacy Med Name: METFORMIN 500MG TABLETS] 180 tablet 0     Sig: TAKE 1 TABLET BY MOUTH TWICE DAILY WITH MEALS      Last office visit with prescribing clinician: 10/1/2024   Last telemedicine visit with prescribing clinician: Visit date not found   Next office visit with prescribing clinician: 3/3/2025   {  Roxane Zayas MA  01/14/25, 12:04 EST

## 2025-01-17 RX ORDER — BUMETANIDE 2 MG/1
2 TABLET ORAL DAILY
Qty: 90 TABLET | Refills: 0 | Status: SHIPPED | OUTPATIENT
Start: 2025-01-17

## 2025-02-03 ENCOUNTER — TELEPHONE (OUTPATIENT)
Dept: CARDIOLOGY | Facility: CLINIC | Age: 89
End: 2025-02-03
Payer: MEDICARE

## 2025-02-03 NOTE — TELEPHONE ENCOUNTER
Patient wife called concerned that patient has increased swelling with SOB, per  instructed her to give patient extra dose of bumex for 3 days and see if that helps. Patient wife verbalizes understanding.

## 2025-02-06 ENCOUNTER — TELEPHONE (OUTPATIENT)
Dept: CARDIOLOGY | Facility: CLINIC | Age: 89
End: 2025-02-06
Payer: MEDICARE

## 2025-02-06 DIAGNOSIS — R60.9 SWELLING: Primary | ICD-10-CM

## 2025-02-06 NOTE — TELEPHONE ENCOUNTER
We doubled patients bumex 2 mg on 2/3 to BID d/t increased of SOB and swelling. Wife called and said it has helped him with his SOB and swelling. They want to know if they should continue the bumex 2 mg BID     Per  patient will need to go back to bumex 2 mg daily and he needs lab work. Send lab order in and patient wife verbalizes understanding.

## 2025-02-06 NOTE — TELEPHONE ENCOUNTER
Caller: Rosalee Germainanita    Relationship: Emergency Contact    Best call back number: [phone]    What is the best time to reach you: ANY    Who are you requesting to speak with (clinical staff, provider,  specific staff member): [facility]    What was the call regarding: MS GERMAIN CALLED TO REQUEST A CALLBACK FROM EDINSON REGARDING THE PATIENT'S DIURETICS. PLEASE CONTACT MS GERMAIN AT YOUR EARLIEST CONVENIENCE.    Is it okay if the provider responds through [facility]IONhart: PLEASE CALL

## 2025-02-10 ENCOUNTER — LAB (OUTPATIENT)
Facility: HOSPITAL | Age: 89
End: 2025-02-10
Payer: MEDICARE

## 2025-02-10 DIAGNOSIS — R60.9 SWELLING: ICD-10-CM

## 2025-02-10 LAB
ANION GAP SERPL CALCULATED.3IONS-SCNC: 9 MMOL/L (ref 5–15)
BUN SERPL-MCNC: 33 MG/DL (ref 8–23)
BUN/CREAT SERPL: 18.9 (ref 7–25)
CALCIUM SPEC-SCNC: 8.4 MG/DL (ref 8.6–10.5)
CHLORIDE SERPL-SCNC: 96 MMOL/L (ref 98–107)
CO2 SERPL-SCNC: 33 MMOL/L (ref 22–29)
CREAT SERPL-MCNC: 1.75 MG/DL (ref 0.76–1.27)
EGFRCR SERPLBLD CKD-EPI 2021: 37 ML/MIN/1.73
GLUCOSE SERPL-MCNC: 129 MG/DL (ref 65–99)
POTASSIUM SERPL-SCNC: 4.2 MMOL/L (ref 3.5–5.2)
SODIUM SERPL-SCNC: 138 MMOL/L (ref 136–145)

## 2025-02-10 PROCEDURE — 36415 COLL VENOUS BLD VENIPUNCTURE: CPT

## 2025-02-10 PROCEDURE — 80048 BASIC METABOLIC PNL TOTAL CA: CPT

## 2025-02-12 ENCOUNTER — TELEPHONE (OUTPATIENT)
Dept: CARDIOLOGY | Facility: CLINIC | Age: 89
End: 2025-02-12
Payer: MEDICARE

## 2025-02-12 NOTE — TELEPHONE ENCOUNTER
Patient and wife called about recent blood work, we did this after telling patient to take extra dose of bumex d/t SOB and swelling.    Patient says his SOB is much better but still has swelling in his abdomen and lower extremities.     When he left the hospital his weight was 198 and he is 213lb.

## 2025-02-13 NOTE — TELEPHONE ENCOUNTER
Spoke to patient regarding plan of care, they verbalizes understanding. Instructed them to continue monitoring s/s of fluid overload.

## 2025-02-20 ENCOUNTER — TELEPHONE (OUTPATIENT)
Dept: CARDIOLOGY | Facility: CLINIC | Age: 89
End: 2025-02-20
Payer: MEDICARE

## 2025-02-20 NOTE — TELEPHONE ENCOUNTER
"Received voicemail from pt's wife Maya. In message she states that over the last week, pt has gained 8lbs. She states that last week, pt was told to decrease the Bumex to every other day (See note from 2/12). Pt was also on the call and states that the shortness of breath, is not any worse, but is still there. He said \"it seems to happen at random times of the day.\" Maya stated that with the weight gain, pt is having mobility issues, and needs help to get up to go to the bathroom. They want to know what pt should do about the weight gain.     Please advise.    "

## 2025-02-21 NOTE — TELEPHONE ENCOUNTER
Spoke to pt and wife and informed them what Dr. Stover said about medication. They verbalized understanding.

## 2025-03-03 ENCOUNTER — OFFICE VISIT (OUTPATIENT)
Dept: FAMILY MEDICINE CLINIC | Facility: CLINIC | Age: 89
End: 2025-03-03
Payer: MEDICARE

## 2025-03-03 ENCOUNTER — LAB (OUTPATIENT)
Dept: LAB | Facility: HOSPITAL | Age: 89
End: 2025-03-03
Payer: MEDICARE

## 2025-03-03 VITALS
SYSTOLIC BLOOD PRESSURE: 148 MMHG | OXYGEN SATURATION: 97 % | HEIGHT: 73 IN | DIASTOLIC BLOOD PRESSURE: 92 MMHG | HEART RATE: 77 BPM | WEIGHT: 208 LBS | BODY MASS INDEX: 27.57 KG/M2

## 2025-03-03 DIAGNOSIS — E11.65 TYPE 2 DIABETES MELLITUS WITH HYPERGLYCEMIA, WITHOUT LONG-TERM CURRENT USE OF INSULIN: Primary | ICD-10-CM

## 2025-03-03 DIAGNOSIS — E83.42 HYPOMAGNESEMIA: ICD-10-CM

## 2025-03-03 DIAGNOSIS — R82.90 FOUL SMELLING URINE: ICD-10-CM

## 2025-03-03 DIAGNOSIS — N18.32 STAGE 3B CHRONIC KIDNEY DISEASE: ICD-10-CM

## 2025-03-03 LAB
ANION GAP SERPL CALCULATED.3IONS-SCNC: 13.3 MMOL/L (ref 5–15)
BUN SERPL-MCNC: 36 MG/DL (ref 8–23)
BUN/CREAT SERPL: 21.7 (ref 7–25)
CALCIUM SPEC-SCNC: 9.1 MG/DL (ref 8.6–10.5)
CHLORIDE SERPL-SCNC: 98 MMOL/L (ref 98–107)
CO2 SERPL-SCNC: 28.7 MMOL/L (ref 22–29)
CREAT SERPL-MCNC: 1.66 MG/DL (ref 0.76–1.27)
EGFRCR SERPLBLD CKD-EPI 2021: 39.4 ML/MIN/1.73
EXPIRATION DATE: ABNORMAL
GLUCOSE SERPL-MCNC: 136 MG/DL (ref 65–99)
HBA1C MFR BLD: 6.5 % (ref 4.5–5.7)
Lab: ABNORMAL
MAGNESIUM SERPL-MCNC: 1.4 MG/DL (ref 1.6–2.4)
POTASSIUM SERPL-SCNC: 4 MMOL/L (ref 3.5–5.2)
SODIUM SERPL-SCNC: 140 MMOL/L (ref 136–145)

## 2025-03-03 PROCEDURE — 83735 ASSAY OF MAGNESIUM: CPT

## 2025-03-03 PROCEDURE — 80048 BASIC METABOLIC PNL TOTAL CA: CPT

## 2025-03-03 RX ORDER — FAMOTIDINE 20 MG/1
20 TABLET, FILM COATED ORAL 2 TIMES DAILY PRN
Qty: 180 TABLET | Refills: 0 | Status: SHIPPED | OUTPATIENT
Start: 2025-03-03

## 2025-03-03 RX ORDER — EMPAGLIFLOZIN 10 MG/1
10 TABLET, FILM COATED ORAL DAILY
Qty: 90 TABLET | Refills: 11 | Status: SHIPPED | OUTPATIENT
Start: 2025-03-03

## 2025-03-03 NOTE — ASSESSMENT & PLAN NOTE
Continue metformin 500mg BID, I'd like him to start taking his Jardiance 10mg daily that was previously prescribed

## 2025-03-12 NOTE — PROGRESS NOTES
Follow-up Visit      Date: 2025  Patient Name: Iain Dee  : 1935   MRN: [id number]     Chief Complaint:    Chief Complaint   Patient presents with    Coronary artery disease involving native coronary artery of       History of Present Illness: Iain Dee is a 89 y.o. male who is here today for follow-up on coronary artery disease.    Patient has a history of inferior ST segment elevation MI and we treated medically.  Patient has been doing fine.  Patient has a chronic venous stasis.  Patient has chronic atrial fibrillation.  Patient is able to do most of the activities with help and assistant.  Sometime he does feel short of breath.    He has a lot of swelling of the lower extremities which are mostly venous stasis.  He does not have paroxysmal nocturnal dyspnea.  His wife does need help with his daily activities.      Problem List     CARDIAC  Coronary Artery Disease:   Inferior STEMI, 2024: Medical management     Myocardium:   Echo, 3/1/2023: LVEF 55%  Echo, 2024: LVEF 35 to 40%     Valvular:   Mild MR, mild TR     Electrical:   Chronic A-fib     Pericardium:   Normal     CARDIAC RISK FACTORS  Hypertension  Diabetes  Dyslipidemia  2024   HDL 43 LDL 92   Obstructive Sleep Apnea    NON-CARDIAC  GERD  Gout  Arthritis    SURGERIES  Prostate cancer s/p radiation  Bladder cancer s/p surgery  Hernia repair      Subjective      Review of Systems:   Review of Systems   Respiratory:  Positive for cough and shortness of breath.    Cardiovascular:  Positive for leg swelling.       Medications:     Current Outpatient Medications:     apixaban (ELIQUIS) 2.5 MG tablet tablet, Take 1 tablet by mouth 2 (Two) Times a Day. Indications: Atrial Fibrillation, Disp: 60 tablet, Rfl: 11    atorvastatin (LIPITOR) 40 MG tablet, Take 1 tablet by mouth Every Night., Disp: 90 tablet, Rfl: 3    bumetanide (BUMEX) 2 MG tablet, TAKE 1 TABLET BY MOUTH DAILY, Disp: 90 tablet, Rfl: 0    carvedilol  "(COREG) 3.125 MG tablet, Take 1 tablet by mouth 2 (Two) Times a Day With Meals., Disp: 180 tablet, Rfl: 3    clopidogrel (PLAVIX) 75 MG tablet, Take 1 tablet by mouth Daily., Disp: 90 tablet, Rfl: 3    famotidine (PEPCID) 20 MG tablet, Take 1 tablet by mouth 2 (Two) Times a Day As Needed for Heartburn., Disp: 180 tablet, Rfl: 0    glucose blood test strip, 1 each by Other route Daily. Use daily as directed, Disp: 50 each, Rfl: 12    glucose monitor monitoring kit, 1 each Daily. Use daily as directed., Disp: 1 each, Rfl: 0    Jardiance 10 MG tablet tablet, Take 1 tablet by mouth Daily., Disp: 90 tablet, Rfl: 11    ketoconazole (NIZORAL) 2 % cream, , Disp: , Rfl:     Lancets Thin misc, 1 each Daily. Use daily as directed, Disp: 50 each, Rfl: 11    metFORMIN (GLUCOPHAGE) 500 MG tablet, TAKE 1 TABLET BY MOUTH TWICE DAILY WITH MEALS, Disp: 180 tablet, Rfl: 0    nitroglycerin (NITRODUR) 0.2 MG/HR patch, Place 1 patch on the skin as directed by provider See Admin Instructions. Apply patch daily, remove at night for at least 10 hours, Disp: 90 patch, Rfl: 3    magnesium oxide (MAG-OX) 400 MG tablet, Take 1 tablet by mouth Daily., Disp: 30 tablet, Rfl: 11    Allergies:   Allergies   Allergen Reactions    Colchicine Other (See Comments)     Unable to walk        Objective     Physical Exam:  Vitals:    03/13/25 0855   BP: 150/100   BP Location: Left arm   Patient Position: Sitting   Cuff Size: Adult   Pulse: 83   SpO2: 97%   Weight: 101 kg (223 lb)   Height: 185.4 cm (73\")     Body mass index is 29.42 kg/m².    Constitutional:       General: Not in acute distress.     Appearance: Healthy appearance. Not in distress.     Neck:     JVP:Not elevated     Carotid artery: Normal    Pulmonary:      Effort: Pulmonary effort is normal.      Breath sounds: Normal breath sounds. No wheezing. No rhonchi. No rales.     Cardiovascular:      Normal rate. irregular rhythm. Normal S1. Normal S2.      Murmurs: There is 2/6 murmur.      No " gallop. No click. No rub.     Abdominal:      General: Bowel sounds are normal.      Palpations: Abdomen is soft.      Tenderness: There is no abdominal tenderness.    Extremities:     Pulses:Normal radial and pedal pulses     Edema:no edema    Smoking Cessation:   Tobacco Product History : Patient never smoked    Lab Review:   Lab Results   Component Value Date    GLUCOSE 136 (H) 03/03/2025    BUN 36 (H) 03/03/2025    CREATININE 1.66 (H) 03/03/2025    EGFRIFNONA 58 (L) 12/16/2021    BCR 21.7 03/03/2025    K 4.0 03/03/2025    CO2 28.7 03/03/2025    CALCIUM 9.1 03/03/2025    ALBUMIN 3.8 08/05/2024    AST 32 08/05/2024    ALT 17 08/05/2024     Lab Results   Component Value Date    WBC 8.15 08/07/2024    HGB 12.8 (L) 08/07/2024    HCT 38.8 08/07/2024    MCV 98.7 (H) 08/07/2024     08/07/2024     Lab Results   Component Value Date    TSH 3.350 02/27/2023             Assessment / Plan      Assessment:   Diagnosis Plan   1. Chronic atrial fibrillation        2. Coronary artery disease involving native coronary artery of native heart without angina pectoris        3. Essential hypertension        4. Mixed hyperlipidemia             Plan:  Patient has been in chronic A-fib and does not feel that he has any episodes of fast heart rate.  We will continue him on Eliquis.  He has been taking Plavix for his coronary artery disease and we can switch it to low-dose aspirin if he had any bleeding.  We started him on magnesium because of low levels.  We have return back to Bumex every day and have advised him to take extra if he needs because of shortness of breath or lower extremity edema.  He has been having skin blisters also.      Follow Up:       Return in about 9 months (around 12/13/2025).    Isabel Stover MD

## 2025-03-13 ENCOUNTER — OFFICE VISIT (OUTPATIENT)
Dept: CARDIOLOGY | Facility: CLINIC | Age: 89
End: 2025-03-13
Payer: MEDICARE

## 2025-03-13 VITALS
DIASTOLIC BLOOD PRESSURE: 100 MMHG | WEIGHT: 223 LBS | SYSTOLIC BLOOD PRESSURE: 150 MMHG | BODY MASS INDEX: 29.55 KG/M2 | HEART RATE: 83 BPM | HEIGHT: 73 IN | OXYGEN SATURATION: 97 %

## 2025-03-13 DIAGNOSIS — E78.2 MIXED HYPERLIPIDEMIA: ICD-10-CM

## 2025-03-13 DIAGNOSIS — I48.20 CHRONIC ATRIAL FIBRILLATION: Primary | ICD-10-CM

## 2025-03-13 DIAGNOSIS — I25.10 CORONARY ARTERY DISEASE INVOLVING NATIVE CORONARY ARTERY OF NATIVE HEART WITHOUT ANGINA PECTORIS: ICD-10-CM

## 2025-03-13 DIAGNOSIS — I10 ESSENTIAL HYPERTENSION: ICD-10-CM

## 2025-03-13 PROCEDURE — 99214 OFFICE O/P EST MOD 30 MIN: CPT | Performed by: INTERNAL MEDICINE

## 2025-03-13 RX ORDER — MAGNESIUM OXIDE 400 MG/1
400 TABLET ORAL DAILY
Qty: 30 TABLET | Refills: 11 | Status: SHIPPED | OUTPATIENT
Start: 2025-03-13

## 2025-03-19 NOTE — PROGRESS NOTES
Subjective   Iain Dee is a 89 y.o. male.     Chief Complaint   Patient presents with    Diabetes     A1C       History of Present Illness     Iain Dee presents today for   Chief Complaint   Patient presents with    Diabetes     A1C     he is in need of chronic disease followup. he denies acute complaints today.    This patient is accompanied by their self who contributes to the history of their care.    The following portions of the patient's history were reviewed and updated as appropriate: allergies, current medications, past family history, past medical history, past social history, past surgical history and problem list.    Current Outpatient Medications   Medication Instructions    apixaban (ELIQUIS) 2.5 mg, Oral, 2 Times Daily    atorvastatin (LIPITOR) 40 mg, Oral, Nightly    bumetanide (BUMEX) 2 mg, Oral, Daily    carvedilol (COREG) 3.125 mg, Oral, 2 Times Daily With Meals    clopidogrel (PLAVIX) 75 mg, Oral, Daily    famotidine (PEPCID) 20 mg, Oral, 2 Times Daily PRN    glucose blood test strip 1 each, Other, Daily, Use daily as directed    glucose monitor monitoring kit 1 each, Not Applicable, Daily, Use daily as directed.    Jardiance 10 mg, Oral, Daily    ketoconazole (NIZORAL) 2 % cream     Lancets Thin misc 1 each, Not Applicable, Daily, Use daily as directed    magnesium oxide (MAG-OX) 400 mg, Oral, Daily    metFORMIN (GLUCOPHAGE) 500 mg, Oral, 2 Times Daily With Meals    nitroglycerin (NITRODUR) 0.2 MG/HR patch 1 patch, Transdermal, See Admin Instructions, Apply patch daily, remove at night for at least 10 hours     Active Ambulatory Problems     Diagnosis Date Noted    AMS (altered mental status) 12/08/2021    Hypomagnesemia 12/08/2021    Hypoalbuminemia 12/08/2021    Essential hypertension 12/08/2021    Atrial fibrillation 12/08/2021    GERD (gastroesophageal reflux disease) 12/08/2021    Gout     Diabetes mellitus     JOLENE on CPAP     Chronic anticoagulation (Eliquis) 12/08/2021     "Alcohol abuse (1 to 2 glasses of bourbon a day) 12/09/2021    Memory impairment 02/14/2024    STEMI (ST elevation myocardial infarction) 08/05/2024    Stage 3b chronic kidney disease 03/03/2025     Resolved Ambulatory Problems     Diagnosis Date Noted    Hypokalemia 12/08/2021    Hypocalcemia 12/08/2021    Bladder carcinoma     Seizures 02/27/2023     Past Medical History:   Diagnosis Date    Arthritis     Cancer     Hypertension      Past Surgical History:   Procedure Laterality Date    BLADDER SURGERY      Biopsy    HERNIA REPAIR      PROSTATE BIOPSY       Family History   Problem Relation Age of Onset    No Known Problems Mother     Heart attack Father      Social History     Socioeconomic History    Marital status:    Tobacco Use    Smoking status: Never     Passive exposure: Never    Smokeless tobacco: Never   Vaping Use    Vaping status: Never Used   Substance and Sexual Activity    Alcohol use: Yes     Alcohol/week: 1.0 standard drink of alcohol     Types: 1 Shots of liquor per week     Comment: Estimation based on spouse report of patient drinking \"1 large martini\" each night (regular martini = 3 shots, large martini = est. 4 shots)    Drug use: No    Sexual activity: Defer       Review of Systems  Review of Systems -  General ROS: negative for - chills, fever or night sweats  Cardiovascular ROS: no chest pain or dyspnea on exertion  Gastrointestinal ROS: no abdominal pain, change in bowel habits, or black or bloody stools  Genito-Urinary ROS: no trouble voiding or gross hematuria    Objective   Blood pressure 148/92, pulse 77, height 185.4 cm (72.99\"), weight 94.3 kg (208 lb), SpO2 97%.  Nursing note reviewed  Physical Exam  Const: NAD, A&Ox4, Pleasant, Cooperative  Eyes: EOMI, no conjunctivitis  ENT: No nasal discharge present, neck supple  Cardiac: Regular rate and rhythm, no cyanosis  Resp: Respiratory rate within normal limits, no increased work of breathing, no audible wheezing or retractions " noted  GI: No distention or ascites  Procedures  Assessment & Plan     Problem List Items Addressed This Visit          Endocrine and Metabolic    Diabetes mellitus - Primary    Overview   He was on Levemir in the hospital and at the [facility], stopped on his own at discharge and was started on metformin at his hospital follow-up in April.          Current Assessment & Plan   Continue metformin 500mg BID, I'd like him to start taking his Jardiance 10mg daily that was previously prescribed         Relevant Medications    Jardiance 10 MG tablet tablet    Other Relevant Orders    POC Glycosylated Hemoglobin (Hb A1C) (Completed)    Microalbumin / Creatinine Urine Ratio - Urine, Clean Catch       Genitourinary and Reproductive     Hypomagnesemia    Overview   Possibly PPI-related         Current Assessment & Plan   Recheck today, has had severe hypomagnesemia in the past related to PPI         Relevant Orders    Magnesium (Completed)    Stage 3b chronic kidney disease    Current Assessment & Plan   Multifactorial. Bumex has changed over the last few weeks, most recent change per cardiology was from BID to daioly, then to every other day (due to decreased kidney function), then back up to 5 days per week (due to weight gain) as of last week.         Relevant Orders    Basic Metabolic Panel (Completed)    Magnesium (Completed)     Other Visit Diagnoses         Foul smelling urine        Relevant Orders    Urinalysis With Microscopic If Indicated (No Culture) - Urine, Clean Catch    Urine Culture - Urine, Urine, Clean Catch          See patient diagnoses and orders along with patient instructions for assessment, plan, and changes to care for patient.    There are no Patient Instructions on file for this visit.    No follow-ups on file.    Ambulatory progress note signed and attested to by Luis Aguilar D.O.    Disclaimer to patients: The 21st Century Cares Act makes medical notes like these available to patients in the  interest of transparency. However, please be advised that this is still a medical document. It is intended as [id number] communication. Many sections may include medical language or jargon, abbreviations, and additional verbiage that are unfamiliar or confusing. In some ways it may come across as blunt, direct, or may be summarized in order to clearly and concisely communicate the most crucial information to medical professionals. It may also include mentions of conditions that are unlikely but considered as part of the differential diagnosis, including serious disorders. These are not always discussed at length at the time of appointment because their likelihood is so low, but may be included in a medical note to make it clear what has been considered and/or ruled out as part of a work-up. Medical documents are intended to carry relevant information, facts as evident, and the personal clinical opinion of the physician. If you have any questions regarding this medical document, please bring them to the attention of the physician at your next scheduled appointment.

## 2025-03-27 ENCOUNTER — TELEPHONE (OUTPATIENT)
Dept: FAMILY MEDICINE CLINIC | Facility: CLINIC | Age: 89
End: 2025-03-27
Payer: MEDICARE

## 2025-03-27 NOTE — TELEPHONE ENCOUNTER
Caller: Maya Germain    Relationship: Emergency Contact    Best call back number: [phone]     What specialty or service is being requested: HOME HEALTH PHYSICAL THERAPY/OCCUPATIONAL THERAPY    Any additional details: PATIENT HAS SEVERE DIFFICULTY WITH MOVING AND GETTING OUT OF BED ON HIS OWN, AND BELIEVES HOME HEALTH WILL HELP

## 2025-03-31 NOTE — TELEPHONE ENCOUNTER
Caller: Moiz Germain    Relationship: Emergency Contact    Best call back number:  [phone] (Mobile)     What was the call regarding:  MOIZ PATIENT'S WIFE IS CALLING TO CHECK ON THE STATUS OF THE HOME HEALTH THAT WAS REQUESTED     PLEASE CALL

## 2025-04-01 DIAGNOSIS — E11.65 TYPE 2 DIABETES MELLITUS WITH HYPERGLYCEMIA, WITHOUT LONG-TERM CURRENT USE OF INSULIN: ICD-10-CM

## 2025-04-01 NOTE — TELEPHONE ENCOUNTER
Rx Refill Note  Requested Prescriptions     Pending Prescriptions Disp Refills    metFORMIN (GLUCOPHAGE) 500 MG tablet 180 tablet 0     Sig: Take 1 tablet by mouth 2 (Two) Times a Day With Meals.      Last office visit with prescribing clinician: 3/3/2025   Last telemedicine visit with prescribing clinician: Visit date not found   Next office visit with prescribing clinician: 6/3/2025                         Would you like a call back once the refill request has been completed: [] Yes [] No    If the office needs to give you a call back, can they leave a voicemail: [] Yes [] No    Caron Centeno MA  04/01/25, 15:06 EDT

## 2025-04-02 ENCOUNTER — HOSPITAL ENCOUNTER (INPATIENT)
Facility: HOSPITAL | Age: 89
LOS: 2 days | Discharge: HOME HEALTH | End: 2025-04-05
Attending: EMERGENCY MEDICINE | Admitting: INTERNAL MEDICINE
Payer: MEDICARE

## 2025-04-02 ENCOUNTER — APPOINTMENT (OUTPATIENT)
Dept: GENERAL RADIOLOGY | Facility: HOSPITAL | Age: 89
End: 2025-04-02
Payer: MEDICARE

## 2025-04-02 ENCOUNTER — APPOINTMENT (OUTPATIENT)
Dept: CARDIOLOGY | Facility: HOSPITAL | Age: 89
End: 2025-04-02
Payer: MEDICARE

## 2025-04-02 DIAGNOSIS — S70.12XA THIGH HEMATOMA, LEFT, INITIAL ENCOUNTER: ICD-10-CM

## 2025-04-02 DIAGNOSIS — I50.40 COMBINED SYSTOLIC AND DIASTOLIC CONGESTIVE HEART FAILURE, UNSPECIFIED HF CHRONICITY: ICD-10-CM

## 2025-04-02 DIAGNOSIS — R79.89 ELEVATED TROPONIN: ICD-10-CM

## 2025-04-02 DIAGNOSIS — M87.059 AVASCULAR NECROSIS OF BONE OF HIP, UNSPECIFIED LATERALITY: ICD-10-CM

## 2025-04-02 DIAGNOSIS — I50.9 ACUTE ON CHRONIC CONGESTIVE HEART FAILURE, UNSPECIFIED HEART FAILURE TYPE: Primary | ICD-10-CM

## 2025-04-02 DIAGNOSIS — J98.11 ATELECTASIS: ICD-10-CM

## 2025-04-02 DIAGNOSIS — L98.491 SKIN ULCER, LIMITED TO BREAKDOWN OF SKIN: ICD-10-CM

## 2025-04-02 DIAGNOSIS — D64.9 ANEMIA, UNSPECIFIED TYPE: ICD-10-CM

## 2025-04-02 DIAGNOSIS — N18.9 CHRONIC KIDNEY DISEASE, UNSPECIFIED CKD STAGE: ICD-10-CM

## 2025-04-02 DIAGNOSIS — M79.605 PAIN OF LEFT LOWER EXTREMITY: ICD-10-CM

## 2025-04-02 DIAGNOSIS — R06.02 SHORTNESS OF BREATH: ICD-10-CM

## 2025-04-02 LAB
ALBUMIN SERPL-MCNC: 3.4 G/DL (ref 3.5–5.2)
ALBUMIN/GLOB SERPL: 1.9 G/DL
ALP SERPL-CCNC: 171 U/L (ref 39–117)
ALT SERPL W P-5'-P-CCNC: 27 U/L (ref 1–41)
ANION GAP SERPL CALCULATED.3IONS-SCNC: 14 MMOL/L (ref 5–15)
AST SERPL-CCNC: 32 U/L (ref 1–40)
BASOPHILS # BLD AUTO: 0.04 10*3/MM3 (ref 0–0.2)
BASOPHILS NFR BLD AUTO: 0.7 % (ref 0–1.5)
BH CV LOWER VASCULAR LEFT COMMON FEMORAL AUGMENT: NORMAL
BH CV LOWER VASCULAR LEFT COMMON FEMORAL COMPRESS: NORMAL
BH CV LOWER VASCULAR LEFT COMMON FEMORAL PHASIC: NORMAL
BH CV LOWER VASCULAR LEFT COMMON FEMORAL SPONT: NORMAL
BH CV LOWER VASCULAR LEFT DISTAL FEMORAL AUGMENT: NORMAL
BH CV LOWER VASCULAR LEFT DISTAL FEMORAL COMPRESS: NORMAL
BH CV LOWER VASCULAR LEFT DISTAL FEMORAL PHASIC: NORMAL
BH CV LOWER VASCULAR LEFT DISTAL FEMORAL SPONT: NORMAL
BH CV LOWER VASCULAR LEFT GASTRONEMIUS COMPRESS: NORMAL
BH CV LOWER VASCULAR LEFT GREATER SAPH AK COMPRESS: NORMAL
BH CV LOWER VASCULAR LEFT GREATER SAPH BK COMPRESS: NORMAL
BH CV LOWER VASCULAR LEFT LESSER SAPH COMPRESS: NORMAL
BH CV LOWER VASCULAR LEFT MID FEMORAL AUGMENT: NORMAL
BH CV LOWER VASCULAR LEFT MID FEMORAL COMPRESS: NORMAL
BH CV LOWER VASCULAR LEFT MID FEMORAL PHASIC: NORMAL
BH CV LOWER VASCULAR LEFT MID FEMORAL SPONT: NORMAL
BH CV LOWER VASCULAR LEFT PERONEAL COMPRESS: NORMAL
BH CV LOWER VASCULAR LEFT POPLITEAL AUGMENT: NORMAL
BH CV LOWER VASCULAR LEFT POPLITEAL COMPRESS: NORMAL
BH CV LOWER VASCULAR LEFT POPLITEAL PHASIC: NORMAL
BH CV LOWER VASCULAR LEFT POPLITEAL SPONT: NORMAL
BH CV LOWER VASCULAR LEFT POSTERIOR TIBIAL COMPRESS: NORMAL
BH CV LOWER VASCULAR LEFT PROFUNDA FEMORAL PHASIC: NORMAL
BH CV LOWER VASCULAR LEFT PROFUNDA FEMORAL SPONT: NORMAL
BH CV LOWER VASCULAR LEFT PROXIMAL FEMORAL AUGMENT: NORMAL
BH CV LOWER VASCULAR LEFT PROXIMAL FEMORAL COMPRESS: NORMAL
BH CV LOWER VASCULAR LEFT PROXIMAL FEMORAL PHASIC: NORMAL
BH CV LOWER VASCULAR LEFT PROXIMAL FEMORAL SPONT: NORMAL
BH CV LOWER VASCULAR LEFT SAPHENOFEMORAL JUNCTION AUGMENT: NORMAL
BH CV LOWER VASCULAR LEFT SAPHENOFEMORAL JUNCTION COMPRESS: NORMAL
BH CV LOWER VASCULAR LEFT SAPHENOFEMORAL JUNCTION PHASIC: NORMAL
BH CV LOWER VASCULAR LEFT SAPHENOFEMORAL JUNCTION SPONT: NORMAL
BH CV LOWER VASCULAR RIGHT COMMON FEMORAL AUGMENT: NORMAL
BH CV LOWER VASCULAR RIGHT COMMON FEMORAL COMPRESS: NORMAL
BH CV LOWER VASCULAR RIGHT COMMON FEMORAL PHASIC: NORMAL
BH CV LOWER VASCULAR RIGHT COMMON FEMORAL SPONT: NORMAL
BH CV LOWER VASCULAR RIGHT DISTAL FEMORAL AUGMENT: NORMAL
BH CV LOWER VASCULAR RIGHT DISTAL FEMORAL COMPRESS: NORMAL
BH CV LOWER VASCULAR RIGHT DISTAL FEMORAL PHASIC: NORMAL
BH CV LOWER VASCULAR RIGHT DISTAL FEMORAL SPONT: NORMAL
BH CV LOWER VASCULAR RIGHT GASTRONEMIUS COMPRESS: NORMAL
BH CV LOWER VASCULAR RIGHT GREATER SAPH AK COMPRESS: NORMAL
BH CV LOWER VASCULAR RIGHT GREATER SAPH BK COMPRESS: NORMAL
BH CV LOWER VASCULAR RIGHT LESSER SAPH COMPRESS: NORMAL
BH CV LOWER VASCULAR RIGHT MID FEMORAL AUGMENT: NORMAL
BH CV LOWER VASCULAR RIGHT MID FEMORAL COMPRESS: NORMAL
BH CV LOWER VASCULAR RIGHT MID FEMORAL PHASIC: NORMAL
BH CV LOWER VASCULAR RIGHT MID FEMORAL SPONT: NORMAL
BH CV LOWER VASCULAR RIGHT PERONEAL COMPRESS: NORMAL
BH CV LOWER VASCULAR RIGHT POPLITEAL AUGMENT: NORMAL
BH CV LOWER VASCULAR RIGHT POPLITEAL COMPRESS: NORMAL
BH CV LOWER VASCULAR RIGHT POPLITEAL PHASIC: NORMAL
BH CV LOWER VASCULAR RIGHT POPLITEAL SPONT: NORMAL
BH CV LOWER VASCULAR RIGHT POSTERIOR TIBIAL COMPRESS: NORMAL
BH CV LOWER VASCULAR RIGHT PROFUNDA FEMORAL PHASIC: NORMAL
BH CV LOWER VASCULAR RIGHT PROFUNDA FEMORAL SPONT: NORMAL
BH CV LOWER VASCULAR RIGHT PROXIMAL FEMORAL AUGMENT: NORMAL
BH CV LOWER VASCULAR RIGHT PROXIMAL FEMORAL COMPRESS: NORMAL
BH CV LOWER VASCULAR RIGHT PROXIMAL FEMORAL PHASIC: NORMAL
BH CV LOWER VASCULAR RIGHT PROXIMAL FEMORAL SPONT: NORMAL
BH CV LOWER VASCULAR RIGHT SAPHENOFEMORAL JUNCTION AUGMENT: NORMAL
BH CV LOWER VASCULAR RIGHT SAPHENOFEMORAL JUNCTION COMPRESS: NORMAL
BH CV LOWER VASCULAR RIGHT SAPHENOFEMORAL JUNCTION PHASIC: NORMAL
BH CV LOWER VASCULAR RIGHT SAPHENOFEMORAL JUNCTION SPONT: NORMAL
BH CV VAS PRELIMINARY FINDINGS SCRIPTING: 1
BILIRUB SERPL-MCNC: 0.9 MG/DL (ref 0–1.2)
BUN SERPL-MCNC: 45 MG/DL (ref 8–23)
BUN/CREAT SERPL: 25 (ref 7–25)
CALCIUM SPEC-SCNC: 8.7 MG/DL (ref 8.6–10.5)
CHLORIDE SERPL-SCNC: 98 MMOL/L (ref 98–107)
CO2 SERPL-SCNC: 26 MMOL/L (ref 22–29)
CREAT SERPL-MCNC: 1.8 MG/DL (ref 0.76–1.27)
CRP SERPL-MCNC: 0.5 MG/DL (ref 0–0.5)
DEPRECATED RDW RBC AUTO: 63.1 FL (ref 37–54)
EGFRCR SERPLBLD CKD-EPI 2021: 35.8 ML/MIN/1.73
EOSINOPHIL # BLD AUTO: 0.08 10*3/MM3 (ref 0–0.4)
EOSINOPHIL NFR BLD AUTO: 1.4 % (ref 0.3–6.2)
ERYTHROCYTE [DISTWIDTH] IN BLOOD BY AUTOMATED COUNT: 17.2 % (ref 12.3–15.4)
GEN 5 1HR TROPONIN T REFLEX: 94 NG/L
GLOBULIN UR ELPH-MCNC: 1.8 GM/DL
GLUCOSE BLDC GLUCOMTR-MCNC: 174 MG/DL (ref 70–130)
GLUCOSE SERPL-MCNC: 171 MG/DL (ref 65–99)
HBA1C MFR BLD: 6.4 % (ref 4.8–5.6)
HCT VFR BLD AUTO: 32.7 % (ref 37.5–51)
HGB BLD-MCNC: 9.8 G/DL (ref 13–17.7)
IMM GRANULOCYTES # BLD AUTO: 0.03 10*3/MM3 (ref 0–0.05)
IMM GRANULOCYTES NFR BLD AUTO: 0.5 % (ref 0–0.5)
LYMPHOCYTES # BLD AUTO: 0.43 10*3/MM3 (ref 0.7–3.1)
LYMPHOCYTES NFR BLD AUTO: 7.5 % (ref 19.6–45.3)
MCH RBC QN AUTO: 30.1 PG (ref 26.6–33)
MCHC RBC AUTO-ENTMCNC: 30 G/DL (ref 31.5–35.7)
MCV RBC AUTO: 100.3 FL (ref 79–97)
MONOCYTES # BLD AUTO: 0.46 10*3/MM3 (ref 0.1–0.9)
MONOCYTES NFR BLD AUTO: 8.1 % (ref 5–12)
NEUTROPHILS NFR BLD AUTO: 4.67 10*3/MM3 (ref 1.7–7)
NEUTROPHILS NFR BLD AUTO: 81.8 % (ref 42.7–76)
NRBC BLD AUTO-RTO: 0 /100 WBC (ref 0–0.2)
NT-PROBNP SERPL-MCNC: ABNORMAL PG/ML (ref 0–1800)
PLATELET # BLD AUTO: 213 10*3/MM3 (ref 140–450)
PMV BLD AUTO: 10.9 FL (ref 6–12)
POTASSIUM SERPL-SCNC: 4.4 MMOL/L (ref 3.5–5.2)
PROCALCITONIN SERPL-MCNC: 0.18 NG/ML (ref 0–0.25)
PROT SERPL-MCNC: 5.2 G/DL (ref 6–8.5)
QT INTERVAL: 432 MS
QTC INTERVAL: 469 MS
RBC # BLD AUTO: 3.26 10*6/MM3 (ref 4.14–5.8)
SODIUM SERPL-SCNC: 138 MMOL/L (ref 136–145)
TROPONIN T % DELTA: -8
TROPONIN T NUMERIC DELTA: -8 NG/L
TROPONIN T SERPL HS-MCNC: 102 NG/L
TSH SERPL DL<=0.05 MIU/L-ACNC: 2.65 UIU/ML (ref 0.27–4.2)
WBC NRBC COR # BLD AUTO: 5.71 10*3/MM3 (ref 3.4–10.8)

## 2025-04-02 PROCEDURE — 84145 PROCALCITONIN (PCT): CPT | Performed by: NURSE PRACTITIONER

## 2025-04-02 PROCEDURE — 71045 X-RAY EXAM CHEST 1 VIEW: CPT

## 2025-04-02 PROCEDURE — 83880 ASSAY OF NATRIURETIC PEPTIDE: CPT | Performed by: NURSE PRACTITIONER

## 2025-04-02 PROCEDURE — 99222 1ST HOSP IP/OBS MODERATE 55: CPT | Performed by: NURSE PRACTITIONER

## 2025-04-02 PROCEDURE — 93970 EXTREMITY STUDY: CPT

## 2025-04-02 PROCEDURE — 85025 COMPLETE CBC W/AUTO DIFF WBC: CPT | Performed by: NURSE PRACTITIONER

## 2025-04-02 PROCEDURE — 83036 HEMOGLOBIN GLYCOSYLATED A1C: CPT | Performed by: NURSE PRACTITIONER

## 2025-04-02 PROCEDURE — 80053 COMPREHEN METABOLIC PANEL: CPT | Performed by: NURSE PRACTITIONER

## 2025-04-02 PROCEDURE — 99285 EMERGENCY DEPT VISIT HI MDM: CPT

## 2025-04-02 PROCEDURE — 82948 REAGENT STRIP/BLOOD GLUCOSE: CPT

## 2025-04-02 PROCEDURE — G0378 HOSPITAL OBSERVATION PER HR: HCPCS

## 2025-04-02 PROCEDURE — 86140 C-REACTIVE PROTEIN: CPT | Performed by: NURSE PRACTITIONER

## 2025-04-02 PROCEDURE — 36415 COLL VENOUS BLD VENIPUNCTURE: CPT

## 2025-04-02 PROCEDURE — 72170 X-RAY EXAM OF PELVIS: CPT

## 2025-04-02 PROCEDURE — 25010000002 BUMETANIDE PER 0.5 MG: Performed by: NURSE PRACTITIONER

## 2025-04-02 PROCEDURE — 93005 ELECTROCARDIOGRAM TRACING: CPT | Performed by: NURSE PRACTITIONER

## 2025-04-02 PROCEDURE — 84484 ASSAY OF TROPONIN QUANT: CPT | Performed by: NURSE PRACTITIONER

## 2025-04-02 PROCEDURE — 93970 EXTREMITY STUDY: CPT | Performed by: INTERNAL MEDICINE

## 2025-04-02 PROCEDURE — 84443 ASSAY THYROID STIM HORMONE: CPT | Performed by: NURSE PRACTITIONER

## 2025-04-02 PROCEDURE — 73552 X-RAY EXAM OF FEMUR 2/>: CPT

## 2025-04-02 RX ORDER — SODIUM CHLORIDE 0.9 % (FLUSH) 0.9 %
10 SYRINGE (ML) INJECTION EVERY 12 HOURS SCHEDULED
Status: DISCONTINUED | OUTPATIENT
Start: 2025-04-02 | End: 2025-04-05 | Stop reason: HOSPADM

## 2025-04-02 RX ORDER — DEXTROSE MONOHYDRATE 25 G/50ML
25 INJECTION, SOLUTION INTRAVENOUS
Status: DISCONTINUED | OUTPATIENT
Start: 2025-04-02 | End: 2025-04-05 | Stop reason: HOSPADM

## 2025-04-02 RX ORDER — POLYETHYLENE GLYCOL 3350 17 G/17G
17 POWDER, FOR SOLUTION ORAL DAILY PRN
Status: DISCONTINUED | OUTPATIENT
Start: 2025-04-02 | End: 2025-04-05 | Stop reason: HOSPADM

## 2025-04-02 RX ORDER — BUMETANIDE 0.25 MG/ML
2 INJECTION, SOLUTION INTRAMUSCULAR; INTRAVENOUS DAILY
Status: DISCONTINUED | OUTPATIENT
Start: 2025-04-03 | End: 2025-04-03

## 2025-04-02 RX ORDER — NITROGLYCERIN 0.4 MG/1
0.4 TABLET SUBLINGUAL
Status: DISCONTINUED | OUTPATIENT
Start: 2025-04-02 | End: 2025-04-05 | Stop reason: HOSPADM

## 2025-04-02 RX ORDER — CARVEDILOL 3.12 MG/1
3.12 TABLET ORAL 2 TIMES DAILY WITH MEALS
Status: DISCONTINUED | OUTPATIENT
Start: 2025-04-02 | End: 2025-04-05 | Stop reason: HOSPADM

## 2025-04-02 RX ORDER — ATORVASTATIN CALCIUM 40 MG/1
40 TABLET, FILM COATED ORAL NIGHTLY
Status: DISCONTINUED | OUTPATIENT
Start: 2025-04-02 | End: 2025-04-05 | Stop reason: HOSPADM

## 2025-04-02 RX ORDER — BUMETANIDE 1 MG/1
2 TABLET ORAL DAILY
Status: DISCONTINUED | OUTPATIENT
Start: 2025-04-03 | End: 2025-04-03

## 2025-04-02 RX ORDER — AMOXICILLIN 250 MG
2 CAPSULE ORAL 2 TIMES DAILY PRN
Status: DISCONTINUED | OUTPATIENT
Start: 2025-04-02 | End: 2025-04-05 | Stop reason: HOSPADM

## 2025-04-02 RX ORDER — BUMETANIDE 0.25 MG/ML
2 INJECTION, SOLUTION INTRAMUSCULAR; INTRAVENOUS ONCE
Status: COMPLETED | OUTPATIENT
Start: 2025-04-02 | End: 2025-04-02

## 2025-04-02 RX ORDER — INSULIN LISPRO 100 [IU]/ML
2-9 INJECTION, SOLUTION INTRAVENOUS; SUBCUTANEOUS
Status: DISCONTINUED | OUTPATIENT
Start: 2025-04-02 | End: 2025-04-05 | Stop reason: HOSPADM

## 2025-04-02 RX ORDER — NITROGLYCERIN 40 MG/1
1 PATCH TRANSDERMAL SEE ADMIN INSTRUCTIONS
Status: DISCONTINUED | OUTPATIENT
Start: 2025-04-02 | End: 2025-04-02

## 2025-04-02 RX ORDER — NITROGLYCERIN 40 MG/1
1 PATCH TRANSDERMAL DAILY
Status: DISCONTINUED | OUTPATIENT
Start: 2025-04-03 | End: 2025-04-05 | Stop reason: HOSPADM

## 2025-04-02 RX ORDER — FAMOTIDINE 20 MG/1
20 TABLET, FILM COATED ORAL 2 TIMES DAILY PRN
Status: DISCONTINUED | OUTPATIENT
Start: 2025-04-02 | End: 2025-04-05 | Stop reason: HOSPADM

## 2025-04-02 RX ORDER — BISACODYL 10 MG
10 SUPPOSITORY, RECTAL RECTAL DAILY PRN
Status: DISCONTINUED | OUTPATIENT
Start: 2025-04-02 | End: 2025-04-05 | Stop reason: HOSPADM

## 2025-04-02 RX ORDER — SODIUM CHLORIDE 0.9 % (FLUSH) 0.9 %
10 SYRINGE (ML) INJECTION AS NEEDED
Status: DISCONTINUED | OUTPATIENT
Start: 2025-04-02 | End: 2025-04-05 | Stop reason: HOSPADM

## 2025-04-02 RX ORDER — BISACODYL 5 MG/1
5 TABLET, DELAYED RELEASE ORAL DAILY PRN
Status: DISCONTINUED | OUTPATIENT
Start: 2025-04-02 | End: 2025-04-05 | Stop reason: HOSPADM

## 2025-04-02 RX ORDER — NICOTINE POLACRILEX 4 MG
15 LOZENGE BUCCAL
Status: DISCONTINUED | OUTPATIENT
Start: 2025-04-02 | End: 2025-04-05 | Stop reason: HOSPADM

## 2025-04-02 RX ORDER — SODIUM CHLORIDE 9 MG/ML
40 INJECTION, SOLUTION INTRAVENOUS AS NEEDED
Status: DISCONTINUED | OUTPATIENT
Start: 2025-04-02 | End: 2025-04-05 | Stop reason: HOSPADM

## 2025-04-02 RX ORDER — IBUPROFEN 600 MG/1
1 TABLET ORAL
Status: DISCONTINUED | OUTPATIENT
Start: 2025-04-02 | End: 2025-04-05 | Stop reason: HOSPADM

## 2025-04-02 RX ORDER — CLOPIDOGREL BISULFATE 75 MG/1
75 TABLET ORAL DAILY
Status: DISCONTINUED | OUTPATIENT
Start: 2025-04-03 | End: 2025-04-03

## 2025-04-02 RX ADMIN — BUMETANIDE 2 MG: 0.25 INJECTION INTRAMUSCULAR; INTRAVENOUS at 17:10

## 2025-04-02 RX ADMIN — Medication 10 ML: at 22:12

## 2025-04-02 RX ADMIN — CARVEDILOL 3.12 MG: 3.12 TABLET, FILM COATED ORAL at 22:08

## 2025-04-02 RX ADMIN — ATORVASTATIN CALCIUM 40 MG: 40 TABLET, FILM COATED ORAL at 22:08

## 2025-04-02 NOTE — ED PROVIDER NOTES
" EMERGENCY DEPARTMENT ENCOUNTER    Pt Name: Iain Dee  MRN: [id number]  Pt :   1935  Room Number:    Date of encounter:  2025  PCP: Luis Aguilar DO  ED Provider: COLIN Madrid    Historian: Patient, EMS      HPI:  Chief Complaint: Fluid retention, left hip pain        Context: Iain Dee is a 89 y.o. male who presents to the ED c/o fluid retention and left leg pain.  Patient with history of recent inferior ST segment elevation MI, chronic venous stasis, chronic atrial fibrillation  who presented via EMS for evaluation of fluid retention and left leg pain.  Patient reports being helped by his friends to move from one room to another to assist to the chair, he felt he pulled the hamstring during the move.  Reports limited mobility at baseline with need for assistance.  He feels short of breath intermittently.      PAST MEDICAL HISTORY  Past Medical History:   Diagnosis Date    Arthritis     Cancer     Diabetes mellitus     GERD (gastroesophageal reflux disease)     Gout     H/O Bladder carcinoma (HCC)     Hypertension          PAST SURGICAL HISTORY  Past Surgical History:   Procedure Laterality Date    BLADDER SURGERY      Biopsy    HERNIA REPAIR      PROSTATE BIOPSY           FAMILY HISTORY  Family History   Problem Relation Age of Onset    No Known Problems Mother     Heart attack Father          SOCIAL HISTORY  Social History     Socioeconomic History    Marital status:    Tobacco Use    Smoking status: Never     Passive exposure: Never    Smokeless tobacco: Never   Vaping Use    Vaping status: Never Used   Substance and Sexual Activity    Alcohol use: Yes     Alcohol/week: 1.0 standard drink of alcohol     Types: 1 Shots of liquor per week     Comment: Estimation based on spouse report of patient drinking \"1 large martini\" each night (regular martini = 3 shots, large martini = est. 4 shots)    Drug use: No    Sexual activity: Defer "         ALLERGIES  Colchicine        REVIEW OF SYSTEMS  Review of Systems       All systems reviewed and negative except for those discussed in HPI.       PHYSICAL EXAM    I have reviewed the triage vital signs and nursing notes.    ED Triage Vitals   Temp Pulse Resp BP SpO2   -- -- -- -- --      Temp src Heart Rate Source Patient Position BP Location FiO2 (%)   -- -- -- -- --       Physical Exam  GENERAL:   Appears in no acute distress.  Obese obese obese  HENT: Nares patent.  EYES: No scleral icterus.  CV: Irregular rhythm, normal rate, bilateral lower extremity edema  RESPIRATORY: Normal effort.  No audible wheezes, rales or rhonchi.  ABDOMEN: Soft, protuberant, nontender  MUSCULOSKELETAL: No deformities.   NEURO: Alert, moves all extremities, follows commands.  SKIN: Warm, dry, no rash visualized.  Ulceration to the left lateral shin      LAB RESULTS  Recent Results (from the past 24 hours)   Comprehensive Metabolic Panel    Collection Time: 04/02/25  2:59 PM    Specimen: Blood   Result Value Ref Range    Glucose 171 (H) 65 - 99 mg/dL    BUN 45 (H) 8 - 23 mg/dL    Creatinine 1.80 (H) 0.76 - 1.27 mg/dL    Sodium 138 136 - 145 mmol/L    Potassium 4.4 3.5 - 5.2 mmol/L    Chloride 98 98 - 107 mmol/L    CO2 26.0 22.0 - 29.0 mmol/L    Calcium 8.7 8.6 - 10.5 mg/dL    Total Protein 5.2 (L) 6.0 - 8.5 g/dL    Albumin 3.4 (L) 3.5 - 5.2 g/dL    ALT (SGPT) 27 1 - 41 U/L    AST (SGOT) 32 1 - 40 U/L    Alkaline Phosphatase 171 (H) 39 - 117 U/L    Total Bilirubin 0.9 0.0 - 1.2 mg/dL    Globulin 1.8 gm/dL    A/G Ratio 1.9 g/dL    BUN/Creatinine Ratio 25.0 7.0 - 25.0    Anion Gap 14.0 5.0 - 15.0 mmol/L    eGFR 35.8 (L) >60.0 mL/min/1.73   BNP    Collection Time: 04/02/25  2:59 PM    Specimen: Blood   Result Value Ref Range    proBNP 22,485.0 (H) 0.0 - 1,800.0 pg/mL   CBC Auto Differential    Collection Time: 04/02/25  2:59 PM    Specimen: Blood   Result Value Ref Range    WBC 5.71 3.40 - 10.80 10*3/mm3    RBC 3.26 (L) 4.14 - 5.80  10*6/mm3    Hemoglobin 9.8 (L) 13.0 - 17.7 g/dL    Hematocrit 32.7 (L) 37.5 - 51.0 %    .3 (H) 79.0 - 97.0 fL    MCH 30.1 26.6 - 33.0 pg    MCHC 30.0 (L) 31.5 - 35.7 g/dL    RDW 17.2 (H) 12.3 - 15.4 %    RDW-SD 63.1 (H) 37.0 - 54.0 fl    MPV 10.9 6.0 - 12.0 fL    Platelets 213 140 - 450 10*3/mm3    Neutrophil % 81.8 (H) 42.7 - 76.0 %    Lymphocyte % 7.5 (L) 19.6 - 45.3 %    Monocyte % 8.1 5.0 - 12.0 %    Eosinophil % 1.4 0.3 - 6.2 %    Basophil % 0.7 0.0 - 1.5 %    Immature Grans % 0.5 0.0 - 0.5 %    Neutrophils, Absolute 4.67 1.70 - 7.00 10*3/mm3    Lymphocytes, Absolute 0.43 (L) 0.70 - 3.10 10*3/mm3    Monocytes, Absolute 0.46 0.10 - 0.90 10*3/mm3    Eosinophils, Absolute 0.08 0.00 - 0.40 10*3/mm3    Basophils, Absolute 0.04 0.00 - 0.20 10*3/mm3    Immature Grans, Absolute 0.03 0.00 - 0.05 10*3/mm3    nRBC 0.0 0.0 - 0.2 /100 WBC   C-reactive Protein    Collection Time: 04/02/25  2:59 PM    Specimen: Blood   Result Value Ref Range    C-Reactive Protein 0.50 0.00 - 0.50 mg/dL   Procalcitonin    Collection Time: 04/02/25  2:59 PM    Specimen: Blood   Result Value Ref Range    Procalcitonin 0.18 0.00 - 0.25 ng/mL   ECG 12 Lead Dyspnea    Collection Time: 04/02/25  3:08 PM   Result Value Ref Range    QT Interval 432 ms    QTC Interval 469 ms   High Sensitivity Troponin T    Collection Time: 04/02/25  3:51 PM    Specimen: Blood   Result Value Ref Range    HS Troponin T 102 (C) <22 ng/L   Duplex Venous Lower Extremity - Bilateral    Collection Time: 04/02/25  3:52 PM   Result Value Ref Range    Right Common Femoral Spont Y     Right Common Femoral Phasic Y     Right Common Femoral Compress C     Right Common Femoral Augment Y     Right Saphenofemoral Junction Spont Y     Right Saphenofemoral Junction Phasic Y     Right Saphenofemoral Junction Compress C     Right Saphenofemoral Junction Augment Y     Right Profunda Femoral Spont Y     Right Profunda Femoral Phasic Y     Right Proximal Femoral Spont Y     Right  Proximal Femoral Phasic Y     Right Proximal Femoral Compress C     Right Proximal Femoral Augment Y     Right Mid Femoral Spont Y     Right Mid Femoral Phasic Y     Right Mid Femoral Compress C     Right Mid Femoral Augment Y     Right Distal Femoral Spont Y     Right Distal Femoral Phasic Y     Right Distal Femoral Compress C     Right Distal Femoral Augment Y     Right Popliteal Spont Y     Right Popliteal Phasic Y     Right Popliteal Compress C     Right Popliteal Augment Y     Right Posterior Tibial Compress C     Right Peroneal Compress C     Right Gastronemius Compress C     Right Greater Saph AK Compress C     Right Greater Saph BK Compress C     Right Lesser Saph Compress C     Left Common Femoral Spont Y     Left Common Femoral Phasic Y     Left Common Femoral Compress C     Left Common Femoral Augment Y     Left Saphenofemoral Junction Spont Y     Left Saphenofemoral Junction Phasic Y     Left Saphenofemoral Junction Compress C     Left Saphenofemoral Junction Augment Y     Left Profunda Femoral Spont Y     Left Profunda Femoral Phasic Y     Left Proximal Femoral Spont Y     Left Proximal Femoral Phasic Y     Left Proximal Femoral Compress C     Left Proximal Femoral Augment Y     Left Mid Femoral Spont Y     Left Mid Femoral Phasic Y     Left Mid Femoral Compress C     Left Mid Femoral Augment Y     Left Distal Femoral Spont Y     Left Distal Femoral Phasic Y     Left Distal Femoral Compress C     Left Distal Femoral Augment Y     Left Popliteal Spont Y     Left Popliteal Phasic Y     Left Popliteal Compress C     Left Popliteal Augment Y     Left Posterior Tibial Compress C     Left Peroneal Compress C     Left Gastronemius Compress C     Left Greater Saph AK Compress C     Left Greater Saph BK Compress C     Left Lesser Saph Compress C     BH CV VAS PRELIMINARY FINDINGS SCRIPTING 1.0        If labs were ordered, I independently reviewed the results and considered them in treating the  patient.        RADIOLOGY  Duplex Venous Lower Extremity - Bilateral  Result Date: 4/2/2025    Normal bilateral lower extremity venous duplex scan.     XR Femur 2 View Left  Result Date: 4/2/2025  XR FEMUR 2 VW LEFT, XR PELVIS 1 OR 2 VW Date of Exam: 4/2/2025 2:57 PM EDT Indication: leg pain Comparison: None available. Findings: Pelvis: Prostate clips or fiducial markers are noted at the lower central pelvis. There are vascular calcifications. There is ill-defined serpiginous sclerosis of the right and left femoral heads compatible with avascular necrosis. The hip joints are congruent with symmetric mild osteoarthritic changes. No acute fracture or traumatic malalignment. There is severe degenerative disc disease in the lower lumbar spine. Left femur: There are vascular calcifications. Unremarkable appearance of the thigh soft tissues otherwise. No knee joint effusion. No acute fracture or traumatic malalignment. No focal bony lesion. Only mild degenerative changes at the knee.     Impression: No acute fracture or traumatic malalignment. Ill-defined sclerosis of the right and left femoral heads compatible with avascular necrosis. No loss of femoral head sphericity/subchondral collapse. Electronically Signed: Tim Harris MD  4/2/2025 3:16 PM EDT  Workstation ID: [id number]    XR Pelvis 1 or 2 View  Result Date: 4/2/2025  XR FEMUR 2 VW LEFT, XR PELVIS 1 OR 2 VW Date of Exam: 4/2/2025 2:57 PM EDT Indication: leg pain Comparison: None available. Findings: Pelvis: Prostate clips or fiducial markers are noted at the lower central pelvis. There are vascular calcifications. There is ill-defined serpiginous sclerosis of the right and left femoral heads compatible with avascular necrosis. The hip joints are congruent with symmetric mild osteoarthritic changes. No acute fracture or traumatic malalignment. There is severe degenerative disc disease in the lower lumbar spine. Left femur: There are vascular calcifications.  Unremarkable appearance of the thigh soft tissues otherwise. No knee joint effusion. No acute fracture or traumatic malalignment. No focal bony lesion. Only mild degenerative changes at the knee.     Impression: No acute fracture or traumatic malalignment. Ill-defined sclerosis of the right and left femoral heads compatible with avascular necrosis. No loss of femoral head sphericity/subchondral collapse. Electronically Signed: Tim Harris MD  4/2/2025 3:16 PM EDT  Workstation ID: [id number]    XR Chest 1 View  Result Date: 4/2/2025  XR CHEST 1 VW Date of Exam: 4/2/2025 2:57 PM EDT Indication: sob Comparison: 8/6/2024 Findings: There is incomplete inspiration. Heart size is borderline. There is strandy airspace disease in the left lung base. There is atelectasis in the right infrahilar region     Impression: Left basilar atelectasis or pneumonia Electronically Signed: Keaton Stephens  4/2/2025 3:08 PM EDT  Workstation ID: [id number]      I ordered and independently reviewed the above noted radiographic studies.      I viewed images of chest x-ray which showed left basilar opacity per my independent interpretation.    See radiologist's dictation for official interpretation.        PROCEDURES    Procedures    ECG 12 Lead Dyspnea   Final Result   Test Reason : Dyspnea   Blood Pressure :   */*   mmHG   Vent. Rate :  71 BPM     Atrial Rate :  51 BPM      P-R Int :   * ms          QRS Dur : 116 ms       QT Int : 432 ms       P-R-T Axes :   *  -2  48 degrees     QTcB Int : 469 ms      Atrial fibrillation with premature ventricular or aberrantly conducted   complexes   Abnormal ECG   When compared with ECG of 07-Aug-2024 04:23,   Nonspecific T wave abnormality has replaced inverted T waves in Inferior   leads   Nonspecific T wave abnormality, improved in Anterolateral leads   QT has lengthened   Confirmed by TIM MYLES MD ([id number]) on 4/2/2025 3:09:28 PM      Referred By: EDMD           Confirmed By: TIM MYLES MD           MEDICATIONS GIVEN IN ER    Medications   bumetanide (BUMEX) injection 2 mg (has no administration in time range)         MEDICAL DECISION MAKING, PROGRESS, and CONSULTS    All labs, if obtained, have been independently reviewed by me.  All radiology studies, if obtained, have been reviewed by me and the radiologist dictating the report.  All EKG's, if obtained, have been independently viewed and interpreted by me/my attending physician.      Discussion below represents my analysis of pertinent findings related to patient's condition, differential diagnosis, treatment plan and final disposition.  Patient is 89-year-old male with history of A-fib, hypertension, JOLENE on CPAP, diabetes type 2 presented for evaluation of fluid overload and left leg pain.  On physical exam he was pleasant appearing, significant lower extremity edema, skin ulceration to the left shin.  Lab work was significant for elevated creatinine 1.8, history of chronic kidney disease, elevated proBNP 22,485, above patient's normal, he received Bumex 2 mg IV in ER, acute anemia hemoglobin 9.8, hematocrit 32.7, patient declines blood in stool.  X-ray pelvis showed no acute fracture or malalignment, vascular necrosis noted to bilateral femoral heads, chest x-ray shows left basilar atelectasis versus pneumonia.  Patient has no cough or fever to suggest pneumonia.  Spoke with patient and his wife about admission for further evaluation and management.  They are agreeable, I consulted hospitalist Dr. Mckeon for admission.                       Differential diagnosis:    CHF exacerbation, hypoxia, pulmonary edema, DVT, hip fracture, hamstring injury, anemia of chronic disease      Additional sources:    - Discussed/ obtained information from independent historians: EMT, spouse    - External (non-ED) record review:    from 8/5/24   Left ventricular systolic function is moderately decreased. Calculated left ventricular EF = 34.3% Left ventricular ejection  fraction appears to be 36 - 40%.    Left ventricular wall thickness is consistent with mild concentric hypertrophy.    Left ventricular diastolic function was indeterminate.    Mildly reduced right ventricular systolic function noted.    The right ventricular cavity is dilated.    The left atrial cavity is mildly dilated.    Left atrial volume is moderately increased.    The right atrial cavity is dilated.    Moderate mitral valve regurgitation is present.    Estimated right ventricular systolic pressure from tricuspid regurgitation is moderately elevated (45-55 mmHg). Calculated right ventricular systolic pressure from tricuspid regurgitation is 51 mmHg.       - Chronic or social conditions impacting care: Advanced age, comorbidities    - Shared decision making: Patient, wife      Orders placed during this visit:  Orders Placed This Encounter   Procedures    XR Chest 1 View    XR Femur 2 View Left    XR Pelvis 1 or 2 View    Comprehensive Metabolic Panel    BNP    Urinalysis With Culture If Indicated - Urine, Clean Catch    CBC Auto Differential    High Sensitivity Troponin T    C-reactive Protein    Procalcitonin    TSH Rfx On Abnormal To Free T4    High Sensitivity Troponin T 1Hr    Vital Signs Recheck    POC Occult Blood Stool    ECG 12 Lead Dyspnea    Initiate Observation Status    ED Bed Request    CBC & Differential         Additional orders considered but not ordered:  CT chest, CT abdomen    ED Course:    Consultants:      ED Course as of 04/02/25 1644   Wed Apr 02, 2025   1537 Hemoglobin(!): 9.8  Acute anemia when compared to multiple prior values. [RS]   1539 XR Chest 1 View  Personally reviewed the single view of the chest.  My interpretation there is some hazy changes within the left lower lobe.  There is no focal lobar infiltrate visualized.  [RS]   1540 XR Femur 2 View Left  I personally reviewed the images of the left femur.  On my interpretation there are atherosclerotic changes visualized.  There  is no displaced fracture visualized [RS]   1542 Hemoglobin(!): 9.8 [IR]   1542 Hematocrit(!): 32.7  Worsening of anemia [IR]   1610 No DVT or SVT per radiology technician report [IR]   1624 I reevaluated the patient, wife is at bedside.  She told me he was declining since August after his heart attack.  She also told me that their diet was poor recently due to increase in stressors.  She feels overwhelmed and had not been cooking for her  and self for few months. [IR]   1630 Patient told me that he has difficulty with stooling, he will go to the toilet and sit there for 40 minutes with small amount of stool. He reported no blood in stool or dark tarry stools. [IR]   1636 HS Troponin T(!!): 102 [IR]   1636 Elevated troponin, appears chronically [IR]   1637 Messaged hospitalist for admission [IR]   1637 proBNP(!): 22,485.0  Patient received Bumex IV [IR]      ED Course User Index  [IR] Jody Rick APRN  [RS] Tim Contreras MD              Shared Decision Making:  After my consideration of clinical presentation and any laboratory/radiology studies obtained, I discussed the findings with the patient/patient representative who is in agreement with the treatment plan and the final disposition.   Risks and benefits of discharge and/or observation/admission were discussed.       AS OF 16:44 EDT VITALS:    BP - 138/94  HR - 66  TEMP - 97.8 °F (36.6 °C) (Oral)  O2 SATS - 100%                  DIAGNOSIS  Final diagnoses:   Acute on chronic congestive heart failure, unspecified heart failure type   Anemia, unspecified type   Chronic kidney disease, unspecified CKD stage   Shortness of breath   Pain of left lower extremity   Skin ulcer, limited to breakdown of skin   Atelectasis   Avascular necrosis of bone of hip, unspecified laterality   Elevated troponin         DISPOSITION  admit      Please note that portions of this document were completed with voice recognition software.     COLIN Madrid    04/02/25   16:44 EDT        Jody Rick, APRN  04/02/25 [id number]

## 2025-04-02 NOTE — H&P
[facility] Medicine Services  HISTORY AND PHYSICAL    Patient Name: Iain Dee  : 1935  MRN: [id number]  Primary Care Physician: Luis Aguilar DO  Date of admission: 2025    Subjective   Subjective     Chief Complaint:  Left thigh pain    HPI:  Iani Dee is a 89 y.o. male with PMH of HTN, HLP, afib on chronic eliquis, CAD, T2DM, and prostate/bladder cancer who presented to the ED with left thigh pain and swelling.  He states he had people over for lunch and he was carried to the room where they were eating and when he was set in the chair he began having this thigh pain.  He denies SOB or chest pain as the reason he was being carried. He states his swelling and dyspnea has been about the same since August, no recent significant worsening.  He was found to have elevated BNP, received IV diuresis in ED and is being admitted for further diuresis        Review of Systems   Constitutional:  Positive for appetite change. Negative for activity change, chills, diaphoresis, fever and unexpected weight change.        Poor appetite at baseline   HENT:  Negative for congestion, hearing loss and trouble swallowing.    Eyes:  Negative for visual disturbance.   Respiratory:  Positive for shortness of breath. Negative for cough and chest tightness.    Cardiovascular:  Positive for leg swelling. Negative for chest pain.   Gastrointestinal:  Positive for constipation. Negative for abdominal pain, diarrhea, nausea and vomiting.   Genitourinary:  Negative for dysuria and hematuria.   Musculoskeletal:  Positive for gait problem. Negative for back pain.   Skin:  Positive for wound.   Neurological:  Positive for weakness. Negative for dizziness and syncope.   Psychiatric/Behavioral:  Negative for behavioral problems and confusion.           Personal History     Past Medical History:   Diagnosis Date    A-fib     Arthritis     Cancer     Diabetes mellitus     GERD (gastroesophageal  reflux disease)     Gout     H/O Bladder carcinoma (HCC)     Hypertension          Past Surgical History:   Procedure Laterality Date    BLADDER SURGERY      Biopsy    HERNIA REPAIR      PROSTATE BIOPSY         Family History: family history includes Heart attack in his father; No Known Problems in his mother.     Social History:  reports that he has never smoked. He has never been exposed to tobacco smoke. He has never used smokeless tobacco. He reports current alcohol use of about 1.0 standard drink of alcohol per week. He reports that he does not use drugs.  Social History     Social History Narrative    Lives in [place] with wife    Uses rollator at home       Medications:  Lancets Thin, apixaban, atorvastatin, bumetanide, carvedilol, clopidogrel, empagliflozin, famotidine, glucose blood, glucose monitor, ketoconazole, magnesium oxide, metFORMIN, and nitroglycerin    Allergies   Allergen Reactions    Colchicine Other (See Comments)     Unable to walk        Objective   Objective     Vital Signs:   Temp:  [97.8 °F (36.6 °C)] 97.8 °F (36.6 °C)  Heart Rate:  [64-87] 75  Resp:  [16] 16  BP: ()/() 135/99  Flow (L/min) (Oxygen Therapy):  [2] 2    Physical Exam   Constitutional: Awake, alert, wife at bedside  Eyes: PERRLA, sclerae anicteric, no conjunctival injection  HENT: NCAT, mucous membranes moist  Neck: Supple, no thyromegaly, no lymphadenopathy, trachea midline  Respiratory: Fine crackles bilateral bases, nonlabored respirations, sats stable on 2L  Cardiovascular: irregular, no murmurs, rubs, or gallops, palpable pedal pulses bilaterally  Gastrointestinal: Positive bowel sounds, soft, nontender, nondistended, obese  Musculoskeletal: 2-3+bilateral ankle edema, no clubbing or cyanosis to extremities. Left posterior thigh with swelling and firmness, TTP, no redness, no bruising noted  Psychiatric: Appropriate affect, cooperative, pleasant  Neurologic: Oriented x 3, ARTEAGA, speech clear  Skin: No rashes  noted. Left shin wound with mild surrounding redness      Result Review:  I have personally reviewed the results from the time of this admission to 4/2/2025 18:03 EDT and agree with these findings:  [x]  Laboratory list / accordion  []  Microbiology  []  Radiology  []  EKG/Telemetry   []  Cardiology/Vascular   []  Pathology  [x]  Old records  []  Other:  Most notable findings include:    LAB RESULTS:      Lab 04/02/25  1459   WBC 5.71   HEMOGLOBIN 9.8*   HEMATOCRIT 32.7*   PLATELETS 213   NEUTROS ABS 4.67   IMMATURE GRANS (ABS) 0.03   LYMPHS ABS 0.43*   MONOS ABS 0.46   EOS ABS 0.08   .3*   CRP 0.50   PROCALCITONIN 0.18         Lab 04/02/25  1551 04/02/25  1459   SODIUM  --  138   POTASSIUM  --  4.4   CHLORIDE  --  98   CO2  --  26.0   ANION GAP  --  14.0   BUN  --  45*   CREATININE  --  1.80*   EGFR  --  35.8*   GLUCOSE  --  171*   CALCIUM  --  8.7   TSH 2.650  --          Lab 04/02/25  1459   TOTAL PROTEIN 5.2*   ALBUMIN 3.4*   GLOBULIN 1.8   ALT (SGPT) 27   AST (SGOT) 32   BILIRUBIN 0.9   ALK PHOS 171*         Lab 04/02/25  1711 04/02/25  1551 04/02/25  1459   PROBNP  --   --  22,485.0*   HSTROP T 94* 102*  --                  Brief Urine Lab Results       None          Microbiology Results (last 10 days)       ** No results found for the last 240 hours. **            Duplex Venous Lower Extremity - Bilateral  Result Date: 4/2/2025    Normal bilateral lower extremity venous duplex scan.     XR Femur 2 View Left  Result Date: 4/2/2025  XR FEMUR 2 VW LEFT, XR PELVIS 1 OR 2 VW Date of Exam: 4/2/2025 2:57 PM EDT Indication: leg pain Comparison: None available. Findings: Pelvis: Prostate clips or fiducial markers are noted at the lower central pelvis. There are vascular calcifications. There is ill-defined serpiginous sclerosis of the right and left femoral heads compatible with avascular necrosis. The hip joints are congruent with symmetric mild osteoarthritic changes. No acute fracture or traumatic  malalignment. There is severe degenerative disc disease in the lower lumbar spine. Left femur: There are vascular calcifications. Unremarkable appearance of the thigh soft tissues otherwise. No knee joint effusion. No acute fracture or traumatic malalignment. No focal bony lesion. Only mild degenerative changes at the knee.     Impression: Impression: No acute fracture or traumatic malalignment. Ill-defined sclerosis of the right and left femoral heads compatible with avascular necrosis. No loss of femoral head sphericity/subchondral collapse. Electronically Signed: Tim Harris MD  4/2/2025 3:16 PM EDT  Workstation ID: [id number]    XR Pelvis 1 or 2 View  Result Date: 4/2/2025  XR FEMUR 2 VW LEFT, XR PELVIS 1 OR 2 VW Date of Exam: 4/2/2025 2:57 PM EDT Indication: leg pain Comparison: None available. Findings: Pelvis: Prostate clips or fiducial markers are noted at the lower central pelvis. There are vascular calcifications. There is ill-defined serpiginous sclerosis of the right and left femoral heads compatible with avascular necrosis. The hip joints are congruent with symmetric mild osteoarthritic changes. No acute fracture or traumatic malalignment. There is severe degenerative disc disease in the lower lumbar spine. Left femur: There are vascular calcifications. Unremarkable appearance of the thigh soft tissues otherwise. No knee joint effusion. No acute fracture or traumatic malalignment. No focal bony lesion. Only mild degenerative changes at the knee.     Impression: Impression: No acute fracture or traumatic malalignment. Ill-defined sclerosis of the right and left femoral heads compatible with avascular necrosis. No loss of femoral head sphericity/subchondral collapse. Electronically Signed: Tim Harris MD  4/2/2025 3:16 PM EDT  Workstation ID: [id number]    XR Chest 1 View  Result Date: 4/2/2025  XR CHEST 1 VW Date of Exam: 4/2/2025 2:57 PM EDT Indication: sob Comparison: 8/6/2024 Findings: There is  incomplete inspiration. Heart size is borderline. There is strandy airspace disease in the left lung base. There is atelectasis in the right infrahilar region     Impression: Impression: Left basilar atelectasis or pneumonia Electronically Signed: Keaton Stephens  4/2/2025 3:08 PM EDT  Workstation ID: [id number]      Results for orders placed during the hospital encounter of 08/05/24    Adult Transthoracic Echo Complete W/ Cont if Necessary Per Protocol    Interpretation Summary    Left ventricular systolic function is moderately decreased. Calculated left ventricular EF = 34.3% Left ventricular ejection fraction appears to be 36 - 40%.    Left ventricular wall thickness is consistent with mild concentric hypertrophy.    Left ventricular diastolic function was indeterminate.    Mildly reduced right ventricular systolic function noted.    The right ventricular cavity is dilated.    The left atrial cavity is mildly dilated.    Left atrial volume is moderately increased.    The right atrial cavity is dilated.    Moderate mitral valve regurgitation is present.    Estimated right ventricular systolic pressure from tricuspid regurgitation is moderately elevated (45-55 mmHg). Calculated right ventricular systolic pressure from tricuspid regurgitation is 51 mmHg.      Assessment & Plan   Assessment & Plan       CHF (congestive heart failure)    Essential hypertension    Atrial fibrillation    Diabetes mellitus    JOLENE on CPAP    Stage 3b chronic kidney disease    Thigh hematoma, left, initial encounter    Left thigh hematoma  Anemia  --hold plavix and ASA  --avoiding CT currently due to creatinine and receiving diuretics  --trend H/H (no recent H/H for comparison)    CHF  --continue IV diuresis  --strict I&O  --ECHO pending    CKD  --from chart review, appears baseline 1.4-1.6  --repeat labs in am    T2DM  --A1C pending  --SSI for now  --continue home jardiance    Afib  HTN  HLP  --eliquis on hold as above  --per last cardiology  clinic note, plavix can be transitioned to baby asa if needed due to bleeding  --rate controlled, continue carvedilol  --continue statin    LLE wound  --WOC consult  --started as a blister that popped due to LE edema    Total time spent: 60 minutes  Time spent includes time reviewing chart, face-to-face time, counseling patient/family/caregiver, ordering medications/tests/procedures, communicating with other health care professionals, documenting clinical information in the electronic health record, and coordination of care.      DVT prophylaxis:  mechanical for now    CODE STATUS:    Code Status (Patient has no pulse and is not breathing): CPR (Attempt to Resuscitate)  Medical Interventions (Patient has pulse or is breathing): Full Support      Expected Discharge  Expected Discharge Date: 4/4/2025; Expected Discharge Time:         Signature: Electronically signed by COLIN Medina, 04/02/25, 6:03 PM EDT

## 2025-04-02 NOTE — ED NOTES
Iain Dee    Nursing Report ED to Floor:  Mental status: A&O x 4  Ambulatory status: Up with max assist  Oxygen Therapy:  2 lpm NC  Cardiac Rhythm: Afib  Admitted from: ER  Safety Concerns:  None  Precautions: None  Social Issues: None  ED Room #:  8    ED Nurse Phone Extension - [phone] or may call [phone].      HPI:   Chief Complaint   Patient presents with    Fluid Retention       Past Medical History:  Past Medical History:   Diagnosis Date    A-fib     Arthritis     Cancer     Diabetes mellitus     GERD (gastroesophageal reflux disease)     Gout     H/O Bladder carcinoma (HCC)     Hypertension         Past Surgical History:  Past Surgical History:   Procedure Laterality Date    BLADDER SURGERY      Biopsy    HERNIA REPAIR      PROSTATE BIOPSY          Admitting Doctor:   Lázaro Mckeon DO    Consulting Provider(s):  Consults       No orders found from 3/4/2025 to 4/3/2025.             Admitting Diagnosis:   The primary encounter diagnosis was Acute on chronic congestive heart failure, unspecified heart failure type. Diagnoses of Anemia, unspecified type, Chronic kidney disease, unspecified CKD stage, Shortness of breath, Pain of left lower extremity, Skin ulcer, limited to breakdown of skin, Atelectasis, Avascular necrosis of bone of hip, unspecified laterality, and Elevated troponin were also pertinent to this visit.    Most Recent Vitals:   Vitals:    04/02/25 1815 04/02/25 1830 04/02/25 1845 04/02/25 1900   BP: 106/59 111/62 117/77 100/74   BP Location:       Patient Position:       Pulse: 78 82 76 80   Resp:       Temp:       TempSrc:       SpO2: 96% 90% (!) 77% 92%   Weight:       Height:           Active LDAs/IV Access:   Lines, Drains & Airways       Active LDAs       Name Placement date Placement time Site Days    Peripheral IV 04/02/25 1500 Anterior;Right Forearm 04/02/25  1500  Forearm  less than 1    Peripheral IV 04/02/25 1512 Anterior;Right Forearm 04/02/25  1512  Forearm  less than 1                     Labs (abnormal labs have a star):   Labs Reviewed   COMPREHENSIVE METABOLIC PANEL - Abnormal; Notable for the following components:       Result Value    Glucose 171 (*)     BUN 45 (*)     Creatinine 1.80 (*)     Total Protein 5.2 (*)     Albumin 3.4 (*)     Alkaline Phosphatase 171 (*)     eGFR 35.8 (*)     All other components within normal limits    Narrative:     GFR Categories in Chronic Kidney Disease (CKD)      GFR Category          GFR (mL/min/1.73)    Interpretation  G1                     90 or greater         Normal or high (1)  G2                      60-89                Mild decrease (1)  G3a                   45-59                Mild to moderate decrease  G3b                   30-44                Moderate to severe decrease  G4                    15-29                Severe decrease  G5                    14 or less           Kidney failure          (1)In the absence of evidence of kidney disease, neither GFR category G1 or G2 fulfill the criteria for CKD.    eGFR calculation 2021 CKD-EPI creatinine equation, which does not include race as a factor   BNP (IN-HOUSE) - Abnormal; Notable for the following components:    proBNP 22,485.0 (*)     All other components within normal limits    Narrative:     This assay is used as an aid in the diagnosis of individuals suspected of having heart failure. It can be used as an aid in the diagnosis of acute decompensated heart failure (ADHF) in patients presenting with signs and symptoms of ADHF to the emergency department (ED). In addition, NT-proBNP of <300 pg/mL indicates ADHF is not likely.    Age Range Result Interpretation  NT-proBNP Concentration (pg/mL:      <50             Positive            >450                   Gray                 300-450                    Negative             <300    50-75           Positive            >900                  Gray                300-900                  Negative            <300      >75              Positive            >1800                  Gray                300-1800                  Negative            <300   CBC WITH AUTO DIFFERENTIAL - Abnormal; Notable for the following components:    RBC 3.26 (*)     Hemoglobin 9.8 (*)     Hematocrit 32.7 (*)     .3 (*)     MCHC 30.0 (*)     RDW 17.2 (*)     RDW-SD 63.1 (*)     Neutrophil % 81.8 (*)     Lymphocyte % 7.5 (*)     Lymphocytes, Absolute 0.43 (*)     All other components within normal limits   TROPONIN - Abnormal; Notable for the following components:    HS Troponin T 102 (*)     All other components within normal limits    Narrative:     High Sensitive Troponin T Reference Range:  <14.0 ng/L- Negative Female for AMI  <22.0 ng/L- Negative Male for AMI  >=14 - Abnormal Female indicating possible myocardial injury.  >=22 - Abnormal Male indicating possible myocardial injury.   Clinicians would have to utilize clinical acumen, EKG, Troponin, and serial changes to determine if it is an Acute Myocardial Infarction or myocardial injury due to an underlying chronic condition.        HIGH SENSITIVITIY TROPONIN T 1HR - Abnormal; Notable for the following components:    HS Troponin T 94 (*)     All other components within normal limits    Narrative:     High Sensitive Troponin T Reference Range:  <14.0 ng/L- Negative Female for AMI  <22.0 ng/L- Negative Male for AMI  >=14 - Abnormal Female indicating possible myocardial injury.  >=22 - Abnormal Male indicating possible myocardial injury.   Clinicians would have to utilize clinical acumen, EKG, Troponin, and serial changes to determine if it is an Acute Myocardial Infarction or myocardial injury due to an underlying chronic condition.        C-REACTIVE PROTEIN - Normal   PROCALCITONIN - Normal    Narrative:     As a Marker for Sepsis (Non-Neonates):    1. <0.5 ng/mL represents a low risk of severe sepsis and/or septic shock.  2. >2 ng/mL represents a high risk of severe sepsis and/or septic shock.    As a Marker  "for Lower Respiratory Tract Infections that require antibiotic therapy:    PCT on Admission    Antibiotic Therapy       6-12 Hrs later    >0.5                Strongly Recommended  >0.25 - <0.5        Recommended   0.1 - 0.25          Discouraged              Remeasure/reassess PCT  <0.1                Strongly Discouraged     Remeasure/reassess PCT    As 28 day mortality risk marker: \"Change in Procalcitonin Result\" (>80% or <=80%) if Day 0 (or Day 1) and Day 4 values are available. Refer to http://www.[employer] [employer]-pct-calculator.com    Change in PCT <=80%  A decrease of PCT levels below or equal to 80% defines a positive change in PCT test result representing a higher risk for 28-day all-cause mortality of patients diagnosed with severe sepsis for septic shock.    Change in PCT >80%  A decrease of PCT levels of more than 80% defines a negative change in PCT result representing a lower risk for 28-day all-cause mortality of patients diagnosed with severe sepsis or septic shock.      TSH RFX ON ABNORMAL TO FREE T4 - Normal   CBC AND DIFFERENTIAL    Narrative:     The following orders were created for panel order CBC & Differential.  Procedure                               Abnormality         Status                     ---------                               -----------         ------                     CBC Auto Differential[[id number]]        Abnormal            Final result                 Please view results for these tests on the individual orders.       Meds Given in ED:   Medications   bumetanide (BUMEX) injection 2 mg (has no administration in time range)   bumetanide (BUMEX) injection 2 mg (2 mg Intravenous Given 4/2/25 1710)           Last NIH score:                                                          Dysphagia screening results:  Patient Factors Component (Dysphagia:Stroke or Rule-out)  Best Eye Response: 4-->(E4) spontaneous (04/02/25 1429)  Best Motor Response: 6-->(M6) obeys commands (04/02/25 1429)  Best " Verbal Response: 5-->(V5) oriented (04/02/25 1429)  Allen Coma Scale Score: 15 (04/02/25 1429)     Allen Coma Scale:  No data recorded     CIWA:        Restraint Type:            Isolation Status:  No active isolations

## 2025-04-03 ENCOUNTER — APPOINTMENT (OUTPATIENT)
Dept: CARDIOLOGY | Facility: HOSPITAL | Age: 89
End: 2025-04-03
Payer: MEDICARE

## 2025-04-03 LAB
ANION GAP SERPL CALCULATED.3IONS-SCNC: 10 MMOL/L (ref 5–15)
BACTERIA UR QL AUTO: NORMAL /HPF
BILIRUB UR QL STRIP: NEGATIVE
BUN SERPL-MCNC: 44 MG/DL (ref 8–23)
BUN/CREAT SERPL: 27.8 (ref 7–25)
CALCIUM SPEC-SCNC: 8.7 MG/DL (ref 8.6–10.5)
CHLORIDE SERPL-SCNC: 96 MMOL/L (ref 98–107)
CLARITY UR: CLEAR
CO2 SERPL-SCNC: 30 MMOL/L (ref 22–29)
COLOR UR: YELLOW
CREAT SERPL-MCNC: 1.58 MG/DL (ref 0.76–1.27)
EGFRCR SERPLBLD CKD-EPI 2021: 41.8 ML/MIN/1.73
GLUCOSE BLDC GLUCOMTR-MCNC: 130 MG/DL (ref 70–130)
GLUCOSE BLDC GLUCOMTR-MCNC: 150 MG/DL (ref 70–130)
GLUCOSE BLDC GLUCOMTR-MCNC: 171 MG/DL (ref 70–130)
GLUCOSE BLDC GLUCOMTR-MCNC: 199 MG/DL (ref 70–130)
GLUCOSE SERPL-MCNC: 191 MG/DL (ref 65–99)
GLUCOSE UR STRIP-MCNC: ABNORMAL MG/DL
HCT VFR BLD AUTO: 30.3 % (ref 37.5–51)
HCT VFR BLD AUTO: 30.9 % (ref 37.5–51)
HCT VFR BLD AUTO: 32.2 % (ref 37.5–51)
HGB BLD-MCNC: 9.3 G/DL (ref 13–17.7)
HGB BLD-MCNC: 9.5 G/DL (ref 13–17.7)
HGB BLD-MCNC: 9.8 G/DL (ref 13–17.7)
HGB UR QL STRIP.AUTO: ABNORMAL
HYALINE CASTS UR QL AUTO: NORMAL /LPF
KETONES UR QL STRIP: NEGATIVE
LEUKOCYTE ESTERASE UR QL STRIP.AUTO: NEGATIVE
NITRITE UR QL STRIP: NEGATIVE
PH UR STRIP.AUTO: 6.5 [PH] (ref 5–8)
POTASSIUM SERPL-SCNC: 3.9 MMOL/L (ref 3.5–5.2)
PROT UR QL STRIP: ABNORMAL
RBC # UR STRIP: NORMAL /HPF
REF LAB TEST METHOD: NORMAL
SODIUM SERPL-SCNC: 136 MMOL/L (ref 136–145)
SP GR UR STRIP: 1.01 (ref 1–1.03)
SQUAMOUS #/AREA URNS HPF: NORMAL /HPF
UROBILINOGEN UR QL STRIP: ABNORMAL
WBC # UR STRIP: NORMAL /HPF

## 2025-04-03 PROCEDURE — 99222 1ST HOSP IP/OBS MODERATE 55: CPT | Performed by: PHYSICIAN ASSISTANT

## 2025-04-03 PROCEDURE — 81001 URINALYSIS AUTO W/SCOPE: CPT | Performed by: INTERNAL MEDICINE

## 2025-04-03 PROCEDURE — 63710000001 INSULIN LISPRO (HUMAN) PER 5 UNITS: Performed by: NURSE PRACTITIONER

## 2025-04-03 PROCEDURE — 97535 SELF CARE MNGMENT TRAINING: CPT

## 2025-04-03 PROCEDURE — 99232 SBSQ HOSP IP/OBS MODERATE 35: CPT | Performed by: INTERNAL MEDICINE

## 2025-04-03 PROCEDURE — 97162 PT EVAL MOD COMPLEX 30 MIN: CPT

## 2025-04-03 PROCEDURE — 25010000002 BUMETANIDE PER 0.5 MG: Performed by: NURSE PRACTITIONER

## 2025-04-03 PROCEDURE — 85018 HEMOGLOBIN: CPT | Performed by: NURSE PRACTITIONER

## 2025-04-03 PROCEDURE — 85018 HEMOGLOBIN: CPT | Performed by: INTERNAL MEDICINE

## 2025-04-03 PROCEDURE — 80048 BASIC METABOLIC PNL TOTAL CA: CPT | Performed by: NURSE PRACTITIONER

## 2025-04-03 PROCEDURE — 97530 THERAPEUTIC ACTIVITIES: CPT

## 2025-04-03 PROCEDURE — 97166 OT EVAL MOD COMPLEX 45 MIN: CPT

## 2025-04-03 PROCEDURE — 82948 REAGENT STRIP/BLOOD GLUCOSE: CPT

## 2025-04-03 PROCEDURE — 85014 HEMATOCRIT: CPT | Performed by: NURSE PRACTITIONER

## 2025-04-03 PROCEDURE — 25010000002 BUMETANIDE PER 0.5 MG: Performed by: PHYSICIAN ASSISTANT

## 2025-04-03 PROCEDURE — 85014 HEMATOCRIT: CPT | Performed by: INTERNAL MEDICINE

## 2025-04-03 RX ORDER — POTASSIUM CHLORIDE 750 MG/1
20 CAPSULE, EXTENDED RELEASE ORAL ONCE
Status: COMPLETED | OUTPATIENT
Start: 2025-04-03 | End: 2025-04-03

## 2025-04-03 RX ORDER — ASPIRIN 81 MG/1
81 TABLET ORAL DAILY
Status: DISCONTINUED | OUTPATIENT
Start: 2025-04-03 | End: 2025-04-05 | Stop reason: HOSPADM

## 2025-04-03 RX ORDER — BUMETANIDE 0.25 MG/ML
2 INJECTION, SOLUTION INTRAMUSCULAR; INTRAVENOUS 2 TIMES DAILY
Status: DISCONTINUED | OUTPATIENT
Start: 2025-04-03 | End: 2025-04-04

## 2025-04-03 RX ADMIN — Medication 10 ML: at 21:10

## 2025-04-03 RX ADMIN — BUMETANIDE 2 MG: 0.25 INJECTION INTRAMUSCULAR; INTRAVENOUS at 08:54

## 2025-04-03 RX ADMIN — ATORVASTATIN CALCIUM 40 MG: 40 TABLET, FILM COATED ORAL at 20:42

## 2025-04-03 RX ADMIN — ASPIRIN 81 MG: 81 TABLET, COATED ORAL at 11:59

## 2025-04-03 RX ADMIN — INSULIN LISPRO 2 UNITS: 100 INJECTION, SOLUTION INTRAVENOUS; SUBCUTANEOUS at 20:50

## 2025-04-03 RX ADMIN — EMPAGLIFLOZIN 10 MG: 10 TABLET, FILM COATED ORAL at 08:54

## 2025-04-03 RX ADMIN — MAGNESIUM OXIDE TAB 400 MG (241.3 MG ELEMENTAL MG) 400 MG: 400 (241.3 MG) TAB at 08:54

## 2025-04-03 RX ADMIN — INSULIN LISPRO 2 UNITS: 100 INJECTION, SOLUTION INTRAVENOUS; SUBCUTANEOUS at 17:56

## 2025-04-03 RX ADMIN — BUMETANIDE 2 MG: 0.25 INJECTION INTRAMUSCULAR; INTRAVENOUS at 16:47

## 2025-04-03 RX ADMIN — POTASSIUM CHLORIDE 20 MEQ: 750 CAPSULE, EXTENDED RELEASE ORAL at 16:48

## 2025-04-03 RX ADMIN — CARVEDILOL 3.12 MG: 3.12 TABLET, FILM COATED ORAL at 17:57

## 2025-04-03 RX ADMIN — INSULIN LISPRO 2 UNITS: 100 INJECTION, SOLUTION INTRAVENOUS; SUBCUTANEOUS at 13:08

## 2025-04-03 RX ADMIN — Medication 10 ML: at 08:58

## 2025-04-03 RX ADMIN — CARVEDILOL 3.12 MG: 3.12 TABLET, FILM COATED ORAL at 08:54

## 2025-04-03 RX ADMIN — NITROGLYCERIN 1 PATCH: 0.2 PATCH TRANSDERMAL at 08:56

## 2025-04-03 NOTE — PLAN OF CARE
Goal Outcome Evaluation:  Plan of Care Reviewed With: patient, spouse           Outcome Evaluation: Pt's ADL independence limited d/t LLE pain, generalized weakness, decreased functional endurance, and balance deficits. Pt would benefit from continued skilled IPOT services. Rec SNF for STR at d/c for best functional outcomes.    Anticipated Discharge Disposition (OT): skilled nursing facility

## 2025-04-03 NOTE — CONSULTS
[facility] Cardiology, Inpatient Consult  Date of Hospital Visit: 25  Encounter Provider: Caron Guevara PA-C    Place of Service: [facility]  Patient Name: Iain Dee  :1935  MRN: [id number]     Primary Care Provider: Luis Aguilar DO    Chief complaint: Acute on chronic heart failure    PROBLEM LIST  CARDIAC  Coronary Artery Disease:   Inferior STEMI, 2024: Medical management     Myocardium:   Echo, 3/1/2023: LVEF 55%  Echo, 2024: LVEF 35 to 40%     Valvular:   Mild MR, mild TR     Electrical:   Chronic A-fib     Pericardium:   Normal     CARDIAC RISK FACTORS  Hypertension  Diabetes  Dyslipidemia  2024   HDL 43 LDL 92   Obstructive Sleep Apnea    NON-CARDIAC  GERD  Gout  Arthritis    SURGERIES  Prostate cancer s/p radiation  Bladder cancer s/p surgery  Hernia repair    History of Present Illness:  Patient is an 89-year-old history of longstanding persistent atrial fibrillation, chronic HFrEF, recent inferior STEMI with medical management presented to the hospital with some leg pain and swelling.  He reported to the ED provider that he was helped to a chair and after he sat down he felt pain to his left hamstring area.  In the ED it was noted patient had a hematoma, x-rays noted no acute fracture but patient was admitted the hospital for acute on chronic heart failure as well as observation with his hematoma.  Patient's Plavix and Eliquis were held  before we have seen the patient.    I evaluated the patient this morning who is quite fatigued.  He denies any overt chest pain but does complain of left leg pain.  He has noticed some swelling to his legs over the last several days.  He states most of the time he is fairly sedentary and is not up moving around much.      I reviewed patient's past medical history, surgical history, family history and social history.    Current Outpatient Medications   Medication Instructions    apixaban  (ELIQUIS) 2.5 mg, Oral, 2 Times Daily    atorvastatin (LIPITOR) 40 mg, Oral, Nightly    bumetanide (BUMEX) 2 mg, Oral, Daily    carvedilol (COREG) 3.125 mg, Oral, 2 Times Daily With Meals    clopidogrel (PLAVIX) 75 mg, Oral, Daily    famotidine (PEPCID) 20 mg, Oral, 2 Times Daily PRN    glucose blood test strip 1 each, Other, Daily, Use daily as directed    glucose monitor monitoring kit 1 each, Not Applicable, Daily, Use daily as directed.    Jardiance 10 mg, Oral, Daily    ketoconazole (NIZORAL) 2 % cream     Lancets Thin misc 1 each, Not Applicable, Daily, Use daily as directed    magnesium oxide (MAG-OX) 400 mg, Oral, Daily    metFORMIN (GLUCOPHAGE) 500 mg, Oral, 2 Times Daily With Meals    nitroglycerin (NITRODUR) 0.2 MG/HR patch 1 patch, Transdermal, See Admin Instructions, Apply patch daily, remove at night for at least 10 hours          Scheduled Meds:[Held by provider] apixaban, 2.5 mg, Oral, BID  atorvastatin, 40 mg, Oral, Nightly  bumetanide, 2 mg, Intravenous, Daily  [Held by provider] bumetanide, 2 mg, Oral, Daily  carvedilol, 3.125 mg, Oral, BID With Meals  [Held by provider] clopidogrel, 75 mg, Oral, Daily  empagliflozin, 10 mg, Oral, Daily  insulin lispro, 2-9 Units, Subcutaneous, 4x Daily AC & at Bedtime  magnesium oxide, 400 mg, Oral, Daily  [Held by provider] metFORMIN, 500 mg, Oral, BID With Meals  nitroglycerin, 1 patch, Transdermal, Daily  pharmacy consult - MTM, , Not Applicable, Daily  sodium chloride, 10 mL, Intravenous, Q12H      Continuous Infusions:       Review of Systems            Objective:     Vitals:    04/03/25 0422 04/03/25 0822 04/03/25 0854 04/03/25 0856   BP: 136/85  131/77 131/77   BP Location: Right arm      Patient Position: Lying      Pulse:   72 72   Resp: 18      Temp: 98.4 °F (36.9 °C) 97.7 °F (36.5 °C)     TempSrc: Oral Oral     SpO2:       Weight:       Height:           Body mass index is 28.63 kg/m².  Flowsheet Rows      Flowsheet Row First Filed Value   Admission  "Height 185.4 cm (73\") Documented at 04/02/2025 1436   Admission Weight 98.4 kg (217 lb) Documented at 04/02/2025 1436            Intake/Output Summary (Last 24 hours) at 4/3/2025 0914  Last data filed at 4/3/2025 0230  Gross per 24 hour   Intake 120 ml   Output 750 ml   Net -630 ml       Constitutional:       Appearance: Chronically ill-appearing and acutely ill-appearing.   Pulmonary:      Comments: Diminished breath sounds in bases and scattered rhonchi  Cardiovascular:      Normal rate. Irregularly irregular rhythm.      Murmurs: There is a high frequency blowing holosystolic murmur at the apex.   Edema:     Comments: 1+ bilateral lower extremity edema.  Venous stasis changes  Abdominal:      General: There is no distension.   Neurological:      Comments: Drowsy but arousable           Lab Review:                CBC:      Lab 04/03/25  0001 04/02/25  1459   WBC  --  5.71   HEMOGLOBIN 9.5* 9.8*   HEMATOCRIT 30.9* 32.7*   PLATELETS  --  213   NEUTROS ABS  --  4.67   IMMATURE GRANS (ABS)  --  0.03   LYMPHS ABS  --  0.43*   MONOS ABS  --  0.46   EOS ABS  --  0.08   MCV  --  100.3*     CMP:        Lab 04/03/25  0001 04/02/25  1459   SODIUM 136 138   POTASSIUM 3.9 4.4   CHLORIDE 96* 98   CO2 30.0* 26.0   ANION GAP 10.0 14.0   BUN 44* 45*   CREATININE 1.58* 1.80*   EGFR 41.8* 35.8*   GLUCOSE 191* 171*   CALCIUM 8.7 8.7   TOTAL PROTEIN  --  5.2*   ALBUMIN  --  3.4*   GLOBULIN  --  1.8   ALT (SGPT)  --  27   AST (SGOT)  --  32   BILIRUBIN  --  0.9   ALK PHOS  --  171*         Lab Results   Component Value Date    HGBA1C 6.40 (H) 04/02/2025     Estimated Creatinine Clearance: 39.9 mL/min (A) (by C-G formula based on SCr of 1.58 mg/dL (H)).          Lab 04/02/25  1711 04/02/25  1551 04/02/25  1459   PROBNP  --   --  22,485.0*   HSTROP T 94* 102*  --        Lab Results   Component Value Date    CHOL 156 02/14/2024    TRIG 116 02/14/2024    HDL 43 02/14/2024    LDL 92 02/14/2024         XR Femur 2 View Left  Result Date: " 2025  Impression: No acute fracture or traumatic malalignment. Ill-defined sclerosis of the right and left femoral heads compatible with avascular necrosis. No loss of femoral head sphericity/subchondral collapse. Electronically Signed: Tim Harris MD  2025 3:16 PM EDT  Workstation ID: [id number]    XR Pelvis 1 or 2 View  Result Date: 2025  Impression: No acute fracture or traumatic malalignment. Ill-defined sclerosis of the right and left femoral heads compatible with avascular necrosis. No loss of femoral head sphericity/subchondral collapse. Electronically Signed: Tim Harris MD  2025 3:16 PM EDT  Workstation ID: [id number]    XR Chest 1 View  Result Date: 2025  Impression: Left basilar atelectasis or pneumonia Electronically Signed: Keaton Stephens  2025 3:08 PM EDT  Workstation ID: [id number]       Results for orders placed during the hospital encounter of 24    Adult Transthoracic Echo Complete W/ Cont if Necessary Per Protocol    Interpretation Summary    Left ventricular systolic function is moderately decreased. Calculated left ventricular EF = 34.3% Left ventricular ejection fraction appears to be 36 - 40%.    Left ventricular wall thickness is consistent with mild concentric hypertrophy.    Left ventricular diastolic function was indeterminate.    Mildly reduced right ventricular systolic function noted.    The right ventricular cavity is dilated.    The left atrial cavity is mildly dilated.    Left atrial volume is moderately increased.    The right atrial cavity is dilated.    Moderate mitral valve regurgitation is present.    Estimated right ventricular systolic pressure from tricuspid regurgitation is moderately elevated (45-55 mmHg). Calculated right ventricular systolic pressure from tricuspid regurgitation is 51 mmHg.       EK25: A-fib with a heart rate of 71 bpm and PVCs    Result Review:  I have personally reviewed the results from the time of admission and  agree with these findings:  [x]  Laboratory  [x]  Radiology  [x]  EKG/Telemetry   []  Pathology  []  Old records  []  Other:             Assessment:   Acute on chronic HFrEF   Echo 8/2024 EF 36-40%  History of inferior STEMI, mildly elevated troponin trending down, no signs of MI this admission  Given hematoma okay to transition from Plavix to aspirin 81 mg to be started today  Longstanding persistent atrial fibrillation on chronic anticoagulation  Eliquis currently on hold secondary to left thigh hematoma  CKD        Plan:   Patient is in fluid overload so will increase Bumex to 2 mg IV twice daily and patient will get another dose today.  Potassium replacement given aggressive diuresis.  Will monitor patient's kidney function and urine output to adjust diuretics.  Patient currently on carvedilol 3.125 and Jardiance 10 mg for GDMT.  Will try to uptitrate his GDMT once able.  Will also give a dose of potassium, will likely need.  We will discontinue Plavix 75 mg and start patient on aspirin 81 mg given his prior inferior STEMI.  Will monitor patient's H&H today and if it stays stable and hematomas stays stable we will likely start patient back on Eliquis in the morning.  Continue nitroglycerin patch 12 hours a day for chronic angina.  Will continue to follow while inpatient      Caron Guevara PA-C

## 2025-04-03 NOTE — THERAPY EVALUATION
Patient Name: Iain Dee  : 1935    MRN: [id number]                              Today's Date: 4/3/2025       Admit Date: 2025    Visit Dx:     ICD-10-CM ICD-9-CM   1. Acute on chronic congestive heart failure, unspecified heart failure type  I50.9 428.0   2. Anemia, unspecified type  D64.9 285.9   3. Chronic kidney disease, unspecified CKD stage  N18.9 585.9   4. Shortness of breath  R06.02 786.05   5. Pain of left lower extremity  M79.605 729.5   6. Skin ulcer, limited to breakdown of skin  L98.491 707.9   7. Atelectasis  J98.11 518.0   8. Avascular necrosis of bone of hip, unspecified laterality  M87.059 733.42   9. Elevated troponin  R79.89 790.6     Patient Active Problem List   Diagnosis    AMS (altered mental status)    Hypomagnesemia    Hypoalbuminemia    Essential hypertension    Atrial fibrillation    GERD (gastroesophageal reflux disease)    Gout    Diabetes mellitus    JOLENE on CPAP    Chronic anticoagulation (Eliquis)    Alcohol abuse (1 to 2 glasses of bourbon a day)    Memory impairment    STEMI (ST elevation myocardial infarction)    Stage 3b chronic kidney disease    CHF (congestive heart failure)    Thigh hematoma, left, initial encounter     Past Medical History:   Diagnosis Date    A-fib     Arthritis     Cancer     Diabetes mellitus     GERD (gastroesophageal reflux disease)     Gout     H/O Bladder carcinoma (HCC)     Hypertension      Past Surgical History:   Procedure Laterality Date    BLADDER SURGERY      Biopsy    HERNIA REPAIR      PROSTATE BIOPSY        General Information       Row Name 25 1456          Physical Therapy Time and Intention    Document Type evaluation  -ML     Mode of Treatment physical therapy  -ML       Row Name 25 1456          General Information    Patient Profile Reviewed yes  -ML     Prior Level of Function independent:;all household mobility;gait;transfer;bed mobility;feeding;grooming;min assist:;dressing;bathing;dependent:;home  management;cooking;cleaning;driving;shopping  patient reports ambulating with rollator at baseline, per wife patient with recent functional decline  -ML     Existing Precautions/Restrictions fall;oxygen therapy device and L/min  -ML     Barriers to Rehab medically complex;previous functional deficit  -ML       Row Name 04/03/25 1456          Living Environment    Current Living Arrangements home  -ML     People in Home spouse  -ML       Row Name 04/03/25 1456          Home Main Entrance    Number of Stairs, Main Entrance other (see comments)  ramp to enter home  -ML       Row Name 04/03/25 1456          Stairs Within Home, Primary    Number of Stairs, Within Home, Primary none  -ML       Row Name 04/03/25 1456          Cognition    Orientation Status (Cognition) oriented x 3  -ML       Row Name 04/03/25 1456          Safety Issues/Impairments Affecting Functional Mobility    Safety Issues Affecting Function (Mobility) awareness of need for assistance;insight into deficits/self-awareness;safety precaution awareness;safety precautions follow-through/compliance;sequencing abilities  -ML     Impairments Affecting Function (Mobility) balance;endurance/activity tolerance;strength;postural/trunk control;pain;range of motion (ROM);motor planning  -ML               User Key  (r) = Recorded By, (t) = Taken By, (c) = Cosigned By      Initials Name Provider Type     Bianka Deras Physical Therapist                   Mobility       Row Name 04/03/25 1501          Bed Mobility    Comment, (Bed Mobility) patient found and left seated in bedside chair  -       Row Name 04/03/25 1501          Bed-Chair Transfer    Bed-Chair [place] (Transfers) verbal cues;nonverbal cues (demo/gesture);moderate assist (50% patient effort);2 person assist  -     Assistive Device (Bed-Chair Transfers) walker, front-wheeled  -     Comment, (Bed-Chair Transfer) chair to BSC, BSC to chair  -ML       Row Name 04/03/25 1501          Sit-Stand  Transfer    Sit-Stand [place] (Transfers) verbal cues;nonverbal cues (demo/gesture);moderate assist (50% patient effort);maximum assist (25% patient effort);2 person assist  -ML     Assistive Device (Sit-Stand Transfers) walker, front-wheeled  -ML     Comment, (Sit-Stand Transfer) verbal cues for sequenicng/hand placement, with increased fatigue patient required maxAx2 to complete sit to stand transfer  -ML       Row Name 04/03/25 1501          Gait/Stairs (Locomotion)    [place] (Gait) verbal cues;minimum assist (75% patient effort);1 person assist;1 person to manage equipment  -ML     Assistive Device (Gait) walker, front-wheeled;other (see comments)  close chair follow  -ML     Distance in Feet (Gait) 5  + 10  -ML     Deviations/Abnormal Patterns (Gait) bilateral deviations;han decreased;gait speed decreased;stride length decreased  -ML     Bilateral Gait Deviations forward flexed posture;heel strike decreased  -ML     Comment, (Gait/Stairs) patient fatigues quickly, required seated rest break, forward flexed posture throughout  -ML               User Key  (r) = Recorded By, (t) = Taken By, (c) = Cosigned By      Initials Name Provider Type    ML Bianka Deras Physical Therapist                   Obj/Interventions       Row Name 04/03/25 1506          Range of Motion Comprehensive    General Range of Motion bilateral lower extremity ROM WFL  -ML       Row Name 04/03/25 1506          Strength Comprehensive (MMT)    General Manual Muscle Testing (MMT) Assessment lower extremity strength deficits identified  -ML     Comment, General Manual Muscle Testing (MMT) Assessment BLE grossly 3+/5  -ML       Row Name 04/03/25 1506          Balance    Balance Assessment sitting static balance;sitting dynamic balance;standing static balance;standing dynamic balance  -ML     Static Sitting Balance standby assist  -ML     Dynamic Sitting Balance minimal assist  -ML     Position, Sitting Balance  unsupported;sitting in chair;other (see comments)  sitting on BSC  -ML     Static Standing Balance verbal cues;non-verbal cues (demo/gesture);minimal assist;2-person assist  -ML     Dynamic Standing Balance verbal cues;non-verbal cues (demo/gesture);moderate assist;2-person assist  -ML     Position/Device Used, Standing Balance supported;walker, front-wheeled  -ML     Balance Interventions sitting;standing;sit to stand;supported;occupation based/functional task  -ML     Comment, Balance slight posterior lean during sit to stand transfer  -ML               User Key  (r) = Recorded By, (t) = Taken By, (c) = Cosigned By      Initials Name Provider Type    ML Bianka Deras Physical Therapist                   Goals/Plan       Row Name 04/03/25 1510          Bed Mobility Goal 1 (PT)    Activity/Assistive Device (Bed Mobility Goal 1, PT) sit to supine;supine to sit  -ML     [place] Level/Cues Needed (Bed Mobility Goal 1, PT) standby assist  -ML     Time Frame (Bed Mobility Goal 1, PT) short term goal (STG);5 days  -ML       Row Name 04/03/25 1510          Transfer Goal 1 (PT)    Activity/Assistive Device (Transfer Goal 1, PT) sit-to-stand/stand-to-sit;bed-to-chair/chair-to-bed;walker, rolling  -ML     [place] Level/Cues Needed (Transfer Goal 1, PT) contact guard required  -ML     Time Frame (Transfer Goal 1, PT) long term goal (LTG);10 days  -ML       Row Name 04/03/25 1510          Gait Training Goal 1 (PT)    Activity/Assistive Device (Gait Training Goal 1, PT) gait (walking locomotion);decrease fall risk;improve balance and speed;increase endurance/gait distance;walker, rolling  -ML     [place] Level (Gait Training Goal 1, PT) contact guard required  -ML     Distance (Gait Training Goal 1, PT) 40  -ML     Time Frame (Gait Training Goal 1, PT) long term goal (LTG);10 days  -ML       Row Name 04/03/25 1510          Therapy Assessment/Plan (PT)    Planned Therapy Interventions (PT) balance training;bed  mobility training;home exercise program;gait training;patient/family education;postural re-education;ROM (range of motion);strengthening;transfer training  -               User Key  (r) = Recorded By, (t) = Taken By, (c) = Cosigned By      Initials Name Provider Type     Bianka Deras Physical Therapist                   Clinical Impression       Row Name 04/03/25 1507          Pain    Pretreatment Pain Rating 4/10  -ML     Posttreatment Pain Rating 4/10  -ML     Pain Location extremity  -ML     Pain Side/Orientation left;lower  -ML     Pain Management Interventions exercise or physical activity utilized;positioning techniques utilized;nursing notified  -     Response to Pain Interventions activity participation with tolerable pain  -ML       Row Name 04/03/25 1507          Plan of Care Review    Plan of Care Reviewed With patient  -     Outcome Evaluation Physical therapy evaluation complete. The patient required mod-maxAx2 to complete sit to stand transfer, with increased fatigue required increased assistance. Patient limited in ambulation distance by decreased strength and activity tolerance. Patient presents below baseline for mobility and at increased risk for falls. The patient would continue to benefit from skilled PT to address strength, balance and activity tolerance deficits.  -[facility] Name 04/03/25 1507          Therapy Assessment/Plan (PT)    Patient/Family Therapy Goals Statement (PT) return to PLOF  -ML     Rehab Potential (PT) good  -ML     Criteria for Skilled Interventions Met (PT) yes;meets criteria;skilled treatment is necessary  -     Therapy Frequency (PT) daily  -ML     Predicted Duration of Therapy Intervention (PT) 10 days  -ML       Row Name 04/03/25 1507          Vital Signs    Pre SpO2 (%) 93  -ML     Post SpO2 (%) 97  -ML       Row Name 04/03/25 1507          Positioning and Restraints    Pre-Treatment Position sitting in chair/recliner  -ML     Post Treatment Position chair   -ML     In Chair notified nsg;reclined;call light within reach;encouraged to call for assist;exit alarm on;with family/caregiver;waffle cushion;on mechanical lift sling;legs elevated;heels elevated  -ML               User Key  (r) = Recorded By, (t) = Taken By, (c) = Cosigned By      Initials Name Provider Type    Bianka Lang Physical Therapist                   Outcome Measures       Row Name 04/03/25 1511 04/03/25 0800       How much help from another person do you currently need...    Turning from your back to your side while in flat bed without using bedrails? 3  -ML 3  -DA    Moving from lying on back to sitting on the side of a flat bed without bedrails? 2  -ML 3  -DA    Moving to and from a bed to a chair (including a wheelchair)? 2  -ML 3  -DA    Standing up from a chair using your arms (e.g., wheelchair, bedside chair)? 2  -ML 3  -DA    Climbing 3-5 steps with a railing? 1  -ML 2  -DA    To walk in hospital room? 3  -ML 3  -DA    AM-PAC 6 Clicks Score (PT) 13  -ML 17  -DA    Highest Level of Mobility Goal 4 --> Transfer to chair/commode  -ML 5 --> Static standing  -DA      Row Name 04/03/25 1511          Functional Assessment    Outcome Measure Options AM-PAC 6 Clicks Basic Mobility (PT)  -ML               User Key  (r) = Recorded By, (t) = Taken By, (c) = Cosigned By      Initials Name Provider Type    Bianka Lang Physical Therapist    Hugo Farrell RN Registered Nurse                                 Physical Therapy Education       Title: PT OT SLP Therapies (In Progress)       Topic: Physical Therapy (In Progress)       Point: Mobility training (Done)       Learning Progress Summary            Patient Acceptance, E, VU,NR by ML at 4/3/2025 1512   Family Acceptance, E, VU,NR by ML at 4/3/2025 1512                      Point: Home exercise program (Not Started)       Learner Progress:  Not documented in this visit.              Point: Body mechanics (Done)       Learning Progress Summary             Patient Acceptance, E, VU,NR by ML at 4/3/2025 1512   Family Acceptance, E, VU,NR by ML at 4/3/2025 1512                      Point: Precautions (Done)       Learning Progress Summary            Patient Acceptance, E, VU,NR by ML at 4/3/2025 1512   Family Acceptance, E, VU,NR by ML at 4/3/2025 1512                                      User Key       Initials Effective Dates Name Provider Type Discipline     04/22/21 -  Bianka Deras Physical Therapist PT                  PT Recommendation and Plan  Planned Therapy Interventions (PT): balance training, bed mobility training, home exercise program, gait training, patient/family education, postural re-education, ROM (range of motion), strengthening, transfer training  Outcome Evaluation: Physical therapy evaluation complete. The patient required mod-maxAx2 to complete sit to stand transfer, with increased fatigue required increased assistance. Patient limited in ambulation distance by decreased strength and activity tolerance. Patient presents below baseline for mobility and at increased risk for falls. The patient would continue to benefit from skilled PT to address strength, balance and activity tolerance deficits.     Time Calculation:   PT Evaluation Complexity  History, PT Evaluation Complexity: 3 or more personal factors and/or comorbidities  Examination of Body Systems (PT Eval Complexity): total of 3 or more elements  Clinical Presentation (PT Evaluation Complexity): evolving  Clinical Decision Making (PT Evaluation Complexity): moderate complexity  Overall Complexity (PT Evaluation Complexity): moderate complexity     PT Charges       Row Name 04/03/25 1513             Time Calculation    Start Time 1336  -ML      PT Received On 04/03/25  -ML      PT Goal Re-Cert Due Date 04/13/25  -ML         Timed Charges    16090 - PT Therapeutic Activity Minutes 15  -ML         Untimed Charges    PT Eval/Re-eval Minutes 46  -ML         Total Minutes    Timed  Charges Total Minutes 15  -ML      Untimed Charges Total Minutes 46  -ML       Total Minutes 61  -ML                User Key  (r) = Recorded By, (t) = Taken By, (c) = Cosigned By      Initials Name Provider Type    Bianka Lang Physical Therapist                  Therapy Charges for Today       Code Description Service Date Service Provider Modifiers Qty    59038020909 HC PT THERAPEUTIC ACT EA 15 MIN 4/3/2025 Bianka Deras GP 1    08014727461 HC PT EVAL MOD COMPLEXITY 4 4/3/2025 Bianka Deras GP 1            PT G-Codes  Outcome Measure Options: AM-PAC 6 Clicks Basic Mobility (PT)  AM-PAC 6 Clicks Score (PT): 13  PT Discharge Summary  Anticipated Discharge Disposition (PT): skilled nursing facility    Bianka Deras  4/3/2025

## 2025-04-03 NOTE — NURSING NOTE
Woc consulted for: Lower left extremity    Wound/ Skin Assessment: Patient presents with about a 5 cm in diameter area of dermal erosion.  At the time that Woc assessed it there was a lot of yellowish creamy adherent sloughy drainage on the wound.  I cleansed this off with a Vashe soaked gauze and then took a clean Vashe soaked gauze and let it sit for 10 minutes you can now see in the photo macerated wound edges dermal erosion red-pink yellow fibrin.  With surrounding hemosiderin staining this is also on the other leg.  Bilateral lower extremity edema.        Peripheral venous disease is not in the problem list however its most likely they are with the hemosiderin staining.  Also with a blister who said it only happened recently but it does drain does have heart failure.    Recommendations/ Intervention performed: Recommend having PT wound care evaluate for compression wraps.    In the meantime I cleansed with Vashe and then placed Hydrofera Blue ready over the wound bed ABD pad wrapped with Kerlix you can changes every 3 days hopefully they will add compression before third day.        Additional skin issues: Patient is high risk for pressure injury development given that he is basically wheelchair-bound had to be carried to dinner the night before admit, diabetes, bilateral lower extremity edema affecting sensation and friction risk of skin weeping wound.  And advanced age.  Heel Allevyn's placement Wo please place sacral dressing or barrier cream  which ever is appropriate and turn every 2.      Pressure Injury Protocol (initiate for Kervin Score of 18 or less):   *Maintain good skin care, keep dry, turn q 2 hr, keep heels elevated and offloaded with heel boots.    *Apply z-guard to sacrococcygeal area/ perineal area BID or daily and PRN per incontinent episodes.  *Follow C.A.R.E protocol if medical devices (Bipap, jean-baptiste, Ng tube, etc) are being used.     [facility]    Please contact with questions or concerns.

## 2025-04-03 NOTE — PROGRESS NOTES
[facility] Medicine Services  PROGRESS NOTE    Patient Name: Iain Dee  : 1935  MRN: [id number]    Date of Admission: 2025  Primary Care Physician: Luis Aguilar DO    Subjective   Subjective     CC:  Dyspnea, LE edema, left thigh pain/hematoma    HPI:  Left thigh pain stable, dyspnea stable  No chest pain  No n/v/d      Objective   Objective     Vital Signs:   Temp:  [97.2 °F (36.2 °C)-98.4 °F (36.9 °C)] 97.6 °F (36.4 °C)  Heart Rate:  [61-87] 69  Resp:  [16-18] 17  BP: ()/() 129/87  Flow (L/min) (Oxygen Therapy):  [2-3] 3     Physical Exam:  Constitutional:Alert, elderly, nontoxic appearing 2Lnc, in place, normal work of breathing  Psych:Normal/appropriate affect  HEENT:NCAT, oropharynx clear  Neck: neck supple, full range of motion  Neuro: Face symmetric, speech clear, equal , moves all extremities  Cardiac: RRR; BLE edema  Resp: CTAB, normal effort  GI: abd soft, nontender to palpation  Skin: No extremity rash  Musculoskeletal/extremities: no cyanosis of extremities        Results Reviewed:  LAB RESULTS:      Lab 25  0843 25  0001 25  1711 25  1551 25  1459   WBC  --   --   --   --  5.71   HEMOGLOBIN 9.8* 9.5*  --   --  9.8*   HEMATOCRIT 32.2* 30.9*  --   --  32.7*   PLATELETS  --   --   --   --  213   NEUTROS ABS  --   --   --   --  4.67   IMMATURE GRANS (ABS)  --   --   --   --  0.03   LYMPHS ABS  --   --   --   --  0.43*   MONOS ABS  --   --   --   --  0.46   EOS ABS  --   --   --   --  0.08   MCV  --   --   --   --  100.3*   CRP  --   --   --   --  0.50   PROCALCITONIN  --   --   --   --  0.18   HSTROP T  --   --  94* 102*  --          Lab 25  0001 25  1551 25  1459   SODIUM 136  --  138   POTASSIUM 3.9  --  4.4   CHLORIDE 96*  --  98   CO2 30.0*  --  26.0   ANION GAP 10.0  --  14.0   BUN 44*  --  45*   CREATININE 1.58*  --  1.80*   EGFR 41.8*  --  35.8*   GLUCOSE 191*  --  171*   CALCIUM 8.7   --  8.7   HEMOGLOBIN A1C  --   --  6.40*   TSH  --  2.650  --          Lab 04/02/25  1459   TOTAL PROTEIN 5.2*   ALBUMIN 3.4*   GLOBULIN 1.8   ALT (SGPT) 27   AST (SGOT) 32   BILIRUBIN 0.9   ALK PHOS 171*         Lab 04/02/25  1711 04/02/25  1551 04/02/25  1459   PROBNP  --   --  22,485.0*   HSTROP T 94* 102*  --                  Brief Urine Lab Results       None            Microbiology Results Abnormal       None            Duplex Venous Lower Extremity - Bilateral  Result Date: 4/2/2025    Normal bilateral lower extremity venous duplex scan.     XR Femur 2 View Left  Result Date: 4/2/2025  XR FEMUR 2 VW LEFT, XR PELVIS 1 OR 2 VW Date of Exam: 4/2/2025 2:57 PM EDT Indication: leg pain Comparison: None available. Findings: Pelvis: Prostate clips or fiducial markers are noted at the lower central pelvis. There are vascular calcifications. There is ill-defined serpiginous sclerosis of the right and left femoral heads compatible with avascular necrosis. The hip joints are congruent with symmetric mild osteoarthritic changes. No acute fracture or traumatic malalignment. There is severe degenerative disc disease in the lower lumbar spine. Left femur: There are vascular calcifications. Unremarkable appearance of the thigh soft tissues otherwise. No knee joint effusion. No acute fracture or traumatic malalignment. No focal bony lesion. Only mild degenerative changes at the knee.     Impression: Impression: No acute fracture or traumatic malalignment. Ill-defined sclerosis of the right and left femoral heads compatible with avascular necrosis. No loss of femoral head sphericity/subchondral collapse. Electronically Signed: Tim Harris MD  4/2/2025 3:16 PM EDT  Workstation ID: [id number]    XR Pelvis 1 or 2 View  Result Date: 4/2/2025  XR FEMUR 2 VW LEFT, XR PELVIS 1 OR 2 VW Date of Exam: 4/2/2025 2:57 PM EDT Indication: leg pain Comparison: None available. Findings: Pelvis: Prostate clips or fiducial markers are noted at  the lower central pelvis. There are vascular calcifications. There is ill-defined serpiginous sclerosis of the right and left femoral heads compatible with avascular necrosis. The hip joints are congruent with symmetric mild osteoarthritic changes. No acute fracture or traumatic malalignment. There is severe degenerative disc disease in the lower lumbar spine. Left femur: There are vascular calcifications. Unremarkable appearance of the thigh soft tissues otherwise. No knee joint effusion. No acute fracture or traumatic malalignment. No focal bony lesion. Only mild degenerative changes at the knee.     Impression: Impression: No acute fracture or traumatic malalignment. Ill-defined sclerosis of the right and left femoral heads compatible with avascular necrosis. No loss of femoral head sphericity/subchondral collapse. Electronically Signed: Tim Harris MD  4/2/2025 3:16 PM EDT  Workstation ID: [id number]    XR Chest 1 View  Result Date: 4/2/2025  XR CHEST 1 VW Date of Exam: 4/2/2025 2:57 PM EDT Indication: sob Comparison: 8/6/2024 Findings: There is incomplete inspiration. Heart size is borderline. There is strandy airspace disease in the left lung base. There is atelectasis in the right infrahilar region     Impression: Impression: Left basilar atelectasis or pneumonia Electronically Signed: Keaton Stephens  4/2/2025 3:08 PM EDT  Workstation ID: [id number]      Results for orders placed during the hospital encounter of 08/05/24    Adult Transthoracic Echo Complete W/ Cont if Necessary Per Protocol    Interpretation Summary    Left ventricular systolic function is moderately decreased. Calculated left ventricular EF = 34.3% Left ventricular ejection fraction appears to be 36 - 40%.    Left ventricular wall thickness is consistent with mild concentric hypertrophy.    Left ventricular diastolic function was indeterminate.    Mildly reduced right ventricular systolic function noted.    The right ventricular cavity is  dilated.    The left atrial cavity is mildly dilated.    Left atrial volume is moderately increased.    The right atrial cavity is dilated.    Moderate mitral valve regurgitation is present.    Estimated right ventricular systolic pressure from tricuspid regurgitation is moderately elevated (45-55 mmHg). Calculated right ventricular systolic pressure from tricuspid regurgitation is 51 mmHg.      Current medications:  Scheduled Meds:[Held by provider] apixaban, 2.5 mg, Oral, BID  aspirin, 81 mg, Oral, Daily  atorvastatin, 40 mg, Oral, Nightly  bumetanide, 2 mg, Intravenous, BID  carvedilol, 3.125 mg, Oral, BID With Meals  empagliflozin, 10 mg, Oral, Daily  insulin lispro, 2-9 Units, Subcutaneous, 4x Daily AC & at Bedtime  magnesium oxide, 400 mg, Oral, Daily  [Held by provider] metFORMIN, 500 mg, Oral, BID With Meals  nitroglycerin, 1 patch, Transdermal, Daily  pharmacy consult - MTM, , Not Applicable, Daily  potassium chloride, 20 mEq, Oral, Once  sodium chloride, 10 mL, Intravenous, Q12H      Continuous Infusions:   PRN Meds:.  senna-docusate sodium **AND** polyethylene glycol **AND** bisacodyl **AND** bisacodyl    dextrose    dextrose    famotidine    glucagon (human recombinant)    Magnesium Cardiology Dose Replacement - Follow Nurse / BPA Driven Protocol    nitroglycerin    sodium chloride    sodium chloride    Assessment & Plan   Assessment & Plan     Active Hospital Problems    Diagnosis  POA    **CHF (congestive heart failure) [I50.9]  Yes    Thigh hematoma, left, initial encounter [S70.12XA]  Yes    Stage 3b chronic kidney disease [N18.32]  Yes    Atrial fibrillation [I48.91]  Yes    Essential hypertension [I10]  Yes    Diabetes mellitus [E11.9]  Yes    JOLENE on CPAP [G47.33]  Yes      Resolved Hospital Problems   No resolved problems to display.        Brief Hospital Course to date:  Iain Dee is a 89 y.o. male w/ hx CAD (prior stemi), HFrEF (echo 8/2024 w/ LV EF 36-40%, afib (on eliquis), htn, hl, dm2,  ckd 3 (baseline cr ~1.6-1.8),  prostate/bladder cancer who presented to ED w/ left thigh pain, edema per haps mild dypsnea. In ED was noted w/ elevated probnp, received iv lasix in ED. Was noted w/ left thigh hematoma.         A/C HFrEF  Persistent Afib (on eliquis)  CAD (previous inferior stemi)  Elevated troponin this admission, likely due to decompensated chf  HTN  HL  -previous EF 36-40% (8/2/024)  -cards following: increasing bumex to 2mg iv bid; continue coreg 3.125mg bid, statin jardiance. Stopping plavix, initiating asa 81mg (given previous inferior stemi). Holding eliquis for thigh hematoma. If hgb remains stable and hematoma clinically stable then likely restart eliquis tomorrow. Repeat echo pending (ordered by admitting providers)    Left thigh hematoma  -BLE venous duplex negative for dvt  -left femur: no fracture  -pelvix xray: no fracture  -stopped plavix (ok'd per cards)  -starting asa 81 per cards  -holding eliquis. Restart soon/tomorrow if hgb & hematoma stable    DM2  -A1c 6.4  -jardiane  -ssi    Mild confusion  -denies head trauma, nonfocal exam  -u/a benign    Ckd 3 (baseline cr appears ~1.6-1.8)  -trend as diuresing    LLE wound  -woc consulted     Dispo:   -pt/ot recommend snf at d/c    10pm hgb  Am labs: cbc,bmp,mag (f/u echo, u/a)        Expected Discharge Location and Transportation: ? Back home (pt/ot evals pending)  Expected Discharge   Expected Discharge Date: 4/5/2025; Expected Discharge Time:      VTE Prophylaxis:  Pharmacologic & mechanical VTE prophylaxis orders are present.         AM-PAC 6 Clicks Score (PT): 17 (04/02/25 8433)    CODE STATUS:   Code Status and Medical Interventions: CPR (Attempt to Resuscitate); Full Support   Ordered at: 04/02/25 2082     Code Status (Patient has no pulse and is not breathing):    CPR (Attempt to Resuscitate)     Medical Interventions (Patient has pulse or is breathing):    Full Support       Coy Herrera MD  04/03/25

## 2025-04-03 NOTE — CASE MANAGEMENT/SOCIAL WORK
Discharge Planning Assessment  [facility]     Patient Name: Iain Dee  MRN: [id number]  Today's Date: 4/2/2025    Admit Date: 4/2/2025    Plan: ONGOING   Discharge Needs Assessment       Row Name 04/02/25 1957       Living Environment    People in Home spouse    Current Living Arrangements home    Primary Care Provided by self;spouse/significant other  caregivers    Provides Primary Care For no one, unable/limited ability to care for self    Family Caregiver if Needed spouse    Quality of Family Relationships supportive;involved;helpful    Able to Return to Prior Arrangements yes       Resource/Environmental Concerns    Transportation Concerns none       Transition Planning    Patient/Family Anticipates Transition to --  TBD    Patient/Family Anticipated Services at Transition --  TBD       Discharge Needs Assessment    Readmission Within the Last 30 Days no previous admission in last 30 days    Equipment Currently Used at Home rollator;shower chair;oxygen;cpap;grab bar    Concerns to be Addressed discharge planning    Anticipated Changes Related to Illness inability to care for self    Equipment Needed After Discharge --  TBD    Outpatient/Agency/Support Group Needs skilled nursing facility    Discharge Facility/Level of Care Needs nursing facility, skilled    Current Discharge Risk chronically ill                   Discharge Plan       Row Name 04/02/25 1958       Plan    Plan ONGOING    Patient/Family in Agreement with Plan yes    Plan Comments Met with pt and wife at bedside for DCP.  [place] in [facility].  At baseline, pt is independent with ADLs.  However, wife reports a functional decline over last 3-4 weeks and he currently requires assistance with all ADLs/IADLs.  He has a caregiver 3d/w from [id number].  He uses a rollator at baseline.  CPAP and O2 HS only (unknown liter flow) per Pt [facility] ([employer]).  No current HH.  Confirmed he has Medicare and M[employer] insurance with Rx coverage.  CM will cont to  follow for DC needs.                    Expected Discharge Date and Time       Expected Discharge Date Expected Discharge Time    Apr 4, 2025            Demographic Summary       Row Name 04/02/25 1956       General Information    Admission Type observation    Referral Source admission list    Reason for Consult discharge planning    Preferred Language English       Contact Information    Permission Granted to Share Info With     Contact Information Obtained for                    Functional Status       Row Name 04/02/25 1956       Functional Status    Usual Activity Tolerance moderate    Current Activity Tolerance fair       Functional Status, IADL    Medications assistive person    Meal Preparation completely dependent    Housekeeping completely dependent    Laundry completely dependent    Shopping completely dependent                   Psychosocial    No documentation.                  Abuse/Neglect    No documentation.                  Legal    No documentation.                  Substance Abuse    No documentation.                  Patient Forms    No documentation.                     Brigitte Temple RN

## 2025-04-03 NOTE — PLAN OF CARE
Goal Outcome Evaluation:  Plan of Care Reviewed With: patient           Outcome Evaluation: Physical therapy evaluation complete. The patient required mod-maxAx2 to complete sit to stand transfer, with increased fatigue required increased assistance. Patient limited in ambulation distance by decreased strength and activity tolerance. Patient presents below baseline for mobility and at increased risk for falls. The patient would continue to benefit from skilled PT to address strength, balance and activity tolerance deficits.    Anticipated Discharge Disposition (PT): skilled nursing facility

## 2025-04-03 NOTE — THERAPY EVALUATION
Patient Name: Iain Dee  : 1935    MRN: [id number]                              Today's Date: 4/3/2025       Admit Date: 2025    Visit Dx:     ICD-10-CM ICD-9-CM   1. Acute on chronic congestive heart failure, unspecified heart failure type  I50.9 428.0   2. Anemia, unspecified type  D64.9 285.9   3. Chronic kidney disease, unspecified CKD stage  N18.9 585.9   4. Shortness of breath  R06.02 786.05   5. Pain of left lower extremity  M79.605 729.5   6. Skin ulcer, limited to breakdown of skin  L98.491 707.9   7. Atelectasis  J98.11 518.0   8. Avascular necrosis of bone of hip, unspecified laterality  M87.059 733.42   9. Elevated troponin  R79.89 790.6     Patient Active Problem List   Diagnosis    AMS (altered mental status)    Hypomagnesemia    Hypoalbuminemia    Essential hypertension    Atrial fibrillation    GERD (gastroesophageal reflux disease)    Gout    Diabetes mellitus    JOLENE on CPAP    Chronic anticoagulation (Eliquis)    Alcohol abuse (1 to 2 glasses of bourbon a day)    Memory impairment    STEMI (ST elevation myocardial infarction)    Stage 3b chronic kidney disease    CHF (congestive heart failure)    Thigh hematoma, left, initial encounter     Past Medical History:   Diagnosis Date    A-fib     Arthritis     Cancer     Diabetes mellitus     GERD (gastroesophageal reflux disease)     Gout     H/O Bladder carcinoma (HCC)     Hypertension      Past Surgical History:   Procedure Laterality Date    BLADDER SURGERY      Biopsy    HERNIA REPAIR      PROSTATE BIOPSY        General Information       Row Name 25 1507          OT Time and Intention    Document Type evaluation  -     Mode of Treatment occupational therapy  -       Row Name 25 1508          General Information    Patient Profile Reviewed yes  -MC     Prior Level of Function independent:;bed mobility;transfer;all household mobility;grooming;feeding;min assist:;dressing;bathing;dependent:;home  management;cooking;cleaning;driving;shopping  Pt uses rollator for mobility at baseline, pt's wife reports a recent functional decline within last 3-4 weeks. Pt has caregiver who comes 3 days/week for 4 hours a day, has a tub shower w/ transfer bench  -     Existing Precautions/Restrictions fall;oxygen therapy device and L/min;other (see comments)  L thigh pain  -     Barriers to Rehab medically complex;previous functional deficit  -       Row Name 04/03/25 1507          Living Environment    Current Living Arrangements home  -     People in Home spouse  -       Row Name 04/03/25 1507          Home Main Entrance    Number of Stairs, Main Entrance other (see comments)  ramp  -       Row Name 04/03/25 1507          Stairs Within Home, Primary    Number of Stairs, Within Home, Primary none  -       Row Name 04/03/25 1507          Cognition    Orientation Status (Cognition) oriented x 3  -       Row Name 04/03/25 1507          Safety Issues/Impairments Affecting Functional Mobility    Safety Issues Affecting Function (Mobility) awareness of need for assistance;insight into deficits/self-awareness;safety precaution awareness;safety precautions follow-through/compliance;sequencing abilities  -     Impairments Affecting Function (Mobility) balance;endurance/activity tolerance;strength;postural/trunk control;pain;range of motion (ROM);motor planning  -               User Key  (r) = Recorded By, (t) = Taken By, (c) = Cosigned By      Initials Name Provider Type     Tracy Jackson OT Occupational Therapist                     Mobility/ADL's       Row Name 04/03/25 1513          Bed Mobility    Comment, (Bed Mobility) Pt received and left sitting UIC  -       Row Name 04/03/25 1513          Transfers    Transfers sit-stand transfer;stand-sit transfer;toilet transfer  -       Row Name 04/03/25 1513          Sit-Stand Transfer    Sit-Stand [place] (Transfers) maximum assist (25% patient  effort);2 person assist;verbal cues  -     Assistive Device (Sit-Stand Transfers) walker, front-wheeled  -     Comment, (Sit-Stand Transfer) STSx4. Pt w/ noted poterior lean initially upon standing  -       Row Name 04/03/25 1513          Stand-Sit Transfer    Stand-Sit [place] (Transfers) maximum assist (25% patient effort);2 person assist;verbal cues  -     Assistive Device (Stand-Sit Transfers) walker, front-wheeled  -       Row Name 04/03/25 1513          Toilet Transfer    Type (Toilet Transfer) sit-stand;stand-sit;stand pivot/stand step  -     [place] Level (Toilet Transfer) moderate assist (50% patient effort);2 person assist;verbal cues  -     Assistive Device (Toilet Transfer) commode, bedside without drop arms;walker, front-wheeled  -       Row Name 04/03/25 1513          Functional Mobility    Functional Mobility- Ind. Level minimum assist (75% patient effort);1 person + 1 person to manage equipment;verbal cues required  -     Functional Mobility- Device walker, front-wheeled  [facility]     Functional Mobility-Distance (Feet) --  short HH distance  -     Functional Mobility- Safety Issues balance decreased during turns;sequencing ability decreased;step length decreased;supplemental O2  -     Functional Mobility- Comment close chair follow for safety  -       Row Name 04/03/25 1513          Activities of Daily Living    BADL Assessment/Intervention lower body dressing;toileting;upper body dressing  -       Row Name 04/03/25 1513          Lower Body Dressing Assessment/Training    [place] Level (Lower Body Dressing) don;doff;socks;dependent (less than 25% patient effort)  -     Position (Lower Body Dressing) supported sitting  -       Row Name 04/03/25 1513          Toileting Assessment/Training    [place] Level (Toileting) adjust/manage clothing;perform perineal hygiene;dependent (less than 25% patient effort);verbal cues  -     Assistive Devices (Toileting)  commode, bedside without drop arms  -     Position (Toileting) supported standing;supported sitting  -       Row Name 04/03/25 1513          Upper Body Dressing Assessment/Training    [place] Level (Upper Body Dressing) doff;don;neil/mosese;minimum assist (75% patient effort)  -     Position (Upper Body Dressing) unsupported sitting  -               User Key  (r) = Recorded By, (t) = Taken By, (c) = Cosigned By      Initials Name Provider Type     Tracy Jackson OT Occupational Therapist                   Obj/Interventions       [facility] Name 04/03/25 1519          Sensory Assessment (Somatosensory)    Sensory Assessment (Somatosensory) UE sensation intact  -[facility] Name 04/03/25 1519          Vision Assessment/Intervention    Visual Impairment/Limitations WFL  -[facility] Name 04/03/25 1519          Range of Motion Comprehensive    General Range of Motion bilateral upper extremity ROM WFL  -[facility] Name 04/03/25 1519          Strength Comprehensive (MMT)    General Manual Muscle Testing (MMT) Assessment upper extremity strength deficits identified  -     Comment, General Manual Muscle Testing (MMT) Assessment BUE grossly 4/5  -[facility] Name 04/03/25 1519          Balance    Balance Assessment sitting static balance;sitting dynamic balance;sit to stand dynamic balance;standing static balance;standing dynamic balance  -     Static Sitting Balance standby assist  -     Dynamic Sitting Balance minimal assist  -     Position, Sitting Balance unsupported;sitting in chair;other (see comments)  BSC  -     Sit to Stand Dynamic Balance 2-person assist;verbal cues;maximum assist  -     Static Standing Balance minimal assist;2-person assist;verbal cues  -     Dynamic Standing Balance moderate assist;2-person assist;verbal cues  -     Position/Device Used, Standing Balance supported;walker, front-wheeled  -     Balance Interventions sitting;sit to stand;occupation  based/functional task  -               User Key  (r) = Recorded By, (t) = Taken By, (c) = Cosigned By      Initials Name Provider Type     Trcay Jackson, OT Occupational Therapist                   Goals/Plan       Row Name 04/03/25 1524          Transfer Goal 1 (OT)    Activity/Assistive Device (Transfer Goal 1, OT) bed-to-chair/chair-to-bed;toilet;walker, rolling  -     [place] Level/Cues Needed (Transfer Goal 1, OT) minimum assist (75% or more patient effort)  -     Time Frame (Transfer Goal 1, OT) long term goal (LTG);10 days  -     Progress/Outcome (Transfer Goal 1, OT) goal ongoing  -       Row Name 04/03/25 1524          Dressing Goal 1 (OT)    Activity/Device (Dressing Goal 1, OT) lower body dressing  don/doff socks with AAD  -     [place]/Cues Needed (Dressing Goal 1, OT) moderate assist (50-74% patient effort)  -     Time Frame (Dressing Goal 1, OT) long term goal (LTG);10 days  -     Progress/Outcome (Dressing Goal 1, OT) goal ongoing  -       Row Name 04/03/25 1524          Toileting Goal 1 (OT)    Activity/Device (Toileting Goal 1, OT) adjust/manage clothing;perform perineal hygiene;commode, bedside without drop arms  -     [place] Level/Cues Needed (Toileting Goal 1, OT) contact guard required  -     Time Frame (Toileting Goal 1, OT) short term goal (STG);5 days  -     Progress/Outcome (Toileting Goal 1, OT) goal ongoing  -       Row Name 04/03/25 1524          Therapy Assessment/Plan (OT)    Planned Therapy Interventions (OT) activity tolerance training;adaptive equipment training;BADL retraining;edema control/reduction;functional balance retraining;IADL retraining;occupation/activity based interventions;patient/caregiver education/training;ROM/therapeutic exercise;strengthening exercise;transfer/mobility retraining  -               User Key  (r) = Recorded By, (t) = Taken By, (c) = Cosigned By      Initials Name Provider Type     Tracy Jackson,  OT Occupational Therapist                   Clinical Impression       Row Name 04/03/25 1519          Pain Assessment    Pretreatment Pain Rating 4/10  -     Posttreatment Pain Rating 4/10  -     Pain Location extremity  -     Pain Side/Orientation left;lower  -     Pain Management Interventions exercise or physical activity utilized;positioning techniques utilized;nursing notified  -     Response to Pain Interventions activity participation with tolerable pain  -       Row Name 04/03/25 1519          Plan of Care Review    Plan of Care Reviewed With patient;spouse  -     Outcome Evaluation Pt's ADL independence limited d/t LLE pain, generalized weakness, decreased functional endurance, and balance deficits. Pt would benefit from continued skilled IPOT services. Rec SNF for STR at d/c for best functional outcomes.  -       Row Name 04/03/25 1519          Therapy Assessment/Plan (OT)    Patient/Family Therapy Goal Statement (OT) Return to PLOF  -     Criteria for Skilled Therapeutic Interventions Met (OT) yes;skilled treatment is necessary  -     Therapy Frequency (OT) daily  -     Predicted Duration of Therapy Intervention (OT) 10 days  -       Row Name 04/03/25 1519          Therapy Plan Review/Discharge Plan (OT)    Anticipated Discharge Disposition (OT) skilled nursing facility  -       Row Name 04/03/25 1519          Vital Signs    O2 Delivery Pre Treatment nasal cannula  -     O2 Delivery Intra Treatment nasal cannula  -     O2 Delivery Post Treatment nasal cannula  -     Pre Patient Position Sitting  -     Intra Patient Position Standing  -     Post Patient Position Sitting  -       Row Name 04/03/25 1519          Positioning and Restraints    Pre-Treatment Position sitting in chair/recliner  -     Post Treatment Position chair  -     In Chair notified nsg;reclined;call light within reach;encouraged to call for assist;exit alarm on;waffle cushion;on mechanical lift  sling;legs elevated;heels elevated;with family/caregiver  -               User Key  (r) = Recorded By, (t) = Taken By, (c) = Cosigned By      Initials Name Provider Type    Tracy Johns OT Occupational Therapist                   Outcome Measures       Row Name 04/03/25 1526          How much help from another is currently needed...    Putting on and taking off regular lower body clothing? 2  -MC     Bathing (including washing, rinsing, and drying) 2  -MC     Toileting (which includes using toilet bed pan or urinal) 2  -MC     Putting on and taking off regular upper body clothing 3  -MC     Taking care of personal grooming (such as brushing teeth) 4  -MC     Eating meals 4  -MC     AM-PAC 6 Clicks Score (OT) 17  -MC       Row Name 04/03/25 1511 04/03/25 0800       How much help from another person do you currently need...    Turning from your back to your side while in flat bed without using bedrails? 3  -ML 3  -DA    Moving from lying on back to sitting on the side of a flat bed without bedrails? 2  -ML 3  -DA    Moving to and from a bed to a chair (including a wheelchair)? 2  -ML 3  -DA    Standing up from a chair using your arms (e.g., wheelchair, bedside chair)? 2  -ML 3  -DA    Climbing 3-5 steps with a railing? 1  -ML 2  -DA    To walk in hospital room? 3  -ML 3  -DA    AM-PAC 6 Clicks Score (PT) 13  -ML 17  -DA    Highest Level of Mobility Goal 4 --> Transfer to chair/commode  -ML 5 --> Static standing  -DA      Row Name 04/03/25 1526 04/03/25 1511       Functional Assessment    Outcome Measure Options AM-PAC 6 Clicks Daily Activity (OT)  - AM-PAC 6 Clicks Basic Mobility (PT)  -ML              User Key  (r) = Recorded By, (t) = Taken By, (c) = Cosigned By      Initials Name Provider Type    Tracy Johns OT Occupational Therapist    Bianka Lang Physical Therapist    Hugo Farrell, RN Registered Nurse                    Occupational Therapy Education       Title: PT OT SLP  Therapies (In Progress)       Topic: Occupational Therapy (In Progress)       Point: ADL training (Done)       Learning Progress Summary            Patient Acceptance, E, VU by  at 4/3/2025 1528                      Point: Precautions (Done)       Learning Progress Summary            Patient Acceptance, E, VU by  at 4/3/2025 1528                      Point: Body mechanics (Done)       Learning Progress Summary            Patient Acceptance, E, VU by  at 4/3/2025 1528                                      User Key       Initials Effective Dates Name Provider Type Discipline     10/14/22 -  Tracy Jackson OT Occupational Therapist OT                  OT Recommendation and Plan  Planned Therapy Interventions (OT): activity tolerance training, adaptive equipment training, BADL retraining, edema control/reduction, functional balance retraining, IADL retraining, occupation/activity based interventions, patient/caregiver education/training, ROM/therapeutic exercise, strengthening exercise, transfer/mobility retraining  Therapy Frequency (OT): daily  Plan of Care Review  Plan of Care Reviewed With: patient, spouse  Outcome Evaluation: Pt's ADL independence limited d/t LLE pain, generalized weakness, decreased functional endurance, and balance deficits. Pt would benefit from continued skilled IPOT services. Rec SNF for STR at d/c for best functional outcomes.     Time Calculation:   Evaluation Complexity (OT)  Review Occupational Profile/Medical/Therapy History Complexity: expanded/moderate complexity  Assessment, Occupational Performance/Identification of Deficit Complexity: 3-5 performance deficits  Clinical Decision Making Complexity (OT): detailed assessment/moderate complexity  Overall Complexity of Evaluation (OT): moderate complexity     Time Calculation- OT       Row Name 04/03/25 1529             Time Calculation- OT    OT Start Time 1328  -      OT Received On 04/03/25  -      OT Goal Re-Cert Due  Date 04/13/25  -         Timed Charges    66218 - OT Therapeutic Activity Minutes 4  -MC      79954 - OT Self Care/Mgmt Minutes 7  -MC         Untimed Charges    OT Eval/Re-eval Minutes 31  -MC         Total Minutes    Timed Charges Total Minutes 11  -MC      Untimed Charges Total Minutes 31  -MC       Total Minutes 42  -MC                User Key  (r) = Recorded By, (t) = Taken By, (c) = Cosigned By      Initials Name Provider Type     Tracy Jackson, OT Occupational Therapist                  Therapy Charges for Today       Code Description Service Date Service Provider Modifiers Qty    93757003338  OT SELF CARE/MGMT/TRAIN EA 15 MIN 4/3/2025 Tracy Jackson OT GO 1    [id number]  OT EVAL MOD COMPLEXITY 3 4/3/2025 Tracy Jackson OT GO 1                 Tracy Jackson OT  4/3/2025

## 2025-04-04 ENCOUNTER — APPOINTMENT (OUTPATIENT)
Dept: CARDIOLOGY | Facility: HOSPITAL | Age: 89
End: 2025-04-04
Payer: MEDICARE

## 2025-04-04 LAB
ANION GAP SERPL CALCULATED.3IONS-SCNC: 12 MMOL/L (ref 5–15)
AORTIC DIMENSIONLESS INDEX: 0.53 (DI)
ASCENDING AORTA: 4 CM
AV MEAN PRESS GRAD SYS DOP V1V2: 4 MMHG
AV VMAX SYS DOP: 130.7 CM/SEC
BH CV ECHO MEAS - AI P1/2T: 561 MSEC
BH CV ECHO MEAS - AO MAX PG: 6.8 MMHG
BH CV ECHO MEAS - AO ROOT AREA (BSA CORRECTED): 1.9 CM2
BH CV ECHO MEAS - AO ROOT DIAM: 4.2 CM
BH CV ECHO MEAS - AO V2 VTI: 25.7 CM
BH CV ECHO MEAS - AVA(I,D): 2.2 CM2
BH CV ECHO MEAS - EDV(CUBED): 125 ML
BH CV ECHO MEAS - EDV(MOD-SP2): 233 ML
BH CV ECHO MEAS - EDV(MOD-SP4): 208 ML
BH CV ECHO MEAS - EF(MOD-SP2): 53.2 %
BH CV ECHO MEAS - EF(MOD-SP4): 57.3 %
BH CV ECHO MEAS - ESV(CUBED): 24.4 ML
BH CV ECHO MEAS - ESV(MOD-SP2): 109 ML
BH CV ECHO MEAS - ESV(MOD-SP4): 88.8 ML
BH CV ECHO MEAS - FS: 42 %
BH CV ECHO MEAS - IVS/LVPW: 0.92 CM
BH CV ECHO MEAS - IVSD: 1.1 CM
BH CV ECHO MEAS - LA DIMENSION: 4.7 CM
BH CV ECHO MEAS - LAT PEAK E' VEL: 9.6 CM/SEC
BH CV ECHO MEAS - LV DIASTOLIC VOL/BSA (35-75): 91.8 CM2
BH CV ECHO MEAS - LV MASS(C)D: 220.3 GRAMS
BH CV ECHO MEAS - LV MAX PG: 1.81 MMHG
BH CV ECHO MEAS - LV MEAN PG: 1 MMHG
BH CV ECHO MEAS - LV SYSTOLIC VOL/BSA (12-30): 39.2 CM2
BH CV ECHO MEAS - LV V1 MAX: 67.1 CM/SEC
BH CV ECHO MEAS - LV V1 VTI: 13.6 CM
BH CV ECHO MEAS - LVIDD: 5 CM
BH CV ECHO MEAS - LVIDS: 2.9 CM
BH CV ECHO MEAS - LVOT AREA: 4.2 CM2
BH CV ECHO MEAS - LVOT DIAM: 2.3 CM
BH CV ECHO MEAS - LVPWD: 1.2 CM
BH CV ECHO MEAS - MED PEAK E' VEL: 4.7 CM/SEC
BH CV ECHO MEAS - MV A MAX VEL: 30 CM/SEC
BH CV ECHO MEAS - MV DEC SLOPE: 462 CM/SEC2
BH CV ECHO MEAS - MV DEC TIME: 0.2 SEC
BH CV ECHO MEAS - MV E MAX VEL: 95 CM/SEC
BH CV ECHO MEAS - MV E/A: 3.2
BH CV ECHO MEAS - MV P1/2T: 71 MSEC
BH CV ECHO MEAS - MVA(P1/2T): 3.1 CM2
BH CV ECHO MEAS - PA ACC TIME: 0.13 SEC
BH CV ECHO MEAS - PA V2 MAX: 87.6 CM/SEC
BH CV ECHO MEAS - PAPD(PI EDV): 8 MMHG
BH CV ECHO MEAS - PI END-D VEL: 140 CM/SEC
BH CV ECHO MEAS - RAP SYSTOLE: 15 MMHG
BH CV ECHO MEAS - RVSP: 62 MMHG
BH CV ECHO MEAS - SV(LVOT): 56.5 ML
BH CV ECHO MEAS - SV(MOD-SP2): 124 ML
BH CV ECHO MEAS - SV(MOD-SP4): 119.2 ML
BH CV ECHO MEAS - SVI(LVOT): 24.9 ML/M2
BH CV ECHO MEAS - SVI(MOD-SP2): 54.7 ML/M2
BH CV ECHO MEAS - SVI(MOD-SP4): 52.6 ML/M2
BH CV ECHO MEAS - TAPSE (>1.6): 1.64 CM
BH CV ECHO MEAS - TR MAX PG: 45.7 MMHG
BH CV ECHO MEAS - TR MAX VEL: 338 CM/SEC
BH CV ECHO MEASUREMENTS AVERAGE E/E' RATIO: 13.29
BH CV VAS BP LEFT ARM: NORMAL MMHG
BH CV XLRA - RV BASE: 6.1 CM
BH CV XLRA - RV LENGTH: 9.1 CM
BH CV XLRA - RV MID: 4.8 CM
BH CV XLRA - TDI S': 9.9 CM/SEC
BUN SERPL-MCNC: 41 MG/DL (ref 8–23)
BUN/CREAT SERPL: 22 (ref 7–25)
CALCIUM SPEC-SCNC: 8.8 MG/DL (ref 8.6–10.5)
CHLORIDE SERPL-SCNC: 98 MMOL/L (ref 98–107)
CO2 SERPL-SCNC: 33 MMOL/L (ref 22–29)
CREAT SERPL-MCNC: 1.86 MG/DL (ref 0.76–1.27)
DEPRECATED RDW RBC AUTO: 63.1 FL (ref 37–54)
EGFRCR SERPLBLD CKD-EPI 2021: 34.4 ML/MIN/1.73
ERYTHROCYTE [DISTWIDTH] IN BLOOD BY AUTOMATED COUNT: 17.4 % (ref 12.3–15.4)
GLUCOSE BLDC GLUCOMTR-MCNC: 156 MG/DL (ref 70–130)
GLUCOSE BLDC GLUCOMTR-MCNC: 193 MG/DL (ref 70–130)
GLUCOSE BLDC GLUCOMTR-MCNC: 245 MG/DL (ref 70–130)
GLUCOSE BLDC GLUCOMTR-MCNC: 375 MG/DL (ref 70–130)
GLUCOSE SERPL-MCNC: 94 MG/DL (ref 65–99)
HCT VFR BLD AUTO: 29.8 % (ref 37.5–51)
HGB BLD-MCNC: 9.2 G/DL (ref 13–17.7)
IVRT: 53 MS
LEFT ATRIUM VOLUME INDEX: 89 ML/M2
LV EF 2D ECHO EST: 55 %
LV EF BIPLANE MOD: 54.7 %
MAGNESIUM SERPL-MCNC: 2 MG/DL (ref 1.6–2.4)
MCH RBC QN AUTO: 30.8 PG (ref 26.6–33)
MCHC RBC AUTO-ENTMCNC: 30.9 G/DL (ref 31.5–35.7)
MCV RBC AUTO: 99.7 FL (ref 79–97)
PLATELET # BLD AUTO: 200 10*3/MM3 (ref 140–450)
PMV BLD AUTO: 10.9 FL (ref 6–12)
POTASSIUM SERPL-SCNC: 3.9 MMOL/L (ref 3.5–5.2)
RBC # BLD AUTO: 2.99 10*6/MM3 (ref 4.14–5.8)
SODIUM SERPL-SCNC: 143 MMOL/L (ref 136–145)
WBC NRBC COR # BLD AUTO: 5.95 10*3/MM3 (ref 3.4–10.8)

## 2025-04-04 PROCEDURE — 80048 BASIC METABOLIC PNL TOTAL CA: CPT | Performed by: INTERNAL MEDICINE

## 2025-04-04 PROCEDURE — 25010000002 BUMETANIDE PER 0.5 MG: Performed by: PHYSICIAN ASSISTANT

## 2025-04-04 PROCEDURE — 29581 APPL MULTLAYER CMPRN SYS LEG: CPT

## 2025-04-04 PROCEDURE — 93306 TTE W/DOPPLER COMPLETE: CPT | Performed by: INTERNAL MEDICINE

## 2025-04-04 PROCEDURE — 97164 PT RE-EVAL EST PLAN CARE: CPT

## 2025-04-04 PROCEDURE — 83735 ASSAY OF MAGNESIUM: CPT | Performed by: INTERNAL MEDICINE

## 2025-04-04 PROCEDURE — 97597 DBRDMT OPN WND 1ST 20 CM/<: CPT

## 2025-04-04 PROCEDURE — 99232 SBSQ HOSP IP/OBS MODERATE 35: CPT | Performed by: INTERNAL MEDICINE

## 2025-04-04 PROCEDURE — 63710000001 INSULIN LISPRO (HUMAN) PER 5 UNITS: Performed by: NURSE PRACTITIONER

## 2025-04-04 PROCEDURE — 82948 REAGENT STRIP/BLOOD GLUCOSE: CPT

## 2025-04-04 PROCEDURE — 85027 COMPLETE CBC AUTOMATED: CPT | Performed by: INTERNAL MEDICINE

## 2025-04-04 PROCEDURE — 93306 TTE W/DOPPLER COMPLETE: CPT

## 2025-04-04 RX ORDER — BUMETANIDE 0.25 MG/ML
2 INJECTION, SOLUTION INTRAMUSCULAR; INTRAVENOUS DAILY
Status: DISCONTINUED | OUTPATIENT
Start: 2025-04-04 | End: 2025-04-04

## 2025-04-04 RX ORDER — BUMETANIDE 1 MG/1
2 TABLET ORAL DAILY
Status: DISCONTINUED | OUTPATIENT
Start: 2025-04-05 | End: 2025-04-05 | Stop reason: HOSPADM

## 2025-04-04 RX ADMIN — INSULIN LISPRO 8 UNITS: 100 INJECTION, SOLUTION INTRAVENOUS; SUBCUTANEOUS at 09:14

## 2025-04-04 RX ADMIN — MAGNESIUM OXIDE TAB 400 MG (241.3 MG ELEMENTAL MG) 400 MG: 400 (241.3 MG) TAB at 09:57

## 2025-04-04 RX ADMIN — CARVEDILOL 3.12 MG: 3.12 TABLET, FILM COATED ORAL at 17:34

## 2025-04-04 RX ADMIN — ATORVASTATIN CALCIUM 40 MG: 40 TABLET, FILM COATED ORAL at 20:57

## 2025-04-04 RX ADMIN — INSULIN LISPRO 2 UNITS: 100 INJECTION, SOLUTION INTRAVENOUS; SUBCUTANEOUS at 17:34

## 2025-04-04 RX ADMIN — CARVEDILOL 3.12 MG: 3.12 TABLET, FILM COATED ORAL at 09:06

## 2025-04-04 RX ADMIN — INSULIN LISPRO 2 UNITS: 100 INJECTION, SOLUTION INTRAVENOUS; SUBCUTANEOUS at 13:07

## 2025-04-04 RX ADMIN — APIXABAN 2.5 MG: 2.5 TABLET, FILM COATED ORAL at 09:05

## 2025-04-04 RX ADMIN — EMPAGLIFLOZIN 10 MG: 10 TABLET, FILM COATED ORAL at 09:06

## 2025-04-04 RX ADMIN — Medication 10 ML: at 09:08

## 2025-04-04 RX ADMIN — INSULIN LISPRO 4 UNITS: 100 INJECTION, SOLUTION INTRAVENOUS; SUBCUTANEOUS at 20:57

## 2025-04-04 RX ADMIN — BUMETANIDE 2 MG: 0.25 INJECTION INTRAMUSCULAR; INTRAVENOUS at 09:13

## 2025-04-04 RX ADMIN — ASPIRIN 81 MG: 81 TABLET, COATED ORAL at 09:05

## 2025-04-04 RX ADMIN — NITROGLYCERIN 1 PATCH: 0.2 PATCH TRANSDERMAL at 09:06

## 2025-04-04 RX ADMIN — Medication 10 ML: at 20:58

## 2025-04-04 RX ADMIN — APIXABAN 2.5 MG: 2.5 TABLET, FILM COATED ORAL at 20:57

## 2025-04-04 NOTE — PROGRESS NOTES
"[facility]  IP Progress Note    HOSPITAL COURSE:  Patient with a history of coronary artery disease admitted with acute on chronic renal failure is feeling much better.  Wants to go home and not to the physical therapy place if possible      CHIEF COMPLAINTS:  Heart failure with mildly reduced ejection fraction      Subjective   Eating breakfast and feeling okay      Objective     Blood pressure 138/84, pulse 72, temperature 97.4 °F (36.3 °C), temperature source Oral, resp. rate 18, height 185.4 cm (73\"), weight 103 kg (226 lb), SpO2 94%.     Intake/Output Summary (Last 24 hours) at 4/4/2025 1922  Last data filed at 4/4/2025 1737  Gross per 24 hour   Intake --   Output 3200 ml   Net -3200 ml       PHYSICAL EXAM:  Constitutional:       General: Not in acute distress.     Appearance: Healthy appearance. Not in distress.     Neck:     JVP:Not elevated     Carotid artery: Normal    Pulmonary:      Effort: Pulmonary effort is normal.      Breath sounds: Normal breath sounds. No wheezing. No rhonchi. No rales.     Cardiovascular:      Normal rate. Regular rhythm. Normal S1. Normal S2.      Murmurs: There is 2/6 systolic murmur.      No gallop. No click. No rub.     Abdominal:      General: Bowel sounds are normal.      Palpations: Abdomen is soft.      Tenderness: There is no abdominal tenderness.    Extremities:     Pulses:Normal radial and pedal pulses     Edema:Edema    MEDICATIONS:    apixaban, 2.5 mg, Oral, BID  aspirin, 81 mg, Oral, Daily  atorvastatin, 40 mg, Oral, Nightly  [START ON 4/5/2025] bumetanide, 2 mg, Oral, Daily  carvedilol, 3.125 mg, Oral, BID With Meals  empagliflozin, 10 mg, Oral, Daily  insulin lispro, 2-9 Units, Subcutaneous, 4x Daily AC & at Bedtime  magnesium oxide, 400 mg, Oral, Daily  [Held by provider] metFORMIN, 500 mg, Oral, BID With Meals  nitroglycerin, 1 patch, Transdermal, Daily  pharmacy consult - MTM, , Not Applicable, Daily  sodium chloride, 10 mL, " "Intravenous, Q12H          Results from last 7 days   Lab Units 04/04/25  0312   WBC 10*3/mm3 5.95   HEMOGLOBIN g/dL 9.2*   HEMATOCRIT % 29.8*   PLATELETS 10*3/mm3 200     Results from last 7 days   Lab Units 04/04/25  0312 04/03/25  0001 04/02/25  1459   SODIUM mmol/L 143   < > 138   POTASSIUM mmol/L 3.9   < > 4.4   CHLORIDE mmol/L 98   < > 98   CO2 mmol/L 33.0*   < > 26.0   BUN mg/dL 41*   < > 45*   CREATININE mg/dL 1.86*   < > 1.80*   CALCIUM mg/dL 8.8   < > 8.7   BILIRUBIN mg/dL  --   --  0.9   ALK PHOS U/L  --   --  171*   ALT (SGPT) U/L  --   --  27   AST (SGOT) U/L  --   --  32   GLUCOSE mg/dL 94   < > 171*    < > = values in this interval not displayed.         Lab Results   Component Value Date    TROPONINT 94 (C) 04/02/2025     Results from last 7 days   Lab Units 04/02/25  1551   TSH uIU/mL 2.650             No results found for: \"IRON\", \"FERRITIN\", \"LABIRON\", \"TIBC\"   Hemoglobin A1C   Date Value Ref Range Status   04/02/2025 6.40 (H) 4.80 - 5.60 % Final     Magnesium   Date Value Ref Range Status   04/04/2025 2.0 1.6 - 2.4 mg/dL Final        RESULT REVIEW:    I reviewed the patient's new clinical results.  I reviewed the patient's other test results and agree with the interpretation    Tele: Atrial Fib with Controlled Rate      ASSESSMENT:     Coronary artery disease  Heart failure  Chronic kidney disease  Bilateral lower extremity edema    PLAN:     I think patient has responded good to the treatment.  Will continue diuresing with Bumex 2 mg once or twice daily depending on his symptoms.  He will definitely benefit from physical therapy.  He does not want to go to a place he wants to go home and have home physical therapy.  Continue aspirin and Eliquis.  No further recommendation at this time.  Please do not hesitate to call us over the weekend.  "

## 2025-04-04 NOTE — PLAN OF CARE
Goal Outcome Evaluation:  Plan of Care Reviewed With: patient, spouse        Progress: no change  Outcome Evaluation: PT wound care initial evaulation complete. Pt presenting with chronic moderate BLE edema (L>R) with open ulceration to the L lateral leg appearing consistent with ruptured blister. PT applied mepilex Ag to wound to help reduce bacterial load, manage draiange, and promote epithelialization and applied BLE MLW to improve venous return, improve skin integrity, and promote optimal wound healing potential. Pt due for next wound dressing and MLW change in 2-3 days.

## 2025-04-04 NOTE — CASE MANAGEMENT/SOCIAL WORK
Continued Stay Note  [facility]     Patient Name: Iain Dee  MRN: [id number]  Today's Date: 4/4/2025    Admit Date: 4/2/2025    Plan: discharge plan   Discharge Plan       Row Name 04/04/25 1700       Plan    Final Discharge Disposition Code 06 - home with home health care      Row Name 04/04/25 1657       Plan    Plan discharge plan    Plan Comments I met with pt and pt's spouse in room regarding discharge plan. Pt aware of PT/OT recommendations for SNF, but states he wants to therapy at home and requests HH. Pt's preference is [facility]. I made a referral to [facility] and spoke with Giovanna(liason for [facility]). [facility] is unable eto accept referral due to full capacity. I made additional referrals to  [facility], [facility], and [facility]. [facility] the only one that could accept referral.   will notify [facility] HH at discharge and cont to follow                   Discharge Codes    No documentation.                 Expected Discharge Date and Time       Expected Discharge Date Expected Discharge Time    Apr 5, 2025               Agustina Phillips RN

## 2025-04-04 NOTE — PROGRESS NOTES
[facility] Medicine Services  PROGRESS NOTE    Patient Name: Iain Dee  : 1935  MRN: [id number]    Date of Admission: 2025  Primary Care Physician: Luis Aguilar DO    Subjective   Subjective     CC:  Dyspnea, LE edema, left thigh pain/hematoma    HPI:  Left thigh pain improved, has not enlarged  Dysnpea improved        Objective   Objective     Vital Signs:   Temp:  [96.4 °F (35.8 °C)-97.8 °F (36.6 °C)] 97.8 °F (36.6 °C)  Heart Rate:  [59-80] 76  Resp:  [18-20] 18  BP: (118-136)/(71-91) 129/91  Flow (L/min) (Oxygen Therapy):  [3] 3     Physical Exam:  Constitutional:Alert, elderly, nontoxic appearing 2Lnc, in place, normal work of breathing  Psych:Normal/appropriate affect  HEENT:NCAT, oropharynx clear  Neck: neck supple, full range of motion  Neuro: Face symmetric, speech clear, equal , moves all extremities  Cardiac:rrr  Resp: ctab  GI: abd soft, nontender to palpation  Skin: No extremity rash  Musculoskeletal/extremities: left lateral thigh bruising stable, not tense        Results Reviewed:  LAB RESULTS:      Lab 25  0312 25  2156 25  0843 25  0001 25  1711 25  1551 25  1459   WBC 5.95  --   --   --   --   --  5.71   HEMOGLOBIN 9.2* 9.3* 9.8* 9.5*  --   --  9.8*   HEMATOCRIT 29.8* 30.3* 32.2* 30.9*  --   --  32.7*   PLATELETS 200  --   --   --   --   --  213   NEUTROS ABS  --   --   --   --   --   --  4.67   IMMATURE GRANS (ABS)  --   --   --   --   --   --  0.03   LYMPHS ABS  --   --   --   --   --   --  0.43*   MONOS ABS  --   --   --   --   --   --  0.46   EOS ABS  --   --   --   --   --   --  0.08   MCV 99.7*  --   --   --   --   --  100.3*   CRP  --   --   --   --   --   --  0.50   PROCALCITONIN  --   --   --   --   --   --  0.18   HSTROP T  --   --   --   --  94* 102*  --          Lab 25  0312 25  0001 25  1551 25  1459   SODIUM 143 136  --  138   POTASSIUM 3.9 3.9  --  4.4   CHLORIDE  98 96*  --  98   CO2 33.0* 30.0*  --  26.0   ANION GAP 12.0 10.0  --  14.0   BUN 41* 44*  --  45*   CREATININE 1.86* 1.58*  --  1.80*   EGFR 34.4* 41.8*  --  35.8*   GLUCOSE 94 191*  --  171*   CALCIUM 8.8 8.7  --  8.7   MAGNESIUM 2.0  --   --   --    HEMOGLOBIN A1C  --   --   --  6.40*   TSH  --   --  2.650  --          Lab 04/02/25  1459   TOTAL PROTEIN 5.2*   ALBUMIN 3.4*   GLOBULIN 1.8   ALT (SGPT) 27   AST (SGOT) 32   BILIRUBIN 0.9   ALK PHOS 171*         Lab 04/02/25  1711 04/02/25  1551 04/02/25  1459   PROBNP  --   --  22,485.0*   HSTROP T 94* 102*  --                  Brief Urine Lab Results  (Last result in the past 365 days)        Color   Clarity   Blood   Leuk Est   Nitrite   Protein   CREAT   Urine HCG        04/03/25 1536 Yellow   Clear   Trace   Negative   Negative   100 mg/dL (2+)                   Microbiology Results Abnormal       None            No radiology results from the last 24 hrs      Results for orders placed during the hospital encounter of 08/05/24    Adult Transthoracic Echo Complete W/ Cont if Necessary Per Protocol    Interpretation Summary    Left ventricular systolic function is moderately decreased. Calculated left ventricular EF = 34.3% Left ventricular ejection fraction appears to be 36 - 40%.    Left ventricular wall thickness is consistent with mild concentric hypertrophy.    Left ventricular diastolic function was indeterminate.    Mildly reduced right ventricular systolic function noted.    The right ventricular cavity is dilated.    The left atrial cavity is mildly dilated.    Left atrial volume is moderately increased.    The right atrial cavity is dilated.    Moderate mitral valve regurgitation is present.    Estimated right ventricular systolic pressure from tricuspid regurgitation is moderately elevated (45-55 mmHg). Calculated right ventricular systolic pressure from tricuspid regurgitation is 51 mmHg.      Current medications:  Scheduled Meds:apixaban, 2.5 mg, Oral,  BID  aspirin, 81 mg, Oral, Daily  atorvastatin, 40 mg, Oral, Nightly  [START ON 4/5/2025] bumetanide, 2 mg, Oral, Daily  carvedilol, 3.125 mg, Oral, BID With Meals  empagliflozin, 10 mg, Oral, Daily  insulin lispro, 2-9 Units, Subcutaneous, 4x Daily AC & at Bedtime  magnesium oxide, 400 mg, Oral, Daily  [Held by provider] metFORMIN, 500 mg, Oral, BID With Meals  nitroglycerin, 1 patch, Transdermal, Daily  pharmacy consult - MTM, , Not Applicable, Daily  sodium chloride, 10 mL, Intravenous, Q12H      Continuous Infusions:   PRN Meds:.  senna-docusate sodium **AND** polyethylene glycol **AND** bisacodyl **AND** bisacodyl    dextrose    dextrose    famotidine    glucagon (human recombinant)    Magnesium Cardiology Dose Replacement - Follow Nurse / BPA Driven Protocol    nitroglycerin    sodium chloride    sodium chloride    Assessment & Plan   Assessment & Plan     Active Hospital Problems    Diagnosis  POA    **CHF (congestive heart failure) [I50.9]  Yes    Thigh hematoma, left, initial encounter [S70.12XA]  Yes    Stage 3b chronic kidney disease [N18.32]  Yes    Atrial fibrillation [I48.91]  Yes    Essential hypertension [I10]  Yes    Diabetes mellitus [E11.9]  Yes    JOLENE on CPAP [G47.33]  Yes      Resolved Hospital Problems   No resolved problems to display.        Brief Hospital Course to date:  Iain Dee is a 89 y.o. male w/ hx CAD (prior stemi), HFrEF (echo 8/2024 w/ LV EF 36-40%, afib (on eliquis), htn, hl, dm2, ckd 3 (baseline cr ~1.6-1.8),  prostate/bladder cancer who presented to ED w/ left thigh pain, edema per haps mild dypsnea. In ED was noted w/ elevated probnp, received iv lasix in ED. Was noted w/ left thigh hematoma.         A/C CHF (formerly HFrEF 36-40% August 2024, now appears to have improved EF 51-55%)  Persistent Afib (on eliquis)  CAD (previous inferior stemi)  Elevated troponin this admission, likely due to decompensated chf  HTN  HL  -previous EF 36-40% (8/2/024)  -echo 4/4/25: prelim  result EF 51-55%; follow up final echo result  -s/p iv diuresis w/ IV bumex  -changed to home po bumex 2mg daily starting 4/5  -cards consulted: agreed w/ iv diuresis initially, coreg, jardiance; stopped plavix & initiated asa 81mg. Continue nitroglycerin patch for chronic angina. Agreed w/ holding eliquis initially & restarting eliquis on 4/4/25; follow up cardiology 4-6 weeks  -The left thigh hematoma is stable & hgb stable ~9  -restarted eliquis 4/4/25; if hgb stable & thigh hematoma stable/improved then d/c home 4/5/25      Left thigh hematoma, improved  -BLE venous duplex negative for dvt  -left femur: no fracture  -pelvix xray: no fracture  -stopped plavix (ok'd per cards)  -started asa 81 per cards  -initially held eliquis  -restarted eliquis today; if hgb stable/ thigh hematoma stable then d/c home tomorrow    DM2  -A1c 6.4  -jardiane  -ssi    Mild confusion, resolved  -denies head trauma, nonfocal exam  -u/a benign    Ckd 3 (baseline cr appears ~1.6-1.8)  -stable    LLE wound  -woc consulted     Dispo:   -pt/ot recommend snf at d/c; however, patient & wife decline this  -home w/ home health at discharge      Am HGB ordered (final echo result)        Expected Discharge Location and Transportation: ? Back home w/ home health tomorrow 4/5/25 (if hgb stable, etc)  Expected Discharge   Expected Discharge Date: 4/5/2025; Expected Discharge Time:      VTE Prophylaxis:  Pharmacologic & mechanical VTE prophylaxis orders are present.         AM-PAC 6 Clicks Score (PT): 17 (04/04/25 0800)    CODE STATUS:   Code Status and Medical Interventions: CPR (Attempt to Resuscitate); Full Support   Ordered at: 04/02/25 6048     Code Status (Patient has no pulse and is not breathing):    CPR (Attempt to Resuscitate)     Medical Interventions (Patient has pulse or is breathing):    Full Support       Coy Herrera MD  04/04/25

## 2025-04-04 NOTE — THERAPY RE-EVALUATION
Acute Care - Wound/Debridement Initial Evaluation  [facility]     Patient Name: Iain Dee  : 1935  MRN: [id number]  Today's Date: 2025                Admit Date: 2025    Visit Dx:    ICD-10-CM ICD-9-CM   1. Acute on chronic congestive heart failure, unspecified heart failure type  I50.9 428.0   2. Anemia, unspecified type  D64.9 285.9   3. Chronic kidney disease, unspecified CKD stage  N18.9 585.9   4. Shortness of breath  R06.02 786.05   5. Pain of left lower extremity  M79.605 729.5   6. Skin ulcer, limited to breakdown of skin  L98.491 707.9   7. Atelectasis  J98.11 518.0   8. Avascular necrosis of bone of hip, unspecified laterality  M87.059 733.42   9. Elevated troponin  R79.89 790.6               Patient Active Problem List   Diagnosis    AMS (altered mental status)    Hypomagnesemia    Hypoalbuminemia    Essential hypertension    Atrial fibrillation    GERD (gastroesophageal reflux disease)    Gout    Diabetes mellitus    JOLENE on CPAP    Chronic anticoagulation (Eliquis)    Alcohol abuse (1 to 2 glasses of bourbon a day)    Memory impairment    STEMI (ST elevation myocardial infarction)    Stage 3b chronic kidney disease    CHF (congestive heart failure)    Thigh hematoma, left, initial encounter        Past Medical History:   Diagnosis Date    A-fib     Arthritis     Cancer     Diabetes mellitus     GERD (gastroesophageal reflux disease)     Gout     H/O Bladder carcinoma (HCC)     Hypertension         Past Surgical History:   Procedure Laterality Date    BLADDER SURGERY      Biopsy    HERNIA REPAIR      PROSTATE BIOPSY             Wound 25 1113 Left lower leg (Active)   Wound Image    25   Dressing Appearance intact;moist drainage 25   Closure Adhesive bandage 25 0424   Base moist;pink;red;granulating;epithelialization;yellow;slough 25   Periwound intact;dry;edematous;swelling;warm 25   Periwound Temperature warm 25    Periwound Skin Turgor soft 04/04/25 0923   Edges open;irregular 04/04/25 0923   Wound Length (cm) 4.5 cm 04/04/25 0923   Wound Width (cm) 3.2 cm 04/04/25 0923   Wound Depth (cm) 0.1 cm 04/04/25 0923   Wound Surface Area (cm^2) 11.31 cm^2 04/04/25 0923   Wound Volume (cm^3) 0.754 cm^3 04/04/25 0923   Drainage Characteristics/Odor serosanguineous 04/04/25 0923   Drainage Amount scant 04/04/25 0923   Care, Wound cleansed with;soap and water;debrided 04/04/25 0923   Dressing Care dressing applied;silver impregnated;low-adherent;foam;multi-layer wrap 04/04/25 0923   Periwound Care cleansed with pH balanced cleanser;dry periwound area maintained 04/04/25 0923      Lymphedema       Row Name 04/04/25 1200             Lymphedema Edema Assessment    Ptting Edema Category By severity  -      Pitting Edema Moderate  L>R  -         Skin Changes/Observations    Location/Assessment Lower Extremity  -      Lower Extremity Conditions bilateral:;clean;dry;shiny;fragile;right:;intact  -      Lower Extremity Color/Pigment bilateral:;brawny;fibrosis;left:;erythema  -         Lymphedema Pulses/Capillary Refill    Lymphedema Pulses/Capillary Refill lower extremity pulses;capillary refill  -      Dorsalis Pedis Pulse right:;left:  GABRIELLA through edema  -      Capillary Refill lower extremity capillary refill  -      Lower Extremity Capillary Refill right:;left:;less than 3 seconds  -         Lymphedema Measurements    Measurement Type(s) Quick Girth  -      Quick Girth Areas Lower extremities  -         LLE Quick Girth (cm)    Mid foot 29 cm  -      Smallest ankle 22.5 cm  -      Largest calf 38.8 cm  -         RLE Quick Girth (cm)    Mid foot 28.6 cm  -LH      Smallest ankle 20.8 cm  -LH      Largest calf 34.8 cm  -      RLE Quick Girth Total 84.2  -         Compression/Skin Care    Compression/Skin Care skin care;wrapping location  -      Skin Care washed/dried;lotion applied  -      Wrapping Location  lower extremity  -      Wrapping Location LE bilateral:;foot to knee  -      Wrapping Comments LLE wound dressings size 5 compressogrip, RLE size 4 compressogrip applied doubled distally for gradient compression.  -                User Key  (r) = Recorded By, (t) = Taken By, (c) = Cosigned By      Initials Name Provider Type     Louis Rojas, PT Physical Therapist                    WOUND DEBRIDEMENT  Total area of Debridement: 2cm2  Debridement Site 1  Location- Site 1: LLE  Selective Debridement- Site 1: Wound Surface <20cmsq  Instruments- Site 1: tweezers  Excised Tissue Description- Site 1: minimum, slough, other (comment) (crusts, debris/hairs/fibers)  Bleeding- Site 1: minimum, held pressure, 1 minute               PT Assessment (Last 12 Hours)       PT Evaluation and Treatment       Row Name 04/04/25 0938          Physical Therapy Time and Intention    Subjective Information complains of;weakness;fatigue;pain;swelling  -     Document Type evaluation;wound care  -     Mode of Treatment physical therapy;individual therapy  -       Row Name 04/04/25 0942          General Information    Patient Profile Reviewed yes  -     Patient Observations alert;cooperative;agree to therapy  -     Pertinent History of Current Functional Problem Chronic BLE edema with recent LLE blisters  -     Risks Reviewed patient and family:;increased discomfort  -     Benefits Reviewed patient and family:;increase knowledge;improve skin integrity;decrease risk of DVT;decrease pain;improve function  -     Barriers to Rehab medically complex  -       Row Name 04/04/25 0976          Pain    Pain Location extremity  -     Pain Side/Orientation left;lower  -     Pain Management Interventions positioning techniques utilized  -     Response to Pain Interventions no change per patient report  -     Additional Documentation Pain Scale: FACES Pre/Post-Treatment (Group)  -       Row Name 04/04/25 0981           Pain Scale: FACES Pre/Post-Treatment    Pain: FACES Scale, Pretreatment 2-->hurts little bit  -LH     Posttreatment Pain Rating 2-->hurts little bit  -LH       Row Name 04/04/25 0923          Cognition    Affect/Mental Status (Cognition) WFL  -LH     Orientation Status (Cognition) oriented x 3  -LH       Row Name 04/04/25 0923          Wound 04/03/25 1113 Left lower leg    Wound - Properties Group Placement Date: 04/03/25  -TG Placement Time: 1113  -TG Side: Left  -TG Orientation: lower  -TG Location: leg  -TG    Wound Image Images linked: 2  -LH     Dressing Appearance intact;moist drainage  -LH     Base moist;pink;red;granulating;epithelialization;yellow;slough  -LH     Periwound intact;dry;edematous;swelling;warm  -LH     Periwound Temperature warm  -     Periwound Skin Turgor soft  -     Edges open;irregular  -LH     Wound Length (cm) 4.5 cm  -LH     Wound Width (cm) 3.2 cm  -LH     Wound Depth (cm) 0.1 cm  -LH     Wound Surface Area (cm^2) 11.31 cm^2  -LH     Wound Volume (cm^3) 0.754 cm^3  -LH     Drainage Characteristics/Odor serosanguineous  -LH     Drainage Amount scant  -LH     Care, Wound cleansed with;soap and water;debrided  -     Dressing Care dressing applied;silver impregnated;low-adherent;foam;multi-layer wrap  Mepilex Ag, cast padding, MLW  -LH     Periwound Care cleansed with pH balanced cleanser;dry periwound area maintained  -     Retired Wound - Properties Group Placement Date: 04/03/25  -TG Placement Time: 1113 -TG Side: Left  -TG Orientation: lower  -TG Location: leg  -TG    Retired Wound - Properties Group Placement Date: 04/03/25  -TG Placement Time: 1113  -TG Side: Left  -TG Orientation: lower  -TG Location: leg  -TG    Retired Wound - Properties Group Date first assessed: 04/03/25  -TG Time first assessed: 1113  -TG Side: Left  -TG Location: leg  -TG      Row Name 04/04/25 0923          Coping    Observed Emotional State calm;cooperative;pleasant  -     Verbalized Emotional  State acceptance  -     Trust Relationship/Rapport care explained;questions answered  -     Family/Support Persons spouse  -     Involvement in Care at bedside;attentive to patient  -       Row Name 04/04/25 0923          Plan of Care Review    Plan of Care Reviewed With patient;spouse  -     Progress no change  -     Outcome Evaluation PT wound care initial evaulation complete. Pt presenting with chronic moderate BLE edema (L>R) with open ulceration to the L lateral leg appearing consistent with ruptured blister. PT applied mepilex Ag to wound to help reduce bacterial load, manage draiange, and promote epithelialization and applied BLE MLW to improve venous return, improve skin integrity, and promote optimal wound healing potential. Pt due for next wound dressing and MLW change in 2-3 days.  -       Row Name 04/04/25 0923          Positioning and Restraints    Pre-Treatment Position in bed  -     Post Treatment Position bed  -     In Bed supine;call light within reach;encouraged to call for assist;with family/caregiver  -       Row Name 04/04/25 0923          Therapy Assessment/Plan (PT)    Patient/Family Therapy Goals Statement (PT) Heal wounds, reduce edema  -     PT Diagnosis (PT) BLE edema, LLE blister  -     Rehab Potential (PT) good  -     Criteria for Skilled Interventions Met (PT) yes;meets criteria;skilled treatment is necessary  -       Row Name 04/04/25 0923          Therapy Plan Review/Discharge Plan (PT)    Therapy Plan Review (PT) evaluation/treatment results reviewed;care plan/treatment goals reviewed;risks/benefits reviewed;current/potential barriers reviewed;participants voiced agreement with care plan;participants included;patient;spouse/significant other  -       Row Name 04/04/25 0923          Physical Therapy Goals    Wound Management Goal Selection (PT) wound management, PT goal 1;wound management, PT goal 2  -       Row Name 04/04/25 0923          Wound  Management Goal 1 (PT)    Wound Management Goal (Wound Goal 1, PT) Decrease area of wound by 50% as evidence of wound healing.  -     Time Frame (Wound Goal 1, PT) short-term goal (STG);3-5 days  -       Row Name 04/04/25 0923          Wound Management Goal 2 (PT)    Wound Management Goal (Wound Goal 2, PT) Demonstrate minimal remaining BLE edema to improve skin integrity.  -     Time Frame (Wound Goal 2, PT) long-term goal (LTG);1 week  -               User Key  (r) = Recorded By, (t) = Taken By, (c) = Cosigned By      Initials Name Provider Type     Asim Mathur, RN Registered Nurse     Louis Rojas, PT Physical Therapist                  Physical Therapy Education       Title: PT OT SLP Therapies (In Progress)       Topic: Physical Therapy (In Progress)       Point: Mobility training (Done)       Learning Progress Summary            Patient Acceptance, E, VU,NR by ML at 4/3/2025 1512   Family Acceptance, E, VU,NR by ML at 4/3/2025 1512                      Point: Home exercise program (Not Started)       Learner Progress:  Not documented in this visit.              Point: Body mechanics (Done)       Learning Progress Summary            Patient Acceptance, E, VU,NR by ML at 4/3/2025 1512   Family Acceptance, E, VU,NR by ML at 4/3/2025 1512                      Point: Precautions (Done)       Learning Progress Summary            Patient Acceptance, E, VU,NR by ML at 4/3/2025 1512   Family Acceptance, E, VU,NR by ML at 4/3/2025 1512                                      User Key       Initials Effective Dates Name Provider Type [facility] 04/22/21 -  Bianka Deras Physical Therapist PT                    Recommendation and Plan  Anticipated Discharge Disposition (PT): skilled nursing facility  Planned Therapy Interventions (PT): wound care, patient/family education  Therapy Frequency (PT): daily  Plan of Care Reviewed With: patient, spouse   Progress: no change       Progress: no  change  Outcome Evaluation: PT wound care initial evaulation complete. Pt presenting with chronic moderate BLE edema (L>R) with open ulceration to the L lateral leg appearing consistent with ruptured blister. PT applied mepilex Ag to wound to help reduce bacterial load, manage draiange, and promote epithelialization and applied BLE MLW to improve venous return, improve skin integrity, and promote optimal wound healing potential. Pt due for next wound dressing and MLW change in 2-3 days.  Plan of Care Reviewed With: patient, spouse            Time Calculation   PT Charges       Row Name 04/04/25 1232             Time Calculation    Start Time 0923  -      PT Goal Re-Cert Due Date 04/13/25  -         Untimed Charges    PT Eval/Re-eval Minutes 31  -LH      Wound Care 10599 Selective debridement;51519 Multilayer comp below knee  -      34396-Scooodsyrq comp below knee 15  -LH      18121-Vyvftqtvm debridement 15  -LH         Total Minutes    Untimed Charges Total Minutes 61  -LH       Total Minutes 61  -LH                User Key  (r) = Recorded By, (t) = Taken By, (c) = Cosigned By      Initials Name Provider Type     Louis Rojas, PT Physical Therapist                      Therapy Charges for Today       Code Description Service Date Service Provider Modifiers Qty    21631958645 HC PT RE-EVAL ESTABLISHED PLAN 2 4/4/2025 Louis Rojas, PT GP 1    43584086875 HC VINAY DEBRIDE OPEN WOUND UP TO 20CM 4/4/2025 Louis Rojas, PT GP 1    32033398040 HC PT MULTI LAYER COMP SYS BELOW KNEE 4/4/2025 Louis Rojas, PT GP 1              PT G-Codes  Outcome Measure Options: AM-PAC 6 Clicks Daily Activity (OT)  AM-PAC 6 Clicks Score (PT): 17  AM-PAC 6 Clicks Score (OT): 17       Louis Rojas PT  4/4/2025

## 2025-04-05 ENCOUNTER — READMISSION MANAGEMENT (OUTPATIENT)
Dept: CALL CENTER | Facility: HOSPITAL | Age: 89
End: 2025-04-05
Payer: MEDICARE

## 2025-04-05 VITALS
TEMPERATURE: 98 F | DIASTOLIC BLOOD PRESSURE: 78 MMHG | RESPIRATION RATE: 16 BRPM | OXYGEN SATURATION: 99 % | HEIGHT: 73 IN | SYSTOLIC BLOOD PRESSURE: 117 MMHG | BODY MASS INDEX: 30.38 KG/M2 | HEART RATE: 83 BPM | WEIGHT: 229.2 LBS

## 2025-04-05 LAB
GLUCOSE BLDC GLUCOMTR-MCNC: 143 MG/DL (ref 70–130)
GLUCOSE BLDC GLUCOMTR-MCNC: 151 MG/DL (ref 70–130)
HCT VFR BLD AUTO: 29.6 % (ref 37.5–51)
HGB BLD-MCNC: 9.1 G/DL (ref 13–17.7)

## 2025-04-05 PROCEDURE — 82948 REAGENT STRIP/BLOOD GLUCOSE: CPT

## 2025-04-05 PROCEDURE — 99239 HOSP IP/OBS DSCHRG MGMT >30: CPT | Performed by: INTERNAL MEDICINE

## 2025-04-05 PROCEDURE — 85018 HEMOGLOBIN: CPT | Performed by: INTERNAL MEDICINE

## 2025-04-05 PROCEDURE — 85014 HEMATOCRIT: CPT | Performed by: INTERNAL MEDICINE

## 2025-04-05 RX ORDER — ASPIRIN 81 MG/1
81 TABLET ORAL DAILY
Qty: 30 TABLET | Refills: 0 | Status: SHIPPED | OUTPATIENT
Start: 2025-04-06

## 2025-04-05 RX ADMIN — MAGNESIUM OXIDE TAB 400 MG (241.3 MG ELEMENTAL MG) 400 MG: 400 (241.3 MG) TAB at 08:26

## 2025-04-05 RX ADMIN — CARVEDILOL 3.12 MG: 3.12 TABLET, FILM COATED ORAL at 08:27

## 2025-04-05 RX ADMIN — Medication 10 ML: at 08:28

## 2025-04-05 RX ADMIN — BUMETANIDE 2 MG: 1 TABLET ORAL at 08:27

## 2025-04-05 RX ADMIN — ASPIRIN 81 MG: 81 TABLET, COATED ORAL at 08:26

## 2025-04-05 RX ADMIN — NITROGLYCERIN 1 PATCH: 0.2 PATCH TRANSDERMAL at 08:27

## 2025-04-05 RX ADMIN — APIXABAN 2.5 MG: 2.5 TABLET, FILM COATED ORAL at 08:26

## 2025-04-05 RX ADMIN — EMPAGLIFLOZIN 10 MG: 10 TABLET, FILM COATED ORAL at 08:26

## 2025-04-05 NOTE — CASE MANAGEMENT/SOCIAL WORK
Continued Stay Note  [facility]     Patient Name: Iain Dee  MRN: [id number]  Today's Date: 4/5/2025    Admit Date: 4/2/2025    Plan: discharge plan   Discharge Plan       Row Name 04/05/25 0942       Plan    Plan Comments Attempt made x2 different numbers to make notificaton to [facility]  that pt is pending discharge. If pt doesnt here from them by monday they can call number on discharge summary                   Discharge Codes    No documentation.                 Expected Discharge Date and Time       Expected Discharge Date Expected Discharge Time    Apr 5, 2025               Angeli Howard RN

## 2025-04-05 NOTE — DISCHARGE SUMMARY
[facility] Medicine Services  DISCHARGE SUMMARY    Patient Name: Iain Dee  : 1935  MRN: [id number]    Date of Admission: 2025  2:27 PM  Date of Discharge:  2025  Primary Care Physician: Luis Aguilar DO    Consults       Date and Time Order Name Status Description    4/3/2025  7:32 AM Inpatient Cardiology Consult Completed             Hospital Course     Presenting Problem:     Active Hospital Problems    Diagnosis  POA    **CHF (congestive heart failure) [I50.9]  Yes    Thigh hematoma, left, initial encounter [S70.12XA]  Yes    Stage 3b chronic kidney disease [N18.32]  Yes    Atrial fibrillation [I48.91]  Yes    Essential hypertension [I10]  Yes    Diabetes mellitus [E11.9]  Yes    JOLENE on CPAP [G47.33]  Yes      Resolved Hospital Problems   No resolved problems to display.      -----------------final diagnoses-------------  A/C HFrEF (ef 36-40%)  Valvular heart dz (Moderate-severe Aortic regurgitation & Moderate-severe tricuspid regurgitation)  CAD  Persistent Afib (on eliquis)  Elevated troponin due to decompensated CHF  -s/p iv diuresis, improved overall; back on oral bumex; follow up Dr. Stover 4-6 weeks  Left thigh hematoma, resolving  -stopped plavix indefinitely, started asa 81mg  -initially held eliquis, now restarted (hematoma/bruising stable)  DM2  Ckd 3 (baseline cr ~1.6-1.8)  Gen weakness  -has declined inpatient rehab, opting for home w/ home heatlh  -------------------------------------------------    Hospital Course:  Iain Dee is a 89 y.o. male w/ hx cad (previous stemi, on plavix), HFrEF w/ ef 36-40%), afib (on eliquis), dm2, ckd 3 (baseline cr ~1.6-1.8), hx prostate cancer who presented w/ left thigh pain, edema and some dyspnea. Was noted to have left lateral proximal thigh bruising/hematoma and pain. Imaging negative. Duplex negative for dvt. Was initiated on iv diuretics and plavix, eliquis held. Hemoglobin remained stable. Cards  consulted. Repeat echo showed stable ef 36-40%, did show moderate-severe AI, mod-severe TR. Cardiology ultimately stopped plavix indefinitely in favor of asa & eliquis. Eliquis was restarted on 4/4, and overnight has stable hgb and thigh exam. Overall has improved, hgb stable. Was evaluated by therapy who recommended inpatient rehab, but patient & his wife has pleasantly declined this opting instead to go home w/ home health. Will set up w/ heart & valve clinic for short term follow up, request f/u w/ Dr. Stover (cards) 4-6 weeks & pcp 1 week.      Discharge Follow Up Recommendations for outpatient labs/diagnostics:   Referral to heart & valve clinic 7-10 days  f/u cards Dr. Stover 4-6 weeks  Pcp 1 week      Day of Discharge     HPI:   Still w/ mild dyspnea but overall improved. Left thigh pain stable, no increased edema. Very eager to go home today    Review of Systems  No f/c  No chest pain  Left thigh pain stable    Vital Signs:   Temp:  [97.4 °F (36.3 °C)-98.4 °F (36.9 °C)] 98 °F (36.7 °C)  Heart Rate:  [65-84] 83  Resp:  [16-18] 16  BP: (110-138)/(71-91) 117/78  Flow (L/min) (Oxygen Therapy):  [2] 2      Physical Exam:  Constitutional:Alert, elderly, nontoxic appearing 2Lnc, in place, normal work of breathing  Psych:Normal/appropriate affect  HEENT:NCAT, oropharynx clear  Neck: neck supple, full range of motion  Neuro: Face symmetric, speech clear, equal , moves all extremities  Cardiac:rrr  Resp: ctab  GI: abd soft, nontender to palpation  Skin: No extremity rash  Musculoskeletal/extremities: left lateral thigh bruising stable, not tense    Pertinent  and/or Most Recent Results     LAB RESULTS:      Lab 04/05/25  0412 04/04/25  0312 04/03/25  2156 04/03/25  0843 04/03/25  0001 04/02/25  1459   WBC  --  5.95  --   --   --  5.71   HEMOGLOBIN 9.1* 9.2* 9.3* 9.8* 9.5* 9.8*   HEMATOCRIT 29.6* 29.8* 30.3* 32.2* 30.9* 32.7*   PLATELETS  --  200  --   --   --  213   NEUTROS ABS  --   --   --   --   --  4.67    IMMATURE GRANS (ABS)  --   --   --   --   --  0.03   LYMPHS ABS  --   --   --   --   --  0.43*   MONOS ABS  --   --   --   --   --  0.46   EOS ABS  --   --   --   --   --  0.08   MCV  --  99.7*  --   --   --  100.3*   CRP  --   --   --   --   --  0.50   PROCALCITONIN  --   --   --   --   --  0.18         Lab 04/04/25  0312 04/03/25  0001 04/02/25  1551 04/02/25  1459   SODIUM 143 136  --  138   POTASSIUM 3.9 3.9  --  4.4   CHLORIDE 98 96*  --  98   CO2 33.0* 30.0*  --  26.0   ANION GAP 12.0 10.0  --  14.0   BUN 41* 44*  --  45*   CREATININE 1.86* 1.58*  --  1.80*   EGFR 34.4* 41.8*  --  35.8*   GLUCOSE 94 191*  --  171*   CALCIUM 8.8 8.7  --  8.7   MAGNESIUM 2.0  --   --   --    HEMOGLOBIN A1C  --   --   --  6.40*   TSH  --   --  2.650  --          Lab 04/02/25  1459   TOTAL PROTEIN 5.2*   ALBUMIN 3.4*   GLOBULIN 1.8   ALT (SGPT) 27   AST (SGOT) 32   BILIRUBIN 0.9   ALK PHOS 171*         Lab 04/02/25  1711 04/02/25  1551 04/02/25  1459   PROBNP  --   --  22,485.0*   HSTROP T 94* 102*  --                  Brief Urine Lab Results  (Last result in the past 365 days)        Color   Clarity   Blood   Leuk Est   Nitrite   Protein   CREAT   Urine HCG        04/03/25 1536 Yellow   Clear   Trace   Negative   Negative   100 mg/dL (2+)                 Microbiology Results (last 10 days)       ** No results found for the last 240 hours. **            Duplex Venous Lower Extremity - Bilateral  Result Date: 4/2/2025    Normal bilateral lower extremity venous duplex scan.     XR Femur 2 View Left  Result Date: 4/2/2025  XR FEMUR 2 VW LEFT, XR PELVIS 1 OR 2 VW Date of Exam: 4/2/2025 2:57 PM EDT Indication: leg pain Comparison: None available. Findings: Pelvis: Prostate clips or fiducial markers are noted at the lower central pelvis. There are vascular calcifications. There is ill-defined serpiginous sclerosis of the right and left femoral heads compatible with avascular necrosis. The hip joints are congruent with symmetric mild  osteoarthritic changes. No acute fracture or traumatic malalignment. There is severe degenerative disc disease in the lower lumbar spine. Left femur: There are vascular calcifications. Unremarkable appearance of the thigh soft tissues otherwise. No knee joint effusion. No acute fracture or traumatic malalignment. No focal bony lesion. Only mild degenerative changes at the knee.     Impression: No acute fracture or traumatic malalignment. Ill-defined sclerosis of the right and left femoral heads compatible with avascular necrosis. No loss of femoral head sphericity/subchondral collapse. Electronically Signed: Tim Harris MD  4/2/2025 3:16 PM EDT  Workstation ID: [id number]    XR Pelvis 1 or 2 View  Result Date: 4/2/2025  XR FEMUR 2 VW LEFT, XR PELVIS 1 OR 2 VW Date of Exam: 4/2/2025 2:57 PM EDT Indication: leg pain Comparison: None available. Findings: Pelvis: Prostate clips or fiducial markers are noted at the lower central pelvis. There are vascular calcifications. There is ill-defined serpiginous sclerosis of the right and left femoral heads compatible with avascular necrosis. The hip joints are congruent with symmetric mild osteoarthritic changes. No acute fracture or traumatic malalignment. There is severe degenerative disc disease in the lower lumbar spine. Left femur: There are vascular calcifications. Unremarkable appearance of the thigh soft tissues otherwise. No knee joint effusion. No acute fracture or traumatic malalignment. No focal bony lesion. Only mild degenerative changes at the knee.     Impression: No acute fracture or traumatic malalignment. Ill-defined sclerosis of the right and left femoral heads compatible with avascular necrosis. No loss of femoral head sphericity/subchondral collapse. Electronically Signed: Tim Harris MD  4/2/2025 3:16 PM EDT  Workstation ID: [id number]    XR Chest 1 View  Result Date: 4/2/2025  XR CHEST 1 VW Date of Exam: 4/2/2025 2:57 PM EDT Indication: sob Comparison:  8/6/2024 Findings: There is incomplete inspiration. Heart size is borderline. There is strandy airspace disease in the left lung base. There is atelectasis in the right infrahilar region     Impression: Left basilar atelectasis or pneumonia Electronically Signed: Keaton Stephens  4/2/2025 3:08 PM EDT  Workstation ID: [id number]      Results for orders placed during the hospital encounter of 04/02/25    Duplex Venous Lower Extremity - Bilateral    Interpretation Summary    Normal bilateral lower extremity venous duplex scan.      Results for orders placed during the hospital encounter of 04/02/25    Duplex Venous Lower Extremity - Bilateral    Interpretation Summary    Normal bilateral lower extremity venous duplex scan.      Results for orders placed during the hospital encounter of 04/02/25    Adult Transthoracic Echo Complete W/ Cont if Necessary Per Protocol    Interpretation Summary    Left ventricular ejection fraction appears to be 36 - 40%.    Left ventricular wall thickness is consistent with mild concentric hypertrophy.    Left ventricular diastolic function is consistent with (grade II w/high LAP) pseudonormalization.    The right ventricular cavity is moderate to severely dilated.    The left atrial cavity is moderate to severely dilated.    Left atrial volume is severely increased.    The right atrial cavity is severely  dilated.    There is mild calcification of the aortic valve.    Moderate to severe aortic valve regurgitation is present.    Moderate to severe tricuspid valve regurgitation is present.    Estimated right ventricular systolic pressure from tricuspid regurgitation is markedly elevated (>55 mmHg).      Plan for Follow-up of Pending Labs/Results:     Discharge Details        Discharge Medications        New Medications        Instructions Start Date   aspirin 81 MG EC tablet   81 mg, Oral, Daily   Start Date: April 6, 2025            Continue These Medications        Instructions Start Date    apixaban 2.5 MG tablet tablet  Commonly known as: ELIQUIS   2.5 mg, Oral, 2 Times Daily      atorvastatin 40 MG tablet  Commonly known as: LIPITOR   40 mg, Oral, Nightly      bumetanide 2 MG tablet  Commonly known as: BUMEX   2 mg, Oral, Daily      carvedilol 3.125 MG tablet  Commonly known as: COREG   3.125 mg, Oral, 2 Times Daily With Meals      famotidine 20 MG tablet  Commonly known as: PEPCID   20 mg, Oral, 2 Times Daily PRN      Jardiance 10 MG tablet tablet  Generic drug: empagliflozin   10 mg, Oral, Daily      magnesium oxide 400 MG tablet  Commonly known as: MAG-OX   400 mg, Oral, Daily      metFORMIN 500 MG tablet  Commonly known as: GLUCOPHAGE   500 mg, Oral, 2 Times Daily With Meals      nitroglycerin 0.2 MG/HR patch  Commonly known as: NITRODUR   1 patch, Transdermal, See Admin Instructions, Apply patch daily, remove at night for at least 10 hours             Stop These Medications      clopidogrel 75 MG tablet  Commonly known as: PLAVIX              Allergies   Allergen Reactions    Colchicine Other (See Comments)     Unable to walk          Discharge Disposition:  Home-Health Care [facility]    Diet:  Hospital:  Diet Order   Procedures    Diet: Regular/House, Cardiac; Healthy Heart (2-3 Na+); Fluid Consistency: Thin (IDDSI 0)            Activity:         CODE STATUS:    Code Status and Medical Interventions: CPR (Attempt to Resuscitate); Full Support   Ordered at: 04/02/25 175     Code Status (Patient has no pulse and is not breathing):    CPR (Attempt to Resuscitate)     Medical Interventions (Patient has pulse or is breathing):    Full Support       Future Appointments   Date Time Provider Department Center   6/3/2025  9:30 AM Luis Aguilar DO [facility]   12/18/2025  9:45 AM Isabel Stover MD [facility]       Additional Instructions for the Follow-ups that You Need to Schedule       Ambulatory Referral to Home Health   As directed      Face to Face Visit Date: 4/4/2025   Follow-up  provider for Plan of Care?: I treated the patient in an acute care facility and will not continue treatment after discharge.   Follow-up provider: TAMICA NAVARRETE [[id number]]   Reason/Clinical Findings: CHF, afib, DM, Thigh hematoma, left, initial encounte   Describe mobility limitations that make leaving home difficult: CHF, afib, DM, Thigh hematoma, left, initial encounte   Nursing/Therapeutic Services Requested: Skilled Nursing Physical Therapy Occupational Therapy   Skilled nursing orders: Cardiopulmonary assessments (Compression wraps legs Q2-3 days) Neurovascular assessments Other   PT orders: Home safety assessment Strengthening Therapeutic exercise   Occupational orders: Activities of daily living Strengthening Energy conservation Home safety assessment   Frequency: 1 Week 1        Discharge Follow-up with PCP   As directed       Currently Documented PCP:    Tamica Navarrete DO    PCP Phone Number:    [phone]     Follow Up Details: 1 week        Discharge Follow-up with Specialty: [facility] (Dr. Stover) 4-6 weeks   As directed      Specialty: [facility] (Dr. Stover) 4-6 weeks                      Coy Herrera MD  04/05/25      Time Spent on Discharge:  I spent  35  minutes on this discharge activity which included: face-to-face encounter with the patient, reviewing the data in the system, coordination of the care with the nursing staff as well as consultants, documentation, and entering orders.

## 2025-04-05 NOTE — PLAN OF CARE
Goal Outcome Evaluation:              Outcome Evaluation: pt with discharge orders, family to take pt home

## 2025-04-05 NOTE — OUTREACH NOTE
Prep Survey      Flowsheet Row Responses   [facility] patient discharged from? [place]   Is LACE score < 7 ? No   Eligibility [facility]   Date of Admission 04/02/25   Date of Discharge 04/05/25   Discharge Disposition Home-Health Care Sv   Discharge diagnosis CHF (congestive heart failure)   Does the patient have one of the following disease processes/diagnoses(primary or secondary)? CHF   Does the patient have Home health ordered? Yes   What is the Home health agency?  [facility]   Is there a DME ordered? No   Comments regarding appointments Ambulatory Referral to [facility] [place] [facility] [place] [facility] [place],   Discharge Follow-up with Specialty: [facility] cardioogy (Dr. Stover) 4-6 weeks   Medication alerts for this patient see AVS   Prep survey completed? Yes            Susu HUERTA - Registered Nurse              Susu HUERTA - Registered Nurse

## 2025-04-05 NOTE — PLAN OF CARE
Goal Outcome Evaluation:         Patient intermittently confused through the night and somewhat restless. Patient desatted some the last half of the shift so 2L nasal cannula was applied for sleep. Patient and wife hopeful he can return home today. Aside from the oxygen, VSS. No needs or issues at this time.

## 2025-04-07 ENCOUNTER — TRANSITIONAL CARE MANAGEMENT TELEPHONE ENCOUNTER (OUTPATIENT)
Dept: CALL CENTER | Facility: HOSPITAL | Age: 89
End: 2025-04-07
Payer: MEDICARE

## 2025-04-07 NOTE — OUTREACH NOTE
Call Center TCM Note      Flowsheet Row Responses   [facility] patient discharged from? [place]   Does the patient have one of the following disease processes/diagnoses(primary or secondary)? CHF   TCM attempt successful? Yes   Call start time 1021   Call end time 1034   Discharge diagnosis CHF (congestive heart failure)   Is patient permission given to speak with other caregiver? Yes   List who call center can speak with spouse- Maya   Person spoke with today (if not patient) and relationship spouse- Maya   Meds reviewed with patient/caregiver? Yes   Is the patient having any side effects they believe may be caused by any medication additions or changes? No   Does the patient have all medications ordered at discharge? Yes   Is the patient taking all medications as directed (includes completed medication regime)? Yes   Comments spouse did not wish to follow up with PCP at this time   Does the patient have an appointment with their PCP within 7-14 days of discharge? No   Nursing Interventions Patient declined scheduling/rescheduling appointment at this time, Patient desires to follow up with specialty only   What is the Home health agency?  [facility]   Has home health visited the patient within 72 hours of discharge? Call prior to 72 hours   DME comments using a CPAP only at night   Pulse Ox monitoring None  [Spouse states she will look into purchasing a pulxe ox]   Psychosocial issues? No   Did the patient receive a copy of their discharge instructions? Yes   Nursing interventions Reviewed instructions with patient   What is the patient's perception of their health status since discharge? Improving   Nursing interventions Nurse provided patient education   Is the patient able to teach back signs and symptoms of worsening condition? (i.e. weight gain, shortness of air, etc.) Yes   If the patient is a current smoker, are they able to teach back resources for cessation? Not a smoker   Is the patient/caregiver  able to teach back the hierarchy of who to call/visit for symptoms/problems? PCP, Specialist, Home health nurse, Urgent Care, ED, 911 Yes   Additional teach back comments spouse states she forgot to weigh the patient today but will definitely start doing that daily   CHF Zone this Call Green Zone   Green Zone Patient reports doing well, No new or worsening shortness of breath, Physical activity level is normal for you, No new swelling -  feet, ankles and legs look normal for you, No chest pain   Green Zone Interventions Daily weight check, Meds as directed, Low sodium diet, Follow up visits planned   TCM call completed? Yes   Wrap up additional comments Per spouse, patient is doing well, all questions and concerns addressed, discussed importance of follow up appt at Heart and Valve clinic, spouse voiced understanding, declined to schedule with PCP at this time.   Call end time 1034   Would this patient benefit from a Referral to Amb Social Work? No   Is the patient interested in additional calls from an ambulatory ? No             Kira REINA - Registered Nurse    4/7/2025, 10:34 EDT

## 2025-04-09 ENCOUNTER — TELEPHONE (OUTPATIENT)
Dept: FAMILY MEDICINE CLINIC | Facility: CLINIC | Age: 89
End: 2025-04-09
Payer: MEDICARE

## 2025-04-09 NOTE — TELEPHONE ENCOUNTER
Anni Torres ( Nurse), she called to get an approval for nursing 2x a week for 9 weeks, a UNAA Boot Bilaterally- 2x a week. She also wanted to make sure that Dr. Aguilar will follow up and through with the patient for HH. Melissa berg sated that the patient has wounds on his left lower extremity and on the second toe of his left foot.     Callback Number: [phone]

## 2025-04-10 ENCOUNTER — TELEMEDICINE (OUTPATIENT)
Dept: FAMILY MEDICINE CLINIC | Facility: CLINIC | Age: 89
End: 2025-04-10
Payer: MEDICARE

## 2025-04-10 DIAGNOSIS — S70.12XA THIGH HEMATOMA, LEFT, INITIAL ENCOUNTER: Primary | ICD-10-CM

## 2025-04-10 NOTE — PROGRESS NOTES
[facility] Follow-Up/Transition of Care Visit     Patient Name: Iain Dee  : 1935   MRN: [id number]   Care Team: Patient Care Team:  Luis Aguilar DO as PCP - General (Family Medicine)  Iain Bernstein MD as Consulting Physician (Cardiology)  Isabel Stover MD as Cardiologist (Cardiology)    Chief Complaint:  No chief complaint on file.      History of Present Illness: Iain Dee is a 89 y.o. male who presents today for TCM visit.    Within 48 business hours after discharge our office contacted him via telephone to coordinate his care and needs.      I reviewed and discussed the details of that call along with the discharge summary, hospital problems, inpatient lab results, inpatient diagnostic studies, and consultation reports with Iain.      Current outpatient and discharge medications have been reconciled for the patient.  Reviewed by: Luis Aguilar DO          12/10/2021     8:37 PM 3/27/2023     6:42 AM 2025     4:04 PM   Date of TCM Phone Call   [facility]   Date of Admission 2021   Date of Discharge 12/10/2021 3/24/2023 2025   Discharge Disposition Home or Self Care Home or Self Care Home-Health Care [facility]     Risk for Readmission (LACE) Score: 12 (2025  6:00 AM)      History of Present Illness   Course During Hospital Stay:    Iain Dee is a 89 y.o. male w/ hx cad (previous stemi, on plavix), HFrEF w/ ef 36-40%), afib (on eliquis), dm2, ckd 3 (baseline cr ~1.6-1.8), hx prostate cancer who presented w/ left thigh pain, edema and some dyspnea. Was noted to have left lateral proximal thigh bruising/hematoma and pain. Imaging negative. Duplex negative for dvt. Was initiated on iv diuretics and plavix, eliquis held. Hemoglobin remained stable. Cards consulted. Repeat echo showed stable ef 36-40%, did show moderate-severe AI, mod-severe TR. Cardiology ultimately stopped plavix  indefinitely in favor of asa & eliquis. Eliquis was restarted on 4/4, and overnight has stable hgb and thigh exam. Overall has improved, hgb stable. Was evaluated by therapy who recommended inpatient rehab, but patient & his wife has pleasantly declined this opting instead to go home w/ home health. Will set up w/ heart & valve clinic for short term follow up, request f/u w/ Dr. Stover (cards) 4-6 weeks & pcp 1 week.        Discharge Follow Up Recommendations for outpatient labs/diagnostics:   Referral to heart & valve clinic 7-10 days  f/u cards Dr. Stover 4-6 weeks  Pcp 1 week    Status Since Discharge:  Left leg is more painful since coming home. Pain is worst around the knee. Denies much pain around the hip (x-ray of hip suggested possible avascular necrosis).    Taking aspirin and eliquis     The following portions of the patient's history were reviewed and updated as appropriate: allergies, current medications, past family history, past medical history, past social history, past surgical history, and problem list.    You have chosen to receive care through a telehealth visit.  Do you consent to use a video/audio connection for your medical care today? Yes    The following portions of the patient's history were reviewed and updated as appropriate: allergies, current medications, past family history, past medical history, past social history, past surgical history and problem list.    Subjective      Review of Systems:   Review of Systems - See HPI    Past Medical History:   Past Medical History:   Diagnosis Date    A-fib     Arthritis     Cancer     Diabetes mellitus     GERD (gastroesophageal reflux disease)     Gout     H/O Bladder carcinoma (HCC)     Hypertension        Past Surgical History:   Past Surgical History:   Procedure Laterality Date    BLADDER SURGERY      Biopsy    HERNIA REPAIR      PROSTATE BIOPSY         Family History:   Family History   Problem Relation Age of Onset    No Known Problems  "Mother     Heart attack Father        Social History:   Social History     Socioeconomic History    Marital status:    Tobacco Use    Smoking status: Never     Passive exposure: Never    Smokeless tobacco: Never   Vaping Use    Vaping status: Never Used   Substance and Sexual Activity    Alcohol use: Yes     Alcohol/week: 1.0 standard drink of alcohol     Types: 1 Shots of liquor per week     Comment: Estimation based on spouse report of patient drinking \"1 large martini\" each night (regular martini = 3 shots, large martini = est. 4 shots)    Drug use: No    Sexual activity: Defer       Tobacco History:   Social History     Tobacco Use   Smoking Status Never    Passive exposure: Never   Smokeless Tobacco Never       Medications:     Current Outpatient Medications:     apixaban (ELIQUIS) 2.5 MG tablet tablet, Take 1 tablet by mouth 2 (Two) Times a Day. Indications: Atrial Fibrillation, Disp: 60 tablet, Rfl: 11    aspirin 81 MG EC tablet, Take 1 tablet by mouth Daily., Disp: 30 tablet, Rfl: 0    atorvastatin (LIPITOR) 40 MG tablet, Take 1 tablet by mouth Every Night., Disp: 90 tablet, Rfl: 3    bumetanide (BUMEX) 2 MG tablet, TAKE 1 TABLET BY MOUTH DAILY, Disp: 90 tablet, Rfl: 0    carvedilol (COREG) 3.125 MG tablet, Take 1 tablet by mouth 2 (Two) Times a Day With Meals., Disp: 180 tablet, Rfl: 3    famotidine (PEPCID) 20 MG tablet, Take 1 tablet by mouth 2 (Two) Times a Day As Needed for Heartburn., Disp: 180 tablet, Rfl: 0    Jardiance 10 MG tablet tablet, Take 1 tablet by mouth Daily., Disp: 90 tablet, Rfl: 11    magnesium oxide (MAG-OX) 400 MG tablet, Take 1 tablet by mouth Daily., Disp: 30 tablet, Rfl: 11    metFORMIN (GLUCOPHAGE) 500 MG tablet, Take 1 tablet by mouth 2 (Two) Times a Day With Meals., Disp: 180 tablet, Rfl: 0    nitroglycerin (NITRODUR) 0.2 MG/HR patch, Place 1 patch on the skin as directed by provider See Admin Instructions. Apply patch daily, remove at night for at least 10 hours, Disp: " 90 patch, Rfl: 3    Allergies:   Allergies   Allergen Reactions    Colchicine Other (See Comments)     Unable to walk        Objective   Objective     Physical Exam:  Vital Signs: There were no vitals filed for this visit.  There is no height or weight on file to calculate BMI.     Physical Exam  Nursing note reviewed  Const: In chair at home  Wounds on left leg visualized, no purulent drainage  Large hematoma medial left thigh  Procedures/Radiology     Procedures  Adult Transthoracic Echo Complete W/ Cont if Necessary Per Protocol  Addendum Date: 4/4/2025    Left ventricular ejection fraction appears to be 36 - 40%.   Left ventricular wall thickness is consistent with mild concentric hypertrophy.   Left ventricular diastolic function is consistent with (grade II w/high LAP) pseudonormalization.   The right ventricular cavity is moderate to severely dilated.   The left atrial cavity is moderate to severely dilated.   Left atrial volume is severely increased.   The right atrial cavity is severely  dilated.   There is mild calcification of the aortic valve.   Moderate to severe aortic valve regurgitation is present.   Moderate to severe tricuspid valve regurgitation is present.   Estimated right ventricular systolic pressure from tricuspid regurgitation is markedly elevated (>55 mmHg).     XR Femur 2 View Left  Result Date: 4/2/2025  Impression: No acute fracture or traumatic malalignment. Ill-defined sclerosis of the right and left femoral heads compatible with avascular necrosis. No loss of femoral head sphericity/subchondral collapse. Electronically Signed: Tim Harris MD  4/2/2025 3:16 PM EDT  Workstation ID: [id number]    XR Pelvis 1 or 2 View  Result Date: 4/2/2025  Impression: No acute fracture or traumatic malalignment. Ill-defined sclerosis of the right and left femoral heads compatible with avascular necrosis. No loss of femoral head sphericity/subchondral collapse. Electronically Signed: Tim Harris  MD  4/2/2025 3:16 PM EDT  Workstation ID: [id number]    XR Chest 1 View  Result Date: 4/2/2025  Impression: Left basilar atelectasis or pneumonia Electronically Signed: Keaton Stephens  4/2/2025 3:08 PM EDT  Workstation ID: [id number]       Assessment & Plan   Assessment / Plan      Assessment/Plan:   Problems Addressed This Visit  Diagnoses and all orders for this visit:    1. Thigh hematoma, left, initial encounter (Primary)      Problem List Items Addressed This Visit          Other    Thigh hematoma, left, initial encounter - Primary     Unna wraps twice weekly, will likely need at least 2 weeks of these given his diabetes and advanced age    PT/OT to evaluate. Would benefit from stand-assist rails at home    Follow up with cardiology as scheduled    This visit was conducted via telemedicine with live video and audio provided through Video Options: MyChart/Zoom at the point of care.     See patient diagnoses and orders along with patient instructions for assessment, plan, and changes to care for patient.    Patient Instructions   Search for Mobility Stand Assist rail for couch or bed    Follow Up:   Return if symptoms worsen or fail to improve.    MDM       [facility] [address]  [facility]  [address]  [facility] [address]  Fax [phone]  Phone [phone]

## 2025-04-11 ENCOUNTER — HOSPITAL ENCOUNTER (INPATIENT)
Facility: HOSPITAL | Age: 89
LOS: 6 days | Discharge: NURSING, REHAB OR LONG-TERM CARE | DRG: 291 | End: 2025-04-19
Attending: EMERGENCY MEDICINE | Admitting: INTERNAL MEDICINE
Payer: MEDICARE

## 2025-04-11 ENCOUNTER — APPOINTMENT (OUTPATIENT)
Dept: GENERAL RADIOLOGY | Facility: HOSPITAL | Age: 89
DRG: 291 | End: 2025-04-11
Payer: MEDICARE

## 2025-04-11 DIAGNOSIS — R60.1 ANASARCA: ICD-10-CM

## 2025-04-11 DIAGNOSIS — J81.0 ACUTE PULMONARY EDEMA: ICD-10-CM

## 2025-04-11 DIAGNOSIS — Z86.39 HISTORY OF DIABETES MELLITUS: ICD-10-CM

## 2025-04-11 DIAGNOSIS — R62.7 FAILURE TO THRIVE IN ADULT: ICD-10-CM

## 2025-04-11 DIAGNOSIS — I50.9 ACUTE ON CHRONIC CONGESTIVE HEART FAILURE, UNSPECIFIED HEART FAILURE TYPE: Primary | ICD-10-CM

## 2025-04-11 LAB
ALBUMIN SERPL-MCNC: 3.4 G/DL (ref 3.5–5.2)
ALBUMIN/GLOB SERPL: 1.4 G/DL
ALP SERPL-CCNC: 161 U/L (ref 39–117)
ALT SERPL W P-5'-P-CCNC: 21 U/L (ref 1–41)
ANION GAP SERPL CALCULATED.3IONS-SCNC: 11 MMOL/L (ref 5–15)
AST SERPL-CCNC: 28 U/L (ref 1–40)
BASOPHILS # BLD AUTO: 0.05 10*3/MM3 (ref 0–0.2)
BASOPHILS NFR BLD AUTO: 0.9 % (ref 0–1.5)
BILIRUB CONJ SERPL-MCNC: 0.7 MG/DL (ref 0–0.3)
BILIRUB SERPL-MCNC: 1.5 MG/DL (ref 0–1.2)
BUN SERPL-MCNC: 47 MG/DL (ref 8–23)
BUN/CREAT SERPL: 25.5 (ref 7–25)
CALCIUM SPEC-SCNC: 8.7 MG/DL (ref 8.6–10.5)
CHLORIDE SERPL-SCNC: 95 MMOL/L (ref 98–107)
CO2 SERPL-SCNC: 31 MMOL/L (ref 22–29)
CREAT SERPL-MCNC: 1.84 MG/DL (ref 0.76–1.27)
DEPRECATED RDW RBC AUTO: 62.7 FL (ref 37–54)
EGFRCR SERPLBLD CKD-EPI 2021: 34.8 ML/MIN/1.73
EOSINOPHIL # BLD AUTO: 0.07 10*3/MM3 (ref 0–0.4)
EOSINOPHIL NFR BLD AUTO: 1.3 % (ref 0.3–6.2)
ERYTHROCYTE [DISTWIDTH] IN BLOOD BY AUTOMATED COUNT: 17.3 % (ref 12.3–15.4)
GEN 5 1HR TROPONIN T REFLEX: 123 NG/L
GLOBULIN UR ELPH-MCNC: 2.4 GM/DL
GLUCOSE BLDC GLUCOMTR-MCNC: 209 MG/DL (ref 70–130)
GLUCOSE SERPL-MCNC: 151 MG/DL (ref 65–99)
HCT VFR BLD AUTO: 31.8 % (ref 37.5–51)
HGB BLD-MCNC: 9.7 G/DL (ref 13–17.7)
IMM GRANULOCYTES # BLD AUTO: 0.01 10*3/MM3 (ref 0–0.05)
IMM GRANULOCYTES NFR BLD AUTO: 0.2 % (ref 0–0.5)
LYMPHOCYTES # BLD AUTO: 0.4 10*3/MM3 (ref 0.7–3.1)
LYMPHOCYTES NFR BLD AUTO: 7.5 % (ref 19.6–45.3)
MCH RBC QN AUTO: 30.4 PG (ref 26.6–33)
MCHC RBC AUTO-ENTMCNC: 30.5 G/DL (ref 31.5–35.7)
MCV RBC AUTO: 99.7 FL (ref 79–97)
MONOCYTES # BLD AUTO: 0.53 10*3/MM3 (ref 0.1–0.9)
MONOCYTES NFR BLD AUTO: 9.9 % (ref 5–12)
NEUTROPHILS NFR BLD AUTO: 4.3 10*3/MM3 (ref 1.7–7)
NEUTROPHILS NFR BLD AUTO: 80.2 % (ref 42.7–76)
NRBC BLD AUTO-RTO: 0 /100 WBC (ref 0–0.2)
NT-PROBNP SERPL-MCNC: ABNORMAL PG/ML (ref 0–1800)
PLATELET # BLD AUTO: 233 10*3/MM3 (ref 140–450)
PMV BLD AUTO: 10.3 FL (ref 6–12)
POTASSIUM SERPL-SCNC: 4.3 MMOL/L (ref 3.5–5.2)
PROT SERPL-MCNC: 5.8 G/DL (ref 6–8.5)
QT INTERVAL: 426 MS
QTC INTERVAL: 435 MS
RBC # BLD AUTO: 3.19 10*6/MM3 (ref 4.14–5.8)
SODIUM SERPL-SCNC: 137 MMOL/L (ref 136–145)
TROPONIN T % DELTA: -10
TROPONIN T NUMERIC DELTA: -13 NG/L
TROPONIN T SERPL HS-MCNC: 136 NG/L
WBC NRBC COR # BLD AUTO: 5.36 10*3/MM3 (ref 3.4–10.8)

## 2025-04-11 PROCEDURE — 93005 ELECTROCARDIOGRAM TRACING: CPT | Performed by: EMERGENCY MEDICINE

## 2025-04-11 PROCEDURE — 94660 CPAP INITIATION&MGMT: CPT

## 2025-04-11 PROCEDURE — 82948 REAGENT STRIP/BLOOD GLUCOSE: CPT

## 2025-04-11 PROCEDURE — 84484 ASSAY OF TROPONIN QUANT: CPT | Performed by: EMERGENCY MEDICINE

## 2025-04-11 PROCEDURE — 80053 COMPREHEN METABOLIC PANEL: CPT | Performed by: EMERGENCY MEDICINE

## 2025-04-11 PROCEDURE — 82248 BILIRUBIN DIRECT: CPT | Performed by: NURSE PRACTITIONER

## 2025-04-11 PROCEDURE — G0378 HOSPITAL OBSERVATION PER HR: HCPCS

## 2025-04-11 PROCEDURE — 71045 X-RAY EXAM CHEST 1 VIEW: CPT

## 2025-04-11 PROCEDURE — 36415 COLL VENOUS BLD VENIPUNCTURE: CPT

## 2025-04-11 PROCEDURE — 99223 1ST HOSP IP/OBS HIGH 75: CPT | Performed by: NURSE PRACTITIONER

## 2025-04-11 PROCEDURE — 83880 ASSAY OF NATRIURETIC PEPTIDE: CPT | Performed by: EMERGENCY MEDICINE

## 2025-04-11 PROCEDURE — 25010000002 BUMETANIDE PER 0.5 MG: Performed by: NURSE PRACTITIONER

## 2025-04-11 PROCEDURE — 99285 EMERGENCY DEPT VISIT HI MDM: CPT

## 2025-04-11 PROCEDURE — 94799 UNLISTED PULMONARY SVC/PX: CPT

## 2025-04-11 PROCEDURE — 85025 COMPLETE CBC W/AUTO DIFF WBC: CPT | Performed by: EMERGENCY MEDICINE

## 2025-04-11 PROCEDURE — 63710000001 INSULIN LISPRO (HUMAN) PER 5 UNITS: Performed by: NURSE PRACTITIONER

## 2025-04-11 PROCEDURE — 25010000002 BUMETANIDE PER 0.5 MG: Performed by: EMERGENCY MEDICINE

## 2025-04-11 RX ORDER — DEXTROSE MONOHYDRATE 25 G/50ML
25 INJECTION, SOLUTION INTRAVENOUS
Status: DISCONTINUED | OUTPATIENT
Start: 2025-04-11 | End: 2025-04-19 | Stop reason: HOSPADM

## 2025-04-11 RX ORDER — NITROGLYCERIN 0.4 MG/1
0.4 TABLET SUBLINGUAL
Status: DISCONTINUED | OUTPATIENT
Start: 2025-04-11 | End: 2025-04-19 | Stop reason: HOSPADM

## 2025-04-11 RX ORDER — CARVEDILOL 3.12 MG/1
3.12 TABLET ORAL 2 TIMES DAILY WITH MEALS
Status: DISCONTINUED | OUTPATIENT
Start: 2025-04-11 | End: 2025-04-18

## 2025-04-11 RX ORDER — ACETAMINOPHEN 325 MG/1
650 TABLET ORAL EVERY 4 HOURS PRN
Status: DISCONTINUED | OUTPATIENT
Start: 2025-04-11 | End: 2025-04-19 | Stop reason: HOSPADM

## 2025-04-11 RX ORDER — ATORVASTATIN CALCIUM 40 MG/1
40 TABLET, FILM COATED ORAL NIGHTLY
Status: DISCONTINUED | OUTPATIENT
Start: 2025-04-11 | End: 2025-04-19 | Stop reason: HOSPADM

## 2025-04-11 RX ORDER — ASPIRIN 81 MG/1
81 TABLET ORAL DAILY
Status: DISCONTINUED | OUTPATIENT
Start: 2025-04-12 | End: 2025-04-19 | Stop reason: HOSPADM

## 2025-04-11 RX ORDER — BISACODYL 10 MG
10 SUPPOSITORY, RECTAL RECTAL DAILY PRN
Status: DISCONTINUED | OUTPATIENT
Start: 2025-04-11 | End: 2025-04-19 | Stop reason: HOSPADM

## 2025-04-11 RX ORDER — SODIUM CHLORIDE 0.9 % (FLUSH) 0.9 %
10 SYRINGE (ML) INJECTION AS NEEDED
Status: DISCONTINUED | OUTPATIENT
Start: 2025-04-11 | End: 2025-04-19 | Stop reason: HOSPADM

## 2025-04-11 RX ORDER — BISACODYL 5 MG/1
5 TABLET, DELAYED RELEASE ORAL DAILY PRN
Status: DISCONTINUED | OUTPATIENT
Start: 2025-04-11 | End: 2025-04-19 | Stop reason: HOSPADM

## 2025-04-11 RX ORDER — POLYETHYLENE GLYCOL 3350 17 G/17G
17 POWDER, FOR SOLUTION ORAL DAILY PRN
Status: DISCONTINUED | OUTPATIENT
Start: 2025-04-11 | End: 2025-04-19 | Stop reason: HOSPADM

## 2025-04-11 RX ORDER — ACETAMINOPHEN 650 MG/1
650 SUPPOSITORY RECTAL EVERY 4 HOURS PRN
Status: DISCONTINUED | OUTPATIENT
Start: 2025-04-11 | End: 2025-04-19 | Stop reason: HOSPADM

## 2025-04-11 RX ORDER — BUMETANIDE 0.25 MG/ML
1 INJECTION, SOLUTION INTRAMUSCULAR; INTRAVENOUS EVERY 12 HOURS
Status: DISCONTINUED | OUTPATIENT
Start: 2025-04-11 | End: 2025-04-12

## 2025-04-11 RX ORDER — INSULIN LISPRO 100 [IU]/ML
2-7 INJECTION, SOLUTION INTRAVENOUS; SUBCUTANEOUS
Status: DISCONTINUED | OUTPATIENT
Start: 2025-04-11 | End: 2025-04-19 | Stop reason: HOSPADM

## 2025-04-11 RX ORDER — BUMETANIDE 0.25 MG/ML
2 INJECTION, SOLUTION INTRAMUSCULAR; INTRAVENOUS ONCE
Status: COMPLETED | OUTPATIENT
Start: 2025-04-11 | End: 2025-04-11

## 2025-04-11 RX ORDER — PANTOPRAZOLE SODIUM 40 MG/1
40 TABLET, DELAYED RELEASE ORAL
Status: DISCONTINUED | OUTPATIENT
Start: 2025-04-12 | End: 2025-04-19 | Stop reason: HOSPADM

## 2025-04-11 RX ORDER — ACETAMINOPHEN 160 MG/5ML
650 SOLUTION ORAL EVERY 4 HOURS PRN
Status: DISCONTINUED | OUTPATIENT
Start: 2025-04-11 | End: 2025-04-19 | Stop reason: HOSPADM

## 2025-04-11 RX ORDER — SODIUM CHLORIDE 9 MG/ML
40 INJECTION, SOLUTION INTRAVENOUS AS NEEDED
Status: DISCONTINUED | OUTPATIENT
Start: 2025-04-11 | End: 2025-04-19 | Stop reason: HOSPADM

## 2025-04-11 RX ORDER — BUMETANIDE 1 MG/1
2 TABLET ORAL DAILY
Status: DISCONTINUED | OUTPATIENT
Start: 2025-04-11 | End: 2025-04-12

## 2025-04-11 RX ORDER — SODIUM CHLORIDE 0.9 % (FLUSH) 0.9 %
10 SYRINGE (ML) INJECTION EVERY 12 HOURS SCHEDULED
Status: DISCONTINUED | OUTPATIENT
Start: 2025-04-11 | End: 2025-04-11

## 2025-04-11 RX ORDER — AMOXICILLIN 250 MG
2 CAPSULE ORAL 2 TIMES DAILY PRN
Status: DISCONTINUED | OUTPATIENT
Start: 2025-04-11 | End: 2025-04-19 | Stop reason: HOSPADM

## 2025-04-11 RX ORDER — NICOTINE POLACRILEX 4 MG
15 LOZENGE BUCCAL
Status: DISCONTINUED | OUTPATIENT
Start: 2025-04-11 | End: 2025-04-19 | Stop reason: HOSPADM

## 2025-04-11 RX ORDER — IBUPROFEN 600 MG/1
1 TABLET ORAL
Status: DISCONTINUED | OUTPATIENT
Start: 2025-04-11 | End: 2025-04-19 | Stop reason: HOSPADM

## 2025-04-11 RX ADMIN — APIXABAN 2.5 MG: 2.5 TABLET, FILM COATED ORAL at 22:04

## 2025-04-11 RX ADMIN — Medication 10 ML: at 22:04

## 2025-04-11 RX ADMIN — ATORVASTATIN CALCIUM 40 MG: 40 TABLET, FILM COATED ORAL at 22:04

## 2025-04-11 RX ADMIN — BUMETANIDE 2 MG: 0.25 INJECTION INTRAMUSCULAR; INTRAVENOUS at 15:34

## 2025-04-11 RX ADMIN — INSULIN LISPRO 3 UNITS: 100 INJECTION, SOLUTION INTRAVENOUS; SUBCUTANEOUS at 22:25

## 2025-04-11 RX ADMIN — BUMETANIDE 1 MG: 0.25 INJECTION INTRAMUSCULAR; INTRAVENOUS at 22:04

## 2025-04-11 RX ADMIN — CARVEDILOL 3.12 MG: 3.12 TABLET, FILM COATED ORAL at 22:04

## 2025-04-11 NOTE — ED PROVIDER NOTES
[place]    EMERGENCY DEPARTMENT ENCOUNTER      Pt Name: Iain Dee  MRN: [id number]  YOB: 1935  Date of evaluation: 4/11/2025  Provider: Javon Schwartz MD    CHIEF COMPLAINT       Chief Complaint   Patient presents with    Leg Swelling         HISTORY OF PRESENT ILLNESS   Iain Dee is a 89 y.o. male who presents to the emergency department with complaint of worsening dyspnea and bilateral leg edema over the course the past several days.  Patient has history of congestive heart failure and was recently admitted for exacerbation of the same.  Was recommended to go to rehab following admission, however declined in favor of going home but has continued to worsen and family cannot take care of him.  He is normally able to ambulate on a walker/rollator, however has been unable to ambulate for the past day.  He denies any chest pain, cough, fever, chills.  Also has a left lower extremity hematoma that was previously evaluated and he had an ultrasound of the leg that was negative for DVT.      Nursing notes were reviewed.    REVIEW OF SYSTEMS     ROS:  A chief complaint appropriate review of systems was completed and is negative except as noted in the HPI.      PAST MEDICAL HISTORY     Past Medical History:   Diagnosis Date    A-fib     Arthritis     Cancer     Diabetes mellitus     GERD (gastroesophageal reflux disease)     Gout     H/O Bladder carcinoma (HCC)     Hypertension          SURGICAL HISTORY       Past Surgical History:   Procedure Laterality Date    BLADDER SURGERY      Biopsy    HERNIA REPAIR      PROSTATE BIOPSY           CURRENT MEDICATIONS     No current facility-administered medications for this encounter.    Current Outpatient Medications:     apixaban (ELIQUIS) 2.5 MG tablet tablet, Take 1 tablet by mouth 2 (Two) Times a Day. Indications: Atrial Fibrillation, Disp: 60 tablet, Rfl: 11    aspirin 81 MG EC tablet, Take 1 tablet by mouth Daily., Disp: 30 tablet, Rfl: 0     "atorvastatin (LIPITOR) 40 MG tablet, Take 1 tablet by mouth Every Night., Disp: 90 tablet, Rfl: 3    bumetanide (BUMEX) 2 MG tablet, TAKE 1 TABLET BY MOUTH DAILY, Disp: 90 tablet, Rfl: 0    carvedilol (COREG) 3.125 MG tablet, Take 1 tablet by mouth 2 (Two) Times a Day With Meals., Disp: 180 tablet, Rfl: 3    famotidine (PEPCID) 20 MG tablet, Take 1 tablet by mouth 2 (Two) Times a Day As Needed for Heartburn., Disp: 180 tablet, Rfl: 0    Jardiance 10 MG tablet tablet, Take 1 tablet by mouth Daily., Disp: 90 tablet, Rfl: 11    magnesium oxide (MAG-OX) 400 MG tablet, Take 1 tablet by mouth Daily., Disp: 30 tablet, Rfl: 11    metFORMIN (GLUCOPHAGE) 500 MG tablet, Take 1 tablet by mouth 2 (Two) Times a Day With Meals., Disp: 180 tablet, Rfl: 0    nitroglycerin (NITRODUR) 0.2 MG/HR patch, Place 1 patch on the skin as directed by provider See Admin Instructions. Apply patch daily, remove at night for at least 10 hours, Disp: 90 patch, Rfl: 3    ALLERGIES     Colchicine    FAMILY HISTORY       Family History   Problem Relation Age of Onset    No Known Problems Mother     Heart attack Father           SOCIAL HISTORY       Social History     Socioeconomic History    Marital status:    Tobacco Use    Smoking status: Never     Passive exposure: Never    Smokeless tobacco: Never   Vaping Use    Vaping status: Never Used   Substance and Sexual Activity    Alcohol use: Yes     Alcohol/week: 1.0 standard drink of alcohol     Types: 1 Shots of liquor per week     Comment: Estimation based on spouse report of patient drinking \"1 large martini\" each night (regular martini = 3 shots, large martini = est. 4 shots)    Drug use: No    Sexual activity: Defer         PHYSICAL EXAM    (up to 7 for level 4, 8 or more for level 5)     Vitals:    04/11/25 1500 04/11/25 1530 04/11/25 1534 04/11/25 1600   BP: 132/72 141/97 141/97 111/85   BP Location:       Patient Position:       Pulse: 59 58 61 73   Resp:       Temp:       TempSrc:     "   SpO2: 95% 91%  97%   Weight:       Height:           General: Awake, alert, no acute distress.  HEENT: Conjunctivae normal.  Neck: Trachea midline.  Cardiac: Heart regular rate, rhythm, no murmurs, rubs, or gallops  Lungs: Mildly tachypneic.  Lungs are clear to auscultation, there is no wheezing, rhonchi, or rales. There is no use of accessory muscles.  Chest wall: There is no tenderness to palpation over the chest wall or over ribs  Abdomen: Abdomen is soft, nontender, nondistended. There are no firm or pulsatile masses, no rebound rigidity or guarding.   Musculoskeletal: Significant pitting edema to bilateral lower extremities, worse on the left with hematoma over the left lateral leg.  Pulses palpable in the feet bilaterally.  There is no motor or sensory dysfunction and compartments of bilateral lower extremities are soft and compressible.  There is a noninfected appearing stasis ulcer on the left anterior lower leg.  Neuro: Alert and oriented x 4.  Dermatology: Skin is warm and dry  Psych: Mentation is grossly normal, cognition is grossly normal. Affect is appropriate.        DIAGNOSTIC RESULTS     EKG: All EKGs are interpreted by the Emergency Department Physician who either signs or Co-signs this chart in the absence of a cardiologist.    ECG 12 Lead Other; abdominal pain   Preliminary Result   Test Reason : Other~   Blood Pressure :   */*   mmHG   Vent. Rate :  63 BPM     Atrial Rate :  37 BPM      P-R Int :   * ms          QRS Dur : 102 ms       QT Int : 426 ms       P-R-T Axes :   *  10  96 degrees     QTcB Int : 435 ms      Atrial fibrillation   Inferior infarct (cited on or before 17-Dec-2015)   Anterolateral infarct (cited on or before 05-Aug-2024)   Abnormal ECG   When compared with ECG of 02-Apr-2025 15:08,   Nonspecific T wave abnormality no longer evident in Inferior leads      Referred By: EDMD           Confirmed By:             RADIOLOGY:   [x] Radiologist's Report Reviewed:  XR Chest 1 View    Final Result   Impression:   1.Cardiomegaly with pulmonary vascular congestion, suggestive of mild CHF.   2.Left basilar atelectasis versus infiltrate.         Electronically Signed: Osmar Motta MD     4/11/2025 3:19 PM EDT     Workstation ID: [id number]          I ordered and independently reviewed the above noted radiographic studies.        LABS:    I have reviewed and interpreted all of the currently available lab results from this visit (if applicable):  Results for orders placed or performed during the hospital encounter of 04/11/25   ECG 12 Lead Other; abdominal pain    Collection Time: 04/11/25  2:28 PM   Result Value Ref Range    QT Interval 426 ms    QTC Interval 435 ms   Comprehensive Metabolic Panel    Collection Time: 04/11/25  2:47 PM    Specimen: Blood   Result Value Ref Range    Glucose 151 (H) 65 - 99 mg/dL    BUN 47 (H) 8 - 23 mg/dL    Creatinine 1.84 (H) 0.76 - 1.27 mg/dL    Sodium 137 136 - 145 mmol/L    Potassium 4.3 3.5 - 5.2 mmol/L    Chloride 95 (L) 98 - 107 mmol/L    CO2 31.0 (H) 22.0 - 29.0 mmol/L    Calcium 8.7 8.6 - 10.5 mg/dL    Total Protein 5.8 (L) 6.0 - 8.5 g/dL    Albumin 3.4 (L) 3.5 - 5.2 g/dL    ALT (SGPT) 21 1 - 41 U/L    AST (SGOT) 28 1 - 40 U/L    Alkaline Phosphatase 161 (H) 39 - 117 U/L    Total Bilirubin 1.5 (H) 0.0 - 1.2 mg/dL    Globulin 2.4 gm/dL    A/G Ratio 1.4 g/dL    BUN/Creatinine Ratio 25.5 (H) 7.0 - 25.0    Anion Gap 11.0 5.0 - 15.0 mmol/L    eGFR 34.8 (L) >60.0 mL/min/1.73   BNP    Collection Time: 04/11/25  2:47 PM    Specimen: Blood   Result Value Ref Range    proBNP 20,657.0 (H) 0.0 - 1,800.0 pg/mL   High Sensitivity Troponin T    Collection Time: 04/11/25  2:47 PM    Specimen: Blood   Result Value Ref Range    HS Troponin T 136 (C) <22 ng/L   CBC Auto Differential    Collection Time: 04/11/25  2:47 PM    Specimen: Blood   Result Value Ref Range    WBC 5.36 3.40 - 10.80 10*3/mm3    RBC 3.19 (L) 4.14 - 5.80 10*6/mm3    Hemoglobin 9.7 (L) 13.0 - 17.7 g/dL     Hematocrit 31.8 (L) 37.5 - 51.0 %    MCV 99.7 (H) 79.0 - 97.0 fL    MCH 30.4 26.6 - 33.0 pg    MCHC 30.5 (L) 31.5 - 35.7 g/dL    RDW 17.3 (H) 12.3 - 15.4 %    RDW-SD 62.7 (H) 37.0 - 54.0 fl    MPV 10.3 6.0 - 12.0 fL    Platelets 233 140 - 450 10*3/mm3    Neutrophil % 80.2 (H) 42.7 - 76.0 %    Lymphocyte % 7.5 (L) 19.6 - 45.3 %    Monocyte % 9.9 5.0 - 12.0 %    Eosinophil % 1.3 0.3 - 6.2 %    Basophil % 0.9 0.0 - 1.5 %    Immature Grans % 0.2 0.0 - 0.5 %    Neutrophils, Absolute 4.30 1.70 - 7.00 10*3/mm3    Lymphocytes, Absolute 0.40 (L) 0.70 - 3.10 10*3/mm3    Monocytes, Absolute 0.53 0.10 - 0.90 10*3/mm3    Eosinophils, Absolute 0.07 0.00 - 0.40 10*3/mm3    Basophils, Absolute 0.05 0.00 - 0.20 10*3/mm3    Immature Grans, Absolute 0.01 0.00 - 0.05 10*3/mm3    nRBC 0.0 0.0 - 0.2 /100 WBC        If labs were ordered, I independently reviewed the results and considered them in treating the patient.      EMERGENCY DEPARTMENT COURSE and DIFFERENTIAL DIAGNOSIS/MDM:   Vitals:  AS OF 16:08 EDT    BP - 111/85  HR - 73  TEMP - 97.9 °F (36.6 °C) (Oral)  O2 SATS - 97%        Discussion below represents my analysis of pertinent findings related to patient's condition, differential diagnosis, treatment plan and final disposition.      Differential diagnosis:  The differential diagnosis associated with the patient's presentation includes: CHF exacerbation, pulmonary hypertension, renal failure, hepatic failure, pneumonia, pneumothorax, ACS, dysrhythmia, pulmonary embolism, DVT      Independent interpretations (ECG/rhythm strip/X-ray/US/CT scan): I independently interpreted the patient's chest x-ray and cardiac monitor.  There is evidence of volume overload.  The patient is in sinus rhythm.      Additional sources:  Discussed/obtained information from independent historians:   [] Spouse:   [] Parent:   [] Friend:   [] EMS:   [] Other:  External (non-ED) record review:   [] Inpatient record:   [] Office record:   [] Outpatient  record:   [] Prior Outpatient labs:   [] Prior Outpatient radiology:   [] Primary Care record:   [] Outside ED record:   [x] Other: I reviewed discharge summary from April 5.  Patient with history of MI, heart failure with reduced ejection fraction EF of 36 to 40% on Eliquis, diabetes, A-fib, CKD.  Duplex of the left lower extremity was performed and noted to be negative for DVT.  Echo showed moderate to severe tricuspid regurgitation, moderate to severe aortic insufficiency.  Patient was discharged home on Eliquis, discontinued Plavix.      Patient's care impacted by:   [x] Diabetes   [x] Hypertension   [] Coronary Artery Disease   [] Cancer   [x] Other: CHF with reduced ejection fraction, atrial fibrillation, CKD    Care significantly affected by Social Determinants of Health (housing and economic circumstances, unemployment)    [] Yes     [x] No   If yes, Patient's care significantly limited by  Social Determinants of Health including:    [] Inadequate housing    [] Low income    [] Alcoholism and drug addiction in family    [] Problems related to primary support group    [] Unemployment    [] Problems related to employment    [] Other Social Determinants of Health:       Consideration of admission/observation vs discharge: Patient presents with anasarca, debility, pulmonary edema, warrants admission for further management.      I considered prescription management with:    [] Pain medication:   [] Antiviral:   [] Antibiotic:   [x] Other: Patient given IV Bumex in the ED    Additional orders considered but not ordered:  The following testing was considered but ultimately not selected after discussion with patient/family: Consider repeat left lower extremity ultrasound, however patient is already anticoagulated and previously had negative DVT ultrasound of the leg and therefore clinically doubt VTE as a cause for his left greater than right leg swelling    ED Course:    ED Course as of 04/11/25 1608   Fri Apr 11,  2025 1607 Patient presents with worsening edema, debility. Patient has history of CHF, recent admission to this facility for the same, recommended to go to rehab but declined. Has had worsening edema since being discharged home despite use of his home Bumex. Can typically ambulate on his own using a rollator but has been unable to ambulate for the past day. He has significant bilateral leg edema. He has a large left leg hematoma that has previously been evaluated. He reports some increased shortness of breath as well and appears a little tachypneic with movement. Have given him a dose of Bumex. [NS]   1607 I discussed case with hospitalist Dr. Bowen.  Discussed history, presentation, workup.  Accepts patient for admission [NS]      ED Course User Index  [NS] Javon Schwartz MD           PROCEDURES:  Procedures    CRITICAL CARE TIME        FINAL IMPRESSION      1. Acute on chronic congestive heart failure, unspecified heart failure type    2. Acute pulmonary edema    3. Anasarca    4. Failure to thrive in adult    5. History of diabetes mellitus          DISPOSITION/PLAN     ED Disposition       ED Disposition   Decision to Admit    Condition   --    Comment   --                 Comment: Please note this report has been produced using speech recognition software.      Javon Schwartz MD  Attending Emergency Physician             Javon Schwartz MD  04/11/25 0728

## 2025-04-11 NOTE — ED NOTES
Iain Dee    Nursing Report ED to Floor:  Mental status: alert and oriented x4  Ambulatory status: has not been walking d/t swelling  Oxygen Therapy:  room air  Cardiac Rhythm: afib  Admitted from: ed/home  Safety Concerns:  fall risk  Precautions: na  Social Issues: na  ED Room #:  02    ED Nurse Phone Extension - [phone] or may call [phone].      HPI:   Chief Complaint   Patient presents with    Leg Swelling       Past Medical History:  Past Medical History:   Diagnosis Date    A-fib     Arthritis     Cancer     Diabetes mellitus     GERD (gastroesophageal reflux disease)     Gout     H/O Bladder carcinoma (HCC)     Hypertension         Past Surgical History:  Past Surgical History:   Procedure Laterality Date    BLADDER SURGERY      Biopsy    HERNIA REPAIR      PROSTATE BIOPSY          Admitting Doctor:   Brian Joseph Kerley, DO    Consulting Provider(s):  Consults       Date and Time Order Name Status Description    4/3/2025  7:32 AM Inpatient Cardiology Consult Completed              Admitting Diagnosis:   The primary encounter diagnosis was Acute on chronic congestive heart failure, unspecified heart failure type. Diagnoses of Acute pulmonary edema, Anasarca, Failure to thrive in adult, and History of diabetes mellitus were also pertinent to this visit.    Most Recent Vitals:   Vitals:    04/11/25 1500 04/11/25 1530 04/11/25 1534 04/11/25 1600   BP: 132/72 141/97 141/97 111/85   BP Location:       Patient Position:       Pulse: 59 58 61 73   Resp:       Temp:       TempSrc:       SpO2: 95% 91%  97%   Weight:       Height:           Active LDAs/IV Access:   Lines, Drains & Airways       Active LDAs       Name Placement date Placement time Site Days    Peripheral IV 04/11/25 1421 Anterior;Left;Proximal Forearm 04/11/25  1421  Forearm  less than 1                    Labs (abnormal labs have a star):   Labs Reviewed   COMPREHENSIVE METABOLIC PANEL - Abnormal; Notable for the following components:       Result  Value    Glucose 151 (*)     BUN 47 (*)     Creatinine 1.84 (*)     Chloride 95 (*)     CO2 31.0 (*)     Total Protein 5.8 (*)     Albumin 3.4 (*)     Alkaline Phosphatase 161 (*)     Total Bilirubin 1.5 (*)     BUN/Creatinine Ratio 25.5 (*)     eGFR 34.8 (*)     All other components within normal limits    Narrative:     GFR Categories in Chronic Kidney Disease (CKD)      GFR Category          GFR (mL/min/1.73)    Interpretation  G1                     90 or greater         Normal or high (1)  G2                      60-89                Mild decrease (1)  G3a                   45-59                Mild to moderate decrease  G3b                   30-44                Moderate to severe decrease  G4                    15-29                Severe decrease  G5                    14 or less           Kidney failure          (1)In the absence of evidence of kidney disease, neither GFR category G1 or G2 fulfill the criteria for CKD.    eGFR calculation 2021 CKD-EPI creatinine equation, which does not include race as a factor   BNP (IN-HOUSE) - Abnormal; Notable for the following components:    proBNP 20,657.0 (*)     All other components within normal limits    Narrative:     This assay is used as an aid in the diagnosis of individuals suspected of having heart failure. It can be used as an aid in the diagnosis of acute decompensated heart failure (ADHF) in patients presenting with signs and symptoms of ADHF to the emergency department (ED). In addition, NT-proBNP of <300 pg/mL indicates ADHF is not likely.    Age Range Result Interpretation  NT-proBNP Concentration (pg/mL:      <50             Positive            >450                   Gray                 300-450                    Negative             <300    50-75           Positive            >900                  Gray                300-900                  Negative            <300      >75             Positive            >1800                  Love                 300-1800                  Negative            <300   TROPONIN - Abnormal; Notable for the following components:    HS Troponin T 136 (*)     All other components within normal limits    Narrative:     High Sensitive Troponin T Reference Range:  <14.0 ng/L- Negative Female for AMI  <22.0 ng/L- Negative Male for AMI  >=14 - Abnormal Female indicating possible myocardial injury.  >=22 - Abnormal Male indicating possible myocardial injury.   Clinicians would have to utilize clinical acumen, EKG, Troponin, and serial changes to determine if it is an Acute Myocardial Infarction or myocardial injury due to an underlying chronic condition.        CBC WITH AUTO DIFFERENTIAL - Abnormal; Notable for the following components:    RBC 3.19 (*)     Hemoglobin 9.7 (*)     Hematocrit 31.8 (*)     MCV 99.7 (*)     MCHC 30.5 (*)     RDW 17.3 (*)     RDW-SD 62.7 (*)     Neutrophil % 80.2 (*)     Lymphocyte % 7.5 (*)     Lymphocytes, Absolute 0.40 (*)     All other components within normal limits   HIGH SENSITIVITIY TROPONIN T 1HR   CBC AND DIFFERENTIAL    Narrative:     The following orders were created for panel order CBC & Differential.  Procedure                               Abnormality         Status                     ---------                               -----------         ------                     CBC Auto Differential[[id number]]        Abnormal            Final result                 Please view results for these tests on the individual orders.       Meds Given in ED:   Medications   bumetanide (BUMEX) injection 2 mg (2 mg Intravenous Given 4/11/25 6645)     No current facility-administered medications for this encounter.       Last NIH score:                                                          Dysphagia screening results:        [place] Coma Scale:  No data recorded     CIWA:        Restraint Type:            Isolation Status:  No active isolations

## 2025-04-11 NOTE — LETTER
EMS Transport Request  For use at [facility], [place], [place], [place], and [place] only   Patient Name: Iain Dee : 1935   Weight:102 kg (225 lb) Pick-up Location: [facility] BLS/ALS: BLS/ALS: BLS   Insurance: MEDICARE Auth End Date:    Pre-Cert #: D/C Summary complete:    Destination: [facility]   Contact Precautions: None   Equipment (O2, Fluids, etc.): None   Arrive By Date/Time: 25 Stretcher/WC: Stretcher   CM Requesting: Patricia Keith RN Ext:    Notes/Medical Necessity: Impaired functional mobility, endurance, balance, and gait.      ______________________________________________________________________    *Only 2 patient bags OR 1 carry-on size bag are permitted.  Wheelchairs and walkers CANNOT transported with the patient. Acknowledge: Yes

## 2025-04-11 NOTE — H&P
[facility] Medicine Services  HISTORY AND PHYSICAL    Patient Name: Iain Dee  : 1935  MRN: [id number]  Primary Care Physician: Luis Aguilar DO  Date of admission: 2025    Subjective   Subjective     Chief Complaint:  Leg swelling     HPI:  Iain Dee is a 89 y.o. male with PMH significant for HFrEF, valvular heart disease,  A-fib chronically anticoagulated on Eliquis, CAD, DM2, prostate/bladder cancer (~12 years ago) with recent admission to [facility] -4 2025 for left thigh hematoma who presents to [facility] ED with worsening edema despite using his home Bumex. Patient and wife at bedside note that patient has not improved as they expected he would since discharge and that over the past couple days he has not been able to ambulate with his rollator as he normally is able to do. Of note, patient was recommended to go to rehab after his last admission but declined. Patient denies recent illness, fever, chills, chest pain, increased cough, N/V/D. He notes some increase in shortness of breath but denies specific QUAN or orthopnea. He does report more discomfort and fullness in his abdomen. He reports the swelling in his legs is about the same with greater swelling on the left. He feels the left leg hematoma is about the same but does say it is more yellow.    Upon presentation to the ED, patient's vital signs are stable.  Labs are noted for elevated proBNP 20,657 (decreased from prior 22,485 on ) , elevated troponins 136>>123, elevated creatinine 1.84 (baseline 1.30-1.80 per chart review) , elevated alk phos 161, and elevated bilirubin 1.5 (bili 0.9 on ), hemoglobin 9.7, and neutrophilia.  CXR shows cardiomegaly with pulmonary vascular congestion and left basilar atelectasis versus infiltrate.  Left basilar atelectasis was seen on CXR for 2025. Patient was give 2 mg IV Bumex in the ED.      Personal History     Past Medical History:   Diagnosis Date     A-fib     Arthritis     Cancer     Diabetes mellitus     GERD (gastroesophageal reflux disease)     Gout     H/O Bladder carcinoma (HCC)     Hypertension              Past Surgical History:   Procedure Laterality Date    BLADDER SURGERY      Biopsy    HERNIA REPAIR      PROSTATE BIOPSY         Family History:  family history includes Heart attack in his father; No Known Problems in his mother.     Social History:  reports that he has never smoked. He has never been exposed to tobacco smoke. He has never used smokeless tobacco. He reports current alcohol use of about 1.0 standard drink of alcohol per week. He reports that he does not use drugs.  Social History     Social History Narrative    Lives in [place] with wife    Uses rollator at home       Medications:  apixaban, aspirin, atorvastatin, bumetanide, carvedilol, empagliflozin, famotidine, magnesium oxide, metFORMIN, and nitroglycerin    Allergies   Allergen Reactions    Colchicine Other (See Comments)     Unable to walk        Objective   Objective     Vital Signs:   Temp:  [97.4 °F (36.3 °C)-97.9 °F (36.6 °C)] 97.4 °F (36.3 °C)  Heart Rate:  [54-74] 66  Resp:  [16-18] 18  BP: (111-141)/(72-98) 139/92    Physical Exam   Constitutional: Awake, alert  Eyes: PERRLA, sclerae anicteric, no conjunctival injection  HENT: NCAT, mucous membranes moist  Neck: Supple, no thyromegaly, no lymphadenopathy, trachea midline  Respiratory: Clear to auscultation bilaterally, nonlabored respirations   Cardiovascular: Irregular rhythm, no murmurs, rubs, or gallops  Gastrointestinal: Positive bowel sounds, soft, nontender, nondistended, + fluid  Musculoskeletal: 4+ LLE and left foot edema, 2+ RLE and right foot edema, no clubbing or cyanosis to extremities  Psychiatric: Appropriate affect, cooperative  Neurologic: Oriented x 3, LE strength R>L, Cranial Nerves grossly intact to confrontation, speech clear  Skin: No rashes      Result Review:  I have personally reviewed the results  from the time of this admission to 4/11/2025 18:31 EDT and agree with these findings:  [x]  Laboratory list / accordion  []  Microbiology  [x]  Radiology  [x]  EKG/Telemetry   [x]  Cardiology/Vascular   []  Pathology  [x]  Old records  []  Other:  Most notable findings include:     LAB RESULTS:      Lab 04/11/25  1447 04/05/25  0412   WBC 5.36  --    HEMOGLOBIN 9.7* 9.1*   HEMATOCRIT 31.8* 29.6*   PLATELETS 233  --    NEUTROS ABS 4.30  --    IMMATURE GRANS (ABS) 0.01  --    LYMPHS ABS 0.40*  --    MONOS ABS 0.53  --    EOS ABS 0.07  --    MCV 99.7*  --          Lab 04/11/25  1447   SODIUM 137   POTASSIUM 4.3   CHLORIDE 95*   CO2 31.0*   ANION GAP 11.0   BUN 47*   CREATININE 1.84*   EGFR 34.8*   GLUCOSE 151*   CALCIUM 8.7         Lab 04/11/25  1447   TOTAL PROTEIN 5.8*   ALBUMIN 3.4*   GLOBULIN 2.4   ALT (SGPT) 21   AST (SGOT) 28   BILIRUBIN 1.5*   ALK PHOS 161*         Lab 04/11/25  1608 04/11/25  1447   PROBNP  --  20,657.0*   HSTROP T 123* 136*                 Brief Urine Lab Results  (Last result in the past 365 days)        Color   Clarity   Blood   Leuk Est   Nitrite   Protein   CREAT   Urine HCG        04/03/25 1536 Yellow   Clear   Trace   Negative   Negative   100 mg/dL (2+)                 Microbiology Results (last 10 days)       ** No results found for the last 240 hours. **            XR Chest 1 View  Result Date: 4/11/2025  XR CHEST 1 VW Date of Exam: 4/11/2025 3:01 PM EDT Indication: sob Comparison: Chest x-ray dated 4/2/2025 Findings: Cardiac silhouette is enlarged. Pulmonary vasculature is indistinct. Mild left basilar atelectasis versus infiltrate. No pleural effusion or pneumothorax is seen. No acute osseous lesion is seen     Impression: Impression: 1.Cardiomegaly with pulmonary vascular congestion, suggestive of mild CHF. 2.Left basilar atelectasis versus infiltrate. Electronically Signed: Osmar Motta MD  4/11/2025 3:19 PM EDT  Workstation ID: [id number]      Results for orders placed during  the hospital encounter of 04/02/25    Adult Transthoracic Echo Complete W/ Cont if Necessary Per Protocol    Interpretation Summary    Left ventricular ejection fraction appears to be 36 - 40%.    Left ventricular wall thickness is consistent with mild concentric hypertrophy.    Left ventricular diastolic function is consistent with (grade II w/high LAP) pseudonormalization.    The right ventricular cavity is moderate to severely dilated.    The left atrial cavity is moderate to severely dilated.    Left atrial volume is severely increased.    The right atrial cavity is severely  dilated.    There is mild calcification of the aortic valve.    Moderate to severe aortic valve regurgitation is present.    Moderate to severe tricuspid valve regurgitation is present.    Estimated right ventricular systolic pressure from tricuspid regurgitation is markedly elevated (>55 mmHg).      Assessment & Plan   Assessment & Plan       Acute exacerbation of CHF (congestive heart failure)    JOLENE on CPAP    Stage 3b chronic kidney disease    Thigh hematoma, left, initial encounter    Iain Dee is a 89 y.o. male with PMH significant for HFrEF, valvular heart disease,  A-fib chronically anticoagulated on Eliquis, CAD, DM2, prostate/bladder cancer (~12 years ago) with recent admission to [facility] 4/2-4 5/20/2025 for left thigh hematoma who presents to [facility] ED with worsening edema despite using his home Bumex and worsening mobility. Of note, patient was recommended to go to rehab after his last admission but declined.     CHF exacerbation  HFrEF  Valvular heart disease  H/o STEMI  --Follows with Dr. Stover  --Echo 4/4/2025 with EF 36-40%, LV diastolic dysfunction, moderate to severe AVR, moderate to severe tricuspid valve regurg, markedly elevated RVSP >55  --Probnp proBNP 20,657 (decreased from prior 22,485 on 4/2)  --CXR shows cardiomegaly with pulmonary vascular congestion and left basilar atelectasis versus infiltrate.  Left basilar  atelectasis also seen on CXR for 2/20/2025.  --Afebrile, normotensive  --Patient on room air, add OPEP for atelectasis on CXR, O2 prn  --Received Bumex 2 mg IV in ED  --Consult cardiology  --Continue gentle IV diuresis with Bumex 1 mg BID until seen by cardiology, hold Jardiance for now and monitor renal function  --Continue Coreg, defer further GDMT to cardiology  --Strict I's & O's  --Daily weights  --AM CMP, mag  --PT and OT consults    Elevated troponin  --Troponins 136>>123, increased from prior admission, likely secondary to supply-demand mismatch in setting of CHF exacerbation/pulmonary congestion  --Pt denies chest pain  --EKG reviewed, no ST elevation or T-wave inversion, controlled a-fib    Elevated bilirubin  --Bili 1.5, was wnl prior admission  --? From increased RBC breakdown with hematoma vs hepatic congestion, some ascites on exam, LFTs wnl x slightly elevated alk phos  --Check direct bili  --Consider liver US pending clinical course  --AM CMP    A-fib  --rate controlled, continue Coreg  --continue Eliquis at renal dosing 2.5 mg BID    Left leg hematoma  --Stable from prior, although increased yellowing, ? R/t healing hematoma  --H&H stable  --Continue to monitor  --AM CBC    Left leg wound  --From blister likely secondary to edema/venous stasis  --PT wound care consult, may benefit from compression wraps/unna boots  --AM CBC    BLE edema  --PT wound care consult as above    CKD 3b  --Cr slightly elevated 1.84 (appears to fluctuate 1.30-1.80 per chart review)   --Hold Jardiance while getting IV diuresis  --AM CMP    CAD  HTN  HLD  --Continue ASA, previously on Plavix that cardiology dc'd last admission d/t hematoma  --Continue Coreg  --Continue statin, LFTs wnl    DM2  --A1c 6.4  --On metformin BID at home, hold  --ACHS, low-dose SSI    GERD  --Daily PPI    JOLENE  --CPAP      DVT prophylaxis:  Medical    CODE STATUS:    Code Status (Patient has no pulse and is not breathing): CPR (Attempt to  Resuscitate)  Medical Interventions (Patient has pulse or is breathing): Full Support  Level Of Support Discussed With: Patient      Expected Discharge  Expected Discharge Date: 4/14/2025; Expected Discharge Time:       This note has been completed as part of a split-shared workflow.     Signature: Electronically signed by COLIN Schultz, 04/11/25, 4:43 PM EDT

## 2025-04-12 ENCOUNTER — READMISSION MANAGEMENT (OUTPATIENT)
Dept: CALL CENTER | Facility: HOSPITAL | Age: 89
End: 2025-04-12
Payer: MEDICARE

## 2025-04-12 LAB
ALBUMIN SERPL-MCNC: 3.4 G/DL (ref 3.5–5.2)
ALBUMIN/GLOB SERPL: 1.7 G/DL
ALP SERPL-CCNC: 155 U/L (ref 39–117)
ALT SERPL W P-5'-P-CCNC: 19 U/L (ref 1–41)
ANION GAP SERPL CALCULATED.3IONS-SCNC: 11 MMOL/L (ref 5–15)
AST SERPL-CCNC: 21 U/L (ref 1–40)
BILIRUB SERPL-MCNC: 1.6 MG/DL (ref 0–1.2)
BUN SERPL-MCNC: 48 MG/DL (ref 8–23)
BUN/CREAT SERPL: 28.1 (ref 7–25)
CALCIUM SPEC-SCNC: 8.6 MG/DL (ref 8.6–10.5)
CHLORIDE SERPL-SCNC: 97 MMOL/L (ref 98–107)
CO2 SERPL-SCNC: 31 MMOL/L (ref 22–29)
CREAT SERPL-MCNC: 1.71 MG/DL (ref 0.76–1.27)
DEPRECATED RDW RBC AUTO: 64.3 FL (ref 37–54)
EGFRCR SERPLBLD CKD-EPI 2021: 38 ML/MIN/1.73
ERYTHROCYTE [DISTWIDTH] IN BLOOD BY AUTOMATED COUNT: 17.3 % (ref 12.3–15.4)
GLOBULIN UR ELPH-MCNC: 2 GM/DL
GLUCOSE BLDC GLUCOMTR-MCNC: 114 MG/DL (ref 70–130)
GLUCOSE BLDC GLUCOMTR-MCNC: 134 MG/DL (ref 70–130)
GLUCOSE BLDC GLUCOMTR-MCNC: 157 MG/DL (ref 70–130)
GLUCOSE BLDC GLUCOMTR-MCNC: 185 MG/DL (ref 70–130)
GLUCOSE SERPL-MCNC: 100 MG/DL (ref 65–99)
HCT VFR BLD AUTO: 32.9 % (ref 37.5–51)
HGB BLD-MCNC: 10.1 G/DL (ref 13–17.7)
MAGNESIUM SERPL-MCNC: 2.1 MG/DL (ref 1.6–2.4)
MCH RBC QN AUTO: 30.9 PG (ref 26.6–33)
MCHC RBC AUTO-ENTMCNC: 30.7 G/DL (ref 31.5–35.7)
MCV RBC AUTO: 100.6 FL (ref 79–97)
PLATELET # BLD AUTO: 236 10*3/MM3 (ref 140–450)
PMV BLD AUTO: 10.6 FL (ref 6–12)
POTASSIUM SERPL-SCNC: 4.2 MMOL/L (ref 3.5–5.2)
PROT SERPL-MCNC: 5.4 G/DL (ref 6–8.5)
RBC # BLD AUTO: 3.27 10*6/MM3 (ref 4.14–5.8)
SODIUM SERPL-SCNC: 139 MMOL/L (ref 136–145)
WBC NRBC COR # BLD AUTO: 5.19 10*3/MM3 (ref 3.4–10.8)

## 2025-04-12 PROCEDURE — 97162 PT EVAL MOD COMPLEX 30 MIN: CPT

## 2025-04-12 PROCEDURE — 99233 SBSQ HOSP IP/OBS HIGH 50: CPT

## 2025-04-12 PROCEDURE — 83735 ASSAY OF MAGNESIUM: CPT | Performed by: NURSE PRACTITIONER

## 2025-04-12 PROCEDURE — 85027 COMPLETE CBC AUTOMATED: CPT | Performed by: NURSE PRACTITIONER

## 2025-04-12 PROCEDURE — 29581 APPL MULTLAYER CMPRN SYS LEG: CPT

## 2025-04-12 PROCEDURE — G0378 HOSPITAL OBSERVATION PER HR: HCPCS

## 2025-04-12 PROCEDURE — 97535 SELF CARE MNGMENT TRAINING: CPT

## 2025-04-12 PROCEDURE — 99222 1ST HOSP IP/OBS MODERATE 55: CPT

## 2025-04-12 PROCEDURE — 63710000001 INSULIN LISPRO (HUMAN) PER 5 UNITS: Performed by: NURSE PRACTITIONER

## 2025-04-12 PROCEDURE — 97530 THERAPEUTIC ACTIVITIES: CPT

## 2025-04-12 PROCEDURE — 97597 DBRDMT OPN WND 1ST 20 CM/<: CPT

## 2025-04-12 PROCEDURE — 25010000002 BUMETANIDE PER 0.5 MG: Performed by: NURSE PRACTITIONER

## 2025-04-12 PROCEDURE — 97166 OT EVAL MOD COMPLEX 45 MIN: CPT

## 2025-04-12 PROCEDURE — 82948 REAGENT STRIP/BLOOD GLUCOSE: CPT

## 2025-04-12 PROCEDURE — 25010000002 FUROSEMIDE PER 20 MG

## 2025-04-12 PROCEDURE — 80053 COMPREHEN METABOLIC PANEL: CPT | Performed by: NURSE PRACTITIONER

## 2025-04-12 RX ORDER — POTASSIUM CHLORIDE 750 MG/1
20 CAPSULE, EXTENDED RELEASE ORAL
Status: CANCELLED | OUTPATIENT
Start: 2025-04-12

## 2025-04-12 RX ORDER — FUROSEMIDE 10 MG/ML
40 INJECTION INTRAMUSCULAR; INTRAVENOUS EVERY 12 HOURS
Status: DISCONTINUED | OUTPATIENT
Start: 2025-04-12 | End: 2025-04-15

## 2025-04-12 RX ORDER — POTASSIUM CHLORIDE 750 MG/1
20 CAPSULE, EXTENDED RELEASE ORAL DAILY
Status: DISCONTINUED | OUTPATIENT
Start: 2025-04-12 | End: 2025-04-19 | Stop reason: HOSPADM

## 2025-04-12 RX ORDER — TRAMADOL HYDROCHLORIDE 50 MG/1
50 TABLET ORAL EVERY 6 HOURS PRN
Status: DISPENSED | OUTPATIENT
Start: 2025-04-12 | End: 2025-04-17

## 2025-04-12 RX ORDER — BUMETANIDE 0.25 MG/ML
2 INJECTION, SOLUTION INTRAMUSCULAR; INTRAVENOUS EVERY 12 HOURS
Status: DISCONTINUED | OUTPATIENT
Start: 2025-04-12 | End: 2025-04-12

## 2025-04-12 RX ADMIN — CARVEDILOL 3.12 MG: 3.12 TABLET, FILM COATED ORAL at 17:03

## 2025-04-12 RX ADMIN — BUMETANIDE 1 MG: 0.25 INJECTION INTRAMUSCULAR; INTRAVENOUS at 08:14

## 2025-04-12 RX ADMIN — FUROSEMIDE 40 MG: 10 INJECTION, SOLUTION INTRAMUSCULAR; INTRAVENOUS at 17:03

## 2025-04-12 RX ADMIN — APIXABAN 2.5 MG: 2.5 TABLET, FILM COATED ORAL at 08:13

## 2025-04-12 RX ADMIN — POTASSIUM CHLORIDE 20 MEQ: 750 CAPSULE, EXTENDED RELEASE ORAL at 11:40

## 2025-04-12 RX ADMIN — MAGNESIUM OXIDE TAB 400 MG (241.3 MG ELEMENTAL MG) 400 MG: 400 (241.3 MG) TAB at 08:13

## 2025-04-12 RX ADMIN — ACETAMINOPHEN 650 MG: 325 TABLET, FILM COATED ORAL at 11:40

## 2025-04-12 RX ADMIN — INSULIN LISPRO 2 UNITS: 100 INJECTION, SOLUTION INTRAVENOUS; SUBCUTANEOUS at 17:03

## 2025-04-12 RX ADMIN — INSULIN LISPRO 2 UNITS: 100 INJECTION, SOLUTION INTRAVENOUS; SUBCUTANEOUS at 20:35

## 2025-04-12 RX ADMIN — CARVEDILOL 3.12 MG: 3.12 TABLET, FILM COATED ORAL at 08:13

## 2025-04-12 RX ADMIN — APIXABAN 2.5 MG: 2.5 TABLET, FILM COATED ORAL at 20:36

## 2025-04-12 RX ADMIN — PANTOPRAZOLE SODIUM 40 MG: 40 TABLET, DELAYED RELEASE ORAL at 05:30

## 2025-04-12 RX ADMIN — ATORVASTATIN CALCIUM 40 MG: 40 TABLET, FILM COATED ORAL at 20:36

## 2025-04-12 RX ADMIN — ASPIRIN 81 MG: 81 TABLET, COATED ORAL at 08:13

## 2025-04-12 RX ADMIN — Medication 10 ML: at 08:14

## 2025-04-12 RX ADMIN — TRAMADOL HYDROCHLORIDE 50 MG: 50 TABLET, COATED ORAL at 11:40

## 2025-04-12 NOTE — CONSULTS
[facility] Consult Note    Referring Provider: MD Jeff Skelton Spencer Ryan, DO    Patient Care Team:  Luis Aguilar DO as PCP - General (Family Medicine)  Iain Bernstein MD as Consulting Physician (Cardiology)  Isabel Stover MD as Cardiologist (Cardiology)    Reason for Consultation: CHF exacerbation    Subjective     History of present illness: Iain Dee is an 89-year-old male who presented to [facility] ED on 4/11 with complaints of worsening dyspnea and BLE edema over the last several days.  Patient has a history of CHF and was recently admitted for a CHF exacerbation.  Patient was recommended to go to rehab following his discharge, however patient declined in favor of going home.  Patient's condition worsened since going home and family cannot take care of him.  Patient has been unable to ambulate for the last several days however patient denies any chest pain, presyncope, syncope.  Patient subsequently has a left lower extremity hematoma that was previously evaluated and he had an ultrasound of the leg that was negative for DVT.    Problem List:  CAD  Inferior STEMI, 8/5/2024: Medical managemen   PAF  [id number]  6 on Eliquis  CHF  Echo, 3/1/2023: LVEF 55%  Echo, 8/5/2024: LVEF 35 to 40%   HTN  HLD  JOLENE        Past Medical History:   Diagnosis Date    A-fib     Arthritis     Cancer     Diabetes mellitus     GERD (gastroesophageal reflux disease)     Gout     H/O Bladder carcinoma (HCC)     Hypertension        Past Surgical History:   Procedure Laterality Date    BLADDER SURGERY      Biopsy    HERNIA REPAIR      PROSTATE BIOPSY           Current Facility-Administered Medications:     acetaminophen (TYLENOL) tablet 650 mg, 650 mg, Oral, Q4H PRN **OR** acetaminophen (TYLENOL) 160 MG/5ML oral solution 650 mg, 650 mg, Oral, Q4H PRN **OR** acetaminophen (TYLENOL) suppository 650 mg, 650 mg, Rectal, Q4H PRN, Yeny aRndolph APRN    apixaban  (ELIQUIS) tablet 2.5 mg, 2.5 mg, Oral, BID, Yeny Randolph, APRN, 2.5 mg at 04/11/25 2204    aspirin EC tablet 81 mg, 81 mg, Oral, Daily, Yeny Randolph E, APRN    atorvastatin (LIPITOR) tablet 40 mg, 40 mg, Oral, Nightly, Yeny Randolph, APRN, 40 mg at 04/11/25 2204    sennosides-docusate (PERICOLACE) 8.6-50 MG per tablet 2 tablet, 2 tablet, Oral, BID PRN **AND** polyethylene glycol (MIRALAX) packet 17 g, 17 g, Oral, Daily PRN **AND** bisacodyl (DULCOLAX) EC tablet 5 mg, 5 mg, Oral, Daily PRN **AND** bisacodyl (DULCOLAX) suppository 10 mg, 10 mg, Rectal, Daily PRN, Yeny Randolph, APRN    bumetanide (BUMEX) injection 1 mg, 1 mg, Intravenous, Q12H, Yeny Randolph, APRN, 1 mg at 04/11/25 2204    [Held by provider] bumetanide (BUMEX) tablet 2 mg, 2 mg, Oral, Daily, Yeny Randolph, APRN    carvedilol (COREG) tablet 3.125 mg, 3.125 mg, Oral, BID With Meals, Yeny Randolph, APRN, 3.125 mg at 04/11/25 2204    dextrose (D50W) (25 g/50 mL) IV injection 25 g, 25 g, Intravenous, Q15 Min PRN, Yeny Randolph, APRGIOVANNY    dextrose (GLUTOSE) oral gel 15 g, 15 g, Oral, Q15 Min PRN, Yeny Randolph, APRN    [Held by provider] empagliflozin (JARDIANCE) tablet 10 mg, 10 mg, Oral, Daily, William Randolpht E, APRN    glucagon (GLUCAGEN) injection 1 mg, 1 mg, Intramuscular, Q15 Min PRN, Yeny Randolph, APRN    Insulin Lispro (humaLOG) injection 2-7 Units, 2-7 Units, Subcutaneous, 4x Daily AC & at Bedtime, Yeny Randolph APRN, 3 Units at 04/11/25 2225    Magnesium Cardiology Dose Replacement - Follow Nurse / BPA Driven Protocol, , Not Applicable, PRN, Yeny Randolph APRN    magnesium oxide (MAG-OX) tablet 400 mg, 400 mg, Oral, Daily, Yeny Randolph APRN    nitroglycerin (NITROSTAT) SL tablet 0.4 mg, 0.4 mg, Sublingual, Q5 Min PRN, Yeny Randolph APRN    pantoprazole (PROTONIX) EC tablet 40 mg, 40 mg, Oral, Q AM, Yeny Randolph APRN, 40 mg at 04/12/25 0556     Pharmacy Consult - [place], , Not Applicable, Daily, Dennis Oconnor, [facility]    Potassium Replacement - Follow Nurse / BPA Driven Protocol, , Not Applicable, PRN, Yeny Randolph E, APRN    sodium chloride 0.9 % flush 10 mL, 10 mL, Intravenous, PRN, William Randolpht E, APRN, 10 mL at 04/11/25 2204    sodium chloride 0.9 % infusion 40 mL, 40 mL, Intravenous, PRN, William Randolpht E, APRN         Medications Prior to Admission   Medication Sig Dispense Refill Last Dose/Taking    apixaban (ELIQUIS) 2.5 MG tablet tablet Take 1 tablet by mouth 2 (Two) Times a Day. Indications: Atrial Fibrillation 60 tablet 11 4/11/2025 Morning    aspirin 81 MG EC tablet Take 1 tablet by mouth Daily. 30 tablet 0 4/11/2025 Morning    atorvastatin (LIPITOR) 40 MG tablet Take 1 tablet by mouth Every Night. 90 tablet 3 4/10/2025    bumetanide (BUMEX) 2 MG tablet TAKE 1 TABLET BY MOUTH DAILY 90 tablet 0 4/10/2025    carvedilol (COREG) 3.125 MG tablet Take 1 tablet by mouth 2 (Two) Times a Day With Meals. 180 tablet 3 4/11/2025 Morning    famotidine (PEPCID) 20 MG tablet Take 1 tablet by mouth 2 (Two) Times a Day As Needed for Heartburn. 180 tablet 0 Past Week    Jardiance 10 MG tablet tablet Take 1 tablet by mouth Daily. 90 tablet 11 4/11/2025 Morning    magnesium oxide (MAG-OX) 400 MG tablet Take 1 tablet by mouth Daily. 30 tablet 11 4/11/2025 Morning    metFORMIN (GLUCOPHAGE) 500 MG tablet Take 1 tablet by mouth 2 (Two) Times a Day With Meals. 180 tablet 0 4/11/2025 Morning    nitroglycerin (NITRODUR) 0.2 MG/HR patch Place 1 patch on the skin as directed by provider See Admin Instructions. Apply patch daily, remove at night for at least 10 hours 90 patch 3 More than a month       Allergies   Allergen Reactions    Colchicine Other (See Comments)     Unable to walk        Social History     Socioeconomic History    Marital status:    Tobacco Use    Smoking status: Never     Passive exposure: Never    Smokeless tobacco: Never   Vaping Use  "   Vaping status: Never Used   Substance and Sexual Activity    Alcohol use: Yes     Alcohol/week: 1.0 standard drink of alcohol     Types: 1 Shots of liquor per week     Comment: Estimation based on spouse report of patient drinking \"1 large martini\" each night (regular martini = 3 shots, large martini = est. 4 shots)    Drug use: No    Sexual activity: Defer       Family History   Problem Relation Age of Onset    No Known Problems Mother     Heart attack Father          Review of Systems:  Review of Systems   Constitutional: Positive for weight gain.   Cardiovascular:  Positive for leg swelling and orthopnea.   Skin:  Positive for color change.        Resolving left lateral leg hematoma   Gastrointestinal:  Positive for bloating.   All other systems reviewed and are negative.        Objective   Vitals:  /80 (BP Location: Left arm, Patient Position: Lying)   Pulse 65   Temp 97.5 °F (36.4 °C) (Oral)   Resp 18   Ht 185.4 cm (73\")   Wt 104 kg (229 lb)   SpO2 99%   BMI 30.21 kg/m²      Intake/Output Summary (Last 24 hours) at 4/12/2025 0721  Last data filed at 4/12/2025 0500  Gross per 24 hour   Intake --   Output 900 ml   Net -900 ml     Telemetry: Rate controlled A-fib    Vitals reviewed.   Constitutional:       General: Sleeping.      Appearance: Overweight. Chronically ill-appearing.   Neck:      Vascular: JVR present. JVD elevated.   Pulmonary:      Effort: Pulmonary effort is normal.      Comments: Diminished breath sounds with right middle lobe crackles  Cardiovascular:      PMI at left midclavicular line. Normal rate. Irregularly irregular rhythm.      Murmurs: There is no murmur.   Pulses:     No decreased pulses.   Edema:     Peripheral edema present.     Thigh: bilateral 1+ edema of the thigh.     Pretibial: bilateral 1+ edema of the pretibial area.     Ankle: bilateral 2+ edema of the ankle.     Feet: bilateral 2+ pitting edema of the feet.  Abdominal:      General: There is distension. "   Musculoskeletal:         General: Tenderness present. Skin:     General: Skin is warm and dry.   Neurological:      General: No focal deficit present.           Labs:  I reviewed the patient's new clinical results.  Results from last 7 days   Lab Units 04/12/25  0530   WBC 10*3/mm3 5.19   HEMOGLOBIN g/dL 10.1*   HEMATOCRIT % 32.9*   PLATELETS 10*3/mm3 236     Results from last 7 days   Lab Units 04/11/25  1447   SODIUM mmol/L 137   POTASSIUM mmol/L 4.3   CHLORIDE mmol/L 95*   CO2 mmol/L 31.0*   BUN mg/dL 47*   CREATININE mg/dL 1.84*   CALCIUM mg/dL 8.7   BILIRUBIN mg/dL 1.5*   ALK PHOS U/L 161*   ALT (SGPT) U/L 21   AST (SGOT) U/L 28   GLUCOSE mg/dL 151*     Results from last 7 days   Lab Units 04/11/25  1447   SODIUM mmol/L 137   POTASSIUM mmol/L 4.3   CHLORIDE mmol/L 95*   CO2 mmol/L 31.0*   BUN mg/dL 47*   CREATININE mg/dL 1.84*   GLUCOSE mg/dL 151*   CALCIUM mg/dL 8.7         Lab Results   Lab Value Date/Time    TROPONINT 123 (C) 04/11/2025 1608    TROPONINT 136 (C) 04/11/2025 1447    TROPONINT 94 (C) 04/02/2025 1711    TROPONINT 102 (C) 04/02/2025 1551    TROPONINT 560 (C) 08/05/2024 1120    TROPONINT 368 (C) 08/05/2024 0856    TROPONINT 97 (C) 02/27/2023 1712    TROPONINT 109 (C) 02/27/2023 1319    TROPONINT 0.019 12/08/2021 0310             Results from last 7 days   Lab Units 04/11/25  1447   PROBNP pg/mL 20,657.0*     EKG  Test Reason : Other~  Blood Pressure :   */*   mmHG  Vent. Rate :  63 BPM     Atrial Rate :  37 BPM     P-R Int :   * ms          QRS Dur : 102 ms      QT Int : 426 ms       P-R-T Axes :   *  10  96 degrees    QTcB Int : 435 ms     Atrial fibrillation  Inferior infarct (cited on or before 17-Dec-2015)  Anterolateral infarct (cited on or before 05-Aug-2024)  Abnormal ECG  When compared with ECG of 02-Apr-2025 15:08,  Nonspecific T wave abnormality no longer evident in Inferior leads      ECHO    Left ventricular ejection fraction appears to be 36 - 40%.    Left ventricular wall thickness  is consistent with mild concentric hypertrophy.    Left ventricular diastolic function is consistent with (grade II w/high LAP) pseudonormalization.    The right ventricular cavity is moderate to severely dilated.    The left atrial cavity is moderate to severely dilated.    Left atrial volume is severely increased.    The right atrial cavity is severely  dilated.    There is mild calcification of the aortic valve.    Moderate to severe aortic valve regurgitation is present.    Moderate to severe tricuspid valve regurgitation is present.    Estimated right ventricular systolic pressure from tricuspid regurgitation is markedly elevated (>55 mmHg).      CHEST XRAY  1.Cardiomegaly with pulmonary vascular congestion, suggestive of mild CHF.2.Left basilar atelectasis versus infiltrate        Assessment & Plan     ASSESSMENT    CHF HFrEF, EF 36 to 40%  CAD  proBNP 20,657  Troponins 136 --> 123, chronically elevated troponins in the setting of CHF  GDMT: Coreg 3.125, Jardiance  Moderate to severe TVR > 55 mm Hg   Moderate to severe AVR  Afterload reduction and diuretics  PAF  [id number] 6 on Eliquis  Carvedilol  HTN  HLD  Profile well-controlled  Atorvastatin 40  DM II  A1c 6.4  Jardiance  Sliding scale insulin per attending team  CKD  Baseline around 1.6  Trend daily      PLAN:    Strict I's and O's with 1500 mL fluid restriction  Daily standing weights  2 mg IV Bumex twice daily, 40 mEq potassium daily, 400 mg Mag-Ox  Daily CMP, trend renal function and replace electrolytes per protocol  Resume home GDMT    COLIN Freitas      Dictated utilizing Dragon dictation

## 2025-04-12 NOTE — PROGRESS NOTES
[facility]  PROGRESS NOTE    Patient Name: Iain Dee  : 1935  MRN: [id number]    Date of Admission: 2025  Primary Care Physician: Luis Aguilar DO    Subjective   Subjective     CC:  CHF exacerbation    HPI:  No significant overnight events, patient doing well this a.m., no other complaints otherwise.  Breathing is much better at this time.  Good UOP.  Remains on diuresis.    Objective   Objective     Vital Signs:   Temp:  [97.4 °F (36.3 °C)-97.9 °F (36.6 °C)] 97.6 °F (36.4 °C)  Heart Rate:  [54-75] 61  Resp:  [16-18] 16  BP: (111-141)/(72-98) 132/74     Physical Exam:  Constitutional: No acute distress, awake, alert  HENT: NCAT, mucous membranes moist  Respiratory: Clear to auscultation bilaterally, respiratory effort normal   Cardiovascular: RRR, no murmurs, rubs, or gallops  Gastrointestinal: Positive bowel sounds, soft, nontender, nondistended  Musculoskeletal: 2+ bilateral lower extremity edema  Psychiatric: Appropriate affect, cooperative  Neurologic: Oriented x 3, strength symmetric in all extremities, Cranial Nerves grossly intact to confrontation, speech clear  Skin: No rashes      Results Reviewed:  LAB RESULTS:      Lab 25  0530 25  1608 25  1447   WBC 5.19  --  5.36   HEMOGLOBIN 10.1*  --  9.7*   HEMATOCRIT 32.9*  --  31.8*   PLATELETS 236  --  233   NEUTROS ABS  --   --  4.30   IMMATURE GRANS (ABS)  --   --  0.01   LYMPHS ABS  --   --  0.40*   MONOS ABS  --   --  0.53   EOS ABS  --   --  0.07   .6*  --  99.7*   HSTROP T  --  123* 136*         Lab 25  0734 25  1447   SODIUM 139 137   POTASSIUM 4.2 4.3   CHLORIDE 97* 95*   CO2 31.0* 31.0*   ANION GAP 11.0 11.0   BUN 48* 47*   CREATININE 1.71* 1.84*   EGFR 38.0* 34.8*   GLUCOSE 100* 151*   CALCIUM 8.6 8.7   MAGNESIUM 2.1  --          Lab 25  0734 25  1608 25  1447   TOTAL PROTEIN 5.4*  --  5.8*   ALBUMIN 3.4*  --  3.4*   GLOBULIN  2.0  --  2.4   ALT (SGPT) 19  --  21   AST (SGOT) 21  --  28   BILIRUBIN 1.6*  --  1.5*   BILIRUBIN DIRECT  --  0.7*  --    ALK PHOS 155*  --  161*         Lab 04/11/25  1608 04/11/25  1447   PROBNP  --  20,657.0*   HSTROP T 123* 136*                 Brief Urine Lab Results  (Last result in the past 365 days)        Color   Clarity   Blood   Leuk Est   Nitrite   Protein   CREAT   Urine HCG        04/03/25 1536 Yellow   Clear   Trace   Negative   Negative   100 mg/dL (2+)                   Microbiology Results Abnormal       None            XR Chest 1 View  Result Date: 4/11/2025  XR CHEST 1 VW Date of Exam: 4/11/2025 3:01 PM EDT Indication: sob Comparison: Chest x-ray dated 4/2/2025 Findings: Cardiac silhouette is enlarged. Pulmonary vasculature is indistinct. Mild left basilar atelectasis versus infiltrate. No pleural effusion or pneumothorax is seen. No acute osseous lesion is seen     Impression: Impression: 1.Cardiomegaly with pulmonary vascular congestion, suggestive of mild CHF. 2.Left basilar atelectasis versus infiltrate. Electronically Signed: Osmar Motta MD  4/11/2025 3:19 PM EDT  Workstation ID: [id number]      Results for orders placed during the hospital encounter of 04/02/25    Adult Transthoracic Echo Complete W/ Cont if Necessary Per Protocol    Interpretation Summary    Left ventricular ejection fraction appears to be 36 - 40%.    Left ventricular wall thickness is consistent with mild concentric hypertrophy.    Left ventricular diastolic function is consistent with (grade II w/high LAP) pseudonormalization.    The right ventricular cavity is moderate to severely dilated.    The left atrial cavity is moderate to severely dilated.    Left atrial volume is severely increased.    The right atrial cavity is severely  dilated.    There is mild calcification of the aortic valve.    Moderate to severe aortic valve regurgitation is present.    Moderate to severe tricuspid valve regurgitation is present.     Estimated right ventricular systolic pressure from tricuspid regurgitation is markedly elevated (>55 mmHg).      Current medications:  Scheduled Meds:apixaban, 2.5 mg, Oral, BID  aspirin, 81 mg, Oral, Daily  atorvastatin, 40 mg, Oral, Nightly  carvedilol, 3.125 mg, Oral, BID With Meals  empagliflozin, 10 mg, Oral, Daily  furosemide, 40 mg, Intravenous, Q12H  insulin lispro, 2-7 Units, Subcutaneous, 4x Daily AC & at Bedtime  magnesium oxide, 400 mg, Oral, Daily  pantoprazole, 40 mg, Oral, Q AM  pharmacy consult - MTM, , Not Applicable, Daily  potassium chloride, 20 mEq, Oral, Daily      Continuous Infusions:   PRN Meds:.  acetaminophen **OR** acetaminophen **OR** acetaminophen    senna-docusate sodium **AND** polyethylene glycol **AND** bisacodyl **AND** bisacodyl    dextrose    dextrose    glucagon (human recombinant)    Magnesium Cardiology Dose Replacement - Follow Nurse / BPA Driven Protocol    nitroglycerin    Potassium Replacement - Follow Nurse / BPA Driven Protocol    sodium chloride    sodium chloride    traMADol    Assessment & Plan   Assessment & Plan     Active Hospital Problems    Diagnosis  POA    **Acute exacerbation of CHF (congestive heart failure) [I50.9]  Yes    Thigh hematoma, left, initial encounter [S70.12XA]  Yes    Stage 3b chronic kidney disease [N18.32]  Yes    JOLENE on CPAP [G47.33]  Yes      Resolved Hospital Problems   No resolved problems to display.        Brief Hospital Course to date:  Iain Dee is a 89 y.o. male with PMH significant for HFrEF, valvular heart disease,  A-fib chronically anticoagulated on Eliquis, CAD, DM2, prostate/bladder cancer (~12 years ago) with recent admission to [facility] 4/2-4 5/20/2025 for left thigh hematoma who presents to [facility] ED with worsening edema despite using his home Bumex and worsening mobility. Of note, patient was recommended to go to rehab after his last admission but declined.      This patient's problems and plans were partially entered by my  partner and updated as appropriate by me 04/12/25.      CHF exacerbation  HFrEF  Valvular heart disease  H/o STEMI  Follows with Dr. Stover  Echo 4/4/2025 with EF 36-40%, LV diastolic dysfunction, moderate to severe AVR, moderate to severe tricuspid valve regurg, markedly elevated RVSP >55  Probnp proBNP 20,657 (decreased from prior 22,485 on 4/2)  CXR shows cardiomegaly with pulmonary vascular congestion and left basilar atelectasis versus infiltrate.  Left basilar atelectasis also seen on CXR for 2/20/2025.  Afebrile, normotensive  Patient on room air, add OPEP for atelectasis on CXR, O2 prn  Received Bumex 2 mg IV in ED  Cardiology consulted on admission  Lasix 40 twice daily IV for now  GDMT with Coreg and Jardiance resumed by cardiology  Strict I's & O's  Daily weights  AM CMP, mag  PT/OT recommending rehab     Elevated troponin  Troponins 136>>123, increased from prior admission, likely type II demand ischemia   pt denies chest pain  No significant ST elevation on EKG     Elevated bilirubin  Bili 1.5, was wnl prior admission  Likely related to congestive hepatopathy, alk phos and total bilirubin you typically first to go up  Continue to trend with diuresis  No prior liver imaging, will check gallbladder/liver ultrasound with past duplex     A-fib  rate controlled, continue Coreg  continue Eliquis at renal dosing 2.5 mg BID     Left leg hematoma  Stable from prior, although increased yellowing, appears to be healing   H&H stable  Continue to monitor  AM CBC     Left leg wound  From blister likely secondary to edema/venous stasis  PT wound care consult, may benefit from compression wraps/unna boots  AM CBC     BLE edema  PT wound care consult as above     CKD 3b  Cr slightly elevated 1.84 (appears to fluctuate 1.30-1.80 per chart review) on admission  GDMT resumed by cardiology  Renal function improving with diuresing likely cardiorenal in etiology  AM CMP     CAD  HTN  HLD  Continue ASA, previously on Plavix  that cardiology dc'd last admission d/t hematoma  Continue Coreg  Continue statin, LFTs wnl     DM2  A1c 6.4  On metformin BID at home, hold  ACHS, low-dose SSI     GERD  Daily PPI     JOLENE  CPAP    Expected Discharge Location and Transportation: TBD  Expected Discharge TBD  Expected Discharge Date: 4/14/2025; Expected Discharge Time:      VTE Prophylaxis:  Pharmacologic & mechanical VTE prophylaxis orders are present.     Total time spent: Time Spent: Time Spent: 60 minutes  Time spent includes time reviewing chart, face-to-face time, counseling patient/family/caregiver, ordering medications/tests/procedures, communicating with other health care professionals, documenting clinical information in the electronic health record, and coordination of care.      AM-PAC 6 Clicks Score (PT): 12 (04/12/25 1034)    CODE STATUS:   Code Status and Medical Interventions: CPR (Attempt to Resuscitate); Full Support   Ordered at: 04/11/25 0655     Code Status (Patient has no pulse and is not breathing):    CPR (Attempt to Resuscitate)     Medical Interventions (Patient has pulse or is breathing):    Full Support     Level Of Support Discussed With:    Patient       Isabel Mayes MD  04/12/25

## 2025-04-12 NOTE — THERAPY WOUND CARE TREATMENT
Acute Care - Wound/Debridement Initial Evaluation  [facility]     Patient Name: Iain Dee  : 1935  MRN: [id number]  Today's Date: 2025                Admit Date: 2025    Visit Dx:    ICD-10-CM ICD-9-CM   1. Acute on chronic congestive heart failure, unspecified heart failure type  I50.9 428.0   2. Acute pulmonary edema  J81.0 518.4   3. Anasarca  R60.1 782.3   4. Failure to thrive in adult  R62.7 783.7   5. History of diabetes mellitus  Z86.39 V12.29       Patient Active Problem List   Diagnosis    AMS (altered mental status)    Hypomagnesemia    Hypoalbuminemia    Essential hypertension    Atrial fibrillation    GERD (gastroesophageal reflux disease)    Gout    Diabetes mellitus    JOLENE on CPAP    Chronic anticoagulation (Eliquis)    Alcohol abuse (1 to 2 glasses of bourbon a day)    Memory impairment    STEMI (ST elevation myocardial infarction)    Stage 3b chronic kidney disease    CHF (congestive heart failure)    Thigh hematoma, left, initial encounter    Acute exacerbation of CHF (congestive heart failure)        Past Medical History:   Diagnosis Date    A-fib     Arthritis     Cancer     Diabetes mellitus     GERD (gastroesophageal reflux disease)     Gout     H/O Bladder carcinoma (HCC)     Hypertension         Past Surgical History:   Procedure Laterality Date    BLADDER SURGERY      Biopsy    HERNIA REPAIR      PROSTATE BIOPSY             Wound 25 1113 Left lower leg (Active)   Dressing Appearance open to air 25 1524   Base dry;scab;red;pink;yellow;brown 25 1524   Periwound intact;dry;swelling 25 1524   Periwound Temperature cool 25 1524   Periwound Skin Turgor soft 25 1524   Edges irregular;open 25 1524   Wound Length (cm) 5.2 cm 25 1524   Wound Width (cm) 3.5 cm 25 1524   Wound Surface Area (cm^2) 14.29 cm^2 25 1524   Drainage Characteristics/Odor serous 25 1524   Drainage Amount scant 25 1524   Care, Wound  cleansed with;wound cleanser;debrided 04/12/25 1524   Dressing Care dressing applied;petroleum-based;gauze;silver impregnated;low-adherent;foam;multi-layer wrap 04/12/25 1524   Periwound Care cleansed with pH balanced cleanser;dry periwound area maintained 04/12/25 1524       Wound 04/12/25 1235 Left anterior second toe Traumatic Abrasion (Active)   Dressing Appearance open to air 04/12/25 1524   Base moist;pink;red;yellow;slough 04/12/25 1524   Periwound intact;dry 04/12/25 1524   Periwound Temperature cool 04/12/25 1524   Periwound Skin Turgor soft 04/12/25 1524   Edges irregular;open 04/12/25 1524   Wound Length (cm) 1.5 cm 04/12/25 1524   Wound Width (cm) 2 cm 04/12/25 1524   Wound Depth (cm) 0.1 cm 04/12/25 1524   Wound Surface Area (cm^2) 2.36 cm^2 04/12/25 1524   Wound Volume (cm^3) 0.157 cm^3 04/12/25 1524   Drainage Characteristics/Odor serous 04/12/25 1524   Drainage Amount scant 04/12/25 1524   Care, Wound cleansed with;wound cleanser;debrided 04/12/25 1524   Dressing Care dressing applied;silver impregnated;low-adherent;foam 04/12/25 1524   Periwound Care cleansed with pH balanced cleanser;dry periwound area maintained 04/12/25 1524      Lymphedema       Row Name 04/12/25 1530             Lymphedema Edema Assessment    Ptting Edema Category By severity  -MC      Pitting Edema Moderate;Severe  -MC      Edema Assessment Comment Mod R foot, Severe LLE  -MC         Skin Changes/Observations    Location/Assessment Lower Extremity  -      Lower Extremity Conditions right:;intact;bilateral:;clean;dry  -      Lower Extremity Color/Pigment right:;normal;left:;brawny;other (comment)  ecchymosis lateral/proximal leg  -MC         Lymphedema Pulses/Capillary Refill    Lymphedema Pulses/Capillary Refill lower extremity pulses;capillary refill  -      Dorsalis Pedis Pulse right:;left:;+1 diminished  -MC      Posterior Tibialis Pulse right:;left:;+2 normal  -MC      Capillary Refill lower extremity capillary  refill  -      Lower Extremity Capillary Refill right:;left:;less than 3 seconds  -         Compression/Skin Care    Compression/Skin Care skin care;wrapping location;bandaging  -      Skin Care washed/dried  -      Wrapping Location lower extremity  -      Wrapping Location LE bilateral:;foot to knee  -      Wrapping Comments RLE: size 4 compressogrips. LLE: wound dressings, sizes 4/5 compressogrips. All compression doubled distally to create gradient compression.  -                User Key  (r) = Recorded By, (t) = Taken By, (c) = Cosigned By      Initials Name Provider Type     Yeny Paris, PT Physical Therapist                    WOUND DEBRIDEMENT  Total area of Debridement: 6 cm2  Debridement Site 1  Location- Site 1: L toe  Selective Debridement- Site 1: Wound Surface <20cmsq  Instruments- Site 1: #15, scapel, tweezers  Excised Tissue Description- Site 1: maximum, slough  Bleeding- Site 1: none   Debridement Site 2  Location- Site 2: L anterior leg  Selective Debridement- Site 2: Wound Surface <20cmsq  Instruments- Site 2: tweezers, scissors  Excised Tissue Description- Site 2: minimum, other (comment) (scabbing)  Bleeding- Site 2: none           PT Assessment (Last 12 Hours)       PT Evaluation and Treatment       Row Name 04/12/25 1524          Physical Therapy Time and Intention    Subjective Information complains of;pain;swelling  -     Document Type wound care;evaluation  -     Mode of Treatment physical therapy;individual therapy  -       Row Name 04/12/25 1529          General Information    Patient Profile Reviewed yes  -     Patient Observations alert;cooperative;agree to therapy  -     Barriers to Rehab medically complex;previous functional deficit  -       Row Name 04/12/25 1520          Pain    Pretreatment Pain Rating 4/10  -     Posttreatment Pain Rating 4/10  -     Pain Location extremity  -     Pain Side/Orientation left;lower  -     Pain Management  Interventions positioning techniques utilized  -     Response to Pain Interventions intervention effective per patient report  -       Row Name 04/12/25 1524          Cognition    Orientation Status (Cognition) oriented x 4  -MC       Row Name 04/12/25 1524          Wound 04/03/25 1113 Left lower leg    Wound - Properties Group Placement Date: 04/03/25  -TG Placement Time: 1113  -TG Side: Left  -TG Orientation: lower  -TG Location: leg  -TG    Dressing Appearance open to air  -     Base dry;scab;red;pink;yellow;brown  -     Periwound intact;dry;swelling  -     Periwound Temperature cool  -     Periwound Skin Turgor soft  -     Edges irregular;open  -     Wound Length (cm) 5.2 cm  -     Wound Width (cm) 3.5 cm  -     Wound Depth (cm) --  obscured  -     Wound Surface Area (cm^2) 14.29 cm^2  -     Drainage Characteristics/Odor serous  -     Drainage Amount scant  -     Care, Wound cleansed with;wound cleanser;debrided  -     Dressing Care dressing applied;petroleum-based;gauze;silver impregnated;low-adherent;foam;multi-layer wrap  xeroform, mepilex Ag, MLW  -     Periwound Care cleansed with pH balanced cleanser;dry periwound area maintained  -     Retired Wound - Properties Group Placement Date: 04/03/25  -TG Placement Time: 1113  -TG Side: Left  -TG Orientation: lower  -TG Location: leg  -TG    Retired Wound - Properties Group Placement Date: 04/03/25  -TG Placement Time: 1113  -TG Side: Left  -TG Orientation: lower  -TG Location: leg  -TG    Retired Wound - Properties Group Date first assessed: 04/03/25  -TG Time first assessed: 1113  -TG Side: Left  -TG Location: leg  -TG      Row Name 04/12/25 1524          Wound 04/12/25 1235 Left anterior second toe Traumatic Abrasion    Wound - Properties Group Placement Date: 04/12/25  -MC Placement Time: 1235  -MC Present on Original Admission: Y  -MC Side: Left  -MC Orientation: anterior  -MC Location: second toe  -MC Primary Wound Type:  Traumatic  - Secondary Wound Type - Traumatic: Abrasion  -MC    Dressing Appearance open to air  -     Base moist;pink;red;yellow;slough  -MC     Periwound intact;dry  -MC     Periwound Temperature cool  -MC     Periwound Skin Turgor soft  -     Edges irregular;open  -MC     Wound Length (cm) 1.5 cm  -MC     Wound Width (cm) 2 cm  -MC     Wound Depth (cm) 0.1 cm  -MC     Wound Surface Area (cm^2) 2.36 cm^2  -MC     Wound Volume (cm^3) 0.157 cm^3  -MC     Drainage Characteristics/Odor serous  -MC     Drainage Amount scant  -MC     Care, Wound cleansed with;wound cleanser;debrided  -     Dressing Care dressing applied;silver impregnated;low-adherent;foam  mepilex Ag, PF tape  -     Periwound Care cleansed with pH balanced cleanser;dry periwound area maintained  -     Retired Wound - Properties Group Placement Date: 04/12/25  - Placement Time: 1235  -MC Present on Original Admission: Y  -MC Side: Left  -MC Orientation: anterior  -MC Location: second toe  -MC    Retired Wound - Properties Group Placement Date: 04/12/25  - Placement Time: 1235  -MC Present on Original Admission: Y  -MC Side: Left  -MC Orientation: anterior  -MC Location: second toe  -MC    Retired Wound - Properties Group Date first assessed: 04/12/25  - Time first assessed: 1235  -MC Present on Original Admission: Y  -MC Side: Left  -MC Location: second toe  -MC      Row Name 04/12/25 1524          Coping    Observed Emotional State calm;cooperative  -       Row Name 04/12/25 1524          Plan of Care Review    Plan of Care Reviewed With patient;family  -     Outcome Evaluation PT Wound care eval complete. Pt with venous ulceration vs previous blister to the anterior LLE, as well as abrasion to the L 2nd toe, both of which required debridement today. Pt with notable edema to the entire LLE, along with extensive ecchymosis related to recent hematoma development. Pt with moderate edema to the R foot as well. Pt with very cold  extremities in a dependent position, but PT was able to palpate pulses in both LEs. Pt will benefit from skilled PT wound care for debridement and compression therapy as appropriate to promote healing. Anticipate next change in 2-3 days.  -       Row Name 04/12/25 1524          Positioning and Restraints    Pre-Treatment Position sitting in chair/recliner  -     Post Treatment Position chair  -MC     In Chair sitting;call light within reach;encouraged to call for assist;with family/caregiver  -       Row Name 04/12/25 1524          Therapy Assessment/Plan (PT)    Patient/Family Therapy Goals Statement (PT) reduce pain to LLE  -     Rehab Potential (PT) fair  -     Criteria for Skilled Interventions Met (PT) yes;meets criteria;skilled treatment is necessary  -     Predicted Duration of Therapy Intervention (PT) 1 week  -       Row Name 04/12/25 1524          Physical Therapy Goals    Wound Management Goal Selection (PT) wound management, PT goal 1;wound management, PT goal 2  -[facility] Name 04/12/25 1524          Wound Management Goal 1 (PT)    Wound Management Goal (Wound Goal 1, PT) Pt will demonstrate only moderate edema to the LLE to indicate appropriate edema management  -     Time Frame (Wound Goal 1, PT) short-term goal (STG);3 days  -       Row Name 04/12/25 1524          Wound Management Goal 2 (PT)    Wound Management Goal (Wound Goal 2, PT) Pt will demonstrate 10% reduction in LLE wound dimensions to indicate healing progress.  -     Time Frame (Wound Goal 2, PT) long-term goal (LTG);1 week  -               User Key  (r) = Recorded By, (t) = Taken By, (c) = Cosigned By      Initials Name Provider Type    Yeny Savage PT Physical Therapist    Asim Arevalo, RN Registered Nurse                  Physical Therapy Education       Title: PT OT SLP Therapies (In Progress)       Topic: Physical Therapy (Done)       Point: Mobility training (Done)       Learning Progress  Summary            Patient Acceptance, E, VU by ET at 4/12/2025 1034                      Point: Home exercise program (Done)       Learning Progress Summary            Patient Acceptance, E, VU by ET at 4/12/2025 1034                      Point: Body mechanics (Done)       Learning Progress Summary            Patient Acceptance, E, VU by ET at 4/12/2025 1034                      Point: Precautions (Done)       Learning Progress Summary            Patient Acceptance, E, VU by ET at 4/12/2025 1034                                      User Key       Initials Effective Dates Name Provider Type Discipline    ET 07/26/24 -  Trudy Shoemaker, PT Physical Therapist PT                    Recommendation and Plan  Anticipated Discharge Disposition (PT): other (see comments) (defer to PT mobility. Will require skilled wound care upon d/c.)  Planned Therapy Interventions (PT): wound care, patient/family education  Therapy Frequency (PT): daily  Plan of Care Reviewed With: patient, family           Outcome Evaluation: PT Wound care eval complete. Pt with venous ulceration vs previous blister to the anterior LLE, as well as abrasion to the L 2nd toe, both of which required debridement today. Pt with notable edema to the entire LLE, along with extensive ecchymosis related to recent hematoma development. Pt with moderate edema to the R foot as well. Pt with very cold extremities in a dependent position, but PT was able to palpate pulses in both LEs. Pt will benefit from skilled PT wound care for debridement and compression therapy as appropriate to promote healing. Anticipate next change in 2-3 days.  Plan of Care Reviewed With: patient, family            Time Calculation   PT Charges       Row Name 04/12/25 1534 04/12/25 0832          Time Calculation    Start Time 1235  - 0832  -ET     PT Received On -- 04/12/25  -ET     PT Goal Re-Cert Due Date 04/22/25  - 04/22/25  -ET        Time Calculation- PT    Total Timed Code Minutes- PT  -- 15 minute(s)  -ET        Timed Charges    38905 - PT Therapeutic Activity Minutes -- 15  -ET        Untimed Charges    PT Eval/Re-eval Minutes -- 46  -ET     Wound Care 79836 Multilayer comp below knee;32358 Selective debridement  -MC --     21654-Kbjotbrzoe comp below knee 10  -MC --     43256-Hnvzdrjhv debridement 15  -MC --        Total Minutes    Timed Charges Total Minutes -- 15  -ET     Untimed Charges Total Minutes 25  -MC 46  -ET      Total Minutes 25  -MC 61  -ET               User Key  (r) = Recorded By, (t) = Taken By, (c) = Cosigned By      Initials Name Provider Type    Yeny Savage, PT Physical Therapist    ET Trudy Shoemaker, PT Physical Therapist                            PT G-Codes  Outcome Measure Options: AM-PAC 6 Clicks Daily Activity (OT)  AM-PAC 6 Clicks Score (PT): 12  AM-PAC 6 Clicks Score (OT): 13       Yeny Paris, PT  4/12/2025

## 2025-04-12 NOTE — PLAN OF CARE
Goal Outcome Evaluation:  Plan of Care Reviewed With: patient           Outcome Evaluation: PT evaluation completed this date. Pt limited by decreased activity tolerance, BLE weakness, BLE edema distal to knee, and dynamic balance deficits. Performed bed mobility with mod-max assist, sit<>stands from various surfaces with modA x 2, dynamic standing tasks with modA x2 and BUE support to RW. Pt unable to safely progress to gait 2/2 to BLE weakness and easily fatiguing with standing balance tasks. Would benefit from continued skilled PT to addres functional mobility deficits. PT recommends IPR at d/c to maximize safe return to PLOF.    Anticipated Discharge Disposition (PT): inpatient rehabilitation facility

## 2025-04-12 NOTE — PLAN OF CARE
Goal Outcome Evaluation:  Plan of Care Reviewed With: patient           Outcome Evaluation: Pt presents with generalized weakness, decreased activity tolerance, pain and balance deficits limiting BADLs and fxnl mobility. Pt D for LBD and PAINTING for grooming tasks. Pt required modA to assume EOB, stand and transition from EOB to chair. Pt required VCs/TCs for HP, sequencing, hip extension and posture throughout. IPOT rec. to address deficits and support return to baseline. Rec. IRF upon d/c.    Anticipated Discharge Disposition (OT): inpatient rehabilitation facility

## 2025-04-12 NOTE — THERAPY EVALUATION
Patient Name: Iain Dee  : 1935    MRN: [id number]                              Today's Date: 2025       Admit Date: 2025    Visit Dx:     ICD-10-CM ICD-9-CM   1. Acute on chronic congestive heart failure, unspecified heart failure type  I50.9 428.0   2. Acute pulmonary edema  J81.0 518.4   3. Anasarca  R60.1 782.3   4. Failure to thrive in adult  R62.7 783.7   5. History of diabetes mellitus  Z86.39 V12.29     Patient Active Problem List   Diagnosis    AMS (altered mental status)    Hypomagnesemia    Hypoalbuminemia    Essential hypertension    Atrial fibrillation    GERD (gastroesophageal reflux disease)    Gout    Diabetes mellitus    JOLENE on CPAP    Chronic anticoagulation (Eliquis)    Alcohol abuse (1 to 2 glasses of bourbon a day)    Memory impairment    STEMI (ST elevation myocardial infarction)    Stage 3b chronic kidney disease    CHF (congestive heart failure)    Thigh hematoma, left, initial encounter    Acute exacerbation of CHF (congestive heart failure)     Past Medical History:   Diagnosis Date    A-fib     Arthritis     Cancer     Diabetes mellitus     GERD (gastroesophageal reflux disease)     Gout     H/O Bladder carcinoma (HCC)     Hypertension      Past Surgical History:   Procedure Laterality Date    BLADDER SURGERY      Biopsy    HERNIA REPAIR      PROSTATE BIOPSY        General Information       Row Name 25 1050          OT Time and Intention    Document Type evaluation  -AF     Mode of Treatment occupational therapy  -AF     Patient Effort good  -AF       Row Name 25 1050          General Information    Patient Profile Reviewed yes  -AF     Prior Level of Function independent:;all household mobility;gait;transfer;bed mobility;ADL's;feeding;grooming;dressing;bathing  Pt lives in one level home, no steps to enter. Reports no recent falls. Has a tub shower with a shower seat and grab bars. Reports being IND with ADLs and household gait at baseline with use of  rollator.  -AF     Existing Precautions/Restrictions fall  -AF     Barriers to Rehab previous functional deficit  -AF       Row Name 04/12/25 1050          Living Environment    Current Living Arrangements home  -AF     People in Home spouse  -AF       Row Name 04/12/25 1050          Home Main Entrance    Number of Stairs, Main Entrance none  -AF       Row Name 04/12/25 1050          Stairs Within Home, Primary    Number of Stairs, Within Home, Primary none  -AF       Row Name 04/12/25 1050          Cognition    Orientation Status (Cognition) oriented x 4  -AF       Row Name 04/12/25 1050          Safety Issues/Impairments Affecting Functional Mobility    Safety Issues Affecting Function (Mobility) awareness of need for assistance;insight into deficits/self-awareness;positioning of assistive device;safety precaution awareness;safety precautions follow-through/compliance;sequencing abilities  -AF     Impairments Affecting Function (Mobility) balance;endurance/activity tolerance;pain;postural/trunk control;range of motion (ROM);strength;sensation/sensory awareness  -AF               User Key  (r) = Recorded By, (t) = Taken By, (c) = Cosigned By      Initials Name Provider Type    AF Christen Leung OT Occupational Therapist                     Mobility/ADL's       Row Name 04/12/25 1051          Bed Mobility    Bed Mobility supine-sit;scooting/bridging  -AF     Scooting/Bridging [place] (Bed Mobility) maximum assist (25% patient effort);1 person assist;verbal cues;nonverbal cues (demo/gesture)  -AF     Supine-Sit [place] (Bed Mobility) moderate assist (50% patient effort);1 person assist;verbal cues;nonverbal cues (demo/gesture);2 person assist  -AF     Assistive Device (Bed Mobility) bed rails;head of bed elevated;repositioning sheet  -AF     Comment, (Bed Mobility) VCs for sequencing and task initiation. Difficulty maintaining full upright posture, VCs and TCs required. Increased time required to complete  tasks. Difficulty squaring hips EOB.  -       Row Name 04/12/25 1051          Transfers    Transfers bed-chair transfer;sit-stand transfer;stand-sit transfer  -AF     Comment, (Transfers) Completed STS x1 from EOB and x1 from recliner. VCs for hand placement, sequencing, anterior weight shifting and full upright posutre. TCs for facilitation of hip extension in standing. Completed static and dynamic standing task for 30-60 seconds at a time.  -       Row Name 04/12/25 1051          Bed-Chair Transfer    Bed-Chair [place] (Transfers) moderate assist (50% patient effort);verbal cues;nonverbal cues (demo/gesture);2 person assist  -AF     Assistive Device (Bed-Chair Transfers) walker, front-wheeled  -AF     Comment, (Bed-Chair Transfer) VCs for sequencing.  -[facility] Name 04/12/25 1051          Sit-Stand Transfer    Sit-Stand [place] (Transfers) moderate assist (50% patient effort);verbal cues;nonverbal cues (demo/gesture);2 person assist  -AF     Assistive Device (Sit-Stand Transfers) walker, front-wheeled  -AF     Comment, (Sit-Stand Transfer) VCs for hand placement.  -       Row Name 04/12/25 1051          Stand-Sit Transfer    Stand-Sit [place] (Transfers) minimum assist (75% patient effort);nonverbal cues (demo/gesture);verbal cues;2 person assist  -AF     Assistive Device (Stand-Sit Transfers) walker, front-wheeled  -AF     Comment, (Stand-Sit Transfer) VCs for HP and slowly lowering.  -       Row Name 04/12/25 1051          Activities of Daily Living    BADL Assessment/Intervention lower body dressing;grooming  -[facility] Name 04/12/25 1051          Lower Body Dressing Assessment/Training    [place] Level (Lower Body Dressing) don;socks;dependent (less than 25% patient effort)  -     Position (Lower Body Dressing) sitting up in bed  -[facility] Name 04/12/25 1051          Grooming Assessment/Training    [place] Level (Grooming) hair care, combing/brushing;oral care  regimen;wash face, hands;set up  -AF     Position (Grooming) supported sitting  -AF               User Key  (r) = Recorded By, (t) = Taken By, (c) = Cosigned By      Initials Name Provider Type    Christen Robbins OT Occupational Therapist                   Obj/Interventions       Row Name 04/12/25 1055          Sensory Assessment (Somatosensory)    Sensory Assessment (Somatosensory) UE sensation intact  -AF       Row Name 04/12/25 1055          Vision Assessment/Intervention    Visual Impairment/Limitations WFL  -AF       Row Name 04/12/25 1055          Range of Motion Comprehensive    General Range of Motion bilateral upper extremity ROM WFL  -AF       Row Name 04/12/25 1055          Strength Comprehensive (MMT)    General Manual Muscle Testing (MMT) Assessment upper extremity strength deficits identified  -AF     Comment, General Manual Muscle Testing (MMT) Assessment Grossly 4-/5  -AF       Row Name 04/12/25 1055          Balance    Balance Assessment sitting static balance;sitting dynamic balance;sit to stand dynamic balance;standing static balance;standing dynamic balance  -AF     Static Sitting Balance contact guard  -AF     Dynamic Sitting Balance contact guard  -AF     Position, Sitting Balance unsupported;sitting edge of bed  -AF     Static Standing Balance minimal assist  -AF     Dynamic Standing Balance moderate assist  -AF     Position/Device Used, Standing Balance supported;walker, rolling  -AF     Balance Interventions sitting;standing;sit to stand;supported;static;dynamic;occupation based/functional task  -AF     Comment, Balance Static and dynamic standing tasks for 30-60 seconds x2. More difficulty advancing RLE forward with dynamic movments. Difficulty with hip extension and full upright posture.  -AF               User Key  (r) = Recorded By, (t) = Taken By, (c) = Cosigned By      Initials Name Provider Type    Christen Robbins OT Occupational Therapist                   Goals/Plan       [facility]  Name 04/12/25 1059          Transfer Goal 1 (OT)    Activity/Assistive Device (Transfer Goal 1, OT) sit-to-stand/stand-to-sit;bed-to-chair/chair-to-bed;commode, bedside without drop arms  -AF     [place] Level/Cues Needed (Transfer Goal 1, OT) minimum assist (75% or more patient effort)  -AF     Time Frame (Transfer Goal 1, OT) long term goal (LTG);by discharge  -AF       Row Name 04/12/25 1059          Dressing Goal 1 (OT)    Activity/Device (Dressing Goal 1, OT) lower body dressing;sock-aid  -AF     [place]/Cues Needed (Dressing Goal 1, OT) moderate assist (50-74% patient effort)  -AF     Time Frame (Dressing Goal 1, OT) long term goal (LTG);by discharge  -AF       Row Name 04/12/25 1059          Toileting Goal 1 (OT)    Activity/Device (Toileting Goal 1, OT) adjust/manage clothing;perform perineal hygiene;commode, bedside without drop arms  -AF     [place] Level/Cues Needed (Toileting Goal 1, OT) moderate assist (50-74% patient effort)  -AF     Time Frame (Toileting Goal 1, OT) long term goal (LTG);by discharge  -AF       Row Name 04/12/25 1059          Grooming Goal 1 (OT)    Activity/Device (Grooming Goal 1, OT) hair care;wash face, hands;oral care  -AF     [place] (Grooming Goal 1, OT) set-up required  -AF     Time Frame (Grooming Goal 1, OT) short term goal (STG);5 days  -AF       Row Name 04/12/25 1059          Therapy Assessment/Plan (OT)    Planned Therapy Interventions (OT) activity tolerance training;adaptive equipment training;BADL retraining;functional balance retraining;occupation/activity based interventions;patient/caregiver education/training;ROM/therapeutic exercise;strengthening exercise;transfer/mobility retraining  -AF               User Key  (r) = Recorded By, (t) = Taken By, (c) = Cosigned By      Initials Name Provider Type    AF Christen Leung OT Occupational Therapist                   Clinical Impression       Row Name 04/12/25 1056          Pain Assessment     Pre/Posttreatment Pain Comment Pt noted pain but did not formally rate, did not appear to impact BADLs/fxnl mobilituy.  -AF       Row Name 04/12/25 1056          Plan of Care Review    Plan of Care Reviewed With patient  -AF     Outcome Evaluation Pt presents with generalized weakness, decreased activity tolerance, pain and balance deficits limiting BADLs and fxnl mobility. Pt D for LBD and PAINTING for grooming tasks. Pt required modA to assume EOB, stand and transition from EOB to chair. Pt required VCs/TCs for HP, sequencing, hip extension and posture throughout. IPOT rec. to address deficits and support return to baseline. Rec. IRF upon d/c.  -AF       Row Name 04/12/25 1056          Therapy Assessment/Plan (OT)    Patient/Family Therapy Goal Statement (OT) go to rehab and get stronger  -AF     Rehab Potential (OT) good  -AF     Criteria for Skilled Therapeutic Interventions Met (OT) yes;meets criteria;skilled treatment is necessary  -AF     Therapy Frequency (OT) daily  -AF     Predicted Duration of Therapy Intervention (OT) 10 days  -AF       Row Name 04/12/25 1056          Therapy Plan Review/Discharge Plan (OT)    Anticipated Discharge Disposition (OT) inpatient rehabilitation facility  -AF       Row Name 04/12/25 1056          Vital Signs    Pretreatment Heart Rate (beats/min) 60  -AF     Posttreatment Heart Rate (beats/min) 72  -AF     Pre SpO2 (%) 89  -AF     O2 Delivery Pre Treatment room air  -AF     Post SpO2 (%) 92  -AF     O2 Delivery Post Treatment room air  -AF     Pre Patient Position Supine  -AF     Intra Patient Position Standing  -AF     Post Patient Position Sitting  -AF       Row Name 04/12/25 1056          Positioning and Restraints    Pre-Treatment Position in bed  -AF     Post Treatment Position chair  -AF     In Chair notified nsg;reclined;waffle cushion;sitting;call light within reach;encouraged to call for assist;exit alarm on;with family/caregiver  -AF               User Key  (r) = Recorded  By, (t) = Taken By, (c) = Cosigned By      Initials Name Provider Type    Christen Robbins OT Occupational Therapist                   Outcome Measures       Row Name 04/12/25 1100          How much help from another is currently needed...    Putting on and taking off regular lower body clothing? 1  -AF     Bathing (including washing, rinsing, and drying) 2  -AF     Toileting (which includes using toilet bed pan or urinal) 1  -AF     Putting on and taking off regular upper body clothing 2  -AF     Taking care of personal grooming (such as brushing teeth) 3  -AF     Eating meals 4  -AF     AM-PAC 6 Clicks Score (OT) 13  -AF       Row Name 04/12/25 1034 04/12/25 0800       How much help from another person do you currently need...    Turning from your back to your side while in flat bed without using bedrails? 3  -ET 3  -MH    Moving from lying on back to sitting on the side of a flat bed without bedrails? 2  -ET 3  -MH    Moving to and from a bed to a chair (including a wheelchair)? 2  -ET 2  -MH    Standing up from a chair using your arms (e.g., wheelchair, bedside chair)? 2  -ET 2  -MH    Climbing 3-5 steps with a railing? 1  -ET 1  -MH    To walk in hospital room? 2  -ET 1  -MH    AM-PAC 6 Clicks Score (PT) 12  -ET 12  -MH    Highest Level of Mobility Goal 4 --> Transfer to chair/commode  -ET 4 --> Transfer to chair/commode  -MH      Row Name 04/12/25 1100 04/12/25 1034       Functional Assessment    Outcome Measure Options AM-PAC 6 Clicks Daily Activity (OT)  -AF AM-PAC 6 Clicks Basic Mobility (PT)  -ET              User Key  (r) = Recorded By, (t) = Taken By, (c) = Cosigned By      Initials Name Provider Type    Tracy Springer RN Registered Nurse    Christen Robbins OT Occupational Therapist    ET Trudy Shoemaker PT Physical Therapist                    Occupational Therapy Education       Title: PT OT SLP Therapies (In Progress)       Topic: Occupational Therapy (In Progress)       Point: ADL training  (Done)       Learning Progress Summary            Patient Acceptance, E,TB, VU by AF at 4/12/2025 1101                      Point: Body mechanics (Done)       Learning Progress Summary            Patient Acceptance, E,TB, VU by AF at 4/12/2025 1101                                      User Key       Initials Effective Dates Name Provider Type Discipline     08/15/23 -  Christen Leung OT Occupational Therapist OT                  OT Recommendation and Plan  Planned Therapy Interventions (OT): activity tolerance training, adaptive equipment training, BADL retraining, functional balance retraining, occupation/activity based interventions, patient/caregiver education/training, ROM/therapeutic exercise, strengthening exercise, transfer/mobility retraining  Therapy Frequency (OT): daily  Plan of Care Review  Plan of Care Reviewed With: patient  Outcome Evaluation: Pt presents with generalized weakness, decreased activity tolerance, pain and balance deficits limiting BADLs and fxnl mobility. Pt D for LBD and PAINTING for grooming tasks. Pt required modA to assume EOB, stand and transition from EOB to chair. Pt required VCs/TCs for HP, sequencing, hip extension and posture throughout. IPOT rec. to address deficits and support return to baseline. Rec. IRF upon d/c.     Time Calculation:   Evaluation Complexity (OT)  Review Occupational Profile/Medical/Therapy History Complexity: expanded/moderate complexity  Assessment, Occupational Performance/Identification of Deficit Complexity: 3-5 performance deficits  Clinical Decision Making Complexity (OT): detailed assessment/moderate complexity  Overall Complexity of Evaluation (OT): moderate complexity     Time Calculation- OT       Row Name 04/12/25 1102             Time Calculation- OT    OT Start Time 0835  -AF      OT Received On 04/12/25  -AF      OT Goal Re-Cert Due Date 04/22/25  -AF         Timed Charges    22783 - OT Self Care/Mgmt Minutes 15  -AF         Untimed Charges     OT Eval/Re-eval Minutes 45  -AF         Total Minutes    Timed Charges Total Minutes 15  -AF      Untimed Charges Total Minutes 45  -AF       Total Minutes 60  -AF                User Key  (r) = Recorded By, (t) = Taken By, (c) = Cosigned By      Initials Name Provider Type    AF Christen Leung OT Occupational Therapist                  Therapy Charges for Today       Code Description Service Date Service Provider Modifiers Qty    [id number]  OT EVAL MOD COMPLEXITY 4 4/12/2025 Christen Leung OT GO 1    [id number]  OT SELF CARE/MGMT/TRAIN EA 15 MIN 4/12/2025 Christen Leung OT GO 1                 Christen Leung OT  4/12/2025

## 2025-04-12 NOTE — THERAPY EVALUATION
Patient Name: Iain Dee  : 1935    MRN: [id number]                              Today's Date: 2025       Admit Date: 2025    Visit Dx:     ICD-10-CM ICD-9-CM   1. Acute on chronic congestive heart failure, unspecified heart failure type  I50.9 428.0   2. Acute pulmonary edema  J81.0 518.4   3. Anasarca  R60.1 782.3   4. Failure to thrive in adult  R62.7 783.7   5. History of diabetes mellitus  Z86.39 V12.29     Patient Active Problem List   Diagnosis    AMS (altered mental status)    Hypomagnesemia    Hypoalbuminemia    Essential hypertension    Atrial fibrillation    GERD (gastroesophageal reflux disease)    Gout    Diabetes mellitus    JOLENE on CPAP    Chronic anticoagulation (Eliquis)    Alcohol abuse (1 to 2 glasses of bourbon a day)    Memory impairment    STEMI (ST elevation myocardial infarction)    Stage 3b chronic kidney disease    CHF (congestive heart failure)    Thigh hematoma, left, initial encounter    Acute exacerbation of CHF (congestive heart failure)     Past Medical History:   Diagnosis Date    A-fib     Arthritis     Cancer     Diabetes mellitus     GERD (gastroesophageal reflux disease)     Gout     H/O Bladder carcinoma (HCC)     Hypertension      Past Surgical History:   Procedure Laterality Date    BLADDER SURGERY      Biopsy    HERNIA REPAIR      PROSTATE BIOPSY        General Information       Row Name 25 0832          Physical Therapy Time and Intention    Document Type evaluation  -ET     Mode of Treatment physical therapy  -ET       Row Name 25 0832          General Information    Patient Profile Reviewed yes  -ET     Prior Level of Function independent:;all household mobility;gait;transfer;bed mobility;ADL's;feeding;grooming;bathing;dressing  Pt lives in one level home, no steps to enter. Reports no recent falls. Has a tub shower with a shower seat and grab bars. Reports being IND with ADLs and household gait at baseline with use of rollator.  -ET      Existing Precautions/Restrictions fall  -ET     Barriers to Rehab previous functional deficit  -ET       Row Name 04/12/25 08          Living Environment    Current Living Arrangements home  -ET     People in Home spouse  -ET       Row Name 04/12/25 [facility]          Home Main Entrance    Number of Stairs, Main Entrance none  -ET       Row Name 04/12/25 08          Stairs Within Home, Primary    Number of Stairs, Within Home, Primary none  -ET       Row Name 04/12/25 [facility]          Cognition    Orientation Status (Cognition) oriented x 4  -ET       Row Name 04/12/25 [facility]          Safety Issues/Impairments Affecting Functional Mobility    Safety Issues Affecting Function (Mobility) insight into deficits/self-awareness;positioning of assistive device;safety precaution awareness;safety precautions follow-through/compliance;sequencing abilities  -ET     Impairments Affecting Function (Mobility) balance;endurance/activity tolerance;pain;postural/trunk control;range of motion (ROM);strength;sensation/sensory awareness  -ET               User Key  (r) = Recorded By, (t) = Taken By, (c) = Cosigned By      Initials Name Provider Type    ET Trudy Shoemaker PT Physical Therapist                   Mobility       Row Name 04/12/25 [facility]          Bed Mobility    Bed Mobility supine-sit;scooting/bridging  -ET     Scooting/Bridging [place] (Bed Mobility) maximum assist (25% patient effort);1 person assist;verbal cues;nonverbal cues (demo/gesture)  -ET     Supine-Sit [place] (Bed Mobility) moderate assist (50% patient effort);1 person assist;verbal cues;nonverbal cues (demo/gesture)  -ET     Assistive Device (Bed Mobility) bed rails;head of bed elevated;repositioning sheet  -ET     Comment, (Bed Mobility) Verbal cues provided for initiation and sequencing of bed mobility tasks. Requires assist at trunk to attain upright. Cued to scoot towards EOB for BLE placement to improve static sitting at EOB and prepare for progression of  mobility to standing. Increased time required to sequence and complete. Denies dizziness at EOB.  -ET       Row Name 04/12/25 0832          Transfers    Comment, (Transfers) Performed x2 STS from various surfaces throughout session. Verbal cues for hand placement, sequencing with RW, anterior weight shift into standing. Tactile cues required to facilitate hip extension in standing. Tolerated standing for ~30-60 seconds at a time.  -ET       Row Name 04/12/25 0832          Bed-Chair Transfer    Bed-Chair [place] (Transfers) moderate assist (50% patient effort);verbal cues;nonverbal cues (demo/gesture)  -ET     Assistive Device (Bed-Chair Transfers) walker, front-wheeled  -ET       Row Name 04/12/25 0832          Sit-Stand Transfer    Sit-Stand [place] (Transfers) moderate assist (50% patient effort);verbal cues;nonverbal cues (demo/gesture)  -ET     Assistive Device (Sit-Stand Transfers) walker, front-wheeled  -ET       Row Name 04/12/25 0832          Gait/Stairs (Locomotion)    Patient was able to Ambulate no, other medical factors prevent ambulation  -ET     Reason Patient was unable to Ambulate Excessive Weakness  -ET               User Key  (r) = Recorded By, (t) = Taken By, (c) = Cosigned By      Initials Name Provider Type    ET Navid, Trudy, PT Physical Therapist                   Obj/Interventions       Row Name 04/12/25 0832          Range of Motion Comprehensive    General Range of Motion lower extremity range of motion deficits identified  -ET     Comment, General Range of Motion Lacking ~5-10 degrees of knee extension bilaterally, hip/ankle WFL.  -ET       Row Name 04/12/25 0832          Strength Comprehensive (MMT)    General Manual Muscle Testing (MMT) Assessment lower extremity strength deficits identified  -ET     Comment, General Manual Muscle Testing (MMT) Assessment grossly 4-/5 BLE  -ET       Row Name 04/12/25 0832          Balance    Balance Assessment sitting static balance;sitting  dynamic balance;standing static balance;standing dynamic balance  -ET     Static Sitting Balance contact guard  -ET     Dynamic Sitting Balance contact guard  -ET     Position, Sitting Balance unsupported;sitting edge of bed  -ET     Static Standing Balance minimal assist  -ET     Dynamic Standing Balance moderate assist  -ET     Position/Device Used, Standing Balance supported;walker, rolling  -ET     Balance Interventions sitting;standing;sit to stand;supported;static;dynamic;moderate challenge;narrowed base of support;occupation based/functional task;weight shifting activity  -ET     Comment, Balance Pt performed x30 seconds of each for pre-gait activities: standing weight shifting R<>L, forward/backward steps, and alt marching. BUE support on RW throughout. With marching and forward stepping tasks, pt wiht notable increased BLE flexion d/t fatigue. Difficulty advancing RLE forward compared to LLE. Cued throughout for upright posture, hip extension, and general sequencing.  -ET       Row Name 04/12/25 0832          Sensory Assessment (Somatosensory)    Sensory Assessment (Somatosensory) other (see comments)  Reports BLE chronic numbness/tingling distal to knee  -ET               User Key  (r) = Recorded By, (t) = Taken By, (c) = Cosigned By      Initials Name Provider Type    ET Trudy Shoemaker, PT Physical Therapist                   Goals/Plan       Row Name 04/12/25 1032          Bed Mobility Goal 1 (PT)    Activity/Assistive Device (Bed Mobility Goal 1, PT) bed mobility activities, all  -ET     [place] Level/Cues Needed (Bed Mobility Goal 1, PT) contact guard required  -ET     Time Frame (Bed Mobility Goal 1, PT) 5 days;short term goal (STG)  -ET       Row Name 04/12/25 1032          Transfer Goal 1 (PT)    Activity/Assistive Device (Transfer Goal 1, PT) sit-to-stand/stand-to-sit;bed-to-chair/chair-to-bed;walker, rolling  -ET     [place] Level/Cues Needed (Transfer Goal 1, PT) contact guard required   -ET     Time Frame (Transfer Goal 1, PT) long term goal (LTG);10 days  -ET       Row Name 04/12/25 1032          Gait Training Goal 1 (PT)    Activity/Assistive Device (Gait Training Goal 1, PT) gait (walking locomotion);walker, rolling  -ET     [place] Level (Gait Training Goal 1, PT) contact guard required  -ET     Distance (Gait Training Goal 1, PT) 50 ft  -ET     Time Frame (Gait Training Goal 1, PT) long term goal (LTG);10 days  -ET       Row Name 04/12/25 1032          Therapy Assessment/Plan (PT)    Planned Therapy Interventions (PT) balance training;bed mobility training;gait training;home exercise program;stair training;strengthening;stretching;transfer training;neuromuscular re-education;ROM (range of motion);postural re-education;patient/family education  -ET               User Key  (r) = Recorded By, (t) = Taken By, (c) = Cosigned By      Initials Name Provider Type    ET Trudy Shoemaker, PT Physical Therapist                   Clinical Impression       Row Name 04/12/25 1027          Pain    Pain Location extremity  -ET     Pain Side/Orientation left;lower  -ET     Pain Management Interventions positioning techniques utilized;exercise or physical activity utilized  -ET     Response to Pain Interventions activity participation with tolerable pain  -ET     Pre/Posttreatment Pain Comment Pain not formally rated, but did not appear to limit functional mobility.  -ET       Row Name 04/12/25 1027          Plan of Care Review    Plan of Care Reviewed With patient  -ET     Outcome Evaluation PT evaluation completed this date. Pt limited by decreased activity tolerance, BLE weakness, BLE edema distal to knee, and dynamic balance deficits. Performed bed mobility with mod-max assist, sit<>stands from various surfaces with modA x 2, dynamic standing tasks with modA x2 and BUE support to RW. Pt unable to safely progress to gait 2/2 to BLE weakness and easily fatiguing with standing balance tasks. Would benefit  from continued skilled PT to addres functional mobility deficits. PT recommends IPR at d/c to maximize safe return to PLOF.  -ET       Row Name 04/12/25 1027          Therapy Assessment/Plan (PT)    Patient/Family Therapy Goals Statement (PT) to improve strength  -ET     Rehab Potential (PT) good  -ET     Criteria for Skilled Interventions Met (PT) yes;skilled treatment is necessary  -ET     Therapy Frequency (PT) daily  -ET     Predicted Duration of Therapy Intervention (PT) 10 days  -ET       Row Name 04/12/25 1027          Vital Signs    Pre Systolic BP Rehab 132  -ET     Pre Treatment Diastolic BP 87  -ET     Pretreatment Heart Rate (beats/min) 68  -ET     Posttreatment Heart Rate (beats/min) 67  -ET     Pre SpO2 (%) 92  -ET     O2 Delivery Pre Treatment room air  -ET     Post SpO2 (%) 93  -ET     O2 Delivery Post Treatment room air  -ET     Pre Patient Position Supine  -ET     Post Patient Position Sitting  -ET       Row Name 04/12/25 1027          Positioning and Restraints    Pre-Treatment Position in bed  -ET     Post Treatment Position chair  -ET     In Chair notified nsg;reclined;sitting;call light within reach;encouraged to call for assist;exit alarm on;waffle cushion;legs elevated  -ET               User Key  (r) = Recorded By, (t) = Taken By, (c) = Cosigned By      Initials Name Provider Type    ET Trudy Shoemaker, PT Physical Therapist                   Outcome Measures       Row Name 04/12/25 1034          How much help from another person do you currently need...    Turning from your back to your side while in flat bed without using bedrails? 3  -ET     Moving from lying on back to sitting on the side of a flat bed without bedrails? 2  -ET     Moving to and from a bed to a chair (including a wheelchair)? 2  -ET     Standing up from a chair using your arms (e.g., wheelchair, bedside chair)? 2  -ET     Climbing 3-5 steps with a railing? 1  -ET     To walk in hospital room? 2  -ET     AM-PAC 6 Clicks Score  (PT) 12  -ET     Highest Level of Mobility Goal 4 --> Transfer to chair/commode  -ET       Row Name 04/12/25 1034          Functional Assessment    Outcome Measure Options AM-PAC 6 Clicks Basic Mobility (PT)  -ET               User Key  (r) = Recorded By, (t) = Taken By, (c) = Cosigned By      Initials Name Provider Type    ET Trudy Shoemaker, PT Physical Therapist                                 Physical Therapy Education       Title: PT OT SLP Therapies (Done)       Topic: Physical Therapy (Done)       Point: Mobility training (Done)       Learning Progress Summary            Patient Acceptance, E, VU by ET at 4/12/2025 1034                      Point: Home exercise program (Done)       Learning Progress Summary            Patient Acceptance, E, VU by ET at 4/12/2025 1034                      Point: Body mechanics (Done)       Learning Progress Summary            Patient Acceptance, E, VU by ET at 4/12/2025 1034                      Point: Precautions (Done)       Learning Progress Summary            Patient Acceptance, E, VU by ET at 4/12/2025 1034                                      User Key       Initials Effective Dates Name Provider Type Discipline    ET 07/26/24 -  Trudy Shoemaker, PT Physical Therapist PT                  PT Recommendation and Plan  Planned Therapy Interventions (PT): balance training, bed mobility training, gait training, home exercise program, stair training, strengthening, stretching, transfer training, neuromuscular re-education, ROM (range of motion), postural re-education, patient/family education  Outcome Evaluation: PT evaluation completed this date. Pt limited by decreased activity tolerance, BLE weakness, BLE edema distal to knee, and dynamic balance deficits. Performed bed mobility with mod-max assist, sit<>stands from various surfaces with modA x 2, dynamic standing tasks with modA x2 and BUE support to RW. Pt unable to safely progress to gait 2/2 to BLE weakness and easily fatiguing  with standing balance tasks. Would benefit from continued skilled PT to addres functional mobility deficits. PT recommends IPR at d/c to maximize safe return to PLOF.     Time Calculation:   PT Evaluation Complexity  History, PT Evaluation Complexity: 1-2 personal factors and/or comorbidities  Examination of Body Systems (PT Eval Complexity): total of 3 or more elements  Clinical Presentation (PT Evaluation Complexity): evolving  Clinical Decision Making (PT Evaluation Complexity): moderate complexity  Overall Complexity (PT Evaluation Complexity): moderate complexity     PT Charges       Row Name 04/12/25 0832             Time Calculation    Start Time 0832  -ET      PT Received On 04/12/25  -ET      PT Goal Re-Cert Due Date 04/22/25  -ET         Time Calculation- PT    Total Timed Code Minutes- PT 15 minute(s)  -ET         Timed Charges    37814 - PT Therapeutic Activity Minutes 15  -ET         Untimed Charges    PT Eval/Re-eval Minutes 46  -ET         Total Minutes    Timed Charges Total Minutes 15  -ET      Untimed Charges Total Minutes 46  -ET       Total Minutes 61  -ET                User Key  (r) = Recorded By, (t) = Taken By, (c) = Cosigned By      Initials Name Provider Type    ET Trudy Shoemaker, PT Physical Therapist                  Therapy Charges for Today       Code Description Service Date Service Provider Modifiers Qty    44387422291 HC PT THERAPEUTIC ACT EA 15 MIN 4/12/2025 Trudy Shoemaker, PT GP 1    91772293410 HC PT EVAL MOD COMPLEXITY 4 4/12/2025 Trudy Shoemaker, PT GP 1            PT G-Codes  Outcome Measure Options: AM-PAC 6 Clicks Basic Mobility (PT)  AM-PAC 6 Clicks Score (PT): 12  PT Discharge Summary  Anticipated Discharge Disposition (PT): inpatient rehabilitation facility    Trudy Shoemaker PT  4/12/2025

## 2025-04-12 NOTE — PLAN OF CARE
Goal Outcome Evaluation:  Plan of Care Reviewed With: patient, family           Outcome Evaluation: PT Wound care eval complete. Pt with venous ulceration vs previous blister to the anterior LLE, as well as abrasion to the L 2nd toe, both of which required debridement today. Pt with notable edema to the entire LLE, along with extensive ecchymosis related to recent hematoma development. Pt with moderate edema to the R foot as well. Pt with very cold extremities in a dependent position, but PT was able to palpate pulses in both LEs. Pt will benefit from skilled PT wound care for debridement and compression therapy as appropriate to promote healing. Anticipate next change in 2-3 days.    Anticipated Discharge Disposition (PT): other (see comments) (defer to PT mobility. Will require skilled wound care upon d/c.)

## 2025-04-13 ENCOUNTER — APPOINTMENT (OUTPATIENT)
Dept: CARDIOLOGY | Facility: HOSPITAL | Age: 89
DRG: 291 | End: 2025-04-13
Payer: MEDICARE

## 2025-04-13 ENCOUNTER — APPOINTMENT (OUTPATIENT)
Dept: ULTRASOUND IMAGING | Facility: HOSPITAL | Age: 89
DRG: 291 | End: 2025-04-13
Payer: MEDICARE

## 2025-04-13 LAB
ALBUMIN SERPL-MCNC: 3.2 G/DL (ref 3.5–5.2)
ALP SERPL-CCNC: 163 U/L (ref 39–117)
ALT SERPL W P-5'-P-CCNC: 18 U/L (ref 1–41)
ANION GAP SERPL CALCULATED.3IONS-SCNC: 13 MMOL/L (ref 5–15)
AST SERPL-CCNC: 22 U/L (ref 1–40)
BASOPHILS # BLD AUTO: 0.04 10*3/MM3 (ref 0–0.2)
BASOPHILS NFR BLD AUTO: 0.7 % (ref 0–1.5)
BH CV VAS HEPATIC PORTAL VEIN DIAMETER: 0.9 CM
BH CV VAS HEPATOPORTAL HEPATIC V LT DIRECTION: NORMAL
BH CV VAS HEPATOPORTAL HEPATIC V MID DIRECTION: NORMAL
BH CV VAS HEPATOPORTAL HEPATIC V RT DIRECTION: NORMAL
BH CV VAS HEPATOPORTAL PORTAL V EXTRAHEPATIC DIRECTION: NORMAL
BH CV VAS HEPATOPORTAL PORTAL V LT INTRA DIRECTION: NORMAL
BH CV VAS HEPATOPORTAL PORTAL V MAIN INTRA DIRECTION: NORMAL
BH CV VAS HEPATOPORTAL PORTAL V PSV: 26.9 CM/S
BH CV VAS HEPATOPORTAL PORTAL V RT INTRA DIRECTION: NORMAL
BILIRUB CONJ SERPL-MCNC: 1.2 MG/DL (ref 0–0.3)
BILIRUB INDIRECT SERPL-MCNC: 0.9 MG/DL
BILIRUB SERPL-MCNC: 2.1 MG/DL (ref 0–1.2)
BUN SERPL-MCNC: 50 MG/DL (ref 8–23)
BUN/CREAT SERPL: 26.7 (ref 7–25)
CALCIUM SPEC-SCNC: 9 MG/DL (ref 8.6–10.5)
CHLORIDE SERPL-SCNC: 96 MMOL/L (ref 98–107)
CO2 SERPL-SCNC: 29 MMOL/L (ref 22–29)
CREAT SERPL-MCNC: 1.87 MG/DL (ref 0.76–1.27)
DEPRECATED RDW RBC AUTO: 66.4 FL (ref 37–54)
EGFRCR SERPLBLD CKD-EPI 2021: 34.2 ML/MIN/1.73
EOSINOPHIL # BLD AUTO: 0.11 10*3/MM3 (ref 0–0.4)
EOSINOPHIL NFR BLD AUTO: 1.8 % (ref 0.3–6.2)
ERYTHROCYTE [DISTWIDTH] IN BLOOD BY AUTOMATED COUNT: 17.4 % (ref 12.3–15.4)
GLUCOSE BLDC GLUCOMTR-MCNC: 112 MG/DL (ref 70–130)
GLUCOSE BLDC GLUCOMTR-MCNC: 114 MG/DL (ref 70–130)
GLUCOSE BLDC GLUCOMTR-MCNC: 154 MG/DL (ref 70–130)
GLUCOSE BLDC GLUCOMTR-MCNC: 156 MG/DL (ref 70–130)
GLUCOSE SERPL-MCNC: 136 MG/DL (ref 65–99)
HCT VFR BLD AUTO: 34.5 % (ref 37.5–51)
HGB BLD-MCNC: 10.2 G/DL (ref 13–17.7)
IMM GRANULOCYTES # BLD AUTO: 0.02 10*3/MM3 (ref 0–0.05)
IMM GRANULOCYTES NFR BLD AUTO: 0.3 % (ref 0–0.5)
LYMPHOCYTES # BLD AUTO: 0.35 10*3/MM3 (ref 0.7–3.1)
LYMPHOCYTES NFR BLD AUTO: 5.7 % (ref 19.6–45.3)
MAGNESIUM SERPL-MCNC: 2.1 MG/DL (ref 1.6–2.4)
MCH RBC QN AUTO: 30.8 PG (ref 26.6–33)
MCHC RBC AUTO-ENTMCNC: 29.6 G/DL (ref 31.5–35.7)
MCV RBC AUTO: 104.2 FL (ref 79–97)
MONOCYTES # BLD AUTO: 0.45 10*3/MM3 (ref 0.1–0.9)
MONOCYTES NFR BLD AUTO: 7.4 % (ref 5–12)
NEUTROPHILS NFR BLD AUTO: 5.12 10*3/MM3 (ref 1.7–7)
NEUTROPHILS NFR BLD AUTO: 84.1 % (ref 42.7–76)
NRBC BLD AUTO-RTO: 0 /100 WBC (ref 0–0.2)
PLATELET # BLD AUTO: 222 10*3/MM3 (ref 140–450)
PMV BLD AUTO: 10.4 FL (ref 6–12)
POTASSIUM SERPL-SCNC: 4.1 MMOL/L (ref 3.5–5.2)
PROT SERPL-MCNC: 6 G/DL (ref 6–8.5)
RBC # BLD AUTO: 3.31 10*6/MM3 (ref 4.14–5.8)
SODIUM SERPL-SCNC: 138 MMOL/L (ref 136–145)
WBC NRBC COR # BLD AUTO: 6.09 10*3/MM3 (ref 3.4–10.8)

## 2025-04-13 PROCEDURE — 99232 SBSQ HOSP IP/OBS MODERATE 35: CPT

## 2025-04-13 PROCEDURE — 80076 HEPATIC FUNCTION PANEL: CPT

## 2025-04-13 PROCEDURE — 25010000002 FUROSEMIDE PER 20 MG

## 2025-04-13 PROCEDURE — 93975 VASCULAR STUDY: CPT

## 2025-04-13 PROCEDURE — 25010000002 ALBUMIN HUMAN 25% PER 50 ML

## 2025-04-13 PROCEDURE — 63710000001 INSULIN LISPRO (HUMAN) PER 5 UNITS: Performed by: NURSE PRACTITIONER

## 2025-04-13 PROCEDURE — 85025 COMPLETE CBC W/AUTO DIFF WBC: CPT

## 2025-04-13 PROCEDURE — 76705 ECHO EXAM OF ABDOMEN: CPT

## 2025-04-13 PROCEDURE — P9047 ALBUMIN (HUMAN), 25%, 50ML: HCPCS

## 2025-04-13 PROCEDURE — 82948 REAGENT STRIP/BLOOD GLUCOSE: CPT

## 2025-04-13 PROCEDURE — 80048 BASIC METABOLIC PNL TOTAL CA: CPT

## 2025-04-13 PROCEDURE — 83735 ASSAY OF MAGNESIUM: CPT

## 2025-04-13 RX ORDER — ALBUMIN (HUMAN) 12.5 G/50ML
12.5 SOLUTION INTRAVENOUS 2 TIMES DAILY
Status: COMPLETED | OUTPATIENT
Start: 2025-04-13 | End: 2025-04-15

## 2025-04-13 RX ADMIN — FUROSEMIDE 40 MG: 10 INJECTION, SOLUTION INTRAMUSCULAR; INTRAVENOUS at 17:24

## 2025-04-13 RX ADMIN — ASPIRIN 81 MG: 81 TABLET, COATED ORAL at 09:14

## 2025-04-13 RX ADMIN — APIXABAN 2.5 MG: 2.5 TABLET, FILM COATED ORAL at 21:03

## 2025-04-13 RX ADMIN — Medication 10 ML: at 09:15

## 2025-04-13 RX ADMIN — ATORVASTATIN CALCIUM 40 MG: 40 TABLET, FILM COATED ORAL at 21:03

## 2025-04-13 RX ADMIN — MAGNESIUM OXIDE TAB 400 MG (241.3 MG ELEMENTAL MG) 400 MG: 400 (241.3 MG) TAB at 09:14

## 2025-04-13 RX ADMIN — APIXABAN 2.5 MG: 2.5 TABLET, FILM COATED ORAL at 09:14

## 2025-04-13 RX ADMIN — CARVEDILOL 3.12 MG: 3.12 TABLET, FILM COATED ORAL at 09:14

## 2025-04-13 RX ADMIN — FUROSEMIDE 40 MG: 10 INJECTION, SOLUTION INTRAMUSCULAR; INTRAVENOUS at 05:20

## 2025-04-13 RX ADMIN — TRAMADOL HYDROCHLORIDE 50 MG: 50 TABLET, COATED ORAL at 17:45

## 2025-04-13 RX ADMIN — CARVEDILOL 3.12 MG: 3.12 TABLET, FILM COATED ORAL at 17:24

## 2025-04-13 RX ADMIN — POTASSIUM CHLORIDE 20 MEQ: 750 CAPSULE, EXTENDED RELEASE ORAL at 09:14

## 2025-04-13 RX ADMIN — PANTOPRAZOLE SODIUM 40 MG: 40 TABLET, DELAYED RELEASE ORAL at 05:20

## 2025-04-13 RX ADMIN — ALBUMIN (HUMAN) 12.5 G: 0.25 INJECTION, SOLUTION INTRAVENOUS at 17:24

## 2025-04-13 RX ADMIN — INSULIN LISPRO 2 UNITS: 100 INJECTION, SOLUTION INTRAVENOUS; SUBCUTANEOUS at 17:24

## 2025-04-13 RX ADMIN — EMPAGLIFLOZIN 10 MG: 10 TABLET, FILM COATED ORAL at 09:14

## 2025-04-13 RX ADMIN — INSULIN LISPRO 2 UNITS: 100 INJECTION, SOLUTION INTRAVENOUS; SUBCUTANEOUS at 11:50

## 2025-04-13 NOTE — PROGRESS NOTES
"[facility] at [facility] Progress Note     LOS: 0 days   Patient Care Team:  Luis Aguilar DO as PCP - General (Family Medicine)  Iain Bernstein MD as Consulting Physician (Cardiology)  Isabel Stover MD as Cardiologist (Cardiology)  PCP:  Luis Aguilar DO    Chief Complaint:   CHF exacerbation     Subjective: Patient n.p.o. overnight for liver ultrasound secondary to elevated bilirubin.  Likely related to congestive hepatopathy secondary to CHF.  Patient with compression stockings on and mild weeping noted.  BLE edema has improved from yesterday and patient noted on RA.      OBJECTIVE  REVIEW OF SYSTEMS:  @Cardiovascular ROS: positive for - dyspnea on exertion, edema, and irregular heartbeat@        Vital Sign Min/Max for last 24 hours  Temp  Min: 97.5 °F (36.4 °C)  Max: 98.5 °F (36.9 °C)   BP  Min: 131/81  Max: 136/90   Pulse  Min: 51  Max: 75   Resp  Min: 16  Max: 18   SpO2  Min: 91 %  Max: 97 %   No data recorded   No data recorded     I&O/ Weights:     Intake/Output Summary (Last 24 hours) at 4/13/2025 0739  Last data filed at 4/13/2025 0600  Gross per 24 hour   Intake --   Output 1100 ml   Net -1100 ml         04/11/25  1742 04/12/25  0500   Weight: 103 kg (227 lb 1.6 oz) 104 kg (229 lb)     Flowsheet Rows      Flowsheet Row First Filed Value   Admission Height 185.4 cm (73\") Documented at 04/11/2025 1421   Admission Weight 104 kg (229 lb 4.5 oz) Documented at 04/11/2025 1421               Physical Exam:  Vitals reviewed.   Constitutional:       General: Awake.      Appearance: Overweight. Chronically ill-appearing.   Neck:      Vascular: JVR present. JVD elevated.   Pulmonary:      Effort: Increased respiratory effort.      Comments: Diminished bilateral breath sounds, improved bilateral basilar crackles  Cardiovascular:      Normal rate. Irregularly irregular rhythm.      Murmurs: There is no murmur.   Pulses:     No decreased pulses.   Edema:     " Peripheral edema present.     Thigh: bilateral 1+ edema of the thigh.     Pretibial: bilateral 1+ edema of the pretibial area.     Ankle: bilateral 3+ edema of the ankle.     Feet: bilateral 3+ pitting edema of the feet.  Abdominal:      General: There is distension.   Skin:     General: Skin is warm and dry.   Neurological:      General: No focal deficit present.      Mental Status: Alert.            Labs:   Results from last 7 days   Lab Units 04/12/25  0530 04/11/25  1447   WBC 10*3/mm3 5.19 5.36   HEMOGLOBIN g/dL 10.1* 9.7*   HEMATOCRIT % 32.9* 31.8*   PLATELETS 10*3/mm3 236 233     Lab Results   Lab Value Date/Time    TROPONINT 123 (C) 04/11/2025 1608    TROPONINT 136 (C) 04/11/2025 1447    TROPONINT 94 (C) 04/02/2025 1711    TROPONINT 102 (C) 04/02/2025 1551    TROPONINT 560 (C) 08/05/2024 1120    TROPONINT 368 (C) 08/05/2024 0856    TROPONINT 97 (C) 02/27/2023 1712    TROPONINT 109 (C) 02/27/2023 1319    TROPONINT 0.019 12/08/2021 0310         Results from last 7 days   Lab Units 04/12/25  0734 04/11/25  1447   SODIUM mmol/L 139 137   POTASSIUM mmol/L 4.2 4.3   CHLORIDE mmol/L 97* 95*   CO2 mmol/L 31.0* 31.0*   BUN mg/dL 48* 47*   CREATININE mg/dL 1.71* 1.84*   CALCIUM mg/dL 8.6 8.7   BILIRUBIN mg/dL 1.6* 1.5*   ALK PHOS U/L 155* 161*   ALT (SGPT) U/L 19 21   AST (SGOT) U/L 21 28   GLUCOSE mg/dL 100* 151*                         Medication Review:   apixaban, 2.5 mg, Oral, BID  aspirin, 81 mg, Oral, Daily  atorvastatin, 40 mg, Oral, Nightly  carvedilol, 3.125 mg, Oral, BID With Meals  empagliflozin, 10 mg, Oral, Daily  furosemide, 40 mg, Intravenous, Q12H  insulin lispro, 2-7 Units, Subcutaneous, 4x Daily AC & at Bedtime  magnesium oxide, 400 mg, Oral, Daily  pantoprazole, 40 mg, Oral, Q AM  pharmacy consult - MTM, , Not Applicable, Daily  potassium chloride, 20 mEq, Oral, Daily             EKG  Test Reason : Other~  Blood Pressure :   */*   mmHG  Vent. Rate :  63 BPM     Atrial Rate :  37 BPM     P-R Int :    * ms          QRS Dur : 102 ms      QT Int : 426 ms       P-R-T Axes :   *  10  96 degrees    QTcB Int : 435 ms     Atrial fibrillation  Inferior infarct (cited on or before 17-Dec-2015)  Anterolateral infarct (cited on or before 05-Aug-2024)  Abnormal ECG  When compared with ECG of 02-Apr-2025 15:08,  Nonspecific T wave abnormality no longer evident in Inferior leads        ECHO    Left ventricular ejection fraction appears to be 36 - 40%.    Left ventricular wall thickness is consistent with mild concentric hypertrophy.    Left ventricular diastolic function is consistent with (grade II w/high LAP) pseudonormalization.    The right ventricular cavity is moderate to severely dilated.    The left atrial cavity is moderate to severely dilated.    Left atrial volume is severely increased.    The right atrial cavity is severely  dilated.    There is mild calcification of the aortic valve.    Moderate to severe aortic valve regurgitation is present.    Moderate to severe tricuspid valve regurgitation is present.    Estimated right ventricular systolic pressure from tricuspid regurgitation is markedly elevated (>55 mmHg).        CHEST XRAY  1.Cardiomegaly with pulmonary vascular congestion, suggestive of mild CHF.2.Left basilar atelectasis versus infiltrate     TELE: Controlled A-fib      Problem List:   CAD  Inferior STEMI, 8/5/2024: Medical managemen   PAF  [id number]  6 on Eliquis  CHF  Echo, 3/1/2023: LVEF 55%  Echo, 8/5/2024: LVEF 35 to 40%   HTN  HLD  JOLENE  CPAP       Assessment:  CHF HFrEF, EF 36 to 40%  CAD  proBNP 20,657  Troponins 136 --> 123, chronically elevated troponins in the setting of CHF  GDMT: Coreg 3.125, Jardiance  Moderate to severe TVR > 55 mm Hg   Moderate to severe AVR  Afterload reduction and diuretics  PAF  [id number] 6 on Eliquis  Carvedilol  HTN  Normotensive  HLD  Profile well-controlled  Atorvastatin 40  DM II  A1c 6.4  Jardiance  Sliding scale insulin per attending team  CKD  Baseline  around 1.6  Trend daily    Plan:   Strict I's and O's with 1500 mL fluid restriction, 2 L negative since admission  Daily standing weights, awaiting this morning's weight  2 mg IV Bumex twice daily, 40 mEq potassium daily, 400 mg Mag-Ox  Daily CMP, trend renal function and replace electrolytes per protocol, awaiting this morning's lab draw  Resume home GDMT  Dr. Stoevr to resume care tomorrow      COLIN Freitas

## 2025-04-13 NOTE — OUTREACH NOTE
CHF Week 2 Survey      Flowsheet Row Responses   [facility] facility patient discharged from? [place]   Does the patient have one of the following disease processes/diagnoses(primary or secondary)? CHF   Week 2 attempt successful? No   Unsuccessful attempts Attempt 1   Revoke Readmitted            MICKEY TYSON - Registered Nurse

## 2025-04-13 NOTE — PROGRESS NOTES
[facility] Medicine Services  PROGRESS NOTE    Patient Name: Iain Dee  : 1935  MRN: [id number]    Date of Admission: 2025  Primary Care Physician: Luis Aguilar DO    Subjective   Subjective     CC:  CHF exacerbation    HPI:  No significant overnight events.  Good urine output on diuresis, did have a couple of leak so was not charted accurately.  Breathing is much better.  Liver ultrasound showed steatosis however cholelithiasis without evidence of cirrhosis or hepatic congestion.  Some ascites.  Will give some Lasix with albumin for brisk diuresis.    Objective   Objective     Vital Signs:   Temp:  [97.4 °F (36.3 °C)-98.5 °F (36.9 °C)] 97.7 °F (36.5 °C)  Heart Rate:  [51-82] 63  Resp:  [16-18] 18  BP: (131-141)/(74-97) 139/74     Physical Exam:  Constitutional: No acute distress, awake, alert  HENT: NCAT, mucous membranes moist  Respiratory: Clear to auscultation bilaterally, respiratory effort normal   Cardiovascular: RRR, no murmurs, rubs, or gallops  Gastrointestinal: Positive bowel sounds, soft, nontender, nondistended  Musculoskeletal: 2+ bilateral lower extremity edema  Psychiatric: Appropriate affect, cooperative  Neurologic: Oriented x 3, strength symmetric in all extremities, Cranial Nerves grossly intact to confrontation, speech clear  Skin: No rashes      Results Reviewed:  LAB RESULTS:      Lab 25  1124 25  0530 25  1608 25  1447   WBC 6.09 5.19  --  5.36   HEMOGLOBIN 10.2* 10.1*  --  9.7*   HEMATOCRIT 34.5* 32.9*  --  31.8*   PLATELETS 222 236  --  233   NEUTROS ABS 5.12  --   --  4.30   IMMATURE GRANS (ABS) 0.02  --   --  0.01   LYMPHS ABS 0.35*  --   --  0.40*   MONOS ABS 0.45  --   --  0.53   EOS ABS 0.11  --   --  0.07   .2* 100.6*  --  99.7*   HSTROP T  --   --  123* 136*         Lab 25  1124 25  0734 25  1447   SODIUM 138 139 137   POTASSIUM 4.1 4.2 4.3   CHLORIDE 96* 97* 95*   CO2 29.0 31.0*  31.0*   ANION GAP 13.0 11.0 11.0   BUN 50* 48* 47*   CREATININE 1.87* 1.71* 1.84*   EGFR 34.2* 38.0* 34.8*   GLUCOSE 136* 100* 151*   CALCIUM 9.0 8.6 8.7   MAGNESIUM 2.1 2.1  --          Lab 04/13/25  1124 04/12/25  0734 04/11/25  1608 04/11/25  1447   TOTAL PROTEIN 6.0 5.4*  --  5.8*   ALBUMIN 3.2* 3.4*  --  3.4*   GLOBULIN  --  2.0  --  2.4   ALT (SGPT) 18 19  --  21   AST (SGOT) 22 21  --  28   BILIRUBIN 2.1* 1.6*  --  1.5*   INDIRECT BILIRUBIN 0.9  --   --   --    BILIRUBIN DIRECT 1.2*  --  0.7*  --    ALK PHOS 163* 155*  --  161*         Lab 04/11/25  1608 04/11/25  1447   PROBNP  --  20,657.0*   HSTROP T 123* 136*                 Brief Urine Lab Results  (Last result in the past 365 days)        Color   Clarity   Blood   Leuk Est   Nitrite   Protein   CREAT   Urine HCG        04/03/25 1536 Yellow   Clear   Trace   Negative   Negative   100 mg/dL (2+)                   Microbiology Results Abnormal       None            US Gallbladder  Result Date: 4/13/2025  US GALLBLADDER Date of Exam: 4/13/2025 10:37 AM EDT Indication: elevated bilirubin. Comparison: No comparisons available. Technique: Grayscale and color Doppler ultrasound evaluation of the right upper quadrant was performed. Findings: LIVER: The liver has increased parenchymal echogenicity. No focal lesions identified. Normal flow is present within the hepatic vasculature. GALLBLADDER: The gallbladder is contracted containing calcified stones likely contributing to wall thickening measuring 0.4 cm. No pericholecystic fluid and no evidence for positive sonographic Lazo sign. The common bile duct measures 0.5 cm. PANCREAS:  Visualized portions are unremarkable. RIGHT KIDNEY:  Normal in size with no focal lesions. No evidence of nephrolithiasis or hydronephrosis. Right upper quadrant ascites is noted. There is a right pleural effusion.     Impression: Impression: 1. Hepatic steatosis. 2. Cholelithiasis. 3. Right upper quadrant ascites and pleural effusion.  Electronically Signed: Lindy Wells MD  4/13/2025 11:03 AM EDT  Workstation ID: [id number]    XR Chest 1 View  Result Date: 4/11/2025  XR CHEST 1 VW Date of Exam: 4/11/2025 3:01 PM EDT Indication: sob Comparison: Chest x-ray dated 4/2/2025 Findings: Cardiac silhouette is enlarged. Pulmonary vasculature is indistinct. Mild left basilar atelectasis versus infiltrate. No pleural effusion or pneumothorax is seen. No acute osseous lesion is seen     Impression: Impression: 1.Cardiomegaly with pulmonary vascular congestion, suggestive of mild CHF. 2.Left basilar atelectasis versus infiltrate. Electronically Signed: Osmar Motta MD  4/11/2025 3:19 PM EDT  Workstation ID: [id number]      Results for orders placed during the hospital encounter of 04/02/25    Adult Transthoracic Echo Complete W/ Cont if Necessary Per Protocol    Interpretation Summary    Left ventricular ejection fraction appears to be 36 - 40%.    Left ventricular wall thickness is consistent with mild concentric hypertrophy.    Left ventricular diastolic function is consistent with (grade II w/high LAP) pseudonormalization.    The right ventricular cavity is moderate to severely dilated.    The left atrial cavity is moderate to severely dilated.    Left atrial volume is severely increased.    The right atrial cavity is severely  dilated.    There is mild calcification of the aortic valve.    Moderate to severe aortic valve regurgitation is present.    Moderate to severe tricuspid valve regurgitation is present.    Estimated right ventricular systolic pressure from tricuspid regurgitation is markedly elevated (>55 mmHg).      Current medications:  Scheduled Meds:albumin human, 12.5 g, Intravenous, BID  apixaban, 2.5 mg, Oral, BID  aspirin, 81 mg, Oral, Daily  atorvastatin, 40 mg, Oral, Nightly  carvedilol, 3.125 mg, Oral, BID With Meals  empagliflozin, 10 mg, Oral, Daily  furosemide, 40 mg, Intravenous, Q12H  insulin lispro, 2-7 Units, Subcutaneous, 4x  Daily AC & at Bedtime  magnesium oxide, 400 mg, Oral, Daily  pantoprazole, 40 mg, Oral, Q AM  pharmacy consult - MTM, , Not Applicable, Daily  potassium chloride, 20 mEq, Oral, Daily      Continuous Infusions:   PRN Meds:.  acetaminophen **OR** acetaminophen **OR** acetaminophen    senna-docusate sodium **AND** polyethylene glycol **AND** bisacodyl **AND** bisacodyl    dextrose    dextrose    glucagon (human recombinant)    Magnesium Cardiology Dose Replacement - Follow Nurse / BPA Driven Protocol    nitroglycerin    Potassium Replacement - Follow Nurse / BPA Driven Protocol    sodium chloride    sodium chloride    traMADol    Assessment & Plan   Assessment & Plan     Active Hospital Problems    Diagnosis  POA    **Acute exacerbation of CHF (congestive heart failure) [I50.9]  Yes    Thigh hematoma, left, initial encounter [S70.12XA]  Yes    Stage 3b chronic kidney disease [N18.32]  Yes    JOLENE on CPAP [G47.33]  Yes      Resolved Hospital Problems   No resolved problems to display.        Brief Hospital Course to date:  Iain Dee is a 89 y.o. male with PMH significant for HFrEF, valvular heart disease,  A-fib chronically anticoagulated on Eliquis, CAD, DM2, prostate/bladder cancer (~12 years ago) with recent admission to [facility] 4/2-4 5/20/2025 for left thigh hematoma who presents to [facility] ED with worsening edema despite using his home Bumex and worsening mobility. Of note, patient was recommended to go to rehab after his last admission but declined.      This patient's problems and plans were partially entered by my partner and updated as appropriate by me 04/13/25.      CHF exacerbation  HFrEF  Valvular heart disease  H/o STEMI  Follows with Dr. Stover  Echo 4/4/2025 with EF 36-40%, LV diastolic dysfunction, moderate to severe AVR, moderate to severe tricuspid valve regurg, markedly elevated RVSP >55  Probnp proBNP 20,657 (decreased from prior 22,485 on 4/2)  CXR shows cardiomegaly with pulmonary vascular congestion  and left basilar atelectasis versus infiltrate.  Left basilar atelectasis also seen on CXR for 2/20/2025.  Afebrile, normotensive  Patient on room air, add OPEP for atelectasis on CXR, O2 prn  Received Bumex 2 mg IV in ED  Cardiology consulted on admission  Lasix 40 twice daily IV with albumin for brisk diuresis  GDMT with Coreg and Jardiance resumed by cardiology  Strict I's & O's, good UOP however has had some leakage from condom cath  Daily weights  AM CMP, mag  PT/OT recommending rehab     Elevated troponin  Troponins 136>>123, increased from prior admission, likely type II demand ischemia   pt denies chest pain  No significant ST elevation on EKG     Elevated bilirubin  Bili 1.5, was wnl prior admission  Liver ultrasound negative for congestion, likely cholelithiasis evident as well as some ascites  Can check hepatic duplex to assess for portal hypertension     A-fib  rate controlled, continue Coreg  continue Eliquis at renal dosing 2.5 mg BID     Left leg hematoma  Stable from prior, although increased yellowing, appears to be healing   H&H stable  Continue to monitor  AM CBC     Left leg wound  From blister likely secondary to edema/venous stasis  PT wound care consult, may benefit from compression wraps/unna boots  AM CBC     BLE edema  PT wound care consult as above     CKD 3b  Cr slightly elevated 1.84 (appears to fluctuate 1.30-1.80 per chart review) on admission  GDMT resumed by cardiology  Renal function improving with diuresing likely cardiorenal in etiology  AM CMP     CAD  HTN  HLD  Continue ASA, previously on Plavix that cardiology dc'd last admission d/t hematoma  Continue Coreg  Continue statin, LFTs wnl     DM2  A1c 6.4  On metformin BID at home, hold  ACHS, low-dose SSI     GERD  Daily PPI     JOLENE  CPAP    Expected Discharge Location and Transportation: TBD  Expected Discharge TBD  Expected Discharge Date: 4/14/2025; Expected Discharge Time:      VTE Prophylaxis:  Pharmacologic & mechanical VTE  prophylaxis orders are present.    Total time spent: Time Spent: Time Spent: 40 minutes  Time spent includes time reviewing chart, face-to-face time, counseling patient/family/caregiver, ordering medications/tests/procedures, communicating with other health care professionals, documenting clinical information in the electronic health record, and coordination of care.      AM-PAC 6 Clicks Score (PT): 12 (04/13/25 0800)    CODE STATUS:   Code Status and Medical Interventions: CPR (Attempt to Resuscitate); Full Support   Ordered at: 04/11/25 1819     Code Status (Patient has no pulse and is not breathing):    CPR (Attempt to Resuscitate)     Medical Interventions (Patient has pulse or is breathing):    Full Support     Level Of Support Discussed With:    Patient       Isabel Mayes MD  04/13/25

## 2025-04-14 LAB
ANION GAP SERPL CALCULATED.3IONS-SCNC: 13 MMOL/L (ref 5–15)
BUN SERPL-MCNC: 51 MG/DL (ref 8–23)
BUN/CREAT SERPL: 27.4 (ref 7–25)
CALCIUM SPEC-SCNC: 8.9 MG/DL (ref 8.6–10.5)
CHLORIDE SERPL-SCNC: 97 MMOL/L (ref 98–107)
CO2 SERPL-SCNC: 29 MMOL/L (ref 22–29)
CREAT SERPL-MCNC: 1.86 MG/DL (ref 0.76–1.27)
DEPRECATED RDW RBC AUTO: 64.4 FL (ref 37–54)
EGFRCR SERPLBLD CKD-EPI 2021: 34.4 ML/MIN/1.73
ERYTHROCYTE [DISTWIDTH] IN BLOOD BY AUTOMATED COUNT: 17.7 % (ref 12.3–15.4)
GLUCOSE BLDC GLUCOMTR-MCNC: 120 MG/DL (ref 70–130)
GLUCOSE BLDC GLUCOMTR-MCNC: 154 MG/DL (ref 70–130)
GLUCOSE BLDC GLUCOMTR-MCNC: 159 MG/DL (ref 70–130)
GLUCOSE BLDC GLUCOMTR-MCNC: 183 MG/DL (ref 70–130)
GLUCOSE SERPL-MCNC: 129 MG/DL (ref 65–99)
HCT VFR BLD AUTO: 31.8 % (ref 37.5–51)
HGB BLD-MCNC: 9.7 G/DL (ref 13–17.7)
MAGNESIUM SERPL-MCNC: 2 MG/DL (ref 1.6–2.4)
MCH RBC QN AUTO: 30.5 PG (ref 26.6–33)
MCHC RBC AUTO-ENTMCNC: 30.5 G/DL (ref 31.5–35.7)
MCV RBC AUTO: 100 FL (ref 79–97)
PLATELET # BLD AUTO: 231 10*3/MM3 (ref 140–450)
PMV BLD AUTO: 10.4 FL (ref 6–12)
POTASSIUM SERPL-SCNC: 4 MMOL/L (ref 3.5–5.2)
RBC # BLD AUTO: 3.18 10*6/MM3 (ref 4.14–5.8)
SODIUM SERPL-SCNC: 139 MMOL/L (ref 136–145)
WBC NRBC COR # BLD AUTO: 5.25 10*3/MM3 (ref 3.4–10.8)

## 2025-04-14 PROCEDURE — 25010000002 FUROSEMIDE PER 20 MG

## 2025-04-14 PROCEDURE — 82948 REAGENT STRIP/BLOOD GLUCOSE: CPT

## 2025-04-14 PROCEDURE — P9047 ALBUMIN (HUMAN), 25%, 50ML: HCPCS

## 2025-04-14 PROCEDURE — 85027 COMPLETE CBC AUTOMATED: CPT

## 2025-04-14 PROCEDURE — 29581 APPL MULTLAYER CMPRN SYS LEG: CPT

## 2025-04-14 PROCEDURE — 25010000002 ALBUMIN HUMAN 25% PER 50 ML

## 2025-04-14 PROCEDURE — 99232 SBSQ HOSP IP/OBS MODERATE 35: CPT

## 2025-04-14 PROCEDURE — 83735 ASSAY OF MAGNESIUM: CPT

## 2025-04-14 PROCEDURE — 80048 BASIC METABOLIC PNL TOTAL CA: CPT

## 2025-04-14 PROCEDURE — 63710000001 INSULIN LISPRO (HUMAN) PER 5 UNITS: Performed by: NURSE PRACTITIONER

## 2025-04-14 PROCEDURE — 97597 DBRDMT OPN WND 1ST 20 CM/<: CPT

## 2025-04-14 RX ORDER — METOLAZONE 2.5 MG/1
2.5 TABLET ORAL DAILY
Status: DISCONTINUED | OUTPATIENT
Start: 2025-04-14 | End: 2025-04-15

## 2025-04-14 RX ADMIN — APIXABAN 2.5 MG: 2.5 TABLET, FILM COATED ORAL at 08:30

## 2025-04-14 RX ADMIN — INSULIN LISPRO 2 UNITS: 100 INJECTION, SOLUTION INTRAVENOUS; SUBCUTANEOUS at 23:04

## 2025-04-14 RX ADMIN — CARVEDILOL 3.12 MG: 3.12 TABLET, FILM COATED ORAL at 08:30

## 2025-04-14 RX ADMIN — EMPAGLIFLOZIN 10 MG: 10 TABLET, FILM COATED ORAL at 08:30

## 2025-04-14 RX ADMIN — PANTOPRAZOLE SODIUM 40 MG: 40 TABLET, DELAYED RELEASE ORAL at 05:26

## 2025-04-14 RX ADMIN — FUROSEMIDE 40 MG: 10 INJECTION, SOLUTION INTRAMUSCULAR; INTRAVENOUS at 17:24

## 2025-04-14 RX ADMIN — INSULIN LISPRO 2 UNITS: 100 INJECTION, SOLUTION INTRAVENOUS; SUBCUTANEOUS at 11:38

## 2025-04-14 RX ADMIN — MAGNESIUM OXIDE TAB 400 MG (241.3 MG ELEMENTAL MG) 400 MG: 400 (241.3 MG) TAB at 08:30

## 2025-04-14 RX ADMIN — APIXABAN 2.5 MG: 2.5 TABLET, FILM COATED ORAL at 20:02

## 2025-04-14 RX ADMIN — FUROSEMIDE 40 MG: 10 INJECTION, SOLUTION INTRAMUSCULAR; INTRAVENOUS at 05:26

## 2025-04-14 RX ADMIN — ALBUMIN (HUMAN) 12.5 G: 0.25 INJECTION, SOLUTION INTRAVENOUS at 08:29

## 2025-04-14 RX ADMIN — POTASSIUM CHLORIDE 20 MEQ: 750 CAPSULE, EXTENDED RELEASE ORAL at 08:30

## 2025-04-14 RX ADMIN — ATORVASTATIN CALCIUM 40 MG: 40 TABLET, FILM COATED ORAL at 20:02

## 2025-04-14 RX ADMIN — ALBUMIN (HUMAN) 12.5 G: 0.25 INJECTION, SOLUTION INTRAVENOUS at 20:02

## 2025-04-14 RX ADMIN — METOLAZONE 2.5 MG: 2.5 TABLET ORAL at 12:00

## 2025-04-14 RX ADMIN — CARVEDILOL 3.12 MG: 3.12 TABLET, FILM COATED ORAL at 17:25

## 2025-04-14 RX ADMIN — ASPIRIN 81 MG: 81 TABLET, COATED ORAL at 08:30

## 2025-04-14 NOTE — PLAN OF CARE
Problem: Adult Inpatient Plan of Care  Goal: Plan of Care Review  Outcome: Progressing  Flowsheets (Taken 4/14/2025 0551)  Progress: no change  Plan of Care Reviewed With: patient  Goal: Patient-Specific Goal (Individualized)  Outcome: Progressing  Goal: Absence of Hospital-Acquired Illness or Injury  Outcome: Progressing  Intervention: Identify and Manage Fall Risk  Recent Flowsheet Documentation  Taken 4/14/2025 0400 by Batsheva Pope RNA  Safety Promotion/Fall Prevention:   safety round/check completed   room organization consistent   fall prevention program maintained   clutter free environment maintained   assistive device/personal items within reach  Taken 4/14/2025 0200 by Batsheva Pope RNA  Safety Promotion/Fall Prevention:   safety round/check completed   room organization consistent   fall prevention program maintained   clutter free environment maintained   assistive device/personal items within reach  Taken 4/14/2025 0000 by Batsheva Pope RNA  Safety Promotion/Fall Prevention:   safety round/check completed   room organization consistent   fall prevention program maintained   clutter free environment maintained   assistive device/personal items within reach  Taken 4/13/2025 2200 by Batsheva Pope RNA  Safety Promotion/Fall Prevention:   safety round/check completed   room organization consistent   fall prevention program maintained   clutter free environment maintained   assistive device/personal items within reach  Taken 4/13/2025 2100 by Batsheva Pope RNA  Safety Promotion/Fall Prevention:   safety round/check completed   room organization consistent   fall prevention program maintained   clutter free environment maintained   assistive device/personal items within reach  Intervention: Prevent Skin Injury  Recent Flowsheet Documentation  Taken 4/14/2025 0400 by Batsheva Pope RNA  Body Position:   side-lying   right   position changed independently  Skin Protection:   incontinence pads utilized    silicone foam dressing in place  Taken 4/14/2025 0200 by Batsheva Pope RNA  Body Position:   side-lying   left   position changed independently  Skin Protection: incontinence pads utilized  Taken 4/14/2025 0000 by Batsheva Pope RNA  Body Position:   side-lying   position changed independently  Skin Protection:   incontinence pads utilized   silicone foam dressing in place  Taken 4/13/2025 2200 by Batsheva Pope RNA  Body Position:   side-lying   right  Skin Protection:   incontinence pads utilized   silicone foam dressing in place  Taken 4/13/2025 2100 by Batsheva Pope RNA  Body Position: side-lying  Skin Protection:   incontinence pads utilized   skin sealant/moisture barrier applied   silicone foam dressing in place  Intervention: Prevent Infection  Recent Flowsheet Documentation  Taken 4/14/2025 0400 by Batsheva Pope RNA  Infection Prevention: environmental surveillance performed  Taken 4/14/2025 0200 by Batsheva Pope RNA  Infection Prevention: environmental surveillance performed  Taken 4/14/2025 0000 by Batsheva Pope RNA  Infection Prevention: personal protective equipment utilized  Goal: Optimal Comfort and Wellbeing  Outcome: Progressing  Goal: Readiness for Transition of Care  Outcome: Progressing     Problem: Heart Failure  Goal: Optimal Coping  Outcome: Progressing  Intervention: Support Psychosocial Response  Recent Flowsheet Documentation  Taken 4/14/2025 0200 by Batsheva Pope RNA  Supportive Measures: self-care encouraged  Taken 4/13/2025 2100 by Batsheva Pope RNA  Supportive Measures: self-care encouraged  Goal: Optimal Cardiac Output and Blood Flow  Outcome: Progressing  Intervention: Optimize Cardiac Output  Recent Flowsheet Documentation  Taken 4/13/2025 2100 by Batsheva Pope RNA  Environmental Support: environmental consistency promoted  Goal: Stable Heart Rate and Rhythm  Outcome: Progressing  Goal: Fluid and Electrolyte Balance  Outcome: Progressing  Goal: Optimal Functional  Ability  Outcome: Progressing  Intervention: Optimize Functional Ability  Recent Flowsheet Documentation  Taken 4/14/2025 0400 by Batsheva Pope RNA  Self-Care Promotion: independence encouraged  Taken 4/14/2025 0200 by Batsheva Pope RNA  Self-Care Promotion: independence encouraged  Taken 4/14/2025 0000 by Batsheva Pope RNA  Self-Care Promotion: independence encouraged  Taken 4/13/2025 2200 by Batsheva Pope RNA  Self-Care Promotion: independence encouraged  Taken 4/13/2025 2100 by Batsheva Pope RNA  Self-Care Promotion: independence encouraged  Goal: Improved Oral Intake  Outcome: Progressing  Goal: Effective Oxygenation and Ventilation  Outcome: Progressing  Intervention: Optimize Oxygenation and Ventilation  Recent Flowsheet Documentation  Taken 4/14/2025 0400 by Batsheva Pope RNA  Head of Bed (HOB) Positioning: HOB elevated  Taken 4/14/2025 0200 by Batsheva Pope RNA  Head of Bed (HOB) Positioning: HOB elevated  Taken 4/14/2025 0000 by Batsheva Pope RNA  Head of Bed (HOB) Positioning: HOB elevated  Taken 4/13/2025 2200 by Batsheva Pope RNA  Head of Bed (HOB) Positioning: HOB elevated  Taken 4/13/2025 2100 by Batsheva Pope RNA  Head of Bed (HOB) Positioning: HOB elevated  Goal: Effective Breathing Pattern During Sleep  Outcome: Progressing  Intervention: Monitor and Manage Obstructive Sleep Apnea  Recent Flowsheet Documentation  Taken 4/13/2025 2100 by Batsheva Pope RNA  NPPV/CPAP Maintenance: adjusted     Problem: Skin Injury Risk Increased  Goal: Skin Health and Integrity  Outcome: Progressing  Intervention: Optimize Skin Protection  Recent Flowsheet Documentation  Taken 4/14/2025 0400 by Batsheva Pope RNA  Pressure Reduction Techniques:   pressure points protected   frequent weight shift encouraged  Head of Bed (HOB) Positioning: HOB elevated  Pressure Reduction Devices: pressure-redistributing mattress utilized  Skin Protection:   incontinence pads utilized   silicone foam dressing in place  Taken  4/14/2025 0200 by Batsheva Pope RNA  Pressure Reduction Techniques: frequent weight shift encouraged  Head of Bed (HOB) Positioning: HOB elevated  Pressure Reduction Devices: pressure-redistributing mattress utilized  Skin Protection: incontinence pads utilized  Taken 4/14/2025 0000 by Batsheva Pope RNA  Pressure Reduction Techniques:   frequent weight shift encouraged   pressure points protected  Head of Bed (HOB) Positioning: [facility] [place] elevated  Pressure Reduction Devices:   pressure-redistributing mattress utilized   foam padding utilized  Skin Protection:   incontinence pads utilized   silicone foam dressing in place  Taken 4/13/2025 2200 by Batsheva Pope RNA  Pressure Reduction Techniques:   frequent weight shift encouraged   pressure points protected  Head of Bed (HOB) Positioning: [facility] [place] elevated  Pressure Reduction Devices: pressure-redistributing mattress utilized  Skin Protection:   incontinence pads utilized   silicone foam dressing in place  Taken 4/13/2025 2100 by Batsheva Pope RNA  Pressure Reduction Techniques: frequent weight shift encouraged  Head of Bed (HOB) Positioning: [facility] [place] elevated  Pressure Reduction Devices: specialty bed utilized  Skin Protection:   incontinence pads utilized   skin sealant/moisture barrier applied   silicone foam dressing in place     Problem: Fall Injury Risk  Goal: Absence of Fall and Fall-Related Injury  Outcome: Progressing  Intervention: Identify and Manage Contributors  Recent Flowsheet Documentation  Taken 4/14/2025 0400 by Batsheva Pope RNA  Self-Care Promotion: independence encouraged  Taken 4/14/2025 0200 by Batsheva Pope RNA  Self-Care Promotion: independence encouraged  Taken 4/14/2025 0000 by Batsheva Pope RNA  Self-Care Promotion: independence encouraged  Taken 4/13/2025 2200 by Batsheva Pope RNA  Self-Care Promotion: independence encouraged  Taken 4/13/2025 2100 by Batsheva Pope RNA  Self-Care Promotion: independence encouraged  Intervention: Promote  Injury-Free Environment  Recent Flowsheet Documentation  Taken 4/14/2025 0400 by Batsheva Pope RNA  Safety Promotion/Fall Prevention:   safety round/check completed   room organization consistent   fall prevention program maintained   clutter free environment maintained   assistive device/personal items within reach  Taken 4/14/2025 0200 by Batsheva Pope RNA  Safety Promotion/Fall Prevention:   safety round/check completed   room organization consistent   fall prevention program maintained   clutter free environment maintained   assistive device/personal items within reach  Taken 4/14/2025 0000 by Batsheva Pope RNA  Safety Promotion/Fall Prevention:   safety round/check completed   room organization consistent   fall prevention program maintained   clutter free environment maintained   assistive device/personal items within reach  Taken 4/13/2025 2200 by Batsheva Pope RNA  Safety Promotion/Fall Prevention:   safety round/check completed   room organization consistent   fall prevention program maintained   clutter free environment maintained   assistive device/personal items within reach  Taken 4/13/2025 2100 by Batsheva Pope RNA  Safety Promotion/Fall Prevention:   safety round/check completed   room organization consistent   fall prevention program maintained   clutter free environment maintained   assistive device/personal items within reach   Goal Outcome Evaluation:  Plan of Care Reviewed With: patient        Progress: no change

## 2025-04-14 NOTE — THERAPY WOUND CARE TREATMENT
Acute Care - Wound/Debridement Treatment Note  [facility]     Patient Name: Iain Dee  : 1935  MRN: [id number]  Today's Date: 2025                Admit Date: 2025    Visit Dx:    ICD-10-CM ICD-9-CM   1. Acute on chronic congestive heart failure, unspecified heart failure type  I50.9 428.0   2. Acute pulmonary edema  J81.0 518.4   3. Anasarca  R60.1 782.3   4. Failure to thrive in adult  R62.7 783.7   5. History of diabetes mellitus  Z86.39 V12.29       Patient Active Problem List   Diagnosis    AMS (altered mental status)    Hypomagnesemia    Hypoalbuminemia    Essential hypertension    Atrial fibrillation    GERD (gastroesophageal reflux disease)    Gout    Diabetes mellitus    JOLENE on CPAP    Chronic anticoagulation (Eliquis)    Alcohol abuse (1 to 2 glasses of bourbon a day)    Memory impairment    STEMI (ST elevation myocardial infarction)    Stage 3b chronic kidney disease    CHF (congestive heart failure)    Thigh hematoma, left, initial encounter    Acute exacerbation of CHF (congestive heart failure)        Past Medical History:   Diagnosis Date    A-fib     Arthritis     Cancer     Diabetes mellitus     GERD (gastroesophageal reflux disease)     Gout     H/O Bladder carcinoma (HCC)     Hypertension         Past Surgical History:   Procedure Laterality Date    BLADDER SURGERY      Biopsy    HERNIA REPAIR      PROSTATE BIOPSY             Wound 25 1113 Left lower leg (Active)   Closure Unable to assess 25 0800   Base granulating;slough 25   Care, Wound cleansed with;wound cleanser;debrided 25 0930       Wound 25 1235 Left anterior second toe Traumatic Abrasion (Active)   Closure Adhesive bandage 25 0800   Base moist;red;slough;yellow;granulating 25   Care, Wound cleansed with;wound cleanser;debrided 25 0930      Lymphedema       Row Name 25             Lymphedema Edema Assessment    Ptting Edema Category By severity   -KW      Pitting Edema Moderate;Severe  -KW         Skin Changes/Observations    Location/Assessment Lower Extremity  -KW      Lower Extremity Conditions right:;intact;bilateral:;clean;dry  -KW      Lower Extremity Color/Pigment right:;normal;left:;brawny;other (comment)  -KW         Lymphedema Pulses/Capillary Refill    Capillary Refill lower extremity capillary refill  -KW      Lower Extremity Capillary Refill right:;left:;less than 3 seconds  -KW         Compression/Skin Care    Compression/Skin Care skin care;wrapping location;bandaging  -KW      Skin Care washed/dried  -KW      Wrapping Location lower extremity  -KW      Wrapping Location LE bilateral:;foot to knee  -KW      Wrapping Comments RLE: size 4 compressogrips. LLE: wound dressings, sizes 4/5 compressogrips. All compression doubled distally to create gradient compression.  -KW                User Key  (r) = Recorded By, (t) = Taken By, (c) = Cosigned By      Initials Name Provider Type    Otilia Crook, PT Physical Therapist                    WOUND DEBRIDEMENT  Total area of Debridement: 5cm2  Debridement Site 1  Location- Site 1: L toe  Selective Debridement- Site 1: Wound Surface <20cmsq  Instruments- Site 1: tweezers  Excised Tissue Description- Site 1: minimum   Debridement Site 2  Location- Site 2: L anterior leg  Selective Debridement- Site 2: Wound Surface <20cmsq  Instruments- Site 2: tweezers, scissors  Excised Tissue Description- Site 2: scant, minimum, slough           PT Assessment (Last 12 Hours)       PT Evaluation and Treatment       Row Name 04/14/25 9603          Physical Therapy Time and Intention    Subjective Information no complaints  -KW     Document Type wound care;evaluation  -KW     Mode of Treatment physical therapy  -KW       Row Name 04/14/25 0992          General Information    Patient Profile Reviewed yes  -KW       Row Name 04/14/25 6910          Wound 04/03/25 1113 Left lower leg    Wound - Properties Group  Placement Date: 04/03/25  -TG Placement Time: 1113  -TG Side: Left  -TG Orientation: lower  -TG Location: leg  -TG    Base granulating;slough  -KW     Care, Wound cleansed with;wound cleanser;debrided  mepitel Ag  -KW     Retired Wound - Properties Group Placement Date: 04/03/25  -TG Placement Time: 1113  -TG Side: Left  -TG Orientation: lower  -TG Location: leg  -TG    Retired Wound - Properties Group Placement Date: 04/03/25  -TG Placement Time: 1113  -TG Side: Left  -TG Orientation: lower  -TG Location: leg  -TG    Retired Wound - Properties Group Date first assessed: 04/03/25  -TG Time first assessed: 1113  -TG Side: Left  -TG Location: leg  -TG      Row Name 04/14/25 0930          Wound 04/12/25 1235 Left anterior second toe Traumatic Abrasion    Wound - Properties Group Placement Date: 04/12/25  - Placement Time: 1235  -MC Present on Original Admission: Y  -MC Side: Left  -MC Orientation: anterior  -MC Location: second toe  -MC Primary Wound Type: Traumatic  -MC Secondary Wound Type - Traumatic: Abrasion  -MC    Base moist;red;slough;yellow;granulating  -KW     Care, Wound cleansed with;wound cleanser;debrided  meptiel Ag  -KW     Retired Wound - Properties Group Placement Date: 04/12/25  - Placement Time: 1235  -MC Present on Original Admission: Y  -MC Side: Left  -MC Orientation: anterior  -MC Location: second toe  -MC    Retired Wound - Properties Group Placement Date: 04/12/25  - Placement Time: 1235  -MC Present on Original Admission: Y  -MC Side: Left  -MC Orientation: anterior  -MC Location: second toe  -MC    Retired Wound - Properties Group Date first assessed: 04/12/25  - Time first assessed: 1235  -MC Present on Original Admission: Y  -MC Side: Left  -MC Location: second toe  -MC      Row Name 04/14/25 0930          Plan of Care Review    Plan of Care Reviewed With patient  -KW     Progress no change  -KW     Outcome Evaluation Patient with moderate edema in B LE. PT able to debride slough  and nonviable tissue from L LE  Patient would benefit from MLW to improve venous return and manage BLE edema/ girth.  -KW       Row Name 04/14/25 0930          Positioning and Restraints    Pre-Treatment Position in bed  -KW     Post Treatment Position bed  -KW     In Bed supine;call light within reach;encouraged to call for assist  -KW       Row Name 04/14/25 0930          Physical Therapy Goals    Wound Management Goal Selection (PT) wound management, PT goal 1  -KW       Row Name 04/14/25 0930          Wound Management Goal 1 (PT)    Wound Management Goal (Wound Goal 1, PT) Patient will demonstrate no trace of edema in B LE  -KW     Time Frame (Wound Goal 1, PT) 2 weeks  -KW               User Key  (r) = Recorded By, (t) = Taken By, (c) = Cosigned By      Initials Name Provider Type    Yeny Savage, PT Physical Therapist    Asim Arevalo, RN Registered Nurse    Otilia Crook, PT Physical Therapist                  Physical Therapy Education       Title: PT OT SLP Therapies (In Progress)       Topic: Physical Therapy (Done)       Point: Mobility training (Done)       Learning Progress Summary            Patient Acceptance, E, VU by ET at 4/12/2025 1034                      Point: Home exercise program (Done)       Learning Progress Summary            Patient Acceptance, E, VU by ET at 4/12/2025 1034                      Point: Body mechanics (Done)       Learning Progress Summary            Patient Acceptance, E, VU by ET at 4/12/2025 1034                      Point: Precautions (Done)       Learning Progress Summary            Patient Acceptance, E, VU by ET at 4/12/2025 1034                                      User Key       Initials Effective Dates Name Provider Type Discipline    ET 07/26/24 -  Trudy Shoemaker, PT Physical Therapist PT                    Recommendation and Plan  Anticipated Discharge Disposition (PT): other (see comments) (defer to PT mobility. Will require skilled wound  care upon d/c.)  Planned Therapy Interventions (PT): wound care, patient/family education  Therapy Frequency (PT): daily  Plan of Care Reviewed With: patient   Progress: no change       Progress: no change  Outcome Evaluation: Patient with moderate edema in B LE. PT able to debride slough and nonviable tissue from L LE  Patient would benefit from MLW to improve venous return and manage BLE edema/ girth.  Plan of Care Reviewed With: patient            Time Calculation   PT Charges       Row Name 04/14/25 0930             Time Calculation    Start Time 0930  -KW         Untimed Charges    07926-Twfoisrogd comp below knee 30  -KW      59342-Tozzferwv debridement 20  -KW         Total Minutes    Untimed Charges Total Minutes 50  -KW       Total Minutes 50  -KW                User Key  (r) = Recorded By, (t) = Taken By, (c) = Cosigned By      Initials Name Provider Type    Otilia Crook, CLINT Physical Therapist                      Therapy Charges for Today       Code Description Service Date Service Provider Modifiers Qty    33778603642 HC VINAY DEBRIDE OPEN WOUND UP TO 20CM 4/14/2025 Otilia Styles, PT GP 1    74326344626 HC PT MULTI LAYER COMP SYS BELOW KNEE 4/14/2025 Otilia Styles PT GP 1              PT G-Codes  Outcome Measure Options: AM-PAC 6 Clicks Daily Activity (OT)  AM-PAC 6 Clicks Score (PT): 12  AM-PAC 6 Clicks Score (OT): 13       Otilia Styles PT  4/14/2025

## 2025-04-14 NOTE — CASE MANAGEMENT/SOCIAL WORK
Discharge Planning Assessment  [facility]     Patient Name: Iain Dee  MRN: [id number]  Today's Date: 4/14/2025    Admit Date: 4/11/2025    Plan: SNF   Discharge Needs Assessment       Row Name 04/14/25 1308       Living Environment    People in Home spouse    Current Living Arrangements home    Potentially Unsafe Housing Conditions none    In the past 12 months has the electric, gas, oil, or water company threatened to shut off services in your home? No    Primary Care Provided by spouse/significant other    Provides Primary Care For no one;no one, unable/limited ability to care for self    Family Caregiver if Needed spouse    Family Caregiver Names Maya Germain Spouse [phone]    Quality of Family Relationships helpful;involved;supportive    Able to Return to Prior Arrangements yes       Resource/Environmental Concerns    Resource/Environmental Concerns none    Transportation Concerns none       Transportation Needs    In the past 12 months, has lack of transportation kept you from medical appointments or from getting medications? no    In the past 12 months, has lack of transportation kept you from meetings, work, or from getting things needed for daily living? No       Food Insecurity    Within the past 12 months, you worried that your food would run out before you got the money to buy more. Never true    Within the past 12 months, the food you bought just didn't last and you didn't have money to get more. Never true       Transition Planning    Patient/Family Anticipates Transition to other (see comments)  SNF    Patient/Family Anticipated Services at Transition skilled nursing    Transportation Anticipated family or friend will provide       Discharge Needs Assessment    Readmission Within the Last 30 Days previous discharge plan unsuccessful;other (see comments)  Patient declined therapy recommendations for SNF    Equipment Currently Used at Home cpap;walker, rolling;wheelchair    Concerns to  be Addressed discharge planning    Do you want help finding or keeping work or a job? I do not need or want help    Do you want help with school or training? For example, starting or completing job training or getting a high school diploma, GED or equivalent No    Anticipated Changes Related to Illness none    Equipment Needed After Discharge none    Current Discharge Risk chronically ill                   Discharge Plan       Row Name 04/14/25 0690       Plan    Plan SNF    Patient/Family in Agreement with Plan yes    Plan Comments Met with Mr. Dee and his wife at the bedside to initiate discharge plan. They share a [place]. Per wife, patient has been independent with ADLs until fairly recently. She reported that he has had a significant decline in the past few weeks. Patient also endorses functional decline. He has a cpap, rolling walker, and a wheelchair at home. Per wife, he is using the wheelchair more often lately. Prior to hospitalization, patient was getting started with [facility]. His primary care provider is Dr. Luis Aguilar. Patient denies obstacles to getting medical care or obtaining medications. His discharge plan is SNF, and he has requested a referral to [facility].  was unable to reach Freddy aguirre but left message requesting a call back.  will make referral and follow up with patient tomorrow.  14:20 EDT  Received call back from Freddy aguirre, and she reported they have no beds at [facility] and none at [facility] currently. She agreed to check on expected discharges from [facility], but patient will likely have to find another facility.     Final Discharge Disposition Code 03 - skilled nursing facility (SNF)                  Selected Continued Care - Prior Encounters Includes continued care and service providers with selected services from prior encounters from 1/11/2025 to 4/14/2025      Discharged on 4/5/2025 Admission date: 4/2/2025 - Discharge  disposition: Home-Health Care [facility]      Home Medical Care       Service Provider Services Address Phone Fax Patient Preferred    [facility] [address] [address] [phone] --                          Expected Discharge Date and Time       Expected Discharge Date Expected Discharge Time    Apr 14, 2025            Demographic Summary       Row Name 04/14/25 1306       General Information    Admission Type inpatient    Arrived From emergency department    Referral Source admission list    Reason for Consult discharge planning    Preferred Language English       Contact Information    Permission Granted to Share Info With family/designee    Contact Information Obtained for     Contact Information Comments Maya Germain Spouse [phone]                   Functional Status       Row Name 04/14/25 1307       Functional Status    Usual Activity Tolerance fair    Current Activity Tolerance other (see comments)    Functional Status Comments see therapy notes       Physical Activity    On average, how many days per week do you engage in moderate to strenuous exercise (like a brisk walk)? 0 days    On average, how many minutes do you engage in exercise at this level? 0 min    Number of minutes of exercise per week 0       Assessment of Health Literacy    How often do you have someone help you read hospital materials? Occasionally    How often do you have problems learning about your medical condition because of difficulty understanding written information? Occasionally    How often do you have a problem understanding what is told to you about your medical condition? Occasionally    How confident are you filling out medical forms by yourself? A little bit    Health Literacy Moderate       Functional Status, IADL    Medications assistive person    Meal Preparation completely dependent    Housekeeping completely dependent     Laundry completely dependent    Shopping completely dependent    If for any reason you need help with day-to-day activities such as bathing, preparing meals, shopping, managing finances, etc., do you get the help you need? I get all the help I need       Mental Status    General Appearance WDL WDL       Mental Status Summary    Recent Changes in Mental Status/Cognitive Functioning no changes                   Psychosocial    No documentation.                  Abuse/Neglect    No documentation.                  Legal    No documentation.                  Substance Abuse    No documentation.                  Patient Forms    No documentation.                     Patricia Keith RN

## 2025-04-14 NOTE — PROGRESS NOTES
[facility] Medicine Services  PROGRESS NOTE    Patient Name: Iain Dee  : 1935  MRN: [id number]    Date of Admission: 2025  Primary Care Physician: Luis Aguilar DO    Subjective   Subjective     CC:  CHF exacerbation    HPI:  Good UOP s/p albumin and Lasix combination, will initiate metolazone today as renal function still remains elevated with no significant increase in bicarb.  Aiming for mild contraction alkalosis to assess euvolemia.    Objective   Objective     Vital Signs:   Temp:  [97.3 °F (36.3 °C)-98 °F (36.7 °C)] 98 °F (36.7 °C)  Heart Rate:  [59-84] 68  Resp:  [16-18] 18  BP: (105-149)/(65-87) 114/75  Flow (L/min) (Oxygen Therapy):  [2-4] 2     Physical Exam:  Constitutional: No acute distress, awake, alert  HENT: NCAT, mucous membranes moist  Respiratory: Clear to auscultation bilaterally, respiratory effort normal   Cardiovascular: RRR, no murmurs, rubs, or gallops  Gastrointestinal: Positive bowel sounds, soft, nontender, nondistended  Musculoskeletal: 2+ bilateral lower extremity edema  Psychiatric: Appropriate affect, cooperative  Neurologic: Oriented x 3, strength symmetric in all extremities, Cranial Nerves grossly intact to confrontation, speech clear  Skin: No rashes      Results Reviewed:  LAB RESULTS:      Lab 25  0605 25  1124 25  0530 25  1608 25  1447   WBC 5.25 6.09 5.19  --  5.36   HEMOGLOBIN 9.7* 10.2* 10.1*  --  9.7*   HEMATOCRIT 31.8* 34.5* 32.9*  --  31.8*   PLATELETS 231 222 236  --  233   NEUTROS ABS  --  5.12  --   --  4.30   IMMATURE GRANS (ABS)  --  0.02  --   --  0.01   LYMPHS ABS  --  0.35*  --   --  0.40*   MONOS ABS  --  0.45  --   --  0.53   EOS ABS  --  0.11  --   --  0.07   .0* 104.2* 100.6*  --  99.7*   HSTROP T  --   --   --  123* 136*         Lab 25  0605 25  1124 25  0734 25  1447   SODIUM 139 138 139 137   POTASSIUM 4.0 4.1 4.2 4.3   CHLORIDE 97* 96* 97*  95*   CO2 29.0 29.0 31.0* 31.0*   ANION GAP 13.0 13.0 11.0 11.0   BUN 51* 50* 48* 47*   CREATININE 1.86* 1.87* 1.71* 1.84*   EGFR 34.4* 34.2* 38.0* 34.8*   GLUCOSE 129* 136* 100* 151*   CALCIUM 8.9 9.0 8.6 8.7   MAGNESIUM 2.0 2.1 2.1  --          Lab 04/13/25  1124 04/12/25  0734 04/11/25  1608 04/11/25  1447   TOTAL PROTEIN 6.0 5.4*  --  5.8*   ALBUMIN 3.2* 3.4*  --  3.4*   GLOBULIN  --  2.0  --  2.4   ALT (SGPT) 18 19  --  21   AST (SGOT) 22 21  --  28   BILIRUBIN 2.1* 1.6*  --  1.5*   INDIRECT BILIRUBIN 0.9  --   --   --    BILIRUBIN DIRECT 1.2*  --  0.7*  --    ALK PHOS 163* 155*  --  161*         Lab 04/11/25  1608 04/11/25  1447   PROBNP  --  20,657.0*   HSTROP T 123* 136*                 Brief Urine Lab Results  (Last result in the past 365 days)        Color   Clarity   Blood   Leuk Est   Nitrite   Protein   CREAT   Urine HCG        04/03/25 1536 Yellow   Clear   Trace   Negative   Negative   100 mg/dL (2+)                   Microbiology Results Abnormal       None            Duplex Portal Hepatic Complete CAR  Result Date: 4/13/2025  DUPLEX PORTAL HEPATIC COMPLETE CAR Date of Exam: 4/13/2025 9:10 AM EDT Indication: portal hypertension. Comparison: Right upper quadrant ultrasound 4/13/2025 PROCEDURE: Two-dimensional grayscale, power Doppler, spectral Doppler and color Doppler ultrasound examination of the right upper quadrant with Liver Doppler is performed. FINDINGS: Limited exam due to patient habitus, bowel gas, patient positioning and patient cooperation. Partially imaged right pleural effusion. HEPATIC ARTERIES: Not assessed due to exam limitations PORTAL SYSTEM: There is normal hepatopetal flow with normal velocity in the portal veins. The peak systolic velocity is 0.27 m/sec. HEPATIC VEINS AND INFERIOR VENA CAVA: Expected hepatofugal hepatic vein flow.     Impression: Significantly limited exam. Hepatopetal portal flow without visualized thrombus. Based on clinical suspicion for an acute pathology,  multiphase CT or MRI could be considered. Electronically Signed: Steve Cuellar MD  4/13/2025 2:14 PM EDT  Workstation ID: [id number]    US Gallbladder  Result Date: 4/13/2025  US GALLBLADDER Date of Exam: 4/13/2025 10:37 AM EDT Indication: elevated bilirubin. Comparison: No comparisons available. Technique: Grayscale and color Doppler ultrasound evaluation of the right upper quadrant was performed. Findings: LIVER: The liver has increased parenchymal echogenicity. No focal lesions identified. Normal flow is present within the hepatic vasculature. GALLBLADDER: The gallbladder is contracted containing calcified stones likely contributing to wall thickening measuring 0.4 cm. No pericholecystic fluid and no evidence for positive sonographic Lazo sign. The common bile duct measures 0.5 cm. PANCREAS:  Visualized portions are unremarkable. RIGHT KIDNEY:  Normal in size with no focal lesions. No evidence of nephrolithiasis or hydronephrosis. Right upper quadrant ascites is noted. There is a right pleural effusion.     Impression: Impression: 1. Hepatic steatosis. 2. Cholelithiasis. 3. Right upper quadrant ascites and pleural effusion. Electronically Signed: Lindy Wells MD  4/13/2025 11:03 AM EDT  Workstation ID: [id number]      Results for orders placed during the hospital encounter of 04/02/25    Adult Transthoracic Echo Complete W/ Cont if Necessary Per Protocol    Interpretation Summary    Left ventricular ejection fraction appears to be 36 - 40%.    Left ventricular wall thickness is consistent with mild concentric hypertrophy.    Left ventricular diastolic function is consistent with (grade II w/high LAP) pseudonormalization.    The right ventricular cavity is moderate to severely dilated.    The left atrial cavity is moderate to severely dilated.    Left atrial volume is severely increased.    The right atrial cavity is severely  dilated.    There is mild calcification of the aortic valve.    Moderate to severe  aortic valve regurgitation is present.    Moderate to severe tricuspid valve regurgitation is present.    Estimated right ventricular systolic pressure from tricuspid regurgitation is markedly elevated (>55 mmHg).      Current medications:  Scheduled Meds:albumin human, 12.5 g, Intravenous, BID  apixaban, 2.5 mg, Oral, BID  aspirin, 81 mg, Oral, Daily  atorvastatin, 40 mg, Oral, Nightly  carvedilol, 3.125 mg, Oral, BID With Meals  empagliflozin, 10 mg, Oral, Daily  furosemide, 40 mg, Intravenous, Q12H  insulin lispro, 2-7 Units, Subcutaneous, 4x Daily AC & at Bedtime  magnesium oxide, 400 mg, Oral, Daily  metOLazone, 2.5 mg, Oral, Daily  pantoprazole, 40 mg, Oral, Q AM  pharmacy consult - MTM, , Not Applicable, Daily  potassium chloride, 20 mEq, Oral, Daily      Continuous Infusions:   PRN Meds:.  acetaminophen **OR** acetaminophen **OR** acetaminophen    senna-docusate sodium **AND** polyethylene glycol **AND** bisacodyl **AND** bisacodyl    dextrose    dextrose    glucagon (human recombinant)    Magnesium Cardiology Dose Replacement - Follow Nurse / BPA Driven Protocol    nitroglycerin    Potassium Replacement - Follow Nurse / BPA Driven Protocol    sodium chloride    sodium chloride    traMADol    Assessment & Plan   Assessment & Plan     Active Hospital Problems    Diagnosis  POA    **Acute exacerbation of CHF (congestive heart failure) [I50.9]  Yes    Thigh hematoma, left, initial encounter [S70.12XA]  Yes    Stage 3b chronic kidney disease [N18.32]  Yes    JOLENE on CPAP [G47.33]  Yes      Resolved Hospital Problems   No resolved problems to display.        Brief Hospital Course to date:  Iain Dee is a 89 y.o. male with PMH significant for HFrEF, valvular heart disease,  A-fib chronically anticoagulated on Eliquis, CAD, DM2, prostate/bladder cancer (~12 years ago) with recent admission to [facility] 4/2-4 5/20/2025 for left thigh hematoma who presents to [facility] ED with worsening edema despite using his home Bumex and  worsening mobility. Of note, patient was recommended to go to rehab after his last admission but declined.      This patient's problems and plans were partially entered by my partner and updated as appropriate by me 04/14/25.      CHF exacerbation  HFrEF  Valvular heart disease  H/o STEMI  Follows with Dr. Stover  Echo 4/4/2025 with EF 36-40%, LV diastolic dysfunction, moderate to severe AVR, moderate to severe tricuspid valve regurg, markedly elevated RVSP >55  Probnp proBNP 20,657 (decreased from prior 22,485 on 4/2)  CXR shows cardiomegaly with pulmonary vascular congestion and left basilar atelectasis versus infiltrate.  Left basilar atelectasis also seen on CXR for 2/20/2025.  Afebrile, normotensive  Patient on room air, add OPEP for atelectasis on CXR, O2 prn  Received Bumex 2 mg IV in ED  Cardiology consulted on admission  Lasix 40 twice daily IV with albumin for brisk diuresis as well as one-time dose of 2.5 metolazone  GDMT with Coreg and Jardiance resumed by cardiology  Strict I's & O's, good UOP however has had some leakage from condom cath  Daily weights  AM CMP, mag  PT/OT recommending rehab     Elevated troponin  Troponins 136>>123, increased from prior admission, likely type II demand ischemia   pt denies chest pain  No significant ST elevation on EKG     Elevated bilirubin  Bili 1.5, was wnl prior admission  Liver ultrasound negative for congestion, likely cholelithiasis evident as well as some ascites  No portal hypertension hepatic duplex     A-fib  rate controlled, continue Coreg  continue Eliquis at renal dosing 2.5 mg BID     Left leg hematoma  Stable from prior, although increased yellowing, appears to be healing   H&H stable  Continue to monitor  AM CBC     Left leg wound  From blister likely secondary to edema/venous stasis  PT wound care consult, may benefit from compression wraps/unna boots  AM CBC     BLE edema  PT wound care consult as above     CKD 3b  Cr slightly elevated 1.84 (appears  to fluctuate 1.30-1.80 per chart review) on admission  GDMT resumed by cardiology  Renal function improving with diuresing likely cardiorenal in etiology  AM CMP     CAD  HTN  HLD  Continue ASA, previously on Plavix that cardiology dc'd last admission d/t hematoma  Continue Coreg  Continue statin, LFTs wnl     DM2  A1c 6.4  On metformin BID at home, hold  ACHS, low-dose SSI     GERD  Daily PPI     JOLENE  CPAP    Expected Discharge Location and Transportation: TBD  Expected Discharge TBD  Expected Discharge Date: 4/14/2025; Expected Discharge Time:      VTE Prophylaxis:  Pharmacologic & mechanical VTE prophylaxis orders are present.    Total time spent: Time Spent: Time Spent: 40 minutes  Time spent includes time reviewing chart, face-to-face time, counseling patient/family/caregiver, ordering medications/tests/procedures, communicating with other health care professionals, documenting clinical information in the electronic health record, and coordination of care.      AM-PAC 6 Clicks Score (PT): 12 (04/13/25 2100)    CODE STATUS:   Code Status and Medical Interventions: CPR (Attempt to Resuscitate); Full Support   Ordered at: 04/11/25 1811     Code Status (Patient has no pulse and is not breathing):    CPR (Attempt to Resuscitate)     Medical Interventions (Patient has pulse or is breathing):    Full Support     Level Of Support Discussed With:    Patient       Isabel Mayes MD  04/14/25

## 2025-04-14 NOTE — PLAN OF CARE
Goal Outcome Evaluation:  Plan of Care Reviewed With: patient        Progress: no change  Outcome Evaluation: Patient with moderate edema in B LE. PT able to debride slough and nonviable tissue from L LE  Patient would benefit from MLW to improve venous return and manage BLE edema/ girth.

## 2025-04-14 NOTE — CASE MANAGEMENT/SOCIAL WORK
Discharge Planning Assessment  [facility]     Patient Name: Iain Dee  MRN: [id number]  Today's Date: 4/14/2025    Admit Date: 4/2/2025    Plan: SNF   Discharge Needs Assessment       Row Name 04/14/25 1255       Living Environment    People in Home spouse    Current Living Arrangements home    Potentially Unsafe Housing Conditions none    In the past 12 months has the electric, gas, oil, or water company threatened to shut off services in your home? No    Primary Care Provided by spouse/significant other    Provides Primary Care For no one, unable/limited ability to care for self    Family Caregiver if Needed spouse    Family Caregiver Names Maya Germain Spouse [phone]    Quality of Family Relationships involved;helpful;supportive    Able to Return to Prior Arrangements yes       Resource/Environmental Concerns    Resource/Environmental Concerns none    Transportation Concerns none       Transportation Needs    In the past 12 months, has lack of transportation kept you from medical appointments or from getting medications? no    In the past 12 months, has lack of transportation kept you from meetings, work, or from getting things needed for daily living? No       Food Insecurity    Within the past 12 months, you worried that your food would run out before you got the money to buy more. Never true       Transition Planning    Patient/Family Anticipates Transition to inpatient rehabilitation facility    Patient/Family Anticipated Services at Transition none    Transportation Anticipated family or friend will provide       Discharge Needs Assessment    Readmission Within the Last 30 Days previous discharge plan unsuccessful;other (see comments)  Patient declined therapy recommendations for SNF.    Equipment Currently Used at Home walker, rolling;cpap;wheelchair    Concerns to be Addressed discharge planning    Do you want help finding or keeping work or a job? I do not need or want help    Do you want  help with school or training? For example, starting or completing job training or getting a high school diploma, GED or equivalent No    Anticipated Changes Related to Illness inability to care for self    Equipment Needed After Discharge none    Outpatient/Agency/Support Group Needs skilled nursing facility    Discharge Facility/Level of Care Needs nursing facility, skilled    Provided Post Acute Provider List? N/A    N/A Provider List Comment Patient and wife asked CM to make referrals to [facility].    Provided Post Acute Provider Quality & Resource List? N/A    Current Discharge Risk chronically ill                   Discharge Plan       Row Name 04/14/25 5411       Plan    Plan SNF    Patient/Family in Agreement with Plan yes    Plan Comments Met with Mr. Dee and his wife at the bedside to initiate discharge plan. They share a [place] home. Per wife, patient has been independent with ADLs until fairly recently. She reported that he has had a significant decline in the past few weeks. Patient also endorses functional decline. He has a cpap, rolling walker, and a wheelchair at home. Per wife, he is using the wheelchair more often lately. Prior to hospitalization, patient was getting started with [facility]. His primary care provider is Dr. Luis Aguilar. Patient denies obstacles to getting medical care or obtaining medications. His discharge plan is SNF, and he has requested a referral to [facility].  was unable to reach Freddy aguirre but left message requesting a call back.  will make referral and follow up with patient tomorrow.    Final Discharge Disposition Code 03 - skilled nursing facility (SNF)                  Continued Care and Services - Discharged on 4/5/2025 Admission date: 4/2/2025 - [place] [facility] [place] Medical Care Coordination complete.      Service Provider Request Status Services Address Phone Fax Patient  Preferred    [facility]  Selected Home Nursing, Home Rehabilitation [address], [address], [place] [id number] [phone] --    [facility] [place] [facility] -- [address], [facility] [id number] [phone] --    [facility] [place] [facility] -- [address], [place] [id number] [phone] --    [facility] [place] Declined -- [address], [address], [place] [address] [phone] --                  Expected Discharge Date and Time       Expected Discharge Date Expected Discharge Time    Apr 5, 2025            Demographic Summary       Row Name 04/14/25 1254       General Information    Admission Type inpatient    Arrived From emergency department    Referral Source admission list    Reason for Consult discharge planning    Preferred Language English       Contact Information    Permission Granted to Share Info With family/designee    Contact Information Obtained for     Contact Information Comments Maya Germain Spouse [phone]                   Functional Status       Row Name 04/14/25 1254       Functional Status    Usual Activity Tolerance fair    Current Activity Tolerance other (see comments)  See therapy notes       Physical Activity    On average, how many days per week do you engage in moderate to strenuous exercise (like a brisk walk)? 0 days    On average, how many minutes do you engage in exercise at this level? 0 min    Number of minutes of exercise per week 0       Assessment of Health Literacy    How often do you have someone help you read hospital materials? Occasionally    How often do you have problems learning about your medical condition because of difficulty understanding written information? Occasionally    How confident are you filling out medical forms by yourself? Somewhat    Health Literacy Good       Functional Status,  IADL    Medications assistive person    Meal Preparation completely dependent    Housekeeping completely dependent    Laundry completely dependent    Shopping completely dependent    If for any reason you need help with day-to-day activities such as bathing, preparing meals, shopping, managing finances, etc., do you get the help you need? I get all the help I need       Mental Status    General Appearance WDL WDL       Mental Status Summary    Recent Changes in Mental Status/Cognitive Functioning no changes                   Psychosocial    No documentation.                  Abuse/Neglect    No documentation.                  Legal    No documentation.                  Substance Abuse    No documentation.                  Patient Forms    No documentation.                     Patricia Keith RN

## 2025-04-15 ENCOUNTER — APPOINTMENT (OUTPATIENT)
Dept: ULTRASOUND IMAGING | Facility: HOSPITAL | Age: 89
DRG: 291 | End: 2025-04-15
Payer: MEDICARE

## 2025-04-15 LAB
ALBUMIN SERPL-MCNC: 3.4 G/DL (ref 3.5–5.2)
ALP SERPL-CCNC: 154 U/L (ref 39–117)
ALT SERPL W P-5'-P-CCNC: 19 U/L (ref 1–41)
ANION GAP SERPL CALCULATED.3IONS-SCNC: 12 MMOL/L (ref 5–15)
AST SERPL-CCNC: 29 U/L (ref 1–40)
BACTERIA UR QL AUTO: NORMAL /HPF
BILIRUB CONJ SERPL-MCNC: 1.2 MG/DL (ref 0–0.3)
BILIRUB INDIRECT SERPL-MCNC: 0.9 MG/DL
BILIRUB SERPL-MCNC: 2.1 MG/DL (ref 0–1.2)
BILIRUB UR QL STRIP: NEGATIVE
BUN SERPL-MCNC: 52 MG/DL (ref 8–23)
BUN/CREAT SERPL: 27.7 (ref 7–25)
CALCIUM SPEC-SCNC: 9 MG/DL (ref 8.6–10.5)
CHLORIDE SERPL-SCNC: 93 MMOL/L (ref 98–107)
CK SERPL-CCNC: 63 U/L (ref 20–200)
CLARITY UR: CLEAR
CO2 SERPL-SCNC: 32 MMOL/L (ref 22–29)
COLOR UR: YELLOW
CREAT SERPL-MCNC: 1.88 MG/DL (ref 0.76–1.27)
CREAT UR-MCNC: 26.2 MG/DL
DEPRECATED RDW RBC AUTO: 63.5 FL (ref 37–54)
EGFRCR SERPLBLD CKD-EPI 2021: 33.9 ML/MIN/1.73
ERYTHROCYTE [DISTWIDTH] IN BLOOD BY AUTOMATED COUNT: 18.1 % (ref 12.3–15.4)
GLUCOSE BLDC GLUCOMTR-MCNC: 129 MG/DL (ref 70–130)
GLUCOSE BLDC GLUCOMTR-MCNC: 159 MG/DL (ref 70–130)
GLUCOSE BLDC GLUCOMTR-MCNC: 171 MG/DL (ref 70–130)
GLUCOSE BLDC GLUCOMTR-MCNC: 246 MG/DL (ref 70–130)
GLUCOSE SERPL-MCNC: 112 MG/DL (ref 65–99)
GLUCOSE UR STRIP-MCNC: ABNORMAL MG/DL
HCT VFR BLD AUTO: 31.1 % (ref 37.5–51)
HGB BLD-MCNC: 9.6 G/DL (ref 13–17.7)
HGB UR QL STRIP.AUTO: ABNORMAL
HYALINE CASTS UR QL AUTO: NORMAL /LPF
KETONES UR QL STRIP: NEGATIVE
LDH SERPL-CCNC: 258 U/L (ref 135–225)
LEUKOCYTE ESTERASE UR QL STRIP.AUTO: NEGATIVE
MAGNESIUM SERPL-MCNC: 2.1 MG/DL (ref 1.6–2.4)
MCH RBC QN AUTO: 30.5 PG (ref 26.6–33)
MCHC RBC AUTO-ENTMCNC: 30.9 G/DL (ref 31.5–35.7)
MCV RBC AUTO: 98.7 FL (ref 79–97)
NITRITE UR QL STRIP: NEGATIVE
PH UR STRIP.AUTO: 6 [PH] (ref 5–8)
PLATELET # BLD AUTO: 206 10*3/MM3 (ref 140–450)
PMV BLD AUTO: 10.4 FL (ref 6–12)
POTASSIUM SERPL-SCNC: 3.5 MMOL/L (ref 3.5–5.2)
POTASSIUM SERPL-SCNC: 3.8 MMOL/L (ref 3.5–5.2)
PROT ?TM UR-MCNC: 59.5 MG/DL
PROT SERPL-MCNC: 5.9 G/DL (ref 6–8.5)
PROT UR QL STRIP: ABNORMAL
RBC # BLD AUTO: 3.15 10*6/MM3 (ref 4.14–5.8)
RBC # UR STRIP: NORMAL /HPF
REF LAB TEST METHOD: NORMAL
SODIUM SERPL-SCNC: 137 MMOL/L (ref 136–145)
SODIUM UR-SCNC: 87 MMOL/L
SP GR UR STRIP: 1.01 (ref 1–1.03)
SQUAMOUS #/AREA URNS HPF: NORMAL /HPF
URATE SERPL-MCNC: 10.6 MG/DL (ref 3.4–7)
UROBILINOGEN UR QL STRIP: ABNORMAL
UUN 24H UR-MCNC: 297 MG/DL
WBC # UR STRIP: NORMAL /HPF
WBC NRBC COR # BLD AUTO: 4.57 10*3/MM3 (ref 3.4–10.8)

## 2025-04-15 PROCEDURE — 82948 REAGENT STRIP/BLOOD GLUCOSE: CPT

## 2025-04-15 PROCEDURE — 76775 US EXAM ABDO BACK WALL LIM: CPT

## 2025-04-15 PROCEDURE — 82570 ASSAY OF URINE CREATININE: CPT | Performed by: INTERNAL MEDICINE

## 2025-04-15 PROCEDURE — 82550 ASSAY OF CK (CPK): CPT | Performed by: INTERNAL MEDICINE

## 2025-04-15 PROCEDURE — P9047 ALBUMIN (HUMAN), 25%, 50ML: HCPCS

## 2025-04-15 PROCEDURE — 84156 ASSAY OF PROTEIN URINE: CPT | Performed by: INTERNAL MEDICINE

## 2025-04-15 PROCEDURE — 83735 ASSAY OF MAGNESIUM: CPT

## 2025-04-15 PROCEDURE — 85027 COMPLETE CBC AUTOMATED: CPT

## 2025-04-15 PROCEDURE — 84540 ASSAY OF URINE/UREA-N: CPT

## 2025-04-15 PROCEDURE — 63710000001 INSULIN LISPRO (HUMAN) PER 5 UNITS: Performed by: NURSE PRACTITIONER

## 2025-04-15 PROCEDURE — 80076 HEPATIC FUNCTION PANEL: CPT

## 2025-04-15 PROCEDURE — 25010000002 ALBUMIN HUMAN 25% PER 50 ML: Performed by: INTERNAL MEDICINE

## 2025-04-15 PROCEDURE — 81001 URINALYSIS AUTO W/SCOPE: CPT

## 2025-04-15 PROCEDURE — P9047 ALBUMIN (HUMAN), 25%, 50ML: HCPCS | Performed by: INTERNAL MEDICINE

## 2025-04-15 PROCEDURE — 80048 BASIC METABOLIC PNL TOTAL CA: CPT

## 2025-04-15 PROCEDURE — 84300 ASSAY OF URINE SODIUM: CPT

## 2025-04-15 PROCEDURE — 25010000002 ALBUMIN HUMAN 25% PER 50 ML

## 2025-04-15 PROCEDURE — 84132 ASSAY OF SERUM POTASSIUM: CPT

## 2025-04-15 PROCEDURE — 83615 LACTATE (LD) (LDH) ENZYME: CPT | Performed by: INTERNAL MEDICINE

## 2025-04-15 PROCEDURE — 25010000002 FUROSEMIDE PER 20 MG

## 2025-04-15 PROCEDURE — 99232 SBSQ HOSP IP/OBS MODERATE 35: CPT

## 2025-04-15 PROCEDURE — 84550 ASSAY OF BLOOD/URIC ACID: CPT | Performed by: INTERNAL MEDICINE

## 2025-04-15 RX ORDER — ALBUMIN (HUMAN) 12.5 G/50ML
25 SOLUTION INTRAVENOUS 2 TIMES DAILY
Status: COMPLETED | OUTPATIENT
Start: 2025-04-15 | End: 2025-04-16

## 2025-04-15 RX ORDER — POTASSIUM CHLORIDE 1500 MG/1
40 TABLET, EXTENDED RELEASE ORAL EVERY 4 HOURS
Status: DISPENSED | OUTPATIENT
Start: 2025-04-15 | End: 2025-04-15

## 2025-04-15 RX ORDER — FUROSEMIDE 10 MG/ML
40 INJECTION INTRAMUSCULAR; INTRAVENOUS DAILY
Status: DISCONTINUED | OUTPATIENT
Start: 2025-04-16 | End: 2025-04-17

## 2025-04-15 RX ADMIN — CARVEDILOL 3.12 MG: 3.12 TABLET, FILM COATED ORAL at 08:57

## 2025-04-15 RX ADMIN — ATORVASTATIN CALCIUM 40 MG: 40 TABLET, FILM COATED ORAL at 21:35

## 2025-04-15 RX ADMIN — INSULIN LISPRO 3 UNITS: 100 INJECTION, SOLUTION INTRAVENOUS; SUBCUTANEOUS at 11:56

## 2025-04-15 RX ADMIN — ASPIRIN 81 MG: 81 TABLET, COATED ORAL at 08:57

## 2025-04-15 RX ADMIN — APIXABAN 2.5 MG: 2.5 TABLET, FILM COATED ORAL at 21:35

## 2025-04-15 RX ADMIN — MAGNESIUM OXIDE TAB 400 MG (241.3 MG ELEMENTAL MG) 400 MG: 400 (241.3 MG) TAB at 08:58

## 2025-04-15 RX ADMIN — METOLAZONE 2.5 MG: 2.5 TABLET ORAL at 08:58

## 2025-04-15 RX ADMIN — FUROSEMIDE 40 MG: 10 INJECTION, SOLUTION INTRAMUSCULAR; INTRAVENOUS at 05:27

## 2025-04-15 RX ADMIN — INSULIN LISPRO 2 UNITS: 100 INJECTION, SOLUTION INTRAVENOUS; SUBCUTANEOUS at 17:16

## 2025-04-15 RX ADMIN — EMPAGLIFLOZIN 10 MG: 10 TABLET, FILM COATED ORAL at 08:58

## 2025-04-15 RX ADMIN — PANTOPRAZOLE SODIUM 40 MG: 40 TABLET, DELAYED RELEASE ORAL at 05:27

## 2025-04-15 RX ADMIN — TRAMADOL HYDROCHLORIDE 50 MG: 50 TABLET, COATED ORAL at 21:35

## 2025-04-15 RX ADMIN — POTASSIUM CHLORIDE 20 MEQ: 750 CAPSULE, EXTENDED RELEASE ORAL at 08:58

## 2025-04-15 RX ADMIN — ALBUMIN (HUMAN) 25 G: 0.25 INJECTION, SOLUTION INTRAVENOUS at 17:17

## 2025-04-15 RX ADMIN — POTASSIUM CHLORIDE 40 MEQ: 1500 TABLET, EXTENDED RELEASE ORAL at 11:57

## 2025-04-15 RX ADMIN — CARVEDILOL 3.12 MG: 3.12 TABLET, FILM COATED ORAL at 17:17

## 2025-04-15 RX ADMIN — INSULIN LISPRO 2 UNITS: 100 INJECTION, SOLUTION INTRAVENOUS; SUBCUTANEOUS at 21:34

## 2025-04-15 RX ADMIN — ALBUMIN (HUMAN) 12.5 G: 0.25 INJECTION, SOLUTION INTRAVENOUS at 05:27

## 2025-04-15 RX ADMIN — APIXABAN 2.5 MG: 2.5 TABLET, FILM COATED ORAL at 08:58

## 2025-04-15 NOTE — CONSULTS
"  Referring Provider: Isabel Mayes MD   Reason for Consultation: SHANT on CKD    Subjective     Chief complaint Shortness of breath     History of present illness:  This is  is an 89-year-old male with PMH significant for HFrEF, valvular heart disease,  A-fib chronically anticoagulated on Eliquis, CAD, DM2, prostate/bladder cancer (~12 years ago) and left thigh hematoma who presents to [facility] ED with worsening edema. He has been taking ibuprofen on almost daily basis for many years. He denies any hx of kidney stones. Unaware of any family hx of kidney disease or autoimmune disorder. He denies any chest pain, presyncope, syncope  Denies any fever, chills, rigors, rash, hematuria or hemoptysis. Nephrology service has been consulted for CKD/SHANT management     History  Past Medical History:   Diagnosis Date    A-fib     Arthritis     Cancer     Diabetes mellitus     GERD (gastroesophageal reflux disease)     Gout     H/O Bladder carcinoma (HCC)     Hypertension    ,   Past Surgical History:   Procedure Laterality Date    BLADDER SURGERY      Biopsy    HERNIA REPAIR      PROSTATE BIOPSY     ,   Family History   Problem Relation Age of Onset    No Known Problems Mother     Heart attack Father    ,   Social History     Socioeconomic History    Marital status:    Tobacco Use    Smoking status: Never     Passive exposure: Never    Smokeless tobacco: Never   Vaping Use    Vaping status: Never Used   Substance and Sexual Activity    Alcohol use: Yes     Alcohol/week: 1.0 standard drink of alcohol     Types: 1 Shots of liquor per week     Comment: Estimation based on spouse report of patient drinking \"1 large martini\" each night (regular martini = 3 shots, large martini = est. 4 shots)    Drug use: No    Sexual activity: Defer     E-cigarette/Vaping    E-cigarette/Vaping Use Never User     Passive Exposure No     Counseling Given No      E-cigarette/Vaping Substances    Nicotine No     THC No     CBD No     Flavoring No "      E-cigarette/Vaping Devices    Disposable No     Pre-filled or Refillable Cartridge No     Refillable Tank No     Pre-filled Pod No          ,   Medications Prior to Admission   Medication Sig Dispense Refill Last Dose/Taking    apixaban (ELIQUIS) 2.5 MG tablet tablet Take 1 tablet by mouth 2 (Two) Times a Day. Indications: Atrial Fibrillation 60 tablet 11 4/11/2025 Morning    aspirin 81 MG EC tablet Take 1 tablet by mouth Daily. 30 tablet 0 4/11/2025 Morning    atorvastatin (LIPITOR) 40 MG tablet Take 1 tablet by mouth Every Night. 90 tablet 3 4/10/2025    bumetanide (BUMEX) 2 MG tablet TAKE 1 TABLET BY MOUTH DAILY 90 tablet 0 4/10/2025    carvedilol (COREG) 3.125 MG tablet Take 1 tablet by mouth 2 (Two) Times a Day With Meals. 180 tablet 3 4/11/2025 Morning    famotidine (PEPCID) 20 MG tablet Take 1 tablet by mouth 2 (Two) Times a Day As Needed for Heartburn. 180 tablet 0 Past Week    Jardiance 10 MG tablet tablet Take 1 tablet by mouth Daily. 90 tablet 11 4/11/2025 Morning    magnesium oxide (MAG-OX) 400 MG tablet Take 1 tablet by mouth Daily. 30 tablet 11 4/11/2025 Morning    metFORMIN (GLUCOPHAGE) 500 MG tablet Take 1 tablet by mouth 2 (Two) Times a Day With Meals. 180 tablet 0 4/11/2025 Morning    nitroglycerin (NITRODUR) 0.2 MG/HR patch Place 1 patch on the skin as directed by provider See Admin Instructions. Apply patch daily, remove at night for at least 10 hours 90 patch 3 More than a month   , Scheduled Meds:  apixaban, 2.5 mg, Oral, BID  aspirin, 81 mg, Oral, Daily  atorvastatin, 40 mg, Oral, Nightly  carvedilol, 3.125 mg, Oral, BID With Meals  empagliflozin, 10 mg, Oral, Daily  furosemide, 40 mg, Intravenous, Q12H  insulin lispro, 2-7 Units, Subcutaneous, 4x Daily AC & at Bedtime  magnesium oxide, 400 mg, Oral, Daily  metOLazone, 2.5 mg, Oral, Daily  pantoprazole, 40 mg, Oral, Q AM  pharmacy consult - MTM, , Not Applicable, Daily  potassium chloride ER, 40 mEq, Oral, Q4H  potassium chloride, 20 mEq,  Oral, Daily   , Continuous Infusions:   , PRN Meds:    acetaminophen **OR** acetaminophen **OR** acetaminophen    senna-docusate sodium **AND** polyethylene glycol **AND** bisacodyl **AND** bisacodyl    dextrose    dextrose    glucagon (human recombinant)    Magnesium Cardiology Dose Replacement - Follow Nurse / BPA Driven Protocol    nitroglycerin    Potassium Replacement - Follow Nurse / BPA Driven Protocol    sodium chloride    sodium chloride    traMADol, and Allergies:  Colchicine    Review of Systems  Pertinent items are noted in HPI    Objective     Vital Signs  Temp:  [97.3 °F (36.3 °C)-97.9 °F (36.6 °C)] 97.8 °F (36.6 °C)  Heart Rate:  [57-78] 64  Resp:  [16-18] 18  BP: (106-142)/(66-94) 116/66    I/O this shift:  In: 240 [P.O.:240]  Out: 750 [Urine:750]  I/O last 3 completed shifts:  In: 50 [IV Piggyback:50]  Out: 1925 [Urine:1925]    Physical Exam:  General Appearance:    Alert, cooperative, in no acute distress   Head:    Normocephalic, without obvious abnormality, atraumatic   Eyes:            Conjunctivae and sclerae normal, no   icterus, no pallor, corneas clear, PERRLA           Neck:   Supple, trachea midline, no thyromegaly,  no JVD       Lungs:     Clear to auscultation,respirations regular, even and               unlabored    Heart:    Regular rhythm and normal rate, normal S1 and S2, no       murmur, no gallop, no rub, no click       Abdomen:     Normal bowel sounds,soft, non-tender, non-distended, no guarding, no rebound tenderness       Extremities:   Moves all extremities well, ++edema, no cyanosis, no         redness   Pulses:   Pulses palpable and equal bilaterally           Neurologic:   Cranial nerves 2 - 12 grossly intact, no focal deficit         Results Review:   I reviewed the patient's new clinical results.    WBC WBC   Date Value Ref Range Status   04/15/2025 4.57 3.40 - 10.80 10*3/mm3 Final   04/14/2025 5.25 3.40 - 10.80 10*3/mm3 Final   04/13/2025 6.09 3.40 - 10.80 10*3/mm3 Final  "     HGB Hemoglobin   Date Value Ref Range Status   04/15/2025 9.6 (L) 13.0 - 17.7 g/dL Final   04/14/2025 9.7 (L) 13.0 - 17.7 g/dL Final   04/13/2025 10.2 (L) 13.0 - 17.7 g/dL Final      HCT Hematocrit   Date Value Ref Range Status   04/15/2025 31.1 (L) 37.5 - 51.0 % Final   04/14/2025 31.8 (L) 37.5 - 51.0 % Final   04/13/2025 34.5 (L) 37.5 - 51.0 % Final      Platlets No results found for: \"LABPLAT\"   MCV MCV   Date Value Ref Range Status   04/15/2025 98.7 (H) 79.0 - 97.0 fL Final   04/14/2025 100.0 (H) 79.0 - 97.0 fL Final   04/13/2025 104.2 (H) 79.0 - 97.0 fL Final          Sodium Sodium   Date Value Ref Range Status   04/15/2025 137 136 - 145 mmol/L Final   04/14/2025 139 136 - 145 mmol/L Final   04/13/2025 138 136 - 145 mmol/L Final      Potassium Potassium   Date Value Ref Range Status   04/15/2025 3.5 3.5 - 5.2 mmol/L Final   04/14/2025 4.0 3.5 - 5.2 mmol/L Final   04/13/2025 4.1 3.5 - 5.2 mmol/L Final      Chloride Chloride   Date Value Ref Range Status   04/15/2025 93 (L) 98 - 107 mmol/L Final   04/14/2025 97 (L) 98 - 107 mmol/L Final   04/13/2025 96 (L) 98 - 107 mmol/L Final      CO2 CO2   Date Value Ref Range Status   04/15/2025 32.0 (H) 22.0 - 29.0 mmol/L Final   04/14/2025 29.0 22.0 - 29.0 mmol/L Final   04/13/2025 29.0 22.0 - 29.0 mmol/L Final      BUN BUN   Date Value Ref Range Status   04/15/2025 52 (H) 8 - 23 mg/dL Final   04/14/2025 51 (H) 8 - 23 mg/dL Final   04/13/2025 50 (H) 8 - 23 mg/dL Final      Creatinine Creatinine   Date Value Ref Range Status   04/15/2025 1.88 (H) 0.76 - 1.27 mg/dL Final   04/14/2025 1.86 (H) 0.76 - 1.27 mg/dL Final   04/13/2025 1.87 (H) 0.76 - 1.27 mg/dL Final      Calcium Calcium   Date Value Ref Range Status   04/15/2025 9.0 8.6 - 10.5 mg/dL Final   04/14/2025 8.9 8.6 - 10.5 mg/dL Final   04/13/2025 9.0 8.6 - 10.5 mg/dL Final      PO4 No results found for: \"CAPO4\"   Albumin Albumin   Date Value Ref Range Status   04/15/2025 3.4 (L) 3.5 - 5.2 g/dL Final   04/13/2025 " "3.2 (L) 3.5 - 5.2 g/dL Final      Magnesium Magnesium   Date Value Ref Range Status   04/15/2025 2.1 1.6 - 2.4 mg/dL Final   04/14/2025 2.0 1.6 - 2.4 mg/dL Final   04/13/2025 2.1 1.6 - 2.4 mg/dL Final      Uric Acid No results found for: \"URICACID\"         apixaban, 2.5 mg, Oral, BID  aspirin, 81 mg, Oral, Daily  atorvastatin, 40 mg, Oral, Nightly  carvedilol, 3.125 mg, Oral, BID With Meals  empagliflozin, 10 mg, Oral, Daily  furosemide, 40 mg, Intravenous, Q12H  insulin lispro, 2-7 Units, Subcutaneous, 4x Daily AC & at Bedtime  magnesium oxide, 400 mg, Oral, Daily  metOLazone, 2.5 mg, Oral, Daily  pantoprazole, 40 mg, Oral, Q AM  pharmacy consult - MTM, , Not Applicable, Daily  potassium chloride ER, 40 mEq, Oral, Q4H  potassium chloride, 20 mEq, Oral, Daily           Assessment & Plan       Acute exacerbation of CHF (congestive heart failure)    JOLENE on CPAP    Stage 3b chronic kidney disease    Thigh hematoma, left, initial encounter      1- SHANT on CKD stage - Scr 1.8 stable with diuretics.   2- CKD stage III - baseline Scr 1.4-1.6 range - UA + protein. Micralb/creatinine 6.7- Likely DN plus hx of NSAID use   3- Edema   4- systolic CHF - EF 36-40%   5- CAD   6- Moderate to Severe TR and AR  7- HTN   8- DM type II    Plan:  - Discussed in details about NSAID and recommended to avoid in future  - Continue with SGLT 2 inhibitor   - Will decrease dose of diuretics to once daily   - Will hold metolazone and will consider aldactone as K sparing diuretics   - Monitor I/O   - Continue with diuretics   - Renal diet   - Adjust meds per renal function   - No emergent need of RRT   - Monitor H/H and transfuse for Hgb less than 7.0   - Urine lytes   - Renal ultrasound pending       I discussed the patients findings and my recommendations with patient and nursing staff    Isabel Polk MD  04/15/25          "

## 2025-04-15 NOTE — PROGRESS NOTES
[facility] Medicine Services  PROGRESS NOTE    Patient Name: Iain Dee  : 1935  MRN: [id number]    Date of Admission: 2025  Primary Care Physician: Luis Aguilar DO    Subjective   Subjective     CC:  CHF exacerbation    HPI:  No significant overnight events, good UOP s/p metolazone, Lasix with albumin for brisk diuresis almost 2 L charted.  Renal function remains status quo at 1.88 today, no significant increase or decline.  There is however contraction alkalosis.  Will consult nephrology, check renal ultrasound with urine studies    Objective   Objective     Vital Signs:   Temp:  [97.3 °F (36.3 °C)-97.9 °F (36.6 °C)] 97.8 °F (36.6 °C)  Heart Rate:  [57-78] 61  Resp:  [16-18] 18  BP: (116-142)/(66-94) 139/79  Flow (L/min) (Oxygen Therapy):  [2-6] 2     Physical Exam:  Constitutional: No acute distress, awake, alert  HENT: NCAT, mucous membranes moist  Respiratory: Clear to auscultation bilaterally, respiratory effort normal   Cardiovascular: RRR, no murmurs, rubs, or gallops  Gastrointestinal: Positive bowel sounds, soft, nontender, nondistended  Musculoskeletal: 2+ bilateral lower extremity edema  Psychiatric: Appropriate affect, cooperative  Neurologic: Oriented x 3, strength symmetric in all extremities, Cranial Nerves grossly intact to confrontation, speech clear  Skin: No rashes      Results Reviewed:  LAB RESULTS:      Lab 04/15/25  0736 25  0605 25  1124 25  0530 25  1608 25  1447   WBC 4.57 5.25 6.09 5.19  --  5.36   HEMOGLOBIN 9.6* 9.7* 10.2* 10.1*  --  9.7*   HEMATOCRIT 31.1* 31.8* 34.5* 32.9*  --  31.8*   PLATELETS 206 231 222 236  --  233   NEUTROS ABS  --   --  5.12  --   --  4.30   IMMATURE GRANS (ABS)  --   --  0.02  --   --  0.01   LYMPHS ABS  --   --  0.35*  --   --  0.40*   MONOS ABS  --   --  0.45  --   --  0.53   EOS ABS  --   --  0.11  --   --  0.07   MCV 98.7* 100.0* 104.2* 100.6*  --  99.7*   HSTROP T  --   --    --   --  123* 136*         Lab 04/15/25  0736 04/14/25  0605 04/13/25  1124 04/12/25  0734 04/11/25  1447   SODIUM 137 139 138 139 137   POTASSIUM 3.5 4.0 4.1 4.2 4.3   CHLORIDE 93* 97* 96* 97* 95*   CO2 32.0* 29.0 29.0 31.0* 31.0*   ANION GAP 12.0 13.0 13.0 11.0 11.0   BUN 52* 51* 50* 48* 47*   CREATININE 1.88* 1.86* 1.87* 1.71* 1.84*   EGFR 33.9* 34.4* 34.2* 38.0* 34.8*   GLUCOSE 112* 129* 136* 100* 151*   CALCIUM 9.0 8.9 9.0 8.6 8.7   MAGNESIUM 2.1 2.0 2.1 2.1  --          Lab 04/15/25  0736 04/13/25  1124 04/12/25  0734 04/11/25  1608 04/11/25  1447   TOTAL PROTEIN 5.9* 6.0 5.4*  --  5.8*   ALBUMIN 3.4* 3.2* 3.4*  --  3.4*   GLOBULIN  --   --  2.0  --  2.4   ALT (SGPT) 19 18 19  --  21   AST (SGOT) 29 22 21  --  28   BILIRUBIN 2.1* 2.1* 1.6*  --  1.5*   INDIRECT BILIRUBIN 0.9 0.9  --   --   --    BILIRUBIN DIRECT 1.2* 1.2*  --  0.7*  --    ALK PHOS 154* 163* 155*  --  161*         Lab 04/11/25  1608 04/11/25  1447   PROBNP  --  20,657.0*   HSTROP T 123* 136*                 Brief Urine Lab Results  (Last result in the past 365 days)        Color   Clarity   Blood   Leuk Est   Nitrite   Protein   CREAT   Urine HCG        04/15/25 1241 Yellow   Clear   Trace   Negative   Negative   100 mg/dL (2+)                   Microbiology Results Abnormal       None            US Renal Bilateral  Result Date: 4/15/2025  US RENAL BILATERAL Date of Exam: 4/15/2025 12:50 PM EDT Indication: steffanie look for hydronephrosis. Comparison: No comparisons available. Technique: Grayscale and color Doppler ultrasound evaluation of the kidneys and urinary bladder was performed. Findings: RIGHT kidney measures 10.7 x 5.4 x 5.1 cm.  Kidney echogenicity, size, and vascularity appear within normal limits. There is no solid kidney mass.  No echogenic shadowing stone.  No hydronephrosis. LEFT kidney measures 10.9 x 5.6 x 5.7 cm. Kidney echogenicity, size, and vascularity appear within normal limits. There is no solid kidney mass.  No echogenic  shadowing stone.  No hydronephrosis. Limited visualization of the urinary bladder is unremarkable.     Impression: Impression: No significant sonographic abnormality of the kidneys. Electronically Signed: Susu Reeves MD  4/15/2025 1:29 PM EDT  Workstation ID: [id number]      Results for orders placed during the hospital encounter of 04/02/25    Adult Transthoracic Echo Complete W/ Cont if Necessary Per Protocol    Interpretation Summary    Left ventricular ejection fraction appears to be 36 - 40%.    Left ventricular wall thickness is consistent with mild concentric hypertrophy.    Left ventricular diastolic function is consistent with (grade II w/high LAP) pseudonormalization.    The right ventricular cavity is moderate to severely dilated.    The left atrial cavity is moderate to severely dilated.    Left atrial volume is severely increased.    The right atrial cavity is severely  dilated.    There is mild calcification of the aortic valve.    Moderate to severe aortic valve regurgitation is present.    Moderate to severe tricuspid valve regurgitation is present.    Estimated right ventricular systolic pressure from tricuspid regurgitation is markedly elevated (>55 mmHg).      Current medications:  Scheduled Meds:albumin human, 25 g, Intravenous, BID  apixaban, 2.5 mg, Oral, BID  aspirin, 81 mg, Oral, Daily  atorvastatin, 40 mg, Oral, Nightly  carvedilol, 3.125 mg, Oral, BID With Meals  empagliflozin, 10 mg, Oral, Daily  [START ON 4/16/2025] furosemide, 40 mg, Intravenous, Daily  insulin lispro, 2-7 Units, Subcutaneous, 4x Daily AC & at Bedtime  magnesium oxide, 400 mg, Oral, Daily  pantoprazole, 40 mg, Oral, Q AM  pharmacy consult - MTM, , Not Applicable, Daily  potassium chloride ER, 40 mEq, Oral, Q4H  potassium chloride, 20 mEq, Oral, Daily      Continuous Infusions:   PRN Meds:.  acetaminophen **OR** acetaminophen **OR** acetaminophen    senna-docusate sodium **AND** polyethylene glycol **AND** bisacodyl  **AND** bisacodyl    dextrose    dextrose    glucagon (human recombinant)    Magnesium Cardiology Dose Replacement - Follow Nurse / BPA Driven Protocol    nitroglycerin    Potassium Replacement - Follow Nurse / BPA Driven Protocol    sodium chloride    sodium chloride    traMADol    Assessment & Plan   Assessment & Plan     Active Hospital Problems    Diagnosis  POA    **Acute exacerbation of CHF (congestive heart failure) [I50.9]  Yes    Thigh hematoma, left, initial encounter [S70.12XA]  Yes    Stage 3b chronic kidney disease [N18.32]  Yes    JOLENE on CPAP [G47.33]  Yes      Resolved Hospital Problems   No resolved problems to display.        Brief Hospital Course to date:  Iain Dee is a 89 y.o. male with PMH significant for HFrEF, valvular heart disease,  A-fib chronically anticoagulated on Eliquis, CAD, DM2, prostate/bladder cancer (~12 years ago) with recent admission to [facility] 4/2-4 5/20/2025 for left thigh hematoma who presents to [facility] ED with worsening edema despite using his home Bumex and worsening mobility. Of note, patient was recommended to go to rehab after his last admission but declined.      This patient's problems and plans were partially entered by my partner and updated as appropriate by me 04/15/25.      CHF exacerbation  HFrEF  Valvular heart disease  H/o STEMI  Follows with Dr. Stover  Echo 4/4/2025 with EF 36-40%, LV diastolic dysfunction, moderate to severe AVR, moderate to severe tricuspid valve regurg, markedly elevated RVSP >55  Probnp proBNP 20,657 (decreased from prior 22,485 on 4/2)  CXR shows cardiomegaly with pulmonary vascular congestion and left basilar atelectasis versus infiltrate.  Left basilar atelectasis also seen on CXR for 2/20/2025.  Afebrile, normotensive  Patient on room air, add OPEP for atelectasis on CXR, O2 prn  Received Bumex 2 mg IV in ED  Cardiology consulted on admission  S/p Lasix 40 twice daily IV with albumin for brisk diuresis as well as one-time dose of 2.5  metolazone  GDMT with Coreg and Jardiance resumed by cardiology  Strict I's & O's, good UOP however has had some leakage from condom cath  Consulted nephrology due to renal function remaining elevated at 1.88 despite aggressive diuresis, will check renal ultrasound, was WNL no evidence of hydronephrosis or nephrolithiasis  Nephrology recommending once daily diuresis instead of twice daily dosing  Check urine studies as well.  Daily weights  AM CMP, mag  PT/OT recommending rehab     Elevated troponin  Troponins 136>>123, increased from prior admission, likely type II demand ischemia   pt denies chest pain  No significant ST elevation on EKG     Elevated bilirubin improving  Bili 1.5, was wnl prior admission  Liver ultrasound negative for congestion, likely cholelithiasis evident as well as some ascites  No portal hypertension hepatic duplex     A-fib  rate controlled, continue Coreg  continue Eliquis at renal dosing 2.5 mg BID     Left leg hematoma  Stable from prior, although increased yellowing, appears to be healing   H&H stable  Continue to monitor  AM CBC     Left leg wound  From blister likely secondary to edema/venous stasis  PT wound care consult, may benefit from compression wraps/unna boots  AM CBC     BLE edema  PT wound care consult as above     CKD 3b  Cr slightly elevated 1.84 (appears to fluctuate 1.30-1.80 per chart review) on admission  GDMT resumed by cardiology  Renal function improving with diuresing likely cardiorenal in etiology  AM CMP     CAD  HTN  HLD  Continue ASA, previously on Plavix that cardiology dc'd last admission d/t hematoma  Continue Coreg  Continue statin, LFTs wnl     DM2  A1c 6.4  On metformin BID at home, hold  ACHS, low-dose SSI     GERD  Daily PPI     JOLENE  CPAP    Expected Discharge Location and Transportation: TBD  Expected Discharge TBD  Expected Discharge Date: 4/14/2025; Expected Discharge Time:      VTE Prophylaxis:  Pharmacologic & mechanical VTE prophylaxis orders are  present.    Total time spent: Time Spent: Time Spent: 40 minutes  Time spent includes time reviewing chart, face-to-face time, counseling patient/family/caregiver, ordering medications/tests/procedures, communicating with other health care professionals, documenting clinical information in the electronic health record, and coordination of care.      AM-PAC 6 Clicks Score (PT): 12 (04/14/25 2009)    CODE STATUS:   Code Status and Medical Interventions: CPR (Attempt to Resuscitate); Full Support   Ordered at: 04/11/25 1819     Code Status (Patient has no pulse and is not breathing):    CPR (Attempt to Resuscitate)     Medical Interventions (Patient has pulse or is breathing):    Full Support     Level Of Support Discussed With:    Patient       Isabel Mayes MD  04/15/25

## 2025-04-15 NOTE — CASE MANAGEMENT/SOCIAL WORK
Continued Stay Note   [place]     Patient Name: Iain Dee  MRN: [id number]  Today's Date: 4/15/2025    Admit Date: 4/11/2025    Plan: SNF   Discharge Plan       Row Name 04/15/25 1601       Plan    Plan SNF    Patient/Family in Agreement with Plan yes    Plan Comments I had messaged Bhavya to double check on discharges @ [place] [facility], no response before I left. I printed patient choice list of [facility] facilities and met w/patient and Maya in room, gave to them to review for other facilities that they may be interested in. Nephrology consulted today.    Final Discharge Disposition Code 03 - skilled nursing facility (SNF)                   Discharge Codes    No documentation.                 Expected Discharge Date and Time       Expected Discharge Date Expected Discharge Time    Apr 14, 2025               Gabe Johnson RN

## 2025-04-16 LAB
ALBUMIN SERPL-MCNC: 3.4 G/DL (ref 3.5–5.2)
ALP SERPL-CCNC: 172 U/L (ref 39–117)
ALT SERPL W P-5'-P-CCNC: 21 U/L (ref 1–41)
ANION GAP SERPL CALCULATED.3IONS-SCNC: 11 MMOL/L (ref 5–15)
AST SERPL-CCNC: 31 U/L (ref 1–40)
BILIRUB CONJ SERPL-MCNC: 1.2 MG/DL (ref 0–0.3)
BILIRUB INDIRECT SERPL-MCNC: 0.7 MG/DL
BILIRUB SERPL-MCNC: 1.9 MG/DL (ref 0–1.2)
BUN SERPL-MCNC: 55 MG/DL (ref 8–23)
BUN/CREAT SERPL: 33.3 (ref 7–25)
CALCIUM SPEC-SCNC: 8.8 MG/DL (ref 8.6–10.5)
CHLORIDE SERPL-SCNC: 95 MMOL/L (ref 98–107)
CO2 SERPL-SCNC: 30 MMOL/L (ref 22–29)
CREAT SERPL-MCNC: 1.65 MG/DL (ref 0.76–1.27)
DEPRECATED RDW RBC AUTO: 66.8 FL (ref 37–54)
EGFRCR SERPLBLD CKD-EPI 2021: 39.7 ML/MIN/1.73
ERYTHROCYTE [DISTWIDTH] IN BLOOD BY AUTOMATED COUNT: 18.3 % (ref 12.3–15.4)
GLUCOSE BLDC GLUCOMTR-MCNC: 149 MG/DL (ref 70–130)
GLUCOSE BLDC GLUCOMTR-MCNC: 156 MG/DL (ref 70–130)
GLUCOSE BLDC GLUCOMTR-MCNC: 184 MG/DL (ref 70–130)
GLUCOSE BLDC GLUCOMTR-MCNC: 285 MG/DL (ref 70–130)
GLUCOSE SERPL-MCNC: 96 MG/DL (ref 65–99)
HCT VFR BLD AUTO: 32 % (ref 37.5–51)
HGB BLD-MCNC: 9.6 G/DL (ref 13–17.7)
MAGNESIUM SERPL-MCNC: 2 MG/DL (ref 1.6–2.4)
MCH RBC QN AUTO: 30.4 PG (ref 26.6–33)
MCHC RBC AUTO-ENTMCNC: 30 G/DL (ref 31.5–35.7)
MCV RBC AUTO: 101.3 FL (ref 79–97)
PLATELET # BLD AUTO: 210 10*3/MM3 (ref 140–450)
PMV BLD AUTO: 10.6 FL (ref 6–12)
POTASSIUM SERPL-SCNC: 3.8 MMOL/L (ref 3.5–5.2)
POTASSIUM SERPL-SCNC: 3.8 MMOL/L (ref 3.5–5.2)
POTASSIUM SERPL-SCNC: 4.2 MMOL/L (ref 3.5–5.2)
PROT SERPL-MCNC: 5.8 G/DL (ref 6–8.5)
RBC # BLD AUTO: 3.16 10*6/MM3 (ref 4.14–5.8)
SODIUM SERPL-SCNC: 136 MMOL/L (ref 136–145)
WBC NRBC COR # BLD AUTO: 4.94 10*3/MM3 (ref 3.4–10.8)

## 2025-04-16 PROCEDURE — 29581 APPL MULTLAYER CMPRN SYS LEG: CPT

## 2025-04-16 PROCEDURE — 97530 THERAPEUTIC ACTIVITIES: CPT

## 2025-04-16 PROCEDURE — P9047 ALBUMIN (HUMAN), 25%, 50ML: HCPCS | Performed by: INTERNAL MEDICINE

## 2025-04-16 PROCEDURE — 83735 ASSAY OF MAGNESIUM: CPT

## 2025-04-16 PROCEDURE — 80048 BASIC METABOLIC PNL TOTAL CA: CPT

## 2025-04-16 PROCEDURE — 80076 HEPATIC FUNCTION PANEL: CPT

## 2025-04-16 PROCEDURE — 82948 REAGENT STRIP/BLOOD GLUCOSE: CPT

## 2025-04-16 PROCEDURE — 84132 ASSAY OF SERUM POTASSIUM: CPT

## 2025-04-16 PROCEDURE — 94799 UNLISTED PULMONARY SVC/PX: CPT

## 2025-04-16 PROCEDURE — 85027 COMPLETE CBC AUTOMATED: CPT

## 2025-04-16 PROCEDURE — 25010000002 FUROSEMIDE PER 20 MG: Performed by: INTERNAL MEDICINE

## 2025-04-16 PROCEDURE — 63710000001 INSULIN LISPRO (HUMAN) PER 5 UNITS: Performed by: NURSE PRACTITIONER

## 2025-04-16 PROCEDURE — 25010000002 ALBUMIN HUMAN 25% PER 50 ML: Performed by: INTERNAL MEDICINE

## 2025-04-16 PROCEDURE — 97535 SELF CARE MNGMENT TRAINING: CPT

## 2025-04-16 PROCEDURE — 99232 SBSQ HOSP IP/OBS MODERATE 35: CPT | Performed by: NURSE PRACTITIONER

## 2025-04-16 RX ORDER — POTASSIUM CHLORIDE 1500 MG/1
20 TABLET, EXTENDED RELEASE ORAL ONCE
Status: COMPLETED | OUTPATIENT
Start: 2025-04-16 | End: 2025-04-16

## 2025-04-16 RX ORDER — ALBUMIN (HUMAN) 12.5 G/50ML
25 SOLUTION INTRAVENOUS ONCE
Status: COMPLETED | OUTPATIENT
Start: 2025-04-16 | End: 2025-04-16

## 2025-04-16 RX ORDER — POTASSIUM CHLORIDE 1.5 G/1.58G
20 POWDER, FOR SOLUTION ORAL ONCE
Status: COMPLETED | OUTPATIENT
Start: 2025-04-16 | End: 2025-04-16

## 2025-04-16 RX ADMIN — INSULIN LISPRO 2 UNITS: 100 INJECTION, SOLUTION INTRAVENOUS; SUBCUTANEOUS at 16:50

## 2025-04-16 RX ADMIN — ALBUMIN (HUMAN) 25 G: 0.25 INJECTION, SOLUTION INTRAVENOUS at 06:04

## 2025-04-16 RX ADMIN — ASPIRIN 81 MG: 81 TABLET, COATED ORAL at 08:58

## 2025-04-16 RX ADMIN — POTASSIUM CHLORIDE 20 MEQ: 1.5 FOR SOLUTION ORAL at 03:05

## 2025-04-16 RX ADMIN — MAGNESIUM OXIDE TAB 400 MG (241.3 MG ELEMENTAL MG) 400 MG: 400 (241.3 MG) TAB at 08:58

## 2025-04-16 RX ADMIN — POTASSIUM CHLORIDE 20 MEQ: 1500 TABLET, EXTENDED RELEASE ORAL at 08:58

## 2025-04-16 RX ADMIN — INSULIN LISPRO 2 UNITS: 100 INJECTION, SOLUTION INTRAVENOUS; SUBCUTANEOUS at 11:50

## 2025-04-16 RX ADMIN — EMPAGLIFLOZIN 10 MG: 10 TABLET, FILM COATED ORAL at 08:58

## 2025-04-16 RX ADMIN — POTASSIUM CHLORIDE 20 MEQ: 750 CAPSULE, EXTENDED RELEASE ORAL at 08:58

## 2025-04-16 RX ADMIN — ACETAMINOPHEN 650 MG: 325 TABLET, FILM COATED ORAL at 10:33

## 2025-04-16 RX ADMIN — POTASSIUM CHLORIDE 20 MEQ: 1500 TABLET, EXTENDED RELEASE ORAL at 16:51

## 2025-04-16 RX ADMIN — ALBUMIN (HUMAN) 25 G: 0.25 INJECTION, SOLUTION INTRAVENOUS at 14:28

## 2025-04-16 RX ADMIN — PANTOPRAZOLE SODIUM 40 MG: 40 TABLET, DELAYED RELEASE ORAL at 06:04

## 2025-04-16 RX ADMIN — ATORVASTATIN CALCIUM 40 MG: 40 TABLET, FILM COATED ORAL at 20:19

## 2025-04-16 RX ADMIN — CARVEDILOL 3.12 MG: 3.12 TABLET, FILM COATED ORAL at 08:58

## 2025-04-16 RX ADMIN — INSULIN LISPRO 4 UNITS: 100 INJECTION, SOLUTION INTRAVENOUS; SUBCUTANEOUS at 20:27

## 2025-04-16 RX ADMIN — Medication 10 ML: at 08:58

## 2025-04-16 RX ADMIN — FUROSEMIDE 40 MG: 10 INJECTION, SOLUTION INTRAMUSCULAR; INTRAVENOUS at 08:58

## 2025-04-16 RX ADMIN — APIXABAN 2.5 MG: 2.5 TABLET, FILM COATED ORAL at 08:58

## 2025-04-16 RX ADMIN — ACETAMINOPHEN 650 MG: 325 TABLET, FILM COATED ORAL at 18:23

## 2025-04-16 RX ADMIN — CARVEDILOL 3.12 MG: 3.12 TABLET, FILM COATED ORAL at 17:01

## 2025-04-16 RX ADMIN — APIXABAN 2.5 MG: 2.5 TABLET, FILM COATED ORAL at 20:19

## 2025-04-16 NOTE — PROGRESS NOTES
Enter Query Response Below      Query Response: Please clarify the condition the patient was treated/monitored for: For acute on chronic systolic CHF with volume overload.  The patient was treated/monitored with IV Bumex and serial labs.             If applicable, please update the problem list.

## 2025-04-16 NOTE — THERAPY TREATMENT NOTE
Patient Name: Iain Dee  : 1935    MRN: [id number]                              Today's Date: 2025       Admit Date: 2025    Visit Dx:     ICD-10-CM ICD-9-CM   1. Acute on chronic congestive heart failure, unspecified heart failure type  I50.9 428.0   2. Acute pulmonary edema  J81.0 518.4   3. Anasarca  R60.1 782.3   4. Failure to thrive in adult  R62.7 783.7   5. History of diabetes mellitus  Z86.39 V12.29     Patient Active Problem List   Diagnosis    AMS (altered mental status)    Hypomagnesemia    Hypoalbuminemia    Essential hypertension    Atrial fibrillation    GERD (gastroesophageal reflux disease)    Gout    Diabetes mellitus    JOLENE on CPAP    Chronic anticoagulation (Eliquis)    Alcohol abuse (1 to 2 glasses of bourbon a day)    Memory impairment    STEMI (ST elevation myocardial infarction)    Stage 3b chronic kidney disease    CHF (congestive heart failure)    Thigh hematoma, left, initial encounter    Acute exacerbation of CHF (congestive heart failure)     Past Medical History:   Diagnosis Date    A-fib     Arthritis     Cancer     Diabetes mellitus     GERD (gastroesophageal reflux disease)     Gout     H/O Bladder carcinoma (HCC)     Hypertension      Past Surgical History:   Procedure Laterality Date    BLADDER SURGERY      Biopsy    HERNIA REPAIR      PROSTATE BIOPSY        General Information       Row Name 25 1315          Physical Therapy Time and Intention    Document Type therapy note (daily note)  -SD     Mode of Treatment individual therapy;physical therapy  -SD       Row Name 25 1315          General Information    Existing Precautions/Restrictions fall;other (see comments)  fear of falling, LLE wound  -SD       Row Name 25 1315          Cognition    Orientation Status (Cognition) oriented x 3  -SD       Row Name 25 1315          Safety Issues/Impairments Affecting Functional Mobility    Safety Issues Affecting Function (Mobility) awareness of  need for assistance;insight into deficits/self-awareness;problem-solving;safety precautions follow-through/compliance;safety precaution awareness  -SD     Impairments Affecting Function (Mobility) balance;endurance/activity tolerance;pain;strength;sensation/sensory awareness;postural/trunk control;coordination  -SD               User Key  (r) = Recorded By, (t) = Taken By, (c) = Cosigned By      Initials Name Provider Type    Brigitte Naranjo PT Physical Therapist                   Mobility       Row Name 04/16/25 1401          Bed Mobility    Bed Mobility rolling left;rolling right  -SD     Rolling Left [place] (Bed Mobility) moderate assist (50% patient effort)  -SD     Rolling Right [place] (Bed Mobility) moderate assist (50% patient effort)  -SD     Assistive Device (Bed Mobility) bed rails;repositioning sheet  -SD     Comment, (Bed Mobility) Pt rolled side to side to remove lift sling  -SD       Row Name 04/16/25 1401          Transfers    Comment, (Transfers) Pt practiced sit to stand x4 reps from recliner with BUE support on grab bar, needing maxA x1 and max cues for optimal hand/foot placement and weight shifting forward. Standing otoniel limited by weakness and fatigue.  -SD       Row Name 04/16/25 1401          Bed-Chair Transfer    Bed-Chair [place] (Transfers) dependent (less than 25% patient effort)  -SD     Assistive Device (Bed-Chair Transfers) lift device  -SD       Row Name 04/16/25 1401          Sit-Stand Transfer    Sit-Stand [place] (Transfers) maximum assist (25% patient effort);1 person assist;verbal cues;nonverbal cues (demo/gesture)  -SD     Assistive Device (Sit-Stand Transfers) other (see comments)  -SD               User Key  (r) = Recorded By, (t) = Taken By, (c) = Cosigned By      Initials Name Provider Type    Brigitte Naranjo PT Physical Therapist                   Obj/Interventions       Row Name 04/16/25 1404          Motor Skills    Therapeutic  Exercise hip;knee  -SD       Row Name 04/16/25 1404          Hip (Therapeutic Exercise)    Hip (Therapeutic Exercise) strengthening exercise  -SD     Hip Strengthening (Therapeutic Exercise) bilateral;marching while seated;10 repetitions  -SD       Row Name 04/16/25 1404          Knee (Therapeutic Exercise)    Knee (Therapeutic Exercise) strengthening exercise  -SD     Knee Strengthening (Therapeutic Exercise) LAQ (long arc quad);sitting  -SD       Row Name 04/16/25 1404          Balance    Balance Assessment sitting static balance;standing static balance  -SD     Static Sitting Balance standby assist  -SD     Position, Sitting Balance unsupported;sitting in chair  -SD     Static Standing Balance moderate assist;1-person assist;verbal cues  -SD     Position/Device Used, Standing Balance supported;diane-bars  -SD     Comment, Balance prolonged static stance with BUE support and modA for dependent pericare. Pt dem posterior lean and bilat knee instability limiting standing tolerance  -SD               User Key  (r) = Recorded By, (t) = Taken By, (c) = Cosigned By      Initials Name Provider Type    SD Brigitte So, PT Physical Therapist                   Goals/Plan    No documentation.                  Clinical Impression       Row Name 04/16/25 1407          Pain    Pretreatment Pain Rating 2/10  -SD     Posttreatment Pain Rating 2/10  -SD     Pain Side/Orientation generalized  -SD       Row Name 04/16/25 1407          Plan of Care Review    Plan of Care Reviewed With patient;spouse  -SD     Progress no change  -SD     Outcome Evaluation Pt practiced STS transfers with maxA x1 with BUE support. Pt remains limited by weakness, decreased activity otoniel, and continues to be below baseline level of function for mobility. PT continues to rec d/c SNF rehab if deemed medically appropriate.  -SD       Row Name 04/16/25 1407          Vital Signs    Pre Systolic BP Rehab 126  -SD     Pre Treatment Diastolic BP 77  -SD      Pretreatment Heart Rate (beats/min) 55  -SD     Posttreatment Heart Rate (beats/min) 56  -SD     Pre SpO2 (%) 90  -SD     O2 Delivery Pre Treatment room air  -SD     Post SpO2 (%) 94  -SD     O2 Delivery Post Treatment room air  -SD     Pre Patient Position Sitting  -SD     Post Patient Position Supine  -SD       Row Name 04/16/25 1407          Positioning and Restraints    Pre-Treatment Position sitting in chair/recliner  -SD     Post Treatment Position bed  -SD     In Bed notified nsg;fowlers;call light within reach;encouraged to call for assist;exit alarm on;heels elevated;with family/caregiver  -SD               User Key  (r) = Recorded By, (t) = Taken By, (c) = Cosigned By      Initials Name Provider Type    Brigitte Naranjo PT Physical Therapist                   Outcome Measures       Row Name 04/16/25 1410 04/16/25 0800       How much help from another person do you currently need...    Turning from your back to your side while in flat bed without using bedrails? 3  -SD 3  -MH    Moving from lying on back to sitting on the side of a flat bed without bedrails? 2  -SD 3  -MH    Moving to and from a bed to a chair (including a wheelchair)? 2  -SD 2  -MH    Standing up from a chair using your arms (e.g., wheelchair, bedside chair)? 2  -SD 2  -MH    Climbing 3-5 steps with a railing? 1  -SD 1  -MH    To walk in hospital room? 1  -SD 1  -MH    AM-PAC 6 Clicks Score (PT) 11  -SD 12  -MH    Highest Level of Mobility Goal 4 --> Transfer to chair/commode  -SD 4 --> Transfer to chair/commode  -      Row Name 04/16/25 1410 04/16/25 1019       Functional Assessment    Outcome Measure Options AM-PAC 6 Clicks Basic Mobility (PT)  -SD AM-PAC 6 Clicks Daily Activity (OT)  -              User Key  (r) = Recorded By, (t) = Taken By, (c) = Cosigned By      Initials Name Provider Type    Brigitte Naranjo, PT Physical Therapist    Tracy Springer RN Registered Nurse    Tracy Johns, OT  Occupational Therapist                                 Physical Therapy Education       Title: PT OT SLP Therapies (In Progress)       Topic: Physical Therapy (Done)       Point: Mobility training (Done)       Learning Progress Summary            Patient Acceptance, E, NR,VU by SD at 4/16/2025 1438    Acceptance, E, VU by ET at 4/12/2025 1034                      Point: Home exercise program (Done)       Learning Progress Summary            Patient Acceptance, E, NR,VU by SD at 4/16/2025 1438    Acceptance, E, VU by ET at 4/12/2025 1034                      Point: Body mechanics (Done)       Learning Progress Summary            Patient Acceptance, E, NR,VU by SD at 4/16/2025 1438    Acceptance, E, VU by ET at 4/12/2025 1034                      Point: Precautions (Done)       Learning Progress Summary            Patient Acceptance, E, NR,VU by SD at 4/16/2025 1438    Acceptance, E, VU by ET at 4/12/2025 1034                                      User Key       Initials Effective Dates Name Provider Type Discipline    SD 03/13/23 -  Brigitte So, PT Physical Therapist PT    ET 07/26/24 -  Trudy Shoemaker PT Physical Therapist PT                  PT Recommendation and Plan     Progress: no change  Outcome Evaluation: Pt practiced STS transfers with maxA x1 with BUE support. Pt remains limited by weakness, decreased activity otoniel, and continues to be below baseline level of function for mobility. PT continues to rec d/c SNF rehab if deemed medically appropriate.     Time Calculation:         PT Charges       Row Name 04/16/25 1440 04/16/25 1030          Time Calculation    Start Time 1315  -SD 1030  -MF     PT Received On 04/16/25  -SD --     PT Goal Re-Cert Due Date 04/22/25  -SD 04/22/25  -        Time Calculation- PT    Total Timed Code Minutes- PT 32 minute(s)  -SD --        Timed Charges    29747 - PT Therapeutic Exercise Minutes 8  -SD --     77106 - PT Therapeutic Activity Minutes 24  -SD --        Untimed  Charges    12951-Drofjnhpdk comp below knee -- 20  -MF        Total Minutes    Timed Charges Total Minutes 32  -SD --     Untimed Charges Total Minutes -- 20  -MF      Total Minutes 32  -SD 20  -MF               User Key  (r) = Recorded By, (t) = Taken By, (c) = Cosigned By      Initials Name Provider Type    Iain Melgoza, PT Physical Therapist    Brigitte Naranjo, CLINT Physical Therapist                  Therapy Charges for Today       Code Description Service Date Service Provider Modifiers Qty    3541936  PT THERAPEUTIC ACT EA 15 MIN 4/16/2025 Brigitte So, CLINT GP 2            PT G-Codes  Outcome Measure Options: AM-PAC 6 Clicks Basic Mobility (PT)  AM-PAC 6 Clicks Score (PT): 11  AM-PAC 6 Clicks Score (OT): 12  PT Discharge Summary  Anticipated Discharge Disposition (PT): skilled nursing facility    Brigitte So, CLITN  4/16/2025

## 2025-04-16 NOTE — PLAN OF CARE
Goal Outcome Evaluation:  Plan of Care Reviewed With: patient, spouse        Progress: no change  Outcome Evaluation: Pt practiced STS transfers with maxA x1 with BUE support. Pt remains limited by weakness, decreased activity otoniel, and continues to be below baseline level of function for mobility. PT continues to rec d/c SNF rehab if deemed medically appropriate.    Anticipated Discharge Disposition (PT): skilled nursing facility

## 2025-04-16 NOTE — THERAPY TREATMENT NOTE
Patient Name: Iain Dee  : 1935    MRN: [id number]                              Today's Date: 2025       Admit Date: 2025    Visit Dx:     ICD-10-CM ICD-9-CM   1. Acute on chronic congestive heart failure, unspecified heart failure type  I50.9 428.0   2. Acute pulmonary edema  J81.0 518.4   3. Anasarca  R60.1 782.3   4. Failure to thrive in adult  R62.7 783.7   5. History of diabetes mellitus  Z86.39 V12.29     Patient Active Problem List   Diagnosis    AMS (altered mental status)    Hypomagnesemia    Hypoalbuminemia    Essential hypertension    Atrial fibrillation    GERD (gastroesophageal reflux disease)    Gout    Diabetes mellitus    JOLENE on CPAP    Chronic anticoagulation (Eliquis)    Alcohol abuse (1 to 2 glasses of bourbon a day)    Memory impairment    STEMI (ST elevation myocardial infarction)    Stage 3b chronic kidney disease    CHF (congestive heart failure)    Thigh hematoma, left, initial encounter    Acute exacerbation of CHF (congestive heart failure)     Past Medical History:   Diagnosis Date    A-fib     Arthritis     Cancer     Diabetes mellitus     GERD (gastroesophageal reflux disease)     Gout     H/O Bladder carcinoma (HCC)     Hypertension      Past Surgical History:   Procedure Laterality Date    BLADDER SURGERY      Biopsy    HERNIA REPAIR      PROSTATE BIOPSY        General Information       Row Name 25 1013          OT Time and Intention    Document Type therapy note (daily note)  -     Mode of Treatment occupational therapy;individual therapy  -       Row Name 25 1013          General Information    Patient Profile Reviewed yes  -MC     Existing Precautions/Restrictions fall;other (see comments)  fear of falling, LLE wound  -     Barriers to Rehab medically complex;previous functional deficit  -       Row Name 25 1013          Cognition    Orientation Status (Cognition) oriented x 3  -       Row Name 25 1013          Safety  Issues/Impairments Affecting Functional Mobility    Safety Issues Affecting Function (Mobility) awareness of need for assistance;insight into deficits/self-awareness;safety precaution awareness;safety precautions follow-through/compliance;sequencing abilities  -     Impairments Affecting Function (Mobility) balance;endurance/activity tolerance;pain;strength;sensation/sensory awareness  -               User Key  (r) = Recorded By, (t) = Taken By, (c) = Cosigned By      Initials Name Provider Type     Tracy Jackson OT Occupational Therapist                   Lymphedema       Row Name 04/14/25 6557             Lymphedema Edema Assessment    Ptting Edema Category By severity  -KW      Pitting Edema Moderate;Severe  -KW      Recorded by [KW] Otilia Styles PT              Skin Changes/Observations    Location/Assessment Lower Extremity  -KW      Lower Extremity Conditions right:;intact;bilateral:;clean;dry  -KW      Lower Extremity Color/Pigment right:;normal;left:;brawny;other (comment)  -KW      Recorded by [KW] Otilia Styles PT              Lymphedema Pulses/Capillary Refill    Capillary Refill lower extremity capillary refill  -KW      Lower Extremity Capillary Refill right:;left:;less than 3 seconds  -KW      Recorded by [KW] Otilia Styles PT              Compression/Skin Care    Compression/Skin Care skin care;wrapping location;bandaging  -KW      Skin Care washed/dried  -KW      Wrapping Location lower extremity  -KW      Wrapping Location LE bilateral:;foot to knee  -KW      Wrapping Comments RLE: size 4 compressogrips. LLE: wound dressings, sizes 4/5 compressogrips. All compression doubled distally to create gradient compression.  -KW      Recorded by [KW] Otilia Styles PT                User Key  (r) = Recorded By, (t) = Taken By, (c) = Cosigned By      Initials Name Effective Dates    Otilia Crook PT 01/27/22 -                    Mobility/ADL's       Row  Name 04/16/25 1014          Bed Mobility    Bed Mobility supine-sit  -     Supine-Sit [place] (Bed Mobility) minimum assist (75% patient effort);2 person assist;verbal cues  -     Bed Mobility, Safety Issues decreased use of legs for bridging/pushing  -     Assistive Device (Bed Mobility) bed rails;head of bed elevated;repositioning sheet  -     Comment, (Bed Mobility) Step-by-step VC's for seqencing and safe hand placement  -       Row Name 04/16/25 1014          Transfers    Transfers sit-stand transfer;stand-sit transfer;bed-chair transfer  -     Comment, (Transfers) VC's for sequencing and safe hand placement, VC's for anterior weight shifting and achieving fully upright posture in standing.  -       Row Name 04/16/25 1014          Bed-Chair Transfer    Bed-Chair [place] (Transfers) minimum assist (75% patient effort);2 person assist;verbal cues  -     Assistive Device (Bed-Chair Transfers) walker, front-wheeled  -       Row Name 04/16/25 1014          Sit-Stand Transfer    Sit-Stand [place] (Transfers) minimum assist (75% patient effort);2 person assist;verbal cues  -     Assistive Device (Sit-Stand Transfers) walker, front-wheeled  -       Row Name 04/16/25 1014          Stand-Sit Transfer    Stand-Sit [place] (Transfers) minimum assist (75% patient effort);2 person assist;verbal cues  -     Assistive Device (Stand-Sit Transfers) walker, front-wheeled  -       Row Name 04/16/25 1014          Activities of Daily Living    BADL Assessment/Intervention lower body dressing;feeding  -       Row Name 04/16/25 1014          Lower Body Dressing Assessment/Training    [place] Level (Lower Body Dressing) don;socks;dependent (less than 25% patient effort);verbal cues  -     Position (Lower Body Dressing) supine  -       Row Name 04/16/25 1014          Self-Feeding Assessment/Training    [place] Level (Feeding) liquids to mouth;set up  -     Assistive Devices  (Feeding) adapted cup;built-up handle utensils  -     Position (Feeding) supported sitting  -     Comment, (Feeding) Pt provided w/ adapted cup and built-up handle utensils to increase independence and efficiency w/ self-feeding d/t limited hand movement (L>R) d/t arthritis. Pt is L hand dominant. Noted improvement w/ picking up cup to drink w/ adapted cup vs regular cup.  -               User Key  (r) = Recorded By, (t) = Taken By, (c) = Cosigned By      Initials Name Provider Type    Tracy Johns OT Occupational Therapist                   Obj/Interventions       Row Name 04/16/25 1017          Balance    Balance Assessment sitting static balance;sitting dynamic balance;sit to stand dynamic balance;standing static balance;standing dynamic balance  -     Static Sitting Balance standby assist  -     Dynamic Sitting Balance contact guard  -     Position, Sitting Balance unsupported;sitting edge of bed;sitting in chair  -     Sit to Stand Dynamic Balance minimal assist;2-person assist;verbal cues  -     Static Standing Balance minimal assist;2-person assist;verbal cues  -     Dynamic Standing Balance minimal assist;2-person assist;verbal cues  -     Position/Device Used, Standing Balance supported;walker, front-wheeled  -     Balance Interventions sitting;sit to stand;occupation based/functional task  -               User Key  (r) = Recorded By, (t) = Taken By, (c) = Cosigned By      Initials Name Provider Type    Tracy Johns OT Occupational Therapist                   Goals/Plan    No documentation.                  Clinical Impression       Row Name 04/16/25 1017          Pain Assessment    Pain Location extremity  -     Pain Side/Orientation left;lower  -     Pain Management Interventions exercise or physical activity utilized;positioning techniques utilized;nursing notified  -     Response to Pain Interventions activity participation with tolerable pain  -      Additional Documentation Pain Scale: FACES Pre/Post-Treatment (Group)  -       Row Name 04/16/25 1017          Pain Scale: FACES Pre/Post-Treatment    Pain: FACES Scale, Pretreatment 2-->hurts little bit  -     Posttreatment Pain Rating 2-->hurts little bit  -[facility] Name 04/16/25 1017          Plan of Care Review    Plan of Care Reviewed With patient;spouse  -     Progress improving  -     Outcome Evaluation Pt's ADL independence limited d/t LLE pain, generalized weakness, decreased functional endurance, and balance deficits. Pt req min Ax2 for bed mobility, STS, & SPT from bed-to-chair w/ RW, dep for LBD, SUA for self-feeding w/ provided AE. Will continue to progress pt as tolerated per OT POC. Rec SNF at d/c for best functional outcomes.  -[facility] Name 04/16/25 1017          Therapy Assessment/Plan (OT)    Rehab Potential (OT) good  -     Criteria for Skilled Therapeutic Interventions Met (OT) yes;meets criteria;skilled treatment is necessary  -     Therapy Frequency (OT) daily  -[facility] Name 04/16/25 1017          Therapy Plan Review/Discharge Plan (OT)    Anticipated Discharge Disposition (OT) inpatient rehabilitation facility  -[facility] Name 04/16/25 1017          Vital Signs    O2 Delivery Pre Treatment room air  -     O2 Delivery Intra Treatment room air  -     O2 Delivery Post Treatment room air  -     Pre Patient Position Supine  -     Intra Patient Position Standing  -     Post Patient Position Sitting  -[facility] Name 04/16/25 1017          Positioning and Restraints    Pre-Treatment Position in bed  -     Post Treatment Position chair  -     In Chair notified nsg;reclined;call light within reach;encouraged to call for assist;exit alarm on;waffle cushion;on mechanical lift sling;with family/caregiver;legs elevated;heels elevated;with PT  w/ wound care PT  -               User Key  (r) = Recorded By, (t) = Taken By, (c) = Cosigned By      Initials Name  Provider Type     Tracy Jackson OT Occupational Therapist                   Outcome Measures       Row Name 04/16/25 1019          How much help from another is currently needed...    Putting on and taking off regular lower body clothing? 1  -     Bathing (including washing, rinsing, and drying) 2  -MC     Toileting (which includes using toilet bed pan or urinal) 1  -MC     Putting on and taking off regular upper body clothing 2  -MC     Taking care of personal grooming (such as brushing teeth) 3  -MC     Eating meals 3  -     AM-PAC 6 Clicks Score (OT) 12  -       Row Name 04/16/25 1019          Functional Assessment    Outcome Measure Options AM-PAC 6 Clicks Daily Activity (OT)  -               User Key  (r) = Recorded By, (t) = Taken By, (c) = Cosigned By      Initials Name Provider Type     Tracy Jackson OT Occupational Therapist                    Occupational Therapy Education       Title: PT OT SLP Therapies (In Progress)       Topic: Occupational Therapy (In Progress)       Point: ADL training (In Progress)       Learning Progress Summary            Patient Acceptance, E, NR by  at 4/16/2025 1019                      Point: Precautions (In Progress)       Learning Progress Summary            Patient Acceptance, E, NR by  at 4/16/2025 1019                      Point: Body mechanics (In Progress)       Learning Progress Summary            Patient Acceptance, E, NR by  at 4/16/2025 1019                                      User Key       Initials Effective Dates Name Provider Type Discipline     10/14/22 -  Tracy Jackson OT Occupational Therapist OT                  OT Recommendation and Plan  Therapy Frequency (OT): daily  Plan of Care Review  Plan of Care Reviewed With: patient, spouse  Progress: improving  Outcome Evaluation: Pt's ADL independence limited d/t LLE pain, generalized weakness, decreased functional endurance, and balance deficits. Pt req min Ax2 for bed  mobility, STS, & SPT from bed-to-chair w/ RW, dep for LBD, SUA for self-feeding w/ provided AE. Will continue to progress pt as tolerated per OT POC. Rec SNF at d/c for best functional outcomes.     Time Calculation:         Time Calculation- OT       Row Name 04/16/25 1019             Time Calculation- OT    OT Start Time 0856  -      OT Received On 04/16/25  -      OT Goal Re-Cert Due Date 04/22/25  -         Timed Charges    87192 - OT Therapeutic Activity Minutes 18  -MC      86031 - OT Self Care/Mgmt Minutes 9  -MC         Total Minutes    Timed Charges Total Minutes 27  -       Total Minutes 27  -MC                User Key  (r) = Recorded By, (t) = Taken By, (c) = Cosigned By      Initials Name Provider Type     Tracy Jackson OT Occupational Therapist                  Therapy Charges for Today       Code Description Service Date Service Provider Modifiers Qty    69067808662  OT THERAPEUTIC ACT EA 15 MIN 4/16/2025 Tracy Jackson OT GO 1    [id number]  OT SELF CARE/MGMT/TRAIN EA 15 MIN 4/16/2025 Tracy Jackson OT GO 1    [id number]  OT THER SUPP EA 15 MIN 4/16/2025 Tracy Jackson OT GO 2                 Tracy Jackson OT  4/16/2025

## 2025-04-16 NOTE — PLAN OF CARE
Goal Outcome Evaluation:  Plan of Care Reviewed With: patient           Outcome Evaluation: BLE compression wrapping changed with good overall reduction in LE edema noted and improved skin integrity. L toe wound stable to this point with no significant change. PT will f/u with compression wrapping change in 2-3 days.

## 2025-04-16 NOTE — CASE MANAGEMENT/SOCIAL WORK
Continued Stay Note  [facility]     Patient Name: Iain Dee  MRN: [id number]  Today's Date: 4/16/2025    Admit Date: 4/11/2025    Plan: SNF   Discharge Plan       Row Name 04/16/25 1207       Plan    Plan SNF    Patient/Family in Agreement with Plan yes    Plan Comments Met with Mr. Dee and his wife at the bedside to discuss discharge plan. His plan is skilled rehab. [facility], or [facility] as well as [facility] and [facility]. Spoke to the [facility] liaison, and she reported their MD is still reviewing. Explained to patient and wife that we will need to send more referrals if none of their first choices offer beds.  will continue to follow plan of care and assist with discharge planning needs as indicated.    15:06 EDT  Spoke with [facility] liaison, and she stated [facility] should have a skilled bed for him Friday afternoon or Saturday morning.     Final Discharge Disposition Code 03 - skilled nursing facility (SNF)                   Discharge Codes    No documentation.                 Expected Discharge Date and Time       Expected Discharge Date Expected Discharge Time    Apr 14, 2025               Patricia Keith, DENIS

## 2025-04-16 NOTE — PROGRESS NOTES
"   LOS: 3 days    Patient Care Team:  Luis Aguilar DO as PCP - General (Family Medicine)  Iain Bernstein MD as Consulting Physician (Cardiology)  Isabel Stover MD as Cardiologist (Cardiology)    Chief Complaint:  SOA    Subjective     Interval History:     No acute events overnight. No new complaints       Review of Systems:   No CP or SOA , fever, chills, rigors, rash, N/V, Constipation.       Objective     Vital Sign Min/Max for last 24 hours  Temp  Min: 97.6 °F (36.4 °C)  Max: 97.8 °F (36.6 °C)   BP  Min: 117/81  Max: 139/79   Pulse  Min: 51  Max: 81   Resp  Min: 16  Max: 18   SpO2  Min: 84 %  Max: 100 %   Flow (L/min) (Oxygen Therapy)  Min: 2  Max: 2   Weight  Min: 103 kg (227 lb 3.2 oz)  Max: 103 kg (227 lb 3.2 oz)     Flowsheet Rows      Flowsheet Row First Filed Value   Admission Height 185.4 cm (73\") Documented at 04/11/2025 1421   Admission Weight 104 kg (229 lb 4.5 oz) Documented at 04/11/2025 1421            No intake/output data recorded.  I/O last 3 completed shifts:  In: 340 [P.O.:240; IV Piggyback:100]  Out: 3200 [Urine:3200]    Physical Exam:    Gen: Alert, NAD   HENT: NC, AT, EOMI   NECK: Supple, no JVD, Trachea midline   LUNGS: Decrease air entry bilaterally, non labored respirtation   CVS: S1/S2 audible, RRR, no murmur   Abd: Soft, NT, ND, BS+   Ext: + pedal edema, no cyanosis   CNS: Alert, No focal deficit noted grossly  Psy: Cooperative  Skin: Warm, dry and intact      WBC WBC   Date Value Ref Range Status   04/16/2025 4.94 3.40 - 10.80 10*3/mm3 Final   04/15/2025 4.57 3.40 - 10.80 10*3/mm3 Final   04/14/2025 5.25 3.40 - 10.80 10*3/mm3 Final   04/13/2025 6.09 3.40 - 10.80 10*3/mm3 Final      HGB Hemoglobin   Date Value Ref Range Status   04/16/2025 9.6 (L) 13.0 - 17.7 g/dL Final   04/15/2025 9.6 (L) 13.0 - 17.7 g/dL Final   04/14/2025 9.7 (L) 13.0 - 17.7 g/dL Final   04/13/2025 10.2 (L) 13.0 - 17.7 g/dL Final      HCT Hematocrit   Date Value Ref Range Status   04/16/2025 32.0 (L) " "37.5 - 51.0 % Final   04/15/2025 31.1 (L) 37.5 - 51.0 % Final   04/14/2025 31.8 (L) 37.5 - 51.0 % Final   04/13/2025 34.5 (L) 37.5 - 51.0 % Final      Platlets No results found for: \"LABPLAT\"   MCV MCV   Date Value Ref Range Status   04/16/2025 101.3 (H) 79.0 - 97.0 fL Final   04/15/2025 98.7 (H) 79.0 - 97.0 fL Final   04/14/2025 100.0 (H) 79.0 - 97.0 fL Final   04/13/2025 104.2 (H) 79.0 - 97.0 fL Final          Sodium Sodium   Date Value Ref Range Status   04/16/2025 136 136 - 145 mmol/L Final   04/15/2025 137 136 - 145 mmol/L Final   04/14/2025 139 136 - 145 mmol/L Final   04/13/2025 138 136 - 145 mmol/L Final      Potassium Potassium   Date Value Ref Range Status   04/16/2025 3.8 3.5 - 5.2 mmol/L Final   04/15/2025 3.8 3.5 - 5.2 mmol/L Final   04/15/2025 3.5 3.5 - 5.2 mmol/L Final   04/14/2025 4.0 3.5 - 5.2 mmol/L Final   04/13/2025 4.1 3.5 - 5.2 mmol/L Final      Chloride Chloride   Date Value Ref Range Status   04/16/2025 95 (L) 98 - 107 mmol/L Final   04/15/2025 93 (L) 98 - 107 mmol/L Final   04/14/2025 97 (L) 98 - 107 mmol/L Final   04/13/2025 96 (L) 98 - 107 mmol/L Final      CO2 CO2   Date Value Ref Range Status   04/16/2025 30.0 (H) 22.0 - 29.0 mmol/L Final   04/15/2025 32.0 (H) 22.0 - 29.0 mmol/L Final   04/14/2025 29.0 22.0 - 29.0 mmol/L Final   04/13/2025 29.0 22.0 - 29.0 mmol/L Final      BUN BUN   Date Value Ref Range Status   04/16/2025 55 (H) 8 - 23 mg/dL Final   04/15/2025 52 (H) 8 - 23 mg/dL Final   04/14/2025 51 (H) 8 - 23 mg/dL Final   04/13/2025 50 (H) 8 - 23 mg/dL Final      Creatinine Creatinine   Date Value Ref Range Status   04/16/2025 1.65 (H) 0.76 - 1.27 mg/dL Final   04/15/2025 1.88 (H) 0.76 - 1.27 mg/dL Final   04/14/2025 1.86 (H) 0.76 - 1.27 mg/dL Final   04/13/2025 1.87 (H) 0.76 - 1.27 mg/dL Final      Calcium Calcium   Date Value Ref Range Status   04/16/2025 8.8 8.6 - 10.5 mg/dL Final   04/15/2025 9.0 8.6 - 10.5 mg/dL Final   04/14/2025 8.9 8.6 - 10.5 mg/dL Final   04/13/2025 9.0 " "8.6 - 10.5 mg/dL Final      PO4 No results found for: \"CAPO4\"   Albumin Albumin   Date Value Ref Range Status   04/16/2025 3.4 (L) 3.5 - 5.2 g/dL Final   04/15/2025 3.4 (L) 3.5 - 5.2 g/dL Final   04/13/2025 3.2 (L) 3.5 - 5.2 g/dL Final      Magnesium Magnesium   Date Value Ref Range Status   04/16/2025 2.0 1.6 - 2.4 mg/dL Final   04/15/2025 2.1 1.6 - 2.4 mg/dL Final   04/14/2025 2.0 1.6 - 2.4 mg/dL Final   04/13/2025 2.1 1.6 - 2.4 mg/dL Final      Uric Acid Uric Acid   Date Value Ref Range Status   04/15/2025 10.6 (H) 3.4 - 7.0 mg/dL Final     Comment:     Falsely depressed results may occur on samples drawn from patients receiving N-Acetylcysteine (NAC) or Metamizole.           Results Review:     I reviewed the patient's new clinical results.    apixaban, 2.5 mg, Oral, BID  aspirin, 81 mg, Oral, Daily  atorvastatin, 40 mg, Oral, Nightly  carvedilol, 3.125 mg, Oral, BID With Meals  empagliflozin, 10 mg, Oral, Daily  furosemide, 40 mg, Intravenous, Daily  insulin lispro, 2-7 Units, Subcutaneous, 4x Daily AC & at Bedtime  magnesium oxide, 400 mg, Oral, Daily  pantoprazole, 40 mg, Oral, Q AM  pharmacy consult - MTM, , Not Applicable, Daily  potassium chloride, 20 mEq, Oral, Daily      Results from last 7 days   Lab Units 04/16/25  0601 04/15/25  2112 04/15/25  0736 04/14/25  0605 04/13/25  1124 04/12/25  0734   SODIUM mmol/L 136  --  137 139 138 139   POTASSIUM mmol/L 3.8 3.8 3.5 4.0 4.1 4.2   CHLORIDE mmol/L 95*  --  93* 97* 96* 97*   CO2 mmol/L 30.0*  --  32.0* 29.0 29.0 31.0*   BUN mg/dL 55*  --  52* 51* 50* 48*   CREATININE mg/dL 1.65*  --  1.88* 1.86* 1.87* 1.71*   CALCIUM mg/dL 8.8  --  9.0 8.9 9.0 8.6   ALBUMIN g/dL 3.4*  --  3.4*  --  3.2* 3.4*   WBC 10*3/mm3 4.94  --  4.57 5.25 6.09  --    HEMOGLOBIN g/dL 9.6*  --  9.6* 9.7* 10.2*  --    PLATELETS 10*3/mm3 210  --  206 231 222  --    GLUCOSE mg/dL 96  --  112* 129* 136* 100*       Intake/Output Summary (Last 24 hours) at 4/16/2025 0903  Last data filed at " 4/16/2025 0500  Gross per 24 hour   Intake 100 ml   Output 1600 ml   Net -1500 ml     Findings:  RIGHT kidney measures 10.7 x 5.4 x 5.1 cm.  Kidney echogenicity, size, and vascularity appear within normal limits. There is no solid kidney mass.  No echogenic shadowing stone.  No hydronephrosis.     LEFT kidney measures 10.9 x 5.6 x 5.7 cm. Kidney echogenicity, size, and vascularity appear within normal limits. There is no solid kidney mass.  No echogenic shadowing stone.  No hydronephrosis.     Limited visualization of the urinary bladder is unremarkable.        IMPRESSION:  Impression:  No significant sonographic abnormality of the kidneys.         Medication Review: yes    Assessment & Plan       Acute exacerbation of CHF (congestive heart failure)    JOLENE on CPAP    Stage 3b chronic kidney disease    Thigh hematoma, left, initial encounter      1- SHANT on CKD stage - Scr 1.8 stable with diuretics.   2- CKD stage III - baseline Scr 1.4-1.6 range - UA + protein. Micralb/creatinine 6.7- Likely DN plus hx of NSAID use   3- Edema   4- systolic CHF - EF 36-40%   5- CAD   6- Moderate to Severe TR and AR  7- HTN   8- DM type II     Plan:  - Continue with SGLT 2 inhibitor   - Continue with diuretics   - Will hold metolazone and will consider aldactone as K sparing diuretics if needed   - Monitor I/O   - Renal diet   - Adjust meds per renal function   - No emergent need of RRT   - Monitor H/H and transfuse for Hgb less than 7.0       Isabel Polk MD  04/16/25  09:03 EDT

## 2025-04-16 NOTE — PROGRESS NOTES
"    [facility] Medicine Services  PROGRESS NOTE    Patient Name: Iain Dee  : 1935  MRN: [id number]    Date of Admission: 2025  Primary Care Physician: Luis Aguilar DO    Subjective   Subjective     CC:  CHF exacerbation    HPI:  Patient was seen resting up in the chair in no acute distress.  Awake and alert.  Wife at bedside.  Reports he feels \"like shit\".  He just generally does not feel well. Occasional cough. No soa at rest.  Lower extremity pain rated 7/10 scale.  Does not like the food.  Eager to get to rehab as soon as possible.    Objective   Objective     Vital Signs:   Temp:  [97.6 °F (36.4 °C)-97.8 °F (36.6 °C)] 97.8 °F (36.6 °C)  Heart Rate:  [51-81] 70  Resp:  [16-18] 18  BP: (117-139)/(77-89) 126/77  Flow (L/min) (Oxygen Therapy):  [2] 2     Physical Exam:  Constitutional: No acute distress, awake, alert.  Resting up in the chair.  Chronically debilitated appearing elderly male.  Wife at bedside.  HENT: NCAT, mucous membranes moist  Respiratory: Clear to auscultation bilaterally but decreased at bases, respiratory effort normal on room air with sats 93%  Cardiovascular: RRR, no murmurs, rubs, or gallops  Gastrointestinal: Positive bowel sounds, soft, nontender, nondistended  Musculoskeletal: 1+ bilateral lower extremity edema.  Improved.  ARTEAGA spontaneously.  Psychiatric: Appropriate affect, cooperative  Neurologic: Oriented x 3 with mild short-term memory deficits, strength symmetric in all extremities, Cranial Nerves grossly intact to confrontation, speech clear.  Follows commands.  Skin: No rashes      Results Reviewed:  LAB RESULTS:      Lab 25  0601 04/15/25  2112 04/15/25  0736 25  0605 25  1124 25  0530 25  1608 25  1447   WBC 4.94  --  4.57 5.25 6.09 5.19  --  5.36   HEMOGLOBIN 9.6*  --  9.6* 9.7* 10.2* 10.1*  --  9.7*   HEMATOCRIT 32.0*  --  31.1* 31.8* 34.5* 32.9*  --  31.8*   PLATELETS 210  --  206 231 222 " 236  --  233   NEUTROS ABS  --   --   --   --  5.12  --   --  4.30   IMMATURE GRANS (ABS)  --   --   --   --  0.02  --   --  0.01   LYMPHS ABS  --   --   --   --  0.35*  --   --  0.40*   MONOS ABS  --   --   --   --  0.45  --   --  0.53   EOS ABS  --   --   --   --  0.11  --   --  0.07   .3*  --  98.7* 100.0* 104.2* 100.6*  --  99.7*   LDH  --  258*  --   --   --   --   --   --    HSTROP T  --   --   --   --   --   --  123* 136*         Lab 04/16/25  1355 04/16/25  0601 04/15/25  2112 04/15/25  0736 04/14/25  0605 04/13/25  1124 04/12/25  0734   SODIUM  --  136  --  137 139 138 139   POTASSIUM 3.8 3.8 3.8 3.5 4.0 4.1 4.2   CHLORIDE  --  95*  --  93* 97* 96* 97*   CO2  --  30.0*  --  32.0* 29.0 29.0 31.0*   ANION GAP  --  11.0  --  12.0 13.0 13.0 11.0   BUN  --  55*  --  52* 51* 50* 48*   CREATININE  --  1.65*  --  1.88* 1.86* 1.87* 1.71*   EGFR  --  39.7*  --  33.9* 34.4* 34.2* 38.0*   GLUCOSE  --  96  --  112* 129* 136* 100*   CALCIUM  --  8.8  --  9.0 8.9 9.0 8.6   MAGNESIUM  --  2.0  --  2.1 2.0 2.1 2.1         Lab 04/16/25  0601 04/15/25  0736 04/13/25  1124 04/12/25  0734 04/11/25  1608 04/11/25  1447   TOTAL PROTEIN 5.8* 5.9* 6.0 5.4*  --  5.8*   ALBUMIN 3.4* 3.4* 3.2* 3.4*  --  3.4*   GLOBULIN  --   --   --  2.0  --  2.4   ALT (SGPT) 21 19 18 19  --  21   AST (SGOT) 31 29 22 21  --  28   BILIRUBIN 1.9* 2.1* 2.1* 1.6*  --  1.5*   INDIRECT BILIRUBIN 0.7 0.9 0.9  --   --   --    BILIRUBIN DIRECT 1.2* 1.2* 1.2*  --  0.7*  --    ALK PHOS 172* 154* 163* 155*  --  161*         Lab 04/11/25  1608 04/11/25  1447   PROBNP  --  20,657.0*   HSTROP T 123* 136*                 Brief Urine Lab Results  (Last result in the past 365 days)        Color   Clarity   Blood   Leuk Est   Nitrite   Protein   CREAT   Urine HCG        04/15/25 1241             26.2         04/15/25 1241 Yellow   Clear   Trace   Negative   Negative   100 mg/dL (2+)                   Microbiology Results Abnormal       None            US Renal  Bilateral  Result Date: 4/15/2025  US RENAL BILATERAL Date of Exam: 4/15/2025 12:50 PM EDT Indication: steffanie look for hydronephrosis. Comparison: No comparisons available. Technique: Grayscale and color Doppler ultrasound evaluation of the kidneys and urinary bladder was performed. Findings: RIGHT kidney measures 10.7 x 5.4 x 5.1 cm.  Kidney echogenicity, size, and vascularity appear within normal limits. There is no solid kidney mass.  No echogenic shadowing stone.  No hydronephrosis. LEFT kidney measures 10.9 x 5.6 x 5.7 cm. Kidney echogenicity, size, and vascularity appear within normal limits. There is no solid kidney mass.  No echogenic shadowing stone.  No hydronephrosis. Limited visualization of the urinary bladder is unremarkable.     Impression: Impression: No significant sonographic abnormality of the kidneys. Electronically Signed: Ssuu Reeves MD  4/15/2025 1:29 PM EDT  Workstation ID: [id number]      Results for orders placed during the hospital encounter of 04/02/25    Adult Transthoracic Echo Complete W/ Cont if Necessary Per Protocol    Interpretation Summary    Left ventricular ejection fraction appears to be 36 - 40%.    Left ventricular wall thickness is consistent with mild concentric hypertrophy.    Left ventricular diastolic function is consistent with (grade II w/high LAP) pseudonormalization.    The right ventricular cavity is moderate to severely dilated.    The left atrial cavity is moderate to severely dilated.    Left atrial volume is severely increased.    The right atrial cavity is severely  dilated.    There is mild calcification of the aortic valve.    Moderate to severe aortic valve regurgitation is present.    Moderate to severe tricuspid valve regurgitation is present.    Estimated right ventricular systolic pressure from tricuspid regurgitation is markedly elevated (>55 mmHg).      Current medications:  Scheduled Meds:albumin human, 25 g, Intravenous, Once  apixaban, 2.5 mg, Oral,  BID  aspirin, 81 mg, Oral, Daily  atorvastatin, 40 mg, Oral, Nightly  carvedilol, 3.125 mg, Oral, BID With Meals  empagliflozin, 10 mg, Oral, Daily  furosemide, 40 mg, Intravenous, Daily  insulin lispro, 2-7 Units, Subcutaneous, 4x Daily AC & at Bedtime  magnesium oxide, 400 mg, Oral, Daily  pantoprazole, 40 mg, Oral, Q AM  pharmacy consult - MTM, , Not Applicable, Daily  potassium chloride, 20 mEq, Oral, Daily      Continuous Infusions:   PRN Meds:.  acetaminophen **OR** acetaminophen **OR** acetaminophen    senna-docusate sodium **AND** polyethylene glycol **AND** bisacodyl **AND** bisacodyl    dextrose    dextrose    glucagon (human recombinant)    Magnesium Cardiology Dose Replacement - Follow Nurse / BPA Driven Protocol    nitroglycerin    Potassium Replacement - Follow Nurse / BPA Driven Protocol    sodium chloride    sodium chloride    traMADol    Assessment & Plan   Assessment & Plan     Active Hospital Problems    Diagnosis  POA    **Acute exacerbation of CHF (congestive heart failure) [I50.9]  Yes    Thigh hematoma, left, initial encounter [S70.12XA]  Yes    Stage 3b chronic kidney disease [N18.32]  Yes    JOLENE on CPAP [G47.33]  Yes      Resolved Hospital Problems   No resolved problems to display.        Brief Hospital Course to date:  Iain Dee is a 89 y.o. male with PMH significant for HFrEF, valvular heart disease,  A-fib chronically anticoagulated on Eliquis, CAD, DM2, prostate/bladder cancer (~12 years ago) with recent admission to [facility] 4/2-4 5/20/2025 for left thigh hematoma who presents to [facility] ED with worsening edema despite using his home Bumex and worsening mobility. Of note, patient was recommended to go to rehab after his last admission but declined.      This patient's problems and plans were partially entered by my partner and updated as appropriate by me 04/16/25.    Assessment/Plan:  Pt is new to me today     CHF exacerbation  HFrEF  Valvular heart disease  H/o STEMI  Follows with   [facility]  Echo 4/4/2025 with EF 36-40%, LV diastolic dysfunction, moderate to severe AVR, moderate to severe tricuspid valve regurg, markedly elevated RVSP >55  CXR shows cardiomegaly with pulmonary vascular congestion and left basilar atelectasis versus infiltrate.  Left basilar atelectasis also seen on CXR for 2/20/2025.  Patient on room air, add OPEP for atelectasis on CXR, O2 prn  Cardiology consulted on admission  GDMT with Coreg and Jardiance resumed by cardiology  Strict I's & O's  Consulted nephrology due to renal function remaining elevated at 1.88 despite aggressive diuresis, will check renal ultrasound, was WNL no evidence of hydronephrosis or nephrolithiasis  Nephrology managing diuresis   Daily weights  PT/OT recommending rehab. Awaiting placement      Elevated troponin  Troponins 136>>123, increased from prior admission, likely type II demand ischemia   pt denies chest pain  No significant ST elevation on EKG     Elevated bilirubin improving  Bili 1.5, was wnl prior admission  Liver ultrasound negative for congestion, likely cholelithiasis evident as well as some ascites  No portal hypertension hepatic duplex  Monitor labs      A-fib  rate controlled, continue Coreg  continue Eliquis at renal dosing 2.5 mg BID     Left leg hematoma  Stable from prior, appears to be healing   H&H stable  Monitor labs      Left leg wound  From blister likely secondary to edema/venous stasis  PT wound care consult, may benefit from compression wraps/unna boots  Monitor labs      BLE edema  PT wound care consult as above     CKD 3b  Cr slightly elevated 1.84 (appears to fluctuate 1.30-1.80 per chart review) on admission  GDMT resumed by cardiology  Renal function improving with diuresing likely cardiorenal in etiology  Monitor labs      CAD  HTN  HLD  Continue ASA, previously on Plavix that cardiology dc'd last admission d/t hematoma  Continue Coreg  Continue statin, LFTs wnl     DM2  A1c 6.4  On metformin BID at home,  holding   ACHS, low-dose SSI. Glucoses stable.  Monitor      GERD  Daily PPI     JOLENE  CPAP    Expected Discharge Location and Transportation: D. Awaiting rehab   Expected Discharge   Expected Discharge Date: 4/17/2025; Expected Discharge Time:      VTE Prophylaxis:  Pharmacologic & mechanical VTE prophylaxis orders are present.      AM-PAC 6 Clicks Score (PT): 11 (04/16/25 6220)    CODE STATUS:   Code Status and Medical Interventions: CPR (Attempt to Resuscitate); Full Support   Ordered at: 04/11/25 0238     Code Status (Patient has no pulse and is not breathing):    CPR (Attempt to Resuscitate)     Medical Interventions (Patient has pulse or is breathing):    Full Support     Level Of Support Discussed With:    Patient       Blanche Diamond Morley, APRN  04/16/25

## 2025-04-16 NOTE — PLAN OF CARE
Goal Outcome Evaluation:  Plan of Care Reviewed With: patient, spouse        Progress: improving  Outcome Evaluation: Pt's ADL independence limited d/t LLE pain, generalized weakness, decreased functional endurance, and balance deficits. Pt req min Ax2 for bed mobility, STS, & SPT from bed-to-chair w/ RW, dep for LBD, SUA for self-feeding w/ provided AE. Will continue to progress pt as tolerated per OT POC. Rec SNF at d/c for best functional outcomes.    Anticipated Discharge Disposition (OT): inpatient rehabilitation facility

## 2025-04-16 NOTE — THERAPY WOUND CARE TREATMENT
Acute Care - Wound/Debridement Treatment Note  [facility]     Patient Name: Iain Dee  : 1935  MRN: [id number]  Today's Date: 2025                Admit Date: 2025    Visit Dx:    ICD-10-CM ICD-9-CM   1. Acute on chronic congestive heart failure, unspecified heart failure type  I50.9 428.0   2. Acute pulmonary edema  J81.0 518.4   3. Anasarca  R60.1 782.3   4. Failure to thrive in adult  R62.7 783.7   5. History of diabetes mellitus  Z86.39 V12.29       Patient Active Problem List   Diagnosis    AMS (altered mental status)    Hypomagnesemia    Hypoalbuminemia    Essential hypertension    Atrial fibrillation    GERD (gastroesophageal reflux disease)    Gout    Diabetes mellitus    JOLENE on CPAP    Chronic anticoagulation (Eliquis)    Alcohol abuse (1 to 2 glasses of bourbon a day)    Memory impairment    STEMI (ST elevation myocardial infarction)    Stage 3b chronic kidney disease    CHF (congestive heart failure)    Thigh hematoma, left, initial encounter    Acute exacerbation of CHF (congestive heart failure)        Past Medical History:   Diagnosis Date    A-fib     Arthritis     Cancer     Diabetes mellitus     GERD (gastroesophageal reflux disease)     Gout     H/O Bladder carcinoma (HCC)     Hypertension         Past Surgical History:   Procedure Laterality Date    BLADDER SURGERY      Biopsy    HERNIA REPAIR      PROSTATE BIOPSY             Wound 25 1113 Left lower leg (Active)   Dressing Appearance intact;moist drainage 25 1030   Closure Unable to assess 04/15/25 1955   Base moist;pink 25 1030   Periwound intact;dry;swelling 25 1030   Periwound Temperature cool 25 1030   Periwound Skin Turgor soft 25 1030   Edges irregular;open 25 1030   Drainage Characteristics/Odor serous;serosanguineous 25 1030   Drainage Amount small 25 1030   Care, Wound cleansed with;wound cleanser 25 1030   Dressing Care dressing  changed;foam;low-adherent;silver impregnated 04/16/25 1030   Periwound Care cleansed with pH balanced cleanser 04/16/25 1030       Wound 04/12/25 1235 Left anterior second toe Traumatic Abrasion (Active)   Dressing Appearance moist drainage;intact 04/16/25 1030   Closure Adhesive bandage 04/15/25 1955   Base moist;pink;red 04/16/25 1030   Periwound intact;dry 04/16/25 1030   Periwound Temperature cool 04/16/25 1030   Periwound Skin Turgor soft 04/16/25 1030   Edges irregular;open 04/16/25 1030   Drainage Characteristics/Odor serosanguineous 04/16/25 1030   Drainage Amount scant 04/16/25 1030   Care, Wound irrigated with;wound cleanser;debrided 04/16/25 1030   Dressing Care foam;low-adherent;silver impregnated 04/16/25 1030   Periwound Care cleansed with pH balanced cleanser 04/16/25 1030      Lymphedema       Row Name 04/16/25 1030             Lymphedema Edema Assessment    Ptting Edema Category By severity  -      Pitting Edema Moderate  -         Compression/Skin Care    Compression/Skin Care skin care;wrapping location;bandaging  -      Skin Care washed/dried  -      Wrapping Location lower extremity  -      Wrapping Location LE bilateral:;foot to knee  -      Wrapping Comments RLE: size 4 compressogrips. LLE: wound dressings, sizes 4/5 compressogrips. All compression doubled distally to create gradient compression.  -                User Key  (r) = Recorded By, (t) = Taken By, (c) = Cosigned By      Initials Name Provider Type     Iain Perkins, PT Physical Therapist                    WOUND DEBRIDEMENT                     PT Assessment (Last 12 Hours)       PT Evaluation and Treatment       Row Name 04/16/25 1030          Physical Therapy Time and Intention    Subjective Information complains of;weakness;fatigue;dyspnea  -     Document Type therapy note (daily note);wound care  -     Mode of Treatment individual therapy;physical therapy  -       Row Name 04/16/25 1030          Pain     Pain Location other (see comments)  thigh  -MF     Pain Side/Orientation left;upper  -MF     Additional Documentation Pain Scale: FACES Pre/Post-Treatment (Group)  -MF       Row Name 04/16/25 1030          Pain Scale: FACES Pre/Post-Treatment    Pain: FACES Scale, Pretreatment 4-->hurts little more  -MF     Posttreatment Pain Rating 4-->hurts little more  -MF       Row Name 04/16/25 1030          Wound 04/03/25 1113 Left lower leg    Wound - Properties Group Placement Date: 04/03/25  -TG Placement Time: 1113  -TG Side: Left  -TG Orientation: lower  -TG Location: leg  -TG    Dressing Appearance intact;moist drainage  -MF     Base moist;pink  -MF     Periwound intact;dry;swelling  -MF     Periwound Temperature cool  -MF     Periwound Skin Turgor soft  -MF     Edges irregular;open  -MF     Drainage Characteristics/Odor serous;serosanguineous  -MF     Drainage Amount small  -MF     Care, Wound cleansed with;wound cleanser  -MF     Dressing Care dressing changed;foam;low-adherent;silver impregnated  mepilex Ag foam  -MF     Periwound Care cleansed with pH balanced cleanser  -MF     Retired Wound - Properties Group Placement Date: 04/03/25  -TG Placement Time: 1113  -TG Side: Left  -TG Orientation: lower  -TG Location: leg  -TG    Retired Wound - Properties Group Placement Date: 04/03/25  -TG Placement Time: 1113  -TG Side: Left  -TG Orientation: lower  -TG Location: leg  -TG    Retired Wound - Properties Group Date first assessed: 04/03/25  -TG Time first assessed: 1113  -TG Side: Left  -TG Location: leg  -TG      Row Name 04/16/25 1030          Wound 04/12/25 1235 Left anterior second toe Traumatic Abrasion    Wound - Properties Group Placement Date: 04/12/25  -MC Placement Time: 1235  -MC Present on Original Admission: Y  -MC Side: Left  -MC Orientation: anterior  -MC Location: second toe  -MC Primary Wound Type: Traumatic  -MC Secondary Wound Type - Traumatic: Abrasion  -MC    Dressing Appearance moist drainage;intact   -MF     Base moist;pink;red  -MF     Periwound intact;dry  -MF     Periwound Temperature cool  -MF     Periwound Skin Turgor soft  -MF     Edges irregular;open  -MF     Drainage Characteristics/Odor serosanguineous  -MF     Drainage Amount scant  -MF     Care, Wound irrigated with;wound cleanser;debrided  -MF     Dressing Care foam;low-adherent;silver impregnated  mepilex Ag foam with PF tape to secure  -MF     Periwound Care cleansed with pH balanced cleanser  -MF     Retired Wound - Properties Group Placement Date: 04/12/25  - Placement Time: 1235  - Present on Original Admission: Y  -MC Side: Left  -MC Orientation: anterior  -MC Location: second toe  -MC    Retired Wound - Properties Group Placement Date: 04/12/25  - Placement Time: 1235  - Present on Original Admission: Y  -MC Side: Left  -MC Orientation: anterior  -MC Location: second toe  -MC    Retired Wound - Properties Group Date first assessed: 04/12/25  - Time first assessed: 1235  - Present on Original Admission: Y  -MC Side: Left  -MC Location: second toe  -MC      Row Name 04/16/25 1030          Coping    Observed Emotional State calm;cooperative  -     Verbalized Emotional State acceptance  -     Trust Relationship/Rapport care explained  -       Row Name 04/16/25 1030          Plan of Care Review    Plan of Care Reviewed With patient  -     Outcome Evaluation BLE compression wrapping changed with good overall reduction in LE edema noted and improved skin integrity. L toe wound stable to this point with no significant change. PT will f/u with compression wrapping change in 2-3 days.  -       Row Name 04/16/25 1030          Positioning and Restraints    Pre-Treatment Position sitting in chair/recliner  -     Post Treatment Position chair  -MF     In Chair reclined;call light within reach  -               User Key  (r) = Recorded By, (t) = Taken By, (c) = Cosigned By      Initials Name Provider Type    Iain Melgoza,  PT Physical Therapist    Yeny Savage, PT Physical Therapist    Asim Arevalo, RN Registered Nurse                  Physical Therapy Education       Title: PT OT SLP Therapies (In Progress)       Topic: Physical Therapy (Done)       Point: Mobility training (Done)       Learning Progress Summary            Patient Acceptance, E, VU by ET at 4/12/2025 1034                      Point: Home exercise program (Done)       Learning Progress Summary            Patient Acceptance, E, VU by ET at 4/12/2025 1034                      Point: Body mechanics (Done)       Learning Progress Summary            Patient Acceptance, E, VU by ET at 4/12/2025 1034                      Point: Precautions (Done)       Learning Progress Summary            Patient Acceptance, E, VU by ET at 4/12/2025 1034                                      User Key       Initials Effective Dates Name Provider Type Discipline    ET 07/26/24 -  Trudy Shoemaker, PT Physical Therapist PT                    Recommendation and Plan  Anticipated Discharge Disposition (PT): other (see comments) (defer to PT mobility. Will require skilled wound care upon d/c.)  Planned Therapy Interventions (PT): wound care, patient/family education  Therapy Frequency (PT): daily  Plan of Care Reviewed With: patient           Outcome Evaluation: BLE compression wrapping changed with good overall reduction in LE edema noted and improved skin integrity. L toe wound stable to this point with no significant change. PT will f/u with compression wrapping change in 2-3 days.  Plan of Care Reviewed With: patient            Time Calculation   PT Charges       Row Name 04/16/25 1030             Time Calculation    Start Time 1030  -MF      PT Goal Re-Cert Due Date 04/22/25  -MF         Untimed Charges    32637-Hdxrbsilrd comp below knee 20  -MF         Total Minutes    Untimed Charges Total Minutes 20  -MF       Total Minutes 20  -MF                User Key  (r) = Recorded By,  (t) = Taken By, (c) = Cosigned By      Initials Name Provider Type    Iain Melgoza, PT Physical Therapist                            PT G-Codes  Outcome Measure Options: AM-PAC 6 Clicks Daily Activity (OT)  AM-PAC 6 Clicks Score (PT): 12  AM-PAC 6 Clicks Score (OT): 12       Iain Perkins, PT  4/16/2025

## 2025-04-17 LAB
ALBUMIN SERPL-MCNC: 3.6 G/DL (ref 3.5–5.2)
ALP SERPL-CCNC: 171 U/L (ref 39–117)
ALT SERPL W P-5'-P-CCNC: 20 U/L (ref 1–41)
ANION GAP SERPL CALCULATED.3IONS-SCNC: 11 MMOL/L (ref 5–15)
AST SERPL-CCNC: 33 U/L (ref 1–40)
BILIRUB CONJ SERPL-MCNC: 1.1 MG/DL (ref 0–0.3)
BILIRUB INDIRECT SERPL-MCNC: 0.8 MG/DL
BILIRUB SERPL-MCNC: 1.9 MG/DL (ref 0–1.2)
BUN SERPL-MCNC: 57 MG/DL (ref 8–23)
BUN/CREAT SERPL: 28.2 (ref 7–25)
CALCIUM SPEC-SCNC: 8.7 MG/DL (ref 8.6–10.5)
CHLORIDE SERPL-SCNC: 94 MMOL/L (ref 98–107)
CO2 SERPL-SCNC: 33 MMOL/L (ref 22–29)
CREAT SERPL-MCNC: 2.02 MG/DL (ref 0.76–1.27)
DEPRECATED RDW RBC AUTO: 67.4 FL (ref 37–54)
EGFRCR SERPLBLD CKD-EPI 2021: 31.1 ML/MIN/1.73
ERYTHROCYTE [DISTWIDTH] IN BLOOD BY AUTOMATED COUNT: 18.4 % (ref 12.3–15.4)
GLUCOSE BLDC GLUCOMTR-MCNC: 103 MG/DL (ref 70–130)
GLUCOSE BLDC GLUCOMTR-MCNC: 162 MG/DL (ref 70–130)
GLUCOSE BLDC GLUCOMTR-MCNC: 179 MG/DL (ref 70–130)
GLUCOSE BLDC GLUCOMTR-MCNC: 204 MG/DL (ref 70–130)
GLUCOSE SERPL-MCNC: 88 MG/DL (ref 65–99)
HCT VFR BLD AUTO: 32.4 % (ref 37.5–51)
HGB BLD-MCNC: 9.8 G/DL (ref 13–17.7)
MAGNESIUM SERPL-MCNC: 2 MG/DL (ref 1.6–2.4)
MCH RBC QN AUTO: 30.8 PG (ref 26.6–33)
MCHC RBC AUTO-ENTMCNC: 30.2 G/DL (ref 31.5–35.7)
MCV RBC AUTO: 101.9 FL (ref 79–97)
PLATELET # BLD AUTO: 195 10*3/MM3 (ref 140–450)
PMV BLD AUTO: 10.5 FL (ref 6–12)
POTASSIUM SERPL-SCNC: 3.9 MMOL/L (ref 3.5–5.2)
POTASSIUM SERPL-SCNC: 4.4 MMOL/L (ref 3.5–5.2)
PROT SERPL-MCNC: 5.7 G/DL (ref 6–8.5)
RBC # BLD AUTO: 3.18 10*6/MM3 (ref 4.14–5.8)
SODIUM SERPL-SCNC: 138 MMOL/L (ref 136–145)
WBC NRBC COR # BLD AUTO: 4.16 10*3/MM3 (ref 3.4–10.8)

## 2025-04-17 PROCEDURE — 85027 COMPLETE CBC AUTOMATED: CPT

## 2025-04-17 PROCEDURE — 82948 REAGENT STRIP/BLOOD GLUCOSE: CPT

## 2025-04-17 PROCEDURE — 80048 BASIC METABOLIC PNL TOTAL CA: CPT

## 2025-04-17 PROCEDURE — 63710000001 INSULIN LISPRO (HUMAN) PER 5 UNITS: Performed by: NURSE PRACTITIONER

## 2025-04-17 PROCEDURE — 80076 HEPATIC FUNCTION PANEL: CPT

## 2025-04-17 PROCEDURE — 83735 ASSAY OF MAGNESIUM: CPT

## 2025-04-17 PROCEDURE — 25010000002 FUROSEMIDE PER 20 MG: Performed by: INTERNAL MEDICINE

## 2025-04-17 PROCEDURE — 99232 SBSQ HOSP IP/OBS MODERATE 35: CPT | Performed by: NURSE PRACTITIONER

## 2025-04-17 PROCEDURE — 84132 ASSAY OF SERUM POTASSIUM: CPT

## 2025-04-17 RX ORDER — POTASSIUM CHLORIDE 1500 MG/1
20 TABLET, EXTENDED RELEASE ORAL ONCE
Status: COMPLETED | OUTPATIENT
Start: 2025-04-17 | End: 2025-04-17

## 2025-04-17 RX ORDER — FUROSEMIDE 10 MG/ML
20 INJECTION INTRAMUSCULAR; INTRAVENOUS DAILY
Status: DISCONTINUED | OUTPATIENT
Start: 2025-04-18 | End: 2025-04-19 | Stop reason: HOSPADM

## 2025-04-17 RX ADMIN — APIXABAN 2.5 MG: 2.5 TABLET, FILM COATED ORAL at 20:45

## 2025-04-17 RX ADMIN — INSULIN LISPRO 2 UNITS: 100 INJECTION, SOLUTION INTRAVENOUS; SUBCUTANEOUS at 12:03

## 2025-04-17 RX ADMIN — INSULIN LISPRO 2 UNITS: 100 INJECTION, SOLUTION INTRAVENOUS; SUBCUTANEOUS at 17:37

## 2025-04-17 RX ADMIN — Medication 10 ML: at 08:59

## 2025-04-17 RX ADMIN — CARVEDILOL 3.12 MG: 3.12 TABLET, FILM COATED ORAL at 08:59

## 2025-04-17 RX ADMIN — PANTOPRAZOLE SODIUM 40 MG: 40 TABLET, DELAYED RELEASE ORAL at 05:38

## 2025-04-17 RX ADMIN — ASPIRIN 81 MG: 81 TABLET, COATED ORAL at 08:59

## 2025-04-17 RX ADMIN — EMPAGLIFLOZIN 10 MG: 10 TABLET, FILM COATED ORAL at 08:59

## 2025-04-17 RX ADMIN — APIXABAN 2.5 MG: 2.5 TABLET, FILM COATED ORAL at 08:59

## 2025-04-17 RX ADMIN — POTASSIUM CHLORIDE 20 MEQ: 1500 TABLET, EXTENDED RELEASE ORAL at 08:58

## 2025-04-17 RX ADMIN — Medication 1 LOZENGE: at 20:45

## 2025-04-17 RX ADMIN — POTASSIUM CHLORIDE 20 MEQ: 750 CAPSULE, EXTENDED RELEASE ORAL at 08:58

## 2025-04-17 RX ADMIN — INSULIN LISPRO 3 UNITS: 100 INJECTION, SOLUTION INTRAVENOUS; SUBCUTANEOUS at 20:45

## 2025-04-17 RX ADMIN — ATORVASTATIN CALCIUM 40 MG: 40 TABLET, FILM COATED ORAL at 20:45

## 2025-04-17 RX ADMIN — ACETAMINOPHEN 650 MG: 325 TABLET, FILM COATED ORAL at 12:25

## 2025-04-17 RX ADMIN — CARVEDILOL 3.12 MG: 3.12 TABLET, FILM COATED ORAL at 17:37

## 2025-04-17 RX ADMIN — FUROSEMIDE 40 MG: 10 INJECTION, SOLUTION INTRAMUSCULAR; INTRAVENOUS at 08:59

## 2025-04-17 RX ADMIN — MAGNESIUM OXIDE TAB 400 MG (241.3 MG ELEMENTAL MG) 400 MG: 400 (241.3 MG) TAB at 08:58

## 2025-04-17 NOTE — CASE MANAGEMENT/SOCIAL WORK
Continued Stay Note  [facility]     Patient Name: Iain Dee  MRN: [id number]  Today's Date: 4/17/2025    Admit Date: 4/11/2025    Plan: SNF   Discharge Plan       Row Name 04/17/25 0916       Plan    Plan SNF    Patient/Family in Agreement with Plan yes    Plan Comments Met with Mr. Dee at the bedside to update on discharge plan. [facility] still expecting to have a bed for him Friday afternoon or Saturday morning. Agreed to keep him updated with any new information.  will continue to follow plan of care and assist with discharge planning needs as indicated.    Final Discharge Disposition Code 03 - skilled nursing facility (SNF)                   Discharge Codes    No documentation.                 Expected Discharge Date and Time       Expected Discharge Date Expected Discharge Time    Apr 17, 2025               Patricia Keith RN

## 2025-04-17 NOTE — PROGRESS NOTES
"   LOS: 4 days    Patient Care Team:  Luis Aguilar DO as PCP - General (Family Medicine)  Iain Bernstein MD as Consulting Physician (Cardiology)  Isabel Stover MD as Cardiologist (Cardiology)    Chief Complaint:  SOA    Subjective     Interval History:     Uptrend in serum cr noted. Started on SGLT-2inh on 4/12/25. Also on lasix 40 mg daily. LE edema improved.       Review of Systems:   No CP or SOA , fever, chills, rigors, rash, N/V, Constipation.       Objective     Vital Sign Min/Max for last 24 hours  Temp  Min: 97.3 °F (36.3 °C)  Max: 98 °F (36.7 °C)   BP  Min: 109/75  Max: 134/73   Pulse  Min: 41  Max: 73   Resp  Min: 18  Max: 20   SpO2  Min: 86 %  Max: 100 %   No data recorded   Weight  Min: 102 kg (225 lb)  Max: 102 kg (225 lb)     Flowsheet Rows      Flowsheet Row First Filed Value   Admission Height 185.4 cm (73\") Documented at 04/11/2025 1421   Admission Weight 104 kg (229 lb 4.5 oz) Documented at 04/11/2025 1421            I/O this shift:  In: -   Out: 650 [Urine:650]  I/O last 3 completed shifts:  In: -   Out: 3550 [Urine:3550]    Physical Exam:    Gen: Alert, NAD   HENT: NC, AT, EOMI   NECK: Supple, no JVD, Trachea midline   LUNGS: Decrease air entry bilaterally, non labored respirtation   CVS: S1/S2 audible, RRR, no murmur   Abd: Soft, NT, ND, BS+   Ext: + pedal edema, no cyanosis   CNS: Alert, No focal deficit noted grossly  Psy: Cooperative  Skin: Warm, dry and intact      WBC WBC   Date Value Ref Range Status   04/17/2025 4.16 3.40 - 10.80 10*3/mm3 Final   04/16/2025 4.94 3.40 - 10.80 10*3/mm3 Final   04/15/2025 4.57 3.40 - 10.80 10*3/mm3 Final      HGB Hemoglobin   Date Value Ref Range Status   04/17/2025 9.8 (L) 13.0 - 17.7 g/dL Final   04/16/2025 9.6 (L) 13.0 - 17.7 g/dL Final   04/15/2025 9.6 (L) 13.0 - 17.7 g/dL Final      HCT Hematocrit   Date Value Ref Range Status   04/17/2025 32.4 (L) 37.5 - 51.0 % Final   04/16/2025 32.0 (L) 37.5 - 51.0 % Final   04/15/2025 31.1 (L) 37.5 - " "51.0 % Final      Platlets No results found for: \"LABPLAT\"   MCV MCV   Date Value Ref Range Status   04/17/2025 101.9 (H) 79.0 - 97.0 fL Final   04/16/2025 101.3 (H) 79.0 - 97.0 fL Final   04/15/2025 98.7 (H) 79.0 - 97.0 fL Final          Sodium Sodium   Date Value Ref Range Status   04/17/2025 138 136 - 145 mmol/L Final   04/16/2025 136 136 - 145 mmol/L Final   04/15/2025 137 136 - 145 mmol/L Final      Potassium Potassium   Date Value Ref Range Status   04/17/2025 4.4 3.5 - 5.2 mmol/L Final     Comment:     Slight hemolysis detected by analyzer. Result may be falsely elevated.   04/17/2025 3.9 3.5 - 5.2 mmol/L Final   04/16/2025 4.2 3.5 - 5.2 mmol/L Final     Comment:     Slight hemolysis detected by analyzer. Result may be falsely elevated.   04/16/2025 3.8 3.5 - 5.2 mmol/L Final     Comment:     Slight hemolysis detected by analyzer. Result may be falsely elevated.   04/16/2025 3.8 3.5 - 5.2 mmol/L Final   04/15/2025 3.8 3.5 - 5.2 mmol/L Final   04/15/2025 3.5 3.5 - 5.2 mmol/L Final      Chloride Chloride   Date Value Ref Range Status   04/17/2025 94 (L) 98 - 107 mmol/L Final   04/16/2025 95 (L) 98 - 107 mmol/L Final   04/15/2025 93 (L) 98 - 107 mmol/L Final      CO2 CO2   Date Value Ref Range Status   04/17/2025 33.0 (H) 22.0 - 29.0 mmol/L Final   04/16/2025 30.0 (H) 22.0 - 29.0 mmol/L Final   04/15/2025 32.0 (H) 22.0 - 29.0 mmol/L Final      BUN BUN   Date Value Ref Range Status   04/17/2025 57 (H) 8 - 23 mg/dL Final   04/16/2025 55 (H) 8 - 23 mg/dL Final   04/15/2025 52 (H) 8 - 23 mg/dL Final      Creatinine Creatinine   Date Value Ref Range Status   04/17/2025 2.02 (H) 0.76 - 1.27 mg/dL Final   04/16/2025 1.65 (H) 0.76 - 1.27 mg/dL Final   04/15/2025 1.88 (H) 0.76 - 1.27 mg/dL Final      Calcium Calcium   Date Value Ref Range Status   04/17/2025 8.7 8.6 - 10.5 mg/dL Final   04/16/2025 8.8 8.6 - 10.5 mg/dL Final   04/15/2025 9.0 8.6 - 10.5 mg/dL Final      PO4 No results found for: \"CAPO4\"   Albumin Albumin "   Date Value Ref Range Status   04/17/2025 3.6 3.5 - 5.2 g/dL Final   04/16/2025 3.4 (L) 3.5 - 5.2 g/dL Final   04/15/2025 3.4 (L) 3.5 - 5.2 g/dL Final      Magnesium Magnesium   Date Value Ref Range Status   04/17/2025 2.0 1.6 - 2.4 mg/dL Final   04/16/2025 2.0 1.6 - 2.4 mg/dL Final   04/15/2025 2.1 1.6 - 2.4 mg/dL Final      Uric Acid Uric Acid   Date Value Ref Range Status   04/15/2025 10.6 (H) 3.4 - 7.0 mg/dL Final     Comment:     Falsely depressed results may occur on samples drawn from patients receiving N-Acetylcysteine (NAC) or Metamizole.           Results Review:     I reviewed the patient's new clinical results.    apixaban, 2.5 mg, Oral, BID  aspirin, 81 mg, Oral, Daily  atorvastatin, 40 mg, Oral, Nightly  carvedilol, 3.125 mg, Oral, BID With Meals  empagliflozin, 10 mg, Oral, Daily  [START ON 4/18/2025] furosemide, 20 mg, Intravenous, Daily  insulin lispro, 2-7 Units, Subcutaneous, 4x Daily AC & at Bedtime  magnesium oxide, 400 mg, Oral, Daily  pantoprazole, 40 mg, Oral, Q AM  pharmacy consult - MTM, , Not Applicable, Daily  potassium chloride, 20 mEq, Oral, Daily      Results from last 7 days   Lab Units 04/17/25  1308 04/17/25  0605 04/16/25  2133 04/16/25  1355 04/16/25  0601 04/15/25  2112 04/15/25  0736 04/14/25  0605 04/13/25  1124   SODIUM mmol/L  --  138  --   --  136  --  137 139 138   POTASSIUM mmol/L 4.4 3.9 4.2 3.8 3.8   < > 3.5 4.0 4.1   CHLORIDE mmol/L  --  94*  --   --  95*  --  93* 97* 96*   CO2 mmol/L  --  33.0*  --   --  30.0*  --  32.0* 29.0 29.0   BUN mg/dL  --  57*  --   --  55*  --  52* 51* 50*   CREATININE mg/dL  --  2.02*  --   --  1.65*  --  1.88* 1.86* 1.87*   CALCIUM mg/dL  --  8.7  --   --  8.8  --  9.0 8.9 9.0   ALBUMIN g/dL  --  3.6  --   --  3.4*  --  3.4*  --  3.2*   WBC 10*3/mm3  --  4.16  --   --  4.94  --  4.57 5.25 6.09   HEMOGLOBIN g/dL  --  9.8*  --   --  9.6*  --  9.6* 9.7* 10.2*   PLATELETS 10*3/mm3  --  195  --   --  210  --  206 231 222   GLUCOSE mg/dL  --  88   --   --  96  --  112* 129* 136*    < > = values in this interval not displayed.       Intake/Output Summary (Last 24 hours) at 4/17/2025 1506  Last data filed at 4/17/2025 1107  Gross per 24 hour   Intake --   Output 2450 ml   Net -2450 ml     Findings:  RIGHT kidney measures 10.7 x 5.4 x 5.1 cm.  Kidney echogenicity, size, and vascularity appear within normal limits. There is no solid kidney mass.  No echogenic shadowing stone.  No hydronephrosis.     LEFT kidney measures 10.9 x 5.6 x 5.7 cm. Kidney echogenicity, size, and vascularity appear within normal limits. There is no solid kidney mass.  No echogenic shadowing stone.  No hydronephrosis.     Limited visualization of the urinary bladder is unremarkable.        IMPRESSION:  Impression:  No significant sonographic abnormality of the kidneys.         Medication Review: yes    Assessment & Plan       Acute exacerbation of CHF (congestive heart failure)    JOLENE on CPAP    Stage 3b chronic kidney disease    Thigh hematoma, left, initial encounter      1- SHANT on CKD stage - Scr 1.8 stable with diuretics.   2- CKD stage III - baseline Scr 1.4-1.6 range - UA + protein. Micralb/creatinine 6.7- Likely DN plus hx of NSAID use   3- Edema   4- systolic CHF - EF 36-40%   5- CAD   6- Moderate to Severe TR and AR  7- HTN   8- DM type II: On ARB.      Plan:  - Continue with SGLT 2 inhibitor   - Continue with diuretics. Reduce the dose to 20 mg daily  - Will hold metolazone and will consider aldactone as K sparing diuretics and carlso-protection in the long term.   - Monitor I/O   - Renal diet   - Adjust meds per renal function   - No emergent need of RRT   - Monitor H/H and transfuse for Hgb less than 7.0       Lele Paige MD  04/17/25  15:06 EDT

## 2025-04-17 NOTE — PROGRESS NOTES
[facility] Medicine Services  PROGRESS NOTE    Patient Name: Iain Dee  : 1935  MRN: [id number]    Date of Admission: 2025  Primary Care Physician: Luis Aguilar DO    Subjective   Subjective     CC:  Follow up CHF exacerbation     HPI:  Patient was seen resting in bed no apparent distress.  No acute events overnight per nursing.  He is currently feeling well.  No family at bedside.  He continues to await rehab placement.  The patient's wife via telephone.  Tentative plan for transfer to rehab Friday or Saturday once bed available.  No complaints at this time.      Objective   Objective     Vital Signs:   Temp:  [97.5 °F (36.4 °C)-98 °F (36.7 °C)] 97.5 °F (36.4 °C)  Heart Rate:  [50-81] 61  Resp:  [18] 18  BP: (109-126)/(75-89) 124/89     Physical Exam:  Constitutional: No acute distress, awake, alert, chronically ill-appearing, frail  HENT: NCAT, mucous membranes moist  Respiratory: Clear to auscultation bilaterally, respiratory effort normal on room air  Cardiovascular: RRR, no murmurs, cap refill brisk   Gastrointestinal: Positive bowel sounds, soft, nontender, nondistended  Musculoskeletal: 1+ BLE edema   Psychiatric: Appropriate affect, cooperative  Neurologic: Oriented x 3, moves all extremities, speech clear  Skin: warm, dry, no visible rash     Results Reviewed:  LAB RESULTS:      Lab 25  0605 25  0601 04/15/25  2112 04/15/25  0736 25  0605 25  1124 25  0530 25  1608 25  1447   WBC 4.16 4.94  --  4.57 5.25 6.09   < >  --  5.36   HEMOGLOBIN 9.8* 9.6*  --  9.6* 9.7* 10.2*   < >  --  9.7*   HEMATOCRIT 32.4* 32.0*  --  31.1* 31.8* 34.5*   < >  --  31.8*   PLATELETS 195 210  --  206 231 222   < >  --  233   NEUTROS ABS  --   --   --   --   --  5.12  --   --  4.30   IMMATURE GRANS (ABS)  --   --   --   --   --  0.02  --   --  0.01   LYMPHS ABS  --   --   --   --   --  0.35*  --   --  0.40*   MONOS ABS  --   --   --   --    --  0.45  --   --  0.53   EOS ABS  --   --   --   --   --  0.11  --   --  0.07   .9* 101.3*  --  98.7* 100.0* 104.2*   < >  --  99.7*   LDH  --   --  258*  --   --   --   --   --   --    HSTROP T  --   --   --   --   --   --   --  123* 136*    < > = values in this interval not displayed.         Lab 04/17/25 0605 04/16/25 2133 04/16/25  1355 04/16/25  0601 04/15/25  2112 04/15/25  0736 04/14/25 0605 04/13/25  1124   SODIUM 138  --   --  136  --  137 139 138   POTASSIUM 3.9 4.2 3.8 3.8 3.8 3.5 4.0 4.1   CHLORIDE 94*  --   --  95*  --  93* 97* 96*   CO2 33.0*  --   --  30.0*  --  32.0* 29.0 29.0   ANION GAP 11.0  --   --  11.0  --  12.0 13.0 13.0   BUN 57*  --   --  55*  --  52* 51* 50*   CREATININE 2.02*  --   --  1.65*  --  1.88* 1.86* 1.87*   EGFR 31.1*  --   --  39.7*  --  33.9* 34.4* 34.2*   GLUCOSE 88  --   --  96  --  112* 129* 136*   CALCIUM 8.7  --   --  8.8  --  9.0 8.9 9.0   MAGNESIUM 2.0  --   --  2.0  --  2.1 2.0 2.1         Lab 04/17/25  0605 04/16/25  0601 04/15/25  0736 04/13/25  1124 04/12/25  0734 04/11/25  1608 04/11/25  1447   TOTAL PROTEIN 5.7* 5.8* 5.9* 6.0 5.4*  --  5.8*   ALBUMIN 3.6 3.4* 3.4* 3.2* 3.4*  --  3.4*   GLOBULIN  --   --   --   --  2.0  --  2.4   ALT (SGPT) 20 21 19 18 19  --  21   AST (SGOT) 33 31 29 22 21  --  28   BILIRUBIN 1.9* 1.9* 2.1* 2.1* 1.6*  --  1.5*   INDIRECT BILIRUBIN 0.8 0.7 0.9 0.9  --   --   --    BILIRUBIN DIRECT 1.1* 1.2* 1.2* 1.2*  --  0.7*  --    ALK PHOS 171* 172* 154* 163* 155*  --  161*         Lab 04/11/25  1608 04/11/25  1447   PROBNP  --  20,657.0*   HSTROP T 123* 136*                 Brief Urine Lab Results  (Last result in the past 365 days)        Color   Clarity   Blood   Leuk Est   Nitrite   Protein   CREAT   Urine HCG        04/15/25 1241             26.2         04/15/25 1241 Yellow   Clear   Trace   Negative   Negative   100 mg/dL (2+)                   Microbiology Results Abnormal       None            US Renal Bilateral  Result Date:  4/15/2025  US RENAL BILATERAL Date of Exam: 4/15/2025 12:50 PM EDT Indication: steffanie look for hydronephrosis. Comparison: No comparisons available. Technique: Grayscale and color Doppler ultrasound evaluation of the kidneys and urinary bladder was performed. Findings: RIGHT kidney measures 10.7 x 5.4 x 5.1 cm.  Kidney echogenicity, size, and vascularity appear within normal limits. There is no solid kidney mass.  No echogenic shadowing stone.  No hydronephrosis. LEFT kidney measures 10.9 x 5.6 x 5.7 cm. Kidney echogenicity, size, and vascularity appear within normal limits. There is no solid kidney mass.  No echogenic shadowing stone.  No hydronephrosis. Limited visualization of the urinary bladder is unremarkable.     Impression: Impression: No significant sonographic abnormality of the kidneys. Electronically Signed: Susu Reeves MD  4/15/2025 1:29 PM EDT  Workstation ID: [id number]      Results for orders placed during the hospital encounter of 04/02/25    Adult Transthoracic Echo Complete W/ Cont if Necessary Per Protocol    Interpretation Summary    Left ventricular ejection fraction appears to be 36 - 40%.    Left ventricular wall thickness is consistent with mild concentric hypertrophy.    Left ventricular diastolic function is consistent with (grade II w/high LAP) pseudonormalization.    The right ventricular cavity is moderate to severely dilated.    The left atrial cavity is moderate to severely dilated.    Left atrial volume is severely increased.    The right atrial cavity is severely  dilated.    There is mild calcification of the aortic valve.    Moderate to severe aortic valve regurgitation is present.    Moderate to severe tricuspid valve regurgitation is present.    Estimated right ventricular systolic pressure from tricuspid regurgitation is markedly elevated (>55 mmHg).      Current medications:  Scheduled Meds:apixaban, 2.5 mg, Oral, BID  aspirin, 81 mg, Oral, Daily  atorvastatin, 40 mg, Oral,  Nightly  carvedilol, 3.125 mg, Oral, BID With Meals  empagliflozin, 10 mg, Oral, Daily  furosemide, 40 mg, Intravenous, Daily  insulin lispro, 2-7 Units, Subcutaneous, 4x Daily AC & at Bedtime  magnesium oxide, 400 mg, Oral, Daily  pantoprazole, 40 mg, Oral, Q AM  pharmacy consult - MTM, , Not Applicable, Daily  potassium chloride ER, 20 mEq, Oral, Once  potassium chloride, 20 mEq, Oral, Daily      Continuous Infusions:   PRN Meds:.  acetaminophen **OR** acetaminophen **OR** acetaminophen    senna-docusate sodium **AND** polyethylene glycol **AND** bisacodyl **AND** bisacodyl    dextrose    dextrose    glucagon (human recombinant)    Magnesium Cardiology Dose Replacement - Follow Nurse / BPA Driven Protocol    nitroglycerin    Potassium Replacement - Follow Nurse / BPA Driven Protocol    sodium chloride    sodium chloride    traMADol    Assessment & Plan   Assessment & Plan     Active Hospital Problems    Diagnosis  POA    **Acute exacerbation of CHF (congestive heart failure) [I50.9]  Yes    Thigh hematoma, left, initial encounter [S70.12XA]  Yes    Stage 3b chronic kidney disease [N18.32]  Yes    JOLENE on CPAP [G47.33]  Yes      Resolved Hospital Problems   No resolved problems to display.        Brief Hospital Course to date:  Iain Dee is a 89 y.o. male with PMH significant for HFrEF, valvular heart disease,  A-fib chronically anticoagulated on Eliquis, CAD, DM2, prostate/bladder cancer (~12 years ago) with recent admission to [facility] 4/2-4 5/20/2025 for left thigh hematoma who presents to [facility] ED with worsening edema despite using his home Bumex and worsening mobility. Of note, patient was recommended to go to rehab after his last admission but declined.      This patient's problems and plans were partially entered by my partner and updated as appropriate by me 04/17/25.     CHF exacerbation  HFrEF  Valvular heart disease  H/o STEMI  Follows with Dr. Stover  Echo 4/4/2025 with EF 36-40%, LV diastolic dysfunction,  moderate to severe AVR, moderate to severe tricuspid valve regurg, markedly elevated RVSP >55  CXR shows cardiomegaly with pulmonary vascular congestion and left basilar atelectasis versus infiltrate.  Left basilar atelectasis also seen on CXR for 2/20/2025.  Patient on room air, add OPEP for atelectasis on CXR, O2 prn  Cardiology following, GDMT with Coreg and Jardiance resumed   Nephrology following, managing diuresis   Strict I's & O's  Daily weights  PT/OT recommending rehab. Awaiting placement      Elevated troponin  Troponins 136>>123, increased from prior admission, likely type II demand ischemia   pt denies chest pain  No significant ST elevation on EKG     Elevated bilirubin improving  Bili 1.5, was wnl prior admission  Liver ultrasound negative for congestion, likely cholelithiasis evident as well as some ascites  No portal hypertension hepatic duplex  Monitor labs      A-fib  rate controlled, continue Coreg  continue Eliquis at renal dosing 2.5 mg BID     Left leg hematoma  Stable from prior, appears to be healing   H&H stable  Monitor labs      Left leg wound  From blister likely secondary to edema/venous stasis  PT wound care consult, may benefit from compression wraps/unna boots  Monitor labs      BLE edema  PT wound care consult as above     CKD 3b  Cr slightly elevated 1.84 (appears to fluctuate 1.30-1.80 per chart review) on admission  GDMT resumed by cardiology  Nephrology following  Creatinine up to 2.02 this AM   Monitor w/ diuresis   AM labs     CAD  HTN  HLD  Continue ASA, previously on Plavix that cardiology dc'd last admission d/t hematoma  Continue Coreg  Continue statin, LFTs wnl     DM2  A1c 6.4  On metformin BID at home, holding   ACHS, low-dose SSI. Glucoses stable.  Monitor      GERD  Daily PPI     JOLENE  CPAP     Expected Discharge Location and Transportation: TBD. Awaiting rehab   Expected Discharge   Expected Discharge Date: 4/18/2025; Expected Discharge Time:       VTE  Prophylaxis:  Pharmacologic & mechanical VTE prophylaxis orders are present.         AM-PAC 6 Clicks Score (PT): 11 (04/16/25 2000)    CODE STATUS:   Code Status and Medical Interventions: CPR (Attempt to Resuscitate); Full Support   Ordered at: 04/11/25 1819     Code Status (Patient has no pulse and is not breathing):    CPR (Attempt to Resuscitate)     Medical Interventions (Patient has pulse or is breathing):    Full Support     Level Of Support Discussed With:    Patient       COLIN Villatoro  04/17/25

## 2025-04-17 NOTE — PLAN OF CARE
Problem: Adult Inpatient Plan of Care  Goal: Plan of Care Review  Outcome: Progressing  Flowsheets (Taken 4/17/2025 0407)  Progress: no change     Problem: Adult Inpatient Plan of Care  Goal: Absence of Hospital-Acquired Illness or Injury  Intervention: Identify and Manage Fall Risk  Recent Flowsheet Documentation  Taken 4/17/2025 0405 by Chelo Payne RN  Safety Promotion/Fall Prevention:   activity supervised   assistive device/personal items within reach   safety round/check completed  Taken 4/17/2025 0200 by Chelo Payne RN  Safety Promotion/Fall Prevention:   activity supervised   assistive device/personal items within reach   safety round/check completed  Taken 4/17/2025 0000 by Chelo Payne RN  Safety Promotion/Fall Prevention:   activity supervised   assistive device/personal items within reach   safety round/check completed  Taken 4/16/2025 2200 by Chelo Payne RN  Safety Promotion/Fall Prevention:   activity supervised   assistive device/personal items within reach   safety round/check completed  Taken 4/16/2025 2000 by Chelo Payne RN  Safety Promotion/Fall Prevention:   activity supervised   assistive device/personal items within reach   safety round/check completed     Problem: Adult Inpatient Plan of Care  Goal: Absence of Hospital-Acquired Illness or Injury  Intervention: Prevent Skin Injury  Recent Flowsheet Documentation  Taken 4/17/2025 0405 by Chelo Payne RN  Skin Protection: incontinence pads utilized  Taken 4/17/2025 0200 by Chelo Payne RN  Skin Protection: incontinence pads utilized  Taken 4/17/2025 0000 by Chelo Payne RN  Skin Protection: incontinence pads utilized  Taken 4/16/2025 2200 by Chelo Payne RN  Skin Protection: incontinence pads utilized  Taken 4/16/2025 2000 by Chelo Payne RN  Skin Protection: incontinence pads utilized     Problem: Heart Failure  Goal: Optimal Functional Ability  Intervention: Optimize Functional Ability  Recent Flowsheet Documentation  Taken  4/17/2025 0405 by Chelo Payne RN  Activity Management: activity encouraged  Taken 4/17/2025 0200 by Chelo Payne RN  Activity Management: activity encouraged  Taken 4/17/2025 0000 by Chelo Payne RN  Activity Management: activity encouraged  Taken 4/16/2025 2200 by Chelo Payne RN  Activity Management: activity encouraged  Taken 4/16/2025 2000 by Chelo Payne RN  Activity Management: activity encouraged     Problem: Heart Failure  Goal: Effective Oxygenation and Ventilation  Intervention: Promote Airway Secretion Clearance  Recent Flowsheet Documentation  Taken 4/17/2025 0405 by Chelo Payne RN  Activity Management: activity encouraged  Taken 4/17/2025 0200 by Chelo Payne RN  Activity Management: activity encouraged  Taken 4/17/2025 0000 by Chelo Payne RN  Activity Management: activity encouraged  Taken 4/16/2025 2200 by Chelo Payne RN  Activity Management: activity encouraged  Taken 4/16/2025 2000 by Chelo Payne RN  Activity Management: activity encouraged     Problem: Heart Failure  Goal: Effective Oxygenation and Ventilation  Intervention: Promote Airway Secretion Clearance  Recent Flowsheet Documentation  Taken 4/17/2025 0405 by Chelo Payne RN  Activity Management: activity encouraged  Taken 4/17/2025 0200 by Chelo Payne RN  Activity Management: activity encouraged  Taken 4/17/2025 0000 by Chelo Payne RN  Activity Management: activity encouraged  Taken 4/16/2025 2200 by Chelo Payne RN  Activity Management: activity encouraged  Taken 4/16/2025 2000 by Chelo Payne RN  Activity Management: activity encouraged     Problem: Heart Failure  Goal: Effective Oxygenation and Ventilation  Intervention: Optimize Oxygenation and Ventilation  Recent Flowsheet Documentation  Taken 4/17/2025 0405 by Chelo Payne RN  Head of Bed (HOB) Positioning: HOB elevated  Taken 4/17/2025 0200 by Chelo Payne RN  Head of Bed (HOB) Positioning: HOB elevated  Taken 4/17/2025 0000 by Chelo Payne RN  Head of Bed  ([facility]) Positioning: HOB elevated  Taken 4/16/2025 2200 by Chelo Payne RN  Head of Bed ([facility]) Positioning: HOB elevated  Taken 4/16/2025 2000 by Chelo Payne RN  Head of Bed ([facility]) Positioning: HOB elevated     Problem: Skin Injury Risk Increased  Goal: Skin Health and Integrity  Intervention: Optimize Skin Protection  Recent Flowsheet Documentation  Taken 4/17/2025 0405 by Chelo Payne RN  Activity Management: activity encouraged  Pressure Reduction Techniques: frequent weight shift encouraged  Head of Bed ([facility]) Positioning: [facility] elevated  Pressure Reduction Devices: pressure-redistributing mattress utilized  Skin Protection: incontinence pads utilized  Taken 4/17/2025 0200 by Chelo Payne RN  Activity Management: activity encouraged  Pressure Reduction Techniques: frequent weight shift encouraged  Head of Bed ([facility]) Positioning: [facility] elevated  Pressure Reduction Devices: pressure-redistributing mattress utilized  Skin Protection: incontinence pads utilized  Taken 4/17/2025 0000 by Chelo Payne RN  Activity Management: activity encouraged  Pressure Reduction Techniques: frequent weight shift encouraged  Head of Bed ([facility]) Positioning: [facility] elevated  Pressure Reduction Devices: pressure-redistributing mattress utilized  Skin Protection: incontinence pads utilized  Taken 4/16/2025 2200 by Chelo Payne RN  Activity Management: activity encouraged  Pressure Reduction Techniques: frequent weight shift encouraged  Head of Bed ([facility]) Positioning: [facility] elevated  Pressure Reduction Devices: pressure-redistributing mattress utilized  Skin Protection: incontinence pads utilized  Taken 4/16/2025 2000 by Chelo Panye RN  Activity Management: activity encouraged  Pressure Reduction Techniques: frequent weight shift encouraged  Head of Bed ([facility]) Positioning: [facility] elevated  Pressure Reduction Devices:   pressure-redistributing mattress utilized   positioning supports utilized  Skin Protection: incontinence pads utilized     Problem:  Fall Injury Risk  Goal: Absence of Fall and Fall-Related Injury  Intervention: Promote Injury-Free Environment  Recent Flowsheet Documentation  Taken 4/17/2025 0405 by Chelo Payne RN  Safety Promotion/Fall Prevention:   activity supervised   assistive device/personal items within reach   safety round/check completed  Taken 4/17/2025 0200 by Chelo Payne RN  Safety Promotion/Fall Prevention:   activity supervised   assistive device/personal items within reach   safety round/check completed  Taken 4/17/2025 0000 by Chelo Payne RN  Safety Promotion/Fall Prevention:   activity supervised   assistive device/personal items within reach   safety round/check completed  Taken 4/16/2025 2200 by Chelo Payne RN  Safety Promotion/Fall Prevention:   activity supervised   assistive device/personal items within reach   safety round/check completed  Taken 4/16/2025 2000 by Chelo Payne RN  Safety Promotion/Fall Prevention:   activity supervised   assistive device/personal items within reach   safety round/check completed   Goal Outcome Evaluation:           Progress: no change

## 2025-04-18 LAB
ALBUMIN SERPL-MCNC: 3.6 G/DL (ref 3.5–5.2)
ALP SERPL-CCNC: 207 U/L (ref 39–117)
ALT SERPL W P-5'-P-CCNC: 22 U/L (ref 1–41)
ANION GAP SERPL CALCULATED.3IONS-SCNC: 13 MMOL/L (ref 5–15)
AST SERPL-CCNC: 32 U/L (ref 1–40)
BILIRUB CONJ SERPL-MCNC: 1.1 MG/DL (ref 0–0.3)
BILIRUB INDIRECT SERPL-MCNC: 0.6 MG/DL
BILIRUB SERPL-MCNC: 1.7 MG/DL (ref 0–1.2)
BUN SERPL-MCNC: 64 MG/DL (ref 8–23)
BUN/CREAT SERPL: 29 (ref 7–25)
CALCIUM SPEC-SCNC: 8.6 MG/DL (ref 8.6–10.5)
CHLORIDE SERPL-SCNC: 92 MMOL/L (ref 98–107)
CO2 SERPL-SCNC: 32 MMOL/L (ref 22–29)
CREAT SERPL-MCNC: 2.21 MG/DL (ref 0.76–1.27)
DEPRECATED RDW RBC AUTO: 64.7 FL (ref 37–54)
EGFRCR SERPLBLD CKD-EPI 2021: 28 ML/MIN/1.73
ERYTHROCYTE [DISTWIDTH] IN BLOOD BY AUTOMATED COUNT: 18.1 % (ref 12.3–15.4)
GLUCOSE BLDC GLUCOMTR-MCNC: 115 MG/DL (ref 70–130)
GLUCOSE BLDC GLUCOMTR-MCNC: 163 MG/DL (ref 70–130)
GLUCOSE BLDC GLUCOMTR-MCNC: 222 MG/DL (ref 70–130)
GLUCOSE BLDC GLUCOMTR-MCNC: 270 MG/DL (ref 70–130)
GLUCOSE SERPL-MCNC: 113 MG/DL (ref 65–99)
HCT VFR BLD AUTO: 32.1 % (ref 37.5–51)
HGB BLD-MCNC: 9.9 G/DL (ref 13–17.7)
MAGNESIUM SERPL-MCNC: 1.9 MG/DL (ref 1.6–2.4)
MCH RBC QN AUTO: 30.9 PG (ref 26.6–33)
MCHC RBC AUTO-ENTMCNC: 30.8 G/DL (ref 31.5–35.7)
MCV RBC AUTO: 100.3 FL (ref 79–97)
PLATELET # BLD AUTO: 205 10*3/MM3 (ref 140–450)
PMV BLD AUTO: 10.7 FL (ref 6–12)
POTASSIUM SERPL-SCNC: 4 MMOL/L (ref 3.5–5.2)
PROT SERPL-MCNC: 5.8 G/DL (ref 6–8.5)
RBC # BLD AUTO: 3.2 10*6/MM3 (ref 4.14–5.8)
SODIUM SERPL-SCNC: 137 MMOL/L (ref 136–145)
WBC NRBC COR # BLD AUTO: 4.48 10*3/MM3 (ref 3.4–10.8)

## 2025-04-18 PROCEDURE — 80076 HEPATIC FUNCTION PANEL: CPT

## 2025-04-18 PROCEDURE — 25010000002 FUROSEMIDE PER 20 MG: Performed by: INTERNAL MEDICINE

## 2025-04-18 PROCEDURE — 25010000002 MAGNESIUM SULFATE 2 GM/50ML SOLUTION: Performed by: INTERNAL MEDICINE

## 2025-04-18 PROCEDURE — 82948 REAGENT STRIP/BLOOD GLUCOSE: CPT

## 2025-04-18 PROCEDURE — 80048 BASIC METABOLIC PNL TOTAL CA: CPT

## 2025-04-18 PROCEDURE — 85027 COMPLETE CBC AUTOMATED: CPT

## 2025-04-18 PROCEDURE — 99232 SBSQ HOSP IP/OBS MODERATE 35: CPT | Performed by: INTERNAL MEDICINE

## 2025-04-18 PROCEDURE — 83735 ASSAY OF MAGNESIUM: CPT

## 2025-04-18 PROCEDURE — 99232 SBSQ HOSP IP/OBS MODERATE 35: CPT | Performed by: NURSE PRACTITIONER

## 2025-04-18 PROCEDURE — 63710000001 INSULIN LISPRO (HUMAN) PER 5 UNITS: Performed by: NURSE PRACTITIONER

## 2025-04-18 RX ORDER — MAGNESIUM SULFATE HEPTAHYDRATE 40 MG/ML
2 INJECTION, SOLUTION INTRAVENOUS ONCE
Status: COMPLETED | OUTPATIENT
Start: 2025-04-18 | End: 2025-04-18

## 2025-04-18 RX ORDER — TRAZODONE HYDROCHLORIDE 50 MG/1
50 TABLET ORAL NIGHTLY
Status: DISCONTINUED | OUTPATIENT
Start: 2025-04-18 | End: 2025-04-19 | Stop reason: HOSPADM

## 2025-04-18 RX ORDER — BUMETANIDE 2 MG/1
2 TABLET ORAL DAILY
Qty: 90 TABLET | Refills: 0 | Status: SHIPPED | OUTPATIENT
Start: 2025-04-18

## 2025-04-18 RX ORDER — CARVEDILOL 6.25 MG/1
6.25 TABLET ORAL 2 TIMES DAILY WITH MEALS
Status: DISCONTINUED | OUTPATIENT
Start: 2025-04-18 | End: 2025-04-19 | Stop reason: HOSPADM

## 2025-04-18 RX ADMIN — APIXABAN 2.5 MG: 2.5 TABLET, FILM COATED ORAL at 08:31

## 2025-04-18 RX ADMIN — TRAZODONE HYDROCHLORIDE 50 MG: 50 TABLET ORAL at 20:15

## 2025-04-18 RX ADMIN — CARVEDILOL 6.25 MG: 6.25 TABLET, FILM COATED ORAL at 17:02

## 2025-04-18 RX ADMIN — APIXABAN 2.5 MG: 2.5 TABLET, FILM COATED ORAL at 20:15

## 2025-04-18 RX ADMIN — INSULIN LISPRO 2 UNITS: 100 INJECTION, SOLUTION INTRAVENOUS; SUBCUTANEOUS at 11:58

## 2025-04-18 RX ADMIN — INSULIN LISPRO 3 UNITS: 100 INJECTION, SOLUTION INTRAVENOUS; SUBCUTANEOUS at 17:01

## 2025-04-18 RX ADMIN — ATORVASTATIN CALCIUM 40 MG: 40 TABLET, FILM COATED ORAL at 20:15

## 2025-04-18 RX ADMIN — ACETAMINOPHEN 650 MG: 325 TABLET, FILM COATED ORAL at 04:27

## 2025-04-18 RX ADMIN — MAGNESIUM SULFATE HEPTAHYDRATE 2 G: 40 INJECTION, SOLUTION INTRAVENOUS at 08:30

## 2025-04-18 RX ADMIN — MAGNESIUM OXIDE TAB 400 MG (241.3 MG ELEMENTAL MG) 400 MG: 400 (241.3 MG) TAB at 08:30

## 2025-04-18 RX ADMIN — FUROSEMIDE 20 MG: 10 INJECTION, SOLUTION INTRAMUSCULAR; INTRAVENOUS at 08:30

## 2025-04-18 RX ADMIN — INSULIN LISPRO 3 UNITS: 100 INJECTION, SOLUTION INTRAVENOUS; SUBCUTANEOUS at 21:00

## 2025-04-18 RX ADMIN — PANTOPRAZOLE SODIUM 40 MG: 40 TABLET, DELAYED RELEASE ORAL at 05:52

## 2025-04-18 RX ADMIN — EMPAGLIFLOZIN 10 MG: 10 TABLET, FILM COATED ORAL at 08:30

## 2025-04-18 RX ADMIN — ASPIRIN 81 MG: 81 TABLET, COATED ORAL at 08:30

## 2025-04-18 RX ADMIN — POTASSIUM CHLORIDE 20 MEQ: 750 CAPSULE, EXTENDED RELEASE ORAL at 08:30

## 2025-04-18 RX ADMIN — CARVEDILOL 3.12 MG: 3.12 TABLET, FILM COATED ORAL at 08:30

## 2025-04-18 NOTE — PROGRESS NOTES
"[facility]  IP Progress Note    PROBLEM LIST:  CARDIAC   Coronary Artery Disease:   Inferior STEMI, 8/5/2024: Medical management     Myocardium:   Echo, 3/1/2023: LVEF 55%   Echo, 8/5/2024: LVEF 35 to 40%     Valvular:   Mild MR, mild TR     Electrical:   Chronic A-fib     Pericardium:   Normal     VASCULAR   Venous:   Chronic venous stasis     CARDIAC RISK FACTORS   Hypertension   Diabetes   Dyslipidemia   2/2024   HDL 43 LDL 92   Obstructive Sleep Apnea     NON-CARDIAC   GERD   Gout   Arthritis     SURGERIES   Prostate cancer s/p radiation   Bladder cancer s/p surgery   Hernia repair       HOSPITAL COURSE:  Patient was admitted with heart failure with exacerbation of systolic function.      CHIEF COMPLAINTS:  Heart failure with reduced ejection fraction      Subjective   Patient feeling much better.      Objective     Blood pressure 148/83, pulse 53, temperature 98.1 °F (36.7 °C), temperature source Oral, resp. rate 18, height 185.4 cm (72.99\"), weight 102 kg (225 lb), SpO2 92%.     Intake/Output Summary (Last 24 hours) at 4/18/2025 1531  Last data filed at 4/18/2025 1036  Gross per 24 hour   Intake --   Output 2100 ml   Net -2100 ml       PHYSICAL EXAM:  Constitutional:       General: Not in acute distress.     Appearance: Healthy appearance. Not in distress.     Neck:     JVP:Not elevated     Carotid artery: Normal    Pulmonary:      Effort: Pulmonary effort is normal.      Breath sounds: Normal breath sounds. No wheezing. No rhonchi. No rales.     Cardiovascular:      Normal rate. Regular rhythm. Normal S1. Normal S2.      Murmurs: There is mild systolic murmur.      No gallop. No click. No rub.     Abdominal:      General: Bowel sounds are normal.      Palpations: Abdomen is soft.      Tenderness: There is no abdominal tenderness.    Extremities:     Pulses:Normal radial and pedal pulses     Edema:no edema    MEDICATIONS:    apixaban, 2.5 mg, Oral, " "BID  aspirin, 81 mg, Oral, Daily  atorvastatin, 40 mg, Oral, Nightly  carvedilol, 3.125 mg, Oral, BID With Meals  empagliflozin, 10 mg, Oral, Daily  furosemide, 20 mg, Intravenous, Daily  insulin lispro, 2-7 Units, Subcutaneous, 4x Daily AC & at Bedtime  magnesium oxide, 400 mg, Oral, Daily  pantoprazole, 40 mg, Oral, Q AM  pharmacy consult - MTM, , Not Applicable, Daily  potassium chloride, 20 mEq, Oral, Daily  traZODone, 50 mg, Oral, Nightly          Results from last 7 days   Lab Units 04/18/25  0604   WBC 10*3/mm3 4.48   HEMOGLOBIN g/dL 9.9*   HEMATOCRIT % 32.1*   PLATELETS 10*3/mm3 205     Results from last 7 days   Lab Units 04/18/25  0604   SODIUM mmol/L 137   POTASSIUM mmol/L 4.0   CHLORIDE mmol/L 92*   CO2 mmol/L 32.0*   BUN mg/dL 64*   CREATININE mg/dL 2.21*   CALCIUM mg/dL 8.6   BILIRUBIN mg/dL 1.7*   ALK PHOS U/L 207*   ALT (SGPT) U/L 22   AST (SGOT) U/L 32   GLUCOSE mg/dL 113*         Lab Results   Component Value Date    TROPONINT 123 (C) 04/11/2025                 No results found for: \"IRON\", \"FERRITIN\", \"LABIRON\", \"TIBC\"   Hemoglobin A1C   Date Value Ref Range Status   04/02/2025 6.40 (H) 4.80 - 5.60 % Final     Magnesium   Date Value Ref Range Status   04/18/2025 1.9 1.6 - 2.4 mg/dL Final        RESULT REVIEW:    I reviewed the patient's new clinical results.    Tele: Atrial Fib with Controlled Rate      ASSESSMENT:     Heart failure with reduced ejection fraction.  Chronic atrial fibrillation  Chronic kidney disease    PLAN:     I believe in his case we will try to keep him on the high dose of Coreg considering his heart rate is still in 60s.  We will increase carvedilol to 6.25 twice daily.  Slowly induction of spironolactone  Diuretics per nephrology.  He should be going to [place] in the morning.  "

## 2025-04-18 NOTE — PROGRESS NOTES
"   LOS: 5 days    Patient Care Team:  Luis Aguilar DO as PCP - General (Family Medicine)  Iain Bernstein MD as Consulting Physician (Cardiology)  Isabel Stover MD as Cardiologist (Cardiology)    Chief Complaint:  SOA    Subjective     Interval History:     Uptrend in serum cr noted. Lasix dose reduced yesterday. Stale LE edema.        Review of Systems:   No CP or SOA , fever, chills, rigors, rash, N/V, Constipation.       Objective     Vital Sign Min/Max for last 24 hours  Temp  Min: 97.9 °F (36.6 °C)  Max: 98.1 °F (36.7 °C)   BP  Min: 123/75  Max: 148/83   Pulse  Min: 50  Max: 65   Resp  Min: 18  Max: 20   SpO2  Min: 78 %  Max: 95 %   No data recorded   No data recorded     Flowsheet Rows      Flowsheet Row First Filed Value   Admission Height 185.4 cm (73\") Documented at 04/11/2025 1421   Admission Weight 104 kg (229 lb 4.5 oz) Documented at 04/11/2025 1421            I/O this shift:  In: -   Out: 750 [Urine:750]  I/O last 3 completed shifts:  In: -   Out: 3150 [Urine:3150]    Physical Exam:    Gen: Alert, NAD   HENT: NC, AT, EOMI   NECK: Supple, no JVD, Trachea midline   LUNGS: Decrease air entry bilaterally, non labored respirtation   CVS: S1/S2 audible, RRR, no murmur   Abd: Soft, NT, ND, BS+   Ext: + pedal edema, no cyanosis   CNS: Alert, No focal deficit noted grossly  Psy: Cooperative  Skin: Warm, dry and intact      WBC WBC   Date Value Ref Range Status   04/18/2025 4.48 3.40 - 10.80 10*3/mm3 Final   04/17/2025 4.16 3.40 - 10.80 10*3/mm3 Final   04/16/2025 4.94 3.40 - 10.80 10*3/mm3 Final      HGB Hemoglobin   Date Value Ref Range Status   04/18/2025 9.9 (L) 13.0 - 17.7 g/dL Final   04/17/2025 9.8 (L) 13.0 - 17.7 g/dL Final   04/16/2025 9.6 (L) 13.0 - 17.7 g/dL Final      HCT Hematocrit   Date Value Ref Range Status   04/18/2025 32.1 (L) 37.5 - 51.0 % Final   04/17/2025 32.4 (L) 37.5 - 51.0 % Final   04/16/2025 32.0 (L) 37.5 - 51.0 % Final      Platlets No results found for: \"LABPLAT\"   MCV " "MCV   Date Value Ref Range Status   04/18/2025 100.3 (H) 79.0 - 97.0 fL Final   04/17/2025 101.9 (H) 79.0 - 97.0 fL Final   04/16/2025 101.3 (H) 79.0 - 97.0 fL Final          Sodium Sodium   Date Value Ref Range Status   04/18/2025 137 136 - 145 mmol/L Final   04/17/2025 138 136 - 145 mmol/L Final   04/16/2025 136 136 - 145 mmol/L Final      Potassium Potassium   Date Value Ref Range Status   04/18/2025 4.0 3.5 - 5.2 mmol/L Final   04/17/2025 4.4 3.5 - 5.2 mmol/L Final     Comment:     Slight hemolysis detected by analyzer. Result may be falsely elevated.   04/17/2025 3.9 3.5 - 5.2 mmol/L Final   04/16/2025 4.2 3.5 - 5.2 mmol/L Final     Comment:     Slight hemolysis detected by analyzer. Result may be falsely elevated.   04/16/2025 3.8 3.5 - 5.2 mmol/L Final     Comment:     Slight hemolysis detected by analyzer. Result may be falsely elevated.   04/16/2025 3.8 3.5 - 5.2 mmol/L Final   04/15/2025 3.8 3.5 - 5.2 mmol/L Final      Chloride Chloride   Date Value Ref Range Status   04/18/2025 92 (L) 98 - 107 mmol/L Final   04/17/2025 94 (L) 98 - 107 mmol/L Final   04/16/2025 95 (L) 98 - 107 mmol/L Final      CO2 CO2   Date Value Ref Range Status   04/18/2025 32.0 (H) 22.0 - 29.0 mmol/L Final   04/17/2025 33.0 (H) 22.0 - 29.0 mmol/L Final   04/16/2025 30.0 (H) 22.0 - 29.0 mmol/L Final      BUN BUN   Date Value Ref Range Status   04/18/2025 64 (H) 8 - 23 mg/dL Final   04/17/2025 57 (H) 8 - 23 mg/dL Final   04/16/2025 55 (H) 8 - 23 mg/dL Final      Creatinine Creatinine   Date Value Ref Range Status   04/18/2025 2.21 (H) 0.76 - 1.27 mg/dL Final   04/17/2025 2.02 (H) 0.76 - 1.27 mg/dL Final   04/16/2025 1.65 (H) 0.76 - 1.27 mg/dL Final      Calcium Calcium   Date Value Ref Range Status   04/18/2025 8.6 8.6 - 10.5 mg/dL Final   04/17/2025 8.7 8.6 - 10.5 mg/dL Final   04/16/2025 8.8 8.6 - 10.5 mg/dL Final      PO4 No results found for: \"CAPO4\"   Albumin Albumin   Date Value Ref Range Status   04/18/2025 3.6 3.5 - 5.2 g/dL " Final   04/17/2025 3.6 3.5 - 5.2 g/dL Final   04/16/2025 3.4 (L) 3.5 - 5.2 g/dL Final      Magnesium Magnesium   Date Value Ref Range Status   04/18/2025 1.9 1.6 - 2.4 mg/dL Final   04/17/2025 2.0 1.6 - 2.4 mg/dL Final   04/16/2025 2.0 1.6 - 2.4 mg/dL Final      Uric Acid Uric Acid   Date Value Ref Range Status   04/15/2025 10.6 (H) 3.4 - 7.0 mg/dL Final     Comment:     Falsely depressed results may occur on samples drawn from patients receiving N-Acetylcysteine (NAC) or Metamizole.           Results Review:     I reviewed the patient's new clinical results.    apixaban, 2.5 mg, Oral, BID  aspirin, 81 mg, Oral, Daily  atorvastatin, 40 mg, Oral, Nightly  carvedilol, 6.25 mg, Oral, BID With Meals  empagliflozin, 10 mg, Oral, Daily  furosemide, 20 mg, Intravenous, Daily  insulin lispro, 2-7 Units, Subcutaneous, 4x Daily AC & at Bedtime  magnesium oxide, 400 mg, Oral, Daily  pantoprazole, 40 mg, Oral, Q AM  pharmacy consult - MTM, , Not Applicable, Daily  potassium chloride, 20 mEq, Oral, Daily  traZODone, 50 mg, Oral, Nightly      Results from last 7 days   Lab Units 04/18/25  0604 04/17/25  1308 04/17/25  0605 04/16/25  2133 04/16/25  1355 04/16/25  0601 04/15/25  2112 04/15/25  0736   SODIUM mmol/L 137  --  138  --   --  136  --  137   POTASSIUM mmol/L 4.0 4.4 3.9 4.2   < > 3.8   < > 3.5   CHLORIDE mmol/L 92*  --  94*  --   --  95*  --  93*   CO2 mmol/L 32.0*  --  33.0*  --   --  30.0*  --  32.0*   BUN mg/dL 64*  --  57*  --   --  55*  --  52*   CREATININE mg/dL 2.21*  --  2.02*  --   --  1.65*  --  1.88*   CALCIUM mg/dL 8.6  --  8.7  --   --  8.8  --  9.0   ALBUMIN g/dL 3.6  --  3.6  --   --  3.4*  --  3.4*   WBC 10*3/mm3 4.48  --  4.16  --   --  4.94  --  4.57   HEMOGLOBIN g/dL 9.9*  --  9.8*  --   --  9.6*  --  9.6*   PLATELETS 10*3/mm3 205  --  195  --   --  210  --  206   GLUCOSE mg/dL 113*  --  88  --   --  96  --  112*    < > = values in this interval not displayed.       Intake/Output Summary (Last 24 hours)  at 4/18/2025 1717  Last data filed at 4/18/2025 1036  Gross per 24 hour   Intake --   Output 2100 ml   Net -2100 ml     Findings:  RIGHT kidney measures 10.7 x 5.4 x 5.1 cm.  Kidney echogenicity, size, and vascularity appear within normal limits. There is no solid kidney mass.  No echogenic shadowing stone.  No hydronephrosis.     LEFT kidney measures 10.9 x 5.6 x 5.7 cm. Kidney echogenicity, size, and vascularity appear within normal limits. There is no solid kidney mass.  No echogenic shadowing stone.  No hydronephrosis.     Limited visualization of the urinary bladder is unremarkable.        IMPRESSION:  Impression:  No significant sonographic abnormality of the kidneys.         Medication Review: yes    Assessment & Plan       Acute exacerbation of CHF (congestive heart failure)    JOLENE on CPAP    Stage 3b chronic kidney disease    Thigh hematoma, left, initial encounter      1- SHANT on CKD stage - Scr 1.8 stable with diuretics.   2- CKD stage III - baseline Scr 1.4-1.6 range - UA + protein. Micralb/creatinine 6.7- Likely DN plus hx of NSAID use   3- Edema   4- Systolic CHF - EF 36-40%   5- CAD   6- Moderate to Severe TR and AR  7- HTN   8- DM type II: On ARB.      Plan:  - Continue with SGLT 2 inhibitor   - Continue with diuretics. Reduce the dose to 20 mg daily  - Unable to add MRA at present due to uptrend in serum cr.  - Monitor I/O   - Renal diet   - Adjust meds per renal function   - No emergent need of RRT   - Monitor H/H and transfuse for Hgb less than 7.0   - ok for discharge if renal function is stable or better tomorrow. Plan for outpatient f/u in 2-4 weeks after discharge      Lele Paige MD  04/18/25  17:17 EDT

## 2025-04-18 NOTE — PROGRESS NOTES
[facility] Medicine Services  PROGRESS NOTE    Patient Name: Iain Dee  : 1935  MRN: [id number]    Date of Admission: 2025  Primary Care Physician: Luis Aguilar DO    Subjective   Subjective     CC:  Follow-up CHF exacerbation    HPI:  Patient was seen resting in bed no acute distress.  No acute events overnight per nursing.  Patient reports difficulty sleeping last night.  Discussed trialing trazodone and patient is agreeable.  Continues to plan to discharge to the [facility] for skilled nursing.  No new complaints at this time.      Objective   Objective     Vital Signs:   Temp:  [97.6 °F (36.4 °C)-98 °F (36.7 °C)] 98 °F (36.7 °C)  Heart Rate:  [50-71] 61  Resp:  [18-20] 18  BP: (117-128)/(75-86) 128/84     Physical Exam:  Constitutional: No acute distress, awake, alert, chronically ill-appearing, frail  HENT: NCAT, mucous membranes moist  Respiratory: Coarse, grossly clear, faint expiratory wheeze, respiratory effort normal   Cardiovascular: RRR, no murmurs, cap refill brisk   Gastrointestinal: Positive bowel sounds, soft, nontender, nondistended  Musculoskeletal: BLE compression wraps in place  Psychiatric: Flat affect, cooperative  Neurologic: Oriented x 3, moves all extremities, speech clear  Skin: warm, dry, no visible rash       Results Reviewed:  LAB RESULTS:      Lab 25  0604 25  0605 25  0601 04/15/25  2112 04/15/25  0736 25  0605 25  1124 25  0530 25  1608 25  1447   WBC 4.48 4.16 4.94  --  4.57 5.25 6.09   < >  --  5.36   HEMOGLOBIN 9.9* 9.8* 9.6*  --  9.6* 9.7* 10.2*   < >  --  9.7*   HEMATOCRIT 32.1* 32.4* 32.0*  --  31.1* 31.8* 34.5*   < >  --  31.8*   PLATELETS 205 195 210  --  206 231 222   < >  --  233   NEUTROS ABS  --   --   --   --   --   --  5.12  --   --  4.30   IMMATURE GRANS (ABS)  --   --   --   --   --   --  0.02  --   --  0.01   LYMPHS ABS  --   --   --   --   --   --  0.35*  --    --  0.40*   MONOS ABS  --   --   --   --   --   --  0.45  --   --  0.53   EOS ABS  --   --   --   --   --   --  0.11  --   --  0.07   .3* 101.9* 101.3*  --  98.7* 100.0* 104.2*   < >  --  99.7*   LDH  --   --   --  258*  --   --   --   --   --   --    HSTROP T  --   --   --   --   --   --   --   --  123* 136*    < > = values in this interval not displayed.         Lab 04/18/25  0604 04/17/25  1308 04/17/25  0605 04/16/25 2133 04/16/25  1355 04/16/25  0601 04/15/25  2112 04/15/25  0736 04/14/25  0605   SODIUM 137  --  138  --   --  136  --  137 139   POTASSIUM 4.0 4.4 3.9 4.2 3.8 3.8   < > 3.5 4.0   CHLORIDE 92*  --  94*  --   --  95*  --  93* 97*   CO2 32.0*  --  33.0*  --   --  30.0*  --  32.0* 29.0   ANION GAP 13.0  --  11.0  --   --  11.0  --  12.0 13.0   BUN 64*  --  57*  --   --  55*  --  52* 51*   CREATININE 2.21*  --  2.02*  --   --  1.65*  --  1.88* 1.86*   EGFR 28.0*  --  31.1*  --   --  39.7*  --  33.9* 34.4*   GLUCOSE 113*  --  88  --   --  96  --  112* 129*   CALCIUM 8.6  --  8.7  --   --  8.8  --  9.0 8.9   MAGNESIUM 1.9  --  2.0  --   --  2.0  --  2.1 2.0    < > = values in this interval not displayed.         Lab 04/18/25  0604 04/17/25  0605 04/16/25  0601 04/15/25  0736 04/13/25  1124 04/12/25  0734 04/11/25  1608 04/11/25  1447   TOTAL PROTEIN 5.8* 5.7* 5.8* 5.9* 6.0 5.4*  --  5.8*   ALBUMIN 3.6 3.6 3.4* 3.4* 3.2* 3.4*  --  3.4*   GLOBULIN  --   --   --   --   --  2.0  --  2.4   ALT (SGPT) 22 20 21 19 18 19  --  21   AST (SGOT) 32 33 31 29 22 21  --  28   BILIRUBIN 1.7* 1.9* 1.9* 2.1* 2.1* 1.6*  --  1.5*   INDIRECT BILIRUBIN 0.6 0.8 0.7 0.9 0.9  --   --   --    BILIRUBIN DIRECT 1.1* 1.1* 1.2* 1.2* 1.2*  --    < >  --    ALK PHOS 207* 171* 172* 154* 163* 155*  --  161*    < > = values in this interval not displayed.         Lab 04/11/25  1608 04/11/25  1447   PROBNP  --  20,657.0*   HSTROP T 123* 136*                 Brief Urine Lab Results  (Last result in the past 365 days)        Color    Clarity   Blood   Leuk Est   Nitrite   Protein   CREAT   Urine HCG        04/15/25 1241             26.2         04/15/25 1241 Yellow   Clear   Trace   Negative   Negative   100 mg/dL (2+)                   Microbiology Results Abnormal       None            No radiology results from the last 24 hrs    Results for orders placed during the hospital encounter of 04/02/25    Adult Transthoracic Echo Complete W/ Cont if Necessary Per Protocol    Interpretation Summary    Left ventricular ejection fraction appears to be 36 - 40%.    Left ventricular wall thickness is consistent with mild concentric hypertrophy.    Left ventricular diastolic function is consistent with (grade II w/high LAP) pseudonormalization.    The right ventricular cavity is moderate to severely dilated.    The left atrial cavity is moderate to severely dilated.    Left atrial volume is severely increased.    The right atrial cavity is severely  dilated.    There is mild calcification of the aortic valve.    Moderate to severe aortic valve regurgitation is present.    Moderate to severe tricuspid valve regurgitation is present.    Estimated right ventricular systolic pressure from tricuspid regurgitation is markedly elevated (>55 mmHg).      Current medications:  Scheduled Meds:apixaban, 2.5 mg, Oral, BID  aspirin, 81 mg, Oral, Daily  atorvastatin, 40 mg, Oral, Nightly  carvedilol, 3.125 mg, Oral, BID With Meals  empagliflozin, 10 mg, Oral, Daily  furosemide, 20 mg, Intravenous, Daily  insulin lispro, 2-7 Units, Subcutaneous, 4x Daily AC & at Bedtime  magnesium oxide, 400 mg, Oral, Daily  magnesium sulfate, 2 g, Intravenous, Once  pantoprazole, 40 mg, Oral, Q AM  pharmacy consult - MTM, , Not Applicable, Daily  potassium chloride, 20 mEq, Oral, Daily  traZODone, 50 mg, Oral, Nightly      Continuous Infusions:   PRN Meds:.  acetaminophen **OR** acetaminophen **OR** acetaminophen    senna-docusate sodium **AND** polyethylene glycol **AND** bisacodyl  **AND** bisacodyl    dextrose    dextrose    glucagon (human recombinant)    Halls Cough Drops    Magnesium Cardiology Dose Replacement - Follow Nurse / BPA Driven Protocol    nitroglycerin    Potassium Replacement - Follow Nurse / BPA Driven Protocol    sodium chloride    sodium chloride    Assessment & Plan   Assessment & Plan     Active Hospital Problems    Diagnosis  POA    **Acute exacerbation of CHF (congestive heart failure) [I50.9]  Yes    Thigh hematoma, left, initial encounter [S70.12XA]  Yes    Stage 3b chronic kidney disease [N18.32]  Yes    JOLENE on CPAP [G47.33]  Yes      Resolved Hospital Problems   No resolved problems to display.        Brief Hospital Course to date:  Iain Dee is a 89 y.o. male with PMH significant for HFrEF, valvular heart disease,  A-fib chronically anticoagulated on Eliquis, CAD, DM2, prostate/bladder cancer (~12 years ago) with recent admission to [facility] 4/2-4 5/20/2025 for left thigh hematoma who presented  to [facility] ED on 4/11/2025 with worsening edema despite using his home Bumex and worsening mobility. Of note, patient was recommended to go to rehab after his last admission but declined.      This patient's problems and plans were partially entered by my partner and updated as appropriate by me 04/18/25.     CHF exacerbation  HFrEF  Valvular heart disease  H/o STEMI  Follows with Dr. Stover  Echo 4/4/2025 with EF 36-40%, LV diastolic dysfunction, moderate to severe AVR, moderate to severe tricuspid valve regurg, markedly elevated RVSP >55  CXR shows cardiomegaly with pulmonary vascular congestion and left basilar atelectasis versus infiltrate.  Left basilar atelectasis also seen on CXR for 2/20/2025.  Patient on room air, add OPEP for atelectasis on CXR, O2 prn  Cardiology following, GDMT with Coreg and Jardiance resumed   Nephrology following, managing diuresis   Strict I's & O's  Daily weights  PT/OT recommending rehab. Awaiting placement      Elevated troponin  Troponins  136>>123, increased from prior admission, likely type II demand ischemia   pt denies chest pain  No significant ST elevation on EKG     Elevated bilirubin improving  Bili 1.5, was wnl prior admission  Liver ultrasound negative for congestion, likely cholelithiasis evident as well as some ascites  No portal hypertension hepatic duplex  Monitor labs      A-fib  rate controlled, continue Coreg  continue Eliquis at renal dosing 2.5 mg BID     Left leg hematoma  Stable from prior, appears to be healing   H&H stable  Monitor labs      Left leg wound  From blister likely secondary to edema/venous stasis  PT wound care consult, may benefit from compression wraps/unna boots  Monitor labs      BLE edema  PT wound care consult as above     CKD 3b  Cr slightly elevated 1.84 (appears to fluctuate 1.30-1.80 per chart review) on admission  GDMT resumed by cardiology  Nephrology following  Creatinine up to 2.21 this AM   Monitor w/ diuresis   AM labs     Insomnia  -Trial trazodone at HS      CAD  HTN  HLD  Continue ASA, previously on Plavix that cardiology dc'd last admission d/t hematoma  Continue Coreg  Continue statin, LFTs wnl     DM2  A1c 6.4  On metformin BID at home, holding   ACHS, low-dose SSI. Glucoses stable.  Monitor      GERD  Daily PPI     JOLENE  CPAP     Expected Discharge Location and Transportation: TBD. Awaiting rehab   Expected Discharge   Expected Discharge Date: 4/19/2025; Expected Discharge Time:       VTE Prophylaxis:  Pharmacologic & mechanical VTE prophylaxis orders are present.    AM-PAC 6 Clicks Score (PT): 11 (04/17/25 2025)    CODE STATUS:   Code Status and Medical Interventions: CPR (Attempt to Resuscitate); Full Support   Ordered at: 04/11/25 2982     Code Status (Patient has no pulse and is not breathing):    CPR (Attempt to Resuscitate)     Medical Interventions (Patient has pulse or is breathing):    Full Support     Level Of Support Discussed With:    Patient       Harlan ELFEGO Allen, COLIN  04/18/25

## 2025-04-18 NOTE — CASE MANAGEMENT/SOCIAL WORK
Case Management Discharge Note      Final Note: Met with Mr. Dee and his wife at the bedside to finalize discharge plan. He is anticipating discharging to [facility] tomorrow. Facility pharmacy is [facility], and I have updated it in [facility]. Mr. Dee has [facility] stretcher transport at 1000 tomorrow morning. The facility has asked for specific wound care instructions to be included in discharge summary, and they also asked that we send some wound care supplies with him until they have time to get things ordered. Nursing, please call report to [phone]. No other discharge needs were identified or verbalized today.         Selected Continued Care - Admitted Since 4/11/2025       Destination Coordination complete.      Service Provider Services Address Phone Fax Patient Preferred    [facility] [address] [place] [address] [place] , [facility] [id number] [phone] --              Durable Medical Equipment    No services have been selected for the patient.                Dialysis/Infusion    No services have been selected for the patient.                Home Medical Care    No services have been selected for the patient.                Therapy    No services have been selected for the patient.                Community Resources    No services have been selected for the patient.                Community & DME    No services have been selected for the patient.                    Selected Continued Care - Prior Encounters Includes continued care and service providers with selected services from prior encounters from 1/11/2025 to 4/18/2025      Discharged on 4/5/2025 Admission date: 4/2/2025 - Discharge disposition: Home-Health Care Svc      Home Medical Care       Service Provider Services Address Phone Fax Patient Preferred    [facility] [address] [place] [address], [address], [facility] [address] [phone] --                           Transportation Services  Ambulance: [facility]    Final Discharge Disposition Code: 03 - skilled nursing facility (SNF)

## 2025-04-19 ENCOUNTER — APPOINTMENT (OUTPATIENT)
Dept: OTHER | Facility: HOSPITAL | Age: 89
DRG: 291 | End: 2025-04-19
Payer: MEDICARE

## 2025-04-19 VITALS
RESPIRATION RATE: 18 BRPM | HEIGHT: 73 IN | SYSTOLIC BLOOD PRESSURE: 151 MMHG | OXYGEN SATURATION: 92 % | DIASTOLIC BLOOD PRESSURE: 96 MMHG | BODY MASS INDEX: 29.82 KG/M2 | WEIGHT: 225 LBS | HEART RATE: 69 BPM | TEMPERATURE: 97.8 F

## 2025-04-19 PROBLEM — I50.9 ACUTE EXACERBATION OF CHF (CONGESTIVE HEART FAILURE): Status: RESOLVED | Noted: 2025-04-11 | Resolved: 2025-04-19

## 2025-04-19 LAB
ALBUMIN SERPL-MCNC: 3.7 G/DL (ref 3.5–5.2)
ANION GAP SERPL CALCULATED.3IONS-SCNC: 12 MMOL/L (ref 5–15)
BUN SERPL-MCNC: 59 MG/DL (ref 8–23)
BUN/CREAT SERPL: 30.7 (ref 7–25)
CALCIUM SPEC-SCNC: 8.7 MG/DL (ref 8.6–10.5)
CHLORIDE SERPL-SCNC: 93 MMOL/L (ref 98–107)
CO2 SERPL-SCNC: 32 MMOL/L (ref 22–29)
CREAT SERPL-MCNC: 1.92 MG/DL (ref 0.76–1.27)
EGFRCR SERPLBLD CKD-EPI 2021: 33.1 ML/MIN/1.73
GLUCOSE BLDC GLUCOMTR-MCNC: 123 MG/DL (ref 70–130)
GLUCOSE BLDC GLUCOMTR-MCNC: 235 MG/DL (ref 70–130)
GLUCOSE SERPL-MCNC: 170 MG/DL (ref 65–99)
PHOSPHATE SERPL-MCNC: 3.1 MG/DL (ref 2.5–4.5)
POTASSIUM SERPL-SCNC: 3.8 MMOL/L (ref 3.5–5.2)
SODIUM SERPL-SCNC: 137 MMOL/L (ref 136–145)

## 2025-04-19 PROCEDURE — 99239 HOSP IP/OBS DSCHRG MGMT >30: CPT | Performed by: INTERNAL MEDICINE

## 2025-04-19 PROCEDURE — 82948 REAGENT STRIP/BLOOD GLUCOSE: CPT

## 2025-04-19 PROCEDURE — 63710000001 INSULIN LISPRO (HUMAN) PER 5 UNITS: Performed by: NURSE PRACTITIONER

## 2025-04-19 PROCEDURE — 25010000002 FUROSEMIDE PER 20 MG: Performed by: INTERNAL MEDICINE

## 2025-04-19 PROCEDURE — 80069 RENAL FUNCTION PANEL: CPT | Performed by: INTERNAL MEDICINE

## 2025-04-19 RX ORDER — CARVEDILOL 6.25 MG/1
6.25 TABLET ORAL 2 TIMES DAILY WITH MEALS
Start: 2025-04-19

## 2025-04-19 RX ORDER — POTASSIUM CHLORIDE 1500 MG/1
20 TABLET, EXTENDED RELEASE ORAL ONCE
Status: COMPLETED | OUTPATIENT
Start: 2025-04-19 | End: 2025-04-19

## 2025-04-19 RX ADMIN — APIXABAN 2.5 MG: 2.5 TABLET, FILM COATED ORAL at 08:42

## 2025-04-19 RX ADMIN — INSULIN LISPRO 2 UNITS: 100 INJECTION, SOLUTION INTRAVENOUS; SUBCUTANEOUS at 11:45

## 2025-04-19 RX ADMIN — FUROSEMIDE 20 MG: 10 INJECTION, SOLUTION INTRAMUSCULAR; INTRAVENOUS at 08:44

## 2025-04-19 RX ADMIN — PANTOPRAZOLE SODIUM 40 MG: 40 TABLET, DELAYED RELEASE ORAL at 05:30

## 2025-04-19 RX ADMIN — EMPAGLIFLOZIN 10 MG: 10 TABLET, FILM COATED ORAL at 08:42

## 2025-04-19 RX ADMIN — CARVEDILOL 6.25 MG: 6.25 TABLET, FILM COATED ORAL at 08:44

## 2025-04-19 RX ADMIN — POTASSIUM CHLORIDE 20 MEQ: 750 CAPSULE, EXTENDED RELEASE ORAL at 08:42

## 2025-04-19 RX ADMIN — MAGNESIUM OXIDE TAB 400 MG (241.3 MG ELEMENTAL MG) 400 MG: 400 (241.3 MG) TAB at 08:42

## 2025-04-19 RX ADMIN — ASPIRIN 81 MG: 81 TABLET, COATED ORAL at 08:42

## 2025-04-19 RX ADMIN — POTASSIUM CHLORIDE 20 MEQ: 1500 TABLET, EXTENDED RELEASE ORAL at 10:19

## 2025-04-19 NOTE — DISCHARGE SUMMARY
[facility] Medicine Services  DISCHARGE SUMMARY    Patient Name: Iain Dee  : 1935  MRN: [id number]    Date of Admission: 2025  2:19 PM  Date of Discharge:  2025  Primary Care Physician: Luis Aguilar DO    Consults       Date and Time Order Name Status Description    4/15/2025 12:14 PM Inpatient Nephrology Consult Completed     2025  6:19 PM Inpatient Cardiology Consult Completed     4/3/2025  7:32 AM Inpatient Cardiology Consult Completed             Hospital Course     Presenting Problem: CHFrEF c/b renal disease    Active Hospital Problems    Diagnosis  POA    Thigh hematoma, left, initial encounter [S70.12XA]  Yes    Stage 3b chronic kidney disease [N18.32]  Yes    JOLENE on CPAP [G47.33]  Yes      Resolved Hospital Problems    Diagnosis Date Resolved POA    **Acute exacerbation of CHF (congestive heart failure) [I50.9] 2025 Yes          Hospital Course:  Iain Dee is a 89 y.o. male w CHFrEF (EF 36-40%), A-fib on eliquis, CKD who presented on  for worsening LE edema and debility.    Found to have recurrent CHFrEF exacerbation w mod-sev TR c/b renal disease. Cardiology and nephrology both saw patient and followed - d/w nephrology on day of discharge and recommend resuming home bumex 2 mg daily with close follow-up. Carvedilol also adjusted up per cardiology while here. At discharge, 92% on room air. CPAP recommended at night but patient has been using nasal cannula instead. Weight ~225 lb at time of discharge.    Component of venostasis to LE edema as well - PT wound for wraps. Recommend this to continue.     Debility w rehab recommended last time but declined. Discharged to [place] this admission    Discharge Follow Up Recommendations for outpatient labs/diagnostics:   F/u CHF clinic 1 week   F/u NAL 4 weeks   PCP 1 week from rehab [facility]    Day of Discharge     HPI:   Feels well. Breathing much more comfortable. Some URI congestion  We  discuss his [place] dispo and needs for follow-up      Vital Signs:   Temp:  [97.8 °F (36.6 °C)-98.9 °F (37.2 °C)] 97.8 °F (36.6 °C)  Heart Rate:  [53-69] 69  Resp:  [16-18] 18  BP: (133-151)/(78-97) 151/96  Flow (L/min) (Oxygen Therapy):  [2] 2      Physical Exam:  Constitutional: No acute distress, awake, alert older male sitting up in bed  Respiratory: Clear to auscultation bilaterally, respiratory effort normal on 2 ltiers (weaned w room air sat 92%)  Cardiovascular: RRR, no murmurs, rubs, or gallops. Bilateral LE's in wraps  Gastrointestinal: Soft, nontender, nondistended  Musculoskeletal: Muscle tone within normal limits, no joint effusions appreciated  Psychiatric: Appropriate affect, cooperative  Neurologic: Alert and oriented,  speech clear  Skin: No rashes    Pertinent  and/or Most Recent Results     LAB RESULTS:      Lab 04/18/25  0604 04/17/25  0605 04/16/25  0601 04/15/25  2112 04/15/25  0736 04/14/25  0605 04/13/25  1124   WBC 4.48 4.16 4.94  --  4.57 5.25 6.09   HEMOGLOBIN 9.9* 9.8* 9.6*  --  9.6* 9.7* 10.2*   HEMATOCRIT 32.1* 32.4* 32.0*  --  31.1* 31.8* 34.5*   PLATELETS 205 195 210  --  206 231 222   NEUTROS ABS  --   --   --   --   --   --  5.12   IMMATURE GRANS (ABS)  --   --   --   --   --   --  0.02   LYMPHS ABS  --   --   --   --   --   --  0.35*   MONOS ABS  --   --   --   --   --   --  0.45   EOS ABS  --   --   --   --   --   --  0.11   .3* 101.9* 101.3*  --  98.7* 100.0* 104.2*   LDH  --   --   --  258*  --   --   --          Lab 04/19/25  0834 04/18/25  0604 04/17/25  1308 04/17/25  0605 04/16/25  2133 04/16/25  1355 04/16/25  0601 04/15/25  2112 04/15/25  0736 04/14/25  0605   SODIUM 137 137  --  138  --   --  136  --  137 139   POTASSIUM 3.8 4.0 4.4 3.9 4.2   < > 3.8   < > 3.5 4.0   CHLORIDE 93* 92*  --  94*  --   --  95*  --  93* 97*   CO2 32.0* 32.0*  --  33.0*  --   --  30.0*  --  32.0* 29.0   ANION GAP 12.0 13.0  --  11.0  --   --  11.0  --  12.0 13.0   BUN 59* 64*  --  57*   --   --  55*  --  52* 51*   CREATININE 1.92* 2.21*  --  2.02*  --   --  1.65*  --  1.88* 1.86*   EGFR 33.1* 28.0*  --  31.1*  --   --  39.7*  --  33.9* 34.4*   GLUCOSE 170* 113*  --  88  --   --  96  --  112* 129*   CALCIUM 8.7 8.6  --  8.7  --   --  8.8  --  9.0 8.9   MAGNESIUM  --  1.9  --  2.0  --   --  2.0  --  2.1 2.0   PHOSPHORUS 3.1  --   --   --   --   --   --   --   --   --     < > = values in this interval not displayed.         Lab 04/19/25  0834 04/18/25  0604 04/17/25  0605 04/16/25  0601 04/15/25  0736 04/13/25  1124   TOTAL PROTEIN  --  5.8* 5.7* 5.8* 5.9* 6.0   ALBUMIN 3.7 3.6 3.6 3.4* 3.4* 3.2*   ALT (SGPT)  --  22 20 21 19 18   AST (SGOT)  --  32 33 31 29 22   BILIRUBIN  --  1.7* 1.9* 1.9* 2.1* 2.1*   INDIRECT BILIRUBIN  --  0.6 0.8 0.7 0.9 0.9   BILIRUBIN DIRECT  --  1.1* 1.1* 1.2* 1.2* 1.2*   ALK PHOS  --  207* 171* 172* 154* 163*                     Brief Urine Lab Results  (Last result in the past 365 days)        Color   Clarity   Blood   Leuk Est   Nitrite   Protein   CREAT   Urine HCG        04/15/25 1241             26.2         04/15/25 1241 Yellow   Clear   Trace   Negative   Negative   100 mg/dL (2+)                 Microbiology Results (last 10 days)       ** No results found for the last 240 hours. **            US Renal Bilateral  Result Date: 4/15/2025  US RENAL BILATERAL Date of Exam: 4/15/2025 12:50 PM EDT Indication: steffanie look for hydronephrosis. Comparison: No comparisons available. Technique: Grayscale and color Doppler ultrasound evaluation of the kidneys and urinary bladder was performed. Findings: RIGHT kidney measures 10.7 x 5.4 x 5.1 cm.  Kidney echogenicity, size, and vascularity appear within normal limits. There is no solid kidney mass.  No echogenic shadowing stone.  No hydronephrosis. LEFT kidney measures 10.9 x 5.6 x 5.7 cm. Kidney echogenicity, size, and vascularity appear within normal limits. There is no solid kidney mass.  No echogenic shadowing stone.  No  hydronephrosis. Limited visualization of the urinary bladder is unremarkable.     Impression: No significant sonographic abnormality of the kidneys. Electronically Signed: Susu Reeves MD  4/15/2025 1:29 PM EDT  Workstation ID: [id number]    Duplex Portal Hepatic Complete CAR  Result Date: 4/13/2025  DUPLEX PORTAL HEPATIC COMPLETE CAR Date of Exam: 4/13/2025 9:10 AM EDT Indication: portal hypertension. Comparison: Right upper quadrant ultrasound 4/13/2025 PROCEDURE: Two-dimensional grayscale, power Doppler, spectral Doppler and color Doppler ultrasound examination of the right upper quadrant with Liver Doppler is performed. FINDINGS: Limited exam due to patient habitus, bowel gas, patient positioning and patient cooperation. Partially imaged right pleural effusion. HEPATIC ARTERIES: Not assessed due to exam limitations PORTAL SYSTEM: There is normal hepatopetal flow with normal velocity in the portal veins. The peak systolic velocity is 0.27 m/sec. HEPATIC VEINS AND INFERIOR VENA CAVA: Expected hepatofugal hepatic vein flow.     Significantly limited exam. Hepatopetal portal flow without visualized thrombus. Based on clinical suspicion for an acute pathology, multiphase CT or MRI could be considered. Electronically Signed: Steve Cuellar MD  4/13/2025 2:14 PM EDT  Workstation ID: [id number]    US Gallbladder  Result Date: 4/13/2025  US GALLBLADDER Date of Exam: 4/13/2025 10:37 AM EDT Indication: elevated bilirubin. Comparison: No comparisons available. Technique: Grayscale and color Doppler ultrasound evaluation of the right upper quadrant was performed. Findings: LIVER: The liver has increased parenchymal echogenicity. No focal lesions identified. Normal flow is present within the hepatic vasculature. GALLBLADDER: The gallbladder is contracted containing calcified stones likely contributing to wall thickening measuring 0.4 cm. No pericholecystic fluid and no evidence for positive sonographic Lazo sign. The common  bile duct measures 0.5 cm. PANCREAS:  Visualized portions are unremarkable. RIGHT KIDNEY:  Normal in size with no focal lesions. No evidence of nephrolithiasis or hydronephrosis. Right upper quadrant ascites is noted. There is a right pleural effusion.     Impression: 1. Hepatic steatosis. 2. Cholelithiasis. 3. Right upper quadrant ascites and pleural effusion. Electronically Signed: Lindy Wells MD  4/13/2025 11:03 AM EDT  Workstation ID: [id number]    XR Chest 1 View  Result Date: 4/11/2025  XR CHEST 1 VW Date of Exam: 4/11/2025 3:01 PM EDT Indication: sob Comparison: Chest x-ray dated 4/2/2025 Findings: Cardiac silhouette is enlarged. Pulmonary vasculature is indistinct. Mild left basilar atelectasis versus infiltrate. No pleural effusion or pneumothorax is seen. No acute osseous lesion is seen     Impression: 1.Cardiomegaly with pulmonary vascular congestion, suggestive of mild CHF. 2.Left basilar atelectasis versus infiltrate. Electronically Signed: Osmar Motta MD  4/11/2025 3:19 PM EDT  Workstation ID: [id number]      Results for orders placed during the hospital encounter of 04/11/25    Duplex Portal Hepatic Complete CAR    Interpretation Summary  DUPLEX PORTAL HEPATIC COMPLETE CAR    Date of Exam: 4/13/2025 9:10 AM EDT    Indication: portal hypertension.    Comparison: Right upper quadrant ultrasound 4/13/2025      PROCEDURE:  Two-dimensional grayscale, power Doppler, spectral Doppler and color Doppler ultrasound examination of the right upper quadrant with Liver Doppler is performed.    FINDINGS:  Limited exam due to patient habitus, bowel gas, patient positioning and patient cooperation.    Partially imaged right pleural effusion.    HEPATIC ARTERIES:  Not assessed due to exam limitations    PORTAL SYSTEM: There is normal hepatopetal flow with normal velocity in the portal veins. The peak systolic velocity is 0.27 m/sec.    HEPATIC VEINS AND INFERIOR VENA CAVA: Expected hepatofugal hepatic vein  flow.    Impression  Significantly limited exam. Hepatopetal portal flow without visualized thrombus.    Based on clinical suspicion for an acute pathology, multiphase CT or MRI could be considered.      Electronically Signed: Steve Cuellar MD  4/13/2025 2:14 PM EDT  Workstation ID: [id number]      Results for orders placed during the hospital encounter of 04/11/25    Duplex Portal Hepatic Complete CAR    Interpretation Summary  DUPLEX PORTAL HEPATIC COMPLETE CAR    Date of Exam: 4/13/2025 9:10 AM EDT    Indication: portal hypertension.    Comparison: Right upper quadrant ultrasound 4/13/2025      PROCEDURE:  Two-dimensional grayscale, power Doppler, spectral Doppler and color Doppler ultrasound examination of the right upper quadrant with Liver Doppler is performed.    FINDINGS:  Limited exam due to patient habitus, bowel gas, patient positioning and patient cooperation.    Partially imaged right pleural effusion.    HEPATIC ARTERIES:  Not assessed due to exam limitations    PORTAL SYSTEM: There is normal hepatopetal flow with normal velocity in the portal veins. The peak systolic velocity is 0.27 m/sec.    HEPATIC VEINS AND INFERIOR VENA CAVA: Expected hepatofugal hepatic vein flow.    Impression  Significantly limited exam. Hepatopetal portal flow without visualized thrombus.    Based on clinical suspicion for an acute pathology, multiphase CT or MRI could be considered.      Electronically Signed: Steve Cuellar MD  4/13/2025 2:14 PM EDT  Workstation ID: [id number]      Results for orders placed during the hospital encounter of 04/02/25    Adult Transthoracic Echo Complete W/ Cont if Necessary Per Protocol    Interpretation Summary    Left ventricular ejection fraction appears to be 36 - 40%.    Left ventricular wall thickness is consistent with mild concentric hypertrophy.    Left ventricular diastolic function is consistent with (grade II w/high LAP) pseudonormalization.    The right ventricular cavity is  moderate to severely dilated.    The left atrial cavity is moderate to severely dilated.    Left atrial volume is severely increased.    The right atrial cavity is severely  dilated.    There is mild calcification of the aortic valve.    Moderate to severe aortic valve regurgitation is present.    Moderate to severe tricuspid valve regurgitation is present.    Estimated right ventricular systolic pressure from tricuspid regurgitation is markedly elevated (>55 mmHg).      Plan for Follow-up of Pending Labs/Results:     Discharge Details        Discharge Medications        Changes to Medications        Instructions Start Date   carvedilol 6.25 MG tablet  Commonly known as: COREG  What changed:   medication strength  how much to take   6.25 mg, Oral, 2 Times Daily With Meals             Continue These Medications        Instructions Start Date   apixaban 2.5 MG tablet tablet  Commonly known as: ELIQUIS   2.5 mg, Oral, 2 Times Daily      aspirin 81 MG EC tablet   81 mg, Oral, Daily      atorvastatin 40 MG tablet  Commonly known as: LIPITOR   40 mg, Oral, Nightly      bumetanide 2 MG tablet  Commonly known as: BUMEX   2 mg, Oral, Daily      famotidine 20 MG tablet  Commonly known as: PEPCID   20 mg, Oral, 2 Times Daily PRN      Jardiance 10 MG tablet tablet  Generic drug: empagliflozin   10 mg, Oral, Daily      magnesium oxide 400 MG tablet  Commonly known as: MAG-OX   400 mg, Oral, Daily      metFORMIN 500 MG tablet  Commonly known as: GLUCOPHAGE   500 mg, Oral, 2 Times Daily With Meals      nitroglycerin 0.2 MG/HR patch  Commonly known as: NITRODUR   1 patch, Transdermal, See Admin Instructions, Apply patch daily, remove at night for at least 10 hours               Allergies   Allergen Reactions    Colchicine Other (See Comments)     Unable to walk          Discharge Disposition:  Rehab Facility or Unit (DC - External)    Diet:  Hospital:  Diet Order   Procedures    Diet: Cardiac, Diabetic; Healthy Heart (2-3 Na+);  Consistent Carbohydrate; Fluid Consistency: Thin (IDDSI 0)            Activity:      Restrictions or Other Recommendations:         CODE STATUS:    Code Status and Medical Interventions: CPR (Attempt to Resuscitate); Full Support   Ordered at: 04/11/25 1819     Code Status (Patient has no pulse and is not breathing):    CPR (Attempt to Resuscitate)     Medical Interventions (Patient has pulse or is breathing):    Full Support     Level Of Support Discussed With:    Patient       Future Appointments   Date Time Provider Department Center   4/19/2025 10:00 AM [address] [facility] None   5/12/2025 11:00 AM Bhavya De Souza APRN [facility]   6/3/2025  9:30 AM Luis Aguilar DO [facility]   12/18/2025  9:45 AM Isabel Stover MD [facility]       Additional Instructions for the Follow-ups that You Need to Schedule       Discharge Follow-up with PCP   As directed       Currently Documented PCP:    Luis Aguilar DO    PCP Phone Number:    [phone]     Follow Up Details: 1 week from rehab discharge        Discharge Follow-up with Specified Provider: CHF clinic 1 week w BMP   As directed      To: CHF clinic 1 week w [facility]        Discharge Follow-up with Specified Provider: NAL 4 weeks   As directed      To: NAL 4 weeks                      Ligia Guevara MD  04/19/25      Time Spent on Discharge:  I spent 40 minutes on this discharge activity which included: face-to-face encounter with the patient, reviewing the data in the system, coordination of the care with the nursing staff as well as consultants, documentation, and entering orders.

## 2025-04-19 NOTE — PLAN OF CARE
Goal Outcome Evaluation:                 IV removed. D/C education complete. Report Called to Sarah PITT at the [facility]

## 2025-04-19 NOTE — DISCHARGE INSTRUCTIONS
Wound care to Left lower extremity and left second toe- cleanse with normal saline and pat dry with gauze, apply xeroform, and mepilex every 3 days with compression wrap changes.

## 2025-04-19 NOTE — DISCHARGE PLACEMENT REQUEST
"Iain Calero \"Guero\" (89 y.o. Male)     SHERWIN RANDOLPH RN  [phone]      NURSE WILL UPDATE YOU ON TRANSPORT TIME WHEN SHE CALLS REPORT      Date of Birth   1935    Social Security Number       Address   [address] [facility] [address]    Home Phone   [phone]    MRN   [id number]       [place]   None    Marital Status                               Admission Date   4/11/2025    Admission Type   Emergency    Admitting Provider   Ligia Guevara MD    Attending Provider   Ligia Guevara MD    Department, Room/Bed   [facility] 4G, S454/1       Discharge Date       Discharge Disposition   Rehab Facility or Unit (DC - External)    Discharge Destination                                 Attending Provider: Ligia Guevara MD    Allergies: Colchicine    Isolation: None   Infection: None   Code Status: CPR    Ht: 185.4 cm (72.99\")   Wt: 102 kg (225 lb)    Admission Cmt: None   Principal Problem: Acute exacerbation of CHF (congestive heart failure) [I50.9]                   Active Insurance as of 4/11/2025       Primary Coverage       Payor Plan Insurance Group Employer/Plan Group    MEDICARE MEDICARE A & B        Payor Plan Address Payor Plan Phone Number Payor Plan Fax Number Effective Dates    [address] [phone]  11/1/2001 - None Entered    [facility] [id number]         Subscriber Name Subscriber Birth Date Member ID       IAIN CALERO 1935 [id number]               Secondary Coverage       Payor Plan Insurance Group Employer/Plan Group    [employer] [place] [employer] [place]        Payor Plan Address Payor Plan Phone Number Payor Plan Fax Number Effective Dates    [address] [place] [address] [phone]  3/1/2009 - None Entered    [place] [address]         Subscriber Name Subscriber Birth Date Member ID       IAIN CALERO 1935 [id number]                     Emergency Contacts        (Rel.) Home Phone Work Phone Mobile Phone    Maya Germain (Spouse) " [phone] -- [phone]                 Discharge Summary        Ligia Guevara MD at 25 0951              [facility] Medicine Services  DISCHARGE SUMMARY    Patient Name: Iain Dee  : 1935  MRN: [id number]    Date of Admission: 2025  2:19 PM  Date of Discharge:  2025  Primary Care Physician: Luis Aguilar DO    Consults       Date and Time Order Name Status Description    4/15/2025 12:14 PM Inpatient Nephrology Consult Completed     2025  6:19 PM Inpatient Cardiology Consult Completed     4/3/2025  7:32 AM Inpatient Cardiology Consult Completed             Hospital Course     Presenting Problem: CHFrEF c/b renal disease    Active Hospital Problems    Diagnosis  POA    Thigh hematoma, left, initial encounter [S70.12XA]  Yes    Stage 3b chronic kidney disease [N18.32]  Yes    JOLENE on CPAP [G47.33]  Yes      Resolved Hospital Problems    Diagnosis Date Resolved POA    **Acute exacerbation of CHF (congestive heart failure) [I50.9] 2025 Yes          Hospital Course:  Iain Dee is a 89 y.o. male w CHFrEF (EF 36-40%), A-fib on eliquis, CKD who presented on  for worsening LE edema and debility.    Found to have recurrent CHFrEF exacerbation w mod-sev TR c/b renal disease. Cardiology and nephrology both saw patient and followed - d/w nephrology on day of discharge and recommend resuming home bumex 2 mg daily with close follow-up. Carvedilol also adjusted up per cardiology while here. At discharge, 92% on room air. CPAP recommended at night but patient has been using nasal cannula instead. Weight ~225 lb at time of discharge.    Component of venostasis to LE edema as well - PT wound for wraps. Recommend this to continue.     Debility w rehab recommended last time but declined. Discharged to [place] this admission    Discharge Follow Up Recommendations for outpatient labs/diagnostics:   F/u CHF clinic 1 week   F/u NAL 4 weeks   PCP 1 week from  rehab [facility]    Day of Discharge     HPI:   Feels well. Breathing much more comfortable. Some URI congestion  We discuss his Nash dispo and needs for follow-up      Vital Signs:   Temp:  [97.8 °F (36.6 °C)-98.9 °F (37.2 °C)] 97.8 °F (36.6 °C)  Heart Rate:  [53-69] 69  Resp:  [16-18] 18  BP: (133-151)/(78-97) 151/96  Flow (L/min) (Oxygen Therapy):  [2] 2      Physical Exam:  Constitutional: No acute distress, awake, alert older male sitting up in bed  Respiratory: Clear to auscultation bilaterally, respiratory effort normal on 2 ltiers (weaned w room air sat 92%)  Cardiovascular: RRR, no murmurs, rubs, or gallops. Bilateral LE's in wraps  Gastrointestinal: Soft, nontender, nondistended  Musculoskeletal: Muscle tone within normal limits, no joint effusions appreciated  Psychiatric: Appropriate affect, cooperative  Neurologic: Alert and oriented,  speech clear  Skin: No rashes    Pertinent  and/or Most Recent Results     LAB RESULTS:      Lab 04/18/25  0604 04/17/25  0605 04/16/25  0601 04/15/25  2112 04/15/25  0736 04/14/25  0605 04/13/25  1124   WBC 4.48 4.16 4.94  --  4.57 5.25 6.09   HEMOGLOBIN 9.9* 9.8* 9.6*  --  9.6* 9.7* 10.2*   HEMATOCRIT 32.1* 32.4* 32.0*  --  31.1* 31.8* 34.5*   PLATELETS 205 195 210  --  206 231 222   NEUTROS ABS  --   --   --   --   --   --  5.12   IMMATURE GRANS (ABS)  --   --   --   --   --   --  0.02   LYMPHS ABS  --   --   --   --   --   --  0.35*   MONOS ABS  --   --   --   --   --   --  0.45   EOS ABS  --   --   --   --   --   --  0.11   .3* 101.9* 101.3*  --  98.7* 100.0* 104.2*   LDH  --   --   --  258*  --   --   --          Lab 04/19/25  0834 04/18/25  0604 04/17/25  1308 04/17/25  0605 04/16/25  2133 04/16/25  1355 04/16/25  0601 04/15/25  2112 04/15/25  0736 04/14/25  0605   SODIUM 137 137  --  138  --   --  136  --  137 139   POTASSIUM 3.8 4.0 4.4 3.9 4.2   < > 3.8   < > 3.5 4.0   CHLORIDE 93* 92*  --  94*  --   --  95*  --  93* 97*   CO2 32.0* 32.0*  --  33.0*  --    --  30.0*  --  32.0* 29.0   ANION GAP 12.0 13.0  --  11.0  --   --  11.0  --  12.0 13.0   BUN 59* 64*  --  57*  --   --  55*  --  52* 51*   CREATININE 1.92* 2.21*  --  2.02*  --   --  1.65*  --  1.88* 1.86*   EGFR 33.1* 28.0*  --  31.1*  --   --  39.7*  --  33.9* 34.4*   GLUCOSE 170* 113*  --  88  --   --  96  --  112* 129*   CALCIUM 8.7 8.6  --  8.7  --   --  8.8  --  9.0 8.9   MAGNESIUM  --  1.9  --  2.0  --   --  2.0  --  2.1 2.0   PHOSPHORUS 3.1  --   --   --   --   --   --   --   --   --     < > = values in this interval not displayed.         Lab 04/19/25  0834 04/18/25  0604 04/17/25  0605 04/16/25  0601 04/15/25  0736 04/13/25  1124   TOTAL PROTEIN  --  5.8* 5.7* 5.8* 5.9* 6.0   ALBUMIN 3.7 3.6 3.6 3.4* 3.4* 3.2*   ALT (SGPT)  --  22 20 21 19 18   AST (SGOT)  --  32 33 31 29 22   BILIRUBIN  --  1.7* 1.9* 1.9* 2.1* 2.1*   INDIRECT BILIRUBIN  --  0.6 0.8 0.7 0.9 0.9   BILIRUBIN DIRECT  --  1.1* 1.1* 1.2* 1.2* 1.2*   ALK PHOS  --  207* 171* 172* 154* 163*                     Brief Urine Lab Results  (Last result in the past 365 days)        Color   Clarity   Blood   Leuk Est   Nitrite   Protein   CREAT   Urine HCG        04/15/25 1241             26.2         04/15/25 1241 Yellow   Clear   Trace   Negative   Negative   100 mg/dL (2+)                 Microbiology Results (last 10 days)       ** No results found for the last 240 hours. **            US Renal Bilateral  Result Date: 4/15/2025  US RENAL BILATERAL Date of Exam: 4/15/2025 12:50 PM EDT Indication: steffanie look for hydronephrosis. Comparison: No comparisons available. Technique: Grayscale and color Doppler ultrasound evaluation of the kidneys and urinary bladder was performed. Findings: RIGHT kidney measures 10.7 x 5.4 x 5.1 cm.  Kidney echogenicity, size, and vascularity appear within normal limits. There is no solid kidney mass.  No echogenic shadowing stone.  No hydronephrosis. LEFT kidney measures 10.9 x 5.6 x 5.7 cm. Kidney echogenicity, size, and  vascularity appear within normal limits. There is no solid kidney mass.  No echogenic shadowing stone.  No hydronephrosis. Limited visualization of the urinary bladder is unremarkable.     Impression: No significant sonographic abnormality of the kidneys. Electronically Signed: Susu Reeves MD  4/15/2025 1:29 PM EDT  Workstation ID: [id number]    Duplex Portal Hepatic Complete CAR  Result Date: 4/13/2025  DUPLEX PORTAL HEPATIC COMPLETE CAR Date of Exam: 4/13/2025 9:10 AM EDT Indication: portal hypertension. Comparison: Right upper quadrant ultrasound 4/13/2025 PROCEDURE: Two-dimensional grayscale, power Doppler, spectral Doppler and color Doppler ultrasound examination of the right upper quadrant with Liver Doppler is performed. FINDINGS: Limited exam due to patient habitus, bowel gas, patient positioning and patient cooperation. Partially imaged right pleural effusion. HEPATIC ARTERIES: Not assessed due to exam limitations PORTAL SYSTEM: There is normal hepatopetal flow with normal velocity in the portal veins. The peak systolic velocity is 0.27 m/sec. HEPATIC VEINS AND INFERIOR VENA CAVA: Expected hepatofugal hepatic vein flow.     Significantly limited exam. Hepatopetal portal flow without visualized thrombus. Based on clinical suspicion for an acute pathology, multiphase CT or MRI could be considered. Electronically Signed: Steve Cuellar MD  4/13/2025 2:14 PM EDT  Workstation ID: [id number]    US Gallbladder  Result Date: 4/13/2025  US GALLBLADDER Date of Exam: 4/13/2025 10:37 AM EDT Indication: elevated bilirubin. Comparison: No comparisons available. Technique: Grayscale and color Doppler ultrasound evaluation of the right upper quadrant was performed. Findings: LIVER: The liver has increased parenchymal echogenicity. No focal lesions identified. Normal flow is present within the hepatic vasculature. GALLBLADDER: The gallbladder is contracted containing calcified stones likely contributing to wall thickening  measuring 0.4 cm. No pericholecystic fluid and no evidence for positive sonographic Lazo sign. The common bile duct measures 0.5 cm. PANCREAS:  Visualized portions are unremarkable. RIGHT KIDNEY:  Normal in size with no focal lesions. No evidence of nephrolithiasis or hydronephrosis. Right upper quadrant ascites is noted. There is a right pleural effusion.     Impression: 1. Hepatic steatosis. 2. Cholelithiasis. 3. Right upper quadrant ascites and pleural effusion. Electronically Signed: Lindy Wells MD  4/13/2025 11:03 AM EDT  Workstation ID: [id number]    XR Chest 1 View  Result Date: 4/11/2025  XR CHEST 1 VW Date of Exam: 4/11/2025 3:01 PM EDT Indication: sob Comparison: Chest x-ray dated 4/2/2025 Findings: Cardiac silhouette is enlarged. Pulmonary vasculature is indistinct. Mild left basilar atelectasis versus infiltrate. No pleural effusion or pneumothorax is seen. No acute osseous lesion is seen     Impression: 1.Cardiomegaly with pulmonary vascular congestion, suggestive of mild CHF. 2.Left basilar atelectasis versus infiltrate. Electronically Signed: Osmar Motta MD  4/11/2025 3:19 PM EDT  Workstation ID: [id number]      Results for orders placed during the hospital encounter of 04/11/25    Duplex Portal Hepatic Complete CAR    Interpretation Summary  DUPLEX PORTAL HEPATIC COMPLETE CAR    Date of Exam: 4/13/2025 9:10 AM EDT    Indication: portal hypertension.    Comparison: Right upper quadrant ultrasound 4/13/2025      PROCEDURE:  Two-dimensional grayscale, power Doppler, spectral Doppler and color Doppler ultrasound examination of the right upper quadrant with Liver Doppler is performed.    FINDINGS:  Limited exam due to patient habitus, bowel gas, patient positioning and patient cooperation.    Partially imaged right pleural effusion.    HEPATIC ARTERIES:  Not assessed due to exam limitations    PORTAL SYSTEM: There is normal hepatopetal flow with normal velocity in the portal veins. The peak  systolic velocity is 0.27 m/sec.    HEPATIC VEINS AND INFERIOR VENA CAVA: Expected hepatofugal hepatic vein flow.    Impression  Significantly limited exam. Hepatopetal portal flow without visualized thrombus.    Based on clinical suspicion for an acute pathology, multiphase CT or MRI could be considered.      Electronically Signed: Steve Cuellar MD  4/13/2025 2:14 PM EDT  Workstation ID: [id number]      Results for orders placed during the hospital encounter of 04/11/25    Duplex Portal Hepatic Complete CAR    Interpretation Summary  DUPLEX PORTAL HEPATIC COMPLETE CAR    Date of Exam: 4/13/2025 9:10 AM EDT    Indication: portal hypertension.    Comparison: Right upper quadrant ultrasound 4/13/2025      PROCEDURE:  Two-dimensional grayscale, power Doppler, spectral Doppler and color Doppler ultrasound examination of the right upper quadrant with Liver Doppler is performed.    FINDINGS:  Limited exam due to patient habitus, bowel gas, patient positioning and patient cooperation.    Partially imaged right pleural effusion.    HEPATIC ARTERIES:  Not assessed due to exam limitations    PORTAL SYSTEM: There is normal hepatopetal flow with normal velocity in the portal veins. The peak systolic velocity is 0.27 m/sec.    HEPATIC VEINS AND INFERIOR VENA CAVA: Expected hepatofugal hepatic vein flow.    Impression  Significantly limited exam. Hepatopetal portal flow without visualized thrombus.    Based on clinical suspicion for an acute pathology, multiphase CT or MRI could be considered.      Electronically Signed: Steve Cuellar MD  4/13/2025 2:14 PM EDT  Workstation ID: [id number]      Results for orders placed during the hospital encounter of 04/02/25    Adult Transthoracic Echo Complete W/ Cont if Necessary Per Protocol    Interpretation Summary    Left ventricular ejection fraction appears to be 36 - 40%.    Left ventricular wall thickness is consistent with mild concentric hypertrophy.    Left ventricular diastolic  function is consistent with (grade II w/high LAP) pseudonormalization.    The right ventricular cavity is moderate to severely dilated.    The left atrial cavity is moderate to severely dilated.    Left atrial volume is severely increased.    The right atrial cavity is severely  dilated.    There is mild calcification of the aortic valve.    Moderate to severe aortic valve regurgitation is present.    Moderate to severe tricuspid valve regurgitation is present.    Estimated right ventricular systolic pressure from tricuspid regurgitation is markedly elevated (>55 mmHg).      Plan for Follow-up of Pending Labs/Results:     Discharge Details        Discharge Medications        Changes to Medications        Instructions Start Date   carvedilol 6.25 MG tablet  Commonly known as: COREG  What changed:   medication strength  how much to take   6.25 mg, Oral, 2 Times Daily With Meals             Continue These Medications        Instructions Start Date   apixaban 2.5 MG tablet tablet  Commonly known as: ELIQUIS   2.5 mg, Oral, 2 Times Daily      aspirin 81 MG EC tablet   81 mg, Oral, Daily      atorvastatin 40 MG tablet  Commonly known as: LIPITOR   40 mg, Oral, Nightly      bumetanide 2 MG tablet  Commonly known as: BUMEX   2 mg, Oral, Daily      famotidine 20 MG tablet  Commonly known as: PEPCID   20 mg, Oral, 2 Times Daily PRN      Jardiance 10 MG tablet tablet  Generic drug: empagliflozin   10 mg, Oral, Daily      magnesium oxide 400 MG tablet  Commonly known as: MAG-OX   400 mg, Oral, Daily      metFORMIN 500 MG tablet  Commonly known as: GLUCOPHAGE   500 mg, Oral, 2 Times Daily With Meals      nitroglycerin 0.2 MG/HR patch  Commonly known as: NITRODUR   1 patch, Transdermal, See Admin Instructions, Apply patch daily, remove at night for at least 10 hours               Allergies   Allergen Reactions    Colchicine Other (See Comments)     Unable to walk          Discharge Disposition:  Rehab Facility or Unit (DC -  External)    Diet:  Hospital:  Diet Order   Procedures    Diet: Cardiac, Diabetic; Healthy Heart (2-3 Na+); Consistent Carbohydrate; Fluid Consistency: Thin (IDDSI 0)            Activity:      Restrictions or Other Recommendations:         CODE STATUS:    Code Status and Medical Interventions: CPR (Attempt to Resuscitate); Full Support   Ordered at: 04/11/25 1819     Code Status (Patient has no pulse and is not breathing):    CPR (Attempt to Resuscitate)     Medical Interventions (Patient has pulse or is breathing):    Full Support     Level Of Support Discussed With:    Patient       Future Appointments   Date Time Provider Department Center   4/19/2025 10:00 AM MED 11 [facility] None   5/12/2025 11:00 AM Bhavya De Souza APRN [facility]   6/3/2025  9:30 AM Luis Aguilar DO [facility]   12/18/2025  9:45 AM Isabel Stover MD [facility]       Additional Instructions for the Follow-ups that You Need to Schedule       Discharge Follow-up with PCP   As directed       Currently Documented PCP:    Luis Aguilar DO    PCP Phone Number:    [phone]     Follow Up Details: 1 week from rehab discharge        Discharge Follow-up with Specified Provider: CHF clinic 1 week w [facility]   As directed      To: CHF clinic 1 week w [facility]        Discharge Follow-up with Specified Provider: [facility] 4 weeks   As directed      To: NAL 4 weeks                      Ligia Guevara MD  04/19/25      Time Spent on Discharge:  I spent 40 minutes on this discharge activity which included: face-to-face encounter with the patient, reviewing the data in the system, coordination of the care with the nursing staff as well as consultants, documentation, and entering orders.            Electronically signed by Ligia Guevara MD at 04/19/25 1571

## 2025-04-25 LAB
QT INTERVAL: 426 MS
QTC INTERVAL: 435 MS

## 2025-04-28 ENCOUNTER — PATIENT OUTREACH (OUTPATIENT)
Dept: CASE MANAGEMENT | Facility: OTHER | Age: 89
End: 2025-04-28
Payer: MEDICARE

## 2025-04-28 NOTE — OUTREACH NOTE
AMBULATORY CASE MANAGEMENT NOTE    Names and Relationships of Patient/Support Persons: Contact: [facility]; Relationship:  -     SNF Follow-up    Questions/Answers      Flowsheet Row Responses   Acute Facility Discharged From [facility]   Acute Discharge Date 04/19/25   Acute Facility Diagnoses Acute CHF Exasc.   Name of the Skilled Nursing Facility? [facility]   Purpose of SNF Admission PT, OT   Estimated length of stay for the patient? Undetermined   Who is the insurance provider or payor of patient stay? Medicare   Progression of Patient? Spoke with SNF Iliana BECKETT.  Patient continues to receive Therapy.  There is no DC date set or plans made at this time.            Latosha WILEY  Ambulatory Case Management    4/28/2025, 17:05 EDT

## 2025-05-06 ENCOUNTER — TELEPHONE (OUTPATIENT)
Dept: CARDIOLOGY | Facility: HOSPITAL | Age: 89
End: 2025-05-06
Payer: MEDICARE

## 2025-05-06 NOTE — TELEPHONE ENCOUNTER
Caller: Maya Germain    Relationship to patient: Emergency Contact    Best call back number: [phone]      Type of visit: NEW PT ( TELE-HEALTH)    Requested date: 05-12-25     If rescheduling, when is the original appointment: 5-12-25     Additional notes:PLEASE CONTACT MS GERMAIN IN REGARDS TO THIS REQUEST.

## 2025-05-07 ENCOUNTER — PATIENT OUTREACH (OUTPATIENT)
Dept: CASE MANAGEMENT | Facility: OTHER | Age: 89
End: 2025-05-07
Payer: MEDICARE

## 2025-05-07 ENCOUNTER — READMISSION MANAGEMENT (OUTPATIENT)
Dept: CALL CENTER | Facility: HOSPITAL | Age: 89
End: 2025-05-07
Payer: MEDICARE

## 2025-05-07 ENCOUNTER — TELEMEDICINE (OUTPATIENT)
Dept: CARDIOLOGY | Facility: HOSPITAL | Age: 89
End: 2025-05-07
Payer: MEDICARE

## 2025-05-07 ENCOUNTER — DOCUMENTATION (OUTPATIENT)
Dept: CARDIOLOGY | Facility: HOSPITAL | Age: 89
End: 2025-05-07

## 2025-05-07 VITALS
SYSTOLIC BLOOD PRESSURE: 179 MMHG | DIASTOLIC BLOOD PRESSURE: 76 MMHG | RESPIRATION RATE: 20 BRPM | HEART RATE: 71 BPM | OXYGEN SATURATION: 94 % | WEIGHT: 228 LBS | BODY MASS INDEX: 30.09 KG/M2 | TEMPERATURE: 97.8 F

## 2025-05-07 DIAGNOSIS — I48.20 CHRONIC ATRIAL FIBRILLATION: ICD-10-CM

## 2025-05-07 DIAGNOSIS — N18.32 STAGE 3B CHRONIC KIDNEY DISEASE: ICD-10-CM

## 2025-05-07 DIAGNOSIS — I50.23 ACUTE ON CHRONIC HFREF (HEART FAILURE WITH REDUCED EJECTION FRACTION): Primary | ICD-10-CM

## 2025-05-07 DIAGNOSIS — I10 ESSENTIAL HYPERTENSION: ICD-10-CM

## 2025-05-07 NOTE — OUTREACH NOTE
Prep Survey      Flowsheet Row Responses   Congregation facility patient discharged from? Non-BH   Is LACE score < 7 ? Non-BH Discharge   Eligibility [facility]   Date of Admission 04/19/25   Date of Discharge 05/09/25   Discharge Disposition Home-Health Care [facility]   Discharge diagnosis Acute CHF exacerbation   Does the patient have one of the following disease processes/diagnoses(primary or secondary)? CHF   Does the patient have Home health ordered? Yes   What is the Home health agency?  [facility]    Prep survey completed? Yes            Fiona GUTIÉRREZ - Registered Nurse

## 2025-05-07 NOTE — PROGRESS NOTES
[facility]  Heart Failure Clinic    Cardiologist/EP: Dr. Stover  PCP: Luis Aguilra DO  Referring: Coy Herrera MD    Telemedicine Visit  Mode of Visit: Video  Location of patient: other: [facility] . Patient is in a secure environment.   Location of provider: home  You have chosen to receive care through a telehealth visit.  Does the patient consent to use a video/audio connection for your medical care today? Yes  The visit included audio and video interaction. No technical issues occurred during this visit.       Chief Complaint  Congestive Heart Failure and Establish Care    PROBLEM LIST:  HFrEF  Echo 3/2023 LVEF 55%  Echo 8/5/2024 LVEF 35 to 40%  Echo 4/4/2025 EF 36-40, grade 2 diastolic dysfunction with high left atrial pressures, right ventricular cavity moderate to severely dilated, left atrial cavity moderately to severely dilated, moderate to severe AI, moderate to severe TR  VHD  Echo 4/2025 mild to moderate mitral valve regurgitation, moderate to severe AI, moderate-severe TR  CAD  Inferior STEMI 8/2020 for medical management  Permanent atrial fibrillation  CKD III  Chronic lower extremity edema  Hypertension  Hyperlipidemia  Type 2 diabetes  JOLENE  Gout  Prostate cancer status post radiation  Bladder cancer status post surgery    Subjective    History of Present Illness    Iain Dee is a 89 y.o. male who presents today as a same day video visit for HF.    Patient was hospitalized 4/11 to 4/19 with recurrent HFrEF exacerbation.  Nephrology was following and recommended resuming home Bumex at 2 mg daily. He is up 17lbs since 4/26th. He says his breathing has been stable. He was on steroids for 6 days for gout, completed 5/1. He does admit to some dietary indiscretion since being at facility. He says he is also drinking a lot of fluids    Plans to be discharged from the [facility] Friday. Unable to come to office appts without transportation. Wife plans to discuss with case  manager    Dyspnea: chronic, he says it recently has improved  Lower extremity swelling: yes, worsening  Abdominal swelling: denies  Home weight: Current weight is 228. Discharge weight was 225. Per records weight 4/26 at the [facility] was 211  Home BP/HRs: 129-179 systolic (most 150-160)  Orthopnea/pillows denies, one. Wears CPAP   Hospital stays: 8/2024, 4/2/25, 4/11/25          Objective     Vital Signs:   Vitals:    05/07/25 1106   BP: 179/76   Pulse: 71   Resp: 20   Temp: 97.8 °F (36.6 °C)   SpO2: 94%   Weight: 103 kg (228 lb)     Body mass index is 30.09 kg/m².  Physical Exam  Constitutional:       Appearance: Normal appearance.   HENT:      Head: Normocephalic.   Pulmonary:      Effort: Pulmonary effort is normal. No respiratory distress.   Neurological:      Mental Status: He is alert and oriented to person, place, and time.   Psychiatric:         Mood and Affect: Mood normal.         Behavior: Behavior normal.         Thought Content: Thought content normal.              Data Reviewed:  Lab Results   Component Value Date    GLUCOSE 170 (H) 04/19/2025    CALCIUM 8.7 04/19/2025     04/19/2025    K 3.8 04/19/2025    CO2 32.0 (H) 04/19/2025    CL 93 (L) 04/19/2025    BUN 59 (H) 04/19/2025    CREATININE 1.92 (H) 04/19/2025    EGFR 33.1 (L) 04/19/2025    BCR 30.7 (H) 04/19/2025    ANIONGAP 12.0 04/19/2025     Lab Results   Component Value Date    WBC 4.48 04/18/2025    HGB 9.9 (L) 04/18/2025    HCT 32.1 (L) 04/18/2025    .3 (H) 04/18/2025     04/18/2025     Lab Results   Component Value Date    PROBNP 20,657.0 (H) 04/11/2025     Reviewed records from the [facility].  Labs 4/21/2025 WBC 6.1, hemoglobin 9.8, hematocrit 31.7, platelets 229.  Magnesium 2, sodium 139, potassium 3.7, glucose 212, BUN 54, creatinine 1.6, CO2 31, chloride 94.  A1c 6.7.       Assessment and Plan   Acute on Chronic HFrEF  - LVEF 36-40%, NYHA class III ACC/AHA stage: C  - Likely exacerbated by recent steroids.  Increase Bumex  to 2 mg twice a day for 3 days.  Will add 10 mEq of potassium for 3 days. Consider starting low dose of spironolactone at next visit  -Continue the following guideline directed medical therapy:    Drug Class   Drug   Dose Last Dose Adjustment Notes   ACEi/ARB/ARNI    N/a CKD   Beta Blocker Carvedilol 6.25mg BID     MRA    CKD   SGLT2i Jardiance 10mg     Diuretic regimen Bumex  2mg daily     Secondary therapy        - Reinforced reasons to call and diuretic plan.     2. Chronic atrial fibrillation  - Rate controlled  - Continue eliquis    3. Essential hypertension  - Recently elevated, likely from steroids and fluid retention  - Continue monitoring when discharged from rehab    4. Stage 3b chronic kidney disease  - Recent creatinine 1.6  - Ranged from 1.6-2.2 while inpatient    Patient will follow up next via telemedicine as long as stable symptoms. Advised to call if having worsening symptoms and would then recommend him get transportation to office for IV diuretics      Follow Up {Instructions Charge Capture  Follow-up Communications :23}   Return in about 1 week (around 5/14/2025) for HF, Office follow up.    I have reviewed this documentation, made edits where appropriate, and agree with the final report of ReDS data/interpretation. In addition, I have the following to add:    Lung fluid content by ReDS assessment correlates to pulmonary capillary wedge pressure (Adrien et al. JAHA 2018). In patients with LEFT-sided heart failure, elevated readings suggest that the patient MAY benefit from additional diuresis while low readings suggest that the patient MAY benefit from a reduction in diuretics; clinical correlation is recommended. Low normal and mildly elevated readings may be appropriate for some patients. In patients with RIGHT-sided heart failure, lung fluid content may be a less reliable marker for guiding diuresis.    Note that history and exam findings documented in the note reflect the RN's independent  assessment; please see separate documentation for my personal assessment for patients also evaluated by me in person.    Patient was given instructions and counseling regarding his condition or for health maintenance advice. Please see specific information pulled into the AVS if appropriate.  Advised to call the [facility] with any questions, concerns, or worsening symptoms.

## 2025-05-07 NOTE — TELEPHONE ENCOUNTER
I sent a message to our heart failure providers asking if I have permission to schedule this as a video visit.

## 2025-05-07 NOTE — OUTREACH NOTE
AMBULATORY CASE MANAGEMENT NOTE    Names and Relationships of Patient/Support Persons: Contact: [facility]; Relationship:  -     SNF Follow-up    Questions/Answers      Flowsheet Row Responses   Acute Facility Discharged From [facility]   Acute Discharge Date 04/19/25   Acute Facility Diagnoses Acute CHF Exasc.   Name of the Skilled Nursing Facility? [facility]   Tier Level of the Skilled Nursing Facility 4   Purpose of SNF Admission PT, OT   Estimated length of stay for the patient? Planning for Discharge Friday, 5-9-25.   Who is the insurance provider or payor of patient stay? Medicare   Progression of Patient? RN-MARIA T spoke with nursing staff who stated patient continues to receive Therapy.  They are planning for DC home on Friday, 5/9/25, with [facility].        This note will be routed to the [facility] Call Center Nurses.    Latosha WILEY  Ambulatory Case Management    5/7/2025, 09:37 EDT

## 2025-05-07 NOTE — PROGRESS NOTES
Please fax the following orders to [facility]:  Increase bumex to 2mg twice a day for 3 days. Please if second bumex dose at lunch time. First dose today. After 3 days may resume bumex 2mg daily as long as symptoms are stable  Potassium 10meq daily for 3 days with extra bumex dose

## 2025-05-12 ENCOUNTER — PATIENT OUTREACH (OUTPATIENT)
Dept: CASE MANAGEMENT | Facility: OTHER | Age: 89
End: 2025-05-12
Payer: MEDICARE

## 2025-05-12 ENCOUNTER — TELEMEDICINE (OUTPATIENT)
Dept: FAMILY MEDICINE CLINIC | Facility: CLINIC | Age: 89
End: 2025-05-12
Payer: MEDICARE

## 2025-05-12 ENCOUNTER — TRANSITIONAL CARE MANAGEMENT TELEPHONE ENCOUNTER (OUTPATIENT)
Dept: CALL CENTER | Facility: HOSPITAL | Age: 89
End: 2025-05-12
Payer: MEDICARE

## 2025-05-12 DIAGNOSIS — R29.898 WEAKNESS OF BOTH LOWER EXTREMITIES: Primary | ICD-10-CM

## 2025-05-12 NOTE — OUTREACH NOTE
AMBULATORY CASE MANAGEMENT NOTE    Names and Relationships of Patient/Support Persons: Contact: Maya Germain; Relationship: Emergency Contact -     Patient Outreach    RN-ACM called patient, spoke with wife Maya.  Explained role of RN-RAMYAM.  Wife stated they have had a Telehealth appt with PCP today; the Home Health nurse is visiting patient currently; they have hired caregivers in the home, 8 hrs/day.  Wife voiced feelings of being overwhelmed today and did not want to talk further at this time.      Adult Patient Profile  Questions/Answers      Flowsheet Row Most Recent Value   Symptoms/Conditions Managed at Home cardiovascular  [Patient was hospitalized 4/11/25 to 4/19/25 wdith Acute CHF exasc. - He went to Rehab at [facility], 4/19/25 to 5/9/25.  Was discharged home [facility] arranged.]   Barriers to Managing Health none   Cardiovascular Symptoms/Conditions heart failure   Source of Information family, health record  [spoke with wife Maya]   Primary Source of Support/Comfort spouse   People in Home spouse   Current Living Arrangements home   Resource/Environmental Concerns none            Latosha WILEY  Ambulatory Case Management    5/12/2025, 14:08 EDT

## 2025-05-12 NOTE — PROGRESS NOTES
Hospital Follow-Up/Transition of Care Visit     Patient Name: Iain Dee  : 1935   MRN: [id number]   Care Team: Patient Care Team:  Luis Aguilar DO as PCP - General (Family Medicine)  Iain Bernstein MD as Consulting Physician (Cardiology)  Isabel Stover MD as Cardiologist (Cardiology)  SANDY CALLAWAY as Resident  [facility] as Resident    Chief Complaint:  No chief complaint on file.      History of Present Illness: Iain Dee is a 89 y.o. male who presents today for TCM visit.    Within 48 business hours after discharge our office contacted him via telephone to coordinate his care and needs.      I reviewed and discussed the details of that call along with the discharge summary, hospital problems, inpatient lab results, inpatient diagnostic studies, and consultation reports with Iain.      Current outpatient and discharge medications have been reconciled for the patient.  Reviewed by: Luis Aguilar DO          12/10/2021     8:37 PM 3/27/2023     6:42 AM 2025     4:04 PM 2025    10:04 AM   Date of TCM Phone Call   [facility] [place]   Date of Admission 2021   Date of Discharge 12/10/2021 3/24/2023 2025 2025   Discharge Disposition Home or Self Care Home or Self Care Home-Health Care Svc Home-Health Care Sv     Risk for Readmission (LACE) No data recorded    History of Present Illness   Course During Hospital Stay:    Iain Dee is a 89 y.o. male w CHFrEF (EF 36-40%), A-fib on eliquis, CKD who presented on  for worsening LE edema and debility.     Found to have recurrent CHFrEF exacerbation w mod-sev TR c/b renal disease. Cardiology and nephrology both saw patient and followed - d/w nephrology on day of discharge and recommend resuming home bumex 2 mg daily with close follow-up. Carvedilol also adjusted up per cardiology while here. At  "discharge, 92% on room air. CPAP recommended at night but patient has been using nasal cannula instead. Weight ~225 lb at time of discharge.     Component of venostasis to LE edema as well - PT wound for wraps. Recommend this to continue.      Debility w rehab recommended last time but declined. Discharged to [facility] this admission     Discharge Follow Up Recommendations for outpatient labs/diagnostics:   F/u CHF clinic 1 week   F/u NAL 4 weeks   PCP 1 week from rehab plcmt    Status Since Discharge:  Came home Friday from the [facility]. Reports he is \"in bad shape\" can't stand up on his own, needs stability to hold on to someone.  Having blood in urine     The following portions of the patient's history were reviewed and updated as appropriate: allergies, current medications, past family history, past medical history, past social history, past surgical history, and problem list.    This patient is accompanied by their caregiver who contributes to the history of their care.    The following portions of the patient's history were reviewed and updated as appropriate: allergies, current medications, past family history, past medical history, past social history, past surgical history and problem list.    Subjective      Review of Systems:   Review of Systems - See HPI    Past Medical History:   Past Medical History:   Diagnosis Date    A-fib     Arthritis     Cancer     Diabetes mellitus     GERD (gastroesophageal reflux disease)     Gout     H/O Bladder carcinoma (HCC)     Hypertension        Past Surgical History:   Past Surgical History:   Procedure Laterality Date    BLADDER SURGERY      Biopsy    HERNIA REPAIR      PROSTATE BIOPSY         Family History:   Family History   Problem Relation Age of Onset    No Known Problems Mother     Heart attack Father        Social History:   Social History     Socioeconomic History    Marital status:    Tobacco Use    Smoking status: Never     Passive exposure: Never    " "Smokeless tobacco: Never   Vaping Use    Vaping status: Never Used   Substance and Sexual Activity    Alcohol use: Yes     Alcohol/week: 1.0 standard drink of alcohol     Types: 1 Shots of liquor per week     Comment: Estimation based on spouse report of patient drinking \"1 large martini\" each night (regular martini = 3 shots, large martini = est. 4 shots)    Drug use: No    Sexual activity: Defer       Tobacco History:   Social History     Tobacco Use   Smoking Status Never    Passive exposure: Never   Smokeless Tobacco Never       Medications:     Current Outpatient Medications:     apixaban (ELIQUIS) 2.5 MG tablet tablet, Take 1 tablet by mouth 2 (Two) Times a Day. Indications: Atrial Fibrillation, Disp: 60 tablet, Rfl: 11    aspirin 81 MG EC tablet, Take 1 tablet by mouth Daily., Disp: 30 tablet, Rfl: 0    atorvastatin (LIPITOR) 40 MG tablet, Take 1 tablet by mouth Every Night., Disp: 90 tablet, Rfl: 3    bumetanide (BUMEX) 2 MG tablet, Take 1 tablet by mouth 2 (Two) Times a Day., Disp: 60 tablet, Rfl: 3    carvedilol (COREG) 6.25 MG tablet, Take 1 tablet by mouth 2 (Two) Times a Day With Meals., Disp: , Rfl:     famotidine (PEPCID) 20 MG tablet, Take 1 tablet by mouth 2 (Two) Times a Day As Needed for Heartburn., Disp: 180 tablet, Rfl: 0    gabapentin (NEURONTIN) 100 MG capsule, Take 1 capsule by mouth 2 (Two) Times a Day., Disp: , Rfl:     Jardiance 10 MG tablet tablet, Take 1 tablet by mouth Daily., Disp: 90 tablet, Rfl: 11    magnesium oxide (MAG-OX) 400 MG tablet, Take 1 tablet by mouth Daily., Disp: 30 tablet, Rfl: 11    metFORMIN (GLUCOPHAGE) 500 MG tablet, Take 1 tablet by mouth 2 (Two) Times a Day With Meals., Disp: 180 tablet, Rfl: 0    metOLazone (ZAROXOLYN) 2.5 MG tablet, Take 30 min before morning bumex dose on Friday, Sunday and Tuesday (Patient not taking: Reported on 5/28/2025), Disp: 3 tablet, Rfl: 0    nitroglycerin (NITRODUR) 0.2 MG/HR patch, Place 1 patch on the skin as directed by provider " See Admin Instructions. Apply patch daily, remove at night for at least 10 hours, Disp: 90 patch, Rfl: 3    potassium chloride 10 MEQ CR tablet, Take 2 tablets by mouth Daily., Disp: 60 tablet, Rfl: 3    Allergies:   Allergies   Allergen Reactions    Colchicine Other (See Comments)     Unable to walk        Objective   Objective     Physical Exam:  Vital Signs: There were no vitals filed for this visit.  There is no height or weight on file to calculate BMI.     Physical Exam  Nursing note reviewed  Unable to examine  Procedures/Radiology     Procedures  No radiology results for the last 7 days     Assessment & Plan   Assessment / Plan      Assessment/Plan:   Problems Addressed This Visit  Diagnoses and all orders for this visit:    1. Weakness of both lower extremities (Primary)  -     XR Spine Lumbar 2 or 3 View; Future      Problem List Items Addressed This Visit    None  Visit Diagnoses         Weakness of both lower extremities    -  Primary    Relevant Orders    XR Spine Lumbar 2 or 3 View          Will get UA w micro, CMP, CBC, magnesium  Restart PPI given severe reflux symptoms impacting sleep  Check lumbar xr for weakness in legs    See patient diagnoses and orders along with patient instructions for assessment, plan, and changes to care for patient.    There are no Patient Instructions on file for this visit.    Follow Up:   No follow-ups on file.        MDM       [facility] [address]  [facility]  [address]  [facility] [place] [address]  Fax [phone]  Phone [phone]

## 2025-05-12 NOTE — OUTREACH NOTE
Call Center TCM Note      Flowsheet Row Responses   [facility] patient discharged from? Non-BH  [[facility]]   Does the patient have one of the following disease processes/diagnoses(primary or secondary)? CHF   TCM attempt successful? Yes   Discharge diagnosis Acute CHF exacerbation   TCM call completed? Yes   Wrap up additional comments Patient has a completed telehealth appt with PCP documented for today (5/12).  This fulfills TCM requirements and no phone call is needed.   Would this patient benefit from a Referral to [facility] Social Work? No   Is the patient interested in additional calls from an ambulatory ? No            Kira REINA - Registered Nurse    5/12/2025, 13:07 EDT

## 2025-05-13 NOTE — PROGRESS NOTES
[facility]  Heart Failure Clinic    Cardiologist/EP: Dr. Stover  PCP: Luis Aguilar DO      Telemedicine Visit  Mode of Visit: Video  Location of patient: home. Patient is in a secure environment. Wife and caregiver present  Location of provider: home  You have chosen to receive care through a telehealth visit.  Does the patient consent to use a video/audio connection for your medical care today? Yes  The visit included audio and video interaction. No technical issues occurred during this visit.       Chief Complaint  Follow-up and Congestive Heart Failure    PROBLEM LIST:  HFrEF  Echo 3/2023 LVEF 55%  Echo 8/5/2024 LVEF 35 to 40%  Echo 4/4/2025 EF 36-40, grade 2 diastolic dysfunction with high left atrial pressures, right ventricular cavity moderate to severely dilated, left atrial cavity moderately to severely dilated, moderate to severe AI, moderate to severe TR  VHD  Echo 4/2025 mild to moderate mitral valve regurgitation, moderate to severe AI, moderate-severe TR  CAD  Inferior STEMI 8/2020 for medical management  Permanent atrial fibrillation  CKD III  Chronic lower extremity edema  Hypertension  Hyperlipidemia  Type 2 diabetes  JOLENE  Gout  Prostate cancer status post radiation  Bladder cancer status post surgery    Subjective    History of Present Illness    Iain Dee is a 89 y.o. male who presents today to follow-up on heart failure..    Patient was hospitalized 4/11 to 4/19 with recurrent HFrEF exacerbation.  Nephrology was following and recommended resuming home Bumex at 2 mg daily.  He was discharged to the [facility] for rehab and was seen last week for a 17 pound weight gain since 4/26.  He also had had recent steroids for gout.  His Bumex was increased to 2 mg twice a day for 3 days and he was given 10 mEq of potassium for 3 days.    He is home now. Had a difficult day yesterday with his breathing. Unable to weigh. Wife reports that he still looks very swollen but slightly  better. His legs are wrapped. His wife says she thinks doubling the bumex helped some but he started getting worse yesterday. Wheelchair bound and requires one assist. Waiting for home PT. Homebound but may be able to get transportation to office        Dyspnea: currently denies, but wife disagrees and says he was very short of breath yesterday  Lower extremity swelling: yes, wearing compression wraps  Abdominal swelling: denies  Home weight: unable to weight  Home BP/HRs: not monitoring regularly  Orthopnea/pillows denies, one. Wears CPAP   Hospital stays: 8/2024, 4/2/25, 4/11/25          Objective     Vital Signs:   Vitals:    05/14/25 1027   BP: 116/72   Pulse: 65       There is no height or weight on file to calculate BMI.  Physical Exam  Constitutional:       Appearance: Normal appearance.   HENT:      Head: Normocephalic.   Cardiovascular:      Comments: At least 2+ edema, wearing compression socks. 1+ edema extending to thighs  Pulmonary:      Effort: Pulmonary effort is normal. No respiratory distress.   Musculoskeletal:      Right lower leg: Edema present.      Left lower leg: Edema present.   Neurological:      Mental Status: He is alert and oriented to person, place, and time.   Psychiatric:         Mood and Affect: Mood normal.         Behavior: Behavior normal.         Thought Content: Thought content normal.              Data Reviewed:  Lab Results   Component Value Date    GLUCOSE 170 (H) 04/19/2025    CALCIUM 8.7 04/19/2025     04/19/2025    K 3.8 04/19/2025    CO2 32.0 (H) 04/19/2025    CL 93 (L) 04/19/2025    BUN 59 (H) 04/19/2025    CREATININE 1.92 (H) 04/19/2025    EGFR 33.1 (L) 04/19/2025    BCR 30.7 (H) 04/19/2025    ANIONGAP 12.0 04/19/2025     Lab Results   Component Value Date    WBC 4.48 04/18/2025    HGB 9.9 (L) 04/18/2025    HCT 32.1 (L) 04/18/2025    .3 (H) 04/18/2025     04/18/2025     Lab Results   Component Value Date    PROBNP 20,657.0 (H) 04/11/2025     Reviewed  records from the [facility].  Labs 4/21/2025 WBC 6.1, hemoglobin 9.8, hematocrit 31.7, platelets 229.  Magnesium 2, sodium 139, potassium 3.7, glucose 212, BUN 54, creatinine 1.6, CO2 31, chloride 94.  A1c 6.7.       Assessment and Plan   Acute on Chronic HFrEF  - LVEF 36-40%, NYHA class III ACC/AHA stage: C  - Increase bumex to 2mg twice a day. Will contact home health to see if they can do IV lasix. If unable to, then he may require transportation services to bring to the office  -Continue the following guideline directed medical therapy:    Drug Class   Drug   Dose Last Dose Adjustment Notes   ACEi/ARB/ARNI    N/a CKD   Beta Blocker Carvedilol 6.25mg BID     MRA    CKD   SGLT2i Jardiance 10mg     Diuretic regimen Bumex  2mg BID Increased 5/14/25    Secondary therapy        - High risk for hospital readmission. Ordered Zoll HF patch for remote monitoring  - Reinforced reasons to call and diuretic plan.     2. Chronic atrial fibrillation  - Rate controlled  - Continue eliquis    3. Essential hypertension  - Appears controlled  - Home health to continue to monitor    4. Stage 3b chronic kidney disease  - Recent creatinine 1.6  - Had repeat labs with home health Monday, will request  - Ranged from 1.6-2.2 while inpatient          Follow Up {Instructions Charge Capture  Follow-up Communications :23}   Return in about 1 week (around 5/21/2025) for Video Visit, HF.        Patient was given instructions and counseling regarding his condition or for health maintenance advice. Please see specific information pulled into the AVS if appropriate.  Advised to call the [facility] with any questions, concerns, or worsening symptoms.

## 2025-05-14 ENCOUNTER — TELEMEDICINE (OUTPATIENT)
Dept: CARDIOLOGY | Facility: HOSPITAL | Age: 89
End: 2025-05-14
Payer: MEDICARE

## 2025-05-14 VITALS — DIASTOLIC BLOOD PRESSURE: 72 MMHG | SYSTOLIC BLOOD PRESSURE: 116 MMHG | HEART RATE: 65 BPM

## 2025-05-14 DIAGNOSIS — I10 ESSENTIAL HYPERTENSION: ICD-10-CM

## 2025-05-14 DIAGNOSIS — I48.20 CHRONIC ATRIAL FIBRILLATION: ICD-10-CM

## 2025-05-14 DIAGNOSIS — N18.32 STAGE 3B CHRONIC KIDNEY DISEASE: ICD-10-CM

## 2025-05-14 DIAGNOSIS — I50.23 ACUTE ON CHRONIC HFREF (HEART FAILURE WITH REDUCED EJECTION FRACTION): Primary | ICD-10-CM

## 2025-05-14 RX ORDER — GABAPENTIN 100 MG/1
100 CAPSULE ORAL 2 TIMES DAILY
COMMUNITY

## 2025-05-14 RX ORDER — BUMETANIDE 2 MG/1
2 TABLET ORAL 2 TIMES DAILY
Start: 2025-05-14

## 2025-05-15 ENCOUNTER — TELEPHONE (OUTPATIENT)
Dept: CARDIOLOGY | Facility: HOSPITAL | Age: 89
End: 2025-05-15
Payer: MEDICARE

## 2025-05-15 RX ORDER — POTASSIUM CHLORIDE 750 MG/1
20 TABLET, EXTENDED RELEASE ORAL DAILY
Qty: 30 TABLET | Refills: 0 | Status: SHIPPED | OUTPATIENT
Start: 2025-05-15

## 2025-05-15 RX ORDER — METOLAZONE 2.5 MG/1
TABLET ORAL
Qty: 3 TABLET | Refills: 0 | Status: SHIPPED | OUTPATIENT
Start: 2025-05-15

## 2025-05-15 NOTE — TELEPHONE ENCOUNTER
Called patient's wife, Maya, with plans.  Initially, was told by A nurse that she could give IV diuretics, but then this morning was told that management said no.  We have reached out to the manager to figure out if we can initiate an IV diuretic protocol.  For now, we will send in metolazone to take 30 minutes before Bumex dose on Friday, Sunday and Tuesday.  Continue Bumex 2 mg twice a day.  Will send in supplemental potassium of 20 mEq daily while taking metolazone. Reviewed recent labs and creatinine stable.  She had no further questions or concerns

## 2025-05-16 ENCOUNTER — TELEPHONE (OUTPATIENT)
Dept: FAMILY MEDICINE CLINIC | Facility: CLINIC | Age: 89
End: 2025-05-16

## 2025-05-16 ENCOUNTER — TELEPHONE (OUTPATIENT)
Dept: CARDIOLOGY | Facility: HOSPITAL | Age: 89
End: 2025-05-16
Payer: MEDICARE

## 2025-05-16 NOTE — TELEPHONE ENCOUNTER
Spouse wanted to cancel appointment made today with Wendy Espinoza for IV lasix as she was able to find the metolazone at the [facility] pharmacy. They want to keep the appointment that is a video visit for next Wednesday as of right now but will call if needed based off how the medication metolazone helps over the weekend.

## 2025-05-16 NOTE — TELEPHONE ENCOUNTER
Caller: NICK WITH [facility]    Relationship: Other    Best call back number: [phone]     What orders are you requesting (i.e. lab or imaging): HOME HEALTH ORDERS ONCE A WEEK FOR THE NEXT 5 WEEKS FOR PHYSICAL THERAPY        Additional notes: PLEASE CALL WITH ANY QUESTIONS.

## 2025-05-19 ENCOUNTER — DOCUMENTATION (OUTPATIENT)
Dept: CARDIOLOGY | Facility: HOSPITAL | Age: 89
End: 2025-05-19
Payer: MEDICARE

## 2025-05-19 ENCOUNTER — PATIENT OUTREACH (OUTPATIENT)
Dept: CASE MANAGEMENT | Facility: OTHER | Age: 89
End: 2025-05-19
Payer: MEDICARE

## 2025-05-19 NOTE — OUTREACH NOTE
AMBULATORY CASE MANAGEMENT NOTE    Names and Relationships of Patient/Support Persons: Contact: Maya Germain; Relationship: Emergency Contact -     Patient Outreach    RN-ACM called patient (4th attempt), spoke with wife Maya, regarding recent discharge home from Rehab, 5/9/25, and possible HRCM needs.  Explained role of RN-ACM.  Wife stated many people are involved in patient's medical care:  doctors appts, skilled Home Health services, in-home caregivers; she said she can message the doctors on MyChart if she has any questions.  Wife voiced no need for Amb. Case Mgt calls. She voiced appreciation for checking with her.     Latosha IWLEY  Ambulatory Case Management    5/19/2025, 11:09 EDT

## 2025-05-19 NOTE — PROGRESS NOTES
Labs reviewed from 5/12/2025.  WBC 5.4, hemoglobin 10.3, hematocrit 33.6, platelets 207.  Glucose 156, BUN 47, creatinine 1.59, sodium 139, potassium 4.0, total protein 5.6, albumin 3.5, AST 51, ALT 57.

## 2025-05-21 ENCOUNTER — TELEMEDICINE (OUTPATIENT)
Dept: CARDIOLOGY | Facility: HOSPITAL | Age: 89
End: 2025-05-21
Payer: MEDICARE

## 2025-05-21 ENCOUNTER — TELEPHONE (OUTPATIENT)
Dept: CARDIOLOGY | Facility: HOSPITAL | Age: 89
End: 2025-05-21
Payer: MEDICARE

## 2025-05-21 ENCOUNTER — TELEPHONE (OUTPATIENT)
Dept: FAMILY MEDICINE CLINIC | Facility: CLINIC | Age: 89
End: 2025-05-21

## 2025-05-21 VITALS — DIASTOLIC BLOOD PRESSURE: 72 MMHG | HEART RATE: 61 BPM | SYSTOLIC BLOOD PRESSURE: 143 MMHG

## 2025-05-21 DIAGNOSIS — I48.20 CHRONIC ATRIAL FIBRILLATION: ICD-10-CM

## 2025-05-21 DIAGNOSIS — I50.23 ACUTE ON CHRONIC HFREF (HEART FAILURE WITH REDUCED EJECTION FRACTION): Primary | ICD-10-CM

## 2025-05-21 DIAGNOSIS — R31.0 GROSS HEMATURIA: ICD-10-CM

## 2025-05-21 DIAGNOSIS — I10 ESSENTIAL HYPERTENSION: ICD-10-CM

## 2025-05-21 DIAGNOSIS — N18.32 STAGE 3B CHRONIC KIDNEY DISEASE: ICD-10-CM

## 2025-05-21 NOTE — TELEPHONE ENCOUNTER
Caller: NASIR    Relationship: Other OCUPATIONAL THERAP;IST [facility]    Best call back number:     What is the best time to reach you: ANY TIME    Who are you requesting to speak with (clinical staff, provider,  specific staff member): CLINICAL STAFF    Do you know the name of the person who called: NASIR    What was the call regarding: NASIR WILL NEEDING VERBAL OK FOR 01 TIMES EIGHT ON VISITS     Is it okay if the provider responds through MyChart: PLEASE CALL IF ANY QUESTIONS

## 2025-05-21 NOTE — TELEPHONE ENCOUNTER
Caller: NASIR    Relationship: Other OCC THERAPIST WITH [facility]    Best call back number:      Equipment requested: HOSPITAL BED        Prescribing Provider: DR NAVARRETE    Additional information or concerns: MEHRAN WILL BE SENDING A FAX TO DR NAVARRETE AND ASKING FOR THIS TO BE EXPIDITED ONCE RECEIVED; FAMILY IS OK WITH THIS REQUEST

## 2025-05-21 NOTE — PROGRESS NOTES
[facility]  Heart Failure Clinic    Cardiologist/EP: Dr. Stover  PCP: Luis Aguilar DO      Telemedicine Visit  Mode of Visit: Video  Location of patient: home. Patient is in a secure environment. Wife present  Location of provider: home  You have chosen to receive care through a telehealth visit.  Does the patient consent to use a video/audio connection for your medical care today? Yes  The visit included audio and video interaction. No technical issues occurred during this visit.       Chief Complaint  Follow-up and Congestive Heart Failure    PROBLEM LIST:  HFrEF  Echo 3/2023 LVEF 55%  Echo 8/5/2024 LVEF 35 to 40%  Echo 4/4/2025 EF 36-40, grade 2 diastolic dysfunction with high left atrial pressures, right ventricular cavity moderate to severely dilated, left atrial cavity moderately to severely dilated, moderate to severe AI, moderate to severe TR  VHD  Echo 4/2025 mild to moderate mitral valve regurgitation, moderate to severe AI, moderate-severe TR  CAD  Inferior STEMI 8/2020 for medical management  Permanent atrial fibrillation  CKD III  Chronic lower extremity edema  Hypertension  Hyperlipidemia  Type 2 diabetes  JOLENE  Gout  Prostate cancer status post radiation  Bladder cancer status post surgery    Subjective    History of Present Illness    Iain Dee is a 89 y.o. male who presents today to follow-up on heart failure..    Patient's bumex was doubled last week and started on metolazone QOD for 3 days. He has urinated a lot and breathing is better, but swelling is still significant. She reports occasionally she has seen some blood in his urine. He is wearing a condom catheter.  He did not complete the Zoll monitor because he did not like the size. Still has edema, sits with legs dependent. Has compression socks. He says he hates salt. His wife Maya says she forgot to double the bumex.       Dyspnea: improved, still has some occasionally in the afternoon  Lower extremity  swelling: yes, wearing compression wraps  Abdominal swelling: denies  Home weight: unable to weight  Home BP/HRs: not monitoring regularly  Orthopnea/pillows denies, one. Wears CPAP   Hospital stays: 8/2024, 4/2/25, 4/11/25          Objective     Vital Signs:   Vitals:    05/21/25 1516   BP: 143/72   Pulse: 61         There is no height or weight on file to calculate BMI.  Physical Exam  Constitutional:       Appearance: Normal appearance.   HENT:      Head: Normocephalic.   Cardiovascular:      Comments: At least 2+ edema, wearing compression socks. 2+ edema extending to knee  Pulmonary:      Effort: Pulmonary effort is normal. No respiratory distress.   Musculoskeletal:      Right lower leg: Edema present.      Left lower leg: Edema present.   Neurological:      Mental Status: He is oriented to person, place, and time. He is lethargic.   Psychiatric:         Mood and Affect: Mood normal.         Behavior: Behavior normal.         Thought Content: Thought content normal.              Data Reviewed:  Lab Results   Component Value Date    GLUCOSE 170 (H) 04/19/2025    CALCIUM 8.7 04/19/2025     04/19/2025    K 3.8 04/19/2025    CO2 32.0 (H) 04/19/2025    CL 93 (L) 04/19/2025    BUN 59 (H) 04/19/2025    CREATININE 1.92 (H) 04/19/2025    EGFR 33.1 (L) 04/19/2025    BCR 30.7 (H) 04/19/2025    ANIONGAP 12.0 04/19/2025     Lab Results   Component Value Date    WBC 4.48 04/18/2025    HGB 9.9 (L) 04/18/2025    HCT 32.1 (L) 04/18/2025    .3 (H) 04/18/2025     04/18/2025     Lab Results   Component Value Date    PROBNP 20,657.0 (H) 04/11/2025     Reviewed records from the [facility].  Labs 4/21/2025 WBC 6.1, hemoglobin 9.8, hematocrit 31.7, platelets 229.  Magnesium 2, sodium 139, potassium 3.7, glucose 212, BUN 54, creatinine 1.6, CO2 31, chloride 94.  A1c 6.7.  5/12/2025. WBC 5.4, hemoglobin 10.3, hematocrit 33.6, platelets 207. Glucose 156, BUN 47, creatinine 1.59, sodium 139, potassium 4.0, total protein  5.6, albumin 3.5, AST 51, ALT 57. Urinalysis showed 3+ protein. No blood       Assessment and Plan   Acute on Chronic HFrEF  - LVEF 36-40%, NYHA class III ACC/AHA stage: C  - He has had some improvement with metolazone but did not increase bumex. Advised to increase bumex to 2mg twice a day  - Labs today: BMP, proBNP, urinalysis  - Will order furoscix and give as long as labs stable  -Continue the following guideline directed medical therapy:    Drug Class   Drug   Dose Last Dose Adjustment Notes   ACEi/ARB/ARNI    N/a CKD   Beta Blocker Carvedilol 6.25mg BID     MRA    CKD   SGLT2i Jardiance 10mg     Diuretic regimen Bumex  2mg BID     Secondary therapy        - High risk for hospital readmission. Ordered Zoll HF patch for remote monitoring, he was hesitant about this but says he will go ahead and put the patch on  - Reinforced reasons to call and diuretic plan.     2. Chronic atrial fibrillation  - Rate controlled  - Continue eliquis    3. Essential hypertension  - Appears controlled  - Home health to continue to monitor    4. Stage 3b chronic kidney disease  - Recent creatinine 1.6  - Ranged from 1.6-2.2 while inpatient  - BMP today    5. Hematuria  - Check UA  - If persists, advised to call PCP          Follow Up {Instructions Charge Capture  Follow-up Communications :23}   Return in about 1 week (around 5/28/2025) for HF, Video Visit. .        Patient was given instructions and counseling regarding his condition or for health maintenance advice. Please see specific information pulled into the AVS if appropriate.  Advised to call the [facility] with any questions, concerns, or worsening symptoms.

## 2025-05-21 NOTE — TELEPHONE ENCOUNTER
Spoke with facility about needing lab work complete, they stated they would complete tomorrow morning.  Faxed lab order: bmp, probnp, and urinalysis with culture if indicated.

## 2025-05-22 ENCOUNTER — TELEPHONE (OUTPATIENT)
Dept: FAMILY MEDICINE CLINIC | Facility: CLINIC | Age: 89
End: 2025-05-22
Payer: MEDICARE

## 2025-05-22 DIAGNOSIS — M79.2 NEUROPATHIC PAIN: Primary | ICD-10-CM

## 2025-05-22 NOTE — TELEPHONE ENCOUNTER
Caller: Maya Germain    Relationship: Emergency Contact    Best call back number: [phone]     Requested Prescriptions:   Requested Prescriptions     Pending Prescriptions Disp Refills    gabapentin (NEURONTIN) 100 MG capsule       Sig: Take 1 capsule by mouth 2 (Two) Times a Day.        Pharmacy where request should be sent: [id number] - [place], [place] - [address] [facility] [phone] [facility] [phone] [facility]     Last office visit with prescribing clinician: 3/3/2025   Last telemedicine visit with prescribing clinician: 5/12/2025   Next office visit with prescribing clinician: 6/3/2025     Additional details provided by patient: PATIENT WAS PRESCRIBED THIS AT THE [facility] AND HE HAS ENOUGH MEDICATION TO LAST TILL TOMORROW.  REQUESTING A REFILL OR SOMETHING SIMILAR TO HELP WITH LEG (NERVE) PAIN     Does the patient have less than a 3 day supply:  [x] Yes  [] No    Would you like a call back once the refill request has been completed: [] Yes [x] No    If the office needs to give you a call back, can they leave a voicemail: [] Yes [x] No    Nikki Khoury MA   05/22/25 09:49 EDT

## 2025-05-22 NOTE — TELEPHONE ENCOUNTER
I informed patients wife that this is controlled and he would need a visit in the office for CSA/UDS.     She stated he cannot walk and she would be unable to bring him.   She asked if there was alternative medications and that she would actually prefer him to not be on anything controlled?

## 2025-05-23 ENCOUNTER — TELEPHONE (OUTPATIENT)
Dept: FAMILY MEDICINE CLINIC | Facility: CLINIC | Age: 89
End: 2025-05-23
Payer: MEDICARE

## 2025-05-23 NOTE — TELEPHONE ENCOUNTER
PATIENTS WIFE CALLED STATING PATIENT HAS BLOOD IN URINE, SPOKE WITH PROVIDER AND HE SAID HE APPROVES A VERBAL FOR THE HOME HEALTH TO COLLECT A URINE SAMPLE DURING VISIT TODAY.    HUB TO RELAY

## 2025-05-27 ENCOUNTER — TELEPHONE (OUTPATIENT)
Dept: CARDIOLOGY | Facility: HOSPITAL | Age: 89
End: 2025-05-27
Payer: MEDICARE

## 2025-05-27 NOTE — TELEPHONE ENCOUNTER
Provider: DO NAVARRETE    Caller: Maya Germain    Relationship to Patient: Emergency Contact    Phone Number: [phone]     Reason for Call: PATIENT IS CALLING TO CHECK ON THE STATUS OF THE REQUEST FOR THE ALTERNATE MEDICATION FOR THE NEUROPATHY, PATIENT HAS BEEN TAKING FREQUENT DOSAGES OF STRONG TYLENOL

## 2025-05-27 NOTE — TELEPHONE ENCOUNTER
Pt's wife called left a message asking if she should stop his blood thinner related to blood clots and blood in his urine?    Please advise

## 2025-05-27 NOTE — TELEPHONE ENCOUNTER
I spoke to patients wife, she stated patients hematuria has resolved since he stopped the metolazone. She stated the home health nurse took a urine sample last week and they are waiting on results.

## 2025-05-27 NOTE — PROGRESS NOTES
"[facility]  Heart Failure Clinic    Cardiologist/EP: Dr. Stover  PCP: Luis Aguilar,       Telemedicine Visit  Mode of Visit: Video  Location of patient: home. Patient is in a secure environment. Wife present  Location of provider: home  You have chosen to receive care through a telehealth visit.  Does the patient consent to use a video/audio connection for your medical care today? Yes  The visit included audio and video interaction. No technical issues occurred during this visit.       Chief Complaint  Follow-up and Congestive Heart Failure    PROBLEM LIST:  HFrEF  Echo 3/2023 LVEF 55%  Echo 8/5/2024 LVEF 35 to 40%  Echo 4/4/2025 EF 36-40, grade 2 diastolic dysfunction with high left atrial pressures, right ventricular cavity moderate to severely dilated, left atrial cavity moderately to severely dilated, moderate to severe AI, moderate to severe TR  VHD  Echo 4/2025 mild to moderate mitral valve regurgitation, moderate to severe AI, moderate-severe TR  CAD  Inferior STEMI 8/2020 for medical management  Permanent atrial fibrillation  CKD III  Chronic lower extremity edema  Hypertension  Hyperlipidemia  Type 2 diabetes  JOLENE  Gout  Prostate cancer status post radiation  Bladder cancer status post surgery    Subjective    History of Present Illness    Iain Dee is a 89 y.o. male who presents today to follow-up on heart failure..    He did a short course of metolazone a week ago.  He had some hematuria while taking this but reports this has now resolved. He thinks he might have a \"blood blister\" but not sure where. His Bumex was doubled to 2 mg twice a day at last visit. His wife reports his swelling has improved. Wife says he is slowly progressing with PT but he says he is not sure. Says he still cannot take 3 steps independently      Dyspnea: improved, still has some occasionally in the late afternoon  Lower extremity swelling: improving, wearing compression wraps  Abdominal swelling: " denies  Home weight: unable to weight  Home BP/HRs: 130s when checked by home health and PT  Orthopnea/pillows denies, one. Wears CPAP   Hospital stays: 8/2024, 4/2/25, 4/11/25          Objective     Vital Signs:   Vitals:    05/28/25 1105   BP: 143/91   Pulse: 69           There is no height or weight on file to calculate BMI.  Physical Exam  Constitutional:       Appearance: Normal appearance.   HENT:      Head: Normocephalic.   Cardiovascular:      Comments: 1-2+ edema, wearing compression socks.   Pulmonary:      Effort: Pulmonary effort is normal. No respiratory distress.   Musculoskeletal:      Right lower leg: Edema present.      Left lower leg: Edema present.   Neurological:      Mental Status: He is oriented to person, place, and time. He is lethargic.   Psychiatric:         Mood and Affect: Mood normal.         Behavior: Behavior normal.         Thought Content: Thought content normal.              Data Reviewed:  Lab Results   Component Value Date    GLUCOSE 170 (H) 04/19/2025    CALCIUM 8.7 04/19/2025     04/19/2025    K 3.8 04/19/2025    CO2 32.0 (H) 04/19/2025    CL 93 (L) 04/19/2025    BUN 59 (H) 04/19/2025    CREATININE 1.92 (H) 04/19/2025    EGFR 33.1 (L) 04/19/2025    BCR 30.7 (H) 04/19/2025    ANIONGAP 12.0 04/19/2025     Lab Results   Component Value Date    WBC 4.48 04/18/2025    HGB 9.9 (L) 04/18/2025    HCT 32.1 (L) 04/18/2025    .3 (H) 04/18/2025     04/18/2025     Lab Results   Component Value Date    PROBNP 20,657.0 (H) 04/11/2025     Reviewed records from the [facility].  Labs 4/21/2025 WBC 6.1, hemoglobin 9.8, hematocrit 31.7, platelets 229.  Magnesium 2, sodium 139, potassium 3.7, glucose 212, BUN 54, creatinine 1.6, CO2 31, chloride 94.  A1c 6.7.  5/12/2025. WBC 5.4, hemoglobin 10.3, hematocrit 33.6, platelets 207. Glucose 156, BUN 47, creatinine 1.59, sodium 139, potassium 4.0, total protein 5.6, albumin 3.5, AST 51, ALT 57. Urinalysis showed 3+ protein. No blood      "  Assessment and Plan   Chronic HFrEF  - LVEF 36-40%, NYHA class III/IV ACC/AHA stage: C/D  - Symptoms improving on 2mg of bumex BID  - Labs pending  - Will plan to order furoscix to have on hand as long as labs are stable  -Continue the following guideline directed medical therapy:    Drug Class   Drug   Dose Last Dose Adjustment Notes   ACEi/ARB/ARNI    N/a CKD   Beta Blocker Carvedilol 6.25mg BID     MRA    CKD   SGLT2i Jardiance 10mg     Diuretic regimen Bumex  2mg BID     Secondary therapy        - High risk for hospital readmission. Ordered Zoll HF patch but was hesitant to put this on  - Reinforced reasons to call and diuretic plan.     2. Permanent atrial fibrillation  - Rate controlled  - Continue eliquis    3. Essential hypertension  - Appears controlled  - Home health to continue to monitor    4. Stage 3b chronic kidney disease  - Recent creatinine 1.6  - Ranged from 1.6-2.2 while inpatient  - BMP pending    5. Hematuria  - Recent UA showed 2+ blood, no bacteria  - Reports symptoms resolved and questionable \"blood blister\". Recommend that he have home healthy look at this and call PCP with any further bleeding          Follow Up {Instructions Charge Capture  Follow-up Communications :23}   No follow-ups on file.        Patient was given instructions and counseling regarding his condition or for health maintenance advice. Please see specific information pulled into the AVS if appropriate.  Advised to call the [facility] with any questions, concerns, or worsening symptoms.    "

## 2025-05-28 ENCOUNTER — TELEMEDICINE (OUTPATIENT)
Dept: CARDIOLOGY | Facility: HOSPITAL | Age: 89
End: 2025-05-28
Payer: MEDICARE

## 2025-05-28 VITALS — SYSTOLIC BLOOD PRESSURE: 143 MMHG | DIASTOLIC BLOOD PRESSURE: 91 MMHG | HEART RATE: 69 BPM

## 2025-05-28 DIAGNOSIS — I48.21 PERMANENT ATRIAL FIBRILLATION: ICD-10-CM

## 2025-05-28 DIAGNOSIS — R31.0 GROSS HEMATURIA: ICD-10-CM

## 2025-05-28 DIAGNOSIS — N18.32 STAGE 3B CHRONIC KIDNEY DISEASE: ICD-10-CM

## 2025-05-28 DIAGNOSIS — I10 ESSENTIAL HYPERTENSION: ICD-10-CM

## 2025-05-28 DIAGNOSIS — I50.22 CHRONIC HFREF (HEART FAILURE WITH REDUCED EJECTION FRACTION): Primary | ICD-10-CM

## 2025-05-29 ENCOUNTER — TELEPHONE (OUTPATIENT)
Dept: CARDIOLOGY | Facility: HOSPITAL | Age: 89
End: 2025-05-29
Payer: MEDICARE

## 2025-05-29 NOTE — TELEPHONE ENCOUNTER
Spoke with nurse Bianka again today requesting for labs, she said they do not have results due to the lab tube collected was specimen collected contaminated.     Requests for lab to be completed and results faxed to our office was initiated 5/21 to current.     Nurse stated I should call back around 3 pm tomorrow and they should have results by then.

## 2025-05-30 ENCOUNTER — TELEPHONE (OUTPATIENT)
Dept: CARDIOLOGY | Facility: HOSPITAL | Age: 89
End: 2025-05-30
Payer: MEDICARE

## 2025-05-30 ENCOUNTER — TELEPHONE (OUTPATIENT)
Dept: FAMILY MEDICINE CLINIC | Facility: CLINIC | Age: 89
End: 2025-05-30

## 2025-05-30 RX ORDER — POTASSIUM CHLORIDE 750 MG/1
20 TABLET, EXTENDED RELEASE ORAL DAILY
Qty: 30 TABLET | Refills: 0 | Status: SHIPPED | OUTPATIENT
Start: 2025-05-30 | End: 2025-05-30 | Stop reason: SDUPTHER

## 2025-05-30 RX ORDER — BUMETANIDE 2 MG/1
2 TABLET ORAL 2 TIMES DAILY
Start: 2025-05-30 | End: 2025-05-30 | Stop reason: SDUPTHER

## 2025-05-30 RX ORDER — POTASSIUM CHLORIDE 750 MG/1
20 TABLET, EXTENDED RELEASE ORAL DAILY
Qty: 60 TABLET | Refills: 3 | Status: SHIPPED | OUTPATIENT
Start: 2025-05-30

## 2025-05-30 RX ORDER — BUMETANIDE 2 MG/1
2 TABLET ORAL 2 TIMES DAILY
Qty: 60 TABLET | Refills: 3 | Status: SHIPPED | OUTPATIENT
Start: 2025-05-30

## 2025-05-30 NOTE — TELEPHONE ENCOUNTER
Caller: Maya Germain     Relationship: SELF    Best call back number: [phone]    What is your medical concern? PT WIFE IS TRYING TO  HIS MEDICATIONS. THEY USE THE [facility] ON [address]. THEY WERE TOLD THERE THAT THE PRESCRIPTION HAD BEEN SENT AND THEN RECALLED AND SENT TO ANOTHER PHARMACY. AT THE OTHER PHARMACY THEY WERE TOLD THEY NEVER RECEIVED THE PRESCRIPTION. PLEASE REACH OUT TO THE PT WIFE TO ADDRESS THIS.

## 2025-05-30 NOTE — TELEPHONE ENCOUNTER
Caller: DARRELL MCCRARY    Relationship: Home Health    Best call back number: [phone]    What orders are you requesting (i.e. lab or imaging): VERBAL ORDERS FOR WOUND CARE    In what timeframe would the patient need to come in: ASAP    Where will you receive your lab/imaging services: IN HOME    Additional notes: DARRELL IS WANTING TO USE [facility] ( [facility]) AND NEEDS VERBAL ORDER FOR THIS. PLEASE ADVISE

## 2025-05-30 NOTE — TELEPHONE ENCOUNTER
Spoke with the nurse Dariana she still does not see the completed results but is going to call the hospital they use and see if they can get the report expedited.

## 2025-05-30 NOTE — TELEPHONE ENCOUNTER
I think he may run out of bumex before we get the labs back so I sent in refills for bumex 2mg twice a day and potassium 20meq daily. We will plan to keep him on this dose, but if kidney function significantly worsens we may need to decrease

## 2025-05-30 NOTE — TELEPHONE ENCOUNTER
Just spoke with his wife- she reports the nurse is there currently drawing the labs- I explained to keep his medication as prescribed and on Monday we will follow up regarding results.    Wife verbalized understanding

## 2025-05-30 NOTE — TELEPHONE ENCOUNTER
Rody the practice manager called to stated she had discussed with the hospital that they send there labs off too, they had rejected the specimens that were collected but did not specify the reason as to why they were unable to use.    Rody stated she can have them redo labs today and send us the completed labs this afternoon by the earliest or tomorrow morning the lab results will be faxed to our office.

## 2025-05-30 NOTE — TELEPHONE ENCOUNTER
[facility] called office to report that they were unable to obtain labs as requested, RN called wife to discuss and wife states she does not want him stuck any more and maybe try back on Monday.    RN will speak with provider Adelina regarding further action with lab draw on Monday

## 2025-05-30 NOTE — TELEPHONE ENCOUNTER
Spoke with Rody the practice manager with [facility] to get clarification on not yet receiving the completion of lab work.    Rody and I discussed she said she would make a few calls and see if she herself can get those results to fax over to us, she will call my direct line back if needed.  Me #[phone]

## 2025-05-30 NOTE — TELEPHONE ENCOUNTER
Pharmacy called requesting for clarification on the continuation of patients bumex and potassium.   I stated the nurse Brigitte Dos Santos or I would call back for further advisory on medications once we received lab work from [facility].    We had received a fax this morning from [facility] requesting for a signature to release the lab report record of the labs the provider Adelina SHAW placed the order for and that I have been requesting since 5/21/25.  Brigitte Dos Santos RN had signed on behalf of the provider as Adelina is out on Friday's.  I am still waiting for completed labs.

## 2025-06-04 ENCOUNTER — TELEPHONE (OUTPATIENT)
Dept: FAMILY MEDICINE CLINIC | Facility: CLINIC | Age: 89
End: 2025-06-04
Payer: MEDICARE

## 2025-06-04 ENCOUNTER — TELEPHONE (OUTPATIENT)
Dept: CARDIOLOGY | Facility: HOSPITAL | Age: 89
End: 2025-06-04
Payer: MEDICARE

## 2025-06-04 ENCOUNTER — TELEPHONE (OUTPATIENT)
Dept: FAMILY MEDICINE CLINIC | Facility: CLINIC | Age: 89
End: 2025-06-04

## 2025-06-04 RX ORDER — GABAPENTIN 100 MG/1
100 CAPSULE ORAL 2 TIMES DAILY
Qty: 60 CAPSULE | Refills: 0 | Status: SHIPPED | OUTPATIENT
Start: 2025-06-04

## 2025-06-04 NOTE — TELEPHONE ENCOUNTER
Caller: NICK    Relationship:     Best call back number: [phone]    What orders are you requesting (i.e. lab or imaging): NEEDS VERBAL ORDERS TO CONTINUE PT FOR 4 MORE WEEKS TWICE A WEEK    In what timeframe would the patient need to come in:      Where will you receive your lab/imaging services: HOME    Additional notes:

## 2025-06-04 NOTE — TELEPHONE ENCOUNTER
Sent gabapentin 100mg BID. Low dose to start, they can follow up via telemed. They may also want to look into PACE program via [facility] (primary care program can come out to see him).

## 2025-06-04 NOTE — TELEPHONE ENCOUNTER
Called patient and his wife with lab results. Labs stable.  Glucose 157, BUN 49, creatinine 1.71, sodium 138, potassium 4.5, chloride 93, carbon dioxide 28, NT proBNP 17,105.    Continue Bumex 2 mg twice a day with potassium 20 mEq daily.     Maya Briggs is going to  a sample of furoscix in a couple of days

## 2025-06-04 NOTE — TELEPHONE ENCOUNTER
Caller: FELIX RN WITH [facility]    Best call back number: [phone]       Who are you requesting to speak with (clinical staff, provider,  specific staff member): CLINICAL     What was the call regarding:  WILL CONTINUE TWICE WEEKLY FOR THE NEXT 8 WEEKS TO SEE THE PATIENT  FOR EDEMA CONTROL OF BILATERAL LOWER EXTREMITIES   WOUND CARE, BILATERAL LEGS AND WOUND CARE TO BOTH LEGS AND SECOND TOE    DUE TO DISEASE DUE TO CHS     IF ANY QUESTIONS PLEASE CALL FELIX

## 2025-06-09 ENCOUNTER — TELEPHONE (OUTPATIENT)
Dept: FAMILY MEDICINE CLINIC | Facility: CLINIC | Age: 89
End: 2025-06-09
Payer: MEDICARE

## 2025-06-09 ENCOUNTER — TELEPHONE (OUTPATIENT)
Dept: CARDIOLOGY | Facility: HOSPITAL | Age: 89
End: 2025-06-09
Payer: MEDICARE

## 2025-06-09 RX ORDER — INDOMETHACIN 25 MG/1
25 CAPSULE ORAL 3 TIMES DAILY PRN
Qty: 9 CAPSULE | Refills: 0 | Status: SHIPPED | OUTPATIENT
Start: 2025-06-09

## 2025-06-09 NOTE — TELEPHONE ENCOUNTER
Patient is having a gout flare up, was told to call back if a medication was needed for the gout. Indomethacin is the medication she was asking for. She said that the flare up is causing him immense pain and that his hands were extremely swollen. I saw that he has a video visit with Dr. Aguilar on Wednesday, she would like something to help her  before then.  Please advise.

## 2025-06-09 NOTE — TELEPHONE ENCOUNTER
Pt's wife came by for a nurse visit for education on Furoscix application as well as a review of medication     She is requesting a refill on his potassium - will need sent over to pharmacy    Medication samples provided today.  Patient wife was instructed on how to take medication and possible side effects.    Medication: Furoscix  Quanity: 1  LOT #: [id number]  EXP: 04/30/2026  NDC: 77266-327-91

## 2025-06-09 NOTE — TELEPHONE ENCOUNTER
Bumex and potassium were sent on 5/30.  Receipt was confirmed by pharmacy, it should be ready, but if not let me know and I will resend

## 2025-06-09 NOTE — TELEPHONE ENCOUNTER
Sent. I will see him on Wednesday still to check up. If his hand worsens they should go to ER especially if he has fever.

## 2025-06-09 NOTE — TELEPHONE ENCOUNTER
DARRELL FROM [facility] CALLED STATING THAT THE PTS RIGHT HAND HAS BEEN SWOLLEN AND IS DISFIGURED AND PT STATES ITS PAINFUL , STATES THAT HE HAS HAD WENT THE ER FOR GOUT IN THE PAST AND DARRELL WANTS TO KNOW WHAT SHE SHOULD DO. HAND HAS BEEN LIKE THAT OVER THE WEEKEND.

## 2025-06-11 ENCOUNTER — TELEMEDICINE (OUTPATIENT)
Dept: FAMILY MEDICINE CLINIC | Facility: CLINIC | Age: 89
End: 2025-06-11
Payer: MEDICARE

## 2025-06-11 DIAGNOSIS — R22.31 LOCALIZED SWELLING ON RIGHT HAND: Primary | ICD-10-CM

## 2025-06-11 RX ORDER — SACCHAROMYCES BOULARDII 250 MG
250 CAPSULE ORAL 2 TIMES DAILY
Qty: 20 CAPSULE | Refills: 0 | Status: SHIPPED | OUTPATIENT
Start: 2025-06-11 | End: 2025-06-21

## 2025-06-11 RX ORDER — DOXYCYCLINE 100 MG/1
100 TABLET ORAL 2 TIMES DAILY
Qty: 20 TABLET | Refills: 0 | Status: SHIPPED | OUTPATIENT
Start: 2025-06-11 | End: 2025-06-21

## 2025-06-11 NOTE — PATIENT INSTRUCTIONS
If the hand swelling does not improve over the next few days with the antibiotic, go to the ER or hospital  Continue unna boots with home health- if the feet stop getting better

## 2025-06-11 NOTE — PROGRESS NOTES
Established Patient Virtual Visit      Patient Name: Iain Dee  : 1935   MRN: [id number]   Care Team: Patient Care Team:  Luis Aguilar DO as PCP - General (Family Medicine)  Iain Bernstein MD as Consulting Physician (Cardiology)  Isabel Stover MD as Cardiologist (Cardiology)  SANDY CALLAWAY as Resident  [facility] as Resident    Chief Complaint:  No chief complaint on file.      History of Present Illness: Iain Dee is a 89 y.o. male who is here today for chief complaint.    HPI    Swelling redness of both feet  Last 2 nights he has finally not had pain and been able to sleep    You have chosen to receive care through a telehealth visit.  Do you consent to use a video/audio connection for your medical care today? Yes    Patient location: [address] [place] [address]   Provider Location: [address] [place] [address]    The following portions of the patient's history were reviewed and updated as appropriate: allergies, current medications, past family history, past medical history, past social history, past surgical history and problem list.    Subjective      Review of Systems:   Review of Systems - See HPI  Review of Systems -  General ROS: negative for - chills, fever or night sweats  Cardiovascular ROS: no chest pain or dyspnea on exertion  Gastrointestinal ROS: no abdominal pain, change in bowel habits, or black or bloody stools  Genito-Urinary ROS: no dysuria, trouble voiding, or hematuria  Past Medical History:   Past Medical History:   Diagnosis Date    A-fib     Arthritis     Cancer     Diabetes mellitus     GERD (gastroesophageal reflux disease)     Gout     H/O Bladder carcinoma (HCC)     Hypertension        Past Surgical History:   Past Surgical History:   Procedure Laterality Date    BLADDER SURGERY      Biopsy    HERNIA REPAIR      PROSTATE BIOPSY         Family History:   Family History   Problem Relation Age of Onset    No Known Problems  "Mother     Heart attack Father        Social History:   Social History     Socioeconomic History    Marital status:    Tobacco Use    Smoking status: Never     Passive exposure: Never    Smokeless tobacco: Never   Vaping Use    Vaping status: Never Used   Substance and Sexual Activity    Alcohol use: Yes     Alcohol/week: 1.0 standard drink of alcohol     Types: 1 Shots of liquor per week     Comment: Estimation based on spouse report of patient drinking \"1 large martini\" each night (regular martini = 3 shots, large martini = est. 4 shots)    Drug use: No    Sexual activity: Defer       Tobacco History:   Social History     Tobacco Use   Smoking Status Never    Passive exposure: Never   Smokeless Tobacco Never       Medications:   Outpatient Medications Prior to Visit   Medication Sig Dispense Refill    apixaban (ELIQUIS) 2.5 MG tablet tablet Take 1 tablet by mouth 2 (Two) Times a Day. Indications: Atrial Fibrillation 60 tablet 11    aspirin 81 MG EC tablet Take 1 tablet by mouth Daily. 30 tablet 0    atorvastatin (LIPITOR) 40 MG tablet Take 1 tablet by mouth Every Night. 90 tablet 3    bumetanide (BUMEX) 2 MG tablet Take 1 tablet by mouth 2 (Two) Times a Day. 60 tablet 3    carvedilol (COREG) 6.25 MG tablet Take 1 tablet by mouth 2 (Two) Times a Day With Meals.      famotidine (PEPCID) 20 MG tablet Take 1 tablet by mouth 2 (Two) Times a Day As Needed for Heartburn. 180 tablet 0    gabapentin (NEURONTIN) 100 MG capsule Take 1 capsule by mouth 2 (Two) Times a Day. 60 capsule 0    indomethacin (INDOCIN) 25 MG capsule Take 1 capsule by mouth 3 (Three) Times a Day As Needed for Moderate Pain. 9 capsule 0    Jardiance 10 MG tablet tablet Take 1 tablet by mouth Daily. 90 tablet 11    magnesium oxide (MAG-OX) 400 MG tablet Take 1 tablet by mouth Daily. 30 tablet 11    metFORMIN (GLUCOPHAGE) 500 MG tablet Take 1 tablet by mouth 2 (Two) Times a Day With Meals. 180 tablet 0    nitroglycerin (NITRODUR) 0.2 MG/HR patch " Place 1 patch on the skin as directed by provider See Admin Instructions. Apply patch daily, remove at night for at least 10 hours (Patient not taking: Reported on 6/18/2025) 90 patch 3    potassium chloride 10 MEQ CR tablet Take 2 tablets by mouth Daily. 60 tablet 3    metOLazone (ZAROXOLYN) 2.5 MG tablet Take 30 min before morning bumex dose on Friday, Sunday and Tuesday (Patient not taking: Reported on 5/28/2025) 3 tablet 0     No facility-administered medications prior to visit.        Allergies:   Allergies   Allergen Reactions    Colchicine Other (See Comments)     Unable to walk        Objective   Objective     Physical Exam:  Vital Signs:  There were no vitals taken for this visit.  Vitals obtained from patient if available  Physical Exam  Const: Non-toxic appearing, NAD, A&Ox4, Pleasant, Cooperative  Eyes: EOMI, no conjunctivitis  ENT: No copious nasal drainage noted  Cardiac: Regular rate by pulse  Resp: Respiratory rate observed to be within normal limits, no increased work of breathing observed, no audible wheezing or cough noted  Psych: Appropriate mood and behavior.  Assessment & Plan   Assessment / Plan      Assessment/Plan:   Problems Addressed This Visit  Problem List Items Addressed This Visit    None  Visit Diagnoses         Localized swelling on right hand    -  Primary    Relevant Medications    doxycycline (ADOXA) 100 MG tablet    saccharomyces boulardii (Florastor) 250 MG capsule          Recommended letting toe wounds air dry  Pain mostly in 5th digit on ulnar side of right hand    See patient diagnoses and orders along with patient instructions for assessment, plan, and changes to care for patient.    This visit was conducted via telemedicine with live video and audio provided through [employer] at the point of care.    Patient Instructions   If the hand swelling does not improve over the next few days with the antibiotic, go to the ER or hospital  Continue unna boots with home health- if the  feet stop getting better    Return in about 2 days (around 6/13/2025) for video visit.    DO RHONDA Willis PC [address]  [facility]  [address]  [facility] [phone]  Fax [phone]  Phone [phone]    Disclaimer to patients: The 21st Century Cares Act makes medical notes like these available to patients in the interest of transparency. However, please be advised that this is still a medical document. It is intended as [id number]-[id number]-etya communication. Many sections may include medical language or jargon, abbreviations, and additional verbiage that are unfamiliar or confusing. In some ways it may come across as blunt, direct, or may be summarized in order to clearly and concisely communicate the most crucial information to medical professionals. It may also include mentions of conditions that are unlikely but considered as part of the differential diagnosis, including serious disorders. These are not always discussed at length at the time of appointment because their likelihood is so low, but may be included in a medical note to make it clear what has been considered and/or ruled out as part of a work-up. Medical documents are intended to carry relevant information, facts as evident, and the personal clinical opinion of the physician. If you have any questions regarding this medical document, please bring them to the attention of the physician at your next scheduled appointment.

## 2025-06-13 ENCOUNTER — TELEMEDICINE (OUTPATIENT)
Dept: FAMILY MEDICINE CLINIC | Facility: CLINIC | Age: 89
End: 2025-06-13
Payer: MEDICARE

## 2025-06-13 DIAGNOSIS — R22.31 LOCALIZED SWELLING ON RIGHT HAND: Primary | ICD-10-CM

## 2025-06-13 NOTE — PROGRESS NOTES
Established Patient Virtual Visit      Patient Name: Iain Dee  : 1935   MRN: [id number]   Care Team: Patient Care Team:  Luis Aguilar DO as PCP - General (Family Medicine)  Iain Bernstein MD as Consulting Physician (Cardiology)  Isabel Stover MD as Cardiologist (Cardiology)  SANDY CALLAWAY as Resident  [facility] as Resident    Chief Complaint:    Chief Complaint   Patient presents with    Follow-up     Right hand swelling       History of Present Illness: Iain Dee is a 89 y.o. male who is here today for chief complaint.    HPI    Follow up on his hand    You have chosen to receive care through a telehealth visit.  Do you consent to use a video/audio connection for your medical care today? Yes    Patient location: [address] [place] [address]   Provider Location: [address] [place] [address]    The following portions of the patient's history were reviewed and updated as appropriate: allergies, current medications, past family history, past medical history, past social history, past surgical history and problem list.    Subjective      Review of Systems:   Review of Systems - See HPI  Review of Systems -  General ROS: negative for - chills, fever or night sweats  Cardiovascular ROS: no chest pain or dyspnea on exertion  Gastrointestinal ROS: no abdominal pain, change in bowel habits, or black or bloody stools  Genito-Urinary ROS: no dysuria, trouble voiding, or hematuria  Past Medical History:   Past Medical History:   Diagnosis Date    A-fib     Arthritis     Cancer     Diabetes mellitus     GERD (gastroesophageal reflux disease)     Gout     H/O Bladder carcinoma (HCC)     Hypertension        Past Surgical History:   Past Surgical History:   Procedure Laterality Date    BLADDER SURGERY      Biopsy    HERNIA REPAIR      PROSTATE BIOPSY         Family History:   Family History   Problem Relation Age of Onset    No Known Problems Mother     Heart attack  "Father        Social History:   Social History     Socioeconomic History    Marital status:    Tobacco Use    Smoking status: Never     Passive exposure: Never    Smokeless tobacco: Never   Vaping Use    Vaping status: Never Used   Substance and Sexual Activity    Alcohol use: Yes     Alcohol/week: 1.0 standard drink of alcohol     Types: 1 Shots of liquor per week     Comment: Estimation based on spouse report of patient drinking \"1 large martini\" each night (regular martini = 3 shots, large martini = est. 4 shots)    Drug use: No    Sexual activity: Defer       Tobacco History:   Social History     Tobacco Use   Smoking Status Never    Passive exposure: Never   Smokeless Tobacco Never       Medications:   Outpatient Medications Prior to Visit   Medication Sig Dispense Refill    apixaban (ELIQUIS) 2.5 MG tablet tablet Take 1 tablet by mouth 2 (Two) Times a Day. Indications: Atrial Fibrillation 60 tablet 11    aspirin 81 MG EC tablet Take 1 tablet by mouth Daily. 30 tablet 0    atorvastatin (LIPITOR) 40 MG tablet Take 1 tablet by mouth Every Night. 90 tablet 3    bumetanide (BUMEX) 2 MG tablet Take 1 tablet by mouth 2 (Two) Times a Day. 60 tablet 3    carvedilol (COREG) 6.25 MG tablet Take 1 tablet by mouth 2 (Two) Times a Day With Meals.      doxycycline (ADOXA) 100 MG tablet Take 1 tablet by mouth 2 (Two) Times a Day for 10 days. Avoid prolonged sun exposure while taking. No milk or multivitamins 1 hour prior to 2 hours after dose. 20 tablet 0    famotidine (PEPCID) 20 MG tablet Take 1 tablet by mouth 2 (Two) Times a Day As Needed for Heartburn. 180 tablet 0    gabapentin (NEURONTIN) 100 MG capsule Take 1 capsule by mouth 2 (Two) Times a Day. 60 capsule 0    indomethacin (INDOCIN) 25 MG capsule Take 1 capsule by mouth 3 (Three) Times a Day As Needed for Moderate Pain. 9 capsule 0    Jardiance 10 MG tablet tablet Take 1 tablet by mouth Daily. 90 tablet 11    magnesium oxide (MAG-OX) 400 MG tablet Take 1 " tablet by mouth Daily. 30 tablet 11    metFORMIN (GLUCOPHAGE) 500 MG tablet Take 1 tablet by mouth 2 (Two) Times a Day With Meals. 180 tablet 0    nitroglycerin (NITRODUR) 0.2 MG/HR patch Place 1 patch on the skin as directed by provider See Admin Instructions. Apply patch daily, remove at night for at least 10 hours (Patient not taking: Reported on 6/18/2025) 90 patch 3    potassium chloride 10 MEQ CR tablet Take 2 tablets by mouth Daily. 60 tablet 3    saccharomyces boulardii (Florastor) 250 MG capsule Take 1 capsule by mouth 2 (Two) Times a Day for 10 days. 20 capsule 0    metOLazone (ZAROXOLYN) 2.5 MG tablet Take 30 min before morning bumex dose on Friday, Sunday and Tuesday (Patient not taking: Reported on 5/28/2025) 3 tablet 0     No facility-administered medications prior to visit.        Allergies:   Allergies   Allergen Reactions    Colchicine Other (See Comments)     Unable to walk        Objective   Objective     Physical Exam:  Vital Signs:  There were no vitals taken for this visit.  Vitals obtained from patient if available  Physical Exam  Const: Non-toxic appearing, NAD, A&Ox4, Pleasant, Cooperative  Eyes: EOMI, no conjunctivitis  ENT: No copious nasal drainage noted  Cardiac: Regular rate by pulse  Resp: Respiratory rate observed to be within normal limits, no increased work of breathing observed, no audible wheezing or cough noted  Psych: Appropriate mood and behavior.  Assessment & Plan   Assessment / Plan      Assessment/Plan:   Problems Addressed This Visit  Problem List Items Addressed This Visit    None  Visit Diagnoses         Localized swelling on right hand    -  Primary            See patient diagnoses and orders along with patient instructions for assessment, plan, and changes to care for patient.    This visit was conducted via telemedicine with live video and audio provided through [employer] at the point of care.    Patient Instructions   Due to the severe swelling in the right hand I would  recommend the ER  In general keep the hand elevated and continue the antibiotic    Return in about 2 weeks (around 6/27/2025) for after ER.    DO RHONDA Willis PC DICK SINGH  [facility]  [address]  [facility] [phone]  Fax [phone]  Phone [phone]    Disclaimer to patients: The 21st Century Cares Act makes medical notes like these available to patients in the interest of transparency. However, please be advised that this is still a medical document. It is intended as gwyb-sy-yajl communication. Many sections may include medical language or jargon, abbreviations, and additional verbiage that are unfamiliar or confusing. In some ways it may come across as blunt, direct, or may be summarized in order to clearly and concisely communicate the most crucial information to medical professionals. It may also include mentions of conditions that are unlikely but considered as part of the differential diagnosis, including serious disorders. These are not always discussed at length at the time of appointment because their likelihood is so low, but may be included in a medical note to make it clear what has been considered and/or ruled out as part of a work-up. Medical documents are intended to carry relevant information, facts as evident, and the personal clinical opinion of the physician. If you have any questions regarding this medical document, please bring them to the attention of the physician at your next scheduled appointment.

## 2025-06-13 NOTE — PATIENT INSTRUCTIONS
Due to the severe swelling in the right hand I would recommend the ER  In general keep the hand elevated and continue the antibiotic

## 2025-06-16 ENCOUNTER — HOSPITAL ENCOUNTER (EMERGENCY)
Facility: HOSPITAL | Age: 89
Discharge: HOME | End: 2025-06-16
Attending: EMERGENCY MEDICINE | Admitting: EMERGENCY MEDICINE
Payer: MEDICARE

## 2025-06-16 ENCOUNTER — APPOINTMENT (OUTPATIENT)
Dept: GENERAL RADIOLOGY | Facility: HOSPITAL | Age: 89
End: 2025-06-16
Payer: MEDICARE

## 2025-06-16 VITALS
OXYGEN SATURATION: 93 % | BODY MASS INDEX: 23.86 KG/M2 | HEART RATE: 68 BPM | HEIGHT: 73 IN | RESPIRATION RATE: 14 BRPM | DIASTOLIC BLOOD PRESSURE: 79 MMHG | SYSTOLIC BLOOD PRESSURE: 123 MMHG | WEIGHT: 180 LBS | TEMPERATURE: 97.6 F

## 2025-06-16 DIAGNOSIS — M79.641 PAIN OF RIGHT HAND: Primary | ICD-10-CM

## 2025-06-16 DIAGNOSIS — N18.9 CHRONIC KIDNEY DISEASE, UNSPECIFIED CKD STAGE: ICD-10-CM

## 2025-06-16 DIAGNOSIS — M19.90 INFLAMMATORY ARTHRITIS: ICD-10-CM

## 2025-06-16 LAB
ALBUMIN SERPL-MCNC: 3.3 G/DL (ref 3.5–5.2)
ALBUMIN/GLOB SERPL: 1.2 G/DL
ALP SERPL-CCNC: 349 U/L (ref 39–117)
ALT SERPL W P-5'-P-CCNC: 26 U/L (ref 1–41)
ANION GAP SERPL CALCULATED.3IONS-SCNC: 11 MMOL/L (ref 5–15)
AST SERPL-CCNC: 29 U/L (ref 1–40)
BASOPHILS # BLD AUTO: 0.04 10*3/MM3 (ref 0–0.2)
BASOPHILS NFR BLD AUTO: 0.7 % (ref 0–1.5)
BILIRUB SERPL-MCNC: 0.8 MG/DL (ref 0–1.2)
BUN SERPL-MCNC: 58.9 MG/DL (ref 8–23)
BUN/CREAT SERPL: 33.9 (ref 7–25)
CALCIUM SPEC-SCNC: 8.6 MG/DL (ref 8.6–10.5)
CHLORIDE SERPL-SCNC: 98 MMOL/L (ref 98–107)
CO2 SERPL-SCNC: 30 MMOL/L (ref 22–29)
CREAT SERPL-MCNC: 1.74 MG/DL (ref 0.76–1.27)
CRP SERPL-MCNC: 5.5 MG/DL (ref 0–0.5)
D-LACTATE SERPL-SCNC: 1.5 MMOL/L (ref 0.5–2)
DEPRECATED RDW RBC AUTO: 65.3 FL (ref 37–54)
EGFRCR SERPLBLD CKD-EPI 2021: 37.2 ML/MIN/1.73
EOSINOPHIL # BLD AUTO: 0.12 10*3/MM3 (ref 0–0.4)
EOSINOPHIL NFR BLD AUTO: 2.1 % (ref 0.3–6.2)
ERYTHROCYTE [DISTWIDTH] IN BLOOD BY AUTOMATED COUNT: 17.6 % (ref 12.3–15.4)
ERYTHROCYTE [SEDIMENTATION RATE] IN BLOOD: 56 MM/HR (ref 0–20)
GLOBULIN UR ELPH-MCNC: 2.7 GM/DL
GLUCOSE SERPL-MCNC: 162 MG/DL (ref 65–99)
HCT VFR BLD AUTO: 31 % (ref 37.5–51)
HGB BLD-MCNC: 9.3 G/DL (ref 13–17.7)
HOLD SPECIMEN: NORMAL
IMM GRANULOCYTES # BLD AUTO: 0.01 10*3/MM3 (ref 0–0.05)
IMM GRANULOCYTES NFR BLD AUTO: 0.2 % (ref 0–0.5)
LYMPHOCYTES # BLD AUTO: 0.48 10*3/MM3 (ref 0.7–3.1)
LYMPHOCYTES NFR BLD AUTO: 8.4 % (ref 19.6–45.3)
MCH RBC QN AUTO: 30.2 PG (ref 26.6–33)
MCHC RBC AUTO-ENTMCNC: 30 G/DL (ref 31.5–35.7)
MCV RBC AUTO: 100.6 FL (ref 79–97)
MONOCYTES # BLD AUTO: 0.63 10*3/MM3 (ref 0.1–0.9)
MONOCYTES NFR BLD AUTO: 11 % (ref 5–12)
NEUTROPHILS NFR BLD AUTO: 4.43 10*3/MM3 (ref 1.7–7)
NEUTROPHILS NFR BLD AUTO: 77.6 % (ref 42.7–76)
NRBC BLD AUTO-RTO: 0 /100 WBC (ref 0–0.2)
PLATELET # BLD AUTO: 214 10*3/MM3 (ref 140–450)
PMV BLD AUTO: 10.8 FL (ref 6–12)
POTASSIUM SERPL-SCNC: 4 MMOL/L (ref 3.5–5.2)
PROCALCITONIN SERPL-MCNC: 0.35 NG/ML (ref 0–0.25)
PROT SERPL-MCNC: 6 G/DL (ref 6–8.5)
RBC # BLD AUTO: 3.08 10*6/MM3 (ref 4.14–5.8)
SODIUM SERPL-SCNC: 139 MMOL/L (ref 136–145)
URATE SERPL-MCNC: 8.9 MG/DL (ref 3.4–7)
WBC NRBC COR # BLD AUTO: 5.71 10*3/MM3 (ref 3.4–10.8)
WHOLE BLOOD HOLD COAG: NORMAL
WHOLE BLOOD HOLD SPECIMEN: NORMAL

## 2025-06-16 PROCEDURE — 86140 C-REACTIVE PROTEIN: CPT | Performed by: NURSE PRACTITIONER

## 2025-06-16 PROCEDURE — 80053 COMPREHEN METABOLIC PANEL: CPT | Performed by: NURSE PRACTITIONER

## 2025-06-16 PROCEDURE — 73130 X-RAY EXAM OF HAND: CPT

## 2025-06-16 PROCEDURE — 83605 ASSAY OF LACTIC ACID: CPT | Performed by: NURSE PRACTITIONER

## 2025-06-16 PROCEDURE — 84145 PROCALCITONIN (PCT): CPT | Performed by: NURSE PRACTITIONER

## 2025-06-16 PROCEDURE — 99283 EMERGENCY DEPT VISIT LOW MDM: CPT

## 2025-06-16 PROCEDURE — 85652 RBC SED RATE AUTOMATED: CPT | Performed by: NURSE PRACTITIONER

## 2025-06-16 PROCEDURE — 63710000001 PREDNISONE PER 1 MG: Performed by: NURSE PRACTITIONER

## 2025-06-16 PROCEDURE — 84550 ASSAY OF BLOOD/URIC ACID: CPT | Performed by: NURSE PRACTITIONER

## 2025-06-16 PROCEDURE — 85025 COMPLETE CBC W/AUTO DIFF WBC: CPT | Performed by: NURSE PRACTITIONER

## 2025-06-16 RX ORDER — SODIUM CHLORIDE 0.9 % (FLUSH) 0.9 %
10 SYRINGE (ML) INJECTION AS NEEDED
Status: DISCONTINUED | OUTPATIENT
Start: 2025-06-16 | End: 2025-06-16 | Stop reason: HOSPADM

## 2025-06-16 RX ORDER — OXYCODONE HYDROCHLORIDE 5 MG/1
5 TABLET ORAL ONCE
Refills: 0 | Status: COMPLETED | OUTPATIENT
Start: 2025-06-16 | End: 2025-06-16

## 2025-06-16 RX ORDER — METHYLPREDNISOLONE 4 MG/1
TABLET ORAL
Qty: 21 TABLET | Refills: 0 | Status: SHIPPED | OUTPATIENT
Start: 2025-06-16

## 2025-06-16 RX ORDER — HYDROCODONE BITARTRATE AND ACETAMINOPHEN 5; 325 MG/1; MG/1
1 TABLET ORAL EVERY 6 HOURS PRN
Qty: 12 TABLET | Refills: 0 | Status: SHIPPED | OUTPATIENT
Start: 2025-06-16 | End: 2025-06-19

## 2025-06-16 RX ORDER — BUMETANIDE 1 MG/1
2 TABLET ORAL ONCE
Status: COMPLETED | OUTPATIENT
Start: 2025-06-16 | End: 2025-06-16

## 2025-06-16 RX ORDER — PREDNISONE 20 MG/1
60 TABLET ORAL ONCE
Status: COMPLETED | OUTPATIENT
Start: 2025-06-16 | End: 2025-06-16

## 2025-06-16 RX ADMIN — BUMETANIDE 2 MG: 1 TABLET ORAL at 12:31

## 2025-06-16 RX ADMIN — OXYCODONE 5 MG: 5 TABLET ORAL at 12:31

## 2025-06-16 RX ADMIN — PREDNISONE 60 MG: 20 TABLET ORAL at 12:31

## 2025-06-16 NOTE — ED PROVIDER NOTES
"       EMERGENCY DEPARTMENT ENCOUNTER    Pt Name: Iain Dee  MRN: [id number]  Pt :   1935  Room Number:    Date of encounter:  2025  PCP: Luis Aguilar DO  ED Provider: COLIN Madrid    Historian: Patient, spouse      HPI:  Chief Complaint: Right hand swelling        Context: Iain Dee is a 89 y.o. male who presents to the ED c/o right hand swelling since 1 week ago.  Reports no apparent injury or insect bite.  Patient was evaluated for the symptoms via telehealth by family doctor, was treated for gout and skin infection.  States symptoms are not improving.  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  Denies fever, chills, shortness of breath or chest pain or other systemic symptoms.      PAST MEDICAL HISTORY  Past Medical History:   Diagnosis Date    A-fib     Arthritis     Cancer     Diabetes mellitus     GERD (gastroesophageal reflux disease)     Gout     H/O Bladder carcinoma (HCC)     Hypertension          PAST SURGICAL HISTORY  Past Surgical History:   Procedure Laterality Date    BLADDER SURGERY      Biopsy    HERNIA REPAIR      PROSTATE BIOPSY           FAMILY HISTORY  Family History   Problem Relation Age of Onset    No Known Problems Mother     Heart attack Father          SOCIAL HISTORY  Social History     Socioeconomic History    Marital status:    Tobacco Use    Smoking status: Never     Passive exposure: Never    Smokeless tobacco: Never   Vaping Use    Vaping status: Never Used   Substance and Sexual Activity    Alcohol use: Yes     Alcohol/week: 1.0 standard drink of alcohol     Types: 1 Shots of liquor per week     Comment: Estimation based on spouse report of patient drinking \"1 large martini\" each night (regular martini = 3 shots, large martini = est. 4 shots)    Drug use: No    Sexual activity: Defer         ALLERGIES  Colchicine        REVIEW OF SYSTEMS  Review of Systems       All " systems reviewed and negative except for those discussed in HPI.       PHYSICAL EXAM    I have reviewed the triage vital signs and nursing notes.    ED Triage Vitals [06/16/25 0938]   Temp Heart Rate Resp BP SpO2   97.6 °F (36.4 °C) 66 14 146/100 97 %      Temp src Heart Rate Source Patient Position BP Location FiO2 (%)   Oral Monitor -- -- --       Physical Exam  GENERAL:   Appears in no acute distress.  Obese  HENT: Nares patent.  EYES: No scleral icterus.  CV: Regular rhythm, regular rate.  RESPIRATORY: Normal effort.  No audible wheezes, rales or rhonchi.  ABDOMEN: Soft, nontender  MUSCULOSKELETAL: No deformities.  Edematous, mildly erythematous and warm right hand  NEURO: Alert, moves all extremities, follows commands.  SKIN: Warm, dry, no rash visualized.      LAB RESULTS  Recent Results (from the past 24 hours)   Green Top (Gel)    Collection Time: 06/16/25 10:00 AM   Result Value Ref Range    Extra Tube Hold for add-ons.    Lavender Top    Collection Time: 06/16/25 10:00 AM   Result Value Ref Range    Extra Tube hold for add-on    Gold Top - SST    Collection Time: 06/16/25 10:00 AM   Result Value Ref Range    Extra Tube Hold for add-ons.    Gray Top    Collection Time: 06/16/25 10:00 AM   Result Value Ref Range    Extra Tube Hold for add-ons.    Light Blue Top    Collection Time: 06/16/25 10:00 AM   Result Value Ref Range    Extra Tube Hold for add-ons.    Comprehensive Metabolic Panel    Collection Time: 06/16/25 10:00 AM    Specimen: Blood   Result Value Ref Range    Glucose 162 (H) 65 - 99 mg/dL    BUN 58.9 (H) 8.0 - 23.0 mg/dL    Creatinine 1.74 (H) 0.76 - 1.27 mg/dL    Sodium 139 136 - 145 mmol/L    Potassium 4.0 3.5 - 5.2 mmol/L    Chloride 98 98 - 107 mmol/L    CO2 30.0 (H) 22.0 - 29.0 mmol/L    Calcium 8.6 8.6 - 10.5 mg/dL    Total Protein 6.0 6.0 - 8.5 g/dL    Albumin 3.3 (L) 3.5 - 5.2 g/dL    ALT (SGPT) 26 1 - 41 U/L    AST (SGOT) 29 1 - 40 U/L    Alkaline Phosphatase 349 (H) 39 - 117 U/L    Total  Bilirubin 0.8 0.0 - 1.2 mg/dL    Globulin 2.7 gm/dL    A/G Ratio 1.2 g/dL    BUN/Creatinine Ratio 33.9 (H) 7.0 - 25.0    Anion Gap 11.0 5.0 - 15.0 mmol/L    eGFR 37.2 (L) >60.0 mL/min/1.73   Lactic Acid, Plasma    Collection Time: 06/16/25 10:00 AM    Specimen: Blood   Result Value Ref Range    Lactate 1.5 0.5 - 2.0 mmol/L   Procalcitonin    Collection Time: 06/16/25 10:00 AM    Specimen: Blood   Result Value Ref Range    Procalcitonin 0.35 (H) 0.00 - 0.25 ng/mL   C-reactive Protein    Collection Time: 06/16/25 10:00 AM    Specimen: Blood   Result Value Ref Range    C-Reactive Protein 5.50 (H) 0.00 - 0.50 mg/dL   Sedimentation Rate    Collection Time: 06/16/25 10:00 AM    Specimen: Blood   Result Value Ref Range    Sed Rate 56 (H) 0 - 20 mm/hr   Uric Acid    Collection Time: 06/16/25 10:00 AM    Specimen: Blood   Result Value Ref Range    Uric Acid 8.9 (H) 3.4 - 7.0 mg/dL   CBC Auto Differential    Collection Time: 06/16/25 10:00 AM    Specimen: Blood   Result Value Ref Range    WBC 5.71 3.40 - 10.80 10*3/mm3    RBC 3.08 (L) 4.14 - 5.80 10*6/mm3    Hemoglobin 9.3 (L) 13.0 - 17.7 g/dL    Hematocrit 31.0 (L) 37.5 - 51.0 %    .6 (H) 79.0 - 97.0 fL    MCH 30.2 26.6 - 33.0 pg    MCHC 30.0 (L) 31.5 - 35.7 g/dL    RDW 17.6 (H) 12.3 - 15.4 %    RDW-SD 65.3 (H) 37.0 - 54.0 fl    MPV 10.8 6.0 - 12.0 fL    Platelets 214 140 - 450 10*3/mm3    Neutrophil % 77.6 (H) 42.7 - 76.0 %    Lymphocyte % 8.4 (L) 19.6 - 45.3 %    Monocyte % 11.0 5.0 - 12.0 %    Eosinophil % 2.1 0.3 - 6.2 %    Basophil % 0.7 0.0 - 1.5 %    Immature Grans % 0.2 0.0 - 0.5 %    Neutrophils, Absolute 4.43 1.70 - 7.00 10*3/mm3    Lymphocytes, Absolute 0.48 (L) 0.70 - 3.10 10*3/mm3    Monocytes, Absolute 0.63 0.10 - 0.90 10*3/mm3    Eosinophils, Absolute 0.12 0.00 - 0.40 10*3/mm3    Basophils, Absolute 0.04 0.00 - 0.20 10*3/mm3    Immature Grans, Absolute 0.01 0.00 - 0.05 10*3/mm3    nRBC 0.0 0.0 - 0.2 /100 WBC       If labs were ordered, I independently  reviewed the results and considered them in treating the patient.        RADIOLOGY  XR Hand 3+ View Right  Result Date: 6/16/2025  XR HAND 3+ VW RIGHT Date of Exam: 6/16/2025 10:30 AM EDT Indication: hand swelling Comparison: None available. Findings: No acute fracture is identified. There is suggestion of marginal erosions involving the second metacarpophalangeal and the second and third proximal phalangeal joints, suggesting a component of inflammatory arthritis. There are overall moderate degenerative changes most prominent along the metacarpophalangeal joints. The soft tissues are unremarkable.     Impression: 1.No acute osseous process identified. 2.Suggestion of marginal erosions involving second metacarpophalangeal and second and third proximal phalangeal joints, suggesting a component of inflammatory arthritis. Electronically Signed: Roosevelt Scott MD  6/16/2025 11:09 AM EDT  Workstation ID: [id number]      I ordered and independently reviewed the above noted radiographic studies.      I viewed images of x-ray of right hand which showed no acute osseous process per my independent interpretation.    See radiologist's dictation for official interpretation.        PROCEDURES    Procedures    No orders to display       MEDICATIONS GIVEN IN ER    Medications   sodium chloride 0.9 % flush 10 mL (has no administration in time range)   oxyCODONE (ROXICODONE) immediate release tablet 5 mg (5 mg Oral Given 6/16/25 1231)   predniSONE (DELTASONE) tablet 60 mg (60 mg Oral Given 6/16/25 1231)   bumetanide (BUMEX) tablet 2 mg (2 mg Oral Given 6/16/25 1231)         MEDICAL DECISION MAKING, PROGRESS, and CONSULTS    All labs, if obtained, have been independently reviewed by me.  All radiology studies, if obtained, have been reviewed by me and the radiologist dictating the report.  All EKG's, if obtained, have been independently viewed and interpreted by me/my attending physician.      Discussion below represents my analysis  of pertinent findings related to patient's condition, differential diagnosis, treatment plan and final disposition.  Patient is 89-year-old male with history of A-fib, arthritis, lung cancer, diabetes, GERD, gout, bladder carcinoma, hypertension presents for evaluation of right hand pain and swelling since 1 day ago.  On physical exam he was nontoxic appearing, erythema, edema noted to the right hand without significant warmth.  Lab work was obtained significant for WBC 5.71, Sed rate 56, Procal 0.35, CRP 5.50, Uric acid 8.9, x-ray of the hand showed inflammatory arthritis.  Discussed patient with Dr. Hanson, suspected inflammatory arthritis, less likely cellulitis, advised steroid taper and follow-up with primary care,                       Differential diagnosis:    Arthritis, gout flare, cellulitis, malignancy      Additional sources:    - Discussed/ obtained information from independent historians: Spouse    - External (non-ED) record review: 6/13/2025, telemedicine with   primary care, was seen for localized swelling of right hand, was prescribed doxycycline.    - Chronic or social conditions impacting care: Advanced age, comorbidities    - Shared decision making: Patient, spouse      Orders placed during this visit:  Orders Placed This Encounter   Procedures    XR Hand 3+ View Right    [place] Draw    Comprehensive Metabolic Panel    Lactic Acid, Plasma    Procalcitonin    C-reactive Protein    Sedimentation Rate    Uric Acid    CBC Auto Differential    Insert peripheral IV    Green Top (Gel)    Lavender Top    Gold Top - SST    Gray Top    Light Blue Top    CBC & Differential         Additional orders considered but not ordered:  CT UE    ED Course:    Consultants:      ED Course as of 06/16/25 1537   Mon Jun 16, 2025   1110 Creatinine(!): 1.74  Chronic renal insufficiency [IR]   1110 Hemoglobin(!): 9.3 [IR]   1110 Hematocrit(!): 31.0  Chronic stable anemia [IR]   1110 Uric Acid(!): 8.9 [IR]   1110  C-Reactive Protein(!): 5.50 [IR]   1110 Sed Rate(!): 56 [IR]   1110 Lactate: 1.5 [IR]   1110 WBC: 5.71 [IR]   1226 Spoke with Dr. Hanson, suggest most likely inflammatory arthritis, advised steroid taper and follow-up with primary care [IR]      ED Course User Index  [IR] Jody Rick, APRN              Shared Decision Making:  After my consideration of clinical presentation and any laboratory/radiology studies obtained, I discussed the findings with the patient/patient representative who is in agreement with the treatment plan and the final disposition.   Risks and benefits of discharge and/or observation/admission were discussed.       AS OF 15:37 EDT VITALS:    BP - 123/79  HR - 68  TEMP - 97.6 °F (36.4 °C) (Oral)  O2 SATS - 93%      ALEX reviewed by Omar Erazo DO           DIAGNOSIS  Final diagnoses:   Pain of right hand   Inflammatory arthritis   Chronic kidney disease, unspecified CKD stage         DISPOSITION  DISCHARGE    Patient discharged in stable condition.    Reviewed implications of results, diagnosis, meds, responsibility to follow up, warning signs and symptoms of possible worsening, potential complications and reasons to return to ER.    Patient/Family voiced understanding of above instructions.    Discussed plan for discharge, as there is no emergent indication for admission.  Pt/family is agreeable and understands need for follow up and possible repeat testing.  Pt/family is aware that discharge does not mean that nothing is wrong but that it indicates no emergency is currently present that requires admission and they must continue care with follow-up as given below or with a physician of their choice.     FOLLOW-UP  Luis Aguilar DO  [address] [facility] [address]  [phone]          [facility] EMERGENCY DEPARTMENT  [address] [place] [id number] [phone]             Medication List        New Prescriptions       HYDROcodone-acetaminophen 5-325 MG per tablet  Commonly known as: NORCO  Take 1 tablet by mouth Every 6 (Six) Hours As Needed for Severe Pain for up to 3 days.     methylPREDNISolone 4 MG dose pack  Commonly known as: MEDROL  Take as directed on package instructions.               Where to Get Your Medications        These medications were sent to [id number] - [place], [place] - [address] [facility] [address] [facility] [phone] [facility] [phone]   [address] [facility] [address]      Phone: [phone]   HYDROcodone-acetaminophen 5-325 MG per tablet  methylPREDNISolone 4 MG dose pack            Please note that portions of this document were completed with voice recognition software.     COLIN Madrid   06/16/25   15:37 EDT        Jody Rick, APRGIOVANNY  06/16/25 1537

## 2025-06-17 NOTE — PROGRESS NOTES
[facility]  Heart Failure Clinic    Cardiologist/EP: Dr. Stover  PCP: Luis Aguilar DO      Telemedicine Visit  Mode of Visit: Video  Location of patient: home. Patient is in a secure environment. Wife present  Location of provider: home  You have chosen to receive care through a telehealth visit.  Does the patient consent to use a video/audio connection for your medical care today? Yes  The visit included audio and video interaction. No technical issues occurred during this visit.       Chief Complaint  Follow-up and Congestive Heart Failure    PROBLEM LIST:  HFrEF  Echo 3/2023 LVEF 55%  Echo 8/5/2024 LVEF 35 to 40%  Echo 4/4/2025 EF 36-40, grade 2 diastolic dysfunction with high left atrial pressures, right ventricular cavity moderate to severely dilated, left atrial cavity moderately to severely dilated, moderate to severe AI, moderate to severe TR  VHD  Echo 4/2025 mild to moderate mitral valve regurgitation, moderate to severe AI, moderate-severe TR  CAD  Inferior STEMI 8/2020 for medical management  Permanent atrial fibrillation  CKD III  Chronic lower extremity edema  Hypertension  Hyperlipidemia  Type 2 diabetes  JOLENE  Gout  Prostate cancer status post radiation  Bladder cancer status post surgery    Subjective    History of Present Illness    Iain Dee is a 89 y.o. male who presents today to follow-up on heart failure..    He went to the ED on Monday with persistent right hand swelling, imaging consistent with possible inflammatory arthritis.  He was discharged on Medrol Dosepak and Lortab. He says it is doing much better. His swelling has improved significantly. He has some frustration regarding his progress, still requires assistance to stand. Sits in the chair all day. Denies hematuria      Dyspnea: denies  Lower extremity swelling: improved  Abdominal swelling: denies  Home weight: unable to weight  Home BP/HRs: 130s when checked by home health and PT  Orthopnea/pillows  1  pillow, Wears CPAP   Hospital stays: 8/2024, 4/2/25, 4/11/25          Objective     Vital Signs:   Vitals:    06/18/25 0948   BP: 137/95   BP Location: Left arm   Pulse: 72     Patient unable to weigh        There is no height or weight on file to calculate BMI.  Physical Exam  Constitutional:       Appearance: Normal appearance.   HENT:      Head: Normocephalic.   Cardiovascular:      Comments: Wearing compression socks  Pulmonary:      Effort: Pulmonary effort is normal. No respiratory distress.   Neurological:      Mental Status: He is oriented to person, place, and time. He is lethargic.   Psychiatric:         Mood and Affect: Mood normal.         Behavior: Behavior normal.         Thought Content: Thought content normal.              Data Reviewed:  Lab Results   Component Value Date    GLUCOSE 162 (H) 06/16/2025    CALCIUM 8.6 06/16/2025     06/16/2025    K 4.0 06/16/2025    CO2 30.0 (H) 06/16/2025    CL 98 06/16/2025    BUN 58.9 (H) 06/16/2025    CREATININE 1.74 (H) 06/16/2025    EGFR 37.2 (L) 06/16/2025    BCR 33.9 (H) 06/16/2025    ANIONGAP 11.0 06/16/2025     Lab Results   Component Value Date    WBC 5.71 06/16/2025    HGB 9.3 (L) 06/16/2025    HCT 31.0 (L) 06/16/2025    .6 (H) 06/16/2025     06/16/2025     Lab Results   Component Value Date    PROBNP 20,657.0 (H) 04/11/2025     Labs 6/4/25 Glucose 157, BUN 49, creatinine 1.71, sodium 138, potassium 4.5, chloride 93, carbon dioxide 28, NT proBNP 17,105.          Assessment and Plan   Chronic HFrEF  - LVEF 36-40%, NYHA class III/IV ACC/AHA stage: C/D  - Symptoms improved. Continue 2mg of bumex BID  - Recommended starting low dose spironolactone, but wife defers at this time because she says she is overwhelmed with the amount of medications he is on. I did discuss benefits of spironolactone and that we may be able to decrease or stop potassium but continues to decline since he seems to be doing well.  -Continue the following guideline  directed medical therapy:    Drug Class   Drug   Dose Last Dose Adjustment Notes   ACEi/ARB/ARNI    N/a CKD   Beta Blocker Carvedilol 6.25mg BID     MRA    CKD. Patient defers   SGLT2i Jardiance 10mg     Diuretic regimen Bumex  2mg BID     Secondary therapy  Furoscix PRN       - Reinforced reasons to call and diuretic plan.     2. Permanent atrial fibrillation  - Rate controlled  - Continue eliquis    3. Essential hypertension  - Appears controlled  - Home health to continue to monitor    4. Stage 3b chronic kidney disease  - Recent creatinine 1.7  - Ranged from 1.6-2.2 while inpatient              Follow Up {Instructions Charge Capture  Follow-up Communications :23}   Return in about 2 months (around 8/18/2025) for Video Visit, HF.        Patient was given instructions and counseling regarding his condition or for health maintenance advice. Please see specific information pulled into the AVS if appropriate.  Advised to call the [facility] with any questions, concerns, or worsening symptoms.

## 2025-06-18 ENCOUNTER — TELEMEDICINE (OUTPATIENT)
Dept: CARDIOLOGY | Facility: HOSPITAL | Age: 89
End: 2025-06-18
Payer: MEDICARE

## 2025-06-18 VITALS — DIASTOLIC BLOOD PRESSURE: 95 MMHG | HEART RATE: 72 BPM | SYSTOLIC BLOOD PRESSURE: 137 MMHG

## 2025-06-18 DIAGNOSIS — I48.21 PERMANENT ATRIAL FIBRILLATION: ICD-10-CM

## 2025-06-18 DIAGNOSIS — I50.22 CHRONIC HFREF (HEART FAILURE WITH REDUCED EJECTION FRACTION): Primary | ICD-10-CM

## 2025-06-18 DIAGNOSIS — N18.32 STAGE 3B CHRONIC KIDNEY DISEASE: ICD-10-CM

## 2025-06-18 DIAGNOSIS — I10 ESSENTIAL HYPERTENSION: ICD-10-CM

## 2025-06-25 ENCOUNTER — TELEPHONE (OUTPATIENT)
Dept: FAMILY MEDICINE CLINIC | Facility: CLINIC | Age: 89
End: 2025-06-25
Payer: MEDICARE

## 2025-06-25 DIAGNOSIS — M19.90 INFLAMMATORY ARTHRITIS: ICD-10-CM

## 2025-06-25 DIAGNOSIS — M10.9 ACUTE GOUT OF RIGHT HAND, UNSPECIFIED CAUSE: ICD-10-CM

## 2025-06-25 DIAGNOSIS — M79.2 NEUROPATHIC PAIN: ICD-10-CM

## 2025-06-25 DIAGNOSIS — R22.31 LOCALIZED SWELLING ON RIGHT HAND: Primary | ICD-10-CM

## 2025-06-25 DIAGNOSIS — R29.898 WEAKNESS OF BOTH LOWER EXTREMITIES: ICD-10-CM

## 2025-06-25 NOTE — TELEPHONE ENCOUNTER
Caller: Maya Germain    Relationship: Emergency Contact    Best call back number: [phone]    What is the best time to reach you: ANYTIME    Who are you requesting to speak with (clinical staff, provider,  specific staff member): PROVIDER OR CLINICAL STAFF    What was the call regarding: WIFE STATES THAT PATIENT HAS GOUT IN RIGHT HAND, VERY PAINFUL CAN HARDLY USE HIS HAND. WIFE IS REQUESTING A MEDICATION OR TO BE ADVISED ON WHAT TO DO. PLEASE ADVISE    Is it okay if the provider responds through MyChart: NO

## 2025-06-25 NOTE — TELEPHONE ENCOUNTER
Caller: NICK WITH [facility]     Relationship: Home Health     Best call back number:  [phone]        What is your medical concern? ISSA, A PHYSICAL THERAPIST WITH [facility],  REPORTS THE PATIENT'S RIGHT HAND IS SWOLLEN AND WARM TO THE TOUCH, AND STATED THE PATIENT BELIEVES HE HAS GOUT AGAIN AND THAT THE PREVIOUS MEDICATION DID NOT HELP THE GOUT FLARE CLEAR UP.  ISSA ADVISED THE PATIENT STATED THE PATIENT ADVISED THE GOUT FLARED BACK UP YESTERDAY, 06/24/2025.     How long has this issue been going on? NICK WITH [facility] REPORTS ON 06/11/2025 THE PATIENT WAS PRESCRIBED ANTIBIOTICS FROM DR NAVARRETE THAT THE PATIENT FINISHED ON 06/20/2025.  ISSA VOICED CONCERNS FOR THE PATIENT AND ADVISED THIS LIMITS HIS MOBILITY AND BEING ABLE TO DO PHYSICAL THERAPY EFFECTIVELY.     Is your provider already aware of this issue? YES     Have you been treated for this issue? YES    PLEASE CALL ISSA IF ANY QUESTIONS OR CONCERNS OR THE PATIENT.

## 2025-06-26 RX ORDER — METHYLPREDNISOLONE 4 MG/1
TABLET ORAL
Qty: 21 TABLET | Refills: 0 | Status: SHIPPED | OUTPATIENT
Start: 2025-06-26

## 2025-06-27 ENCOUNTER — TELEPHONE (OUTPATIENT)
Dept: FAMILY MEDICINE CLINIC | Facility: CLINIC | Age: 89
End: 2025-06-27
Payer: MEDICARE

## 2025-06-27 NOTE — TELEPHONE ENCOUNTER
Caller: [facility]    Relationship:     Best call back number: [phone]     What orders are you requesting (i.e. lab or imaging): START OF CARE ORDERS    In what timeframe would the patient need to come in: ASAP FOR JUNE 30TH    Where will you receive your lab/imaging services: IN HOME    Additional notes: ORDERS NEEDED

## 2025-07-03 ENCOUNTER — TELEPHONE (OUTPATIENT)
Dept: FAMILY MEDICINE CLINIC | Facility: CLINIC | Age: 89
End: 2025-07-03
Payer: MEDICARE

## 2025-07-03 ENCOUNTER — TELEPHONE (OUTPATIENT)
Dept: PEDIATRICS | Facility: OTHER | Age: 89
End: 2025-07-03
Payer: MEDICARE

## 2025-07-03 DIAGNOSIS — M79.2 NEUROPATHIC PAIN: ICD-10-CM

## 2025-07-03 RX ORDER — GABAPENTIN 100 MG/1
100 CAPSULE ORAL 2 TIMES DAILY
Qty: 60 CAPSULE | Refills: 5 | Status: SHIPPED | OUTPATIENT
Start: 2025-07-03

## 2025-07-03 NOTE — TELEPHONE ENCOUNTER
Caller: Ariella Germainta    Relationship: Emergency Contact    Best call back number: [phone]     Requested Prescriptions:   Requested Prescriptions     Pending Prescriptions Disp Refills    gabapentin (NEURONTIN) 100 MG capsule 60 capsule 0     Sig: Take 1 capsule by mouth 2 (Two) Times a Day.        Pharmacy where request should be sent: [id number] - [facility] [address] [facility] [address] [facility] [phone] [facility] [phone] FX     Last office visit with prescribing clinician: 3/3/2025   Last telemedicine visit with prescribing clinician: 6/13/2025   Next office visit with prescribing clinician: Visit date not found     Additional details provided by patient: ONE DOSE REMAINING FOR TODAY, 07/03/2025    Does the patient have less than a 3 day supply:  [x] Yes  [] No    Would you like a call back once the refill request has been completed: [] Yes [x] No    If the office needs to give you a call back, can they leave a voicemail: [] Yes [x] No    Lexus Lozano Rep   07/03/25 07:43 EDT

## 2025-07-03 NOTE — TELEPHONE ENCOUNTER
Caller: NICK    Relationship to patient: Home Health [facility]    Best call back number: [phone] VM OK    needing: VERBAL ORDERS FOR HOME HEALTH ONCE A WEEK FOR 5 WEEKS.

## 2025-07-03 NOTE — TELEPHONE ENCOUNTER
Simona cavazos with home health called   Patient has poor circulation in legs and swelling   Big toe is purple   Wants to call in podiatrist

## 2025-07-08 NOTE — TELEPHONE ENCOUNTER
NEED VERBAL ORDERS TODAY ASAP          Caller: NICK     Relationship to patient: Home Health [facility]     Best call back number: [phone] VM OK     needing: VERBAL ORDERS FOR HOME HEALTH ONCE A WEEK FOR 5 WEEKS.

## 2025-07-09 ENCOUNTER — TELEPHONE (OUTPATIENT)
Dept: CARDIOLOGY | Facility: HOSPITAL | Age: 89
End: 2025-07-09
Payer: MEDICARE

## 2025-07-09 ENCOUNTER — TELEPHONE (OUTPATIENT)
Dept: FAMILY MEDICINE CLINIC | Facility: CLINIC | Age: 89
End: 2025-07-09
Payer: MEDICARE

## 2025-07-09 RX ORDER — METOLAZONE 2.5 MG/1
2.5 TABLET ORAL DAILY
Qty: 3 TABLET | Refills: 0 | Status: SHIPPED | OUTPATIENT
Start: 2025-07-09

## 2025-07-09 NOTE — TELEPHONE ENCOUNTER
Per Verbal order from Wendy, will send in Metolazone 2.5mg daily times 3 days    Rn call wife and instructed on administration, wife verbalized understanding

## 2025-07-09 NOTE — TELEPHONE ENCOUNTER
Pt wife called to report he is having increased swelling and decrease in urine output.     Pt currently takes 2mg bumex BID- wife reports it seems to not be working    In the past we have used Metolazone with benefit per wife  Pt is non ambulatory and is unable to come in for a visit, please advise

## 2025-07-09 NOTE — TELEPHONE ENCOUNTER
Caller: LIZETTE MCCRARY    Relationship: Home Health    Best call back number: [phone]    What orders are you requesting (i.e. lab or imaging): WOUND CARE ORDER    In what timeframe would the patient need to come in: ASAP    Where will you receive your lab/imaging services: IN HOME    Additional notes: LIZETTE STATES PATIENT'S LEGS ARE HEALED AND WOULD JUST LIKE TO USE TUBAGRIP? PLEASE CALL WITH VERBAL ORDER OK.

## 2025-07-21 NOTE — TELEPHONE ENCOUNTER
Home Health calling, states patient is having strong cough and congestion, which over the counter medication does not seem to bwe helping. Would like something called in for this.    Can call back   [phone]    To send to:  [facility] #[id number] - [place], [place] - [address] . [facility] [phone] [facility] [phone] FX

## 2025-07-23 NOTE — TELEPHONE ENCOUNTER
Patient cannot walk and it is hard for him to get into appts. I scheduled a video visit for tomorrow.

## 2025-07-25 PROBLEM — I50.43 ACUTE ON CHRONIC COMBINED SYSTOLIC AND DIASTOLIC CHF (CONGESTIVE HEART FAILURE): Status: ACTIVE | Noted: 2025-01-01

## 2025-07-25 PROBLEM — R57.0 CARDIOGENIC SHOCK: Status: ACTIVE | Noted: 2025-01-01

## 2025-07-25 PROBLEM — J96.01 ACUTE HYPOXEMIC RESPIRATORY FAILURE: Status: ACTIVE | Noted: 2025-01-01

## 2025-07-25 NOTE — ED PROVIDER NOTES
"  [place]    EMERGENCY DEPARTMENT ENCOUNTER      Pt Name: Iain Dee  MRN: [id number]  YOB: 1935  Date of evaluation: 7/25/2025  Provider: Javon Schwartz MD    CHIEF COMPLAINT       Chief Complaint   Patient presents with    Altered Mental Status         HISTORY OF PRESENT ILLNESS   Iain Dee is a 89 y.o. male who presents to the emergency department with alteration in mental status. Per report from EMS, patient has been very somnolent today. Has had some possible \"seizure\" activity. This was witnessed by EMS as well and described as patient not responding and \"reaching upward.\" Patient somewhat hypotensive in route, given 500 mL fluid, also requiring supplemental O2, doesn't req O2 at baseline. Patient reports feeling severely weak but denies any specific symptoms. Denies chest pain, sensation of being SOB, fever, chills, cough, abdominal pain, vomiting, diarrhea.       Nursing notes were reviewed.    REVIEW OF SYSTEMS     ROS:  A chief complaint appropriate review of systems was completed and is negative except as noted in the HPI.      PAST MEDICAL HISTORY     Past Medical History:   Diagnosis Date    A-fib     Arthritis     Cancer     Diabetes mellitus     GERD (gastroesophageal reflux disease)     Gout     H/O Bladder carcinoma (HCC)     Hypertension          SURGICAL HISTORY       Past Surgical History:   Procedure Laterality Date    BLADDER SURGERY      Biopsy    HERNIA REPAIR      PROSTATE BIOPSY           CURRENT MEDICATIONS       Current Facility-Administered Medications:     allopurinol (ZYLOPRIM) tablet 100 mg, 100 mg, Oral, Daily, Lexa Reis MD, 100 mg at 07/26/25 0835    apixaban (ELIQUIS) tablet 2.5 mg, 2.5 mg, Oral, Q12H, Lexa Reis MD, 2.5 mg at 07/26/25 0834    aspirin EC tablet 81 mg, 81 mg, Oral, Daily, Lexa Reis MD, 81 mg at 07/26/25 0834    sennosides-docusate (PERICOLACE) 8.6-50 MG per tablet 2 tablet, 2 tablet, Nasogastric, " BID, 2 tablet at 07/25/25 2156 **AND** polyethylene glycol (MIRALAX) packet 17 g, 17 g, Nasogastric, Daily PRN **AND** [DISCONTINUED] bisacodyl (DULCOLAX) EC tablet 5 mg, 5 mg, Oral, Daily PRN **AND** bisacodyl (DULCOLAX) suppository 10 mg, 10 mg, Rectal, Daily PRN, Lexa Reis MD    bumetanide (BUMEX) injection 2 mg, 2 mg, Intravenous, Q6H, Lexa Reis MD, 2 mg at 07/26/25 0626    dexmedeTOMIDine (PRECEDEX) 400 mcg in 100 mL NS infusion, 0.2-1.5 mcg/kg/hr, Intravenous, Titrated, Lexa Reis MD, Last Rate: 9.1 mL/hr at 07/26/25 0813, 0.4 mcg/kg/hr at 07/26/25 0813    dextrose (D50W) (25 g/50 mL) IV injection 25 g, 25 g, Intravenous, Q15 Min PRN, Javon Gamez APRN    dextrose (GLUTOSE) oral gel 15 g, 15 g, Oral, Q15 Min PRN, Javon Gamez APRN    DOBUTamine (DOBUTREX) 1 mg/mL in D5W infusion, 2-20 mcg/kg/min, Intravenous, Titrated, Javon Schwartz MD, Last Rate: 10.88 mL/hr at 07/26/25 0631, 2 mcg/kg/min at 07/26/25 0631    fentaNYL (SUBLIMAZE) bolus from bag 10 mcg/mL injection 50 mcg, 50 mcg, Intravenous, Q30 Min PRN, Lexa Reis MD, 50 mcg at 07/26/25 0832    fentaNYL 2500 mcg/250 mL NS infusion,  mcg/hr, Intravenous, Titrated, Antwan Ely MD, Last Rate: 15 mL/hr at 07/26/25 0856, 150 mcg/hr at 07/26/25 0856    glucagon (GLUCAGEN) injection 1 mg, 1 mg, Intramuscular, Q15 Min PRN, Javon Gamez APRN    insulin glargine (LANTUS, SEMGLEE) injection 9 Units, 9 Units, Subcutaneous, Nightly, Javon Gamez APRN, 9 Units at 07/26/25 0025    insulin regular (humuLIN R,novoLIN R) injection 2-7 Units, 2-7 Units, Subcutaneous, Q6H, Javon Gamez APRN, 3 Units at 07/26/25 0025    mupirocin (BACTROBAN) 2 % nasal ointment 1 Application, 1 Application, Each Nare, BID, Javon Gamez APRN, 1 Application at 07/26/25 0835    nitroglycerin (NITROSTAT) SL tablet 0.4 mg, 0.4 mg, Sublingual, Q5 Min PRN, Javon Gamez APRN     "pantoprazole (PROTONIX) injection 40 mg, 40 mg, Intravenous, Q24H, Javon Gamez APRN, 40 mg at 07/25/25 2156    piperacillin-tazobactam (ZOSYN) 4.5 g IVPB in 100 mL NS MBP (CD), 4.5 g, Intravenous, Q12H, Javon Gamez APRN, 4.5 g at 07/26/25 0622    sodium chloride 0.9 % flush 10 mL, 10 mL, Intravenous, PRN, Javon Schwartz MD    sodium chloride 0.9 % flush 10 mL, 10 mL, Intravenous, Q12H, Javon Gamez APRN, 10 mL at 07/26/25 0835    sodium chloride 0.9 % flush 10 mL, 10 mL, Intravenous, PRN, Javon Gamez, COLIN    sodium chloride 0.9 % infusion 40 mL, 40 mL, Intravenous, PRN, Javon Gamez APRN    thiamine (B-1) 500 mg in sodium chloride 0.9 % 100 mL IVPB, 500 mg, Intravenous, Q8H, Javon Gamez APRN, Last Rate: 200 mL/hr at 07/26/25 0626, 500 mg at 07/26/25 0626    ALLERGIES     Colchicine    FAMILY HISTORY       Family History   Problem Relation Age of Onset    No Known Problems Mother     Heart attack Father           SOCIAL HISTORY       Social History     Socioeconomic History    Marital status:    Tobacco Use    Smoking status: Never     Passive exposure: Never    Smokeless tobacco: Never   Vaping Use    Vaping status: Never Used   Substance and Sexual Activity    Alcohol use: Yes     Alcohol/week: 1.0 standard drink of alcohol     Types: 1 Shots of liquor per week     Comment: Estimation based on spouse report of patient drinking \"1 large martini\" each night (regular martini = 3 shots, large martini = est. 4 shots)    Drug use: No    Sexual activity: Defer         PHYSICAL EXAM    (up to 7 for level 4, 8 or more for level 5)     Vitals:    07/26/25 0900 07/26/25 0915 07/26/25 0930 07/26/25 0945   BP: 116/76  123/86    BP Location:       Patient Position:       Pulse: 67 68 61 62   Resp:       Temp:       TempSrc:       SpO2: 98% 98% 98% 98%   Weight:       Height:           General: Somnolent but arousable  HEENT: Conjunctivae normal.  Neck: Trachea " midline.  Cardiac: Heart regular rate, rhythm, no murmurs, rubs, or gallops  Lungs: Bilateral crackles. No tachypnea. No focal findings.  Chest wall: There is no tenderness to palpation over the chest wall or over ribs  Abdomen: Abdomen is soft, nontender, nondistended. There are no firm or pulsatile masses, no rebound rigidity or guarding.   Neuro: Somnolent. Follows simple commands and moves all 4 extremities.  Dermatology: Extremities are cool to touch, bluish discoloration to the feet bilaterally      DIAGNOSTIC RESULTS     EKG: All EKGs are interpreted by the Emergency Department Physician who either signs or Co-signs this chart in the absence of a cardiologist.    ECG 12 Lead Altered Mental Status   Preliminary Result   Test Reason : Altered Mental Status   Blood Pressure :   */*   mmHG   Vent. Rate :  59 BPM     Atrial Rate :  52 BPM      P-R Int :   * ms          QRS Dur : 134 ms       QT Int : 440 ms       P-R-T Axes :   *  84 -17 degrees     QTcB Int : 435 ms      Undetermined rhythm   Right bundle branch block   Inferior infarct (cited on or before 17-Dec-2015)   Anteroseptal infarct (cited on or before 08-Dec-2021)   T wave abnormality, consider lateral ischemia   Abnormal ECG   When compared with ECG of 11-Apr-2025 14:28,   Current undetermined rhythm precludes rhythm comparison, needs review   Right bundle branch block is now present   Questionable change in initial forces of Anteroseptal leads      Referred By: EDMD           Confirmed By:             RADIOLOGY:   [x] Radiologist's Report Reviewed:  XR Chest 1 View   Final Result   Impression:   Interval placement of additional left chest defibrillator pad. Interval placement of NG/OG tube.      Redemonstration of parenchymal opacities in the right middle lobe/right lower lobe. Worsening of dense opacities of the left lung base and retrocardiac region which may reflect atelectasis and/or pleural effusion.         Electronically Signed: Tim Harris  MD     7/26/2025 9:09 AM EDT     Workstation ID: [id number]      CT Head Without Contrast   Final Result   Impression:   1. No acute intracranial findings by CT. Chronic intracranial findings similar to 2023.   2. Severe sinus disease with multifocal complete opacification. Consider outpatient ENT consultation to assess for obstructing lesions.            Electronically Signed: Steve Cuellar MD     7/25/2025 9:34 PM EDT     Workstation ID: [id number]      CT Chest Without Contrast Diagnostic   Final Result   Impression:   1. Findings suspicious for CHF exacerbation including cardiomegaly, small bilateral pleural effusions, mild interstitial pulmonary edema, small volume ascites and body wall edema.   2. Multifocal airspace consolidation suspicious for combination of pneumonia and atelectasis.   3. ET tube 1 cm above sukhdeep. Gastric tube terminates over mid stomach with sideport just below GE junction.   4. Debris in the mid to upper esophagus which could increased risk for aspiration.   5. Cholelithiasis.            Electronically Signed: Steve Cuellar MD     7/25/2025 9:57 PM EDT     Workstation ID: [id number]      XR Abdomen KUB   Final Result   Impression:   Gastric tube terminates over mid to proximal stomach, oriented slightly leftward.         Electronically Signed: Steve Cuellar MD     7/25/2025 8:39 PM EDT     Workstation ID: [id number]      XR Chest 1 View   Final Result   Impression:   1. ET tube 3.3 cm above sukhdeep without pneumothorax.   2. Similar left lower lobe atelectasis/airspace disease and small effusion. Suspect a trace right effusion similar to prior.         Electronically Signed: Steve Cuellar MD     7/25/2025 7:32 PM EDT     Workstation ID: [id number]      XR Chest 1 View   Final Result   Impression:   1.Cardiomegaly.   2.Bilateral perihilar and basilar subsegmental atelectasis.   3.Probable small left pleural effusion.            Electronically Signed: Baljinder Encarnacion MD     7/25/2025 6:20 PM EDT      Workstation ID: [id number]      XR Chest 1 View    (Results Pending)   XR Chest 1 View    (Results Pending)       I ordered and independently reviewed the above noted radiographic studies.        LABS:    I have reviewed and interpreted all of the currently available lab results from this visit (if applicable):  Results for orders placed or performed during the hospital encounter of 07/25/25   ECG 12 Lead Altered Mental Status    Collection Time: 07/25/25  5:48 PM   Result Value Ref Range    QT Interval 440 ms    QTC Interval 435 ms   Comprehensive Metabolic Panel    Collection Time: 07/25/25  5:57 PM    Specimen: Blood   Result Value Ref Range    Glucose 172 (H) 65 - 99 mg/dL    BUN 61.4 (H) 8.0 - 23.0 mg/dL    Creatinine 2.33 (H) 0.76 - 1.27 mg/dL    Sodium 133 (L) 136 - 145 mmol/L    Potassium 7.1 (C) 3.5 - 5.2 mmol/L    Chloride 89 (L) 98 - 107 mmol/L    CO2 25.4 22.0 - 29.0 mmol/L    Calcium 9.0 8.6 - 10.5 mg/dL    Total Protein 6.4 6.0 - 8.5 g/dL    Albumin 3.4 (L) 3.5 - 5.2 g/dL    ALT (SGPT) 101 (H) 1 - 41 U/L    AST (SGOT) 193 (H) 1 - 40 U/L    Alkaline Phosphatase 333 (H) 39 - 117 U/L    Total Bilirubin 1.1 0.0 - 1.2 mg/dL    Globulin 3.0 gm/dL    A/G Ratio 1.1 g/dL    BUN/Creatinine Ratio 26.4 (H) 7.0 - 25.0    Anion Gap 18.6 (H) 5.0 - 15.0 mmol/L    eGFR 26.2 (L) >60.0 mL/min/1.73   High Sensitivity Troponin T    Collection Time: 07/25/25  5:57 PM    Specimen: Blood   Result Value Ref Range    HS Troponin T 164 (C) <22 ng/L   Magnesium    Collection Time: 07/25/25  5:57 PM    Specimen: Blood   Result Value Ref Range    Magnesium 2.5 (H) 1.6 - 2.4 mg/dL   CBC Auto Differential    Collection Time: 07/25/25  5:57 PM    Specimen: Blood   Result Value Ref Range    WBC 7.74 3.40 - 10.80 10*3/mm3    RBC 3.41 (L) 4.14 - 5.80 10*6/mm3    Hemoglobin 9.8 (L) 13.0 - 17.7 g/dL    Hematocrit 34.3 (L) 37.5 - 51.0 %    .6 (H) 79.0 - 97.0 fL    MCH 28.7 26.6 - 33.0 pg    MCHC 28.6 (L) 31.5 - 35.7 g/dL    RDW 17.9  (H) 12.3 - 15.4 %    RDW-SD 65.5 (H) 37.0 - 54.0 fl    MPV 11.2 6.0 - 12.0 fL    Platelets 261 140 - 450 10*3/mm3    Neutrophil % 81.7 (H) 42.7 - 76.0 %    Lymphocyte % 8.4 (L) 19.6 - 45.3 %    Monocyte % 9.0 5.0 - 12.0 %    Eosinophil % 0.0 (L) 0.3 - 6.2 %    Basophil % 0.4 0.0 - 1.5 %    Immature Grans % 0.5 0.0 - 0.5 %    Neutrophils, Absolute 6.32 1.70 - 7.00 10*3/mm3    Lymphocytes, Absolute 0.65 (L) 0.70 - 3.10 10*3/mm3    Monocytes, Absolute 0.70 0.10 - 0.90 10*3/mm3    Eosinophils, Absolute 0.00 0.00 - 0.40 10*3/mm3    Basophils, Absolute 0.03 0.00 - 0.20 10*3/mm3    Immature Grans, Absolute 0.04 0.00 - 0.05 10*3/mm3    nRBC 0.0 0.0 - 0.2 /100 WBC   BNP    Collection Time: 07/25/25  5:57 PM    Specimen: Blood   Result Value Ref Range    proBNP 25,711.0 (H) 0.0 - 1,800.0 pg/mL   Lactic Acid, Plasma    Collection Time: 07/25/25  5:57 PM    Specimen: Blood   Result Value Ref Range    Lactate 8.0 (C) 0.5 - 2.0 mmol/L   Procalcitonin    Collection Time: 07/25/25  5:57 PM    Specimen: Blood   Result Value Ref Range    Procalcitonin 0.91 (H) 0.00 - 0.25 ng/mL   aPTT    Collection Time: 07/25/25  5:57 PM    Specimen: Blood   Result Value Ref Range    PTT 42.0 (H) 22.0 - 39.0 seconds   Protime-INR    Collection Time: 07/25/25  5:57 PM    Specimen: Blood   Result Value Ref Range    Protime 25.3 (H) 12.2 - 15.3 Seconds    INR 2.14 (H) 0.89 - 1.12   Green Top (Gel)    Collection Time: 07/25/25  5:57 PM   Result Value Ref Range    Extra Tube Hold for add-ons.    Lavender Top    Collection Time: 07/25/25  5:57 PM   Result Value Ref Range    Extra Tube hold for add-on    Gold Top - SST    Collection Time: 07/25/25  5:57 PM   Result Value Ref Range    Extra Tube Hold for add-ons.    Gray Top    Collection Time: 07/25/25  5:57 PM   Result Value Ref Range    Extra Tube Hold for add-ons.    Light Blue Top    Collection Time: 07/25/25  5:57 PM   Result Value Ref Range    Extra Tube Hold for add-ons.    POC CHEM 8    Collection  Time: 07/25/25  6:05 PM    Specimen: Blood   Result Value Ref Range    Glucose 168 (H) 70 - 130 mg/dL    BUN 69 (H) 8 - 26 mg/dL    Creatinine 2.50 (H) 0.60 - 1.30 mg/dL    Sodium 130 (L) 138 - 146 mmol/L    POC Potassium 6.3 (H) 3.5 - 4.9 mmol/L    Chloride 92 (L) 98 - 109 mmol/L    Total CO2 27 24 - 29 mmol/L    Hemoglobin 11.6 (L) 12.0 - 17.0 g/dL    Hematocrit 34 (L) 38 - 51 %    Ionized Calcium 1.08 (L) 1.20 - 1.32 mmol/L    eGFR 24.1 (L) >60.0 mL/min/1.73   Blood Gas, Arterial With Co-Ox    Collection Time: 07/25/25  6:26 PM    Specimen: Arterial Blood   Result Value Ref Range    Site Right Radial     Kirby's Test N/A     pH, Arterial 7.227 (L) 7.350 - 7.450 pH units    pCO2, Arterial 54.2 (H) 35.0 - 45.0 mm Hg    pO2, Arterial 91.7 83.0 - 108.0 mm Hg    HCO3, Arterial 22.6 20.0 - 26.0 mmol/L    Base Excess, Arterial -5.2 (L) 0.0 - 2.0 mmol/L    Hemoglobin, Blood Gas 10.2 (L) 13.5 - 17.5 g/dL    Hematocrit, Blood Gas 31.3 (L) 38.0 - 51.0 %    Oxyhemoglobin 93.4 (L) 94 - 99 %    Methemoglobin 0.30 0.00 - 1.50 %    Carboxyhemoglobin 2.1 (H) 0 - 2 %    CO2 Content 24.2 22 - 33 mmol/L    Temperature 37.0     Barometric Pressure for Blood Gas      Modality Nasal Cannula     FIO2 28 %    Rate 0 Breaths/minute    PIP 0 cmH2O    IPAP 0     EPAP 0     pH, Temp Corrected 7.227 pH Units    pCO2, Temperature Corrected 54.2 (H) 35 - 48 mm Hg    pO2, Temperature Corrected 91.7 83 - 108 mm Hg   High Sensitivity Troponin T 1Hr    Collection Time: 07/25/25  7:25 PM    Specimen: Blood   Result Value Ref Range    HS Troponin T 139 (C) <22 ng/L    Troponin T Numeric Delta -25 ng/L    Troponin T % Delta -15 Abnormal if >/= 20%   CK    Collection Time: 07/25/25  7:25 PM    Specimen: Blood   Result Value Ref Range    Creatine Kinase 77 20 - 200 U/L   Urinalysis With Microscopic If Indicated (No Culture) - Indwelling Urethral Catheter    Collection Time: 07/25/25  7:28 PM    Specimen: Indwelling Urethral Catheter; Urine   Result  Value Ref Range    Color, UA Yellow Yellow, Straw    Appearance, UA Clear Clear    pH, UA 7.0 5.0 - 8.0    Specific Gravity, UA 1.011 1.005 - 1.030    Glucose,  mg/dL (2+) (A) Negative    Ketones, UA Negative Negative    Bilirubin, UA Negative Negative    Blood, UA Moderate (2+) (A) Negative    Protein,  mg/dL (2+) (A) Negative    Leuk Esterase, UA Trace (A) Negative    Nitrite, UA Negative Negative    Urobilinogen, UA 1.0 E.U./dL 0.2 - 1.0 E.U./dL   Urinalysis, Microscopic Only - Indwelling Urethral Catheter    Collection Time: 07/25/25  7:28 PM    Specimen: Indwelling Urethral Catheter; Urine   Result Value Ref Range    RBC, UA Too Numerous to Count (A) None Seen, 0-2 /HPF    WBC, UA 11-20 (A) None Seen, 0-2 /HPF    Bacteria, UA None Seen None Seen /HPF    Squamous Epithelial Cells, UA 3-6 (A) None Seen, 0-2 /HPF    Hyaline Casts, UA 13-20 None Seen /LPF    Methodology Automated Microscopy    Blood Gas, Arterial With Co-Ox    Collection Time: 07/25/25  8:29 PM    Specimen: Arterial Blood   Result Value Ref Range    Site Arterial Line     Kirby's Test N/A     pH, Arterial 7.593 (C) 7.350 - 7.450 pH units    pCO2, Arterial 20.6 (L) 35.0 - 45.0 mm Hg    pO2, Arterial 94.6 83.0 - 108.0 mm Hg    HCO3, Arterial 19.9 (L) 20.0 - 26.0 mmol/L    Base Excess, Arterial -0.9 (L) 0.0 - 2.0 mmol/L    Hemoglobin, Blood Gas 8.8 (L) 13.5 - 17.5 g/dL    Hematocrit, Blood Gas 27.1 (L) 38.0 - 51.0 %    Oxyhemoglobin 97.1 94 - 99 %    Methemoglobin 0.20 0.00 - 1.50 %    Carboxyhemoglobin 2.2 (H) 0 - 2 %    CO2 Content 20.5 (L) 22 - 33 mmol/L    Temperature 37.0     Barometric Pressure for Blood Gas      Modality Ventilator     FIO2 40 %    Ventilator Mode VC+/AC     PEEP 8.0     Notified Jackie SANTANA M.D.     Notified By [id number]     Notified Time 07/25/2025 20:32     pH, Temp Corrected 7.593 pH Units    pCO2, Temperature Corrected 20.6 (L) 35 - 48 mm Hg    pO2, Temperature Corrected 94.6 83 - 108 mm Hg   POC Glucose Once     Collection Time: 07/25/25 11:46 PM    Specimen: Blood   Result Value Ref Range    Glucose 205 (H) 70 - 130 mg/dL   STAT Lactic Acid, Reflex    Collection Time: 07/26/25  1:00 AM    Specimen: Blood   Result Value Ref Range    Lactate 3.4 (C) 0.5 - 2.0 mmol/L   Basic Metabolic Panel    Collection Time: 07/26/25  1:00 AM    Specimen: Blood   Result Value Ref Range    Glucose 187 (H) 65 - 99 mg/dL    BUN 58.7 (H) 8.0 - 23.0 mg/dL    Creatinine 2.04 (H) 0.76 - 1.27 mg/dL    Sodium 136 136 - 145 mmol/L    Potassium 4.6 3.5 - 5.2 mmol/L    Chloride 95 (L) 98 - 107 mmol/L    CO2 24.4 22.0 - 29.0 mmol/L    Calcium 8.5 (L) 8.6 - 10.5 mg/dL    BUN/Creatinine Ratio 28.8 (H) 7.0 - 25.0    Anion Gap 16.6 (H) 5.0 - 15.0 mmol/L    eGFR 30.8 (L) >60.0 mL/min/1.73   Blood Gas, Arterial With Co-Ox    Collection Time: 07/26/25  3:44 AM    Specimen: Arterial Blood   Result Value Ref Range    Site Arterial Line     Kirby's Test N/A     pH, Arterial 7.621 (C) 7.350 - 7.450 pH units    pCO2, Arterial 25.7 (L) 35.0 - 45.0 mm Hg    pO2, Arterial 76.6 (L) 83.0 - 108.0 mm Hg    HCO3, Arterial 26.5 (H) 20.0 - 26.0 mmol/L    Base Excess, Arterial 5.4 (H) 0.0 - 2.0 mmol/L    Hemoglobin, Blood Gas 8.5 (L) 13.5 - 17.5 g/dL    Hematocrit, Blood Gas 26.1 (L) 38.0 - 51.0 %    Oxyhemoglobin 95.9 94 - 99 %    Methemoglobin 0.10 0.00 - 1.50 %    Carboxyhemoglobin 1.5 0 - 2 %    CO2 Content 27.3 22 - 33 mmol/L    Temperature 37.0     Barometric Pressure for Blood Gas      Modality Ventilator     FIO2 40 %    Ventilator Mode VC+/AC     Set Tidal Volume 0.57     Rate 14 Breaths/minute    PEEP 8.0     PIP 0 cmH2O    IPAP 0     EPAP 0     Notified Who OTILIO BARRIOS     Notified By [id number]     Notified Time 07/26/2025 03:45     pH, Temp Corrected 7.621 pH Units    pCO2, Temperature Corrected 25.7 (L) 35 - 48 mm Hg    pO2, Temperature Corrected 76.6 (L) 83 - 108 mm Hg   Lactic Acid, Plasma    Collection Time: 07/26/25  5:23 AM    Specimen: Blood   Result Value Ref  Range    Lactate 2.0 0.5 - 2.0 mmol/L   Comprehensive Metabolic Panel    Collection Time: 07/26/25  5:23 AM    Specimen: Blood   Result Value Ref Range    Glucose 111 (H) 65 - 99 mg/dL    BUN 65.1 (H) 8.0 - 23.0 mg/dL    Creatinine 2.16 (H) 0.76 - 1.27 mg/dL    Sodium 136 136 - 145 mmol/L    Potassium 4.6 3.5 - 5.2 mmol/L    Chloride 92 (L) 98 - 107 mmol/L    CO2 32.0 (H) 22.0 - 29.0 mmol/L    Calcium 8.7 8.6 - 10.5 mg/dL    Total Protein 5.2 (L) 6.0 - 8.5 g/dL    Albumin 2.9 (L) 3.5 - 5.2 g/dL    ALT (SGPT) 601 (H) 1 - 41 U/L    AST (SGOT) 1,474 (H) 1 - 40 U/L    Alkaline Phosphatase 321 (H) 39 - 117 U/L    Total Bilirubin 1.1 0.0 - 1.2 mg/dL    Globulin 2.3 gm/dL    A/G Ratio 1.3 g/dL    BUN/Creatinine Ratio 30.1 (H) 7.0 - 25.0    Anion Gap 12.0 5.0 - 15.0 mmol/L    eGFR 28.7 (L) >60.0 mL/min/1.73   CBC (No Diff)    Collection Time: 07/26/25  5:23 AM    Specimen: Blood   Result Value Ref Range    WBC 7.88 3.40 - 10.80 10*3/mm3    RBC 3.10 (L) 4.14 - 5.80 10*6/mm3    Hemoglobin 9.1 (L) 13.0 - 17.7 g/dL    Hematocrit 28.9 (L) 37.5 - 51.0 %    MCV 93.2 79.0 - 97.0 fL    MCH 29.4 26.6 - 33.0 pg    MCHC 31.5 31.5 - 35.7 g/dL    RDW 17.6 (H) 12.3 - 15.4 %    RDW-SD 59.9 (H) 37.0 - 54.0 fl    MPV 11.1 6.0 - 12.0 fL    Platelets 219 140 - 450 10*3/mm3   Hemoglobin A1c    Collection Time: 07/26/25  5:23 AM    Specimen: Blood   Result Value Ref Range    Hemoglobin A1C 7.07 (H) 4.80 - 5.60 %   Magnesium    Collection Time: 07/26/25  5:23 AM    Specimen: Blood   Result Value Ref Range    Magnesium 2.1 1.6 - 2.4 mg/dL   Phosphorus    Collection Time: 07/26/25  5:23 AM    Specimen: Blood   Result Value Ref Range    Phosphorus 2.8 2.5 - 4.5 mg/dL   STAT Lactic Acid, Reflex    Collection Time: 07/26/25  5:23 AM    Specimen: Blood   Result Value Ref Range    Lactate 2.0 0.5 - 2.0 mmol/L   MRSA Screen, PCR (Inpatient) - Swab, Nares    Collection Time: 07/26/25  5:24 AM    Specimen: Nares; Swab   Result Value Ref Range    MRSA PCR  Negative Negative   POC Glucose Once    Collection Time: 07/26/25  5:45 AM    Specimen: Blood   Result Value Ref Range    Glucose 118 70 - 130 mg/dL   Adult Transthoracic Echo Complete W/ Cont if Necessary Per Protocol    Collection Time: 07/26/25  8:39 AM   Result Value Ref Range    EF(MOD-bp) 28.5 %    LVIDd 5.6 cm    IVSd 1.28 cm    LVPWd 1.28 cm    IVS/LVPW 1.00 cm    LV Sys Vol (BSA corrected) 72.7 cm2    EDV(cubed) 178.8 ml    LV Rain Vol (BSA corrected) 101.2 cm2    LV mass(C)d 309.1 grams    LVOT area 4.5 cm2    LVOT diam 2.40 cm    EDV(MOD-sp2) 269.0 ml    EDV(MOD-sp4) 220.0 ml    ESV(MOD-sp2) 198.0 ml    ESV(MOD-sp4) 158.0 ml    SV(MOD-sp2) 71.0 ml    SV(MOD-sp4) 62.0 ml    SVi(MOD-SP2) 32.7 ml/m2    SVi(MOD-SP4) 28.5 ml/m2    SVi (LVOT) 20.5 ml/m2    EF(MOD-sp2) 26.4 %    EF(MOD-sp4) 28.2 %    MV E max trey 57.1 cm/sec    MV dec time 0.29 sec    IVRT 113.0 ms    LA ESV Index (BP) 85.7 ml/m2    Med Peak E' Trey 4.0 cm/sec    Lat Peak E' Trey 9.8 cm/sec    TR max trey 277.0 cm/sec    Avg E/e' ratio 8.28     SV(LVOT) 44.6 ml    RV Base 5.6 cm    RV Mid 3.5 cm    RV Length 8.7 cm    TAPSE (>1.6) 0.88 cm    RV S' 5.2 cm/sec    LA dimension (2D)  6.0 cm    LV V1 max 59.6 cm/sec    LV V1 max PG 1.43 mmHg    LV V1 mean PG 1.00 mmHg    LV V1 VTI 9.9 cm    Ao pk trey 91.5 cm/sec    Ao max PG 3.4 mmHg    Ao mean PG 2.00 mmHg    Ao V2 VTI 15.8 cm    LORE(I,D) 2.8 cm2    Dimensionless Index 0.62 (DI)    AI P1/2t 574.0 msec    MV max PG 2.02 mmHg    MV mean PG 1.00 mmHg    MV V2 VTI 22.2 cm    MV P1/2t 115.0 msec    MVA(P1/2t) 1.91 cm2    MVA(VTI) 2.01 cm2    MV dec slope 206.5 cm/sec2    MR max trey 414.0 cm/sec    MR max PG 68.7 mmHg    MR mean trey 330.0 cm/sec    MR mean PG 47.5 mmHg    MR .5 cm    TR max PG 30.7 mmHg    PA acc time 0.07 sec    PI end-d trey 125.0 cm/sec    Ao root diam 4.4 cm    LVIDs 3.9 cm    FS 31.5 %    ESV(cubed) 57.6 ml    Ascending aorta 3.9 cm    RVSP(TR) 46 mmHg    RAP systole 15 mmHg         If labs were ordered, I independently reviewed the results and considered them in treating the patient.      EMERGENCY DEPARTMENT COURSE and DIFFERENTIAL DIAGNOSIS/MDM:   Vitals:  AS OF 10:06 EDT    BP - 123/86  HR - 62  TEMP - 99.7 °F (37.6 °C) (Bladder)  O2 SATS - 98%        Discussion below represents my analysis of pertinent findings related to patient's condition, differential diagnosis, treatment plan and final disposition.      Differential diagnosis:  The differential diagnosis associated with the patient's presentation includes: CVA, ICH, intracranial mass, seizure, PNA, CHF, UTI, electrolyte derangement      Independent interpretations (ECG/rhythm strip/X-ray/US/CT scan): I independently interpreted the pt CXR both pre and post intubation - initial w/o focal infiltrate, post tube reveals ETT in good position      Additional sources:  Discussed/obtained information from independent historians:   [x] Spouse: Addl hx obtained from pt wife on her arrival - increasing weakness for several days. Has not had any urine output today.   [] Parent:   [] Friend:   [] EMS:   [] Other:  External (non-ED) record review:   [] Inpatient record:   [] Office record:   [] Outpatient record:   [] Prior Outpatient labs:   [] Prior Outpatient radiology:   [] Primary Care record:   [] Outside ED record:   [] Other:       Patient's care impacted by:   [] Diabetes   [] Hypertension   [] Coronary Artery Disease   [] Cancer   [x] Other: CHF    Care significantly affected by Social Determinants of Health (housing and economic circumstances, unemployment)    [] Yes     [x] No   If yes, Patient's care significantly limited by  Social Determinants of Health including:    [] Inadequate housing    [] Low income    [] Alcoholism and drug addiction in family    [] Problems related to primary support group    [] Unemployment    [] Problems related to employment    [] Other Social Determinants of Health:     ED Course:    ED Course as of  07/26/25 1006   Fri Jul 25, 2025   1752 Patient is still somewhat hypotensive, however he has evidence of volume overload with pitting edema in the extremities, hypoxia, crackles to both lungs.  I reviewed reviewed his record and he has recently recorded significantly depressed ejection fraction.  For this reason, do not feel that sepsis fluid bolus is warranted in this case as it is likely to make the patient worse.  I am going to provide the patient with 500 mL of additional fluid in addition to the 500 mL he received already from EMS.  If he remains hypotensive despite the small fluid bolus, we will proceed with vasopressors. [NS]   1820 I spoke with the patient's wife who is now at bedside with him.  She confirms that patient is full code for right now. [NS]   1902 I discussed case with cardiology Dr. Sanches.  We discussed patient's case in detail.  Will consult.  Also discussed the case with nephrology Dr. Polk.  We also discussed the case in detail and he will consult. [NS]   1911 I spoke w Dr. Reis with the ICU. Discussed hx, tx. Accepts pt for ICU admission. [NS]      ED Course User Index  [NS] Javon Schwartz MD         This patient presented critically ill. Was somnolent but arousable on arrival, answering questions, following simple commands, moving all extremities. BP was soft initially, requiring a few L of supplemental oxygen. I provided a small fluid bolus to which the pt was not responsive and subsequently started him on levophed. Though no obvious source of infection was readily apparent, given his shock state and critical illness I ordered broad spectrum abx along with blood cultures. Shortly after the patient's arrival, he became significantly more somnolent, hypotensive, and bradycardic. The patient needed to be intubated, but I wanted to optimize his hemodynamics as much as possible to prevent mecca-intubation arrest. I increased the patient's levophed, proivded 300 mcg of push dose epi, and  gave 1 mg of atropine with improvement in HR and BP. POC labs returned with SHANT and elevated K and so I also gave the patient calcium chloride and additional hyper K shifting therapies. With patient more HD stable, I proceeded with intubation. Patient was provided with bicarb and I initially set the patients RR high to compensate for his metabolic acidosis. In the immediate post intubation period, he became significantly hypotensive. Due to concern for cardiogenic shock, we added dobutamine and epi drips. I placed a nonsterile R femoral arterial line for close hemodynamic monitoring. Patient hemodynamics improved and we were able to titrate down on vasopressors/inotropes rapidly. Patient was given 2 mg Bumex IV and OG tube was placed to facilitate administration of lokelma for potassium elimination. I consulted cardiology and nephrology, both of which came to the bedside to see the patient. I contacted the ICU for admission.      PROCEDURES:  Endotracheal Intubation  Indication: Hypoxic respiratory failure  Consent: Emergent  Pre-procedure exam: Somnolent  Preparation: The patient was pre-oxygenated with BVM with head in the upright position. Stable hemodynamics were ensured. Proper equipment at the bedside including multiple blades, endotracheal tubes, OPA/NPA, ETCO2, suction, bougie.  Medications: Ketamine, Mike  Technique: Once adequate paralysis and sedation were attained, the blade was advanced into the oropharynx. The glottis was identified and the tube was advanced into the airway under direct visualization. The tube was secured at 24 cm at lip with 8-0 ETT. Post intubation O2 sat was 94.  Confirmation: Waveform ETCO2, CXR demonstrating adequate positioning.  Complications: None    Arterial Line Placement  I placed a R femoral arterial line for hemodynamic monitoring in the setting of shock requiring multiple vasopressors. This was placed under NONSTERILE conditions. The R femoral site was cleaned with  chlorhexidine. Under US guidance, the R femoral artery was accessed using the finder needle. The guidewire was advanced into the vessel smoothly and without resistance and the needle removed. A small skin nick was made adjacent the the guidewire and the arterial catheter was advanced over the wire into the vessel and the wire removed. The line was sutured to the skin using 2 sutures with suture material provided in the line kit. Line was connected to pressure system with good waveform. Patient tolerated well with no immediate complications.      CRITICAL CARE TIME    Approximately 90 minutes of discontinuous critical care time was provided to this patient by myself absent of any time spent performing procedures.  Patient presents critically ill with acute shock with acute liver injury, renal injury, hyperkalemia, alteration in mental status, and hypoxic resp failure placing the CV, resp, neuro, renal systems at risk requiring the following interventions: supplemental oxygen, administration and titration of multiple vasopressors, treatment of hyperkalemia with calcium chloride, insulin, albuterol, bicarbonate, bumex, lokelma, interpretation of labs/ecg/imaging/blood gases, constant bedside attendance, consultation with multiple specialists, coordination of ICU admission with the following response: hemodynamic stabilization.  Patient at high risk of deterioration and possibly death without these interventions.      FINAL IMPRESSION      1. Shock    2. Acute kidney injury superimposed on chronic kidney disease    3. Hyperkalemia    4. Acute respiratory failure with hypoxia    5. Shock liver    6. Altered mental status, unspecified altered mental status type          DISPOSITION/PLAN     ED Disposition       ED Disposition   Decision to Admit    Condition   --    Comment   Level of Care: Critical Care [6]   Admitting Physician: ABILIO TARIQ [[id number]]   Attending Physician: ABILIO TARIQ [[id number]]                    Comment: Please note this report has been produced using speech recognition software.      Javon Schwartz MD  Attending Emergency Physician             Javon Schwartz MD  07/26/25 1253

## 2025-07-25 NOTE — H&P
Intensive Care Admission Note     Acute on chronic combined systolic and diastolic CHF (congestive heart failure)    History of Present Illness     89-year-old male with past medical history of chronic systolic and diastolic congestive heart failure, history of STEMI managed medically with ischemic cardiomyopathy with EF 35-40%, moderate to severe aortic regurgitation, cor pulmonale, permanent atrial fibrillation on Eliquis, CKD stage IIIb, obesity, type 2 diabetes mellitus, essential hypertension, history of gout, history of bladder cancer and prostate cancer treated in the past, presented to ED with complaints of worsening weakness, fatigue, worsening shortness of breath and increased swelling in bilateral lower extremities to upper extremities.  Wife noticed that patient had some shaking episodes but patient was conscious and awake and thinks it is more from weakness.  Per wife his swelling got significantly worse in the last 1 week despite being on Bumex.  He had a fall in April with bruising and hematoma on his left side and ever since then he has been very weak and working with PT OT at home and for the most part bedbound.  He is unable to go to clinic visits and usually gets telehealth visits from his PCP.  Overall his clinical picture has declined significantly in the past 6 months.    In the ED patient was found to be hypoxemic and hypotensive and evidence of shock on exam with cold peripheries and poor mentation.  Had to be intubated and initially on Levophed, epinephrine and dobutamine but eventually was able to wean down to only dobutamine.  CT chest with pulmonary edema and moderate to large left-sided effusion.  EKG with new right bundle branch block.  ABG showed respiratory acidosis and metabolic acidosis.  Labs showed SHANT on CKD and hyperkalemia with potassium of 7.1---> improved to 6.3 after medical treatment.  Also found to have lactic acid of 8.0 and elevated LFTs consistent with developing shock  liver.  Procalcitonin 0.91 and elevated CRP was also noted.  Blood cultures x 2 drawn in the ED.  UA was sent.  Received broad-spectrum antibiotics.    Problem List, Surgical History, Family, Social History, and ROS     Past Medical History:   Diagnosis Date    A-fib     Arthritis     Cancer     Diabetes mellitus     GERD (gastroesophageal reflux disease)     Gout     H/O Bladder carcinoma (HCC)     Hypertension       Past Surgical History:   Procedure Laterality Date    BLADDER SURGERY      Biopsy    HERNIA REPAIR      PROSTATE BIOPSY         Allergies   Allergen Reactions    Colchicine Other (See Comments)     Unable to walk      No current facility-administered medications on file prior to encounter.     Current Outpatient Medications on File Prior to Encounter   Medication Sig    allopurinol (Zyloprim) 300 MG tablet Take 1 tablet by mouth Daily.    amoxicillin-clavulanate (AUGMENTIN) 875-125 MG per tablet Take 1 tablet by mouth 2 (Two) Times a Day for 7 days.    apixaban (ELIQUIS) 2.5 MG tablet tablet Take 1 tablet by mouth 2 (Two) Times a Day. Indications: Atrial Fibrillation    aspirin 81 MG EC tablet Take 1 tablet by mouth Daily.    atorvastatin (LIPITOR) 40 MG tablet Take 1 tablet by mouth Every Night.    bumetanide (BUMEX) 2 MG tablet Take 1 tablet by mouth 2 (Two) Times a Day.    carvedilol (COREG) 6.25 MG tablet Take 1 tablet by mouth 2 (Two) Times a Day With Meals.    famotidine (PEPCID) 20 MG tablet Take 1 tablet by mouth 2 (Two) Times a Day As Needed for Heartburn.    fluticasone (FLONASE) 50 MCG/ACT nasal spray Administer 1 spray into the nostril(s) as directed by provider Daily.    gabapentin (NEURONTIN) 100 MG capsule Take 1 capsule by mouth 3 (Three) Times a Day.    indomethacin (INDOCIN) 25 MG capsule Take 1 capsule by mouth 3 (Three) Times a Day As Needed for Moderate Pain.    Jardiance 10 MG tablet tablet Take 1 tablet by mouth Daily.    magnesium oxide (MAG-OX) 400 MG tablet Take 1 tablet by  "mouth Daily.    metFORMIN (GLUCOPHAGE) 500 MG tablet Take 1 tablet by mouth 2 (Two) Times a Day With Meals.    methylPREDNISolone (MEDROL) 4 MG dose pack Take as directed on package instructions.    metOLazone (ZAROXOLYN) 2.5 MG tablet Take 1 tablet by mouth Daily. Take 1 tablet 30 minutes before Bumex daily times 3 days    nitroglycerin (NITRODUR) 0.2 MG/HR patch Place 1 patch on the skin as directed by provider See Admin Instructions. Apply patch daily, remove at night for at least 10 hours (Patient not taking: Reported on 6/18/2025)    ondansetron ODT (ZOFRAN-ODT) 8 MG disintegrating tablet Place 1 tablet on the tongue Every 8 (Eight) Hours As Needed for Nausea or Vomiting.    potassium chloride 10 MEQ CR tablet Take 2 tablets by mouth Daily.     MEDICATION LIST AND ALLERGIES REVIEWED.    Family History   Problem Relation Age of Onset    No Known Problems Mother     Heart attack Father      Social History     Tobacco Use    Smoking status: Never     Passive exposure: Never    Smokeless tobacco: Never   Vaping Use    Vaping status: Never Used   Substance Use Topics    Alcohol use: Yes     Alcohol/week: 1.0 standard drink of alcohol     Types: 1 Shots of liquor per week     Comment: Estimation based on spouse report of patient drinking \"1 large martini\" each night (regular martini = 3 shots, large martini = est. 4 shots)    Drug use: No     Social History     Social History Narrative    Lives in [place] with wife    Uses rollator at home     FAMILY AND SOCIAL HISTORY REVIEWED.    Unable to get review of systems given intubated status.  All history obtained from chart review and discussing with patient's wife and POA.    Physical Exam and Clinical Information   BP (!) 181/95 (BP Location: Other (Comment), Patient Position: Lying) Comment (BP Location): art line  Pulse 94   Temp (S) 97.7 °F (36.5 °C) (Bladder)   Resp 24   Ht 188 cm (74\")   Wt 90.7 kg (200 lb)   SpO2 91%   BMI 25.68 kg/m²     Physical " exam  General : Intubated adult male.  Neuro: Pupils equal and reactive bilateral.  Sedated.  HEENT : AT,NC,PERRLA, elevated JVD noted.  Orally intubated.  CVS : Tachycardia with regular rhythm, diastolic murmur noted.  Significant pitting edema noted over bilateral upper extremities and lower extremities all the way to the hip.  Resp/Chest: B/l ant VBS+.  Abd: Soft, Non distended, No tenderness, No organomegaly.  Increased with sounds left greater than right lateral chest.  +  SKIN : Acrocyanosis with cold peripheries noted.  Multiple areas of skin breakdown noted in lower extremities and upper extremity.  No evidence of any cellulitis.    Results from last 7 days   Lab Units 07/25/25  1805 07/25/25  1757   WBC 10*3/mm3  --  7.74   HEMOGLOBIN g/dL  --  9.8*   HEMOGLOBIN, POC g/dL 11.6*  --    PLATELETS 10*3/mm3  --  261     Results from last 7 days   Lab Units 07/25/25  1805 07/25/25  1757   SODIUM mmol/L  --  133*   POTASSIUM mmol/L  --  7.1*   CO2 mmol/L  --  25.4   BUN mg/dL  --  61.4*   CREATININE mg/dL 2.50* 2.33*   MAGNESIUM mg/dL  --  2.5*   GLUCOSE mg/dL  --  172*     Estimated Creatinine Clearance: 26.2 mL/min (A) (by C-G formula based on SCr of 2.5 mg/dL (H)).      Results from last 7 days   Lab Units 07/25/25  1826   PH, ARTERIAL pH units 7.227*   PCO2, ARTERIAL mm Hg 54.2*   PO2 ART mm Hg 91.7     Lab Results   Component Value Date    LACTATE 8.0 (C) 07/25/2025        Images: Bilateral pulmonary edema and large left effusion noted.    EKG-right bundle branch block which is new.    I reviewed the patient's results and images.     Impression     Acute on chronic combined systolic and diastolic CHF (congestive heart failure)    Cardiogenic shock SCAI stage D    CAD with H/o stemi managed medically with Ischemic cardiomyopathy    Moderate to severe AR    Essential hypertension    Atrial fibrillation-permanent on Eliquis    Acute on chronic hypoxemic and hypercapnic respiratory failure    Pulmonary edema and  bilateral pleural effusions left greater than right     JOLENE on CPAP    ?  Sepsis    GERD (gastroesophageal reflux disease)    Gout    Diabetes mellitus    SHANT on stage 3b chronic kidney disease    Hyperkalemia    Elevated anion gap metabolic acidosis 2/2 SHANT lactic acidosis      Plan/Recommendations     Neuro  -received ketamine and rocuronium as a paralytic.  Start fentanyl and Precedex for sedation and analgesia.  Target RASS -1 to -2.  - High risk for delirium.  - Daily SAT    CVS  - Patient in cardiogenic shock from severely decompensated congestive heart failure.  Likely had gut edema with poor response to p.o. medications in the last 1 week with decreased urine output.  Continue low-dose dobutamine drip and started Bumex 2 mg IV every 6 hours.  Monitor urine output.  If not having significant urine output might have to start Bumex drip and add metolazone.  Repeat TTE in AM.    -Currently in A-fib with controlled rates.  Continue Eliquis.    -Known history of ischemic cardiomyopathy.  Continue aspirin.  Hold statin given elevated LFTs.  Also has moderate to severe mitral regurgitation with valvular disease likely contributing to worsening of his heart failure.  Unfortunately given his age he is not a candidate for any interventions at this time and patient's wife was told the same in the recent past.  Medical comanagement.  Goal-directed medical therapy as blood pressure allows eventually.    Pulmonary  - Protective ventilation settings initiated.  Repeat ABG in AM.  -VAP bundle  - Has moderate to large left pleural effusion.  Hopefully can diuresis.  Might need to consider tapping if unable to extubate eventually.    Renal  - SHANT on CKD likely secondary to cardiorenal syndrome.  Continue diuretics.  Monitor renal function every 6 hours.  - Hyperkalemia treated medically.  Repeat labs continue diuretics for potassium clearance.  Will also order a dose of Lokelma.    GI/Liver  - Moderate elevated elevated  secondary to hepatic congestion.  - PPI    ID  - Patient's symptoms and presentation are more consistent with heart failure.  Less likely infection.  Mildly elevated procalcitonin but also has elevated creatinine.  For now on broad-spectrum antibiotics.  If cultures are negative we will do rapid de-escalation in 24 to 48 hours.    Hem-onc  - Continue Eliquis.    Endocrine  - SSI    Misc  - Discussed with patient's wife and POA goals of care.  Patient had significant deterioration in the past 6 months and per wife he has been losing interest in life given his worsening medical conditions recently.  She would want to continue medical care but in any event of cardiac arrest would want no CPR.  CODE STATUS changed to NO CPR.      Time spent Critical care 60 min (exclusive of procedure time)  including high complexity decision making to assess, manipulate, and support vital organ system failure in this individual who has impairment of one or more vital organ systems such that there is a high probability of imminent or life threatening deterioration in the patient’s condition.      Lexa Reis MD   Critical Care Medicine  07/25/25 20:00 EDT

## 2025-07-26 NOTE — NURSING NOTE
Notified COLIN Doan that dobutamine infiltrated in patient's left arm while in the ER but Regitine was not administered secondary to hypotension.  New order received to administer regitine to infiltrated area.

## 2025-07-26 NOTE — PLAN OF CARE
Goal Outcome Evaluation:  Plan of Care Reviewed With: patient, spouse        Progress: no change          *remains on vent at 40%, peep 5, mtv 450  *sedation for comfort, fentanyl and precedex  *decreased pulses at 1600, order to d/c right femoral art line, after line pulled and 10 minutes of manual pressure applied, pt developed a hematoma to the site. An additional 15 minutes manual pressure applied. Femostop applied per aprn but pt became dropping sbp to 70s. Manual pressure again for 15minutes, levo ordered, dobutamine restarted, albumin given. Stat h/h and T/s.   *Femostop reapplied at 1824.   *monitor FSBS for hypoglycemia    *echo today showed 26-30% ef  *bumex for fluid overload.

## 2025-07-26 NOTE — CONSULTS
[facility]  Cardiovascular Consultation/h&p Note      Iain Dee  [id number]  1935 19/19    Referring Provider: No ref. provider found   PCP: Luis Aguilar DO    DATE OF ADMISSION: 7/25/2025    Reason for Consultation: HF, cardiogenic shock    History of Present Illness  -year-old male with a past medical history of chronic HFrEF, moderate to severe aortic regurgitation , permanent A-fib,  CKD, HTN, DM, inferior STEMI (medically managed) presenting with HF exacerbation. Noted to have decline in functional status for the past 3 months per his wife. 3 weeks ago started having cold like symptoms. Few days ago started having some mental status changes. Pt was intubated and started on inotropic agents on presentation. Initially was on levophed and epi, but BP has recovered and now in 170s.     CT chest showing pulmonary edema along with large left sided effusion. Cr worse and on exam had cold peripheries. ABG showing combined met and respiratory acidosis. Pt remains on dobutamine. Nephrology consulted and diuretic started. Empiric ABX is also on board.       Past Medical History:   Diagnosis Date    A-fib     Arthritis     Cancer     Diabetes mellitus     GERD (gastroesophageal reflux disease)     Gout     H/O Bladder carcinoma (HCC)     Hypertension        Past Surgical History:   Procedure Laterality Date    BLADDER SURGERY      Biopsy    HERNIA REPAIR      PROSTATE BIOPSY         No current facility-administered medications on file prior to encounter.     Current Outpatient Medications on File Prior to Encounter   Medication Sig Dispense Refill    allopurinol (Zyloprim) 300 MG tablet Take 1 tablet by mouth Daily. 90 tablet 1    amoxicillin-clavulanate (AUGMENTIN) 875-125 MG per tablet Take 1 tablet by mouth 2 (Two) Times a Day for 7 days. 14 tablet 0    apixaban (ELIQUIS) 2.5 MG tablet tablet Take 1 tablet by mouth 2 (Two) Times a Day. Indications: Atrial  Fibrillation 60 tablet 11    aspirin 81 MG EC tablet Take 1 tablet by mouth Daily. 30 tablet 0    atorvastatin (LIPITOR) 40 MG tablet Take 1 tablet by mouth Every Night. 90 tablet 3    bumetanide (BUMEX) 2 MG tablet Take 1 tablet by mouth 2 (Two) Times a Day. 60 tablet 3    carvedilol (COREG) 6.25 MG tablet Take 1 tablet by mouth 2 (Two) Times a Day With Meals.      famotidine (PEPCID) 20 MG tablet Take 1 tablet by mouth 2 (Two) Times a Day As Needed for Heartburn. 180 tablet 0    fluticasone (FLONASE) 50 MCG/ACT nasal spray Administer 1 spray into the nostril(s) as directed by provider Daily. 16 g 11    gabapentin (NEURONTIN) 100 MG capsule Take 1 capsule by mouth 3 (Three) Times a Day. 90 capsule 5    indomethacin (INDOCIN) 25 MG capsule Take 1 capsule by mouth 3 (Three) Times a Day As Needed for Moderate Pain. 9 capsule 0    Jardiance 10 MG tablet tablet Take 1 tablet by mouth Daily. 90 tablet 11    magnesium oxide (MAG-OX) 400 MG tablet Take 1 tablet by mouth Daily. 30 tablet 11    metFORMIN (GLUCOPHAGE) 500 MG tablet Take 1 tablet by mouth 2 (Two) Times a Day With Meals. 180 tablet 0    methylPREDNISolone (MEDROL) 4 MG dose pack Take as directed on package instructions. 21 tablet 0    metOLazone (ZAROXOLYN) 2.5 MG tablet Take 1 tablet by mouth Daily. Take 1 tablet 30 minutes before Bumex daily times 3 days 3 tablet 0    nitroglycerin (NITRODUR) 0.2 MG/HR patch Place 1 patch on the skin as directed by provider See Admin Instructions. Apply patch daily, remove at night for at least 10 hours (Patient not taking: Reported on 6/18/2025) 90 patch 3    ondansetron ODT (ZOFRAN-ODT) 8 MG disintegrating tablet Place 1 tablet on the tongue Every 8 (Eight) Hours As Needed for Nausea or Vomiting. 9 tablet 2    potassium chloride 10 MEQ CR tablet Take 2 tablets by mouth Daily. 60 tablet 3       Social History     Socioeconomic History    Marital status:    Tobacco Use    Smoking status: Never     Passive exposure:  "Never    Smokeless tobacco: Never   Vaping Use    Vaping status: Never Used   Substance and Sexual Activity    Alcohol use: Yes     Alcohol/week: 1.0 standard drink of alcohol     Types: 1 Shots of liquor per week     Comment: Estimation based on spouse report of patient drinking \"1 large martini\" each night (regular martini = 3 shots, large martini = est. 4 shots)    Drug use: No    Sexual activity: Defer       Family History   Problem Relation Age of Onset    No Known Problems Mother     Heart attack Father        Review of Systems   Unable to perform ROS: Acuity of condition         Physical Exam  Vitals:    07/25/25 [birth year] 07/25/25 1937 07/25/25 2000   BP:  (!) 181/95  159/98   BP Location:  Other (Comment)     Patient Position:  Lying     Pulse: 91 94  85   Resp:  24     Temp:  97.7 °F (36.5 °C) (S) 97.7 °F (36.5 °C)    TempSrc:  Bladder Bladder    SpO2:    93%   Weight:       Height:         Physical Exam:  Gen:  intubated and sedated  Eyes:  sclerae anicteric  ENT:  MMM, trachea midline, NC/AT, ETT noted  Resp:  Mechanical breath sounds noted  Cardio:  IRIR  GI:  S/NT/ND, +BS  Ext:  WWP, no LE edema, no joint effusions  Skin: No jaundice or rash, cool peripheries noted. Small ulceration just below his knee on the right side  Neuro: unable to fully assess Cns or motor/sensory functions  Psych:  intubated and sedated      Lab Review:            Results from last 7 days   Lab Units 07/25/25 1805 07/25/25 1757   SODIUM mmol/L  --  133*   POTASSIUM mmol/L  --  7.1*   CHLORIDE mmol/L  --  89*   CO2 mmol/L  --  25.4   BUN mg/dL  --  61.4*   CREATININE mg/dL 2.50* 2.33*   GLUCOSE mg/dL  --  172*   CALCIUM mg/dL  --  9.0     Results from last 7 days   Lab Units 07/25/25 1925 07/25/25 1757   HSTROP T ng/L 139* 164*     Results from last 7 days   Lab Units 07/25/25 1805 07/25/25 1757   WBC 10*3/mm3  --  7.74   HEMOGLOBIN g/dL  --  9.8*   HEMOGLOBIN, POC g/dL 11.6*  --    HEMATOCRIT %  --  34.3* "   HEMATOCRIT POC % 34*  --    PLATELETS 10*3/mm3  --  261     Results from last 7 days   Lab Units 07/25/25  1757   INR  2.14*   APTT seconds 42.0*         Results from last 7 days   Lab Units 07/25/25  1757   MAGNESIUM mg/dL 2.5*             EKG: AF with slowly ventricular response 59 bpm  ECHO: TTE 4/2025    Left ventricular ejection fraction appears to be 36 - 40%.    Left ventricular wall thickness is consistent with mild concentric hypertrophy.    Left ventricular diastolic function is consistent with (grade II w/high LAP) pseudonormalization.    The right ventricular cavity is moderate to severely dilated.    The left atrial cavity is moderate to severely dilated.    Left atrial volume is severely increased.    The right atrial cavity is severely  dilated.    There is mild calcification of the aortic valve.    Moderate to severe aortic valve regurgitation is present.    Moderate to severe tricuspid valve regurgitation is present.    Estimated right ventricular systolic pressure from tricuspid regurgitation is markedly elevated (>55 mmHg).      I personally viewed and interpreted the patient's EKG/telemetry/lab/imaging data    Assessment & Plan:    Cardiogenic shock  Acute on chronic HFrEF  CKD c/b Hyperkalemia  Congestive hepatopathy  Permanent AF  - intubated and sedated  - continue dobutamine   - BP recovered now  - bumex 2mg IV Q6 ordered   - further diuresis per nephrology  - empiric abx coverage    Extensive discussion with his wife who was at his bedside. Discussed the benefit of starting to think about his end of life goals and care. She notes that he has been uncomfortable for months and previously mentioned that he would like to pass away peacefully. She will continue to think about his code status and let us know. Briefly talked about mechanical support but mutually agreed on not pursuing this aggressive measure. Continue inotrope assisted diuresis.       Thank you for allowing me to participate in  the care of Iain Dee. Feel free to contact me directly with any further questions or concerns.    Efrain Sanches MD [facility]  Cardiac Electrophysiologist  [facility]     07/25/25  20:32 EDT.

## 2025-07-26 NOTE — CONSULTS
Referring Provider: Dr Schwartz   Reason for Consultation: SHANT    Subjective     Chief complaint Weakness     History of present illness:  An 89-year-old male with a complex medical history, including chronic systolic and diastolic congestive heart failure (EF 35-40%) secondary to ischemic cardiomyopathy post-STEMI (medically managed), moderate to severe aortic regurgitation, cor pulmonale, permanent atrial fibrillation on Eliquis, CKD stage IIIb, obesity, type 2 diabetes mellitus, hypertension, gout, and remote history of bladder and prostate cancer, presented to the ED with worsening weakness, fatigue, dyspnea, and progressive swelling in both lower and upper extremities. On arrival, he was found to be hypoxemic, hypotensive, and in shock with poor mentation and cold extremities, necessitating intubation and vasopressor support (initially on norepinephrine, epinephrine, and dobutamine, later weaned to dobutamine alone). CT chest revealed pulmonary edema and a moderate to large left-sided pleural effusion. Labs showed acute kidney injury (on CKD) and hyperkalemia (K? 7.1, improved to 6.3 with treatment), elevated lactic acid (8.0), and transaminitis suggestive of shock liver. Nephrology was consulted for management of SHANT.    History  Past Medical History:   Diagnosis Date    A-fib     Arthritis     Cancer     Diabetes mellitus     GERD (gastroesophageal reflux disease)     Gout     H/O Bladder carcinoma (HCC)     Hypertension    ,   Past Surgical History:   Procedure Laterality Date    BLADDER SURGERY      Biopsy    HERNIA REPAIR      PROSTATE BIOPSY     ,   Family History   Problem Relation Age of Onset    No Known Problems Mother     Heart attack Father    ,   Social History     Socioeconomic History    Marital status:    Tobacco Use    Smoking status: Never     Passive exposure: Never    Smokeless tobacco: Never   Vaping Use    Vaping status: Never Used   Substance and Sexual Activity    Alcohol use: Yes  "    Alcohol/week: 1.0 standard drink of alcohol     Types: 1 Shots of liquor per week     Comment: Estimation based on spouse report of patient drinking \"1 large martini\" each night (regular martini = 3 shots, large martini = est. 4 shots)    Drug use: No    Sexual activity: Defer     E-cigarette/Vaping    E-cigarette/Vaping Use Never User     Passive Exposure No     Counseling Given No      E-cigarette/Vaping Substances    Nicotine No     THC No     CBD No     Flavoring No      E-cigarette/Vaping Devices    Disposable No     Pre-filled or Refillable Cartridge No     Refillable Tank No     Pre-filled Pod No          , (Not in a hospital admission) , Scheduled Meds:  allopurinol, 100 mg, Oral, Daily  apixaban, 2.5 mg, Oral, Q12H  aspirin, 81 mg, Oral, Daily  [START ON 7/26/2025] bumetanide, 2 mg, Intravenous, Q6H  mupirocin, 1 Application, Each Nare, BID  pantoprazole, 40 mg, Intravenous, Q24H  [START ON 7/26/2025] piperacillin-tazobactam, 4.5 g, Intravenous, Q12H  senna-docusate sodium, 2 tablet, Nasogastric, BID  sodium chloride, 10 mL, Intravenous, Q12H  sodium zirconium cyclosilicate, 10 g, Oral, Once  thiamine (B-1) IV, 500 mg, Intravenous, Q8H  vancomycin, 20 mg/kg, Intravenous, Once   , Continuous Infusions:  dexmedetomidine, 0.2-1.5 mcg/kg/hr  DOBUTamine, 2-20 mcg/kg/min, Last Rate: 4 mcg/kg/min (07/25/25 1948)  fentanyl 10 mcg/mL,  mcg/hr, Last Rate: 50 mcg/hr (07/25/25 2007)   , PRN Meds:    senna-docusate sodium **AND** polyethylene glycol **AND** [DISCONTINUED] bisacodyl **AND** bisacodyl    fentaNYL    nitroglycerin    sodium chloride    sodium chloride    sodium chloride, and Allergies:  Colchicine    Review of Systems  Pertinent items are noted in HPI    Objective     Vital Signs  Temp:  [97.7 °F (36.5 °C)] 97.7 °F (36.5 °C)  Heart Rate:  [] 85  Resp:  [16-30] 24  BP: ()/() 159/98  Arterial Line BP: (179-190)/(90-99) 179/90  FiO2 (%):  [100 %] 100 %    No intake/output data " "recorded.  I/O last 3 completed shifts:  In: 1000 [I.V.:500; IV Piggyback:500]  Out: -     Physical Exam:  Gen: intubated and sedated.    HENT: NC, AT  NECK: Supple, ETT in place  LUNGS: Coarse breath sound on vent, non labored respirtation   CVS: S1/S2 audible, RRR, no murmur   Abd: Soft, NT, ND, BS+   Ext: +edema, no cyanosis   CNS: Intubated and sedated.   Psy: Intubated and sedated.   Skin: Warm, dry and intact      Results Review:   I reviewed the patient's new clinical results.    WBC WBC   Date Value Ref Range Status   07/25/2025 7.74 3.40 - 10.80 10*3/mm3 Final      HGB Hemoglobin   Date Value Ref Range Status   07/25/2025 11.6 (L) 12.0 - 17.0 g/dL Final     Comment:     Serial Number: [id number]:  [id number]   07/25/2025 9.8 (L) 13.0 - 17.7 g/dL Final      HCT Hematocrit   Date Value Ref Range Status   07/25/2025 34 (L) 38 - 51 % Final   07/25/2025 34.3 (L) 37.5 - 51.0 % Final      Platlets No results found for: \"LABPLAT\"   MCV MCV   Date Value Ref Range Status   07/25/2025 100.6 (H) 79.0 - 97.0 fL Final          Sodium Sodium   Date Value Ref Range Status   07/25/2025 133 (L) 136 - 145 mmol/L Final      Potassium Potassium   Date Value Ref Range Status   07/25/2025 7.1 (C) 3.5 - 5.2 mmol/L Final     Comment:     Specimen hemolyzed.  Result may be falsely elevated.      Chloride Chloride   Date Value Ref Range Status   07/25/2025 89 (L) 98 - 107 mmol/L Final      CO2 CO2   Date Value Ref Range Status   07/25/2025 25.4 22.0 - 29.0 mmol/L Final      BUN BUN   Date Value Ref Range Status   07/25/2025 61.4 (H) 8.0 - 23.0 mg/dL Final      Creatinine Creatinine   Date Value Ref Range Status   07/25/2025 2.50 (H) 0.60 - 1.30 mg/dL Final   07/25/2025 2.33 (H) 0.76 - 1.27 mg/dL Final      Calcium Calcium   Date Value Ref Range Status   07/25/2025 9.0 8.6 - 10.5 mg/dL Final      PO4 No results found for: \"CAPO4\"   Albumin Albumin   Date Value Ref Range Status   07/25/2025 3.4 (L) 3.5 - 5.2 g/dL Final      Magnesium " "Magnesium   Date Value Ref Range Status   07/25/2025 2.5 (H) 1.6 - 2.4 mg/dL Final      Uric Acid No results found for: \"URICACID\"         allopurinol, 100 mg, Oral, Daily  apixaban, 2.5 mg, Oral, Q12H  aspirin, 81 mg, Oral, Daily  [START ON 7/26/2025] bumetanide, 2 mg, Intravenous, Q6H  mupirocin, 1 Application, Each Nare, BID  pantoprazole, 40 mg, Intravenous, Q24H  [START ON 7/26/2025] piperacillin-tazobactam, 4.5 g, Intravenous, Q12H  senna-docusate sodium, 2 tablet, Nasogastric, BID  sodium chloride, 10 mL, Intravenous, Q12H  sodium zirconium cyclosilicate, 10 g, Oral, Once  thiamine (B-1) IV, 500 mg, Intravenous, Q8H  vancomycin, 20 mg/kg, Intravenous, Once      dexmedetomidine, 0.2-1.5 mcg/kg/hr  DOBUTamine, 2-20 mcg/kg/min, Last Rate: 4 mcg/kg/min (07/25/25 1948)  fentanyl 10 mcg/mL,  mcg/hr, Last Rate: 50 mcg/hr (07/25/25 2007)      Results from last 7 days   Lab Units 07/25/25  1805 07/25/25  1757   SODIUM mmol/L  --  133*   POTASSIUM mmol/L  --  7.1*   CHLORIDE mmol/L  --  89*   CO2 mmol/L  --  25.4   BUN mg/dL  --  61.4*   CREATININE mg/dL 2.50* 2.33*   CALCIUM mg/dL  --  9.0   ALBUMIN g/dL  --  3.4*   WBC 10*3/mm3  --  7.74   HEMOGLOBIN g/dL  --  9.8*   HEMOGLOBIN, POC g/dL 11.6*  --    PLATELETS 10*3/mm3  --  261   GLUCOSE mg/dL  --  172*             Assessment & Plan       Acute on chronic combined systolic and diastolic CHF (congestive heart failure)    Essential hypertension    Atrial fibrillation    GERD (gastroesophageal reflux disease)    Gout    Diabetes mellitus    JOLENE on CPAP    Chronic anticoagulation (Eliquis)    Stage 3b chronic kidney disease    Acute hypoxemic respiratory failure    Cardiogenic shock      1- SHANT on CKD stage III - Cardiogenic shock   2- Cardiogenic shock   3- Respiratory distress - on vent   4- Hepatic congestion  5- Hyperkalemia   6- Hyponatremia     Plan:  - Continue with current. Dobutamine and diuretics. UOP is picking up   - S/p Lokelma   - Keep MAP above 65 " mmHg with pressor support.   - Monitor I/O   - Avoid nephrotoxic agents.   - Renal diet   - Adjust meds per renal function   - Monitor H/H and transfuse for Hgb less than 7.0   - Discussed with wife at bedside. She states that patient will not want to be on dialysis/RRT and she will not consider as well.     Critically ill patient. Prognosis guarded.   Thank you very much for the consult. Will follow along.     I discussed the patients findings and my recommendations with family and nursing staff    Isabel Polk MD  07/25/25

## 2025-07-26 NOTE — PLAN OF CARE
Goal Outcome Evaluation:  Plan of Care Reviewed With: spouse        Progress: no change     Patient arouses to pain, sedation decreased and stopped. Dobutamine titrated at 2.  Extravasation protocol reinstated post ER, warm compress and regitine given per pharmacy.  Lokelma and bumex given for hyperkalemia- stable at 4.6.  UO: 1600 ml jean-baptiste  Restraints for safety. Family updated at bedside

## 2025-07-26 NOTE — PROGRESS NOTES
"INTENSIVIST NOTE     Hospital Day: 1    Mr. Iain Dee, 89 y.o. male is followed for:   CHF, cardiogenic shock, respiratory failure       SUBJECTIVE     Interval history:    Remains on mechanical ventilation.  Fluid balance -570 mL.  Maximum temperature 100.2.  Dobutamine weaned off due to increasing blood pressure.  Wife at bedside.    The patient's relevant past medical, surgical and social history were reviewed and updated in Epic as appropriate.       OBJECTIVE     Vital Sign Min/Max for last 24 hours  Temp  Min: 97.7 °F (36.5 °C)  Max: 100.2 °F (37.9 °C)   BP  Min: 47/36  Max: 200/119   Pulse  Min: 46  Max: 106   Resp  Min: 12  Max: 30   SpO2  Min: 70 %  Max: 100 %   No data recorded   Weight  Min: 90.7 kg (199 lb 15.3 oz)  Max: 90.7 kg (200 lb)      Intake/Output Summary (Last 24 hours) at 7/26/2025 1534  Last data filed at 7/26/2025 1521  Gross per 24 hour   Intake 2579.07 ml   Output 3150 ml   Net -570.93 ml      Flowsheet Rows      Flowsheet Row First Filed Value   Admission Height 188 cm (74\") Documented at 07/25/2025 1746   Admission Weight 90.7 kg (200 lb) Documented at 07/25/2025 1746               07/25/25  1746 07/26/25  0838   Weight: 90.7 kg (200 lb) 90.7 kg (199 lb 15.3 oz)            Objective:  General Appearance:  Ill-appearing.    Vital signs: (most recent): Blood pressure 110/73, pulse 52, temperature 99.7 °F (37.6 °C), temperature source Bladder, resp. rate 12, height 188 cm (74.02\"), weight 90.7 kg (199 lb 15.3 oz), SpO2 97%.    HEENT: (Oral ET tube  NG tube)    Lungs:  No rales, wheezes or rhonchi.    Heart: Normal rate.  Regular rhythm.  S1 normal and S2 normal.  No murmur, gallop or friction rub.   Chest: Symmetric chest wall expansion.   Abdomen: Abdomen is soft and non-distended.  Bowel sounds are normal.   There is no abdominal tenderness.   There is no mass. There is no splenomegaly. There is no hepatomegaly.   Extremities: There is no deformity or dependent edema.  (Right femoral " arterial line)  Pupils:  Pupils are equal, round, and reactive to light.    Skin:  Warm and dry.                I reviewed the patient's new clinical results.  I reviewed the patient's new imaging results/reports including actual images and agree with reports.    XR Abdomen KUB  Result Date: 7/26/2025  Impression: Gastric suction tube courses leftward and inferiorly with tip terminating below the diaphragm. The sideport is likely near the GE junction. Recommend further advancement. Electronically Signed: Rudolph Vaz MD  7/26/2025 1:31 PM EDT  Workstation ID: [id number]    XR Chest 1 View  Result Date: 7/26/2025  Impression: Interval placement of additional left chest defibrillator pad. Interval placement of NG/OG tube. Redemonstration of parenchymal opacities in the right middle lobe/right lower lobe. Worsening of dense opacities of the left lung base and retrocardiac region which may reflect atelectasis and/or pleural effusion. Electronically Signed: Tim Harris MD  7/26/2025 9:09 AM EDT  Workstation ID: [id number]    CT Chest Without Contrast Diagnostic  Result Date: 7/25/2025  Impression: 1. Findings suspicious for CHF exacerbation including cardiomegaly, small bilateral pleural effusions, mild interstitial pulmonary edema, small volume ascites and body wall edema. 2. Multifocal airspace consolidation suspicious for combination of pneumonia and atelectasis. 3. ET tube 1 cm above sukhdeep. Gastric tube terminates over mid stomach with sideport just below GE junction. 4. Debris in the mid to upper esophagus which could increased risk for aspiration. 5. Cholelithiasis. Electronically Signed: Steve Cuellar MD  7/25/2025 9:57 PM EDT  Workstation ID: [id number]    CT Head Without Contrast  Result Date: 7/25/2025  Impression: 1. No acute intracranial findings by CT. Chronic intracranial findings similar to 2023. 2. Severe sinus disease with multifocal complete opacification. Consider outpatient ENT consultation to  assess for obstructing lesions. Electronically Signed: Steve Cuellar MD  7/25/2025 9:34 PM EDT  Workstation ID: [id number]    XR Abdomen KUB  Result Date: 7/25/2025  Impression: Gastric tube terminates over mid to proximal stomach, oriented slightly leftward. Electronically Signed: Steve Cuellar MD  7/25/2025 8:39 PM EDT  Workstation ID: [id number]    XR Chest 1 View  Result Date: 7/25/2025  Impression: 1. ET tube 3.3 cm above sukhdeep without pneumothorax. 2. Similar left lower lobe atelectasis/airspace disease and small effusion. Suspect a trace right effusion similar to prior. Electronically Signed: Steve Cuellar MD  7/25/2025 7:32 PM EDT  Workstation ID: [id number]    XR Chest 1 View  Result Date: 7/25/2025  Impression: 1.Cardiomegaly. 2.Bilateral perihilar and basilar subsegmental atelectasis. 3.Probable small left pleural effusion. Electronically Signed: Baljinder Encarnacion MD  7/25/2025 6:20 PM EDT  Workstation ID: [id number]       INFUSIONS  dexmedetomidine, 0.2-1.5 mcg/kg/hr, Last Rate: 0.2 mcg/kg/hr (07/26/25 1217)  DOBUTamine, 2-20 mcg/kg/min, Last Rate: Stopped (07/26/25 1214)  fentanyl 10 mcg/mL,  mcg/hr, Last Rate: 100 mcg/hr (07/26/25 1527)        Results from last 7 days   Lab Units 07/26/25  0523 07/25/25  1805 07/25/25  1757   WBC 10*3/mm3 7.88  --  7.74   HEMOGLOBIN g/dL 9.1*  --  9.8*   HEMOGLOBIN, POC g/dL  --  11.6*  --    HEMATOCRIT % 28.9*  --  34.3*   HEMATOCRIT POC %  --  34*  --    PLATELETS 10*3/mm3 219  --  261     Results from last 7 days   Lab Units 07/26/25  0523 07/26/25  0100 07/25/25  1805 07/25/25  1757   SODIUM mmol/L 136 136  --  133*   POTASSIUM mmol/L 4.6 4.6  --  7.1*   CHLORIDE mmol/L 92* 95*  --  89*   CO2 mmol/L 32.0* 24.4  --  25.4   BUN mg/dL 65.1* 58.7*  --  61.4*   GLUCOSE mg/dL 111* 187*  --  172*   CREATININE mg/dL 2.16* 2.04* 2.50* 2.33*   MAGNESIUM mg/dL 2.1  --   --  2.5*   PHOSPHORUS mg/dL 2.8  --   --   --    CALCIUM mg/dL 8.7 8.5*  --  9.0   ALBUMIN g/dL 2.9*  --    --  3.4*         Results from last 7 days   Lab Units 07/26/25  0344 07/25/25 2029 07/25/25  1826   PH, ARTERIAL pH units 7.621* 7.593* 7.227*   PCO2, ARTERIAL mm Hg 25.7* 20.6* 54.2*   PO2 ART mm Hg 76.6* 94.6 91.7   FIO2 % 40 40 28       Patient isn't on Tube Feeding   /h  Patient doesn't have any tube feeding modular orders    Mechanical Ventilator:   Settings: Observed:   Mode: VC+/AC (07/26/25 1345)    Vt (Set, mL): 450 mL (07/26/25 1345) Vt Mandatory Ins (observed, mL): 444 mL (07/26/25 1345)   Resp Rate (Set): 12 (07/26/25 1345) Resp Rate (Observed) Vent: 12 (07/26/25 1345)     Minute Ventilation (L/min) (Obs): 5.13 L/min (07/26/25 1345)       FiO2 (%): 40 % (07/26/25 1345) Plateau Pressure (cm H2O): (S) 19 cm H2O (07/25/25 1830)   PEEP/CPAP (cm H2O): 5 cm H20 (07/26/25 1345) I:E Ratio (Obs): 1:5.6 (07/26/25 1345)         I reviewed the patient's medications.    Assessment & Plan   ASSESSMENT/PLAN     Active Hospital Problems    Diagnosis     **Acute on chronic combined systolic and diastolic CHF (congestive heart failure)     Acute hypoxemic respiratory failure     Cardiogenic shock     Stage 3b chronic kidney disease     Essential hypertension     Atrial fibrillation     GERD (gastroesophageal reflux disease)     Chronic anticoagulation (Eliquis)     Gout     Diabetes mellitus     JOLENE on CPAP        89-year-old male with a past medical history significant for chronic systolic and diastolic CHF with ischemic cardiomyopathy and EF 35-40%, moderate to severe aortic regurgitation, cor pulmonale, permanent atrial fibrillation on Eliquis, CKD 3B, type 2 diabetes mellitus, hypertension, gout, bladder cancer, and prostate cancer.    He was presented on 7/25 with weakness, shortness of breath, and increasing edema.  He was found to have hypoxemia and shock.  He underwent intubation and was placed on vasoactive drips.  CT of the chest consistent with pulmonary edema.  LFTs were elevated consistent with shock liver  and he had an elevated lactic acid.    Today initially was on dobutamine drip though this has been weaned off due to increasing blood pressure.  He has been undergoing diuresis.  He remains on mechanical ventilation.  Lactate has normalized.  Creatinine 2.16.    Plan:    Reduced rate and tidal volume on ventilator to 12 and 450 due to hyperventilation  Continue Bumex  Dobutamine per parameters  Basal and scheduled insulin  Empiric antibiotics for now though no clear evidence of infection  IV Protonix  Resume home PAP therapy on occasion of extubation  Assess ventilator weaning ability daily       I discussed the patient's findings and my recommendations with family and nursing staff     Plan of care and goals reviewed with multidisciplinary team at daily rounds.    Any copied data from previous notes included in the (1) History of Present Illness, (2) Physical Examination and (3) Medical Decision Making and/or Assessment and Plan has been reviewed and is accurate as of 07/26/25    Critical Care time spent in direct patient care: 31 minutes (excluding procedure time, if applicable) including high complexity decision making to assess, manipulate, and support vital organ system failure in this individual who has impairment of one or more vital organ systems such that there is a high probability of imminent or life threatening deterioration in the patient's condition.    Antwan Ely MD  Pulmonary and Critical Care Medicine  07/26/25 15:34 EDT

## 2025-07-26 NOTE — PROGRESS NOTES
Cardiac Electrophysiology Inpatient Follow up Note    DATE OF ADMISSION: 7/25/2025    Luis Aguilar DO  [address] [facility] / [facility] [address]  Referring Provider: No ref. provider found     Hospital Chief Complaint:   Chief Complaint   Patient presents with    Altered Mental Status     Reason for Consult: HF exacerbation    SUBJECTIVE  Remains intubated and sedated  Off of pressors   - received phentolamine overnight  Remains on dobutamine    Allergies   Allergen Reactions    Colchicine Other (See Comments)     Unable to walk        Current Facility-Administered Medications:     allopurinol (ZYLOPRIM) tablet 100 mg, 100 mg, Oral, Daily, Lexa Reis MD    apixaban (ELIQUIS) tablet 2.5 mg, 2.5 mg, Oral, Q12H, Lexa Reis MD, 2.5 mg at 07/25/25 2156    aspirin EC tablet 81 mg, 81 mg, Oral, Daily, Lexa Reis MD    sennosides-docusate (PERICOLACE) 8.6-50 MG per tablet 2 tablet, 2 tablet, Nasogastric, BID, 2 tablet at 07/25/25 2156 **AND** polyethylene glycol (MIRALAX) packet 17 g, 17 g, Nasogastric, Daily PRN **AND** [DISCONTINUED] bisacodyl (DULCOLAX) EC tablet 5 mg, 5 mg, Oral, Daily PRN **AND** bisacodyl (DULCOLAX) suppository 10 mg, 10 mg, Rectal, Daily PRN, Lexa Reis MD    bumetanide (BUMEX) injection 2 mg, 2 mg, Intravenous, Q6H, Lexa Reis MD, 2 mg at 07/26/25 0026    dexmedeTOMIDine (PRECEDEX) 400 mcg in 100 mL NS infusion, 0.2-1.5 mcg/kg/hr, Intravenous, Titrated, Lexa Reis MD, Stopped at 07/26/25 0525    dextrose (D50W) (25 g/50 mL) IV injection 25 g, 25 g, Intravenous, Q15 Min PRN, Javon Gamez APRN    dextrose (GLUTOSE) oral gel 15 g, 15 g, Oral, Q15 Min PRN, Javon Gamez APRN    DOBUTamine (DOBUTREX) 1 mg/mL in D5W infusion, 2-20 mcg/kg/min, Intravenous, Titrated, Javon Schwartz MD, Stopped at 07/26/25 0324    fentaNYL (SUBLIMAZE) bolus from bag 10 mcg/mL injection 50 mcg, 50 mcg, Intravenous, Q30 Min  PRN, Lexa Reis MD    fentaNYL 2500 mcg/250 mL NS infusion,  mcg/hr, Intravenous, Titrated, Lexa Reis MD, Stopped at 07/26/25 0514    glucagon (GLUCAGEN) injection 1 mg, 1 mg, Intramuscular, Q15 Min PRN, Javon Gamez APRN    insulin glargine (LANTUS, SEMGLEE) injection 9 Units, 9 Units, Subcutaneous, Nightly, Javon Gamez APRN, 9 Units at 07/26/25 0025    insulin regular (humuLIN R,novoLIN R) injection 2-7 Units, 2-7 Units, Subcutaneous, Q6H, Javon Gamez APRN, 3 Units at 07/26/25 0025    mupirocin (BACTROBAN) 2 % nasal ointment 1 Application, 1 Application, Each Nare, BID, Javon Gamez APRN, 1 Application at 07/25/25 2157    nitroglycerin (NITROSTAT) SL tablet 0.4 mg, 0.4 mg, Sublingual, Q5 Min PRN, Javon Gamez APRN    pantoprazole (PROTONIX) injection 40 mg, 40 mg, Intravenous, Q24H, Javon Gamez APRN, 40 mg at 07/25/25 2156    piperacillin-tazobactam (ZOSYN) 4.5 g IVPB in 100 mL NS MBP (CD), 4.5 g, Intravenous, Q12H, Javon Gamez APRN    sodium chloride 0.9 % flush 10 mL, 10 mL, Intravenous, PRN, Javon Schwartz MD    sodium chloride 0.9 % flush 10 mL, 10 mL, Intravenous, Q12H, Javon Gamez APRN, 10 mL at 07/25/25 2138    sodium chloride 0.9 % flush 10 mL, 10 mL, Intravenous, PRN, Javon Gamez APRN    sodium chloride 0.9 % infusion 40 mL, 40 mL, Intravenous, PRN, Javon Gamez APRN    thiamine (B-1) 500 mg in sodium chloride 0.9 % 100 mL IVPB, 500 mg, Intravenous, Q8H, Javon Gamez, APRN, Last Rate: 200 mL/hr at 07/25/25 2156, 500 mg at 07/25/25 2156  dexmedetomidine, 0.2-1.5 mcg/kg/hr, Last Rate: Stopped (07/26/25 0525)  DOBUTamine, 2-20 mcg/kg/min, Last Rate: Stopped (07/26/25 0324)  fentanyl 10 mcg/mL,  mcg/hr, Last Rate: Stopped (07/26/25 0514)            ROS   6 point ROS negative except as outlined in problem list, HPI and other parts of the note.      OBJECTIVE  Vitals:    07/26/25 0300  07/26/25 0338 07/26/25 0400 07/26/25 0500   BP: 123/83  116/73 109/72   BP Location: Left arm  Left arm Left arm   Patient Position: Lying  Lying Lying   Pulse: 70 63 66 67   Resp:  14 12    Temp:   100.2 °F (37.9 °C)    TempSrc:   Bladder    SpO2: 98% 99% 99% 100%   Weight:       Height:         - Supplemental O2:     - Admit Weight  Weight: 90.7 kg (200 lb)  - Body mass index is 25.67 kg/m².    Intake/Output Summary (Last 24 hours) at 7/26/2025 0623  Last data filed at 7/26/2025 0531  Gross per 24 hour   Intake 2161.62 ml   Output 1600 ml   Net 561.62 ml     Physical Exam:  Gen:  intubated and sedated  Eyes:  sclerae anicteric  ENT:  MMM, trachea midline, NC/AT, ETT noted  Resp:  Mechanical breath sounds noted  Cardio:  IRIR no M/R/G  GI:  S/NT/ND, +BS  Ext:  WWP, no LE edema, no joint effusions  Skin: No jaundice or rash  Neuro: unable to fully assess Cns or motor/sensory functions  Psych:  intubated and sedated      Lab  Results from last 7 days   Lab Units 07/26/25  0523 07/25/25 1805 07/25/25  1757   WBC 10*3/mm3 7.88  --  7.74   HEMOGLOBIN g/dL 9.1*  --  9.8*   HEMOGLOBIN, POC g/dL  --  11.6*  --    HEMATOCRIT % 28.9*  --  34.3*   HEMATOCRIT POC %  --  34*  --    PLATELETS 10*3/mm3 219  --  261     Results from last 7 days   Lab Units 07/26/25  0100 07/25/25 1805 07/25/25  1757   SODIUM mmol/L 136  --  133*   POTASSIUM mmol/L 4.6  --  7.1*   CHLORIDE mmol/L 95*  --  89*   CO2 mmol/L 24.4  --  25.4   BUN mg/dL 58.7*  --  61.4*   CREATININE mg/dL 2.04* 2.50* 2.33*   GLUCOSE mg/dL 187*  --  172*   CALCIUM mg/dL 8.5*  --  9.0     Results from last 7 days   Lab Units 07/26/25  0523   MAGNESIUM mg/dL 2.1         Results from last 7 days   Lab Units 07/25/25  1925 07/25/25  1757   CK TOTAL U/L 77  --    HSTROP T ng/L 139* 164*         Results from last 7 days   Lab Units 07/25/25  1757   INR  2.14*   APTT seconds 42.0*         Results from last 7 days   Lab Units 07/26/25  0523   HEMOGLOBIN A1C % 7.07*     Results  from last 7 days   Lab Units 07/25/25  1757   PROBNP pg/mL 25,711.0*     Results from last 7 days   Lab Units 07/25/25  1757   PROBNP pg/mL 25,711.0*       Chest X-Ray:  Imaging Results (Last 24 Hours)       Procedure Component Value Units Date/Time    XR Chest 1 View - In process [[id number]] Resulted: 07/26/25 0256     Updated: 07/26/25 0615    This result has not been signed. Information might be incomplete.      CT Chest Without Contrast Diagnostic [[id number]] Collected: 07/25/25 2150     Updated: 07/25/25 2200    Narrative:      CT CHEST WO CONTRAST DIAGNOSTIC    Date of Exam: 7/25/2025 8:44 PM EDT    Indication: Altered mental status, seizures    Comparison: Chest radiograph 7/25/2025    Technique: Axial CT images were obtained of the chest without contrast administration.  Reconstructed coronal and sagittal images were also obtained. Automated exposure control and iterative construction methods were used.      Findings:  ET tube 1 cm above sukhdeep. Atrophic appearing thyroid. Aortic atherosclerotic disease without aneurysmal dilation. Severe coronary atherosclerotic disease. Cardiomegaly. No significant pericardial effusion. Antegrade oriented gastric tube terminates   within the mid stomach, side port just below GE junction. Debris in the mid to upper esophagus. Normal caliber main pulmonary artery.    No overtly suspicious mediastinal or hilar adenopathy. Small pleural effusions. Patchy bilateral lower lobe airspace consolidation. Other dependent areas of consolidation favor atelectasis. Mild smooth intralobar septal thickening in the right greater   than left upper lobes. No pneumothorax. No overtly suspicious pulmonary nodule.    Multiple gallstones. Small volume upper abdominal ascites. Body wall edema. Carotid atherosclerotic disease. Subacute to chronic appearing nondisplaced right anterior fifth and sixth rib fractures. Similar findings on the left involving left anterior   fifth rib. Chronic left  posterior 11th rib fracture.      Impression:      Impression:  1. Findings suspicious for CHF exacerbation including cardiomegaly, small bilateral pleural effusions, mild interstitial pulmonary edema, small volume ascites and body wall edema.  2. Multifocal airspace consolidation suspicious for combination of pneumonia and atelectasis.  3. ET tube 1 cm above sukhdeep. Gastric tube terminates over mid stomach with sideport just below GE junction.  4. Debris in the mid to upper esophagus which could increased risk for aspiration.  5. Cholelithiasis.        Electronically Signed: Steve Cuellar MD    7/25/2025 9:57 PM EDT    Workstation ID: [id number]    CT Head Without Contrast [[id number]] Collected: 07/25/25 2130     Updated: 07/25/25 2137    Narrative:      CT HEAD WO CONTRAST    Date of Exam: 7/25/2025 8:44 PM EDT    Indication: ams, seizure?.    Comparison: Head CT 2/27/2023    Technique: Axial CT images were obtained of the head without contrast administration.  Automated exposure control and iterative construction methods were used.      Findings:  Focal chronic infarct in the posterior left frontal lobe image 45 stable from prior comparison. Negative for large territory infarct, acute intracranial hemorrhage, large mass lesion, midline shift or hydrocephalus. There is mild global parenchymal   volume loss for age similar to prior. Periventricular and subcortical hypodensities suggesting sequelae of chronic microvascular ischemic change, likely moderate severity similar to prior. No large extra-axial fluid collection. Distal vertebral artery   and carotid atherosclerotic disease. Symmetric globes. Chronic scarring right frontal scalp. No large mastoid effusion. There is complete opacification of right frontal, ethmoid, sphenoid and maxillary sinuses. Near complete opacification of the left   maxillary sinus, left frontal and left ethmoid sinuses. Mild mucosal thickening of left sphenoid sinus. Stable benign left  occipital scalp lipoma.      Impression:      Impression:  1. No acute intracranial findings by CT. Chronic intracranial findings similar to 2023.  2. Severe sinus disease with multifocal complete opacification. Consider outpatient ENT consultation to assess for obstructing lesions.        Electronically Signed: Steve Cuellar MD    7/25/2025 9:34 PM EDT    Workstation ID: [id number]    XR Abdomen KUB [[id number]] Collected: 07/25/25 2038     Updated: 07/25/25 2042    Narrative:      XR ABDOMEN KUB    Date of Exam: 7/25/2025 7:58 PM EDT    Indication: OG tube placement    Comparison: Chest radiograph 7/25/2025    Findings:  See impression      Impression:      Impression:  Gastric tube terminates over mid to proximal stomach, oriented slightly leftward.      Electronically Signed: Steve Cuellar MD    7/25/2025 8:39 PM EDT    Workstation ID: [id number]    XR Chest 1 View [[id number]] Collected: 07/25/25 1930     Updated: 07/25/25 1935    Narrative:      XR CHEST 1 VW    Date of Exam: 7/25/2025 6:50 PM EDT    Indication: POST INTUBATION    Comparison: Chest radiograph 7/25/2025    Findings:  ET tube 3.3 cm above sukhdeep. Lung volumes are low with asymmetric right lower lobe atelectasis/airspace disease and small effusions similar to prior. Cardiomediastinal silhouette without significant change. No significant edema. Blunted right   costophrenic angle suggesting trace fluid similar to prior. No visualized pneumothorax. Aortic atherosclerotic disease. Degenerative related osseous change.      Impression:      Impression:  1. ET tube 3.3 cm above sukhdeep without pneumothorax.  2. Similar left lower lobe atelectasis/airspace disease and small effusion. Suspect a trace right effusion similar to prior.      Electronically Signed: Steve Cuellar MD    7/25/2025 7:32 PM EDT    Workstation ID: [id number]    XR Chest 1 View [[id number]] Collected: 07/25/25 1819     Updated: 07/25/25 1823    Narrative:      XR CHEST 1 VW    Date of  Exam: 7/25/2025 5:41 PM EDT    Indication: Weakness, dizziness, altered mental status    Comparison: 4/11/2025.    Findings:  The heart is enlarged. There are perihilar and basilar densities felt to be secondary to subsegmental atelectasis. The patient probably has a small left pleural effusion. The right pleural space is clear. The pulmonary vascular markings are normal. There   are chronic age-related changes involving the bony thorax and thoracic aorta.      Impression:      Impression:  1.Cardiomegaly.  2.Bilateral perihilar and basilar subsegmental atelectasis.  3.Probable small left pleural effusion.        Electronically Signed: Baljinder Encarnacion MD    7/25/2025 6:20 PM EDT    Workstation ID: [id number]          ECHO: REVIEWED   STRESS: REVIEWED  CATH: REVIEWED    The patient's old records including ambulatory rhythm recordings (ECGs, Holter/event monitor) were reviewed and discussed.    I personally viewed and interpreted the patient's EKG/Telemetry/lab data      ASSESSMENT & PLAN     Acute on chronic HFrEF  CKD c/b Hyperkalemia  Congestive hepatopathy  Permanent AF  - intubated and sedated  - continue dobutamine              - BP/shock recovered now  - bumex 2mg IV Q6 ordered              - further diuresis per nephrology   - patient/family deferring dialysis  - empiric abx coverage     Extensive discussion with his wife who was at his bedside yesterday. Discussed the benefit of starting to think about his end of life goals and care. She notes that he has been uncomfortable for months and previously mentioned that he would like to pass away peacefully. She will continue to think about his code status and let us know. Briefly talked about mechanical support but mutually agreed on not pursuing this aggressive measure. Continue inotrope assisted diuresis.     Efrain Sanches MD [facility]  Cardiac Electrophysiologist  [facility]     07/26/25  06:23 EDT

## 2025-07-27 NOTE — SIGNIFICANT NOTE
Patient was extubated this afternoon with intent to transition to comfort-based plan of care if his respiratory status declined. Called by nursing staff to the bedside for bradycardia in the 30s, hypoxia with saturations in the 70s, and agonal breathing. BiPAP was applied; however, after discussion with his wife at bedside, she elected to proceed with COMFORT MEASURES ONLY. CODE STATUS changed and orders updated.     Sofia Raines MSN, APRN, ACNPC-AG  Pulmonary and Critical Care Medicine  Electronically signed by COLIN Rodriguez, 07/27/25, 2:58 PM EDT.

## 2025-07-27 NOTE — SIGNIFICANT NOTE
Exam confirms with auscultation zero audible heart tones and zero audible respirations. Mr.Michael LATOSHA Dee was pronounced dead at 1526.  MD notified by Patient's RN.    Lino Booth RN  Clinical House Supervisor  7/27/2025 15:55 EDT

## 2025-07-27 NOTE — PROGRESS NOTES
"INTENSIVIST NOTE     Hospital Day: 2    Mr. Iain Dee, 89 y.o. male is followed for:   CHF, cardiogenic shock, respiratory failure       SUBJECTIVE     Interval history:    Fluid balance -2.8 L.  Afebrile.  Remains on mechanical ventilation no FiO2 down to 30% and PEEP 5.   minute ventilation acceptable.  Off all vasoactive drips.    The patient's relevant past medical, surgical and social history were reviewed and updated in Epic as appropriate.       OBJECTIVE     Vital Sign Min/Max for last 24 hours  Temp  Min: 97.5 °F (36.4 °C)  Max: 98.4 °F (36.9 °C)   BP  Min: 78/61  Max: 158/109   Pulse  Min: 42  Max: 101   Resp  Min: 12  Max: 12   SpO2  Min: 87 %  Max: 100 %   No data recorded   No data recorded      Intake/Output Summary (Last 24 hours) at 7/27/2025 1400  Last data filed at 7/27/2025 1319  Gross per 24 hour   Intake 1730.6 ml   Output 4485 ml   Net -2754.4 ml      Flowsheet Rows      Flowsheet Row First Filed Value   Admission Height 188 cm (74\") Documented at 07/25/2025 1746   Admission Weight 90.7 kg (200 lb) Documented at 07/25/2025 1746               07/25/25  1746 07/26/25  0838   Weight: 90.7 kg (200 lb) 90.7 kg (199 lb 15.3 oz)            Objective:  Vital signs: (most recent): Blood pressure 105/53, pulse 73, temperature 98.1 °F (36.7 °C), resp. rate 12, height 188 cm (74.02\"), weight 90.7 kg (199 lb 15.3 oz), SpO2 95%.    HEENT: (Oral ET tube  NG tube)    Lungs:  There are rales.  No wheezes or rhonchi.    Heart: Normal rate.  Regular rhythm.  S1 normal and S2 normal.  No murmur, gallop or friction rub.   Chest: Symmetric chest wall expansion.   Abdomen: Abdomen is soft and non-distended.  Bowel sounds are normal.   There is no abdominal tenderness.   There is no mass. There is no splenomegaly. There is no hepatomegaly.   Extremities: There is no deformity or dependent edema.    Pupils:  Pupils are equal, round, and reactive to light.    Skin:  Warm and dry.                I reviewed the " patient's new clinical results.  I reviewed the patient's new imaging results/reports including actual images and agree with reports.    XR Chest 1 View  Result Date: 7/27/2025  Impression: Endotracheal tube projects 2.2 cm above the sukhdeep. Similar-appearing bibasilar and right middle lobe consolidation which may represent atelectasis and/or pneumonia in the appropriate clinical setting. Similar mild prominence of central pulmonary vasculature which may represent background mild pulmonary edema..Similar small left pleural effusion. Electronically Signed: Otto Reddy MD  7/27/2025 9:04 AM EDT  Workstation ID: [id number]    XR Abdomen KUB  Result Date: 7/26/2025  Impression: Interval advancement of the NG/OG tube with the sideport and tip in the region of the gastric body. Electronically Signed: Tim Harris MD  7/26/2025 4:35 PM EDT  Workstation ID: [id number]    XR Abdomen KUB  Result Date: 7/26/2025  Impression: Gastric suction tube courses leftward and inferiorly with tip terminating below the diaphragm. The sideport is likely near the GE junction. Recommend further advancement. Electronically Signed: Rudolph Vaz MD  7/26/2025 1:31 PM EDT  Workstation ID: [id number]    XR Chest 1 View  Result Date: 7/26/2025  Impression: Interval placement of additional left chest defibrillator pad. Interval placement of NG/OG tube. Redemonstration of parenchymal opacities in the right middle lobe/right lower lobe. Worsening of dense opacities of the left lung base and retrocardiac region which may reflect atelectasis and/or pleural effusion. Electronically Signed: Tim Harris MD  7/26/2025 9:09 AM EDT  Workstation ID: [id number]    CT Chest Without Contrast Diagnostic  Result Date: 7/25/2025  Impression: 1. Findings suspicious for CHF exacerbation including cardiomegaly, small bilateral pleural effusions, mild interstitial pulmonary edema, small volume ascites and body wall edema. 2. Multifocal airspace consolidation  suspicious for combination of pneumonia and atelectasis. 3. ET tube 1 cm above sukhdeep. Gastric tube terminates over mid stomach with sideport just below GE junction. 4. Debris in the mid to upper esophagus which could increased risk for aspiration. 5. Cholelithiasis. Electronically Signed: Steve Cuellar MD  7/25/2025 9:57 PM EDT  Workstation ID: [id number]    CT Head Without Contrast  Result Date: 7/25/2025  Impression: 1. No acute intracranial findings by CT. Chronic intracranial findings similar to 2023. 2. Severe sinus disease with multifocal complete opacification. Consider outpatient ENT consultation to assess for obstructing lesions. Electronically Signed: Steve Cuellar MD  7/25/2025 9:34 PM EDT  Workstation ID: [id number]    XR Abdomen KUB  Result Date: 7/25/2025  Impression: Gastric tube terminates over mid to proximal stomach, oriented slightly leftward. Electronically Signed: Steve Cuellar MD  7/25/2025 8:39 PM EDT  Workstation ID: [id number]    XR Chest 1 View  Result Date: 7/25/2025  Impression: 1. ET tube 3.3 cm above sukhdeep without pneumothorax. 2. Similar left lower lobe atelectasis/airspace disease and small effusion. Suspect a trace right effusion similar to prior. Electronically Signed: Steve Cuellar MD  7/25/2025 7:32 PM EDT  Workstation ID: [id number]    XR Chest 1 View  Result Date: 7/25/2025  Impression: 1.Cardiomegaly. 2.Bilateral perihilar and basilar subsegmental atelectasis. 3.Probable small left pleural effusion. Electronically Signed: Baljinder Encarnacion MD  7/25/2025 6:20 PM EDT  Workstation ID: [id number]       INFUSIONS  dexmedetomidine, 0.2-1.5 mcg/kg/hr, Last Rate: Stopped (07/27/25 1149)  dextrose, 30 mL/hr, Last Rate: 30 mL/hr (07/27/25 1155)  DOBUTamine, 2-20 mcg/kg/min, Last Rate: Stopped (07/27/25 0501)  fentanyl 10 mcg/mL,  mcg/hr, Last Rate: Stopped (07/27/25 1134)  norepinephrine, 0.02-0.3 mcg/kg/min, Last Rate: Stopped (07/27/25 0015)        Results from last 7 days   Lab Units  07/27/25  0253 07/26/25  1802 07/26/25  0523 07/25/25 1805 07/25/25  1757   WBC 10*3/mm3 7.23  --  7.88  --  7.74   HEMOGLOBIN g/dL 9.0* 9.1* 9.1*  --  9.8*   HEMOGLOBIN, POC   --   --   --    < >  --    HEMATOCRIT % 30.0* 29.3* 28.9*  --  34.3*   HEMATOCRIT POC   --   --   --    < >  --    PLATELETS 10*3/mm3 230  --  219  --  261    < > = values in this interval not displayed.     Results from last 7 days   Lab Units 07/27/25  0253 07/26/25  0523 07/26/25 0100 07/25/25 1805 07/25/25  1757   SODIUM mmol/L 138 136 136  --  133*   POTASSIUM mmol/L 3.8 4.6 4.6  --  7.1*   CHLORIDE mmol/L 95* 92* 95*  --  89*   CO2 mmol/L 29.7* 32.0* 24.4  --  25.4   BUN mg/dL 59.0* 65.1* 58.7*  --  61.4*   GLUCOSE mg/dL 57* 111* 187*  --  172*   CREATININE mg/dL 2.30* 2.16* 2.04*   < > 2.33*   MAGNESIUM mg/dL 2.1 2.1  --   --  2.5*   PHOSPHORUS mg/dL 3.3 2.8  --   --   --    CALCIUM mg/dL 8.6 8.7 8.5*  --  9.0   ALBUMIN g/dL 3.0* 2.9*  --   --  3.4*    < > = values in this interval not displayed.         Results from last 7 days   Lab Units 07/27/25  0407 07/26/25 1829 07/26/25 0344 07/25/25 2029 07/25/25 1826   PH, ARTERIAL pH units 7.458* 7.412 7.621* 7.593* 7.227*   PCO2, ARTERIAL mm Hg 48.6* 55.9* 25.7* 20.6* 54.2*   PO2 ART mm Hg 102.0 113.0* 76.6* 94.6 91.7   FIO2 % 40 40 40 40 28       Patient isn't on Tube Feeding   /h  Patient doesn't have any tube feeding modular orders    Mechanical Ventilator:   Settings: Observed:   Mode: PS (07/27/25 1316)    Vt (Set, mL): 450 mL (07/27/25 1136) Vt Mandatory Ins (observed, mL): 433 mL (07/27/25 1316)   Resp Rate (Set): 12 (07/27/25 1136) Resp Rate (Observed) Vent: 20 (07/27/25 1316)   Pressure Support (cm H2O): 10 cm H20 (07/27/25 1316) Minute Ventilation (L/min) (Obs): 7.71 L/min (07/27/25 1316)       FiO2 (%): 30 % (07/27/25 1316) Plateau Pressure (cm H2O): 13 cm H2O (07/27/25 1113)   PEEP/CPAP (cm H2O): 5 cm H20 (07/27/25 1316) I:E Ratio (Obs): 1:1.8 (07/27/25 1316)         I  reviewed the patient's medications.    Assessment & Plan   ASSESSMENT/PLAN     Active Hospital Problems    Diagnosis     **Acute on chronic combined systolic and diastolic CHF (congestive heart failure)     Acute hypoxemic respiratory failure     Cardiogenic shock     Stage 3b chronic kidney disease     Essential hypertension     Atrial fibrillation     GERD (gastroesophageal reflux disease)     Chronic anticoagulation (Eliquis)     Gout     Diabetes mellitus     JOLENE on CPAP        89-year-old male with a past medical history significant for chronic systolic and diastolic CHF with ischemic cardiomyopathy and EF 35-40%, moderate to severe aortic regurgitation, cor pulmonale, permanent atrial fibrillation on Eliquis, CKD 3B, type 2 diabetes mellitus, hypertension, gout, bladder cancer, and prostate cancer.    He was presented on 7/25 with weakness, shortness of breath, and increasing edema.  He was found to have hypoxemia and shock.  He underwent intubation and was placed on vasoactive drips.  CT of the chest consistent with pulmonary edema.  LFTs were elevated consistent with shock liver and he had an elevated lactic acid.    Remains off vasoactive drips.  Creatinine slightly higher.  Fluid balance negative.  Afebrile.  Ventilator requirements low.  LFTs down.    Detailed discussion with his wife today.  She says that he would not want to be on mechanical ventilation and, in retrospect, regrets agreeing to allow intubation 2 days ago.  I think he is improved enough to where he may remain off mechanical ventilation but we will plan on extubating him with preparations for no reintubation and symptom management if he does poorly.    Plan:    Extubation with transition to palliative symptom management if respiratory status uncomfortable and no reintubation  Continue Bumex  Basal and scheduled insulin  Empiric antibiotics   IV Protonix  Resume home PAP therapy after extubation       I discussed the patient's findings and  my recommendations with family and nursing staff     Plan of care and goals reviewed with multidisciplinary team at daily rounds.    Any copied data from previous notes included in the (1) History of Present Illness, (2) Physical Examination and (3) Medical Decision Making and/or Assessment and Plan has been reviewed and is accurate as of 07/27/25    High level of risk due to:  decision to de-escalate care.    Antwan Ely MD  Pulmonary and Critical Care Medicine  07/27/25 14:00 EDT

## 2025-07-27 NOTE — DISCHARGE SUMMARY
Death Summary    Patient name: Iain Dee  CSN: [id number]  MRN: [id number]  : 1935  Today's date: 2025     Date of Admission: 2025  Date and Time of Death:  2025; 1526    Admitting Physician:  Dr. Smiley Lindquist MD; Intensivist  Primary Care Provider: Luis Aguilar DO  Consultations:  Dr. Efrain Sanches MD; Cardiology    Admission Diagnosis: Acute on chronic combined systolic and diastolic CHF     Diagnoses at the Time of Death:     Acute on chronic combined systolic and diastolic CHF (congestive heart failure)    Essential hypertension    Atrial fibrillation    GERD (gastroesophageal reflux disease)    Gout    Diabetes mellitus    JOLENE on CPAP    Chronic anticoagulation (Eliquis)    Stage 3b chronic kidney disease    Acute hypoxemic respiratory failure    Cardiogenic shock    Procedures/Testing:  XR Chest 1 View  Result Date: 2025  Impression: Endotracheal tube projects 2.2 cm above the sukhdeep. Similar-appearing bibasilar and right middle lobe consolidation which may represent atelectasis and/or pneumonia in the appropriate clinical setting. Similar mild prominence of central pulmonary vasculature which may represent background mild pulmonary edema..Similar small left pleural effusion. Electronically Signed: Otto Reddy MD  2025 9:04 AM EDT  Workstation ID: [id number]     XR Abdomen KUB  Result Date: 2025  Impression: Interval advancement of the NG/OG tube with the sideport and tip in the region of the gastric body. Electronically Signed: Tim Harris MD  2025 4:35 PM EDT  Workstation ID: [id number]     XR Abdomen KUB  Result Date: 2025  Impression: Gastric suction tube courses leftward and inferiorly with tip terminating below the diaphragm. The sideport is likely near the GE junction. Recommend further advancement. Electronically Signed: Rudolph Vaz MD  2025 1:31 PM EDT  Workstation ID: [id number]     XR Chest 1 View  Result Date:  7/26/2025  Impression: Interval placement of additional left chest defibrillator pad. Interval placement of NG/OG tube. Redemonstration of parenchymal opacities in the right middle lobe/right lower lobe. Worsening of dense opacities of the left lung base and retrocardiac region which may reflect atelectasis and/or pleural effusion. Electronically Signed: Tim Harris MD  7/26/2025 9:09 AM EDT  Workstation ID: [id number]     CT Chest Without Contrast Diagnostic  Result Date: 7/25/2025  Impression: 1. Findings suspicious for CHF exacerbation including cardiomegaly, small bilateral pleural effusions, mild interstitial pulmonary edema, small volume ascites and body wall edema. 2. Multifocal airspace consolidation suspicious for combination of pneumonia and atelectasis. 3. ET tube 1 cm above sukhdeep. Gastric tube terminates over mid stomach with sideport just below GE junction. 4. Debris in the mid to upper esophagus which could increased risk for aspiration. 5. Cholelithiasis. Electronically Signed: Steve Cuellar MD  7/25/2025 9:57 PM EDT  Workstation ID: [id number]     CT Head Without Contrast  Result Date: 7/25/2025  Impression: 1. No acute intracranial findings by CT. Chronic intracranial findings similar to 2023. 2. Severe sinus disease with multifocal complete opacification. Consider outpatient ENT consultation to assess for obstructing lesions. Electronically Signed: Steve Cuellar MD  7/25/2025 9:34 PM EDT  Workstation ID: [id number]     XR Abdomen KUB  Result Date: 7/25/2025  Impression: Gastric tube terminates over mid to proximal stomach, oriented slightly leftward. Electronically Signed: Steve Cuellar MD  7/25/2025 8:39 PM EDT  Workstation ID: [id number]     XR Chest 1 View  Result Date: 7/25/2025  Impression: 1. ET tube 3.3 cm above sukhdeep without pneumothorax. 2. Similar left lower lobe atelectasis/airspace disease and small effusion. Suspect a trace right effusion similar to prior. Electronically Signed:  Steve Cuellar MD  7/25/2025 7:32 PM EDT  Workstation ID: [id number]     XR Chest 1 View  Result Date: 7/25/2025  Impression: 1.Cardiomegaly. 2.Bilateral perihilar and basilar subsegmental atelectasis. 3.Probable small left pleural effusion. Electronically Signed: Baljinder Encarnacion MD  7/25/2025 6:20 PM EDT  Workstation ID: [id number]       History of Present Illness & Hospital Course:   Iain Dee is a 89 y.o. male with a past medical history significant for chronic systolic and diastolic CHF with ischemic cardiomyopathy and EF 35-40%, moderate to severe aortic regurgitation, cor pulmonale, permanent atrial fibrillation on Eliquis, CKD 3B, type 2 diabetes mellitus, hypertension, gout, bladder cancer, and prostate cancer.     He was presented on 7/25/25 with weakness, shortness of breath, and increasing edema.  He was found to have hypoxemia and shock.  He underwent intubation and was placed on vasoactive drips.  CT of the chest consistent with pulmonary edema.  LFTs were elevated consistent with shock liver and he had an elevated lactic acid.    On 7/27/25, detailed discussion per the Intensivist with the wife was had and she stated that he would not want to be on mechanical ventilation and, in retrospect, regrets to agreeing to intubation 2 days ago. He was weaned from vasoactive medications and had low ventilator requirements so a pressure support trial was initiated. Patient seemed to tolerate the trial well; however, his CODE STATUS was changed to DNI with transition to palliative symptom management if respiratory status declined post extubation.     After extubation, Mr. Dee quickly decompensated, with heart rate dropping to the 30s and oxygen decreasing to the 70s. NIPPV was applied, but after discussion with his wife, he was transitioned to COMFORT MEASURES ONLY. Medication was given and NIPPV was removed.     Mr. Iain Dee was pronounced dead on 7/27/25 at 1526.    Sofia Raines, MSN, APRN,  [facility]-  Pulmonary and Critical Care Medicine  Electronically signed by COLIN Rodriguez, 07/27/25, 4:05 PM EDT.

## 2025-07-27 NOTE — PROGRESS NOTES
Cardiac Electrophysiology Inpatient Follow up Note    DATE OF ADMISSION: 7/25/2025    Luis Aguilar DO  [address] / [facility]ly [facility] [id number]  Referring Provider: No ref. provider found     Hospital Chief Complaint:   Chief Complaint   Patient presents with    Altered Mental Status     Reason for Consult: HF exacerbation    SUBJECTIVE  Remains intubated and sedated  Off of pressors  Off of dobutamine  Discussed with family who is ready to make him comfortable    Allergies   Allergen Reactions    Colchicine Other (See Comments)     Unable to walk        Current Facility-Administered Medications:     allopurinol (ZYLOPRIM) tablet 100 mg, 100 mg, Nasogastric, Daily, Lexa Reis MD    apixaban (ELIQUIS) tablet 2.5 mg, 2.5 mg, Nasogastric, Q12H, Lexa Reis MD, 2.5 mg at 07/26/25 2120    aspirin chewable tablet 81 mg, 81 mg, Nasogastric, Daily, Lexa Reis MD    sennosides-docusate (PERICOLACE) 8.6-50 MG per tablet 2 tablet, 2 tablet, Nasogastric, BID, 2 tablet at 07/26/25 2120 **AND** polyethylene glycol (MIRALAX) packet 17 g, 17 g, Nasogastric, Daily PRN **AND** [DISCONTINUED] bisacodyl (DULCOLAX) EC tablet 5 mg, 5 mg, Oral, Daily PRN **AND** bisacodyl (DULCOLAX) suppository 10 mg, 10 mg, Rectal, Daily PRN, Lexa Reis MD    bumetanide (BUMEX) injection 2 mg, 2 mg, Intravenous, Q6H, Lexa Reis MD, 2 mg at 07/27/25 0537    dexmedeTOMIDine (PRECEDEX) 400 mcg in 100 mL NS infusion, 0.2-1.5 mcg/kg/hr, Intravenous, Titrated, Lexa Reis MD, Last Rate: 13.6 mL/hr at 07/27/25 0338, 0.6 mcg/kg/hr at 07/27/25 0338    dextrose (D50W) (25 g/50 mL) IV injection 25 g, 25 g, Intravenous, Q15 Min PRN, Javon Gamez, APRN    DOBUTamine (DOBUTREX) 1 mg/mL in D5W infusion, 2-20 mcg/kg/min, Intravenous, Titrated, Javon Schwartz MD, Stopped at 07/27/25 0501    fentaNYL (SUBLIMAZE) bolus from bag 10 mcg/mL injection 50 mcg, 50 mcg, Intravenous,  Q30 Min PRN, Lexa Reis MD, 50 mcg at 07/27/25 0335    fentaNYL 2500 mcg/250 mL NS infusion,  mcg/hr, Intravenous, Titrated, Antwan Ely MD, Last Rate: 22.5 mL/hr at 07/27/25 0529, 225 mcg/hr at 07/27/25 0529    glucagon (GLUCAGEN) injection 1 mg, 1 mg, Intramuscular, Q15 Min PRN, Javon Gamez APRN    insulin glargine (LANTUS, SEMGLEE) injection 9 Units, 9 Units, Subcutaneous, Nightly, Javon Gamez APRN, 9 Units at 07/26/25 0025    insulin regular (humuLIN R,novoLIN R) injection 2-7 Units, 2-7 Units, Subcutaneous, Q6H, Javon Gamez APRN, 3 Units at 07/26/25 0025    mupirocin (BACTROBAN) 2 % nasal ointment 1 Application, 1 Application, Each Nare, BID, Javon Gamez APRN, 1 Application at 07/26/25 2120    nitroglycerin (NITROSTAT) SL tablet 0.4 mg, 0.4 mg, Sublingual, Q5 Min PRN, Javon Gamez APRN    norepinephrine (LEVOPHED) 8 mg in 250 mL NS infusion (premix), 0.02-0.3 mcg/kg/min, Intravenous, Titrated, Chasity Raines APRN, Stopped at 07/27/25 0015    pantoprazole (PROTONIX) injection 40 mg, 40 mg, Intravenous, Q24H, Javon Gamez APRN, 40 mg at 07/25/25 2156    Pharmacy Consult - MTM, , Not Applicable, Daily, Dennis Oconnor, RPH    piperacillin-tazobactam (ZOSYN) 4.5 g IVPB in 100 mL NS MBP (CD), 4.5 g, Intravenous, Q8H, Lexa Reis MD, 4.5 g at 07/27/25 0623    sodium chloride 0.9 % flush 10 mL, 10 mL, Intravenous, PRN, Javon Gamez APRN    thiamine (B-1) 500 mg in sodium chloride 0.9 % 100 mL IVPB, 500 mg, Intravenous, Q8H, Javon Gamez APRN, Last Rate: 200 mL/hr at 07/27/25 0530, 500 mg at 07/27/25 0530  dexmedetomidine, 0.2-1.5 mcg/kg/hr, Last Rate: 0.6 mcg/kg/hr (07/27/25 0338)  DOBUTamine, 2-20 mcg/kg/min, Last Rate: Stopped (07/27/25 0501)  fentanyl 10 mcg/mL,  mcg/hr, Last Rate: 225 mcg/hr (07/27/25 0529)  norepinephrine, 0.02-0.3 mcg/kg/min, Last Rate: Stopped (07/27/25 0015)            ROS   6 point  "ROS negative except as outlined in problem list, HPI and other parts of the note.      OBJECTIVE  Vitals:    07/27/25 0600 07/27/25 0712 07/27/25 0745 07/27/25 0800   BP: 124/87  117/74 (!) 124/102   BP Location:       Patient Position:       Pulse: (!) 48 (!) 45 51 (!) 49   Resp:  12     Temp:    97.5 °F (36.4 °C)   TempSrc:    Bladder   SpO2: 97% 100% 97% 95%   Weight:       Height:  188 cm (74.02\")       - Supplemental O2:     - Admit Weight  Weight: 90.7 kg (200 lb)  - Body mass index is 25.66 kg/m².    Intake/Output Summary (Last 24 hours) at 7/27/2025 0843  Last data filed at 7/27/2025 0800  Gross per 24 hour   Intake 499.6 ml   Output 4785 ml   Net -4285.4 ml     Physical Exam:  Gen:  intubated and sedated  Eyes:  sclerae anicteric  ENT:  MMM, trachea midline, NC/AT, ETT noted  Resp:  Mechanical breath sounds noted  Cardio:  IRIR no M/R/G  GI:  S/NT/ND, +BS  Ext:  WWP, no LE edema, no joint effusions  Skin: No jaundice or rash  Neuro: unable to fully assess Cns or motor/sensory functions  Psych:  intubated and sedated      Lab  Results from last 7 days   Lab Units 07/27/25  0253 07/26/25  1802 07/26/25  0523 07/25/25  1805 07/25/25  1757   WBC 10*3/mm3 7.23  --  7.88  --  7.74   HEMOGLOBIN g/dL 9.0* 9.1* 9.1*  --  9.8*   HEMOGLOBIN, POC   --   --   --    < >  --    HEMATOCRIT % 30.0* 29.3* 28.9*  --  34.3*   HEMATOCRIT POC   --   --   --    < >  --    PLATELETS 10*3/mm3 230  --  219  --  261    < > = values in this interval not displayed.     Results from last 7 days   Lab Units 07/27/25  0253 07/26/25  0523 07/26/25  0100   SODIUM mmol/L 138 136 136   POTASSIUM mmol/L 3.8 4.6 4.6   CHLORIDE mmol/L 95* 92* 95*   CO2 mmol/L 29.7* 32.0* 24.4   BUN mg/dL 59.0* 65.1* 58.7*   CREATININE mg/dL 2.30* 2.16* 2.04*   GLUCOSE mg/dL 57* 111* 187*   CALCIUM mg/dL 8.6 8.7 8.5*     Results from last 7 days   Lab Units 07/27/25  0253   MAGNESIUM mg/dL 2.1         Results from last 7 days   Lab Units 07/25/25  1925 " 07/25/25  1757   CK TOTAL U/L 77  --    HSTROP T ng/L 139* 164*         Results from last 7 days   Lab Units 07/25/25  1757   INR  2.14*   APTT seconds 42.0*         Results from last 7 days   Lab Units 07/26/25  0523   HEMOGLOBIN A1C % 7.07*     Results from last 7 days   Lab Units 07/25/25  1757   PROBNP pg/mL 25,711.0*     Results from last 7 days   Lab Units 07/25/25  1757   PROBNP pg/mL 25,711.0*       Chest X-Ray:  Imaging Results (Last 24 Hours)       Procedure Component Value Units Date/Time    XR Chest 1 View - In process [[id number]] Resulted: 07/27/25 0320     Updated: 07/27/25 0545    This result has not been signed. Information might be incomplete.      XR Abdomen KUB [[id number]] Collected: 07/26/25 1632     Updated: 07/26/25 1638    Narrative:      XR ABDOMEN KUB    Date of Exam: 7/26/2025 3:54 PM EDT    Indication: og tube advanced , assess placement    Comparison: KUB 7/26/2025    Findings:  Interval advancement of NG/OG tube with the sideport and tip in the region of the gastric body. Nonobstructive, nonspecific bowel gas pattern. Lung bases are excluded from the field-of-view.      Impression:      Impression:  Interval advancement of the NG/OG tube with the sideport and tip in the region of the gastric body.      Electronically Signed: Tim Harris MD    7/26/2025 4:35 PM EDT    Workstation ID: [id number]    XR Abdomen KUB [[id number]] Collected: 07/26/25 1329     Updated: 07/26/25 1334    Narrative:      XR ABDOMEN KUB    Date of Exam: 7/26/2025 11:04 AM EDT    Indication: og tube placement    Comparison: KUB dated 7/25/2025, CT chest dated 7/25/2025    Findings:  The endotracheal tube is 2.7 cm above the sukhdeep. There is a gastric suction tube which courses leftward and inferiorly with tip terminating below the diaphragm. The sideport is likely near the GE junction. Recommend further advancement.      Impression:      Impression:  Gastric suction tube courses leftward and inferiorly with tip  terminating below the diaphragm. The sideport is likely near the GE junction. Recommend further advancement.        Electronically Signed: Rudolph Vaz MD    7/26/2025 1:31 PM EDT    Workstation ID: [id number]    XR Chest 1 View [[id number]] Collected: 07/26/25 0906     Updated: 07/26/25 0912    Narrative:      XR CHEST 1 VW    Date of Exam: 7/26/2025 2:56 AM EDT    Indication: Intubated Patient    Comparison: Chest x-ray 7/25/2025    Findings:  Placement of additional left chest defibrillator pad. Redemonstration of endotracheal tube terminating in the lower trachea. NG/OG tube courses into the stomach, new from prior chest x-ray. Stable cardiomegaly. Dense parenchymal opacities are seen at the   left lung base and retrocardiac region, likely worsened. Redemonstration of parenchymal opacities in the right middle lobe and right lower lobe. The right lung appears grossly clear. No pneumothorax.      Impression:      Impression:  Interval placement of additional left chest defibrillator pad. Interval placement of NG/OG tube.    Redemonstration of parenchymal opacities in the right middle lobe/right lower lobe. Worsening of dense opacities of the left lung base and retrocardiac region which may reflect atelectasis and/or pleural effusion.      Electronically Signed: Tim Harris MD    7/26/2025 9:09 AM EDT    Workstation ID: [id number]          ECHO: REVIEWED   STRESS: REVIEWED  CATH: REVIEWED    The patient's old records including ambulatory rhythm recordings (ECGs, Holter/event monitor) were reviewed and discussed.    I personally viewed and interpreted the patient's EKG/Telemetry/lab data      ASSESSMENT & PLAN     Acute on chronic HFrEF  SHANT on CKD c/b Hyperkalemia  Congestive hepatopathy  Permanent AF  - intubated and sedated  - off of dobutamine   - continuing renal failure  - on bumex 2mg IV q6h  - declined HD  - empiric abx coverage     Extensive discussion with his wife who was at his bedside today. She  brought in his Living Will and would like to start the process of making him comfortable- I assured that this was a loving decision. ICU team notified. Cardiology team will sign off at this time. Please feel free to reach out with further questions or concern.       Efrain Sanches MD [facility]  Cardiac Electrophysiologist  [facility]     07/27/25  08:43 EDT

## 2025-07-27 NOTE — PLAN OF CARE
- pt very restless/uncooperative overnight  - VSS, dobutamine/levo on and off overnight, very sensitive to dobutamine   - fent @ 225, precedex @ 0.4  - femostop removed overnight, site ALIS + C/D/I, monitoring area   - UOP: 1810  - restraints in place     Problem: Restraint, Nonviolent  Goal: Absence of Harm or Injury  Outcome: Not Progressing  Intervention: Implement Least Restrictive Safety Strategies  Recent Flowsheet Documentation  Taken 7/27/2025 0600 by Shaunna Lyles RN  Medical Device Protection:   torso covered   tubing secured  Diversional Activities: television  Taken 7/27/2025 0400 by Shaunna Lyles RN  Medical Device Protection:   torso covered   tubing secured  Diversional Activities: television  Taken 7/27/2025 0200 by hSaunna Lyles RN  Medical Device Protection:   torso covered   tubing secured  Diversional Activities: television  Taken 7/27/2025 0029 by Shaunna Lyles RN  Medical Device Protection:   torso covered   tubing secured  Diversional Activities: television  Taken 7/27/2025 0000 by Shaunna Lyles RN  Medical Device Protection:   torso covered   tubing secured  Diversional Activities: television  Taken 7/26/2025 2200 by Shaunna Lyles RN  Medical Device Protection:   torso covered   tubing secured  Diversional Activities: television  Taken 7/26/2025 2000 by Shaunna Lyles RN  Medical Device Protection:   torso covered   tubing secured  Diversional Activities: television  Intervention: Protect Dignity, Rights and Personal Wellbeing  Recent Flowsheet Documentation  Taken 7/27/2025 0600 by Shaunna Lyles RN  Trust Relationship/Rapport:   care explained   reassurance provided  Taken 7/27/2025 0400 by Shaunna Lyles RN  Trust Relationship/Rapport:   care explained   reassurance provided  Taken 7/27/2025 0200 by Shaunna Lyles RN  Trust Relationship/Rapport:   care explained   reassurance provided  Taken 7/27/2025 0000 by Shaunna Lyles RN  Trust Relationship/Rapport:   care explained    reassurance provided  Taken 7/26/2025 2200 by Shaunna Lyles RN  Trust Relationship/Rapport:   care explained   reassurance provided  Taken 7/26/2025 2000 by Shaunna Lyles RN  Trust Relationship/Rapport:   care explained   reassurance provided  Intervention: Protect Skin and Joint Integrity  Recent Flowsheet Documentation  Taken 7/27/2025 0600 by Shaunna Lyles RN  Body Position:   left   turned   lower extremity elevated   upper extremity elevated   weight shifting  Taken 7/27/2025 0530 by Shaunna Lyles RN  Body Position:   right   turned   lower extremity elevated   weight shifting   upper extremity elevated  Taken 7/27/2025 0400 by Shaunna Lyles RN  Body Position:   left   turned   lower extremity elevated   upper extremity elevated   weight shifting  Skin Protection:   incontinence pads utilized   transparent dressing maintained  Taken 7/27/2025 0200 by Shaunna Lyles RN  Body Position:   right   turned   lower extremity elevated   upper extremity elevated   weight shifting  Skin Protection:   incontinence pads utilized   transparent dressing maintained  Taken 7/27/2025 0000 by Shaunna Lyles RN  Body Position:   left   upper extremity elevated   weight shifting  Skin Protection:   incontinence pads utilized   transparent dressing maintained  Taken 7/26/2025 2200 by Shaunna Lyles RN  Body Position: supine  Skin Protection:   incontinence pads utilized   transparent dressing maintained  Taken 7/26/2025 2000 by Shaunna Lyles RN  Body Position: supine  Skin Protection:   incontinence pads utilized   transparent dressing maintained  Range of Motion: ROM (range of motion) performed   Goal Outcome Evaluation:

## 2025-07-28 LAB
BACTERIA SPEC RESP CULT: NORMAL
GRAM STN SPEC: NORMAL

## 2025-07-28 NOTE — PROGRESS NOTES
Established Patient Virtual Visit      Patient Name: Iain Dee  : 1935   MRN: [id number]   Care Team: Patient Care Team:  Luis Aguilar DO as PCP - General (Family Medicine)  Iain Bernstein MD as Consulting Physician (Cardiology)  Isabel Stover MD as Cardiologist (Cardiology)  SANDY CALLAWAY as Resident  [facility] as Resident    Chief Complaint:  No chief complaint on file.      History of Present Illness: Iain Dee is a 89 y.o. male who is here today for chief complaint.    HPI    History of Present Illness  The patient is an 89-year-old male who is seen today by virtual visit for upper respiratory symptoms.    He reports feeling unwell, with symptoms similar to influenza, including a cough and runny nose. These symptoms have been present since his childhood. He has been experiencing these symptoms for the past 3 weeks, accompanied by chest congestion. A nurse has confirmed the absence of fluid in his lungs. He also reports nausea, which has been affecting his appetite for the last couple of weeks. He expresses a desire for medication to alleviate his nausea and cough.    He experiences frequent pain at night, which often disrupts his sleep. He is currently on gabapentin for pain management and has been taking extra doses to manage the pain, which may lead to running out of the medication sooner than expected.    He occasionally experiences a strong urge to urinate, which he finds difficult to control when leaning forward. However, he does not report frequent urination.    He inquires if it is okay to have a milkshake once a week.    Social History:  Sleep: He experiences frequent pain at night, which often disrupts his sleep.      You have chosen to receive care through a telehealth visit.  Do you consent to use a video/audio connection for your medical care today? Yes    Patient location: [place] [id number]   Provider Location: [address] [place] [address]  [place] [address]    The following portions of the patient's history were reviewed and updated as appropriate: allergies, current medications, past family history, past medical history, past social history, past surgical history and problem list.    Subjective      Review of Systems:   Review of Systems - See HPI  Review of Systems -  General ROS: negative for - chills, fever or night sweats  Cardiovascular ROS: no chest pain or dyspnea on exertion  Gastrointestinal ROS: no abdominal pain, change in bowel habits, or black or bloody stools  Genito-Urinary ROS: no dysuria, trouble voiding, or hematuria  Past Medical History:   Past Medical History:   Diagnosis Date    A-fib     Arthritis     Cancer     Diabetes mellitus     GERD (gastroesophageal reflux disease)     Gout     H/O Bladder carcinoma (HCC)     Hypertension        Past Surgical History:   Past Surgical History:   Procedure Laterality Date    BLADDER SURGERY      Biopsy    HERNIA REPAIR      PROSTATE BIOPSY         Family History:   Family History   Problem Relation Age of Onset    No Known Problems Mother     Heart attack Father        Social History:   Social History     Socioeconomic History    Marital status:    Tobacco Use    Smoking status: Never     Passive exposure: Never    Smokeless tobacco: Never   Vaping Use    Vaping status: Never Used   Substance and Sexual Activity    Alcohol use: Yes     Alcohol/week: 1.0 standard drink of alcohol     Types: 1 Shots of liquor per week     Comment: Estimation based on spouse report of patient drinking \"1 large martini\" each night (regular martini = 3 shots, large martini = est. 4 shots)    Drug use: No    Sexual activity: Defer       Tobacco History:   Social History     Tobacco Use   Smoking Status Never    Passive exposure: Never   Smokeless Tobacco Never       Medications:   Outpatient Medications Prior to Visit   Medication Sig Dispense Refill    apixaban (ELIQUIS) 2.5 MG tablet tablet Take 1 " tablet by mouth 2 (Two) Times a Day. Indications: Atrial Fibrillation 60 tablet 11    aspirin 81 MG EC tablet Take 1 tablet by mouth Daily. 30 tablet 0    atorvastatin (LIPITOR) 40 MG tablet Take 1 tablet by mouth Every Night. 90 tablet 3    bumetanide (BUMEX) 2 MG tablet Take 1 tablet by mouth 2 (Two) Times a Day. 60 tablet 3    carvedilol (COREG) 6.25 MG tablet Take 1 tablet by mouth 2 (Two) Times a Day With Meals.      famotidine (PEPCID) 20 MG tablet Take 1 tablet by mouth 2 (Two) Times a Day As Needed for Heartburn. 180 tablet 0    indomethacin (INDOCIN) 25 MG capsule Take 1 capsule by mouth 3 (Three) Times a Day As Needed for Moderate Pain. 9 capsule 0    Jardiance 10 MG tablet tablet Take 1 tablet by mouth Daily. 90 tablet 11    magnesium oxide (MAG-OX) 400 MG tablet Take 1 tablet by mouth Daily. 30 tablet 11    metFORMIN (GLUCOPHAGE) 500 MG tablet Take 1 tablet by mouth 2 (Two) Times a Day With Meals. 180 tablet 0    methylPREDNISolone (MEDROL) 4 MG dose pack Take as directed on package instructions. 21 tablet 0    metOLazone (ZAROXOLYN) 2.5 MG tablet Take 1 tablet by mouth Daily. Take 1 tablet 30 minutes before Bumex daily times 3 days 3 tablet 0    nitroglycerin (NITRODUR) 0.2 MG/HR patch Place 1 patch on the skin as directed by provider See Admin Instructions. Apply patch daily, remove at night for at least 10 hours (Patient not taking: Reported on 6/18/2025) 90 patch 3    potassium chloride 10 MEQ CR tablet Take 2 tablets by mouth Daily. 60 tablet 3    gabapentin (NEURONTIN) 100 MG capsule Take 1 capsule by mouth 2 (Two) Times a Day. 60 capsule 5     No facility-administered medications prior to visit.        Allergies:   Allergies   Allergen Reactions    Colchicine Other (See Comments)     Unable to walk        Objective   Objective     Physical Exam:  Vital Signs:  There were no vitals taken for this visit.  Vitals obtained from patient if available  Physical Exam  Const: Non-toxic appearing, NAD  ENT:  No copious nasal drainage noted  Cardiac: Regular rate by pulse  Resp: Respiratory rate observed to be within normal limits, no increased work of breathing observed, no audible wheezing or cough noted  Psych: Appropriate mood and behavior.  Assessment & Plan   Assessment / Plan      Assessment/Plan:   Problems Addressed This Visit  Problem List Items Addressed This Visit          Endocrine and Metabolic    Diabetes mellitus    Overview   He was on Levemir in the hospital and at the [facility], stopped on his own at discharge and was started on metformin at his hospital follow-up in April.          Relevant Orders    Hemoglobin A1c    Microalbumin / Creatinine Urine Ratio - Urine, Clean Catch    Lipid Panel       Genitourinary and Reproductive     Hypomagnesemia    Overview   Possibly PPI-related         Relevant Orders    Magnesium    Stage 3b chronic kidney disease    Relevant Orders    Comprehensive Metabolic Panel    CBC & Differential    Magnesium     Other Visit Diagnoses         Hyperuricemia    -  Primary    Relevant Medications    allopurinol (Zyloprim) 300 MG tablet      Chest congestion        Relevant Medications    amoxicillin-clavulanate (AUGMENTIN) 875-125 MG per tablet    ondansetron ODT (ZOFRAN-ODT) 8 MG disintegrating tablet    fluticasone (FLONASE) 50 MCG/ACT nasal spray      Neuropathic pain        Relevant Medications    gabapentin (NEURONTIN) 100 MG capsule      Shortness of breath        Relevant Orders    proBNP      Urinary hesitancy        Relevant Orders    PSA DIAGNOSTIC    UA / M With / Rflx Culture([facility] ONLY) - Urine, Clean Catch          Assessment & Plan  1. Upper respiratory symptoms.  - Nausea likely due to postnasal drainage from nasal congestion.  - In April 2025, experienced shortness of breath and mild fluid retention. Kidney function showed a slight decline in June 2025, but subsequent tests on 07/18/2025 at [facility] indicated improvement.  - Antibiotic will be prescribed  for chest congestion. Home health will be instructed to draw labs. Instructed to attend ER if he has any shortness of breath, fever, or worsening cough.  - Prescription for nausea medication will be provided. Nasal spray will be recommended to alleviate congestion.    2. Pain management.  - Experiencing pain at night, managed with gabapentin.  - New prescription for gabapentin will be issued, to be taken three times daily.  - All previous prescriptions will be cancelled.  - Patient can give an extra gabapentin if needed.    3. Urinary urgency.  - Reports increased urge to urinate, which may be related to prostate inflammation or kidney function.  - Labs will be drawn by home health to investigate these potential causes.  - Monitoring for any changes in urinary patterns.    4. Blood sugar management.  - Advised that he can have a milkshake once a week but should keep them small and seldom.  - Blood sugar levels reviewed and deemed acceptable for occasional milkshake consumption.    Patient or patient representative verbalized consent for the use of Ambient Listening during the visit with  Luis Aguilar DO for chart documentation. 7/27/2025 23:56 EDT     See patient diagnoses and orders along with patient instructions for assessment, plan, and changes to care for patient.    This visit was conducted via telemedicine with live video and audio provided through [facility] at the point of care.    Patient Instructions   Start allopurinol for uric acid/gout prevention    No follow-ups on file.    DO JACOBY WillisE [address]  [facility]  [address]  [facility] [id number]  Fax [phone]  Phone [phone]    Disclaimer to patients: The 21st Century C[employer] Act makes medical notes like these available to patients in the interest of transparency. However, please be advised that this is still a medical document. It is intended as [id number] communication. Many  sections may include medical language or jargon, abbreviations, and additional verbiage that are unfamiliar or confusing. In some ways it may come across as blunt, direct, or may be summarized in order to clearly and concisely communicate the most crucial information to medical professionals. It may also include mentions of conditions that are unlikely but considered as part of the differential diagnosis, including serious disorders. These are not always discussed at length at the time of appointment because their likelihood is so low, but may be included in a medical note to make it clear what has been considered and/or ruled out as part of a work-up. Medical documents are intended to carry relevant information, facts as evident, and the personal clinical opinion of the physician. If you have any questions regarding this medical document, please bring them to the attention of the physician at your next scheduled appointment.

## 2025-07-30 LAB
BACTERIA SPEC AEROBE CULT: NORMAL
BACTERIA SPEC AEROBE CULT: NORMAL

## 2025-08-02 LAB
QT INTERVAL: 440 MS
QTC INTERVAL: 435 MS